# Patient Record
Sex: FEMALE | Race: WHITE | NOT HISPANIC OR LATINO | Employment: OTHER | URBAN - NONMETROPOLITAN AREA
[De-identification: names, ages, dates, MRNs, and addresses within clinical notes are randomized per-mention and may not be internally consistent; named-entity substitution may affect disease eponyms.]

---

## 2017-03-30 ENCOUNTER — TRANSCRIBE ORDERS (OUTPATIENT)
Dept: PHYSICAL THERAPY | Facility: HOSPITAL | Age: 47
End: 2017-03-30

## 2017-03-30 DIAGNOSIS — M54.5 LOW BACK PAIN, UNSPECIFIED BACK PAIN LATERALITY, UNSPECIFIED CHRONICITY, WITH SCIATICA PRESENCE UNSPECIFIED: Primary | ICD-10-CM

## 2017-04-25 ENCOUNTER — APPOINTMENT (OUTPATIENT)
Dept: PHYSICAL THERAPY | Facility: HOSPITAL | Age: 47
End: 2017-04-25
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
## 2019-07-15 ENCOUNTER — OFFICE VISIT (OUTPATIENT)
Dept: FAMILY MEDICINE CLINIC | Facility: CLINIC | Age: 49
End: 2019-07-15

## 2019-07-15 VITALS
DIASTOLIC BLOOD PRESSURE: 92 MMHG | HEIGHT: 65 IN | OXYGEN SATURATION: 97 % | WEIGHT: 113.6 LBS | SYSTOLIC BLOOD PRESSURE: 110 MMHG | TEMPERATURE: 98 F | HEART RATE: 91 BPM | BODY MASS INDEX: 18.93 KG/M2

## 2019-07-15 DIAGNOSIS — Z13.1 ENCOUNTER FOR SCREENING FOR DIABETES MELLITUS: ICD-10-CM

## 2019-07-15 DIAGNOSIS — R90.89 OTHER ABNORMAL FINDINGS ON DIAGNOSTIC IMAGING OF CENTRAL NERVOUS SYSTEM: ICD-10-CM

## 2019-07-15 DIAGNOSIS — K50.919 CROHN'S DISEASE WITH COMPLICATION, UNSPECIFIED GASTROINTESTINAL TRACT LOCATION (HCC): Primary | ICD-10-CM

## 2019-07-15 DIAGNOSIS — J44.9 CHRONIC OBSTRUCTIVE PULMONARY DISEASE, UNSPECIFIED COPD TYPE (HCC): ICD-10-CM

## 2019-07-15 DIAGNOSIS — Z72.0 TOBACCO USER: ICD-10-CM

## 2019-07-15 DIAGNOSIS — E53.8 B12 DEFICIENCY: ICD-10-CM

## 2019-07-15 DIAGNOSIS — Z71.6 TOBACCO ABUSE COUNSELING: ICD-10-CM

## 2019-07-15 DIAGNOSIS — Z12.11 ENCOUNTER FOR SCREENING COLONOSCOPY: ICD-10-CM

## 2019-07-15 DIAGNOSIS — Z12.31 SCREENING MAMMOGRAM, ENCOUNTER FOR: ICD-10-CM

## 2019-07-15 DIAGNOSIS — R59.0 CERVICAL LYMPHADENOPATHY: ICD-10-CM

## 2019-07-15 DIAGNOSIS — Z00.00 GENERAL MEDICAL EXAMINATION: ICD-10-CM

## 2019-07-15 DIAGNOSIS — E55.9 VITAMIN D DEFICIENCY: ICD-10-CM

## 2019-07-15 DIAGNOSIS — Z13.6 ENCOUNTER FOR SCREENING FOR CARDIOVASCULAR DISORDERS: ICD-10-CM

## 2019-07-15 DIAGNOSIS — K59.04 CHRONIC IDIOPATHIC CONSTIPATION: ICD-10-CM

## 2019-07-15 DIAGNOSIS — Z00.00 ANNUAL PHYSICAL EXAM: ICD-10-CM

## 2019-07-15 DIAGNOSIS — Z13.29 ENCOUNTER FOR SCREENING FOR ENDOCRINE DISORDER: ICD-10-CM

## 2019-07-15 DIAGNOSIS — R79.9 ABNORMAL FINDING OF BLOOD CHEMISTRY: ICD-10-CM

## 2019-07-15 PROCEDURE — 99203 OFFICE O/P NEW LOW 30 MIN: CPT | Performed by: FAMILY MEDICINE

## 2019-07-15 RX ORDER — VARENICLINE TARTRATE 1 MG/1
1 TABLET, FILM COATED ORAL 2 TIMES DAILY
Qty: 60 TABLET | Refills: 3 | Status: SHIPPED | OUTPATIENT
Start: 2019-07-15 | End: 2019-07-29

## 2019-07-15 RX ORDER — VARENICLINE TARTRATE 0.5 MG/1
TABLET, FILM COATED ORAL
Qty: 90 TABLET | Refills: 0 | Status: SHIPPED | OUTPATIENT
Start: 2019-07-15 | End: 2019-07-29

## 2019-07-15 RX ORDER — IPRATROPIUM/ALBUTEROL SULFATE 20-100 MCG
MIST INHALER (GRAM) INHALATION
Refills: 0 | COMMUNITY
Start: 2019-06-13 | End: 2019-08-29

## 2019-07-16 ENCOUNTER — LAB (OUTPATIENT)
Dept: LAB | Facility: HOSPITAL | Age: 49
End: 2019-07-16

## 2019-07-16 DIAGNOSIS — Z00.00 GENERAL MEDICAL EXAMINATION: ICD-10-CM

## 2019-07-16 DIAGNOSIS — R79.9 ABNORMAL FINDING OF BLOOD CHEMISTRY: ICD-10-CM

## 2019-07-16 DIAGNOSIS — E55.9 VITAMIN D DEFICIENCY: ICD-10-CM

## 2019-07-16 DIAGNOSIS — Z12.31 SCREENING MAMMOGRAM, ENCOUNTER FOR: ICD-10-CM

## 2019-07-16 DIAGNOSIS — Z12.11 ENCOUNTER FOR SCREENING COLONOSCOPY: ICD-10-CM

## 2019-07-16 DIAGNOSIS — R90.89 OTHER ABNORMAL FINDINGS ON DIAGNOSTIC IMAGING OF CENTRAL NERVOUS SYSTEM: ICD-10-CM

## 2019-07-16 DIAGNOSIS — Z13.29 ENCOUNTER FOR SCREENING FOR ENDOCRINE DISORDER: ICD-10-CM

## 2019-07-16 DIAGNOSIS — Z00.00 ANNUAL PHYSICAL EXAM: ICD-10-CM

## 2019-07-16 DIAGNOSIS — Z13.1 ENCOUNTER FOR SCREENING FOR DIABETES MELLITUS: ICD-10-CM

## 2019-07-16 DIAGNOSIS — E53.8 B12 DEFICIENCY: ICD-10-CM

## 2019-07-16 DIAGNOSIS — J44.9 CHRONIC OBSTRUCTIVE PULMONARY DISEASE, UNSPECIFIED COPD TYPE (HCC): ICD-10-CM

## 2019-07-16 DIAGNOSIS — Z13.6 ENCOUNTER FOR SCREENING FOR CARDIOVASCULAR DISORDERS: ICD-10-CM

## 2019-07-16 DIAGNOSIS — K50.919 CROHN'S DISEASE WITH COMPLICATION, UNSPECIFIED GASTROINTESTINAL TRACT LOCATION (HCC): ICD-10-CM

## 2019-07-16 LAB
25(OH)D3 SERPL-MCNC: 26.1 NG/ML (ref 30–100)
ALBUMIN SERPL-MCNC: 4.5 G/DL (ref 3.5–5.2)
ALBUMIN/GLOB SERPL: 2 G/DL
ALP SERPL-CCNC: 79 U/L (ref 39–117)
ALT SERPL W P-5'-P-CCNC: 23 U/L (ref 1–33)
ANION GAP SERPL CALCULATED.3IONS-SCNC: 11 MMOL/L (ref 5–15)
AST SERPL-CCNC: 23 U/L (ref 1–32)
BASOPHILS # BLD AUTO: 0.11 10*3/MM3 (ref 0–0.2)
BASOPHILS NFR BLD AUTO: 1.2 % (ref 0–1.5)
BILIRUB SERPL-MCNC: 0.5 MG/DL (ref 0.2–1.2)
BUN BLD-MCNC: 15 MG/DL (ref 6–20)
BUN/CREAT SERPL: 20.8 (ref 7–25)
CALCIUM SPEC-SCNC: 9.2 MG/DL (ref 8.6–10.5)
CHLORIDE SERPL-SCNC: 101 MMOL/L (ref 98–107)
CHOLEST SERPL-MCNC: 178 MG/DL (ref 0–200)
CO2 SERPL-SCNC: 31 MMOL/L (ref 22–29)
CREAT BLD-MCNC: 0.72 MG/DL (ref 0.57–1)
CRP SERPL-MCNC: 0.09 MG/DL (ref 0–0.5)
DEPRECATED RDW RBC AUTO: 44.1 FL (ref 37–54)
EOSINOPHIL # BLD AUTO: 0.41 10*3/MM3 (ref 0–0.4)
EOSINOPHIL NFR BLD AUTO: 4.4 % (ref 0.3–6.2)
ERYTHROCYTE [DISTWIDTH] IN BLOOD BY AUTOMATED COUNT: 12.4 % (ref 12.3–15.4)
ERYTHROCYTE [SEDIMENTATION RATE] IN BLOOD: 2 MM/HR (ref 0–20)
GFR SERPL CREATININE-BSD FRML MDRD: 85 ML/MIN/1.73
GLOBULIN UR ELPH-MCNC: 2.3 GM/DL
GLUCOSE BLD-MCNC: 96 MG/DL (ref 65–99)
HBA1C MFR BLD: 5.6 % (ref 4.8–5.6)
HCT VFR BLD AUTO: 45.3 % (ref 34–46.6)
HDLC SERPL-MCNC: 47 MG/DL (ref 40–60)
HGB BLD-MCNC: 14.4 G/DL (ref 12–15.9)
IMM GRANULOCYTES # BLD AUTO: 0.06 10*3/MM3 (ref 0–0.05)
IMM GRANULOCYTES NFR BLD AUTO: 0.7 % (ref 0–0.5)
LDLC SERPL CALC-MCNC: 99 MG/DL (ref 0–100)
LDLC/HDLC SERPL: 2.11 {RATIO}
LYMPHOCYTES # BLD AUTO: 3.72 10*3/MM3 (ref 0.7–3.1)
LYMPHOCYTES NFR BLD AUTO: 40.3 % (ref 19.6–45.3)
MCH RBC QN AUTO: 30.6 PG (ref 26.6–33)
MCHC RBC AUTO-ENTMCNC: 31.8 G/DL (ref 31.5–35.7)
MCV RBC AUTO: 96.2 FL (ref 79–97)
MONOCYTES # BLD AUTO: 0.91 10*3/MM3 (ref 0.1–0.9)
MONOCYTES NFR BLD AUTO: 9.9 % (ref 5–12)
NEUTROPHILS # BLD AUTO: 4.01 10*3/MM3 (ref 1.7–7)
NEUTROPHILS NFR BLD AUTO: 43.5 % (ref 42.7–76)
NRBC BLD AUTO-RTO: 0 /100 WBC (ref 0–0.2)
PLATELET # BLD AUTO: 434 10*3/MM3 (ref 140–450)
PMV BLD AUTO: 9.8 FL (ref 6–12)
POTASSIUM BLD-SCNC: 4.8 MMOL/L (ref 3.5–5.2)
PROT SERPL-MCNC: 6.8 G/DL (ref 6–8.5)
RBC # BLD AUTO: 4.71 10*6/MM3 (ref 3.77–5.28)
SODIUM BLD-SCNC: 143 MMOL/L (ref 136–145)
T3FREE SERPL-MCNC: 3.81 PG/ML (ref 2–4.4)
T4 FREE SERPL-MCNC: 1.16 NG/DL (ref 0.93–1.7)
TRIGL SERPL-MCNC: 159 MG/DL (ref 0–150)
TSH SERPL DL<=0.05 MIU/L-ACNC: 3.55 MIU/ML (ref 0.27–4.2)
VIT B12 BLD-MCNC: 782 PG/ML (ref 211–946)
VLDLC SERPL-MCNC: 31.8 MG/DL
WBC NRBC COR # BLD: 9.22 10*3/MM3 (ref 3.4–10.8)

## 2019-07-16 PROCEDURE — 85025 COMPLETE CBC W/AUTO DIFF WBC: CPT

## 2019-07-16 PROCEDURE — 86140 C-REACTIVE PROTEIN: CPT

## 2019-07-16 PROCEDURE — 82607 VITAMIN B-12: CPT

## 2019-07-16 PROCEDURE — 84439 ASSAY OF FREE THYROXINE: CPT

## 2019-07-16 PROCEDURE — 86340 INTRINSIC FACTOR ANTIBODY: CPT

## 2019-07-16 PROCEDURE — 80053 COMPREHEN METABOLIC PANEL: CPT

## 2019-07-16 PROCEDURE — 80061 LIPID PANEL: CPT

## 2019-07-16 PROCEDURE — 83036 HEMOGLOBIN GLYCOSYLATED A1C: CPT

## 2019-07-16 PROCEDURE — 84481 FREE ASSAY (FT-3): CPT

## 2019-07-16 PROCEDURE — 85651 RBC SED RATE NONAUTOMATED: CPT

## 2019-07-16 PROCEDURE — 82306 VITAMIN D 25 HYDROXY: CPT

## 2019-07-16 PROCEDURE — 84443 ASSAY THYROID STIM HORMONE: CPT

## 2019-07-17 ENCOUNTER — OFFICE VISIT (OUTPATIENT)
Dept: GASTROENTEROLOGY | Facility: CLINIC | Age: 49
End: 2019-07-17

## 2019-07-17 ENCOUNTER — TELEPHONE (OUTPATIENT)
Dept: FAMILY MEDICINE CLINIC | Facility: CLINIC | Age: 49
End: 2019-07-17

## 2019-07-17 VITALS
DIASTOLIC BLOOD PRESSURE: 60 MMHG | HEIGHT: 65 IN | BODY MASS INDEX: 19.13 KG/M2 | HEART RATE: 88 BPM | WEIGHT: 114.8 LBS | SYSTOLIC BLOOD PRESSURE: 102 MMHG

## 2019-07-17 DIAGNOSIS — R10.84 GENERALIZED ABDOMINAL PAIN: Primary | ICD-10-CM

## 2019-07-17 DIAGNOSIS — R11.0 NAUSEA: ICD-10-CM

## 2019-07-17 PROCEDURE — 99214 OFFICE O/P EST MOD 30 MIN: CPT | Performed by: NURSE PRACTITIONER

## 2019-07-17 RX ORDER — SODIUM, POTASSIUM,MAG SULFATES 17.5-3.13G
1 SOLUTION, RECONSTITUTED, ORAL ORAL EVERY 12 HOURS
Qty: 2 BOTTLE | Refills: 0 | Status: ON HOLD | OUTPATIENT
Start: 2019-07-17 | End: 2019-10-18

## 2019-07-17 RX ORDER — ERGOCALCIFEROL 1.25 MG/1
50000 CAPSULE ORAL WEEKLY
Qty: 4 CAPSULE | Refills: 3 | Status: SHIPPED | OUTPATIENT
Start: 2019-07-17 | End: 2020-07-23 | Stop reason: SDUPTHER

## 2019-07-17 RX ORDER — DEXTROSE AND SODIUM CHLORIDE 5; .45 G/100ML; G/100ML
30 INJECTION, SOLUTION INTRAVENOUS CONTINUOUS PRN
Status: CANCELLED | OUTPATIENT
Start: 2019-10-18

## 2019-07-17 NOTE — TELEPHONE ENCOUNTER
----- Message from Xavier Wick MD sent at 7/16/2019  1:22 PM CDT -----  Thyroid test so far normal

## 2019-07-17 NOTE — TELEPHONE ENCOUNTER
----- Message from Xavier Wick MD sent at 7/17/2019  9:36 AM CDT -----  TSH normal    Vitamin D is low and start pt on Vitamin D 50,000 units once a week Give 4 pills and 3 refills     Vitamin B12 levels normal. Awaiting intrinsic factor test    hga1c normal    ESR inflammation marker normal    awiating rest of labwork     Thanks
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
## 2019-07-18 LAB — IF BLOCK AB SER-ACNC: 0.9 AU/ML (ref 0–1.1)

## 2019-07-19 ENCOUNTER — TELEPHONE (OUTPATIENT)
Dept: FAMILY MEDICINE CLINIC | Facility: CLINIC | Age: 49
End: 2019-07-19

## 2019-07-19 NOTE — TELEPHONE ENCOUNTER
----- Message from Xavier Wick MD sent at 7/18/2019  3:38 PM CDT -----  Intrinsic factor normal..  Suggests that pt does not have pernicious anemia  Called pt
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
## 2019-07-29 ENCOUNTER — OFFICE VISIT (OUTPATIENT)
Dept: FAMILY MEDICINE CLINIC | Facility: CLINIC | Age: 49
End: 2019-07-29

## 2019-07-29 VITALS
HEIGHT: 65 IN | DIASTOLIC BLOOD PRESSURE: 70 MMHG | OXYGEN SATURATION: 97 % | SYSTOLIC BLOOD PRESSURE: 110 MMHG | TEMPERATURE: 98.9 F | BODY MASS INDEX: 19.16 KG/M2 | WEIGHT: 115 LBS | HEART RATE: 94 BPM

## 2019-07-29 DIAGNOSIS — J44.9 CHRONIC OBSTRUCTIVE PULMONARY DISEASE, UNSPECIFIED COPD TYPE (HCC): ICD-10-CM

## 2019-07-29 DIAGNOSIS — E78.2 MIXED HYPERLIPIDEMIA: ICD-10-CM

## 2019-07-29 DIAGNOSIS — Z72.0 TOBACCO USER: ICD-10-CM

## 2019-07-29 DIAGNOSIS — Z71.6 TOBACCO ABUSE COUNSELING: ICD-10-CM

## 2019-07-29 DIAGNOSIS — K50.919 CROHN'S DISEASE WITH COMPLICATION, UNSPECIFIED GASTROINTESTINAL TRACT LOCATION (HCC): ICD-10-CM

## 2019-07-29 DIAGNOSIS — E55.9 VITAMIN D DEFICIENCY: ICD-10-CM

## 2019-07-29 DIAGNOSIS — E53.8 B12 DEFICIENCY: ICD-10-CM

## 2019-07-29 DIAGNOSIS — G25.0 BENIGN HEAD TREMOR: Primary | ICD-10-CM

## 2019-07-29 PROCEDURE — 99214 OFFICE O/P EST MOD 30 MIN: CPT | Performed by: FAMILY MEDICINE

## 2019-08-08 ENCOUNTER — HOSPITAL ENCOUNTER (EMERGENCY)
Facility: HOSPITAL | Age: 49
Discharge: HOME | End: 2019-08-08
Attending: FAMILY MEDICINE | Admitting: FAMILY MEDICINE

## 2019-08-08 ENCOUNTER — APPOINTMENT (OUTPATIENT)
Dept: ULTRASOUND IMAGING | Facility: HOSPITAL | Age: 49
End: 2019-08-08

## 2019-08-08 VITALS
HEART RATE: 78 BPM | BODY MASS INDEX: 19.36 KG/M2 | WEIGHT: 116.2 LBS | SYSTOLIC BLOOD PRESSURE: 140 MMHG | DIASTOLIC BLOOD PRESSURE: 88 MMHG | RESPIRATION RATE: 16 BRPM | HEIGHT: 65 IN | OXYGEN SATURATION: 98 % | TEMPERATURE: 97.8 F

## 2019-08-08 DIAGNOSIS — R10.11 RIGHT UPPER QUADRANT ABDOMINAL PAIN: Primary | ICD-10-CM

## 2019-08-08 LAB
ALBUMIN SERPL-MCNC: 4.2 G/DL (ref 3.5–5.2)
ALBUMIN/GLOB SERPL: 1.8 G/DL
ALP SERPL-CCNC: 63 U/L (ref 39–117)
ALT SERPL W P-5'-P-CCNC: 12 U/L (ref 1–33)
ANION GAP SERPL CALCULATED.3IONS-SCNC: 9 MMOL/L (ref 5–15)
AST SERPL-CCNC: 16 U/L (ref 1–32)
BACTERIA UR QL AUTO: ABNORMAL /HPF
BASOPHILS # BLD AUTO: 0.07 10*3/MM3 (ref 0–0.2)
BASOPHILS NFR BLD AUTO: 0.7 % (ref 0–1.5)
BILIRUB SERPL-MCNC: 0.5 MG/DL (ref 0.2–1.2)
BILIRUB UR QL STRIP: NEGATIVE
BUN BLD-MCNC: 6 MG/DL (ref 6–20)
BUN/CREAT SERPL: 9.7 (ref 7–25)
CALCIUM SPEC-SCNC: 8.9 MG/DL (ref 8.6–10.5)
CHLORIDE SERPL-SCNC: 107 MMOL/L (ref 98–107)
CLARITY UR: CLEAR
CO2 SERPL-SCNC: 25 MMOL/L (ref 22–29)
COLOR UR: YELLOW
CREAT BLD-MCNC: 0.62 MG/DL (ref 0.57–1)
DEPRECATED RDW RBC AUTO: 42.2 FL (ref 37–54)
EOSINOPHIL # BLD AUTO: 0.34 10*3/MM3 (ref 0–0.4)
EOSINOPHIL NFR BLD AUTO: 3.3 % (ref 0.3–6.2)
ERYTHROCYTE [DISTWIDTH] IN BLOOD BY AUTOMATED COUNT: 12.6 % (ref 12.3–15.4)
GFR SERPL CREATININE-BSD FRML MDRD: 101 ML/MIN/1.73
GLOBULIN UR ELPH-MCNC: 2.3 GM/DL
GLUCOSE BLD-MCNC: 101 MG/DL (ref 65–99)
GLUCOSE UR STRIP-MCNC: NEGATIVE MG/DL
HCT VFR BLD AUTO: 38.1 % (ref 34–46.6)
HGB BLD-MCNC: 12.8 G/DL (ref 12–15.9)
HGB UR QL STRIP.AUTO: ABNORMAL
HOLD SPECIMEN: NORMAL
HOLD SPECIMEN: NORMAL
HYALINE CASTS UR QL AUTO: ABNORMAL /LPF
IMM GRANULOCYTES # BLD AUTO: 0.03 10*3/MM3 (ref 0–0.05)
IMM GRANULOCYTES NFR BLD AUTO: 0.3 % (ref 0–0.5)
KETONES UR QL STRIP: NEGATIVE
LEUKOCYTE ESTERASE UR QL STRIP.AUTO: NEGATIVE
LIPASE SERPL-CCNC: 27 U/L (ref 13–60)
LYMPHOCYTES # BLD AUTO: 3.12 10*3/MM3 (ref 0.7–3.1)
LYMPHOCYTES NFR BLD AUTO: 30.4 % (ref 19.6–45.3)
MCH RBC QN AUTO: 30.8 PG (ref 26.6–33)
MCHC RBC AUTO-ENTMCNC: 33.6 G/DL (ref 31.5–35.7)
MCV RBC AUTO: 91.8 FL (ref 79–97)
MONOCYTES # BLD AUTO: 0.68 10*3/MM3 (ref 0.1–0.9)
MONOCYTES NFR BLD AUTO: 6.6 % (ref 5–12)
NEUTROPHILS # BLD AUTO: 6.04 10*3/MM3 (ref 1.7–7)
NEUTROPHILS NFR BLD AUTO: 58.7 % (ref 42.7–76)
NITRITE UR QL STRIP: NEGATIVE
NRBC BLD AUTO-RTO: 0 /100 WBC (ref 0–0.2)
PH UR STRIP.AUTO: 7.5 [PH] (ref 5–9)
PLATELET # BLD AUTO: 379 10*3/MM3 (ref 140–450)
PMV BLD AUTO: 9.3 FL (ref 6–12)
POTASSIUM BLD-SCNC: 3.9 MMOL/L (ref 3.5–5.2)
PROT SERPL-MCNC: 6.5 G/DL (ref 6–8.5)
PROT UR QL STRIP: NEGATIVE
RBC # BLD AUTO: 4.15 10*6/MM3 (ref 3.77–5.28)
RBC # UR: ABNORMAL /HPF
REF LAB TEST METHOD: ABNORMAL
SODIUM BLD-SCNC: 141 MMOL/L (ref 136–145)
SP GR UR STRIP: 1.01 (ref 1–1.03)
SQUAMOUS #/AREA URNS HPF: ABNORMAL /HPF
UROBILINOGEN UR QL STRIP: ABNORMAL
WBC NRBC COR # BLD: 10.28 10*3/MM3 (ref 3.4–10.8)
WBC UR QL AUTO: ABNORMAL /HPF
WHOLE BLOOD HOLD SPECIMEN: NORMAL
WHOLE BLOOD HOLD SPECIMEN: NORMAL

## 2019-08-08 PROCEDURE — 96361 HYDRATE IV INFUSION ADD-ON: CPT

## 2019-08-08 PROCEDURE — 76705 ECHO EXAM OF ABDOMEN: CPT

## 2019-08-08 PROCEDURE — 96375 TX/PRO/DX INJ NEW DRUG ADDON: CPT

## 2019-08-08 PROCEDURE — 96374 THER/PROPH/DIAG INJ IV PUSH: CPT

## 2019-08-08 PROCEDURE — 85025 COMPLETE CBC W/AUTO DIFF WBC: CPT | Performed by: FAMILY MEDICINE

## 2019-08-08 PROCEDURE — 83690 ASSAY OF LIPASE: CPT | Performed by: FAMILY MEDICINE

## 2019-08-08 PROCEDURE — 80053 COMPREHEN METABOLIC PANEL: CPT | Performed by: FAMILY MEDICINE

## 2019-08-08 PROCEDURE — 25010000002 ONDANSETRON PER 1 MG: Performed by: FAMILY MEDICINE

## 2019-08-08 PROCEDURE — 25010000002 MORPHINE PER 10 MG: Performed by: FAMILY MEDICINE

## 2019-08-08 PROCEDURE — 99284 EMERGENCY DEPT VISIT MOD MDM: CPT

## 2019-08-08 PROCEDURE — 81001 URINALYSIS AUTO W/SCOPE: CPT | Performed by: FAMILY MEDICINE

## 2019-08-08 RX ORDER — ONDANSETRON 2 MG/ML
4 INJECTION INTRAMUSCULAR; INTRAVENOUS ONCE
Status: COMPLETED | OUTPATIENT
Start: 2019-08-08 | End: 2019-08-08

## 2019-08-08 RX ORDER — SODIUM CHLORIDE 0.9 % (FLUSH) 0.9 %
10 SYRINGE (ML) INJECTION AS NEEDED
Status: DISCONTINUED | OUTPATIENT
Start: 2019-08-08 | End: 2019-08-08 | Stop reason: HOSPADM

## 2019-08-08 RX ORDER — DICYCLOMINE HYDROCHLORIDE 10 MG/1
10 CAPSULE ORAL AS NEEDED
COMMUNITY
End: 2019-08-14 | Stop reason: DRUGHIGH

## 2019-08-08 RX ORDER — HYDROCODONE BITARTRATE AND ACETAMINOPHEN 5; 325 MG/1; MG/1
1 TABLET ORAL EVERY 6 HOURS PRN
Qty: 12 TABLET | Refills: 0 | Status: SHIPPED | OUTPATIENT
Start: 2019-08-08 | End: 2019-08-26

## 2019-08-08 RX ORDER — SODIUM CHLORIDE 9 MG/ML
125 INJECTION, SOLUTION INTRAVENOUS CONTINUOUS
Status: DISCONTINUED | OUTPATIENT
Start: 2019-08-08 | End: 2019-08-08 | Stop reason: HOSPADM

## 2019-08-08 RX ORDER — PROMETHAZINE HYDROCHLORIDE 25 MG/ML
12.5 INJECTION, SOLUTION INTRAMUSCULAR; INTRAVENOUS ONCE
Status: DISCONTINUED | OUTPATIENT
Start: 2019-08-08 | End: 2019-08-08

## 2019-08-08 RX ORDER — ONDANSETRON 4 MG/1
4 TABLET, ORALLY DISINTEGRATING ORAL EVERY 6 HOURS PRN
Qty: 10 TABLET | Refills: 0 | Status: SHIPPED | OUTPATIENT
Start: 2019-08-08 | End: 2019-11-13 | Stop reason: SDUPTHER

## 2019-08-08 RX ADMIN — ONDANSETRON 4 MG: 2 INJECTION INTRAMUSCULAR; INTRAVENOUS at 11:59

## 2019-08-08 RX ADMIN — MORPHINE SULFATE 4 MG: 4 INJECTION INTRAVENOUS at 12:00

## 2019-08-08 RX ADMIN — SODIUM CHLORIDE 125 ML/HR: 900 INJECTION, SOLUTION INTRAVENOUS at 13:25

## 2019-08-08 RX ADMIN — SODIUM CHLORIDE 1000 ML: 9 INJECTION, SOLUTION INTRAVENOUS at 11:59

## 2019-08-08 NOTE — ED PROVIDER NOTES
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 Last night patient developed RUQ pain when she drank coffee with cream.         Abdominal Pain   Pain location:  RUQ  Pain quality: aching    Pain radiates to:  Does not radiate  Pain severity:  Moderate  Onset quality:  Sudden  Duration:  1 day  Timing:  Intermittent  Progression:  Waxing and waning  Chronicity:  Recurrent  Context: not suspicious food intake and not trauma    Relieved by:  Lying down  Worsened by:  Eating  Associated symptoms: diarrhea, nausea and vomiting    Associated symptoms: no chest pain, no chills, no constipation, no cough, no dysuria, no fatigue, no fever, no shortness of breath and no sore throat    Risk factors: not obese    Nausea   The primary symptoms include abdominal pain, nausea, vomiting and diarrhea. Primary symptoms do not include fever, fatigue, dysuria, myalgias or rash.   The illness does not include chills or constipation.   Diarrhea   The primary symptoms include abdominal pain, nausea, vomiting and diarrhea. Primary symptoms do not include fever, fatigue, dysuria, myalgias or rash.   The illness does not include chills or constipation.       Review of Systems   Constitutional: Negative for appetite change, chills, diaphoresis, fatigue and fever.   HENT: Negative for congestion, ear discharge, ear pain, nosebleeds, rhinorrhea, sinus pressure, sore throat and trouble swallowing.    Eyes: Negative for discharge and redness.   Respiratory: Negative for apnea, cough, chest tightness, shortness of breath and wheezing.    Cardiovascular: Negative for chest pain.   Gastrointestinal: Positive for abdominal pain, diarrhea, nausea and vomiting. Negative for constipation.   Endocrine: Negative for polyuria.   Genitourinary: Negative for dysuria, frequency and urgency.   Musculoskeletal: Negative for myalgias and neck pain.   Skin: Negative for color change and rash.    Allergic/Immunologic: Negative for immunocompromised state.   Neurological: Negative for dizziness, seizures, syncope, weakness, light-headedness and headaches.   Hematological: Negative for adenopathy. Does not bruise/bleed easily.   Psychiatric/Behavioral: Negative for behavioral problems and confusion.   All other systems reviewed and are negative.      Past Medical History:   Diagnosis Date   • Colon polyp    • Crohn's disease (CMS/HCC)    • Diverticulitis of colon    • Irritable bowel syndrome    • Pancreatitis        Allergies   Allergen Reactions   • Azithromycin Swelling   • Ciprofloxacin Hives   • Doxycycline Swelling   • Levofloxacin Rash   • Phenergan [Promethazine Hcl] GI Intolerance       Past Surgical History:   Procedure Laterality Date   • COLONOSCOPY     • HYSTERECTOMY     • TONSILECTOMY, ADENOIDECTOMY, BILATERAL MYRINGOTOMY AND TUBES     • UPPER GASTROINTESTINAL ENDOSCOPY         Family History   Problem Relation Age of Onset   • Inflammatory bowel disease Mother        Social History     Socioeconomic History   • Marital status:      Spouse name: Not on file   • Number of children: Not on file   • Years of education: Not on file   • Highest education level: Not on file   Tobacco Use   • Smoking status: Current Every Day Smoker     Types: Cigarettes   • Smokeless tobacco: Never Used   Substance and Sexual Activity   • Alcohol use: No     Frequency: Never   • Drug use: No   • Sexual activity: Defer           Objective   Physical Exam   Constitutional: She is oriented to person, place, and time. She appears well-developed and well-nourished.   HENT:   Head: Normocephalic and atraumatic.   Nose: Nose normal.   Mouth/Throat: Oropharynx is clear and moist.   Eyes: Conjunctivae and EOM are normal. Pupils are equal, round, and reactive to light. Right eye exhibits no discharge. Left eye exhibits no discharge. No scleral icterus.   Neck: Normal range of motion. Neck supple. No tracheal deviation  present.   Cardiovascular: Normal rate, regular rhythm and normal heart sounds.   No murmur heard.  Pulmonary/Chest: Effort normal and breath sounds normal. No stridor. No respiratory distress. She has no wheezes. She has no rales.   Abdominal: Soft. Bowel sounds are normal. She exhibits no distension and no mass. There is no tenderness. There is no rebound and no guarding.   Musculoskeletal: She exhibits no edema.   Neurological: She is alert and oriented to person, place, and time. Coordination normal.   Skin: Skin is warm and dry. No rash noted. No erythema.   Psychiatric: She has a normal mood and affect. Her behavior is normal. Thought content normal.   Nursing note and vitals reviewed.      Procedures           ED Course        Labs Reviewed   COMPREHENSIVE METABOLIC PANEL - Abnormal; Notable for the following components:       Result Value    Glucose 101 (*)     All other components within normal limits    Narrative:     GFR Normal >60  Chronic Kidney Disease <60  Kidney Failure <15   URINALYSIS W/ MICROSCOPIC IF INDICATED (NO CULTURE) - Abnormal; Notable for the following components:    Blood, UA Trace (*)     All other components within normal limits   CBC WITH AUTO DIFFERENTIAL - Abnormal; Notable for the following components:    Lymphocytes, Absolute 3.12 (*)     All other components within normal limits   URINALYSIS, MICROSCOPIC ONLY - Abnormal; Notable for the following components:    RBC, UA 0-2 (*)     All other components within normal limits   LIPASE - Normal   RAINBOW DRAW    Narrative:     The following orders were created for panel order [place] Draw.  Procedure                               Abnormality         Status                     ---------                               -----------         ------                     Light Blue Top[[id number]]                                   Final result               Green Top (Gel)[[id number]]                                  Final result                Lavender Top[[id number]]                                     Final result               Gold Top - SST[[id number]]                                   Final result                 Please view results for these tests on the individual orders.   CBC AND DIFFERENTIAL    Narrative:     The following orders were created for panel order CBC & Differential.  Procedure                               Abnormality         Status                     ---------                               -----------         ------                     CBC Auto Differential[[id number]]        Abnormal            Final result                 Please view results for these tests on the individual orders.   LIGHT BLUE TOP   GREEN TOP   LAVENDER TOP   GOLD TOP - SST       US Gallbladder   Final Result   Normal right upper quadrant abdominal ultrasound.      [id number]      Electronically signed by:  Rambo Lainez MD  8/8/2019 1:20 PM   CDT Workstation: [id number]                      [facility]      Final diagnoses:   Right upper quadrant abdominal pain            Nic Fuchs MD  08/08/19 142

## 2019-08-08 NOTE — ED TRIAGE NOTES
Pt presents to ED with C/O RUQ abdominal pain that began at 1800 last night after eating chicken. Pt reports pain lasted until 2200. Pt reports the pain began again this AM at 0730 after drinking coffee with creamer. Pt reports that pain as burning as sharp, pain is worse with movement. Pt began having N/V/D this AM

## 2019-08-12 ENCOUNTER — OFFICE VISIT (OUTPATIENT)
Dept: FAMILY MEDICINE CLINIC | Facility: CLINIC | Age: 49
End: 2019-08-12

## 2019-08-12 VITALS
SYSTOLIC BLOOD PRESSURE: 116 MMHG | TEMPERATURE: 99.6 F | DIASTOLIC BLOOD PRESSURE: 78 MMHG | HEIGHT: 65 IN | WEIGHT: 114.4 LBS | HEART RATE: 89 BPM | BODY MASS INDEX: 19.06 KG/M2 | OXYGEN SATURATION: 97 %

## 2019-08-12 DIAGNOSIS — R29.818 NEUROLOGICAL ABNORMALITY: ICD-10-CM

## 2019-08-12 DIAGNOSIS — K59.04 CHRONIC IDIOPATHIC CONSTIPATION: ICD-10-CM

## 2019-08-12 DIAGNOSIS — K50.919 CROHN'S DISEASE WITH COMPLICATION, UNSPECIFIED GASTROINTESTINAL TRACT LOCATION (HCC): ICD-10-CM

## 2019-08-12 DIAGNOSIS — Z71.6 TOBACCO ABUSE COUNSELING: ICD-10-CM

## 2019-08-12 DIAGNOSIS — E55.9 VITAMIN D DEFICIENCY: ICD-10-CM

## 2019-08-12 DIAGNOSIS — R10.11 RUQ PAIN: Primary | ICD-10-CM

## 2019-08-12 DIAGNOSIS — Z72.0 TOBACCO USER: ICD-10-CM

## 2019-08-12 DIAGNOSIS — J44.9 CHRONIC OBSTRUCTIVE PULMONARY DISEASE, UNSPECIFIED COPD TYPE (HCC): ICD-10-CM

## 2019-08-12 DIAGNOSIS — R10.84 GENERALIZED ABDOMINAL PAIN: ICD-10-CM

## 2019-08-12 DIAGNOSIS — R25.1 TREMOR: ICD-10-CM

## 2019-08-12 PROCEDURE — 99214 OFFICE O/P EST MOD 30 MIN: CPT | Performed by: FAMILY MEDICINE
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
## 2019-08-12 NOTE — PATIENT INSTRUCTIONS
Gallbladder Eating Plan  If you have a gallbladder condition, you may have trouble digesting fats. Eating a low-fat diet can help reduce your symptoms, and may be helpful before and after having surgery to remove your gallbladder (cholecystectomy). Your health care provider may recommend that you work with a diet and nutrition specialist (dietitian) to help you reduce the amount of fat in your diet.  What are tips for following this plan?  General guidelines  · Limit your fat intake to less than 30% of your total daily calories. If you eat around 1,800 calories each day, this is less than 60 grams (g) of fat per day.  · Fat is an important part of a healthy diet. Eating a low-fat diet can make it hard to maintain a healthy body weight. Ask your dietitian how much fat, calories, and other nutrients you need each day.  · Eat small, frequent meals throughout the day instead of three large meals.  · Drink at least 8-10 cups of fluid a day. Drink enough fluid to keep your urine clear or pale yellow.  · Limit alcohol intake to no more than 1 drink a day for nonpregnant women and 2 drinks a day for men. One drink equals 12 oz of beer, 5 oz of wine, or 1½ oz of hard liquor.  Reading food labels  · Check Nutrition Facts on food labels for the amount of fat per serving. Choose foods with less than 3 grams of fat per serving.  Shopping  · Choose nonfat and low-fat healthy foods. Look for the words “nonfat,” “low fat,” or “fat free.”  · Avoid buying processed or prepackaged foods.  Cooking  · Cook using low-fat methods, such as baking, broiling, grilling, or boiling.  · Cook with small amounts of healthy fats, such as olive oil, grapeseed oil, canola oil, or sunflower oil.  What foods are recommended?    · All fresh, frozen, or canned fruits and vegetables.  · Whole grains.  · Low-fat or non-fat (skim) milk and yogurt.  · Lean meat, skinless poultry, fish, eggs, and beans.  · Low-fat protein supplement powders or  drinks.  · Spices and herbs.  What foods are not recommended?  · High-fat foods. These include baked goods, fast food, fatty cuts of meat, ice cream, french toast, sweet rolls, pizza, cheese bread, foods covered with butter, creamy sauces, or cheese.  · Fried foods. These include french fries, tempura, battered fish, breaded chicken, fried breads, and sweets.  · Foods with strong odors.  · Foods that cause bloating and gas.  Summary  · A low-fat diet can be helpful if you have a gallbladder condition, or before and after gallbladder surgery.  · Limit your fat intake to less than 30% of your total daily calories. This is about 60 g of fat if you eat 1,800 calories each day.  · Eat small, frequent meals throughout the day instead of three large meals.  This information is not intended to replace advice given to you by your health care provider. Make sure you discuss any questions you have with your health care provider.  Document Released: 12/23/2014 Document Revised: 01/25/2018 Document Reviewed: 01/25/2018  "[employer] Interactive Patient Education © 2019 [employer]    Gallbladder Eating Plan  If you have a gallbladder condition, you may have trouble digesting fats. Eating a low-fat diet can help reduce your symptoms, and may be helpful before and after having surgery to remove your gallbladder (cholecystectomy). Your health care provider may recommend that you work with a diet and nutrition specialist (dietitian) to help you reduce the amount of fat in your diet.  What are tips for following this plan?  General guidelines  · Limit your fat intake to less than 30% of your total daily calories. If you eat around 1,800 calories each day, this is less than 60 grams (g) of fat per day.  · Fat is an important part of a healthy diet. Eating a low-fat diet can make it hard to maintain a healthy body weight. Ask your dietitian how much fat, calories, and other nutrients you need each day.  · Eat small, frequent meals  throughout the day instead of three large meals.  · Drink at least 8-10 cups of fluid a day. Drink enough fluid to keep your urine clear or pale yellow.  · Limit alcohol intake to no more than 1 drink a day for nonpregnant women and 2 drinks a day for men. One drink equals 12 oz of beer, 5 oz of wine, or 1½ oz of hard liquor.  Reading food labels  · Check Nutrition Facts on food labels for the amount of fat per serving. Choose foods with less than 3 grams of fat per serving.  Shopping  · Choose nonfat and low-fat healthy foods. Look for the words “nonfat,” “low fat,” or “fat free.”  · Avoid buying processed or prepackaged foods.  Cooking  · Cook using low-fat methods, such as baking, broiling, grilling, or boiling.  · Cook with small amounts of healthy fats, such as olive oil, grapeseed oil, canola oil, or sunflower oil.  What foods are recommended?    · All fresh, frozen, or canned fruits and vegetables.  · Whole grains.  · Low-fat or non-fat (skim) milk and yogurt.  · Lean meat, skinless poultry, fish, eggs, and beans.  · Low-fat protein supplement powders or drinks.  · Spices and herbs.  What foods are not recommended?  · High-fat foods. These include baked goods, fast food, fatty cuts of meat, ice cream, french toast, sweet rolls, pizza, cheese bread, foods covered with butter, creamy sauces, or cheese.  · Fried foods. These include french fries, tempura, battered fish, breaded chicken, fried breads, and sweets.  · Foods with strong odors.  · Foods that cause bloating and gas.  Summary  · A low-fat diet can be helpful if you have a gallbladder condition, or before and after gallbladder surgery.  · Limit your fat intake to less than 30% of your total daily calories. This is about 60 g of fat if you eat 1,800 calories each day.  · Eat small, frequent meals throughout the day instead of three large meals.  This information is not intended to replace advice given to you by your health care provider. Make sure you  discuss any questions you have with your health care provider.  Document Released: 12/23/2014 Document Revised: 01/25/2018 Document Reviewed: 01/25/2018  [employer] Interactive Patient Education © 2019 [employer]

## 2019-08-14 ENCOUNTER — OFFICE VISIT (OUTPATIENT)
Dept: GASTROENTEROLOGY | Facility: CLINIC | Age: 49
End: 2019-08-14

## 2019-08-14 VITALS
BODY MASS INDEX: 19.56 KG/M2 | HEIGHT: 65 IN | WEIGHT: 117.4 LBS | HEART RATE: 86 BPM | DIASTOLIC BLOOD PRESSURE: 62 MMHG | SYSTOLIC BLOOD PRESSURE: 118 MMHG

## 2019-08-14 DIAGNOSIS — R10.84 GENERALIZED ABDOMINAL PAIN: Primary | ICD-10-CM

## 2019-08-14 DIAGNOSIS — R19.7 DIARRHEA, UNSPECIFIED TYPE: ICD-10-CM

## 2019-08-14 DIAGNOSIS — R11.14 BILIOUS VOMITING WITH NAUSEA: ICD-10-CM

## 2019-08-14 PROCEDURE — 99213 OFFICE O/P EST LOW 20 MIN: CPT | Performed by: NURSE PRACTITIONER
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
## 2019-08-14 NOTE — PATIENT INSTRUCTIONS
Nausea, Adult  Nausea is the feeling of an upset stomach or having to vomit. Nausea on its own is not usually a serious concern, but it may be an early sign of a more serious medical problem. As nausea gets worse, it can lead to vomiting. If vomiting develops, or if you are not able to drink enough fluids, you are at risk of becoming dehydrated. Dehydration can make you tired and thirsty, cause you to have a dry mouth, and decrease how often you urinate. Older adults and people with other diseases or a weak immune system are at higher risk for dehydration. The main goals of treating your nausea are:  · To limit repeated nausea episodes.  · To prevent vomiting and dehydration.  Follow these instructions at home:  Follow instructions from your health care provider about how to care for yourself at home.  Eating and drinking  Follow these recommendations as told by your health care provider:  · Take an oral rehydration solution (ORS). This is a drink that is sold at pharmacies and retail stores.  · Drink clear fluids in small amounts as you are able. Clear fluids include water, ice chips, diluted fruit juice, and low-calorie sports drinks.  · Eat bland, easy-to-digest foods in small amounts as you are able. These foods include bananas, applesauce, rice, lean meats, toast, and crackers.  · Avoid drinking fluids that contain a lot of sugar or caffeine, such as energy drinks, sports drinks, and soda.  · Avoid alcohol.  · Avoid spicy or fatty foods.  General instructions  · Drink enough fluid to keep your urine clear or pale yellow.  · Wash your hands often. If soap and water are not available, use hand .  · Make sure that all people in your household wash their hands well and often.  · Rest at home while you recover.  · Take over-the-counter and prescription medicines only as told by your health care provider.  · Breathe slowly and deeply when you feel nauseous.  · Watch your condition for any changes.  · Keep  all follow-up visits as told by your health care provider. This is important.  Contact a health care provider if:  · You have a headache.  · You have new symptoms.  · Your nausea gets worse.  · You have a fever.  · You feel light-headed or dizzy.  · You vomit.  · You cannot keep fluids down.  Get help right away if:  · You have pain in your chest, neck, arm, or jaw.  · You feel extremely weak or you faint.  · You have vomit that is bright red or looks like coffee grounds.  · You have bloody or black stools or stools that look like tar.  · You have a severe headache, a stiff neck, or both.  · You have severe pain, cramping, or bloating in your abdomen.  · You have a rash.  · You have difficulty breathing or are breathing very quickly.  · Your heart is beating very quickly.  · Your skin feels cold and clammy.  · You feel confused.  · You have pain when you urinate.  · You have signs of dehydration, such as:  ? Dark urine, very little, or no urine.  ? Cracked lips.  ? Dry mouth.  ? Sunken eyes.  ? Sleepiness.  ? Weakness.  These symptoms may represent a serious problem that is an emergency. Do not wait to see if the symptoms will go away. Get medical help right away. Call your local emergency services (911 in the [place] Do not drive yourself to the hospital.  This information is not intended to replace advice given to you by your health care provider. Make sure you discuss any questions you have with your health care provider.  Document Released: 01/25/2006 Document Revised: 05/22/2017 Document Reviewed: 08/23/2016  [employer] Education © 2019 [employer]

## 2019-08-19 ENCOUNTER — HOSPITAL ENCOUNTER (OUTPATIENT)
Dept: NUCLEAR MEDICINE | Facility: HOSPITAL | Age: 49
Discharge: HOME | End: 2019-08-19

## 2019-08-19 ENCOUNTER — TELEPHONE (OUTPATIENT)
Dept: FAMILY MEDICINE CLINIC | Facility: CLINIC | Age: 49
End: 2019-08-19

## 2019-08-19 DIAGNOSIS — R10.11 RUQ PAIN: ICD-10-CM

## 2019-08-19 DIAGNOSIS — R10.84 GENERALIZED ABDOMINAL PAIN: ICD-10-CM

## 2019-08-19 PROCEDURE — A9537 TC99M MEBROFENIN: HCPCS | Performed by: FAMILY MEDICINE

## 2019-08-19 PROCEDURE — 0 TECHNETIUM TC 99M MEBROFENIN KIT: Performed by: FAMILY MEDICINE

## 2019-08-19 PROCEDURE — 78226 HEPATOBILIARY SYSTEM IMAGING: CPT

## 2019-08-19 RX ORDER — KIT FOR THE PREPARATION OF TECHNETIUM TC 99M MEBROFENIN 45 MG/10ML
1 INJECTION, POWDER, LYOPHILIZED, FOR SOLUTION INTRAVENOUS
Status: COMPLETED | OUTPATIENT
Start: 2019-08-19 | End: 2019-08-19

## 2019-08-19 RX ADMIN — MEBROFENIN 1 DOSE: 45 INJECTION, POWDER, LYOPHILIZED, FOR SOLUTION INTRAVENOUS at 09:15

## 2019-08-19 NOTE — TELEPHONE ENCOUNTER
----- Message from Xavier Wick MD sent at 8/19/2019  1:08 PM CDT -----  Call pt    Her HIDA scan was normal and she had a normal ejection fraction of gallbladder at 72%. She will need to continue followup with Gastroenterologist. Thanks  Called pt

## 2019-08-26 NOTE — PROGRESS NOTES
Subjective:  Carla Richard is a 49 y.o. female who presents for       Patient Active Problem List   Diagnosis   • Tobacco abuse counseling   • Chronic idiopathic constipation   • B12 deficiency   • Vitamin D deficiency    • Tobacco user   • Chronic obstructive pulmonary disease (CMS/HCC)   • Crohn's disease with complication (CMS/HCC)   • Cervical lymphadenopathy   • Generalized abdominal pain   • Nausea   • Neurological abnormality   • Tremor   • RUQ pain   • COPD exacerbation (CMS/HCC)           Current Outpatient Medications:   •  albuterol sulfate  (90 Base) MCG/ACT inhaler, Inhale 2 puffs Every 4 (Four) Hours As Needed for Shortness of Air., Disp: 1 inhaler, Rfl: 3  •  doxycycline (MONODOX) 50 MG capsule, Take 2 capsules by mouth 2 (Two) Times a Day for 10 days., Disp: 40 capsule, Rfl: 0  •  Fluticasone Furoate-Vilanterol (BREO ELLIPTA) 100-25 MCG/INH inhaler, Inhale 1 puff Daily., Disp: 28 each, Rfl: 3  •  ondansetron ODT (ZOFRAN-ODT) 4 MG disintegrating tablet, Take 1 tablet by mouth Every 6 (Six) Hours As Needed for Nausea or Vomiting., Disp: 10 tablet, Rfl: 0  •  predniSONE (DELTASONE) 10 MG tablet, Take 4 pills for  4 days 3 pills for 3 days 2 pills for 2 pills and 1 pill for 1 day then stop, Disp: 30 tablet, Rfl: 0  •  sodium-potassium-magnesium sulfates (SUPREP BOWEL PREP KIT) 17.5-3.13-1.6 GM/177ML solution oral solution, Take 1 bottle by mouth Every 12 (Twelve) Hours., Disp: 2 bottle, Rfl: 0  •  vitamin B-12 (CYANOCOBALAMIN) 250 MCG tablet, Take 1 tablet by mouth Daily., Disp: 30 tablet, Rfl: 3  •  vitamin D (ERGOCALCIFEROL) 50968 units capsule capsule, Take 1 capsule by mouth 1 (One) Time Per Week., Disp: 4 capsule, Rfl: 3    HPI     8/12/19  Pt is here for recheck. Since last visit pt has been to [facility] ER on 8/8/19 for RUQ pain and tenderness. Had RUQ pain that was negative for gallstones. Lipase was normal . CBC showed normal WBC and CMP showed normal renal function. UA showed 0-2  RBC.  She was given Norco and zofran for pain.  She states she has a spinchter of oddi issue and has several stents put in for pancreatic stricture/chronic pancreatitis. . SHe has EGD and Colonoscopy scheduled with Dr. Davis on 8/30/19. She did see Gastroenterology [facility] dahl on 7/17/19/  She states on 8/7/19 she had fried chicken and that night it hurt.     She saw Dr. Manjarrez today Neurologist and was started on primodone 50 mg  1/2 tablet at bedtime.      8/29/19 pt is here for recheck she has been coughing and congested for a month.  She was seeing PUlmonologist in the past Dr. Woodward and was diagnosed this year.   She is currenlty on combivent.  She has been smoking tobacco along with  E cigarettes. She has been coughing up sputum. She has scheduled an endoscopy tomorrow.  She has had weakness and fatigue. She has been smoking for more than 30 years     Abdominal Pain   This is a recurrent problem. The current episode started more than 1 year ago. The onset quality is sudden. The problem has been waxing and waning. The pain is located in the generalized abdominal region, epigastric region and RUQ. The pain is at a severity of 5/10. The pain is moderate. The quality of the pain is aching, a sensation of fullness and burning. The abdominal pain radiates to the epigastric region. Associated symptoms include constipation. Pertinent negatives include no anorexia, arthralgias, belching, diarrhea, dysuria, fever, flatus, frequency, headaches, hematochezia, hematuria, melena, myalgias, nausea, vomiting or weight loss. Nothing aggravates the pain. The pain is relieved by being still. The treatment provided no relief. Prior diagnostic workup includes ultrasound. Her past medical history is significant for Crohn's disease. There is no history of abdominal surgery, colon cancer, gallstones, GERD, irritable bowel syndrome, pancreatitis, PUD or ulcerative colitis.    Neurologic Problem   The patient's primary symptoms  include focal sensory loss and weakness. The patient's pertinent negatives include no altered mental status, clumsiness, loss of balance, memory loss, near-syncope, slurred speech, syncope or visual change. This is a recurrent problem. The neurological problem developed gradually. The problem has been waxing and waning since onset. There was no focality noted. Associated symptoms include a fever and shortness of breath. Pertinent negatives include no abdominal pain, auditory change, aura, back pain, bladder incontinence, bowel incontinence, chest pain, confusion, diaphoresis, dizziness, fatigue, headaches, light-headedness, nausea, neck pain, palpitations, vertigo or vomiting. Past treatments include nothing. The treatment provided no relief. There is no history of a bleeding disorder, a clotting disorder, a CVA, head trauma, liver disease, mood changes or seizures.   COPD   This is a chronic problem. The current episode started more than 1 year ago. The problem occurs constantly. The problem has been unchanged. Associated symptoms include abdominal pain, arthralgias, fatigue, joint swelling, numbness and weakness. Pertinent negatives include no anorexia, change in bowel habit, chest pain, chills, congestion, coughing, diaphoresis, fever, headaches, myalgias, nausea, neck pain, sore throat, swollen glands, urinary symptoms, vertigo, visual change or vomiting. The symptoms are aggravated by smoking. She has tried nothing (combivent ) for the symptoms.   Fatigue   This is a chronic problem. The current episode started more than 1 year ago. The problem occurs constantly. The problem has been unchanged. Associated symptoms include abdominal pain, arthralgias, fatigue, joint swelling, numbness and weakness. Pertinent negatives include no anorexia, change in bowel habit, chest pain, chills, congestion, coughing, diaphoresis, fever, headaches, myalgias, nausea, neck pain, sore throat, swollen glands, urinary  symptoms, vertigo, visual change or vomiting. Nothing aggravates the symptoms. She has tried nothing (b12 injections ) for the symptoms. The treatment provided no relief.    Review of Systems  Review of Systems   Constitutional: Positive for activity change and fatigue. Negative for appetite change, chills, diaphoresis and fever.   HENT: Positive for congestion and sore throat. Negative for postnasal drip, rhinorrhea, sinus pressure, sinus pain, sneezing, trouble swallowing and voice change.    Respiratory: Positive for cough, shortness of breath, wheezing and stridor. Negative for choking and chest tightness.    Cardiovascular: Negative for chest pain.   Gastrointestinal: Negative for diarrhea, nausea and vomiting.   Musculoskeletal: Positive for arthralgias.   Neurological: Positive for weakness. Negative for headaches.   Psychiatric/Behavioral: Positive for agitation.       Patient Active Problem List   Diagnosis   • Tobacco abuse counseling   • Chronic idiopathic constipation   • B12 deficiency   • Vitamin D deficiency    • Tobacco user   • Chronic obstructive pulmonary disease (CMS/HCC)   • Crohn's disease with complication (CMS/HCC)   • Cervical lymphadenopathy   • Generalized abdominal pain   • Nausea   • Neurological abnormality   • Tremor   • RUQ pain   • COPD exacerbation (CMS/HCC)     Past Surgical History:   Procedure Laterality Date   • COLONOSCOPY     • HYSTERECTOMY     • TONSILECTOMY, ADENOIDECTOMY, BILATERAL MYRINGOTOMY AND TUBES     • UPPER GASTROINTESTINAL ENDOSCOPY       Social History     Socioeconomic History   • Marital status:      Spouse name: Not on file   • Number of children: Not on file   • Years of education: Not on file   • Highest education level: Not on file   Tobacco Use   • Smoking status: Current Every Day Smoker     Packs/day: 1.00     Types: Cigarettes   • Smokeless tobacco: Never Used   Substance and Sexual Activity   • Alcohol use: No     Frequency: Never   • Drug use:  No   • Sexual activity: Defer     Family History   Problem Relation Age of Onset   • Inflammatory bowel disease Mother      Admission on 08/08/2019, Discharged on 08/08/2019   Component Date Value Ref Range Status   • Glucose 08/08/2019 101* 65 - 99 mg/dL Final   • BUN 08/08/2019 6  6 - 20 mg/dL Final   • Creatinine 08/08/2019 0.62  0.57 - 1.00 mg/dL Final   • Sodium 08/08/2019 141  136 - 145 mmol/L Final   • Potassium 08/08/2019 3.9  3.5 - 5.2 mmol/L Final   • Chloride 08/08/2019 107  98 - 107 mmol/L Final   • CO2 08/08/2019 25.0  22.0 - 29.0 mmol/L Final   • Calcium 08/08/2019 8.9  8.6 - 10.5 mg/dL Final   • Total Protein 08/08/2019 6.5  6.0 - 8.5 g/dL Final   • Albumin 08/08/2019 4.20  3.50 - 5.20 g/dL Final   • ALT (SGPT) 08/08/2019 12  1 - 33 U/L Final   • AST (SGOT) 08/08/2019 16  1 - 32 U/L Final   • Alkaline Phosphatase 08/08/2019 63  39 - 117 U/L Final   • Total Bilirubin 08/08/2019 0.5  0.2 - 1.2 mg/dL Final   • eGFR Non African Amer 08/08/2019 101  >60 mL/min/1.73 Final   • Globulin 08/08/2019 2.3  gm/dL Final   • A/G Ratio 08/08/2019 1.8  g/dL Final   • BUN/Creatinine Ratio 08/08/2019 9.7  7.0 - 25.0 Final   • Anion Gap 08/08/2019 9.0  5.0 - 15.0 mmol/L Final   • Lipase 08/08/2019 27  13 - 60 U/L Final   • Color, UA 08/08/2019 Yellow  Yellow, Straw, Dark Yellow, Ana M Final   • Appearance, UA 08/08/2019 Clear  Clear Final   • pH, UA 08/08/2019 7.5  5.0 - 9.0 Final   • Specific Gravity, UA 08/08/2019 1.006  1.003 - 1.030 Final   • Glucose, UA 08/08/2019 Negative  Negative Final   • Ketones, UA 08/08/2019 Negative  Negative Final   • Bilirubin, UA 08/08/2019 Negative  Negative Final   • Blood, UA 08/08/2019 Trace* Negative Final   • Protein, UA 08/08/2019 Negative  Negative Final   • Leuk Esterase, UA 08/08/2019 Negative  Negative Final   • Nitrite, UA 08/08/2019 Negative  Negative Final   • Urobilinogen, UA 08/08/2019 0.2 E.U./dL  0.2 - 1.0 E.U./dL Final   • Extra Tube 08/08/2019 hold for add-on   Final     Auto resulted   • Extra Tube 08/08/2019 Hold for add-ons.   Final    Auto resulted.   • Extra Tube 08/08/2019 hold for add-on   Final    Auto resulted   • Extra Tube 08/08/2019 Hold for add-ons.   Final    Auto resulted.   • WBC 08/08/2019 10.28  3.40 - 10.80 10*3/mm3 Final   • RBC 08/08/2019 4.15  3.77 - 5.28 10*6/mm3 Final   • Hemoglobin 08/08/2019 12.8  12.0 - 15.9 g/dL Final   • Hematocrit 08/08/2019 38.1  34.0 - 46.6 % Final   • MCV 08/08/2019 91.8  79.0 - 97.0 fL Final   • MCH 08/08/2019 30.8  26.6 - 33.0 pg Final   • MCHC 08/08/2019 33.6  31.5 - 35.7 g/dL Final   • RDW 08/08/2019 12.6  12.3 - 15.4 % Final   • RDW-SD 08/08/2019 42.2  37.0 - 54.0 fl Final   • MPV 08/08/2019 9.3  6.0 - 12.0 fL Final   • Platelets 08/08/2019 379  140 - 450 10*3/mm3 Final   • Neutrophil % 08/08/2019 58.7  42.7 - 76.0 % Final   • Lymphocyte % 08/08/2019 30.4  19.6 - 45.3 % Final   • Monocyte % 08/08/2019 6.6  5.0 - 12.0 % Final   • Eosinophil % 08/08/2019 3.3  0.3 - 6.2 % Final   • Basophil % 08/08/2019 0.7  0.0 - 1.5 % Final   • Immature Grans % 08/08/2019 0.3  0.0 - 0.5 % Final   • Neutrophils, Absolute 08/08/2019 6.04  1.70 - 7.00 10*3/mm3 Final   • Lymphocytes, Absolute 08/08/2019 3.12* 0.70 - 3.10 10*3/mm3 Final   • Monocytes, Absolute 08/08/2019 0.68  0.10 - 0.90 10*3/mm3 Final   • Eosinophils, Absolute 08/08/2019 0.34  0.00 - 0.40 10*3/mm3 Final   • Basophils, Absolute 08/08/2019 0.07  0.00 - 0.20 10*3/mm3 Final   • Immature Grans, Absolute 08/08/2019 0.03  0.00 - 0.05 10*3/mm3 Final   • nRBC 08/08/2019 0.0  0.0 - 0.2 /100 WBC Final   • RBC, UA 08/08/2019 0-2* None Seen /HPF Final   • WBC, UA 08/08/2019 None Seen  None Seen, 0-2, 3-5 /HPF Final   • Bacteria, UA 08/08/2019 None Seen  None Seen /HPF Final   • Squamous Epithelial Cells, UA 08/08/2019 None Seen  None Seen, 0-2 /HPF Final   • Hyaline Casts, UA 08/08/2019 None Seen  None Seen /LPF Final   • Methodology 08/08/2019 Automated Microscopy   Final   Lab on 07/16/2019    Component Date Value Ref Range Status   • WBC 07/16/2019 9.22  3.40 - 10.80 10*3/mm3 Final   • RBC 07/16/2019 4.71  3.77 - 5.28 10*6/mm3 Final   • Hemoglobin 07/16/2019 14.4  12.0 - 15.9 g/dL Final   • Hematocrit 07/16/2019 45.3  34.0 - 46.6 % Final   • MCV 07/16/2019 96.2  79.0 - 97.0 fL Final   • MCH 07/16/2019 30.6  26.6 - 33.0 pg Final   • MCHC 07/16/2019 31.8  31.5 - 35.7 g/dL Final   • RDW 07/16/2019 12.4  12.3 - 15.4 % Final   • RDW-SD 07/16/2019 44.1  37.0 - 54.0 fl Final   • MPV 07/16/2019 9.8  6.0 - 12.0 fL Final   • Platelets 07/16/2019 434  140 - 450 10*3/mm3 Final   • Neutrophil % 07/16/2019 43.5  42.7 - 76.0 % Final   • Lymphocyte % 07/16/2019 40.3  19.6 - 45.3 % Final   • Monocyte % 07/16/2019 9.9  5.0 - 12.0 % Final   • Eosinophil % 07/16/2019 4.4  0.3 - 6.2 % Final   • Basophil % 07/16/2019 1.2  0.0 - 1.5 % Final   • Immature Grans % 07/16/2019 0.7* 0.0 - 0.5 % Final   • Neutrophils, Absolute 07/16/2019 4.01  1.70 - 7.00 10*3/mm3 Final   • Lymphocytes, Absolute 07/16/2019 3.72* 0.70 - 3.10 10*3/mm3 Final   • Monocytes, Absolute 07/16/2019 0.91* 0.10 - 0.90 10*3/mm3 Final   • Eosinophils, Absolute 07/16/2019 0.41* 0.00 - 0.40 10*3/mm3 Final   • Basophils, Absolute 07/16/2019 0.11  0.00 - 0.20 10*3/mm3 Final   • Immature Grans, Absolute 07/16/2019 0.06* 0.00 - 0.05 10*3/mm3 Final   • nRBC 07/16/2019 0.0  0.0 - 0.2 /100 WBC Final   • Glucose 07/16/2019 96  65 - 99 mg/dL Final   • BUN 07/16/2019 15  6 - 20 mg/dL Final   • Creatinine 07/16/2019 0.72  0.57 - 1.00 mg/dL Final   • Sodium 07/16/2019 143  136 - 145 mmol/L Final   • Potassium 07/16/2019 4.8  3.5 - 5.2 mmol/L Final   • Chloride 07/16/2019 101  98 - 107 mmol/L Final   • CO2 07/16/2019 31.0* 22.0 - 29.0 mmol/L Final   • Calcium 07/16/2019 9.2  8.6 - 10.5 mg/dL Final   • Total Protein 07/16/2019 6.8  6.0 - 8.5 g/dL Final   • Albumin 07/16/2019 4.50  3.50 - 5.20 g/dL Final   • ALT (SGPT) 07/16/2019 23  1 - 33 U/L Final   • AST (SGOT) 07/16/2019  23  1 - 32 U/L Final   • Alkaline Phosphatase 07/16/2019 79  39 - 117 U/L Final   • Total Bilirubin 07/16/2019 0.5  0.2 - 1.2 mg/dL Final   • eGFR Non African Amer 07/16/2019 85  >60 mL/min/1.73 Final   • Globulin 07/16/2019 2.3  gm/dL Final   • A/G Ratio 07/16/2019 2.0  g/dL Final   • BUN/Creatinine Ratio 07/16/2019 20.8  7.0 - 25.0 Final   • Anion Gap 07/16/2019 11.0  5.0 - 15.0 mmol/L Final   • Hemoglobin A1C 07/16/2019 5.60  4.80 - 5.60 % Final   • Total Cholesterol 07/16/2019 178  0 - 200 mg/dL Final   • Triglycerides 07/16/2019 159* 0 - 150 mg/dL Final   • HDL Cholesterol 07/16/2019 47  40 - 60 mg/dL Final   • LDL Cholesterol  07/16/2019 99  0 - 100 mg/dL Final   • VLDL Cholesterol 07/16/2019 31.8  mg/dL Final   • LDL/HDL Ratio 07/16/2019 2.11   Final   • TSH 07/16/2019 3.550  0.270 - 4.200 mIU/mL Final   • Free T4 07/16/2019 1.16  0.93 - 1.70 ng/dL Final   • T3, Free 07/16/2019 3.81  2.00 - 4.40 pg/mL Final   • 25 Hydroxy, Vitamin D 07/16/2019 26.1* 30.0 - 100.0 ng/ml Final   • Vitamin B-12 07/16/2019 782  211 - 946 pg/mL Final   • Sed Rate 07/16/2019 2  0 - 20 mm/hr Final   • C-Reactive Protein 07/16/2019 0.09  0.00 - 0.50 mg/dL Final   • Intrinsic Factor Antibodies 07/16/2019 0.9  0.0 - 1.1 AU/mL Final      NM Hepatobiliary Without CCK  Narrative: Nuclear medicine hepatobiliary scan. Fatty challenge. Corn oil  emulsion protocol     CLINICAL HISTORY:   Right upper quadrant symptoms      COMPARISON EXAMINATIONS:    Ultrasound gallbladder August 8, 2019.     FINDINGS:      A Nuclear Medicine hepatobiliary imaging examination was  performed, following the administration of 5.4 millicuries of Tc  labeled Choletec and images obtained as a dynamic acquisition.      There is prompt clearance of tracer from the blood pool into the  hepatic parenchyma, with progression of tracer into the  intrahepatic biliary radicals in an appropriate amount of time.    The gall bladder is visualized in an appropriate period of  "time.    There is progression of tracer through the common bile duct, and  into the small bowel in an appropriate period of time.    Following completion of the initial phase of the examination, a  fatty challenge , 7 ounces corn oil emulsion was administered  orally and images obtained as a dynamic acquisition for  determination of a gall bladder ejection fraction, calculated as   72 percent.  (Normal - greater than 35%).  Impression: CONCLUSION:  1.  Patent cystic duct.  No scintigraphic evidence for acute  cholecystitis.       2.  Patent common bile duct.    3. Normal Gallbladder ejection fraction of at 72 percent. (Normal  - greater than 35%).    Electronically signed by:  Aden Dahl MD  8/19/2019 1:01 PM CDT  Workstation: [id number]    @[email or web address]    There is no immunization history on file for this patient.    The following portions of the patient's history were reviewed and updated as appropriate: allergies, current medications, past family history, past medical history, past social history, past surgical history and problem list.        Physical Exam  /70   Pulse 88   Temp 99.2 °F (37.3 °C)   Ht 165.1 cm (65\")   Wt 51.9 kg (114 lb 6.4 oz)   SpO2 95%   BMI 19.04 kg/m²     Physical Exam   Constitutional: She is oriented to person, place, and time. She appears well-developed and well-nourished.   Appears ill   HENT:   Head: Normocephalic and atraumatic.   Right Ear: External ear normal.   Eyes: Conjunctivae and EOM are normal. Pupils are equal, round, and reactive to light.   Neck: Normal range of motion. Neck supple.   Cardiovascular: Normal rate, regular rhythm and normal heart sounds.   No murmur heard.  Pulmonary/Chest: No respiratory distress.   Decreased breath sounds   Abdominal: Soft. Bowel sounds are normal. She exhibits no distension. There is no tenderness.   Musculoskeletal: Normal range of motion. She exhibits no edema or deformity.   Neurological: She is alert and oriented to person, " place, and time. No cranial nerve deficit.   Skin: Skin is warm. No rash noted. She is not diaphoretic. No erythema. No pallor.   Psychiatric: She has a normal mood and affect. Her behavior is normal.   Nursing note and vitals reviewed.      Assessment/Plan    Diagnosis Plan   1. COPD exacerbation (CMS/[facility])     2. Tobacco abuse counseling     3. Tobacco user     4. Vitamin D deficiency      5. Tremor     6. Crohn's disease with complication, unspecified gastrointestinal tract location (CMS/[facility])     7. Chronic idiopathic constipation     8. Chronic obstructive pulmonary disease, unspecified COPD type (CMS/[facility])  CBC Auto Differential    Comprehensive Metabolic Panel    Mycoplasma Pneu. IgG / IgM Antibodies    C-reactive protein    B Pertussis IgG / IgM Ab    XR Chest PA & Lateral   9. B12 deficiency     10. Neurological abnormality          -reviewed last ER visit at [facility]   -COPD/COPD exacerbation - referral to Pulmonology for PFTs/  Start on prednisone tapering dose. Suspect atpyical pneumonia.  Doxycycline 100 mg PO BID. Pt states she was on this before and could not tolerate due to GI upset. But she will try this time with probiotic supplements. Albuterol inhaler along with nebulizer machine.  Also stop combivent and start on Breo 100 mcg daily.    -tremor - Neurology following on primidone 50 mg 1/2 qhs. Duo nebulzier every 4-6 hours   -rUQ pain - US negative for gallstone. Recommend HIDA scan.  Suspect biliary dyskinesia. Recommend bland diet   -vitamin B12 deficiency  - last b12 stable will start on vitamin b12 250 mcg gordon.  Continue to monitor    -tobacco user - counseled to quit smoking >5 minutes. She could not tolerate chantix   -recommend colonoscopy screeningk/ Crohn's disesae/ IBS-C - referral to Gastroenteorlogy has upcoming colonoscopy soon. Endoscopy schedule on 8/30/19. She may have to cancel since she has respiratory isues. currently  -will get last mammogram screening   -b12 deficiency -check  intrinsic factor and b12 levels. Consider injections if low   -fatigue -check thyroid test.   -cervical lymphadenopathy.  - continue to monitor. Consider  US of neck.    -COPD - combivent  She continues to see Dr. Woodward as Pulmonology. Recommend PUlmonology  Referral in [place] but she will hold now  -regarding tremor of head - she has seen neurology with Dr. Chen. Had MRI of brain. Recommended 2nd opinon with Dr. Manjarrez She also has family history of Friedreich's ataxia  -annual physical exam today  -advised pt to be safe and call with questions and concerns  -recheck in  1 week  -go to ER or call 911 if seere         This document has been electronically signed by Xavier Wick MD on August 29, 2019 10:15 AM

## 2019-08-26 NOTE — PROGRESS NOTES
Subjective:  Carla Richard is a 49 y.o. female who presents for       Patient Active Problem List   Diagnosis   • Tobacco abuse counseling   • Chronic idiopathic constipation   • B12 deficiency   • Vitamin D deficiency    • Tobacco user   • Chronic obstructive pulmonary disease (CMS/HCC)   • Crohn's disease with complication (CMS/HCC)   • Cervical lymphadenopathy   • Generalized abdominal pain   • Nausea   • Neurological abnormality   • Tremor   • RUQ pain           Current Outpatient Medications:   •  COMBIVENT RESPIMAT  MCG/ACT inhaler, INHALE 1 PUFF FOUR TIMES DAILY AS NEEDED., Disp: , Rfl: 0  •  HYDROcodone-acetaminophen (NORCO) 5-325 MG per tablet, Take 1 tablet by mouth Every 6 (Six) Hours As Needed for Moderate Pain ., Disp: 12 tablet, Rfl: 0  •  ondansetron ODT (ZOFRAN-ODT) 4 MG disintegrating tablet, Take 1 tablet by mouth Every 6 (Six) Hours As Needed for Nausea or Vomiting., Disp: 10 tablet, Rfl: 0  •  sodium-potassium-magnesium sulfates (SUPREP BOWEL PREP KIT) 17.5-3.13-1.6 GM/177ML solution oral solution, Take 1 bottle by mouth Every 12 (Twelve) Hours., Disp: 2 bottle, Rfl: 0  •  vitamin B-12 (CYANOCOBALAMIN) 250 MCG tablet, Take 1 tablet by mouth Daily., Disp: 30 tablet, Rfl: 3  •  vitamin D (ERGOCALCIFEROL) 23526 units capsule capsule, Take 1 capsule by mouth 1 (One) Time Per Week., Disp: 4 capsule, Rfl: 3    HPI    Review of Systems  Review of Systems    Patient Active Problem List   Diagnosis   • Tobacco abuse counseling   • Chronic idiopathic constipation   • B12 deficiency   • Vitamin D deficiency    • Tobacco user   • Chronic obstructive pulmonary disease (CMS/HCC)   • Crohn's disease with complication (CMS/HCC)   • Cervical lymphadenopathy   • Generalized abdominal pain   • Nausea   • Neurological abnormality   • Tremor   • RUQ pain     Past Surgical History:   Procedure Laterality Date   • COLONOSCOPY     • HYSTERECTOMY     • TONSILECTOMY, ADENOIDECTOMY, BILATERAL MYRINGOTOMY  AND TUBES     • UPPER GASTROINTESTINAL ENDOSCOPY       Social History     Socioeconomic History   • Marital status:      Spouse name: Not on file   • Number of children: Not on file   • Years of education: Not on file   • Highest education level: Not on file   Tobacco Use   • Smoking status: Current Every Day Smoker     Types: Cigarettes   • Smokeless tobacco: Never Used   Substance and Sexual Activity   • Alcohol use: No     Frequency: Never   • Drug use: No   • Sexual activity: Defer     Family History   Problem Relation Age of Onset   • Inflammatory bowel disease Mother      Admission on 08/08/2019, Discharged on 08/08/2019   Component Date Value Ref Range Status   • Glucose 08/08/2019 101* 65 - 99 mg/dL Final   • BUN 08/08/2019 6  6 - 20 mg/dL Final   • Creatinine 08/08/2019 0.62  0.57 - 1.00 mg/dL Final   • Sodium 08/08/2019 141  136 - 145 mmol/L Final   • Potassium 08/08/2019 3.9  3.5 - 5.2 mmol/L Final   • Chloride 08/08/2019 107  98 - 107 mmol/L Final   • CO2 08/08/2019 25.0  22.0 - 29.0 mmol/L Final   • Calcium 08/08/2019 8.9  8.6 - 10.5 mg/dL Final   • Total Protein 08/08/2019 6.5  6.0 - 8.5 g/dL Final   • Albumin 08/08/2019 4.20  3.50 - 5.20 g/dL Final   • ALT (SGPT) 08/08/2019 12  1 - 33 U/L Final   • AST (SGOT) 08/08/2019 16  1 - 32 U/L Final   • Alkaline Phosphatase 08/08/2019 63  39 - 117 U/L Final   • Total Bilirubin 08/08/2019 0.5  0.2 - 1.2 mg/dL Final   • eGFR Non African Amer 08/08/2019 101  >60 mL/min/1.73 Final   • Globulin 08/08/2019 2.3  gm/dL Final   • A/G Ratio 08/08/2019 1.8  g/dL Final   • BUN/Creatinine Ratio 08/08/2019 9.7  7.0 - 25.0 Final   • Anion Gap 08/08/2019 9.0  5.0 - 15.0 mmol/L Final   • Lipase 08/08/2019 27  13 - 60 U/L Final   • Color, UA 08/08/2019 Yellow  Yellow, Straw, Dark Yellow, Ana M Final   • Appearance, UA 08/08/2019 Clear  Clear Final   • pH,  08/08/2019 7.5  5.0 - 9.0 Final   • Specific Gravity,  08/08/2019 1.006  1.003 - 1.030 Final   • Glucose, UA  08/08/2019 Negative  Negative Final   • Ketones, UA 08/08/2019 Negative  Negative Final   • Bilirubin, UA 08/08/2019 Negative  Negative Final   • Blood, UA 08/08/2019 Trace* Negative Final   • Protein, UA 08/08/2019 Negative  Negative Final   • Leuk Esterase, UA 08/08/2019 Negative  Negative Final   • Nitrite, UA 08/08/2019 Negative  Negative Final   • Urobilinogen, UA 08/08/2019 0.2 E.U./dL  0.2 - 1.0 E.U./dL Final   • Extra Tube 08/08/2019 hold for add-on   Final    Auto resulted   • Extra Tube 08/08/2019 Hold for add-ons.   Final    Auto resulted.   • Extra Tube 08/08/2019 hold for add-on   Final    Auto resulted   • Extra Tube 08/08/2019 Hold for add-ons.   Final    Auto resulted.   • WBC 08/08/2019 10.28  3.40 - 10.80 10*3/mm3 Final   • RBC 08/08/2019 4.15  3.77 - 5.28 10*6/mm3 Final   • Hemoglobin 08/08/2019 12.8  12.0 - 15.9 g/dL Final   • Hematocrit 08/08/2019 38.1  34.0 - 46.6 % Final   • MCV 08/08/2019 91.8  79.0 - 97.0 fL Final   • MCH 08/08/2019 30.8  26.6 - 33.0 pg Final   • MCHC 08/08/2019 33.6  31.5 - 35.7 g/dL Final   • RDW 08/08/2019 12.6  12.3 - 15.4 % Final   • RDW-SD 08/08/2019 42.2  37.0 - 54.0 fl Final   • MPV 08/08/2019 9.3  6.0 - 12.0 fL Final   • Platelets 08/08/2019 379  140 - 450 10*3/mm3 Final   • Neutrophil % 08/08/2019 58.7  42.7 - 76.0 % Final   • Lymphocyte % 08/08/2019 30.4  19.6 - 45.3 % Final   • Monocyte % 08/08/2019 6.6  5.0 - 12.0 % Final   • Eosinophil % 08/08/2019 3.3  0.3 - 6.2 % Final   • Basophil % 08/08/2019 0.7  0.0 - 1.5 % Final   • Immature Grans % 08/08/2019 0.3  0.0 - 0.5 % Final   • Neutrophils, Absolute 08/08/2019 6.04  1.70 - 7.00 10*3/mm3 Final   • Lymphocytes, Absolute 08/08/2019 3.12* 0.70 - 3.10 10*3/mm3 Final   • Monocytes, Absolute 08/08/2019 0.68  0.10 - 0.90 10*3/mm3 Final   • Eosinophils, Absolute 08/08/2019 0.34  0.00 - 0.40 10*3/mm3 Final   • Basophils, Absolute 08/08/2019 0.07  0.00 - 0.20 10*3/mm3 Final   • Immature Grans, Absolute 08/08/2019 0.03   0.00 - 0.05 10*3/mm3 Final   • nRBC 08/08/2019 0.0  0.0 - 0.2 /100 WBC Final   • RBC, UA 08/08/2019 0-2* None Seen /HPF Final   • WBC, UA 08/08/2019 None Seen  None Seen, 0-2, 3-5 /HPF Final   • Bacteria, UA 08/08/2019 None Seen  None Seen /HPF Final   • Squamous Epithelial Cells, UA 08/08/2019 None Seen  None Seen, 0-2 /HPF Final   • Hyaline Casts, UA 08/08/2019 None Seen  None Seen /LPF Final   • Methodology 08/08/2019 Automated Microscopy   Final   Lab on 07/16/2019   Component Date Value Ref Range Status   • WBC 07/16/2019 9.22  3.40 - 10.80 10*3/mm3 Final   • RBC 07/16/2019 4.71  3.77 - 5.28 10*6/mm3 Final   • Hemoglobin 07/16/2019 14.4  12.0 - 15.9 g/dL Final   • Hematocrit 07/16/2019 45.3  34.0 - 46.6 % Final   • MCV 07/16/2019 96.2  79.0 - 97.0 fL Final   • MCH 07/16/2019 30.6  26.6 - 33.0 pg Final   • MCHC 07/16/2019 31.8  31.5 - 35.7 g/dL Final   • RDW 07/16/2019 12.4  12.3 - 15.4 % Final   • RDW-SD 07/16/2019 44.1  37.0 - 54.0 fl Final   • MPV 07/16/2019 9.8  6.0 - 12.0 fL Final   • Platelets 07/16/2019 434  140 - 450 10*3/mm3 Final   • Neutrophil % 07/16/2019 43.5  42.7 - 76.0 % Final   • Lymphocyte % 07/16/2019 40.3  19.6 - 45.3 % Final   • Monocyte % 07/16/2019 9.9  5.0 - 12.0 % Final   • Eosinophil % 07/16/2019 4.4  0.3 - 6.2 % Final   • Basophil % 07/16/2019 1.2  0.0 - 1.5 % Final   • Immature Grans % 07/16/2019 0.7* 0.0 - 0.5 % Final   • Neutrophils, Absolute 07/16/2019 4.01  1.70 - 7.00 10*3/mm3 Final   • Lymphocytes, Absolute 07/16/2019 3.72* 0.70 - 3.10 10*3/mm3 Final   • Monocytes, Absolute 07/16/2019 0.91* 0.10 - 0.90 10*3/mm3 Final   • Eosinophils, Absolute 07/16/2019 0.41* 0.00 - 0.40 10*3/mm3 Final   • Basophils, Absolute 07/16/2019 0.11  0.00 - 0.20 10*3/mm3 Final   • Immature Grans, Absolute 07/16/2019 0.06* 0.00 - 0.05 10*3/mm3 Final   • nRBC 07/16/2019 0.0  0.0 - 0.2 /100 WBC Final   • Glucose 07/16/2019 96  65 - 99 mg/dL Final   • BUN 07/16/2019 15  6 - 20 mg/dL Final   • Creatinine  07/16/2019 0.72  0.57 - 1.00 mg/dL Final   • Sodium 07/16/2019 143  136 - 145 mmol/L Final   • Potassium 07/16/2019 4.8  3.5 - 5.2 mmol/L Final   • Chloride 07/16/2019 101  98 - 107 mmol/L Final   • CO2 07/16/2019 31.0* 22.0 - 29.0 mmol/L Final   • Calcium 07/16/2019 9.2  8.6 - 10.5 mg/dL Final   • Total Protein 07/16/2019 6.8  6.0 - 8.5 g/dL Final   • Albumin 07/16/2019 4.50  3.50 - 5.20 g/dL Final   • ALT (SGPT) 07/16/2019 23  1 - 33 U/L Final   • AST (SGOT) 07/16/2019 23  1 - 32 U/L Final   • Alkaline Phosphatase 07/16/2019 79  39 - 117 U/L Final   • Total Bilirubin 07/16/2019 0.5  0.2 - 1.2 mg/dL Final   • eGFR Non African Amer 07/16/2019 85  >60 mL/min/1.73 Final   • Globulin 07/16/2019 2.3  gm/dL Final   • A/G Ratio 07/16/2019 2.0  g/dL Final   • BUN/Creatinine Ratio 07/16/2019 20.8  7.0 - 25.0 Final   • Anion Gap 07/16/2019 11.0  5.0 - 15.0 mmol/L Final   • Hemoglobin A1C 07/16/2019 5.60  4.80 - 5.60 % Final   • Total Cholesterol 07/16/2019 178  0 - 200 mg/dL Final   • Triglycerides 07/16/2019 159* 0 - 150 mg/dL Final   • HDL Cholesterol 07/16/2019 47  40 - 60 mg/dL Final   • LDL Cholesterol  07/16/2019 99  0 - 100 mg/dL Final   • VLDL Cholesterol 07/16/2019 31.8  mg/dL Final   • LDL/HDL Ratio 07/16/2019 2.11   Final   • TSH 07/16/2019 3.550  0.270 - 4.200 mIU/mL Final   • Free T4 07/16/2019 1.16  0.93 - 1.70 ng/dL Final   • T3, Free 07/16/2019 3.81  2.00 - 4.40 pg/mL Final   • 25 Hydroxy, Vitamin D 07/16/2019 26.1* 30.0 - 100.0 ng/ml Final   • Vitamin B-12 07/16/2019 782  211 - 946 pg/mL Final   • Sed Rate 07/16/2019 2  0 - 20 mm/hr Final   • C-Reactive Protein 07/16/2019 0.09  0.00 - 0.50 mg/dL Final   • Intrinsic Factor Antibodies 07/16/2019 0.9  0.0 - 1.1 AU/mL Final      NM Hepatobiliary Without CCK  Narrative: Nuclear medicine hepatobiliary scan. Fatty challenge. Corn oil  emulsion protocol     CLINICAL HISTORY:   Right upper quadrant symptoms      COMPARISON EXAMINATIONS:    Ultrasound gallbladder  August 8, 2019.     FINDINGS:      A Nuclear Medicine hepatobiliary imaging examination was  performed, following the administration of 5.4 millicuries of Tc  labeled Choletec and images obtained as a dynamic acquisition.      There is prompt clearance of tracer from the blood pool into the  hepatic parenchyma, with progression of tracer into the  intrahepatic biliary radicals in an appropriate amount of time.    The gall bladder is visualized in an appropriate period of time.    There is progression of tracer through the common bile duct, and  into the small bowel in an appropriate period of time.    Following completion of the initial phase of the examination, a  fatty challenge , 7 ounces corn oil emulsion was administered  orally and images obtained as a dynamic acquisition for  determination of a gall bladder ejection fraction, calculated as   72 percent.  (Normal - greater than 35%).  Impression: CONCLUSION:  1.  Patent cystic duct.  No scintigraphic evidence for acute  cholecystitis.       2.  Patent common bile duct.    3. Normal Gallbladder ejection fraction of at 72 percent. (Normal  - greater than 35%).    Electronically signed by:  Aden Dahl MD  8/19/2019 1:01 PM CDT  Workstation: [id number]    @[email or web address]    There is no immunization history on file for this patient.    The following portions of the patient's history were reviewed and updated as appropriate: allergies, current medications, past family history, past medical history, past social history, past surgical history and problem list.        Physical Exam  Physical Exam    Assessment/Plan   No diagnosis found.                     This document has been electronically signed by Xavier Wick MD on August 26, 2019 12:43 PM

## 2019-08-29 ENCOUNTER — TELEPHONE (OUTPATIENT)
Dept: FAMILY MEDICINE CLINIC | Facility: CLINIC | Age: 49
End: 2019-08-29

## 2019-08-29 ENCOUNTER — LAB (OUTPATIENT)
Dept: LAB | Facility: HOSPITAL | Age: 49
End: 2019-08-29

## 2019-08-29 ENCOUNTER — OFFICE VISIT (OUTPATIENT)
Dept: FAMILY MEDICINE CLINIC | Facility: CLINIC | Age: 49
End: 2019-08-29

## 2019-08-29 VITALS
HEIGHT: 65 IN | OXYGEN SATURATION: 95 % | SYSTOLIC BLOOD PRESSURE: 110 MMHG | BODY MASS INDEX: 19.06 KG/M2 | TEMPERATURE: 99.2 F | HEART RATE: 88 BPM | DIASTOLIC BLOOD PRESSURE: 70 MMHG | WEIGHT: 114.4 LBS

## 2019-08-29 DIAGNOSIS — J44.9 CHRONIC OBSTRUCTIVE PULMONARY DISEASE, UNSPECIFIED COPD TYPE (HCC): ICD-10-CM

## 2019-08-29 DIAGNOSIS — E53.8 B12 DEFICIENCY: ICD-10-CM

## 2019-08-29 DIAGNOSIS — R50.9 LOW GRADE FEVER: ICD-10-CM

## 2019-08-29 DIAGNOSIS — Z72.0 TOBACCO USER: ICD-10-CM

## 2019-08-29 DIAGNOSIS — Z71.6 TOBACCO ABUSE COUNSELING: ICD-10-CM

## 2019-08-29 DIAGNOSIS — E55.9 VITAMIN D DEFICIENCY: ICD-10-CM

## 2019-08-29 DIAGNOSIS — R25.1 TREMOR: ICD-10-CM

## 2019-08-29 DIAGNOSIS — K50.919 CROHN'S DISEASE WITH COMPLICATION, UNSPECIFIED GASTROINTESTINAL TRACT LOCATION (HCC): ICD-10-CM

## 2019-08-29 DIAGNOSIS — J44.1 COPD EXACERBATION (HCC): Primary | ICD-10-CM

## 2019-08-29 DIAGNOSIS — K59.04 CHRONIC IDIOPATHIC CONSTIPATION: ICD-10-CM

## 2019-08-29 DIAGNOSIS — R06.09 DYSPNEA ON EXERTION: ICD-10-CM

## 2019-08-29 DIAGNOSIS — R05.9 COUGH: ICD-10-CM

## 2019-08-29 DIAGNOSIS — R29.818 NEUROLOGICAL ABNORMALITY: ICD-10-CM

## 2019-08-29 LAB
ALBUMIN SERPL-MCNC: 4.5 G/DL (ref 3.5–5.2)
ALBUMIN/GLOB SERPL: 2.3 G/DL
ALP SERPL-CCNC: 74 U/L (ref 39–117)
ALT SERPL W P-5'-P-CCNC: 15 U/L (ref 1–33)
ANION GAP SERPL CALCULATED.3IONS-SCNC: 14.4 MMOL/L (ref 5–15)
AST SERPL-CCNC: 21 U/L (ref 1–32)
BASOPHILS # BLD AUTO: 0.09 10*3/MM3 (ref 0–0.2)
BASOPHILS NFR BLD AUTO: 0.6 % (ref 0–1.5)
BILIRUB SERPL-MCNC: 0.4 MG/DL (ref 0.2–1.2)
BUN BLD-MCNC: 9 MG/DL (ref 6–20)
BUN/CREAT SERPL: 12.7 (ref 7–25)
CALCIUM SPEC-SCNC: 9.3 MG/DL (ref 8.6–10.5)
CHLORIDE SERPL-SCNC: 100 MMOL/L (ref 98–107)
CO2 SERPL-SCNC: 25.6 MMOL/L (ref 22–29)
CREAT BLD-MCNC: 0.71 MG/DL (ref 0.57–1)
CRP SERPL-MCNC: 0.36 MG/DL (ref 0–0.5)
DEPRECATED RDW RBC AUTO: 46.6 FL (ref 37–54)
EOSINOPHIL # BLD AUTO: 0.27 10*3/MM3 (ref 0–0.4)
EOSINOPHIL NFR BLD AUTO: 1.9 % (ref 0.3–6.2)
ERYTHROCYTE [DISTWIDTH] IN BLOOD BY AUTOMATED COUNT: 12.7 % (ref 12.3–15.4)
GFR SERPL CREATININE-BSD FRML MDRD: 87 ML/MIN/1.73
GLOBULIN UR ELPH-MCNC: 2 GM/DL
GLUCOSE BLD-MCNC: 99 MG/DL (ref 65–99)
HCT VFR BLD AUTO: 44.8 % (ref 34–46.6)
HGB BLD-MCNC: 14.2 G/DL (ref 12–15.9)
IMM GRANULOCYTES # BLD AUTO: 0.08 10*3/MM3 (ref 0–0.05)
IMM GRANULOCYTES NFR BLD AUTO: 0.6 % (ref 0–0.5)
LYMPHOCYTES # BLD AUTO: 3.57 10*3/MM3 (ref 0.7–3.1)
LYMPHOCYTES NFR BLD AUTO: 25.1 % (ref 19.6–45.3)
MCH RBC QN AUTO: 31.4 PG (ref 26.6–33)
MCHC RBC AUTO-ENTMCNC: 31.7 G/DL (ref 31.5–35.7)
MCV RBC AUTO: 99.1 FL (ref 79–97)
MONOCYTES # BLD AUTO: 0.85 10*3/MM3 (ref 0.1–0.9)
MONOCYTES NFR BLD AUTO: 6 % (ref 5–12)
NEUTROPHILS # BLD AUTO: 9.36 10*3/MM3 (ref 1.7–7)
NEUTROPHILS NFR BLD AUTO: 65.8 % (ref 42.7–76)
NRBC BLD AUTO-RTO: 0 /100 WBC (ref 0–0.2)
PLATELET # BLD AUTO: 489 10*3/MM3 (ref 140–450)
PMV BLD AUTO: 10.2 FL (ref 6–12)
POTASSIUM BLD-SCNC: 4.6 MMOL/L (ref 3.5–5.2)
PROT SERPL-MCNC: 6.5 G/DL (ref 6–8.5)
RBC # BLD AUTO: 4.52 10*6/MM3 (ref 3.77–5.28)
SODIUM BLD-SCNC: 140 MMOL/L (ref 136–145)
WBC NRBC COR # BLD: 14.22 10*3/MM3 (ref 3.4–10.8)

## 2019-08-29 PROCEDURE — 86140 C-REACTIVE PROTEIN: CPT

## 2019-08-29 PROCEDURE — 86738 MYCOPLASMA ANTIBODY: CPT

## 2019-08-29 PROCEDURE — 99214 OFFICE O/P EST MOD 30 MIN: CPT | Performed by: FAMILY MEDICINE

## 2019-08-29 PROCEDURE — 86615 BORDETELLA ANTIBODY: CPT

## 2019-08-29 PROCEDURE — 85025 COMPLETE CBC W/AUTO DIFF WBC: CPT

## 2019-08-29 PROCEDURE — 80053 COMPREHEN METABOLIC PANEL: CPT

## 2019-08-29 RX ORDER — ALBUTEROL SULFATE 90 UG/1
2 AEROSOL, METERED RESPIRATORY (INHALATION) EVERY 4 HOURS PRN
Qty: 1 INHALER | Refills: 3 | Status: SHIPPED | OUTPATIENT
Start: 2019-08-29 | End: 2019-08-29 | Stop reason: CLARIF

## 2019-08-29 RX ORDER — PREDNISONE 10 MG/1
TABLET ORAL
Qty: 30 TABLET | Refills: 0 | Status: SHIPPED | OUTPATIENT
Start: 2019-08-29 | End: 2019-09-25

## 2019-08-29 RX ORDER — FLUCONAZOLE 150 MG/1
TABLET ORAL
Qty: 1 TABLET | Refills: 0 | Status: SHIPPED | OUTPATIENT
Start: 2019-08-29 | End: 2019-09-19

## 2019-08-29 RX ORDER — ALBUTEROL SULFATE 90 UG/1
2 AEROSOL, METERED RESPIRATORY (INHALATION) EVERY 4 HOURS PRN
Qty: 18 G | Refills: 3 | Status: SHIPPED | OUTPATIENT
Start: 2019-08-29 | End: 2020-10-28 | Stop reason: SDUPTHER

## 2019-08-29 RX ORDER — DOXYCYCLINE 50 MG/1
100 CAPSULE ORAL 2 TIMES DAILY
Qty: 40 CAPSULE | Refills: 0 | Status: SHIPPED | OUTPATIENT
Start: 2019-08-29 | End: 2019-09-05

## 2019-08-29 NOTE — TELEPHONE ENCOUNTER
----- Message from Xavier Wick MD sent at 8/29/2019 12:09 PM CDT -----  Call pt     Chest x-ray normal    Awaiting labwork. Continue on antibotics for now. Thanks  Called pt

## 2019-08-30 ENCOUNTER — TELEPHONE (OUTPATIENT)
Dept: FAMILY MEDICINE CLINIC | Facility: CLINIC | Age: 49
End: 2019-08-30

## 2019-08-30 LAB
M PNEUMONIAE IGG ABS: <100 U/ML (ref 0–99)
M PNEUMONIAE IGM ABS: <770 U/ML (ref 0–769)

## 2019-08-30 RX ORDER — CEFUROXIME AXETIL 250 MG/1
500 TABLET ORAL 2 TIMES DAILY
Qty: 40 TABLET | Refills: 0 | Status: SHIPPED | OUTPATIENT
Start: 2019-08-30 | End: 2019-09-19

## 2019-08-30 NOTE — TELEPHONE ENCOUNTER
----- Message from Xavier Wick MD sent at 8/29/2019  9:51 PM CDT -----  Pt's kidney function slightly lower from last check needs to drink more water and fluids     CRP nomral    But WBC is elevated at 14.22 with high neutrophil count suggesting bacterial infection. Continue antibiotics     Awaiting rest of labwork. Thanks     Called pt

## 2019-08-30 NOTE — TELEPHONE ENCOUNTER
Sent Cefitin 500 mg PO BID for 10 day to pharmacy. She is to take 2 pills twice a day for 10 days. Gave 40 pills    Thanks

## 2019-09-01 NOTE — PROGRESS NOTES
Subjective:  Carla Richard is a 49 y.o. female who presents for       Patient Active Problem List   Diagnosis   • Tobacco abuse counseling   • Chronic idiopathic constipation   • B12 deficiency   • Vitamin D deficiency    • Tobacco user   • Chronic obstructive pulmonary disease (CMS/HCC)   • Crohn's disease with complication (CMS/HCC)   • Cervical lymphadenopathy   • Generalized abdominal pain   • Nausea   • Neurological abnormality   • Tremor   • RUQ pain   • COPD exacerbation (CMS/HCC)   • Pertussis exposure   • Chronic bronchitis (CMS/HCC)           Current Outpatient Medications:   •  albuterol sulfate  (90 Base) MCG/ACT inhaler, Inhale 2 puffs Every 4 (Four) Hours As Needed for Wheezing., Disp: 18 g, Rfl: 3  •  cefuroxime (CEFTIN) 250 MG tablet, Take 2 tablets by mouth 2 (Two) Times a Day., Disp: 40 tablet, Rfl: 0  •  clindamycin (CLEOCIN) 300 MG capsule, Take 1 capsule by mouth 4 (Four) Times a Day., Disp: 28 capsule, Rfl: 0  •  fluconazole (DIFLUCAN) 150 MG tablet, Take 1 tablet now and in 72 hours on onset of symptoms, Disp: 1 tablet, Rfl: 0  •  Fluticasone Furoate-Vilanterol (BREO ELLIPTA) 100-25 MCG/INH inhaler, Inhale 1 puff Daily., Disp: 28 each, Rfl: 3  •  ondansetron ODT (ZOFRAN-ODT) 4 MG disintegrating tablet, Take 1 tablet by mouth Every 6 (Six) Hours As Needed for Nausea or Vomiting., Disp: 10 tablet, Rfl: 0  •  predniSONE (DELTASONE) 10 MG tablet, Take 4 pills for  4 days 3 pills for 3 days 2 pills for 2 pills and 1 pill for 1 day then stop, Disp: 30 tablet, Rfl: 0  •  sodium-potassium-magnesium sulfates (SUPREP BOWEL PREP KIT) 17.5-3.13-1.6 GM/177ML solution oral solution, Take 1 bottle by mouth Every 12 (Twelve) Hours., Disp: 2 bottle, Rfl: 0  •  vitamin B-12 (CYANOCOBALAMIN) 250 MCG tablet, Take 1 tablet by mouth Daily., Disp: 30 tablet, Rfl: 3  •  vitamin D (ERGOCALCIFEROL) 03570 units capsule capsule, Take 1 capsule by mouth 1 (One) Time Per Week., Disp: 4 capsule, Rfl:  3    HPI     Pt is 48 yo female with history of COPD, Vitamin B12 deficiency, Vitamin D deficiency, chronic abdominal pain, tremor,  RUQ pain,  Sp hysterectomy,  History of chron's disease, history of pertussis infection     8/12/19  Pt is here for recheck. Since last visit pt has been to [facility] ER on 8/8/19 for RUQ pain and tenderness. Had RUQ pain that was negative for gallstones. Lipase was normal . CBC showed normal WBC and CMP showed normal renal function. UA showed 0-2 RBC.  She was given Norco and zofran for pain.  She states she has a spinchter of oddi issue and has several stents put in for pancreatic stricture/chronic pancreatitis. . SHe has EGD and Colonoscopy scheduled with Dr. aDvis on 8/30/19. She did see Gastroenterology [facility] on 7/17/19/  She states on 8/7/19 she had fried chicken and that night it hurt.     She saw Dr. Manjarrez today Neurologist and was started on primodone 50 mg  1/2 tablet at bedtime.       8/29/19 pt is here for recheck she has been coughing and congested for a month.  She was seeing PUlmonologist in the past Dr. Woodward and was diagnosed this year.   She is currenlty on combivent.  She has been smoking tobacco along with  E cigarettes. She has been coughing up sputum. She has scheduled an endoscopy tomorrow.  She has had weakness and fatigue. She has been smoking for more than 30 years    9/5/19 pt is here for recheck and followup. Pt  Had chest x-ray that was negative for active diseae .  CBC showed WBC at 14.22  With platelets being high. Neurtrophil count was at. GFR was at 87 , liver enzymes. Mycoplasma was negative. CrP was normal B Pertussis was positive for IgG at 3.14 . Pt does report she was exposed to pertussis earlier this year (grandchild).  She is intolerant to Doxycycline. Azithromycin. Her abx was recently switched from doxycycline to cefepime. She states she continues to have shortness of breath and cough.  She was given prednisone tapering dose.  She was  also referred to  Pulmonology but office was unable to reach patient.  She was also started on Breo but has not noticed a difference. She stopped prednisone and restarted again with the cefipime since doxycycline made her stomach upset. She continues to smoke 1/2 ppd.      Abdominal Pain   This is a recurrent problem. The current episode started more than 1 year ago. The onset quality is sudden. The problem has been waxing and waning. The pain is located in the generalized abdominal region, epigastric region and RUQ. The pain is at a severity of 5/10. The pain is moderate. The quality of the pain is aching, a sensation of fullness and burning. The abdominal pain radiates to the epigastric region. Associated symptoms include constipation. Pertinent negatives include no anorexia, arthralgias, belching, diarrhea, dysuria, fever, flatus, frequency, headaches, hematochezia, hematuria, melena, myalgias, nausea, vomiting or weight loss. Nothing aggravates the pain. The pain is relieved by being still. The treatment provided no relief. Prior diagnostic workup includes ultrasound. Her past medical history is significant for Crohn's disease. There is no history of abdominal surgery, colon cancer, gallstones, GERD, irritable bowel syndrome, pancreatitis, PUD or ulcerative colitis.    Neurologic Problem   The patient's primary symptoms include focal sensory loss and weakness. The patient's pertinent negatives include no altered mental status, clumsiness, loss of balance, memory loss, near-syncope, slurred speech, syncope or visual change. This is a recurrent problem. The neurological problem developed gradually. The problem has been waxing and waning since onset. There was no focality noted. Associated symptoms include a fever and shortness of breath. Pertinent negatives include no abdominal pain, auditory change, aura, back pain, bladder incontinence, bowel incontinence, chest  pain, confusion, diaphoresis, dizziness, fatigue, headaches, light-headedness, nausea, neck pain, palpitations, vertigo or vomiting. Past treatments include nothing. The treatment provided no relief. There is no history of a bleeding disorder, a clotting disorder, a CVA, head trauma, liver disease, mood changes or seizures.   COPD   This is a chronic problem. The current episode started more than 1 year ago. The problem occurs constantly. The problem has been unchanged. Associated symptoms include abdominal pain, arthralgias, fatigue, joint swelling, numbness and weakness. Pertinent negatives include no anorexia, change in bowel habit, chest pain, chills, congestion, coughing, diaphoresis, fever, headaches, myalgias, nausea, neck pain, sore throat, swollen glands, urinary symptoms, vertigo, visual change or vomiting. The symptoms are aggravated by smoking. She has tried nothing (combivent ) for the symptoms.   Fatigue   This is a chronic problem. The current episode started more than 1 year ago. The problem occurs constantly. The problem has been unchanged. Associated symptoms include abdominal pain, arthralgias, fatigue, joint swelling, numbness and weakness. Pertinent negatives include no anorexia, change in bowel habit, chest pain, chills, congestion, coughing, diaphoresis, fever, headaches, myalgias, nausea, neck pain, sore throat, swollen glands, urinary symptoms, vertigo, visual change or vomiting. Nothing aggravates the symptoms. She has tried nothing (b12 injections ) for the symptoms. The treatment provided no relief.       Review of Systems  Review of Systems   Constitutional: Positive for activity change and fatigue. Negative for appetite change, chills, diaphoresis and fever.   HENT: Negative for congestion, postnasal drip, rhinorrhea, sinus pressure, sinus pain, sneezing, sore throat, trouble swallowing and voice change.    Respiratory: Positive for cough and shortness of breath. Negative for choking,  chest tightness, wheezing and stridor.    Cardiovascular: Negative for chest pain.   Gastrointestinal: Positive for abdominal pain, constipation and diarrhea. Negative for nausea and vomiting.   Neurological: Negative for weakness and headaches.       Patient Active Problem List   Diagnosis   • Tobacco abuse counseling   • Chronic idiopathic constipation   • B12 deficiency   • Vitamin D deficiency    • Tobacco user   • Chronic obstructive pulmonary disease (CMS/HCC)   • Crohn's disease with complication (CMS/HCC)   • Cervical lymphadenopathy   • Generalized abdominal pain   • Nausea   • Neurological abnormality   • Tremor   • RUQ pain   • COPD exacerbation (CMS/HCC)   • Pertussis exposure   • Chronic bronchitis (CMS/HCC)     Past Surgical History:   Procedure Laterality Date   • COLONOSCOPY     • HYSTERECTOMY     • TONSILECTOMY, ADENOIDECTOMY, BILATERAL MYRINGOTOMY AND TUBES     • UPPER GASTROINTESTINAL ENDOSCOPY       Social History     Socioeconomic History   • Marital status:      Spouse name: Not on file   • Number of children: Not on file   • Years of education: Not on file   • Highest education level: Not on file   Tobacco Use   • Smoking status: Current Every Day Smoker     Packs/day: 1.00     Types: Cigarettes   • Smokeless tobacco: Never Used   Substance and Sexual Activity   • Alcohol use: No     Frequency: Never   • Drug use: No   • Sexual activity: Defer     Family History   Problem Relation Age of Onset   • Inflammatory bowel disease Mother      Lab on 08/29/2019   Component Date Value Ref Range Status   • WBC 08/29/2019 14.22* 3.40 - 10.80 10*3/mm3 Final   • RBC 08/29/2019 4.52  3.77 - 5.28 10*6/mm3 Final   • Hemoglobin 08/29/2019 14.2  12.0 - 15.9 g/dL Final   • Hematocrit 08/29/2019 44.8  34.0 - 46.6 % Final   • MCV 08/29/2019 99.1* 79.0 - 97.0 fL Final   • MCH 08/29/2019 31.4  26.6 - 33.0 pg Final   • MCHC 08/29/2019 31.7  31.5 - 35.7 g/dL Final   • RDW 08/29/2019 12.7  12.3 - 15.4 % Final    • RDW-SD 08/29/2019 46.6  37.0 - 54.0 fl Final   • MPV 08/29/2019 10.2  6.0 - 12.0 fL Final   • Platelets 08/29/2019 489* 140 - 450 10*3/mm3 Final   • Neutrophil % 08/29/2019 65.8  42.7 - 76.0 % Final   • Lymphocyte % 08/29/2019 25.1  19.6 - 45.3 % Final   • Monocyte % 08/29/2019 6.0  5.0 - 12.0 % Final   • Eosinophil % 08/29/2019 1.9  0.3 - 6.2 % Final   • Basophil % 08/29/2019 0.6  0.0 - 1.5 % Final   • Immature Grans % 08/29/2019 0.6* 0.0 - 0.5 % Final   • Neutrophils, Absolute 08/29/2019 9.36* 1.70 - 7.00 10*3/mm3 Final   • Lymphocytes, Absolute 08/29/2019 3.57* 0.70 - 3.10 10*3/mm3 Final   • Monocytes, Absolute 08/29/2019 0.85  0.10 - 0.90 10*3/mm3 Final   • Eosinophils, Absolute 08/29/2019 0.27  0.00 - 0.40 10*3/mm3 Final   • Basophils, Absolute 08/29/2019 0.09  0.00 - 0.20 10*3/mm3 Final   • Immature Grans, Absolute 08/29/2019 0.08* 0.00 - 0.05 10*3/mm3 Final   • nRBC 08/29/2019 0.0  0.0 - 0.2 /100 WBC Final   • Glucose 08/29/2019 99  65 - 99 mg/dL Final   • BUN 08/29/2019 9  6 - 20 mg/dL Final   • Creatinine 08/29/2019 0.71  0.57 - 1.00 mg/dL Final   • Sodium 08/29/2019 140  136 - 145 mmol/L Final   • Potassium 08/29/2019 4.6  3.5 - 5.2 mmol/L Final   • Chloride 08/29/2019 100  98 - 107 mmol/L Final   • CO2 08/29/2019 25.6  22.0 - 29.0 mmol/L Final   • Calcium 08/29/2019 9.3  8.6 - 10.5 mg/dL Final   • Total Protein 08/29/2019 6.5  6.0 - 8.5 g/dL Final   • Albumin 08/29/2019 4.50  3.50 - 5.20 g/dL Final   • ALT (SGPT) 08/29/2019 15  1 - 33 U/L Final   • AST (SGOT) 08/29/2019 21  1 - 32 U/L Final   • Alkaline Phosphatase 08/29/2019 74  39 - 117 U/L Final   • Total Bilirubin 08/29/2019 0.4  0.2 - 1.2 mg/dL Final   • eGFR Non African Amer 08/29/2019 87  >60 mL/min/1.73 Final   • Globulin 08/29/2019 2.0  gm/dL Final   • A/G Ratio 08/29/2019 2.3  g/dL Final   • BUN/Creatinine Ratio 08/29/2019 12.7  7.0 - 25.0 Final   • Anion Gap 08/29/2019 14.4  5.0 - 15.0 mmol/L Final   • M pneumoniae IgG Abs 08/29/2019 <100   0 - 99 U/mL Final                                 Negative:           <100                               Indeterminate: 100 - 320                               Positive:           >320  The reference interval established is intended as a  baseline only.  Values >100 may indicate a recent  infection with Mycoplasma pneumoniae and need to be  confirmed either by a positive IgM result and/or an  additional specimen drawn 2-4 weeks later showing a  significant increase in antibody levels.   • M pneumoniae IgM Abs 08/29/2019 <770  0 - 769 U/mL Final                                 Negative            <770  Clinically significant amount of M. pneumoniae antibody  not detected.                               Low Positive   770 - 950  M. pneumoniae specific IgM presumptively detected.  It  is recommended that another sample be collected 1-2  weeks later to assure reactivity.                               Positive            >950  Highly significant amount of M. pneumoniae specific  IgM antibody detected.   • C-Reactive Protein 08/29/2019 0.36  0.00 - 0.50 mg/dL Final   • B pertussis IgG Ab, Quant 08/29/2019 3.14* 0.00 - 0.94 index Final                                   Negative          <0.95                                 Equivocal   0.95 - 1.04                                 Positive          >1.04   • B pertussis IgM Ab, Quant 08/29/2019 <1.0  0.0 - 0.9 index Final                                     Negative        <1.0                                   Borderline 1.0 - 1.1                                   Positive        >1.1   Admission on 08/08/2019, Discharged on 08/08/2019   Component Date Value Ref Range Status   • Glucose 08/08/2019 101* 65 - 99 mg/dL Final   • BUN 08/08/2019 6  6 - 20 mg/dL Final   • Creatinine 08/08/2019 0.62  0.57 - 1.00 mg/dL Final   • Sodium 08/08/2019 141  136 - 145 mmol/L Final   • Potassium 08/08/2019 3.9  3.5 - 5.2 mmol/L Final   • Chloride 08/08/2019 107  98 - 107 mmol/L Final   •  CO2 08/08/2019 25.0  22.0 - 29.0 mmol/L Final   • Calcium 08/08/2019 8.9  8.6 - 10.5 mg/dL Final   • Total Protein 08/08/2019 6.5  6.0 - 8.5 g/dL Final   • Albumin 08/08/2019 4.20  3.50 - 5.20 g/dL Final   • ALT (SGPT) 08/08/2019 12  1 - 33 U/L Final   • AST (SGOT) 08/08/2019 16  1 - 32 U/L Final   • Alkaline Phosphatase 08/08/2019 63  39 - 117 U/L Final   • Total Bilirubin 08/08/2019 0.5  0.2 - 1.2 mg/dL Final   • eGFR Non African Amer 08/08/2019 101  >60 mL/min/1.73 Final   • Globulin 08/08/2019 2.3  gm/dL Final   • A/G Ratio 08/08/2019 1.8  g/dL Final   • BUN/Creatinine Ratio 08/08/2019 9.7  7.0 - 25.0 Final   • Anion Gap 08/08/2019 9.0  5.0 - 15.0 mmol/L Final   • Lipase 08/08/2019 27  13 - 60 U/L Final   • Color, UA 08/08/2019 Yellow  Yellow, Straw, Dark Yellow, Ana M Final   • Appearance, UA 08/08/2019 Clear  Clear Final   • pH, UA 08/08/2019 7.5  5.0 - 9.0 Final   • Specific Gravity, UA 08/08/2019 1.006  1.003 - 1.030 Final   • Glucose, UA 08/08/2019 Negative  Negative Final   • Ketones, UA 08/08/2019 Negative  Negative Final   • Bilirubin, UA 08/08/2019 Negative  Negative Final   • Blood, UA 08/08/2019 Trace* Negative Final   • Protein, UA 08/08/2019 Negative  Negative Final   • Leuk Esterase, UA 08/08/2019 Negative  Negative Final   • Nitrite, UA 08/08/2019 Negative  Negative Final   • Urobilinogen, UA 08/08/2019 0.2 E.U./dL  0.2 - 1.0 E.U./dL Final   • Extra Tube 08/08/2019 hold for add-on   Final    Auto resulted   • Extra Tube 08/08/2019 Hold for add-ons.   Final    Auto resulted.   • Extra Tube 08/08/2019 hold for add-on   Final    Auto resulted   • Extra Tube 08/08/2019 Hold for add-ons.   Final    Auto resulted.   • WBC 08/08/2019 10.28  3.40 - 10.80 10*3/mm3 Final   • RBC 08/08/2019 4.15  3.77 - 5.28 10*6/mm3 Final   • Hemoglobin 08/08/2019 12.8  12.0 - 15.9 g/dL Final   • Hematocrit 08/08/2019 38.1  34.0 - 46.6 % Final   • MCV 08/08/2019 91.8  79.0 - 97.0 fL Final   • MCH 08/08/2019 30.8  26.6 -  33.0 pg Final   • MCHC 08/08/2019 33.6  31.5 - 35.7 g/dL Final   • RDW 08/08/2019 12.6  12.3 - 15.4 % Final   • RDW-SD 08/08/2019 42.2  37.0 - 54.0 fl Final   • MPV 08/08/2019 9.3  6.0 - 12.0 fL Final   • Platelets 08/08/2019 379  140 - 450 10*3/mm3 Final   • Neutrophil % 08/08/2019 58.7  42.7 - 76.0 % Final   • Lymphocyte % 08/08/2019 30.4  19.6 - 45.3 % Final   • Monocyte % 08/08/2019 6.6  5.0 - 12.0 % Final   • Eosinophil % 08/08/2019 3.3  0.3 - 6.2 % Final   • Basophil % 08/08/2019 0.7  0.0 - 1.5 % Final   • Immature Grans % 08/08/2019 0.3  0.0 - 0.5 % Final   • Neutrophils, Absolute 08/08/2019 6.04  1.70 - 7.00 10*3/mm3 Final   • Lymphocytes, Absolute 08/08/2019 3.12* 0.70 - 3.10 10*3/mm3 Final   • Monocytes, Absolute 08/08/2019 0.68  0.10 - 0.90 10*3/mm3 Final   • Eosinophils, Absolute 08/08/2019 0.34  0.00 - 0.40 10*3/mm3 Final   • Basophils, Absolute 08/08/2019 0.07  0.00 - 0.20 10*3/mm3 Final   • Immature Grans, Absolute 08/08/2019 0.03  0.00 - 0.05 10*3/mm3 Final   • nRBC 08/08/2019 0.0  0.0 - 0.2 /100 WBC Final   • RBC, UA 08/08/2019 0-2* None Seen /HPF Final   • WBC, UA 08/08/2019 None Seen  None Seen, 0-2, 3-5 /HPF Final   • Bacteria, UA 08/08/2019 None Seen  None Seen /HPF Final   • Squamous Epithelial Cells, UA 08/08/2019 None Seen  None Seen, 0-2 /HPF Final   • Hyaline Casts, UA 08/08/2019 None Seen  None Seen /LPF Final   • Methodology 08/08/2019 Automated Microscopy   Final   Lab on 07/16/2019   Component Date Value Ref Range Status   • WBC 07/16/2019 9.22  3.40 - 10.80 10*3/mm3 Final   • RBC 07/16/2019 4.71  3.77 - 5.28 10*6/mm3 Final   • Hemoglobin 07/16/2019 14.4  12.0 - 15.9 g/dL Final   • Hematocrit 07/16/2019 45.3  34.0 - 46.6 % Final   • MCV 07/16/2019 96.2  79.0 - 97.0 fL Final   • MCH 07/16/2019 30.6  26.6 - 33.0 pg Final   • MCHC 07/16/2019 31.8  31.5 - 35.7 g/dL Final   • RDW 07/16/2019 12.4  12.3 - 15.4 % Final   • RDW-SD 07/16/2019 44.1  37.0 - 54.0 fl Final   • MPV 07/16/2019 9.8  6.0  - 12.0 fL Final   • Platelets 07/16/2019 434  140 - 450 10*3/mm3 Final   • Neutrophil % 07/16/2019 43.5  42.7 - 76.0 % Final   • Lymphocyte % 07/16/2019 40.3  19.6 - 45.3 % Final   • Monocyte % 07/16/2019 9.9  5.0 - 12.0 % Final   • Eosinophil % 07/16/2019 4.4  0.3 - 6.2 % Final   • Basophil % 07/16/2019 1.2  0.0 - 1.5 % Final   • Immature Grans % 07/16/2019 0.7* 0.0 - 0.5 % Final   • Neutrophils, Absolute 07/16/2019 4.01  1.70 - 7.00 10*3/mm3 Final   • Lymphocytes, Absolute 07/16/2019 3.72* 0.70 - 3.10 10*3/mm3 Final   • Monocytes, Absolute 07/16/2019 0.91* 0.10 - 0.90 10*3/mm3 Final   • Eosinophils, Absolute 07/16/2019 0.41* 0.00 - 0.40 10*3/mm3 Final   • Basophils, Absolute 07/16/2019 0.11  0.00 - 0.20 10*3/mm3 Final   • Immature Grans, Absolute 07/16/2019 0.06* 0.00 - 0.05 10*3/mm3 Final   • nRBC 07/16/2019 0.0  0.0 - 0.2 /100 WBC Final   • Glucose 07/16/2019 96  65 - 99 mg/dL Final   • BUN 07/16/2019 15  6 - 20 mg/dL Final   • Creatinine 07/16/2019 0.72  0.57 - 1.00 mg/dL Final   • Sodium 07/16/2019 143  136 - 145 mmol/L Final   • Potassium 07/16/2019 4.8  3.5 - 5.2 mmol/L Final   • Chloride 07/16/2019 101  98 - 107 mmol/L Final   • CO2 07/16/2019 31.0* 22.0 - 29.0 mmol/L Final   • Calcium 07/16/2019 9.2  8.6 - 10.5 mg/dL Final   • Total Protein 07/16/2019 6.8  6.0 - 8.5 g/dL Final   • Albumin 07/16/2019 4.50  3.50 - 5.20 g/dL Final   • ALT (SGPT) 07/16/2019 23  1 - 33 U/L Final   • AST (SGOT) 07/16/2019 23  1 - 32 U/L Final   • Alkaline Phosphatase 07/16/2019 79  39 - 117 U/L Final   • Total Bilirubin 07/16/2019 0.5  0.2 - 1.2 mg/dL Final   • eGFR Non African Amer 07/16/2019 85  >60 mL/min/1.73 Final   • Globulin 07/16/2019 2.3  gm/dL Final   • A/G Ratio 07/16/2019 2.0  g/dL Final   • BUN/Creatinine Ratio 07/16/2019 20.8  7.0 - 25.0 Final   • Anion Gap 07/16/2019 11.0  5.0 - 15.0 mmol/L Final   • Hemoglobin A1C 07/16/2019 5.60  4.80 - 5.60 % Final   • Total Cholesterol 07/16/2019 178  0 - 200 mg/dL Final   •  "Triglycerides 07/16/2019 159* 0 - 150 mg/dL Final   • HDL Cholesterol 07/16/2019 47  40 - 60 mg/dL Final   • LDL Cholesterol  07/16/2019 99  0 - 100 mg/dL Final   • VLDL Cholesterol 07/16/2019 31.8  mg/dL Final   • LDL/HDL Ratio 07/16/2019 2.11   Final   • TSH 07/16/2019 3.550  0.270 - 4.200 mIU/mL Final   • Free T4 07/16/2019 1.16  0.93 - 1.70 ng/dL Final   • T3, Free 07/16/2019 3.81  2.00 - 4.40 pg/mL Final   • 25 Hydroxy, Vitamin D 07/16/2019 26.1* 30.0 - 100.0 ng/ml Final   • Vitamin B-12 07/16/2019 782  211 - 946 pg/mL Final   • Sed Rate 07/16/2019 2  0 - 20 mm/hr Final   • C-Reactive Protein 07/16/2019 0.09  0.00 - 0.50 mg/dL Final   • Intrinsic Factor Antibodies 07/16/2019 0.9  0.0 - 1.1 AU/mL Final      XR Chest PA & Lateral  Narrative: TWO VIEW CHEST    HISTORY: Dyspnea. Cough. COPD.    Frontal and lateral films of the chest were obtained.    COMPARISON: January 31, 2015    FINDINGS:    The lungs are clear of an acute process.  The heart is not enlarged.  The pulmonary vasculature is not increased.  No pleural effusion.  No pneumothorax.  No acute osseous abnormality.  Impression: CONCLUSION:  No Acute Disease    [id number]    Electronically signed by:  Theo Jacobson MD  8/29/2019 10:48 AM  CDT Workstation: [id number]    [email or web address]    There is no immunization history on file for this patient.    The following portions of the patient's history were reviewed and updated as appropriate: allergies, current medications, past family history, past medical history, past social history, past surgical history and problem list.        Physical Exam  /70   Pulse 83   Temp 98.8 °F (37.1 °C)   Ht 165.1 cm (65\")   Wt 52 kg (114 lb 9.6 oz)   SpO2 96%   BMI 19.07 kg/m²     Physical Exam   Constitutional: She is oriented to person, place, and time. She appears well-developed and well-nourished.   HENT:   Head: Normocephalic and atraumatic.   Right Ear: External ear normal.   Eyes: Conjunctivae and EOM are normal. " Pupils are equal, round, and reactive to light.   Neck: Normal range of motion. Neck supple.   Cardiovascular: Normal rate, regular rhythm and normal heart sounds.   No murmur heard.  Pulmonary/Chest: No respiratory distress.   Decreased breath sounds     Coarse breath sounds    Abdominal: Soft. Bowel sounds are normal. She exhibits no distension. There is no tenderness.   Musculoskeletal: Normal range of motion. She exhibits no edema or deformity.   Neurological: She is alert and oriented to person, place, and time. No cranial nerve deficit.   Skin: Skin is warm. No rash noted. She is not diaphoretic. No erythema. No pallor.   Psychiatric: She has a normal mood and affect. Her behavior is normal.   Nursing note and vitals reviewed.      Assessment/Plan    Diagnosis Plan   1. COPD exacerbation (CMS/HCC)     2. Tobacco abuse counseling     3. Tobacco user     4. Vitamin D deficiency      5. Crohn's disease with complication, unspecified gastrointestinal tract location (CMS/HCC)     6. Chronic obstructive pulmonary disease, unspecified COPD type (CMS/HCC)     7. Chronic idiopathic constipation     8. B12 deficiency     9. Tremor     10. Pertussis exposure     11. Chronic bronchitis, unspecified chronic bronchitis type (CMS/HCC)          -reviewed last ER visit at [facility]   -COPD/COPD exacerbation - referral to Pulmonology for PFTs/  Start on prednisone tapering dose.mycoplasma negative Stop  Doxycycline 100 mg PO BID. Pt states she was on this before and could not tolerate due to GI upset. But she will try this time with probiotic supplements. Albuterol inhaler along with nebulizer machine.  Also stop combivent and start on Breo 100 mcg 1 puff daily.  Advised to quit smoking. Continue cefepime 250 mg PO BID  Until finished. Will add clindamycin 300 mg every 6 hours since pt has history of pertussis exposure. Pt is intolerant to macrolides and Bactrim.    -tremor - Neurology following on primidone 50 mg 1/2 qhs. Duo  nebulzier every 4-6 hours   -RUQ pain - US negative for gallstone. Recommend HIDA scan.  Suspect biliary dyskinesia. Recommend bland diet   -vitamin B12 deficiency  - last b12 stable will start on vitamin b12 250 mcg gordon.  Continue to monitor    -tobacco user - counseled to quit smoking >5 minutes. She could not tolerate chantix  urged the importance of quitting smoking   -recommend colonoscopy screeningk/ Crohn's disesae/ IBS-C - referral to Gastroenteorlogy has upcoming colonoscopy soon. Endoscopy schedule on 8/30/19. She may have to cancel since she has respiratory isues. currently  -will get last mammogram screening   -b12 deficiency -check intrinsic factor and b12 levels. Consider injections if low   -fatigue -check thyroid test.   -cervical lymphadenopathy.  - continue to monitor. Consider  US of neck.    -COPD - combivent  She continues to see Dr. Woodward as Pulmonology. Recommend PUlmonology  Referral in [place] but she will hold now  -regarding tremor of head - she has seen neurology with Dr. Chen. Had MRI of brain. Recommended 2nd opinon with Dr. Manjarrez She also has family history of Friedreich's ataxia  -annual physical exam today  -advised pt to be safe and call with questions and concerns  -recheck in  12 weeks   -go to ER or call 911 if seere         This document has been electronically signed by Xavier Wick MD on September 5, 2019 11:43 AM

## 2019-09-02 LAB
B PERT IGG SER-ACNC: 3.14 INDEX (ref 0–0.94)
B PERT IGM SER QL IA: <1 INDEX (ref 0–0.9)

## 2019-09-04 ENCOUNTER — TELEPHONE (OUTPATIENT)
Dept: FAMILY MEDICINE CLINIC | Facility: CLINIC | Age: 49
End: 2019-09-04

## 2019-09-04 NOTE — TELEPHONE ENCOUNTER
----- Message from Xavier Wick MD sent at 9/3/2019  7:13 AM CDT -----  Pt is positive for Pertussis IgG. May be due to previous immunization or was infected in the past. Continue with abx. Will recheck on next visit. Thanks

## 2019-09-04 NOTE — TELEPHONE ENCOUNTER
----- Message from Xavier Wick MD sent at 8/30/2019  2:32 PM CDT -----  Mycoplasma antibody test negative continue with antibiotics.  Called pt

## 2019-09-05 ENCOUNTER — OFFICE VISIT (OUTPATIENT)
Dept: FAMILY MEDICINE CLINIC | Facility: CLINIC | Age: 49
End: 2019-09-05

## 2019-09-05 VITALS
HEIGHT: 65 IN | OXYGEN SATURATION: 96 % | BODY MASS INDEX: 19.09 KG/M2 | TEMPERATURE: 98.8 F | HEART RATE: 83 BPM | DIASTOLIC BLOOD PRESSURE: 70 MMHG | WEIGHT: 114.6 LBS | SYSTOLIC BLOOD PRESSURE: 110 MMHG

## 2019-09-05 DIAGNOSIS — Z20.818 PERTUSSIS EXPOSURE: ICD-10-CM

## 2019-09-05 DIAGNOSIS — J44.9 CHRONIC OBSTRUCTIVE PULMONARY DISEASE, UNSPECIFIED COPD TYPE (HCC): ICD-10-CM

## 2019-09-05 DIAGNOSIS — E53.8 B12 DEFICIENCY: ICD-10-CM

## 2019-09-05 DIAGNOSIS — J44.1 COPD EXACERBATION (HCC): Primary | ICD-10-CM

## 2019-09-05 DIAGNOSIS — R25.1 TREMOR: ICD-10-CM

## 2019-09-05 DIAGNOSIS — Z71.6 TOBACCO ABUSE COUNSELING: ICD-10-CM

## 2019-09-05 DIAGNOSIS — J42 CHRONIC BRONCHITIS, UNSPECIFIED CHRONIC BRONCHITIS TYPE (HCC): ICD-10-CM

## 2019-09-05 DIAGNOSIS — K50.919 CROHN'S DISEASE WITH COMPLICATION, UNSPECIFIED GASTROINTESTINAL TRACT LOCATION (HCC): ICD-10-CM

## 2019-09-05 DIAGNOSIS — E55.9 VITAMIN D DEFICIENCY: ICD-10-CM

## 2019-09-05 DIAGNOSIS — Z72.0 TOBACCO USER: ICD-10-CM

## 2019-09-05 DIAGNOSIS — K59.04 CHRONIC IDIOPATHIC CONSTIPATION: ICD-10-CM

## 2019-09-05 PROCEDURE — 99214 OFFICE O/P EST MOD 30 MIN: CPT | Performed by: FAMILY MEDICINE

## 2019-09-05 RX ORDER — CLINDAMYCIN HYDROCHLORIDE 300 MG/1
300 CAPSULE ORAL 4 TIMES DAILY
Qty: 28 CAPSULE | Refills: 0 | Status: SHIPPED | OUTPATIENT
Start: 2019-09-05 | End: 2019-09-19

## 2019-09-05 NOTE — PATIENT INSTRUCTIONS
Clindamycin capsules  What is this medicine?  CLINDAMYCIN (GORDON Garcia) is a lincosamide antibiotic. It is used to treat certain kinds of bacterial infections. It will not work for colds, flu, or other viral infections.  This medicine may be used for other purposes; ask your health care provider or pharmacist if you have questions.  COMMON BRAND NAME(S): Cleocin  What should I tell my health care provider before I take this medicine?  They need to know if you have any of these conditions:  -kidney disease  -liver disease  -stomach problems like colitis  -an unusual or allergic reaction to clindamycin, lincomycin, or other medicines, foods, dyes like tartrazine or preservatives  -pregnant or trying to get pregnant  -breast-feeding  How should I use this medicine?  Take this medicine by mouth with a full glass of water. Follow the directions on the prescription label. You can take this medicine with food or on an empty stomach. If the medicine upsets your stomach, take it with food. Take your medicine at regular intervals. Do not take your medicine more often than directed. Take all of your medicine as directed even if you think your are better. Do not skip doses or stop your medicine early.  Talk to your pediatrician regarding the use of this medicine in children. Special care may be needed.  Overdosage: If you think you have taken too much of this medicine contact a poison control center or emergency room at once.  NOTE: This medicine is only for you. Do not share this medicine with others.  What if I miss a dose?  If you miss a dose, take it as soon as you can. If it is almost time for your next dose, take only that dose. Do not take double or extra doses.  What may interact with this medicine?  -birth control pills  -erythromycin  -medicines that relax muscles for surgery  -rifampin  This list may not describe all possible interactions. Give your health care provider a list of all the medicines, herbs,  non-prescription drugs, or dietary supplements you use. Also tell them if you smoke, drink alcohol, or use illegal drugs. Some items may interact with your medicine.  What should I watch for while using this medicine?  Tell your doctor or healthcare professional if your symptoms do not start to get better or if they get worse.  Do not treat diarrhea with over the counter products. Contact your doctor if you have diarrhea that lasts more than 2 days or if it is severe and watery.  What side effects may I notice from receiving this medicine?  Side effects that you should report to your doctor or health care professional as soon as possible:  -allergic reactions like skin rash, itching or hives, swelling of the face, lips, or tongue  -dark urine  -pain on swallowing  -redness, blistering, peeling or loosening of the skin, including inside the mouth  -unusual bleeding or bruising  -unusually weak or tired  -yellowing of eyes or skin  Side effects that usually do not require medical attention (report to your doctor or health care professional if they continue or are bothersome):  -diarrhea  -itching in the rectal or genital area  -joint pain  -nausea, vomiting  -stomach pain  This list may not describe all possible side effects. Call your doctor for medical advice about side effects. You may report side effects to FDA at 0-861-FDA-2072.  Where should I keep my medicine?  Keep out of the reach of children.  Store at room temperature between 20 and 25 degrees C (68 and 77 degrees F). Throw away any unused medicine after the expiration date.  NOTE: This sheet is a summary. It may not cover all possible information. If you have questions about this medicine, talk to your doctor, pharmacist, or health care provider.  © 2019 [employer] (2017-03-22 16:34:00)

## 2019-09-19 ENCOUNTER — OFFICE VISIT (OUTPATIENT)
Dept: FAMILY MEDICINE CLINIC | Facility: CLINIC | Age: 49
End: 2019-09-19

## 2019-09-19 VITALS
WEIGHT: 114.2 LBS | DIASTOLIC BLOOD PRESSURE: 74 MMHG | OXYGEN SATURATION: 96 % | HEIGHT: 65 IN | SYSTOLIC BLOOD PRESSURE: 124 MMHG | HEART RATE: 74 BPM | BODY MASS INDEX: 19.03 KG/M2 | TEMPERATURE: 97.6 F

## 2019-09-19 DIAGNOSIS — Z71.6 TOBACCO ABUSE COUNSELING: ICD-10-CM

## 2019-09-19 DIAGNOSIS — E53.8 B12 DEFICIENCY: ICD-10-CM

## 2019-09-19 DIAGNOSIS — J42 CHRONIC BRONCHITIS, UNSPECIFIED CHRONIC BRONCHITIS TYPE (HCC): ICD-10-CM

## 2019-09-19 DIAGNOSIS — J44.9 CHRONIC OBSTRUCTIVE PULMONARY DISEASE, UNSPECIFIED COPD TYPE (HCC): ICD-10-CM

## 2019-09-19 DIAGNOSIS — K59.04 CHRONIC IDIOPATHIC CONSTIPATION: ICD-10-CM

## 2019-09-19 DIAGNOSIS — Z20.818 PERTUSSIS EXPOSURE: ICD-10-CM

## 2019-09-19 DIAGNOSIS — K50.919 CROHN'S DISEASE WITH COMPLICATION, UNSPECIFIED GASTROINTESTINAL TRACT LOCATION (HCC): ICD-10-CM

## 2019-09-19 DIAGNOSIS — Z72.0 TOBACCO USER: Primary | ICD-10-CM

## 2019-09-19 DIAGNOSIS — J44.1 COPD EXACERBATION (HCC): ICD-10-CM

## 2019-09-19 DIAGNOSIS — E55.9 VITAMIN D DEFICIENCY: ICD-10-CM

## 2019-09-19 DIAGNOSIS — Z23 NEED FOR IMMUNIZATION AGAINST INFLUENZA: ICD-10-CM

## 2019-09-19 PROCEDURE — G0008 ADMIN INFLUENZA VIRUS VAC: HCPCS | Performed by: FAMILY MEDICINE

## 2019-09-19 PROCEDURE — 90674 CCIIV4 VAC NO PRSV 0.5 ML IM: CPT | Performed by: FAMILY MEDICINE

## 2019-09-19 PROCEDURE — 99214 OFFICE O/P EST MOD 30 MIN: CPT | Performed by: FAMILY MEDICINE

## 2019-09-19 PROCEDURE — 99406 BEHAV CHNG SMOKING 3-10 MIN: CPT | Performed by: FAMILY MEDICINE

## 2019-09-19 NOTE — PATIENT INSTRUCTIONS
Umeclidinium; Vilanterol inhalation powder  What is this medicine?  UMECLIDINIUM; VILANTEROL (ue MEK li DIN ee um; vye TOMAS ter ol) inhalation is a combination of two medicines that decrease inflammation and help to open up the airways of your lungs. It is for chronic obstructive pulmonary disease (COPD), including chronic bronchitis or emphysema. Do NOT use for asthma or an acute asthma attack. Do NOT use for a COPD attack.  This medicine may be used for other purposes; ask your health care provider or pharmacist if you have questions.  COMMON BRAND NAME(S): JAKCELIN AGUIRRE  What should I tell my health care provider before I take this medicine?  They need to know if you have any of these conditions:  -bladder problems or difficulty passing urine  -diabetes  -glaucoma  -heart disease or irregular heartbeat  -high blood pressure  -kidney disease  -pheochromocytoma  -prostate disease  -seizures  -thyroid disease  -an unusual or allergic reaction to umeclidinium, vilanterol, lactose, milk proteins, other medicines, foods, dyes, or preservatives  -pregnant or trying to get pregnant  -breast-feeding  How should I use this medicine?  This medicine is inhaled through the mouth. It is used once per day. Follow the directions on the prescription label. Do not use a spacer device with this inhaler. Take your medicine at regular intervals. Do not take your medicine more often than directed. Do not stop taking except on your doctor's advice. Make sure that you are using your inhaler correctly. Ask you doctor or health care provider if you have any questions.  A special MedGuide will be given to you by the pharmacist with each prescription and refill. Be sure to read this information carefully each time.  Talk to your pediatrician regarding the use of this medicine in children. Special care may be needed.  Overdosage: If you think you have taken too much of this medicine contact a poison control center or emergency room at  once.  NOTE: This medicine is only for you. Do not share this medicine with others.  What if I miss a dose?  If you miss a dose, use it as soon as you can. If it is almost time for your next dose, use only that dose and continue with your regular schedule. Do not use double or extra doses.  What may interact with this medicine?  Do not take this medicine with any of the following medications:  -cisapride  -dofetilide  -dronedarone  -MAOIs like Carbex, Eldepryl, Marplan, Nardil, and Parnate  -pimozide  -thioridazine  -ziprasidone  This medicine may also interact with the following medications:  -antihistamines for allergy  -antiviral medicines for HIV or AIDS  -atropine  -beta-blockers like metoprolol and propranolol  -certain medicines for bladder problems like oxybutynin, tolterodine  -certain medicines for depression, anxiety, or psychotic disturbances  -certain medicines for Parkinson's disease like benztropine, trihexyphenidyl  -certain medicines for stomach problems like dicyclomine, hyoscyamine  -certain medicines for travel sickness like scopolamine  -diuretics  -ipratropium  -medicines for colds  -medicines for fungal infections like ketoconazole and itraconazole  -other medicines for breathing problems  -other medicines that prolong the QT interval (cause an abnormal heart rhythm)  -tiotropium  This list may not describe all possible interactions. Give your health care provider a list of all the medicines, herbs, non-prescription drugs, or dietary supplements you use. Also tell them if you smoke, drink alcohol, or use illegal drugs. Some items may interact with your medicine.  What should I watch for while using this medicine?  Visit your doctor or health care professional for regular checkups. Tell your doctor or health care professional if your symptoms do not get better.  If your symptoms get worse or if you need your short-acting inhalers more often, call your doctor right away. Do not use this medicine  more than once every 24 hours.  What side effects may I notice from receiving this medicine?  Side effects that you should report to your doctor or health care professional as soon as possible:  -allergic reactions like skin rash or hives, swelling of the face, lips, or tongue  -breathing problems right after inhaling your medicine  -changes in vision  -chest pain  -fast, irregular heartbeat  -feeling faint or lightheaded, falls  -fever or chills  -nausea, vomiting  -trouble passing urine or change in the amount of urine  Side effects that usually do not require medical attention (report to your doctor or health care professional if they continue or are bothersome):  -constipation  -cough  -diarrhea  -headache  -muscle cramps  -nervousness  -sore throat  -tremor  This list may not describe all possible side effects. Call your doctor for medical advice about side effects. You may report side effects to FDA at 5-609-FDA-7693.  Where should I keep my medicine?  Keep out of the reach of children.  Store at room temperature between 15 and 30 degrees C (59 and 86 degrees F). Store in a dry place away from direct heat or sunlight. Throw away 6 weeks after you remove the inhaler from the foil tray, or after the dose indicator reads 0, whichever comes first. Throw away any unopened packages after the expiration date.  NOTE: This sheet is a summary. It may not cover all possible information. If you have questions about this medicine, talk to your doctor, pharmacist, or health care provider.     © 2017, [employer] Standard. (2017-01-19 08:33:54)

## 2019-09-23 ENCOUNTER — OFFICE VISIT (OUTPATIENT)
Dept: FAMILY MEDICINE CLINIC | Facility: CLINIC | Age: 49
End: 2019-09-23

## 2019-09-23 VITALS
SYSTOLIC BLOOD PRESSURE: 110 MMHG | BODY MASS INDEX: 18.93 KG/M2 | HEART RATE: 87 BPM | WEIGHT: 113.6 LBS | TEMPERATURE: 98 F | OXYGEN SATURATION: 96 % | HEIGHT: 65 IN | DIASTOLIC BLOOD PRESSURE: 72 MMHG

## 2019-09-23 DIAGNOSIS — Z00.00 MEDICARE ANNUAL WELLNESS VISIT, INITIAL: Primary | ICD-10-CM

## 2019-09-23 PROCEDURE — G0438 PPPS, INITIAL VISIT: HCPCS | Performed by: FAMILY MEDICINE

## 2019-09-23 NOTE — PROGRESS NOTES
The ABCs of the Annual Wellness Visit  Initial Medicare Wellness Visit    Chief Complaint   Patient presents with   • Annual Exam     Medicare Wellness Exam       Subjective   History of Present Illness:  Carla Richard is a 49 y.o. female who presents for an Initial Medicare Wellness Visit.    HEALTH RISK ASSESSMENT    Recent Hospitalizations:  No hospitalization(s) within the last year.    Current Medical Providers:  Patient Care Team:  Xavier Wick MD as PCP - General (Family Medicine)    Smoking Status:  Social History     Tobacco Use   Smoking Status Current Every Day Smoker   • Packs/day: 1.00   • Types: Cigarettes   Smokeless Tobacco Never Used       Alcohol Consumption:  Social History     Substance and Sexual Activity   Alcohol Use No   • Frequency: Never       Depression Screen:   PHQ-2/PHQ-9 Depression Screening 9/23/2019   Little interest or pleasure in doing things 0   Feeling down, depressed, or hopeless 0   Total Score 0       Fall Risk Screen:  STEADI Fall Risk Assessment has not been completed.    Health Habits and Functional and Cognitive Screening:  Functional & Cognitive Status 9/23/2019   Do you have difficulty preparing food and eating? No   Do you have difficulty bathing yourself, getting dressed or grooming yourself? No   Do you have difficulty using the toilet? No   Do you have difficulty moving around from place to place? No   Do you have trouble with steps or getting out of a bed or a chair? No   Current Diet Well Balanced Diet   Dental Exam Up to date   Eye Exam Not up to date   Current Exercise Activities Include None   Do you need help using the phone?  No   Are you deaf or do you have serious difficulty hearing?  No   Do you need help with transportation? No   Do you need help shopping? No   Do you need help preparing meals?  No   Do you need help with housework?  Yes   Do you need help with laundry? No   Do you need help taking your medications? No   Do you need help  managing money? No   Do you ever drive or ride in a car without wearing a seat belt? No   Have you felt unusual stress, anger or loneliness in the last month? Yes   Who do you live with? Alone   If you need help, do you have trouble finding someone available to you? No   Have you been bothered in the last four weeks by sexual problems? No   Do you have difficulty concentrating, remembering or making decisions? No         Does the patient have evidence of cognitive impairment? No    Asprin use counseling:Does not need ASA (and currently is not on it)    Age-appropriate Screening Schedule:  Refer to the list below for future screening recommendations based on patient's age, sex and/or medical conditions. Orders for these recommended tests are listed in the plan section. The patient has been provided with a written plan.    Health Maintenance   Topic Date Due   • TDAP/TD VACCINES (1 - Tdap) 08/12/2029 (Originally 2/8/1989)   • PNEUMOCOCCAL VACCINE (19-64 MEDIUM RISK) (1 of 1 - PPSV23) 08/12/2034 (Originally [birth year]8/1989)   • MAMMOGRAM  02/07/2020   • LIPID PANEL  07/16/2020   • COLONOSCOPY  09/22/2024   • INFLUENZA VACCINE  Completed   • PAP SMEAR  Discontinued   • ZOSTER VACCINE  Discontinued          The following portions of the patient's history were reviewed and updated as appropriate: allergies, current medications, past family history, past medical history, past social history, past surgical history and problem list.    Outpatient Medications Prior to Visit   Medication Sig Dispense Refill   • albuterol sulfate  (90 Base) MCG/ACT inhaler Inhale 2 puffs Every 4 (Four) Hours As Needed for Wheezing. 18 g 3   • ondansetron ODT (ZOFRAN-ODT) 4 MG disintegrating tablet Take 1 tablet by mouth Every 6 (Six) Hours As Needed for Nausea or Vomiting. 10 tablet 0   • sodium-potassium-magnesium sulfates (SUPREP BOWEL PREP KIT) 17.5-3.13-1.6 GM/177ML solution oral solution Take 1 bottle by mouth Every 12 (Twelve) Hours. 2  "bottle 0   • umeclidinium-vilanterol (ANORO ELLIPTA) 62.5-25 MCG/INH aerosol powder  inhaler Inhale 1 puff Daily. 60 each 2   • umeclidinium-vilanterol (ANORO ELLIPTA) 62.5-25 MCG/INH aerosol powder  inhaler Inhale 1 puff Daily. 1 each 0   • vitamin B-12 (CYANOCOBALAMIN) 250 MCG tablet Take 1 tablet by mouth Daily. 30 tablet 3   • vitamin D (ERGOCALCIFEROL) 66213 units capsule capsule Take 1 capsule by mouth 1 (One) Time Per Week. 4 capsule 3   • predniSONE (DELTASONE) 10 MG tablet Take 4 pills for  4 days 3 pills for 3 days 2 pills for 2 pills and 1 pill for 1 day then stop 30 tablet 0     No facility-administered medications prior to visit.        Patient Active Problem List   Diagnosis   • Tobacco abuse counseling   • Chronic idiopathic constipation   • B12 deficiency   • Vitamin D deficiency    • Tobacco user   • Chronic obstructive pulmonary disease (CMS/HCC)   • Crohn's disease with complication (CMS/HCC)   • Cervical lymphadenopathy   • Generalized abdominal pain   • Nausea   • Neurological abnormality   • Tremor   • RUQ pain   • COPD exacerbation (CMS/HCC)   • Pertussis exposure   • Chronic bronchitis (CMS/HCC)       Advanced Care Planning:  Patient has an advance directive - a copy has been provided and is visible in patient header    Review of Systems    Compared to one year ago, the patient feels her physical health is worse.  Compared to one year ago, the patient feels her mental health is the same.    Reviewed chart for potential of high risk medication in the elderly: yes  Reviewed chart for potential of harmful drug interactions in the elderly:yes    Objective         Vitals:    09/23/19 1006   BP: 110/72   BP Location: Right arm   Patient Position: Sitting   Cuff Size: Adult   Pulse: 87   Temp: 98 °F (36.7 °C)   TempSrc: Oral   SpO2: 96%   Weight: 51.5 kg (113 lb 9.6 oz)   Height: 165.1 cm (65\")   PainSc:   5   PainLoc: Back       Body mass index is 18.9 kg/m².  Discussed the patient's BMI with her. " The BMI is below average; BMI management plan is completed.    Physical Exam    Lab Results   Component Value Date    TRIG 159 (H) 07/16/2019    HDL 47 07/16/2019    LDL 99 07/16/2019    VLDL 31.8 07/16/2019    HGBA1C 5.60 07/16/2019        Assessment/Plan   Medicare Risks and Personalized Health Plan  CMS Preventative Services Quick Reference  Advance Directive Discussion  Alcohol Misuse  Breast Cancer/Mammogram Screening  Cardiovascular risk  Chronic Pain   Colon Cancer Screening  Dementia/Memory   Depression/Dysphoria  Fall Risk  Hearing Problem  Immunizations Discussed/Encouraged (specific immunizations; other )  Inadequate Social Support, Isolation, Loneliness, Lack of Transportation, Financial Difficulties, or Caregiver Stress   Inactivity/Sedentary  Lung Cancer Risk  Obesity/Overweight   Osteoprorosis Risk  Polypharmacy    The above risks/problems have been discussed with the patient.  Pertinent information has been shared with the patient in the After Visit Summary.  Follow up plans and orders are seen below in the Assessment/Plan Section.    Diagnoses and all orders for this visit:    1. Medicare annual wellness visit, initial (Primary)      Follow Up:  Return in about 1 year (around 9/23/2020) for Medicare Wellness subsequent.     An After Visit Summary and PPPS were given to the patient.    -Action plan discussed, please see scanned documents  -repeat in 1 year        This document has been electronically signed by Xavier Wick MD on September 23, 2019 12:41 PM

## 2019-09-23 NOTE — PATIENT INSTRUCTIONS
Steps to Quit Smoking    Smoking tobacco can be harmful to your health and can affect almost every organ in your body. Smoking puts you, and those around you, at risk for developing many serious chronic diseases. Quitting smoking is difficult, but it is one of the best things that you can do for your health. It is never too late to quit.  What are the benefits of quitting smoking?  When you quit smoking, you lower your risk of developing serious diseases and conditions, such as:  · Lung cancer or lung disease, such as COPD.  · Heart disease.  · Stroke.  · Heart attack.  · Infertility.  · Osteoporosis and bone fractures.  Additionally, symptoms such as coughing, wheezing, and shortness of breath may get better when you quit. You may also find that you get sick less often because your body is stronger at fighting off colds and infections. If you are pregnant, quitting smoking can help to reduce your chances of having a baby of low birth weight.  How do I get ready to quit?  When you decide to quit smoking, create a plan to make sure that you are successful. Before you quit:  · Pick a date to quit. Set a date within the next two weeks to give you time to prepare.  · Write down the reasons why you are quitting. Keep this list in places where you will see it often, such as on your bathroom mirror or in your car or wallet.  · Identify the people, places, things, and activities that make you want to smoke (triggers) and avoid them. Make sure to take these actions:  ? Throw away all cigarettes at home, at work, and in your car.  ? Throw away smoking accessories, such as ashtrays and lighters.  ? Clean your car and make sure to empty the ashtray.  ? Clean your home, including curtains and carpets.  · Tell your family, friends, and coworkers that you are quitting. Support from your loved ones can make quitting easier.  · Talk with your health care provider about your options for quitting smoking.  · Find out what treatment  options are covered by your health insurance.  What strategies can I use to quit smoking?  Talk with your healthcare provider about different strategies to quit smoking. Some strategies include:  · Quitting smoking altogether instead of gradually lessening how much you smoke over a period of time. Research shows that quitting “cold turkey” is more successful than gradually quitting.  · Attending in-person counseling to help you build problem-solving skills. You are more likely to have success in quitting if you attend several counseling sessions. Even short sessions of 10 minutes can be effective.  · Finding resources and support systems that can help you to quit smoking and remain smoke-free after you quit. These resources are most helpful when you use them often. They can include:  ? Online chats with a counselor.  ? Telephone quitlines.  ? Printed self-help materials.  ? Support groups or group counseling.  ? Text messaging programs.  ? Mobile phone applications.  · Taking medicines to help you quit smoking. (If you are pregnant or breastfeeding, talk with your health care provider first.) Some medicines contain nicotine and some do not. Both types of medicines help with cravings, but the medicines that include nicotine help to relieve withdrawal symptoms. Your health care provider may recommend:  ? Nicotine patches, gum, or lozenges.  ? Nicotine inhalers or sprays.  ? Non-nicotine medicine that is taken by mouth.  Talk with your health care provider about combining strategies, such as taking medicines while you are also receiving in-person counseling. Using these two strategies together makes you more likely to succeed in quitting than if you used either strategy on its own.  If you are pregnant or breastfeeding, talk with your health care provider about finding counseling or other support strategies to quit smoking. Do not take medicine to help you quit smoking unless told to do so by your health care  provider.  What things can I do to make it easier to quit?  Quitting smoking might feel overwhelming at first, but there is a lot that you can do to make it easier. Take these important actions:  · Reach out to your family and friends and ask that they support and encourage you during this time. Call telephone quitlines, reach out to support groups, or work with a counselor for support.  · Ask people who smoke to avoid smoking around you.  · Avoid places that trigger you to smoke, such as bars, parties, or smoke-break areas at work.  · Spend time around people who do not smoke.  · Lessen stress in your life, because stress can be a smoking trigger for some people. To lessen stress, try:  ? Exercising regularly.  ? Deep-breathing exercises.  ? Yoga.  ? Meditating.  ? Performing a body scan. This involves closing your eyes, scanning your body from head to toe, and noticing which parts of your body are particularly tense. Purposefully relax the muscles in those areas.  · Download or purchase mobile phone or tablet apps (applications) that can help you stick to your quit plan by providing reminders, tips, and encouragement. There are many free apps, such as QuitGuide from the CDC (Centers for Disease Control and Prevention). You can find other support for quitting smoking (smoking cessation) through smokefree.gov and other websites.  How will I feel when I quit smoking?  Within the first 24 hours of quitting smoking, you may start to feel some withdrawal symptoms. These symptoms are usually most noticeable 2-3 days after quitting, but they usually do not last beyond 2-3 weeks. Changes or symptoms that you might experience include:  · Mood swings.  · Restlessness, anxiety, or irritation.  · Difficulty concentrating.  · Dizziness.  · Strong cravings for sugary foods in addition to nicotine.  · Mild weight gain.  · Constipation.  · Nausea.  · Coughing or a sore throat.  · Changes in how your medicines work in your  body.  · A depressed mood.  · Difficulty sleeping (insomnia).  After the first 2-3 weeks of quitting, you may start to notice more positive results, such as:  · Improved sense of smell and taste.  · Decreased coughing and sore throat.  · Slower heart rate.  · Lower blood pressure.  · Clearer skin.  · The ability to breathe more easily.  · Fewer sick days.  Quitting smoking is very challenging for most people. Do not get discouraged if you are not successful the first time. Some people need to make many attempts to quit before they achieve long-term success. Do your best to stick to your quit plan, and talk with your health care provider if you have any questions or concerns.  This information is not intended to replace advice given to you by your health care provider. Make sure you discuss any questions you have with your health care provider.  Document Released: 12/12/2002 Document Revised: 07/24/2018 Document Reviewed: 05/03/2016  [employer] Interactive Patient Education © 2019 [employer]  Exercising to Stay Healthy  To become healthy and stay healthy, it is recommended that you do moderate-intensity and vigorous-intensity exercise. You can tell that you are exercising at a moderate intensity if your heart starts beating faster and you start breathing faster but can still hold a conversation. You can tell that you are exercising at a vigorous intensity if you are breathing much harder and faster and cannot hold a conversation while exercising.  Exercising regularly is important. It has many health benefits, such as:  · Improving overall fitness, flexibility, and endurance.  · Increasing bone density.  · Helping with weight control.  · Decreasing body fat.  · Increasing muscle strength.  · Reducing stress and tension.  · Improving overall health.  How often should I exercise?  Choose an activity that you enjoy, and set realistic goals. Your health care provider can help you make an activity plan that works for  you.  Exercise regularly as told by your health care provider. This may include:  · Doing strength training two times a week, such as:  ? Lifting weights.  ? Using resistance bands.  ? Push-ups.  ? Sit-ups.  ? Yoga.  · Doing a certain intensity of exercise for a given amount of time. Choose from these options:  ? A total of 150 minutes of moderate-intensity exercise every week.  ? A total of 75 minutes of vigorous-intensity exercise every week.  ? A mix of moderate-intensity and vigorous-intensity exercise every week.  Children, pregnant women, people who have not exercised regularly, people who are overweight, and older adults may need to talk with a health care provider about what activities are safe to do. If you have a medical condition, be sure to talk with your health care provider before you start a new exercise program.  What are some exercise ideas?  Moderate-intensity exercise ideas include:  · Walking 1 mile (1.6 km) in about 15 minutes.  · Biking.  · Hiking.  · Golfing.  · Dancing.  · Water aerobics.  Vigorous-intensity exercise ideas include:  · Walking 4.5 miles (7.2 km) or more in about 1 hour.  · Jogging or running 5 miles (8 km) in about 1 hour.  · Biking 10 miles (16.1 km) or more in about 1 hour.  · Lap swimming.  · Roller-skating or in-line skating.  · Cross-country skiing.  · Vigorous competitive sports, such as football, basketball, and soccer.  · Jumping rope.  · Aerobic dancing.  What are some everyday activities that can help me to get exercise?  · Yard work, such as:  ? Pushing a .  ? Raking and bagging leaves.  · Washing your car.  · Pushing a stroller.  · Shoveling snow.  · Gardening.  · Washing windows or floors.  How can I be more active in my day-to-day activities?  · Use stairs instead of an elevator.  · Take a walk during your lunch break.  · If you drive, park your car farther away from your work or school.  · If you take public transportation, get off one stop early and  walk the rest of the way.  · Stand up or walk around during all of your indoor phone calls.  · Get up, stretch, and walk around every 30 minutes throughout the day.  · Enjoy exercise with a friend. Support to continue exercising will help you keep a regular routine of activity.  What guidelines can I follow while exercising?  · Before you start a new exercise program, talk with your health care provider.  · Do not exercise so much that you hurt yourself, feel dizzy, or get very short of breath.  · Wear comfortable clothes and wear shoes with good support.  · Drink plenty of water while you exercise to prevent dehydration or heat stroke.  · Work out until your breathing and your heartbeat get faster.  Where to find more information  · [place] Department of Health and Human Services: www.hhs.gov  · Centers for Disease Control and Prevention (CDC): www.cdc.gov  Summary  · Exercising regularly is important. It will improve your overall fitness, flexibility, and endurance.  · Regular exercise also will improve your overall health. It can help you control your weight, reduce stress, and improve your bone density.  · Do not exercise so much that you hurt yourself, feel dizzy, or get very short of breath.  · Before you start a new exercise program, talk with your health care provider.  This information is not intended to replace advice given to you by your health care provider. Make sure you discuss any questions you have with your health care provider.  Document Released: 01/20/2012 Document Revised: 11/08/2018 Document Reviewed: 11/08/2018  [employer] Interactive Patient Education © 2019 [employer]  Preventive Care 40-64 Years, Female  Preventive care refers to lifestyle choices and visits with your health care provider that can promote health and wellness.  What does preventive care include?    · A yearly physical exam. This is also called an annual well check.  · Dental exams once or twice a year.  · Routine eye exams.  Ask your health care provider how often you should have your eyes checked.  · Personal lifestyle choices, including:  ? Daily care of your teeth and gums.  ? Regular physical activity.  ? Eating a healthy diet.  ? Avoiding tobacco and drug use.  ? Limiting alcohol use.  ? Practicing safe sex.  ? Taking low-dose aspirin daily starting at age 50.  ? Taking vitamin and mineral supplements as recommended by your health care provider.  What happens during an annual well check?  The services and screenings done by your health care provider during your annual well check will depend on your age, overall health, lifestyle risk factors, and family history of disease.  Counseling  Your health care provider may ask you questions about your:  · Alcohol use.  · Tobacco use.  · Drug use.  · Emotional well-being.  · Home and relationship well-being.  · Sexual activity.  · Eating habits.  · Work and work environment.  · Method of birth control.  · Menstrual cycle.  · Pregnancy history.  Screening  You may have the following tests or measurements:  · Height, weight, and BMI.  · Blood pressure.  · Lipid and cholesterol levels. These may be checked every 5 years, or more frequently if you are over 50 years old.  · Skin check.  · Lung cancer screening. You may have this screening every year starting at age 55 if you have a 30-pack-year history of smoking and currently smoke or have quit within the past 15 years.  · Colorectal cancer screening. All adults should have this screening starting at age 50 and continuing until age 75. Your health care provider may recommend screening at age 45. You will have tests every 1-10 years, depending on your results and the type of screening test. People at increased risk should start screening at an earlier age. Screening tests may include:  ? Guaiac-based fecal occult blood testing.  ? Fecal immunochemical test (FIT).  ? Stool DNA test.  ? Virtual colonoscopy.  ? Sigmoidoscopy. During this test, a  flexible tube with a tiny camera (sigmoidoscope) is used to examine your rectum and lower colon. The sigmoidoscope is inserted through your anus into your rectum and lower colon.  ? Colonoscopy. During this test, a long, thin, flexible tube with a tiny camera (colonoscope) is used to examine your entire colon and rectum.  · Hepatitis C blood test.  · Hepatitis B blood test.  · Sexually transmitted disease (STD) testing.  · Diabetes screening. This is done by checking your blood sugar (glucose) after you have not eaten for a while (fasting). You may have this done every 1-3 years.  · Mammogram. This may be done every 1-2 years. Talk to your health care provider about when you should start having regular mammograms. This may depend on whether you have a family history of breast cancer.  · BRCA-related cancer screening. This may be done if you have a family history of breast, ovarian, tubal, or peritoneal cancers.  · Pelvic exam and Pap test. This may be done every 3 years starting at age 21. Starting at age 30, this may be done every 5 years if you have a Pap test in combination with an HPV test.  · Bone density scan. This is done to screen for osteoporosis. You may have this scan if you are at high risk for osteoporosis.  Discuss your test results, treatment options, and if necessary, the need for more tests with your health care provider.  Vaccines  Your health care provider may recommend certain vaccines, such as:  · Influenza vaccine. This is recommended every year.  · Tetanus, diphtheria, and acellular pertussis (Tdap, Td) vaccine. You may need a Td booster every 10 years.  · Varicella vaccine. You may need this if you have not been vaccinated.  · Zoster vaccine. You may need this after age 60.  · Measles, mumps, and rubella (MMR) vaccine. You may need at least one dose of MMR if you were born in 1957 or later. You may also need a second dose.  · Pneumococcal 13-valent conjugate (PCV13) vaccine. You may need this  if you have certain conditions and were not previously vaccinated.  · Pneumococcal polysaccharide (PPSV23) vaccine. You may need one or two doses if you smoke cigarettes or if you have certain conditions.  · Meningococcal vaccine. You may need this if you have certain conditions.  · Hepatitis A vaccine. You may need this if you have certain conditions or if you travel or work in places where you may be exposed to hepatitis A.  · Hepatitis B vaccine. You may need this if you have certain conditions or if you travel or work in places where you may be exposed to hepatitis B.  · Haemophilus influenzae type b (Hib) vaccine. You may need this if you have certain conditions.  Talk to your health care provider about which screenings and vaccines you need and how often you need them.  This information is not intended to replace advice given to you by your health care provider. Make sure you discuss any questions you have with your health care provider.  Document Released: 2017 Document Revised: 2019 Document Reviewed: 10/18/2016  [employer] Interactive Patient Education © 2019 [employer]    Medicare Wellness  Personal Prevention Plan of Service     Date of Office Visit:  2019  Encounter Provider:  Xavier Wick MD  Place of Service:  [facility]  Patient Name: Carla Richard  :  1970    As part of the Medicare Wellness portion of your visit today, we are providing you with this personalized preventive plan of services (PPPS). This plan is based upon recommendations of the [place] Preventive Services Task Force (USPSTF) and the Advisory Committee on Immunization Practices (ACIP).    This lists the preventive care services that should be considered, and provides dates of when you are due. Items listed as completed are up-to-date and do not require any further intervention.    Health Maintenance   Topic Date Due   • ZOSTER VACCINE (1 of 2)  02/08/2018   • MEDICARE ANNUAL WELLNESS  07/14/2019   • TDAP/TD VACCINES (1 - Tdap) 08/12/2029 (Originally 2/8/1987)   • PNEUMOCOCCAL VACCINE (19-64 MEDIUM RISK) (1 of 1 - PPSV23) 08/12/2034 (Originally 2/8/1987)   • MAMMOGRAM  02/07/2020   • LIPID PANEL  07/16/2020   • COLONOSCOPY  09/22/2024   • INFLUENZA VACCINE  Completed   • PAP SMEAR  Discontinued       No orders of the defined types were placed in this encounter.      No Follow-up on file.
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
## 2019-09-25 ENCOUNTER — PROCEDURE VISIT (OUTPATIENT)
Dept: PULMONOLOGY | Facility: CLINIC | Age: 49
End: 2019-09-25

## 2019-09-25 ENCOUNTER — OFFICE VISIT (OUTPATIENT)
Dept: PULMONOLOGY | Facility: CLINIC | Age: 49
End: 2019-09-25

## 2019-09-25 VITALS
OXYGEN SATURATION: 96 % | HEART RATE: 76 BPM | WEIGHT: 117.3 LBS | DIASTOLIC BLOOD PRESSURE: 85 MMHG | HEIGHT: 65 IN | BODY MASS INDEX: 19.54 KG/M2 | SYSTOLIC BLOOD PRESSURE: 116 MMHG

## 2019-09-25 DIAGNOSIS — R06.09 DYSPNEA ON EXERTION: Primary | ICD-10-CM

## 2019-09-25 DIAGNOSIS — J44.9 STAGE 2 MODERATE COPD BY GOLD CLASSIFICATION (HCC): ICD-10-CM

## 2019-09-25 DIAGNOSIS — J41.0 SIMPLE CHRONIC BRONCHITIS (HCC): ICD-10-CM

## 2019-09-25 DIAGNOSIS — J30.89 CHRONIC NONSEASONAL ALLERGIC RHINITIS DUE TO POLLEN: ICD-10-CM

## 2019-09-25 DIAGNOSIS — F17.210 CIGARETTE NICOTINE DEPENDENCE, UNCOMPLICATED: ICD-10-CM

## 2019-09-25 PROCEDURE — 99406 BEHAV CHNG SMOKING 3-10 MIN: CPT | Performed by: INTERNAL MEDICINE

## 2019-09-25 PROCEDURE — 94010 BREATHING CAPACITY TEST: CPT | Performed by: INTERNAL MEDICINE

## 2019-09-25 PROCEDURE — 99204 OFFICE O/P NEW MOD 45 MIN: CPT | Performed by: INTERNAL MEDICINE

## 2019-09-25 RX ORDER — CETIRIZINE HYDROCHLORIDE 10 MG/1
10 TABLET ORAL DAILY
Qty: 30 TABLET | Refills: 11 | Status: SHIPPED | OUTPATIENT
Start: 2019-09-25 | End: 2022-03-31 | Stop reason: SDUPTHER

## 2019-09-25 RX ORDER — IPRATROPIUM BROMIDE AND ALBUTEROL SULFATE 2.5; .5 MG/3ML; MG/3ML
3 SOLUTION RESPIRATORY (INHALATION) EVERY 4 HOURS PRN
COMMUNITY
End: 2020-06-23 | Stop reason: SDUPTHER

## 2019-09-25 RX ORDER — BUDESONIDE AND FORMOTEROL FUMARATE DIHYDRATE 160; 4.5 UG/1; UG/1
2 AEROSOL RESPIRATORY (INHALATION) 2 TIMES DAILY
Qty: 1 INHALER | Refills: 11 | Status: SHIPPED | OUTPATIENT
Start: 2019-09-25 | End: 2020-09-30 | Stop reason: SDUPTHER

## 2019-09-25 RX ORDER — FLUTICASONE PROPIONATE 50 MCG
2 SPRAY, SUSPENSION (ML) NASAL DAILY
Qty: 1 BOTTLE | Refills: 11 | Status: SHIPPED | OUTPATIENT
Start: 2019-09-25 | End: 2020-10-28 | Stop reason: SDUPTHER

## 2019-09-25 RX ORDER — PREDNISONE 20 MG/1
40 TABLET ORAL DAILY
Qty: 10 TABLET | Refills: 0 | Status: SHIPPED | OUTPATIENT
Start: 2019-09-25 | End: 2019-09-30

## 2019-10-10 NOTE — PROGRESS NOTES
Subjective:  Carla Richard is a 49 y.o. female who presents for       Patient Active Problem List   Diagnosis   • Tobacco abuse counseling   • Chronic idiopathic constipation   • B12 deficiency   • Vitamin D deficiency    • Tobacco user   • Stage 2 moderate COPD by GOLD classification (CMS/[facility])   • Crohn's disease with complication (CMS/[facility])   • Cervical lymphadenopathy   • Generalized abdominal pain   • Nausea   • Neurological abnormality   • Tremor   • RUQ pain   • COPD exacerbation (CMS/[facility])   • Pertussis exposure   • Chronic bronchitis (CMS/[facility])   • Cigarette nicotine dependence, uncomplicated   • Chronic nonseasonal allergic rhinitis due to pollen           Current Outpatient Medications:   •  albuterol sulfate  (90 Base) MCG/ACT inhaler, Inhale 2 puffs Every 4 (Four) Hours As Needed for Wheezing., Disp: 18 g, Rfl: 3  •  budesonide-formoterol (SYMBICORT) 160-4.5 MCG/ACT inhaler, Inhale 2 puffs 2 (Two) Times a Day., Disp: 1 inhaler, Rfl: 11  •  cetirizine (zyrTEC) 10 MG tablet, Take 1 tablet by mouth Daily., Disp: 30 tablet, Rfl: 11  •  fluticasone (FLONASE) 50 MCG/ACT nasal spray, 2 sprays into the nostril(s) as directed by provider Daily., Disp: 1 bottle, Rfl: 11  •  ipratropium-albuterol (DUO-NEB) 0.5-2.5 mg/3 ml nebulizer, Take 3 mL by nebulization Every 4 (Four) Hours As Needed for Wheezing or Shortness of Air., Disp: , Rfl:   •  ondansetron ODT (ZOFRAN-ODT) 4 MG disintegrating tablet, Take 1 tablet by mouth Every 6 (Six) Hours As Needed for Nausea or Vomiting., Disp: 10 tablet, Rfl: 0  •  sodium-potassium-magnesium sulfates (SUPREP BOWEL PREP KIT) 17.5-3.13-1.6 GM/177ML solution oral solution, Take 1 bottle by mouth Every 12 (Twelve) Hours., Disp: 2 bottle, Rfl: 0  •  vitamin B-12 (CYANOCOBALAMIN) 250 MCG tablet, Take 1 tablet by mouth Daily., Disp: 30 tablet, Rfl: 3  •  vitamin D (ERGOCALCIFEROL) 46960 units capsule capsule, Take 1 capsule by mouth 1 (One) Time Per Week., Disp: 4  capsule, Rfl: 3    HPI        Pt is 48 yo female with history of moderateOPD, Vitamin B12 deficiency, Vitamin D deficiency, chronic abdominal pain, tremor,  RUQ pain,  Sp hysterectomy,  History of chron's disease, history of pertussis infection  sp right shoulder surgery. X 3   9/19/19 pt is here for recheck and followup. She was treated for pertusissi exposure, COPD exacerbation, chronic bronchitis,. She was given cefepime and clindamycin along with prednisone tapering dose.   Pt was positive for pertussis IgG. Mycoplasma test was negative. Has appt with DR. Jacobson on 9/25/19\. She continues to  Have issues with cough dyspnea. She continues to smoke 1 ppd she declines nicotine patch, wellbutrin and chantix.  She has been using Breo with no relief. She was also on combivent     10/17/19 pt [place] for recheck and followup.  Since last visit pt has seen Pulmonlogist fo her COPD and PFTs were consistent with moderate obstruction. Pt was started on symbicort 160-4.5 mcg  2 puffs BID.  She has history of tobacco smoking. Anoro was stopped and she was given presnidone 40 mg PO for 5 days. She also was advised to use duo nebs and albuterol as needed. She was started on flonase and zyrtec  She does have appt tomorrow with endoscopy.  She is going to have surgery on right shoulder with Dr. Stringer this month. She continues to smoke 1 ppd.  She has noticed some improvement with Symbicort but has thick mucous. She has tried mucinex      Abdominal Pain   This is a recurrent problem. The current episode started more than 1 year ago. The onset quality is sudden. The problem has been waxing and waning. The pain is located in the generalized abdominal region, epigastric region and RUQ. The pain is at a severity of 5/10. The pain is moderate. The quality of the pain is aching, a sensation of fullness and burning. The abdominal pain radiates to the epigastric region. Associated symptoms include constipation. Pertinent negatives  include no anorexia, arthralgias, belching, diarrhea, dysuria, fever, flatus, frequency, headaches, hematochezia, hematuria, melena, myalgias, nausea, vomiting or weight loss. Nothing aggravates the pain. The pain is relieved by being still. The treatment provided no relief. Prior diagnostic workup includes ultrasound. Her past medical history is significant for Crohn's disease. There is no history of abdominal surgery, colon cancer, gallstones, GERD, irritable bowel syndrome, pancreatitis, PUD or ulcerative colitis.    Neurologic Problem   The patient's primary symptoms include focal sensory loss and weakness. The patient's pertinent negatives include no altered mental status, clumsiness, loss of balance, memory loss, near-syncope, slurred speech, syncope or visual change. This is a recurrent problem. The neurological problem developed gradually. The problem has been waxing and waning since onset. There was no focality noted. Associated symptoms include a fever and shortness of breath. Pertinent negatives include no abdominal pain, auditory change, aura, back pain, bladder incontinence, bowel incontinence, chest pain, confusion, diaphoresis, dizziness, fatigue, headaches, light-headedness, nausea, neck pain, palpitations, vertigo or vomiting. Past treatments include nothing. The treatment provided no relief. There is no history of a bleeding disorder, a clotting disorder, a CVA, head trauma, liver disease, mood changes or seizures.   COPD   This is a chronic problem. The current episode started more than 1 year ago. The problem occurs constantly. The problem has been unchanged. Associated symptoms include abdominal pain, arthralgias, fatigue, joint swelling, numbness and weakness. Pertinent negatives include no anorexia, change in bowel habit, chest pain, chills, congestion, coughing, diaphoresis, fever, headaches, myalgias, nausea, neck pain, sore throat, swollen glands, urinary symptoms, vertigo, visual change or  vomiting. The symptoms are aggravated by smoking. She has tried nothing (combivent ) for the symptoms.   Fatigue   This is a chronic problem. The current episode started more than 1 year ago. The problem occurs constantly. The problem has been unchanged. Associated symptoms include abdominal pain, arthralgias, fatigue, joint swelling, numbness and weakness. Pertinent negatives include no anorexia, change in bowel habit, chest pain, chills, congestion, coughing, diaphoresis, fever, headaches, myalgias, nausea, neck pain, sore throat, swollen glands, urinary symptoms, vertigo, visual change or vomiting. Nothing aggravates the symptoms. She has tried nothing (b12 injections ) for the symptoms. The treatment provided no relief.    Review of Systems  Review of Systems   Constitutional: Positive for activity change and fatigue. Negative for appetite change, chills, diaphoresis and fever.   HENT: Negative for congestion, postnasal drip, rhinorrhea, sinus pressure, sinus pain, sneezing, sore throat, trouble swallowing and voice change.    Respiratory: Positive for cough and shortness of breath. Negative for choking, chest tightness, wheezing and stridor.    Cardiovascular: Negative for chest pain.   Gastrointestinal: Positive for abdominal pain. Negative for diarrhea, nausea and vomiting.   Musculoskeletal: Positive for arthralgias.   Neurological: Positive for weakness and numbness. Negative for headaches.       Patient Active Problem List   Diagnosis   • Tobacco abuse counseling   • Chronic idiopathic constipation   • B12 deficiency   • Vitamin D deficiency    • Tobacco user   • Stage 2 moderate COPD by GOLD classification (CMS/HCC)   • Crohn's disease with complication (CMS/HCC)   • Cervical lymphadenopathy   • Generalized abdominal pain   • Nausea   • Neurological abnormality   • Tremor   • RUQ pain   • COPD exacerbation (CMS/HCC)   • Pertussis exposure   • Chronic bronchitis (CMS/HCC)   • Cigarette nicotine dependence,  uncomplicated   • Chronic nonseasonal allergic rhinitis due to pollen     Past Surgical History:   Procedure Laterality Date   • COLONOSCOPY     • HYSTERECTOMY     • TONSILECTOMY, ADENOIDECTOMY, BILATERAL MYRINGOTOMY AND TUBES     • UPPER GASTROINTESTINAL ENDOSCOPY       Social History     Socioeconomic History   • Marital status:      Spouse name: Not on file   • Number of children: Not on file   • Years of education: Not on file   • Highest education level: Not on file   Tobacco Use   • Smoking status: Current Every Day Smoker     Packs/day: 1.00     Types: Cigarettes   • Smokeless tobacco: Never Used   Substance and Sexual Activity   • Alcohol use: No     Frequency: Never   • Drug use: No   • Sexual activity: Defer     Family History   Problem Relation Age of Onset   • Inflammatory bowel disease Mother    • Arthritis Mother    • Diabetes Mother    • Hypertension Mother    • Hyperlipidemia Mother    • Obesity Mother    • Osteoporosis Mother    • Cancer Father    • Heart disease Father    • Hyperlipidemia Father    • Diabetes Sister    • Liver disease Daughter    • Mental illness Daughter    • Obesity Daughter    • Diabetes Maternal Grandmother      Lab on 08/29/2019   Component Date Value Ref Range Status   • WBC 08/29/2019 14.22* 3.40 - 10.80 10*3/mm3 Final   • RBC 08/29/2019 4.52  3.77 - 5.28 10*6/mm3 Final   • Hemoglobin 08/29/2019 14.2  12.0 - 15.9 g/dL Final   • Hematocrit 08/29/2019 44.8  34.0 - 46.6 % Final   • MCV 08/29/2019 99.1* 79.0 - 97.0 fL Final   • MCH 08/29/2019 31.4  26.6 - 33.0 pg Final   • MCHC 08/29/2019 31.7  31.5 - 35.7 g/dL Final   • RDW 08/29/2019 12.7  12.3 - 15.4 % Final   • RDW-SD 08/29/2019 46.6  37.0 - 54.0 fl Final   • MPV 08/29/2019 10.2  6.0 - 12.0 fL Final   • Platelets 08/29/2019 489* 140 - 450 10*3/mm3 Final   • Neutrophil % 08/29/2019 65.8  42.7 - 76.0 % Final   • Lymphocyte % 08/29/2019 25.1  19.6 - 45.3 % Final   • Monocyte % 08/29/2019 6.0  5.0 - 12.0 % Final   •  Eosinophil % 08/29/2019 1.9  0.3 - 6.2 % Final   • Basophil % 08/29/2019 0.6  0.0 - 1.5 % Final   • Immature Grans % 08/29/2019 0.6* 0.0 - 0.5 % Final   • Neutrophils, Absolute 08/29/2019 9.36* 1.70 - 7.00 10*3/mm3 Final   • Lymphocytes, Absolute 08/29/2019 3.57* 0.70 - 3.10 10*3/mm3 Final   • Monocytes, Absolute 08/29/2019 0.85  0.10 - 0.90 10*3/mm3 Final   • Eosinophils, Absolute 08/29/2019 0.27  0.00 - 0.40 10*3/mm3 Final   • Basophils, Absolute 08/29/2019 0.09  0.00 - 0.20 10*3/mm3 Final   • Immature Grans, Absolute 08/29/2019 0.08* 0.00 - 0.05 10*3/mm3 Final   • nRBC 08/29/2019 0.0  0.0 - 0.2 /100 WBC Final   • Glucose 08/29/2019 99  65 - 99 mg/dL Final   • BUN 08/29/2019 9  6 - 20 mg/dL Final   • Creatinine 08/29/2019 0.71  0.57 - 1.00 mg/dL Final   • Sodium 08/29/2019 140  136 - 145 mmol/L Final   • Potassium 08/29/2019 4.6  3.5 - 5.2 mmol/L Final   • Chloride 08/29/2019 100  98 - 107 mmol/L Final   • CO2 08/29/2019 25.6  22.0 - 29.0 mmol/L Final   • Calcium 08/29/2019 9.3  8.6 - 10.5 mg/dL Final   • Total Protein 08/29/2019 6.5  6.0 - 8.5 g/dL Final   • Albumin 08/29/2019 4.50  3.50 - 5.20 g/dL Final   • ALT (SGPT) 08/29/2019 15  1 - 33 U/L Final   • AST (SGOT) 08/29/2019 21  1 - 32 U/L Final   • Alkaline Phosphatase 08/29/2019 74  39 - 117 U/L Final   • Total Bilirubin 08/29/2019 0.4  0.2 - 1.2 mg/dL Final   • eGFR Non African Amer 08/29/2019 87  >60 mL/min/1.73 Final   • Globulin 08/29/2019 2.0  gm/dL Final   • A/G Ratio 08/29/2019 2.3  g/dL Final   • BUN/Creatinine Ratio 08/29/2019 12.7  7.0 - 25.0 Final   • Anion Gap 08/29/2019 14.4  5.0 - 15.0 mmol/L Final   • M pneumoniae IgG Abs 08/29/2019 <100  0 - 99 U/mL Final                                 Negative:           <100                               Indeterminate: 100 - 320                               Positive:           >320  The reference interval established is intended as a  baseline only.  Values >100 may indicate a recent  infection with  Mycoplasma pneumoniae and need to be  confirmed either by a positive IgM result and/or an  additional specimen drawn 2-4 weeks later showing a  significant increase in antibody levels.   • M pneumoniae IgM Abs 08/29/2019 <770  0 - 769 U/mL Final                                 Negative            <770  Clinically significant amount of M. pneumoniae antibody  not detected.                               Low Positive   770 - 950  M. pneumoniae specific IgM presumptively detected.  It  is recommended that another sample be collected 1-2  weeks later to assure reactivity.                               Positive            >950  Highly significant amount of M. pneumoniae specific  IgM antibody detected.   • C-Reactive Protein 08/29/2019 0.36  0.00 - 0.50 mg/dL Final   • B pertussis IgG Ab, Quant 08/29/2019 3.14* 0.00 - 0.94 index Final                                   Negative          <0.95                                 Equivocal   0.95 - 1.04                                 Positive          >1.04   • B pertussis IgM Ab, Quant 08/29/2019 <1.0  0.0 - 0.9 index Final                                     Negative        <1.0                                   Borderline 1.0 - 1.1                                   Positive        >1.1   Admission on 08/08/2019, Discharged on 08/08/2019   Component Date Value Ref Range Status   • Glucose 08/08/2019 101* 65 - 99 mg/dL Final   • BUN 08/08/2019 6  6 - 20 mg/dL Final   • Creatinine 08/08/2019 0.62  0.57 - 1.00 mg/dL Final   • Sodium 08/08/2019 141  136 - 145 mmol/L Final   • Potassium 08/08/2019 3.9  3.5 - 5.2 mmol/L Final   • Chloride 08/08/2019 107  98 - 107 mmol/L Final   • CO2 08/08/2019 25.0  22.0 - 29.0 mmol/L Final   • Calcium 08/08/2019 8.9  8.6 - 10.5 mg/dL Final   • Total Protein 08/08/2019 6.5  6.0 - 8.5 g/dL Final   • Albumin 08/08/2019 4.20  3.50 - 5.20 g/dL Final   • ALT (SGPT) 08/08/2019 12  1 - 33 U/L Final   • AST (SGOT) 08/08/2019 16  1 - 32 U/L Final   •  Alkaline Phosphatase 08/08/2019 63  39 - 117 U/L Final   • Total Bilirubin 08/08/2019 0.5  0.2 - 1.2 mg/dL Final   • eGFR Non African Amer 08/08/2019 101  >60 mL/min/1.73 Final   • Globulin 08/08/2019 2.3  gm/dL Final   • A/G Ratio 08/08/2019 1.8  g/dL Final   • BUN/Creatinine Ratio 08/08/2019 9.7  7.0 - 25.0 Final   • Anion Gap 08/08/2019 9.0  5.0 - 15.0 mmol/L Final   • Lipase 08/08/2019 27  13 - 60 U/L Final   • Color, UA 08/08/2019 Yellow  Yellow, Straw, Dark Yellow, Ana M Final   • Appearance, UA 08/08/2019 Clear  Clear Final   • pH, UA 08/08/2019 7.5  5.0 - 9.0 Final   • Specific Gravity, UA 08/08/2019 1.006  1.003 - 1.030 Final   • Glucose, UA 08/08/2019 Negative  Negative Final   • Ketones, UA 08/08/2019 Negative  Negative Final   • Bilirubin, UA 08/08/2019 Negative  Negative Final   • Blood, UA 08/08/2019 Trace* Negative Final   • Protein, UA 08/08/2019 Negative  Negative Final   • Leuk Esterase, UA 08/08/2019 Negative  Negative Final   • Nitrite, UA 08/08/2019 Negative  Negative Final   • Urobilinogen, UA 08/08/2019 0.2 E.U./dL  0.2 - 1.0 E.U./dL Final   • Extra Tube 08/08/2019 hold for add-on   Final    Auto resulted   • Extra Tube 08/08/2019 Hold for add-ons.   Final    Auto resulted.   • Extra Tube 08/08/2019 hold for add-on   Final    Auto resulted   • Extra Tube 08/08/2019 Hold for add-ons.   Final    Auto resulted.   • WBC 08/08/2019 10.28  3.40 - 10.80 10*3/mm3 Final   • RBC 08/08/2019 4.15  3.77 - 5.28 10*6/mm3 Final   • Hemoglobin 08/08/2019 12.8  12.0 - 15.9 g/dL Final   • Hematocrit 08/08/2019 38.1  34.0 - 46.6 % Final   • MCV 08/08/2019 91.8  79.0 - 97.0 fL Final   • MCH 08/08/2019 30.8  26.6 - 33.0 pg Final   • MCHC 08/08/2019 33.6  31.5 - 35.7 g/dL Final   • RDW 08/08/2019 12.6  12.3 - 15.4 % Final   • RDW-SD 08/08/2019 42.2  37.0 - 54.0 fl Final   • MPV 08/08/2019 9.3  6.0 - 12.0 fL Final   • Platelets 08/08/2019 379  140 - 450 10*3/mm3 Final   • Neutrophil % 08/08/2019 58.7  42.7 - 76.0 %  Final   • Lymphocyte % 08/08/2019 30.4  19.6 - 45.3 % Final   • Monocyte % 08/08/2019 6.6  5.0 - 12.0 % Final   • Eosinophil % 08/08/2019 3.3  0.3 - 6.2 % Final   • Basophil % 08/08/2019 0.7  0.0 - 1.5 % Final   • Immature Grans % 08/08/2019 0.3  0.0 - 0.5 % Final   • Neutrophils, Absolute 08/08/2019 6.04  1.70 - 7.00 10*3/mm3 Final   • Lymphocytes, Absolute 08/08/2019 3.12* 0.70 - 3.10 10*3/mm3 Final   • Monocytes, Absolute 08/08/2019 0.68  0.10 - 0.90 10*3/mm3 Final   • Eosinophils, Absolute 08/08/2019 0.34  0.00 - 0.40 10*3/mm3 Final   • Basophils, Absolute 08/08/2019 0.07  0.00 - 0.20 10*3/mm3 Final   • Immature Grans, Absolute 08/08/2019 0.03  0.00 - 0.05 10*3/mm3 Final   • nRBC 08/08/2019 0.0  0.0 - 0.2 /100 WBC Final   • RBC, UA 08/08/2019 0-2* None Seen /HPF Final   • WBC, UA 08/08/2019 None Seen  None Seen, 0-2, 3-5 /HPF Final   • Bacteria, UA 08/08/2019 None Seen  None Seen /HPF Final   • Squamous Epithelial Cells, UA 08/08/2019 None Seen  None Seen, 0-2 /HPF Final   • Hyaline Casts, UA 08/08/2019 None Seen  None Seen /LPF Final   • Methodology 08/08/2019 Automated Microscopy   Final   Lab on 07/16/2019   Component Date Value Ref Range Status   • WBC 07/16/2019 9.22  3.40 - 10.80 10*3/mm3 Final   • RBC 07/16/2019 4.71  3.77 - 5.28 10*6/mm3 Final   • Hemoglobin 07/16/2019 14.4  12.0 - 15.9 g/dL Final   • Hematocrit 07/16/2019 45.3  34.0 - 46.6 % Final   • MCV 07/16/2019 96.2  79.0 - 97.0 fL Final   • MCH 07/16/2019 30.6  26.6 - 33.0 pg Final   • MCHC 07/16/2019 31.8  31.5 - 35.7 g/dL Final   • RDW 07/16/2019 12.4  12.3 - 15.4 % Final   • RDW-SD 07/16/2019 44.1  37.0 - 54.0 fl Final   • MPV 07/16/2019 9.8  6.0 - 12.0 fL Final   • Platelets 07/16/2019 434  140 - 450 10*3/mm3 Final   • Neutrophil % 07/16/2019 43.5  42.7 - 76.0 % Final   • Lymphocyte % 07/16/2019 40.3  19.6 - 45.3 % Final   • Monocyte % 07/16/2019 9.9  5.0 - 12.0 % Final   • Eosinophil % 07/16/2019 4.4  0.3 - 6.2 % Final   • Basophil %  07/16/2019 1.2  0.0 - 1.5 % Final   • Immature Grans % 07/16/2019 0.7* 0.0 - 0.5 % Final   • Neutrophils, Absolute 07/16/2019 4.01  1.70 - 7.00 10*3/mm3 Final   • Lymphocytes, Absolute 07/16/2019 3.72* 0.70 - 3.10 10*3/mm3 Final   • Monocytes, Absolute 07/16/2019 0.91* 0.10 - 0.90 10*3/mm3 Final   • Eosinophils, Absolute 07/16/2019 0.41* 0.00 - 0.40 10*3/mm3 Final   • Basophils, Absolute 07/16/2019 0.11  0.00 - 0.20 10*3/mm3 Final   • Immature Grans, Absolute 07/16/2019 0.06* 0.00 - 0.05 10*3/mm3 Final   • nRBC 07/16/2019 0.0  0.0 - 0.2 /100 WBC Final   • Glucose 07/16/2019 96  65 - 99 mg/dL Final   • BUN 07/16/2019 15  6 - 20 mg/dL Final   • Creatinine 07/16/2019 0.72  0.57 - 1.00 mg/dL Final   • Sodium 07/16/2019 143  136 - 145 mmol/L Final   • Potassium 07/16/2019 4.8  3.5 - 5.2 mmol/L Final   • Chloride 07/16/2019 101  98 - 107 mmol/L Final   • CO2 07/16/2019 31.0* 22.0 - 29.0 mmol/L Final   • Calcium 07/16/2019 9.2  8.6 - 10.5 mg/dL Final   • Total Protein 07/16/2019 6.8  6.0 - 8.5 g/dL Final   • Albumin 07/16/2019 4.50  3.50 - 5.20 g/dL Final   • ALT (SGPT) 07/16/2019 23  1 - 33 U/L Final   • AST (SGOT) 07/16/2019 23  1 - 32 U/L Final   • Alkaline Phosphatase 07/16/2019 79  39 - 117 U/L Final   • Total Bilirubin 07/16/2019 0.5  0.2 - 1.2 mg/dL Final   • eGFR Non African Amer 07/16/2019 85  >60 mL/min/1.73 Final   • Globulin 07/16/2019 2.3  gm/dL Final   • A/G Ratio 07/16/2019 2.0  g/dL Final   • BUN/Creatinine Ratio 07/16/2019 20.8  7.0 - 25.0 Final   • Anion Gap 07/16/2019 11.0  5.0 - 15.0 mmol/L Final   • Hemoglobin A1C 07/16/2019 5.60  4.80 - 5.60 % Final   • Total Cholesterol 07/16/2019 178  0 - 200 mg/dL Final   • Triglycerides 07/16/2019 159* 0 - 150 mg/dL Final   • HDL Cholesterol 07/16/2019 47  40 - 60 mg/dL Final   • LDL Cholesterol  07/16/2019 99  0 - 100 mg/dL Final   • VLDL Cholesterol 07/16/2019 31.8  mg/dL Final   • LDL/HDL Ratio 07/16/2019 2.11   Final   • TSH 07/16/2019 3.550  0.270 - 4.200  "mIU/mL Final   • Free T4 07/16/2019 1.16  0.93 - 1.70 ng/dL Final   • T3, Free 07/16/2019 3.81  2.00 - 4.40 pg/mL Final   • 25 Hydroxy, Vitamin D 07/16/2019 26.1* 30.0 - 100.0 ng/ml Final   • Vitamin B-12 07/16/2019 782  211 - 946 pg/mL Final   • Sed Rate 07/16/2019 2  0 - 20 mm/hr Final   • C-Reactive Protein 07/16/2019 0.09  0.00 - 0.50 mg/dL Final   • Intrinsic Factor Antibodies 07/16/2019 0.9  0.0 - 1.1 AU/mL Final      XR Chest PA & Lateral  Narrative: TWO VIEW CHEST    HISTORY: Dyspnea. Cough. COPD.    Frontal and lateral films of the chest were obtained.    COMPARISON: January 31, 2015    FINDINGS:    The lungs are clear of an acute process.  The heart is not enlarged.  The pulmonary vasculature is not increased.  No pleural effusion.  No pneumothorax.  No acute osseous abnormality.  Impression: CONCLUSION:  No Acute Disease    [address]    Electronically signed by:  Theo Jacobson MD  8/29/2019 10:48 AM  CDT Workstation: [id number]    [email or web address]  Immunization History   Administered Date(s) Administered   • flucelvax quad pfs =>4 YRS 09/19/2019       The following portions of the patient's history were reviewed and updated as appropriate: allergies, current medications, past family history, past medical history, past social history, past surgical history and problem list.        Physical Exam  /76 (BP Location: Left arm, Patient Position: Sitting, Cuff Size: Adult)   Pulse 87   Temp 97.7 °F (36.5 °C) (Oral)   Ht 165.1 cm (65\")   Wt 52.9 kg (116 lb 9.6 oz)   SpO2 98%   BMI 19.40 kg/m²     Physical Exam   Constitutional: She is oriented to person, place, and time. She appears well-developed and well-nourished.   Thins appearance   HENT:   Head: Normocephalic and atraumatic.   Right Ear: External ear normal.   Eyes: Conjunctivae and EOM are normal. Pupils are equal, round, and reactive to light.   Neck: Normal range of motion. Neck supple.   Cardiovascular: Normal rate, regular rhythm and normal heart " sounds.   No murmur heard.  Pulmonary/Chest: No respiratory distress.   Decreased breath sounds    Abdominal: Soft. Bowel sounds are normal. She exhibits no distension. There is no tenderness.   Musculoskeletal: Normal range of motion. She exhibits no edema or deformity.   Neurological: She is alert and oriented to person, place, and time. No cranial nerve deficit.   Skin: Skin is warm. No rash noted. She is not diaphoretic. No erythema. No pallor.   Psychiatric: She has a normal mood and affect. Her behavior is normal.   Nursing note and vitals reviewed.      Assessment/Plan    Diagnosis Plan   1. Stage 2 moderate COPD by GOLD classification (CMS/[facility])     2. Tobacco abuse counseling     3. Tobacco user     4. Vitamin D deficiency      5. Crohn's disease with complication, unspecified gastrointestinal tract location (CMS/[facility])     6. Chronic idiopathic constipation     7. Chronic bronchitis, unspecified chronic bronchitis type (CMS/[facility])     8. B12 deficiency     9. COPD exacerbation (CMS/[facility])          -reviewed last ER visit at [facility]   -recommend influenza vaccination - advised to get at pharmacy   -COPD/COPD exacerbation - referral to Pulmonology for PFTs/  Start on prednisone tapering dose.mycoplasma negative Stop  Doxycycline 100 mg PO BID. Pt states she was on this before and could not tolerate due to GI upset. But she will try this time with probiotic supplements. Albuterol inhaler along with nebulizer machine.   she was on  Breo and Combvent. Anoro Stopped. Pulmonology following now on Symbicort 160/4.4 mcg 2 puffs BID.   Also on albuterol inhalers PRN    -tremor - Neurology following on primidone 50 mg 1/2 qhs. Duo nebulzier every 4-6 hours   -RUQ pain - US negative for gallstone. Recommend HIDA scan.  Suspect biliary dyskinesia. Recommend bland diet   -vitamin B12 deficiency  - last b12 stable will start on vitamin b12 250 mcg gordon.  Continue to monitor    -tobacco user - counseled to quit smoking >5  minutes. She could not tolerate chantix  urged the importance of quitting smoking.   -chronic bronchitis, - mucinex   -recommend colonoscopy screeningk/ Crohn's disesae/ IBS-C - referral to Gastroenteorlogy has upcoming colonoscopy soon. Endoscopy schedule on 8/30/19. She may have to cancel since she has respiratory isues. currently  -pt has upcoming mammogram soon.    -regarding tremor of head - she has seen neurology with Dr. Chen. Had MRI of brain. Recommended 2nd opinon with Dr. Manjarrez She also has family history of Friedreich's ataxia  -advised pt to go to ER or call 911 if symptoms worrisome or severe  -advised pt to be  Safe and call with questions and concerns  -advised to followup with all referrals and Specialist        This document has been electronically signed by Xavier Wick MD on October 17, 2019 7:24 AM                  This document has been electronically signed by Xavier Wick MD on October 10, 2019 7:49 AM

## 2019-10-17 ENCOUNTER — OFFICE VISIT (OUTPATIENT)
Dept: FAMILY MEDICINE CLINIC | Facility: CLINIC | Age: 49
End: 2019-10-17

## 2019-10-17 VITALS
SYSTOLIC BLOOD PRESSURE: 124 MMHG | WEIGHT: 116.6 LBS | DIASTOLIC BLOOD PRESSURE: 76 MMHG | OXYGEN SATURATION: 98 % | BODY MASS INDEX: 19.43 KG/M2 | HEART RATE: 87 BPM | HEIGHT: 65 IN | TEMPERATURE: 97.7 F

## 2019-10-17 DIAGNOSIS — K50.919 CROHN'S DISEASE WITH COMPLICATION, UNSPECIFIED GASTROINTESTINAL TRACT LOCATION (HCC): ICD-10-CM

## 2019-10-17 DIAGNOSIS — J44.9 STAGE 2 MODERATE COPD BY GOLD CLASSIFICATION (HCC): Primary | ICD-10-CM

## 2019-10-17 DIAGNOSIS — Z71.6 TOBACCO ABUSE COUNSELING: ICD-10-CM

## 2019-10-17 DIAGNOSIS — J42 CHRONIC BRONCHITIS, UNSPECIFIED CHRONIC BRONCHITIS TYPE (HCC): ICD-10-CM

## 2019-10-17 DIAGNOSIS — J44.1 COPD EXACERBATION (HCC): ICD-10-CM

## 2019-10-17 DIAGNOSIS — K59.04 CHRONIC IDIOPATHIC CONSTIPATION: ICD-10-CM

## 2019-10-17 DIAGNOSIS — E53.8 B12 DEFICIENCY: ICD-10-CM

## 2019-10-17 DIAGNOSIS — Z72.0 TOBACCO USER: ICD-10-CM

## 2019-10-17 DIAGNOSIS — E55.9 VITAMIN D DEFICIENCY: ICD-10-CM

## 2019-10-17 PROCEDURE — 99214 OFFICE O/P EST MOD 30 MIN: CPT | Performed by: FAMILY MEDICINE

## 2019-10-17 RX ORDER — GUAIFENESIN 600 MG/1
600 TABLET, EXTENDED RELEASE ORAL 2 TIMES DAILY
Qty: 60 TABLET | Refills: 3 | Status: SHIPPED | OUTPATIENT
Start: 2019-10-17 | End: 2020-04-29

## 2019-10-17 RX ORDER — MONTELUKAST SODIUM 10 MG/1
10 TABLET ORAL NIGHTLY
Qty: 30 TABLET | Refills: 3
Start: 2019-10-17 | End: 2022-03-31 | Stop reason: SDUPTHER

## 2019-10-18 ENCOUNTER — ANESTHESIA (OUTPATIENT)
Dept: GASTROENTEROLOGY | Facility: HOSPITAL | Age: 49
End: 2019-10-18

## 2019-10-18 ENCOUNTER — HOSPITAL ENCOUNTER (OUTPATIENT)
Facility: HOSPITAL | Age: 49
Setting detail: HOSPITAL OUTPATIENT SURGERY
Discharge: HOME | End: 2019-10-18
Attending: INTERNAL MEDICINE | Admitting: INTERNAL MEDICINE

## 2019-10-18 ENCOUNTER — ANESTHESIA EVENT (OUTPATIENT)
Dept: GASTROENTEROLOGY | Facility: HOSPITAL | Age: 49
End: 2019-10-18

## 2019-10-18 VITALS
HEART RATE: 75 BPM | BODY MASS INDEX: 19.16 KG/M2 | WEIGHT: 115 LBS | RESPIRATION RATE: 18 BRPM | DIASTOLIC BLOOD PRESSURE: 60 MMHG | HEIGHT: 65 IN | SYSTOLIC BLOOD PRESSURE: 116 MMHG | TEMPERATURE: 97 F | OXYGEN SATURATION: 97 %

## 2019-10-18 DIAGNOSIS — R11.0 NAUSEA: ICD-10-CM

## 2019-10-18 DIAGNOSIS — R10.84 GENERALIZED ABDOMINAL PAIN: ICD-10-CM

## 2019-10-18 PROCEDURE — 45380 COLONOSCOPY AND BIOPSY: CPT | Performed by: INTERNAL MEDICINE

## 2019-10-18 PROCEDURE — 88305 TISSUE EXAM BY PATHOLOGIST: CPT | Performed by: PATHOLOGY

## 2019-10-18 PROCEDURE — 25010000002 PROPOFOL 10 MG/ML EMULSION: Performed by: NURSE ANESTHETIST, CERTIFIED REGISTERED

## 2019-10-18 PROCEDURE — 25010000002 ONDANSETRON PER 1 MG: Performed by: NURSE ANESTHETIST, CERTIFIED REGISTERED

## 2019-10-18 PROCEDURE — 43239 EGD BIOPSY SINGLE/MULTIPLE: CPT | Performed by: INTERNAL MEDICINE

## 2019-10-18 PROCEDURE — 25010000003 LIDOCAINE 1 % SOLUTION: Performed by: NURSE ANESTHETIST, CERTIFIED REGISTERED

## 2019-10-18 PROCEDURE — 88305 TISSUE EXAM BY PATHOLOGIST: CPT | Performed by: INTERNAL MEDICINE

## 2019-10-18 RX ORDER — LIDOCAINE HYDROCHLORIDE 10 MG/ML
INJECTION, SOLUTION INFILTRATION; PERINEURAL AS NEEDED
Status: DISCONTINUED | OUTPATIENT
Start: 2019-10-18 | End: 2019-10-18 | Stop reason: SURG

## 2019-10-18 RX ORDER — DEXTROSE AND SODIUM CHLORIDE 5; .45 G/100ML; G/100ML
30 INJECTION, SOLUTION INTRAVENOUS CONTINUOUS PRN
Status: DISCONTINUED | OUTPATIENT
Start: 2019-10-18 | End: 2019-10-18 | Stop reason: HOSPADM

## 2019-10-18 RX ORDER — PROPOFOL 10 MG/ML
VIAL (ML) INTRAVENOUS AS NEEDED
Status: DISCONTINUED | OUTPATIENT
Start: 2019-10-18 | End: 2019-10-18 | Stop reason: SURG

## 2019-10-18 RX ORDER — ONDANSETRON 2 MG/ML
INJECTION INTRAMUSCULAR; INTRAVENOUS AS NEEDED
Status: DISCONTINUED | OUTPATIENT
Start: 2019-10-18 | End: 2019-10-18 | Stop reason: SURG

## 2019-10-18 RX ADMIN — PROPOFOL 30 MG: 10 INJECTION, EMULSION INTRAVENOUS at 11:14

## 2019-10-18 RX ADMIN — PROPOFOL 30 MG: 10 INJECTION, EMULSION INTRAVENOUS at 11:18

## 2019-10-18 RX ADMIN — PROPOFOL 30 MG: 10 INJECTION, EMULSION INTRAVENOUS at 11:11

## 2019-10-18 RX ADMIN — ONDANSETRON 4 MG: 2 INJECTION INTRAMUSCULAR; INTRAVENOUS at 11:02

## 2019-10-18 RX ADMIN — PROPOFOL 30 MG: 10 INJECTION, EMULSION INTRAVENOUS at 11:21

## 2019-10-18 RX ADMIN — PROPOFOL 20 MG: 10 INJECTION, EMULSION INTRAVENOUS at 11:13

## 2019-10-18 RX ADMIN — PROPOFOL 20 MG: 10 INJECTION, EMULSION INTRAVENOUS at 11:06

## 2019-10-18 RX ADMIN — PROPOFOL 100 MG: 10 INJECTION, EMULSION INTRAVENOUS at 11:03

## 2019-10-18 RX ADMIN — DEXTROSE AND SODIUM CHLORIDE 30 ML/HR: 5; 450 INJECTION, SOLUTION INTRAVENOUS at 10:27

## 2019-10-18 RX ADMIN — LIDOCAINE HYDROCHLORIDE 60 MG: 10 INJECTION, SOLUTION INFILTRATION; PERINEURAL at 11:03

## 2019-10-18 RX ADMIN — PROPOFOL 20 MG: 10 INJECTION, EMULSION INTRAVENOUS at 11:08

## 2019-10-18 RX ADMIN — PROPOFOL 30 MG: 10 INJECTION, EMULSION INTRAVENOUS at 11:15

## 2019-10-18 RX ADMIN — PROPOFOL 50 MG: 10 INJECTION, EMULSION INTRAVENOUS at 11:09

## 2019-10-18 RX ADMIN — PROPOFOL 10 MG: 10 INJECTION, EMULSION INTRAVENOUS at 11:12

## 2019-10-18 RX ADMIN — PROPOFOL 20 MG: 10 INJECTION, EMULSION INTRAVENOUS at 11:25

## 2019-10-18 NOTE — ANESTHESIA PREPROCEDURE EVALUATION
Anesthesia Evaluation     Patient summary reviewed and Nursing notes reviewed   NPO Solid Status: > 8 hours  NPO Liquid Status: > 8 hours           Airway   Mallampati: II  TM distance: >3 FB  Neck ROM: full  No difficulty expected  Dental    (+) edentulous    Pulmonary - normal exam   (+) COPD, asthma,   Cardiovascular - normal exam    (+) hyperlipidemia,       Neuro/Psych  (+) tremors,     GI/Hepatic/Renal/Endo    (+)  GERD,      Musculoskeletal (-) negative ROS    Abdominal  - normal exam   Substance History - negative use     OB/GYN negative ob/gyn ROS         Other      history of cancer                    Anesthesia Plan    ASA 2     MAC   total IV anesthesia  intravenous induction   Anesthetic plan, all risks, benefits, and alternatives have been provided, discussed and informed consent has been obtained with: patient.

## 2019-10-18 NOTE — ANESTHESIA POSTPROCEDURE EVALUATION
Patient: Carla Richard    Procedure Summary     Date:  10/18/19 Room / Location:  [facility] ENDOSCOPY 3 / [facility] ENDOSCOPY    Anesthesia Start:  1101 Anesthesia Stop:  1132    Procedures:       ESOPHAGOGASTRODUODENOSCOPY (N/A )      COLONOSCOPY (N/A ) Diagnosis:       Generalized abdominal pain      Nausea      (Generalized abdominal pain [R10.84])      (Nausea [R11.0])    Surgeon:  Tom Davis MD Provider:  Magdy Joyce CRNA    Anesthesia Type:  MAC ASA Status:  2          Anesthesia Type: MAC  Last vitals  BP   117/71 (10/18/19 1021)   Temp   98.2 °F (36.8 °C) (10/18/19 1021)   Pulse   73 (10/18/19 1021)   Resp   18 (10/18/19 1021)     SpO2   97 % (10/18/19 1021)     Post Anesthesia Care and Evaluation    Patient location during evaluation: bedside  Patient participation: complete - patient participated  Level of consciousness: awake and alert  Pain score: 0  Pain management: adequate  Airway patency: patent  Anesthetic complications: No anesthetic complications  PONV Status: none  Cardiovascular status: acceptable  Respiratory status: acceptable  Hydration status: acceptable
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
## 2019-10-19 ENCOUNTER — HOSPITAL ENCOUNTER (EMERGENCY)
Facility: HOSPITAL | Age: 49
Discharge: HOME | End: 2019-10-19
Attending: FAMILY MEDICINE | Admitting: FAMILY MEDICINE

## 2019-10-19 ENCOUNTER — APPOINTMENT (OUTPATIENT)
Dept: CT IMAGING | Facility: HOSPITAL | Age: 49
End: 2019-10-19

## 2019-10-19 VITALS
RESPIRATION RATE: 20 BRPM | SYSTOLIC BLOOD PRESSURE: 117 MMHG | HEART RATE: 88 BPM | BODY MASS INDEX: 19.16 KG/M2 | TEMPERATURE: 97.6 F | OXYGEN SATURATION: 97 % | HEIGHT: 65 IN | WEIGHT: 115 LBS | DIASTOLIC BLOOD PRESSURE: 66 MMHG

## 2019-10-19 DIAGNOSIS — R10.33 PERIUMBILICAL ABDOMINAL PAIN: Primary | ICD-10-CM

## 2019-10-19 LAB
ALBUMIN SERPL-MCNC: 4.4 G/DL (ref 3.5–5.2)
ALBUMIN/GLOB SERPL: 1.8 G/DL
ALP SERPL-CCNC: 89 U/L (ref 39–117)
ALT SERPL W P-5'-P-CCNC: 12 U/L (ref 1–33)
ANION GAP SERPL CALCULATED.3IONS-SCNC: 8 MMOL/L (ref 5–15)
AST SERPL-CCNC: 11 U/L (ref 1–32)
BASOPHILS # BLD AUTO: 0.06 10*3/MM3 (ref 0–0.2)
BASOPHILS NFR BLD AUTO: 0.3 % (ref 0–1.5)
BILIRUB SERPL-MCNC: 0.3 MG/DL (ref 0.2–1.2)
BUN BLD-MCNC: 11 MG/DL (ref 6–20)
BUN/CREAT SERPL: 16.4 (ref 7–25)
CALCIUM SPEC-SCNC: 9.2 MG/DL (ref 8.6–10.5)
CHLORIDE SERPL-SCNC: 102 MMOL/L (ref 98–107)
CK SERPL-CCNC: 53 U/L (ref 20–180)
CO2 SERPL-SCNC: 31 MMOL/L (ref 22–29)
CREAT BLD-MCNC: 0.67 MG/DL (ref 0.57–1)
DEPRECATED RDW RBC AUTO: 43.9 FL (ref 37–54)
EOSINOPHIL # BLD AUTO: 0.07 10*3/MM3 (ref 0–0.4)
EOSINOPHIL NFR BLD AUTO: 0.4 % (ref 0.3–6.2)
ERYTHROCYTE [DISTWIDTH] IN BLOOD BY AUTOMATED COUNT: 12.7 % (ref 12.3–15.4)
GFR SERPL CREATININE-BSD FRML MDRD: 93 ML/MIN/1.73
GLOBULIN UR ELPH-MCNC: 2.5 GM/DL
GLUCOSE BLD-MCNC: 99 MG/DL (ref 65–99)
HCT VFR BLD AUTO: 42.4 % (ref 34–46.6)
HGB BLD-MCNC: 14 G/DL (ref 12–15.9)
HOLD SPECIMEN: NORMAL
IMM GRANULOCYTES # BLD AUTO: 0.25 10*3/MM3 (ref 0–0.05)
IMM GRANULOCYTES NFR BLD AUTO: 1.4 % (ref 0–0.5)
LIPASE SERPL-CCNC: 35 U/L (ref 13–60)
LYMPHOCYTES # BLD AUTO: 3.67 10*3/MM3 (ref 0.7–3.1)
LYMPHOCYTES NFR BLD AUTO: 20.5 % (ref 19.6–45.3)
MCH RBC QN AUTO: 31.4 PG (ref 26.6–33)
MCHC RBC AUTO-ENTMCNC: 33 G/DL (ref 31.5–35.7)
MCV RBC AUTO: 95.1 FL (ref 79–97)
MONOCYTES # BLD AUTO: 1.33 10*3/MM3 (ref 0.1–0.9)
MONOCYTES NFR BLD AUTO: 7.4 % (ref 5–12)
NEUTROPHILS # BLD AUTO: 12.56 10*3/MM3 (ref 1.7–7)
NEUTROPHILS NFR BLD AUTO: 70 % (ref 42.7–76)
NRBC BLD AUTO-RTO: 0 /100 WBC (ref 0–0.2)
PLATELET # BLD AUTO: 541 10*3/MM3 (ref 140–450)
PMV BLD AUTO: 9 FL (ref 6–12)
POTASSIUM BLD-SCNC: 4.6 MMOL/L (ref 3.5–5.2)
PROT SERPL-MCNC: 6.9 G/DL (ref 6–8.5)
RBC # BLD AUTO: 4.46 10*6/MM3 (ref 3.77–5.28)
SODIUM BLD-SCNC: 141 MMOL/L (ref 136–145)
WBC NRBC COR # BLD: 17.94 10*3/MM3 (ref 3.4–10.8)
WHOLE BLOOD HOLD SPECIMEN: NORMAL

## 2019-10-19 PROCEDURE — 85025 COMPLETE CBC W/AUTO DIFF WBC: CPT | Performed by: FAMILY MEDICINE

## 2019-10-19 PROCEDURE — 0 DIATRIZOATE MEGLUMINE & SODIUM PER 1 ML: Performed by: FAMILY MEDICINE

## 2019-10-19 PROCEDURE — 83690 ASSAY OF LIPASE: CPT | Performed by: FAMILY MEDICINE

## 2019-10-19 PROCEDURE — 96375 TX/PRO/DX INJ NEW DRUG ADDON: CPT

## 2019-10-19 PROCEDURE — 80053 COMPREHEN METABOLIC PANEL: CPT | Performed by: FAMILY MEDICINE

## 2019-10-19 PROCEDURE — 25010000002 MORPHINE PER 10 MG: Performed by: FAMILY MEDICINE

## 2019-10-19 PROCEDURE — 96374 THER/PROPH/DIAG INJ IV PUSH: CPT

## 2019-10-19 PROCEDURE — 25010000002 ONDANSETRON PER 1 MG: Performed by: FAMILY MEDICINE

## 2019-10-19 PROCEDURE — 99284 EMERGENCY DEPT VISIT MOD MDM: CPT

## 2019-10-19 PROCEDURE — 96361 HYDRATE IV INFUSION ADD-ON: CPT

## 2019-10-19 PROCEDURE — 74177 CT ABD & PELVIS W/CONTRAST: CPT

## 2019-10-19 PROCEDURE — 25010000002 IOPAMIDOL 61 % SOLUTION: Performed by: FAMILY MEDICINE

## 2019-10-19 PROCEDURE — 82550 ASSAY OF CK (CPK): CPT | Performed by: FAMILY MEDICINE

## 2019-10-19 RX ORDER — PROMETHAZINE HYDROCHLORIDE 25 MG/1
25 TABLET ORAL EVERY 6 HOURS PRN
Qty: 15 TABLET | Refills: 0 | Status: SHIPPED | OUTPATIENT
Start: 2019-10-19 | End: 2019-10-24

## 2019-10-19 RX ORDER — ONDANSETRON 2 MG/ML
4 INJECTION INTRAMUSCULAR; INTRAVENOUS ONCE
Status: COMPLETED | OUTPATIENT
Start: 2019-10-19 | End: 2019-10-19

## 2019-10-19 RX ORDER — DICYCLOMINE HCL 20 MG
20 TABLET ORAL EVERY 6 HOURS PRN
Qty: 30 TABLET | Refills: 0 | Status: SHIPPED | OUTPATIENT
Start: 2019-10-19 | End: 2020-02-13

## 2019-10-19 RX ORDER — PREDNISONE 20 MG/1
20 TABLET ORAL 2 TIMES DAILY
Qty: 10 TABLET | Refills: 0 | Status: SHIPPED | OUTPATIENT
Start: 2019-10-19 | End: 2019-10-24

## 2019-10-19 RX ORDER — ONDANSETRON 4 MG/1
4 TABLET, ORALLY DISINTEGRATING ORAL EVERY 6 HOURS PRN
Qty: 10 TABLET | Refills: 0 | Status: SHIPPED | OUTPATIENT
Start: 2019-10-19 | End: 2019-10-24 | Stop reason: SDUPTHER

## 2019-10-19 RX ADMIN — MORPHINE SULFATE 4 MG: 4 INJECTION INTRAVENOUS at 15:39

## 2019-10-19 RX ADMIN — DIATRIZOATE MEGLUMINE AND DIATRIZOATE SODIUM 30 ML: 660; 100 LIQUID ORAL; RECTAL at 17:19

## 2019-10-19 RX ADMIN — SODIUM CHLORIDE 1000 ML: 9 INJECTION, SOLUTION INTRAVENOUS at 15:37

## 2019-10-19 RX ADMIN — IOPAMIDOL 75 ML: 612 INJECTION, SOLUTION INTRAVENOUS at 17:19

## 2019-10-19 RX ADMIN — ONDANSETRON 4 MG: 2 INJECTION INTRAMUSCULAR; INTRAVENOUS at 15:38

## 2019-10-19 NOTE — ED PROVIDER NOTES
Subjective   49 year-old female presents with abdominal pain and nausea that began yesterday afternoon following an EGD/colonoscopy with Dr. Davis.  She complains of a constant dull pain in her lower mid abdomen with intermittent stabbing pain.  She denies any vomiting, diarrhea, bloody or dark stools.  She has taken Tylenol at home which did not relieve her pain.  She states that Dr. Davis reported to her that he collected biopsies, and she has gastritis.  She has a history of IBS with constipation and diverticulosis.  She denies any weakness, fever, or chills.  Patient states that several years ago she was told she may have Crohn's disease, that diagnosis is still pending from Dr. Davis, according to patient.        History provided by:  Patient  Abdominal Pain   Pain location:  Periumbilical (Mid lower abdomen)  Pain quality: dull and stabbing    Pain radiates to:  Does not radiate  Pain severity:  Severe  Timing:  Constant (with intermittent stabbing pain)  Chronicity:  New  Relieved by:  Nothing  Worsened by:  Position changes and palpation  Ineffective treatments:  Acetaminophen  Associated symptoms: nausea    Associated symptoms: no chest pain, no cough, no diarrhea, no dysuria, no fatigue, no fever, no shortness of breath, no sore throat and no vomiting        Review of Systems   Constitutional: Negative for activity change, fatigue and fever.   HENT: Negative for congestion and sore throat.    Respiratory: Negative for cough, chest tightness and shortness of breath.    Cardiovascular: Negative for chest pain and palpitations.   Gastrointestinal: Positive for abdominal pain and nausea. Negative for abdominal distention, blood in stool, diarrhea and vomiting.   Genitourinary: Negative for difficulty urinating, dysuria and flank pain.   Musculoskeletal: Negative for arthralgias and myalgias.   Skin: Negative for color change.   Neurological: Negative for dizziness, weakness and headaches.    Psychiatric/Behavioral: Negative for behavioral problems and dysphoric mood.       Past Medical History:   Diagnosis Date   • Asthma    • Cancer (CMS/HCC)     cervical   • Colon polyp    • COPD (chronic obstructive pulmonary disease) (CMS/HCC)    • Crohn's disease (CMS/HCC)    • Diverticulitis of colon    • Elevated cholesterol    • GERD (gastroesophageal reflux disease)    • History of transfusion    • Irritable bowel syndrome    • Pancreatitis        Allergies   Allergen Reactions   • Nsaids Swelling and GI Intolerance   • Azithromycin Swelling   • Ciprofloxacin Hives   • Doxycycline Swelling   • Levofloxacin Rash   • Phenergan [Promethazine Hcl] GI Intolerance       Past Surgical History:   Procedure Laterality Date   • COLONOSCOPY     • HYSTERECTOMY     • TONSILECTOMY, ADENOIDECTOMY, BILATERAL MYRINGOTOMY AND TUBES     • UPPER GASTROINTESTINAL ENDOSCOPY         Family History   Problem Relation Age of Onset   • Inflammatory bowel disease Mother    • Arthritis Mother    • Diabetes Mother    • Hypertension Mother    • Hyperlipidemia Mother    • Obesity Mother    • Osteoporosis Mother    • Cancer Father    • Heart disease Father    • Hyperlipidemia Father    • Diabetes Sister    • Liver disease Daughter    • Mental illness Daughter    • Obesity Daughter    • Diabetes Maternal Grandmother        Social History     Socioeconomic History   • Marital status:      Spouse name: Not on file   • Number of children: Not on file   • Years of education: Not on file   • Highest education level: Not on file   Tobacco Use   • Smoking status: Current Every Day Smoker     Packs/day: 1.00     Types: Cigarettes   • Smokeless tobacco: Never Used   Substance and Sexual Activity   • Alcohol use: No     Frequency: Never   • Drug use: No   • Sexual activity: Defer           Objective   Physical Exam   Constitutional: She appears well-developed and well-nourished.   HENT:   Head: Normocephalic and atraumatic.   Eyes: Pupils are  equal, round, and reactive to light.   Cardiovascular: Normal rate, regular rhythm and normal heart sounds.   Pulmonary/Chest: Effort normal and breath sounds normal.   Abdominal: Soft. Normal appearance and bowel sounds are normal. She exhibits no distension. There is tenderness in the periumbilical area.   Lower mid abdomen   Skin: Skin is warm and dry.       Procedures           ED Course  ED Course as of Oct 19 1852   Sat Oct 19, 2019   1737 ALT (SGPT): 12 [CL]      ED Course User Index  [CL] Syeda [place], [facility]    Final diagnoses:   Periumbilical abdominal pain              Nic Fuchs MD  10/19/19 1852

## 2019-10-21 LAB
LAB AP CASE REPORT: NORMAL
PATH REPORT.FINAL DX SPEC: NORMAL
PATH REPORT.GROSS SPEC: NORMAL

## 2019-10-23 NOTE — PROGRESS NOTES
Pulmonary Office Follow-Up     Carla Richard is seen in the office today for   Chief Complaint   Patient presents with   • Shortness of Breath   .    Subjective     History of Present Illness  Carla Richard is a 49 y.o. female with a PMH significant for COPD, tobacco use, and Crohn's disease who presents for follow-up of COPD.    She was last seen on 9/25/2019, at which time I treated her for an acute exacerbation with a 5-day course of prednisone and recommended changing from Anoro to Symbicort twice daily.  I also recommended that she start using Flonase and cetirizine daily due to prominent allergies.  I did spend time counseling the patient on the importance of complete tobacco cessation and she committed to a cessation date of 2/10/2020.  She states that she does not feel the changes made at last appointment have helped.  Patient complains that while she was on the prednisone she was not able to sleep and she did not like that side effect.  She continues to have frequent cough which is intermittently productive.  Her sputum ranges from clear to yellowish-green.  Patient complains that she sometimes feels like she should have more sputum production but it is not coming out.  She has tried Mucinex but does not feel like it is helping.  She is using Symbicort twice daily and her DuoNeb's 4 times daily.  Patient feels the most benefit from her nebulizer.  She denies any fever, chills, or chest pain.  She is taking montelukast, cetirizine, and Flonase on a daily basis.  Unfortunately, she does continue to smoke but she is committed to keeping her quit date in February.  She states that she thinks she may need an antidepressant as she is trying to quit smoking due to her mood changes.  Patient was previously on Wellbutrin but had significant depression and mood changes.  She also tried Chantix in the past but had GI upset which was intolerable.  She does admit that she was on Daliresp several  years ago when she had frequent pneumonia and believes it helped.  She did get the flu shot but has not gotten her Pneumovax booster yet.  She thinks she last got a Pneumovax in 2013.    Tobacco use history:  Type: cigarettes  Amount: 1 ppd  Duration: 40 years  Cessation: n/a   Willing to quit: Yes      Review of Systems: History obtained from chart review and the patient.  Review of Systems   Constitutional: Positive for fatigue. Negative for fever and unexpected weight change.   HENT: Positive for congestion. Negative for postnasal drip.    Respiratory: Positive for cough, chest tightness and shortness of breath. Negative for wheezing.    Cardiovascular: Negative for chest pain and leg swelling.   Gastrointestinal: Negative for abdominal pain.   Psychiatric/Behavioral: Positive for dysphoric mood. The patient is nervous/anxious.      As described in the HPI. Otherwise, remainder of ROS (14 systems) were negative.    Patient Active Problem List   Diagnosis   • Tobacco abuse counseling   • Chronic idiopathic constipation   • B12 deficiency   • Vitamin D deficiency    • Tobacco user   • Stage 2 moderate COPD by GOLD classification (CMS/HCC)   • Crohn's disease with complication (CMS/HCC)   • Cervical lymphadenopathy   • Generalized abdominal pain   • Nausea   • Neurological abnormality   • Tremor   • RUQ pain   • COPD exacerbation (CMS/HCC)   • Pertussis exposure   • Chronic bronchitis (CMS/HCC)   • Cigarette nicotine dependence, uncomplicated   • Chronic nonseasonal allergic rhinitis due to pollen         Current Outpatient Medications:   •  albuterol sulfate  (90 Base) MCG/ACT inhaler, Inhale 2 puffs Every 4 (Four) Hours As Needed for Wheezing., Disp: 18 g, Rfl: 3  •  budesonide-formoterol (SYMBICORT) 160-4.5 MCG/ACT inhaler, Inhale 2 puffs 2 (Two) Times a Day., Disp: 1 inhaler, Rfl: 11  •  cetirizine (zyrTEC) 10 MG tablet, Take 1 tablet by mouth Daily., Disp: 30 tablet, Rfl: 11  •  dicyclomine (BENTYL) 20  MG tablet, Take 1 tablet by mouth Every 6 (Six) Hours As Needed (abdominal pain)., Disp: 30 tablet, Rfl: 0  •  fluticasone (FLONASE) 50 MCG/ACT nasal spray, 2 sprays into the nostril(s) as directed by provider Daily., Disp: 1 bottle, Rfl: 11  •  guaiFENesin (MUCINEX) 600 MG 12 hr tablet, Take 1 tablet by mouth 2 (Two) Times a Day., Disp: 60 tablet, Rfl: 3  •  ipratropium-albuterol (DUO-NEB) 0.5-2.5 mg/3 ml nebulizer, Take 3 mL by nebulization Every 4 (Four) Hours As Needed for Wheezing or Shortness of Air., Disp: , Rfl:   •  montelukast (SINGULAIR) 10 MG tablet, Take 1 tablet by mouth Every Night., Disp: 30 tablet, Rfl: 3  •  ondansetron ODT (ZOFRAN-ODT) 4 MG disintegrating tablet, Take 1 tablet by mouth Every 6 (Six) Hours As Needed for Nausea or Vomiting., Disp: 10 tablet, Rfl: 0  •  vitamin B-12 (CYANOCOBALAMIN) 250 MCG tablet, Take 1 tablet by mouth Daily., Disp: 30 tablet, Rfl: 3  •  vitamin D (ERGOCALCIFEROL) 09874 units capsule capsule, Take 1 capsule by mouth 1 (One) Time Per Week., Disp: 4 capsule, Rfl: 3  •  roflumilast (DALIRESP) 500 MCG tablet tablet, Take 1 tablet by mouth Daily., Disp: 30 tablet, Rfl: 11    Allergies   Allergen Reactions   • Nsaids Swelling and GI Intolerance   • Azithromycin Swelling   • Ciprofloxacin Hives   • Doxycycline Swelling   • Other Other (See Comments)     nicotine patch   Caused body twitching/seizures   • Levofloxacin Rash   • Phenergan [Promethazine Hcl] GI Intolerance       Past Medical History:   Diagnosis Date   • Asthma    • Cancer (CMS/HCC)     cervical   • Colon polyp    • COPD (chronic obstructive pulmonary disease) (CMS/HCC)    • Crohn's disease (CMS/HCC)    • Diverticulitis of colon    • Elevated cholesterol    • GERD (gastroesophageal reflux disease)    • History of transfusion    • Irritable bowel syndrome    • Pancreatitis      Past Surgical History:   Procedure Laterality Date   • COLONOSCOPY     • COLONOSCOPY N/A 10/18/2019    Procedure: COLONOSCOPY;   "Surgeon: Tom Davis MD;  Location: [facility] ENDOSCOPY;  Service: Gastroenterology   • ENDOSCOPY N/A 10/18/2019    Procedure: ESOPHAGOGASTRODUODENOSCOPY;  Surgeon: Tom Davis MD;  Location: [facility] ENDOSCOPY;  Service: Gastroenterology   • HYSTERECTOMY     • TONSILECTOMY, ADENOIDECTOMY, BILATERAL MYRINGOTOMY AND TUBES     • UPPER GASTROINTESTINAL ENDOSCOPY       Social History     Socioeconomic History   • Marital status:      Spouse name: Not on file   • Number of children: Not on file   • Years of education: Not on file   • Highest education level: Not on file   Tobacco Use   • Smoking status: Current Every Day Smoker     Packs/day: 1.00     Types: Cigarettes   • Smokeless tobacco: Never Used   Substance and Sexual Activity   • Alcohol use: No     Frequency: Never   • Drug use: No   • Sexual activity: Defer     Family History   Problem Relation Age of Onset   • Inflammatory bowel disease Mother    • Arthritis Mother    • Diabetes Mother    • Hypertension Mother    • Hyperlipidemia Mother    • Obesity Mother    • Osteoporosis Mother    • Cancer Father    • Heart disease Father    • Hyperlipidemia Father    • Diabetes Sister    • Liver disease Daughter    • Mental illness Daughter    • Obesity Daughter    • Diabetes Maternal Grandmother           Objective     Blood pressure 115/79, pulse 76, height 165.1 cm (65\"), weight 54 kg (119 lb), SpO2 98 %, not currently breastfeeding.  Physical Exam   Constitutional: She is oriented to person, place, and time. Vital signs are normal. She appears well-developed and well-nourished.   HENT:   Head: Normocephalic and atraumatic.   Nose: Mucosal edema present. No rhinorrhea.   Mouth/Throat: Uvula is midline, oropharynx is clear and moist and mucous membranes are normal.   Mallampati 2   Eyes: Conjunctivae, EOM and lids are normal. Pupils are equal, round, and reactive to light.   Neck: Trachea normal and normal range of motion. No tracheal tenderness present. No " thyroid mass present.   Cardiovascular: Normal rate, regular rhythm and normal heart sounds. PMI is not displaced. Exam reveals no gallop.   No murmur heard.  Pulmonary/Chest: Effort normal and breath sounds normal. No respiratory distress. She has no decreased breath sounds. She has no wheezes. She has no rhonchi. Chest wall is not dull to percussion. She exhibits no tenderness.   Cough with deep breathing   Abdominal: Soft. Normal appearance and bowel sounds are normal. There is no hepatomegaly. There is no tenderness.   Musculoskeletal:   Normal gait, no extremity edema     Vascular Status -  Her right foot exhibits no edema. Her left foot exhibits no edema.  Lymphadenopathy:        Head (right side): No submandibular adenopathy present.        Head (left side): No submandibular adenopathy present.     She has no cervical adenopathy.        Right: No supraclavicular adenopathy present.        Left: No supraclavicular adenopathy present.   Neurological: She is alert and oriented to person, place, and time.   Skin: Skin is warm and dry. No rash noted. No cyanosis. Nails show no clubbing.   Psychiatric: She has a normal mood and affect. Her speech is normal and behavior is normal. Judgment normal.   Nursing note and vitals reviewed.      PFTs: 9/25/19 (independently reviewed and interpreted by me)  Ratio 53  FVC 2.96/ 81%  FEV1 1.56/ 54%  Moderate obstruction.  No comparative data available.    Radiology (independently reviewed and interpreted by me): CXR 8/29/19 showed NACPD    CT chest with contrast 2/8/2018 (report reviewed): No PE, no adenopathy, mild scarring at the apices, mild emphysematous changes, mild patchy infiltrates in the right middle lobe along major fissure and in the lingula not present on prior CT from 3/27/2015     Assessment/Plan     Carla was seen today for shortness of breath.    Diagnoses and all orders for this visit:    Stage 2 moderate COPD by GOLD classification (CMS/HCC)    Chronic  bronchitis, unspecified chronic bronchitis type (CMS/HCC)  -     roflumilast (DALIRESP) 500 MCG tablet tablet; Take 1 tablet by mouth Daily.    Chronic nonseasonal allergic rhinitis due to pollen    Cigarette nicotine dependence, uncomplicated         Discussion/ Recommendations:   She does continue to have prominent chronic bronchitis, which I think is triggered by her persistent smoking.  She is already on triple therapy with Symbicort and DuoNeb so my only suggested change would be to alter her ipratropium to a LAMA.  She states that she feels the DuoNeb's help her the most and she would like to continue with it for now.  She has had issues with Wellbutrin and Chantix in the past, so unfortunately smoking cessation aids are limited.  I do think she would benefit from a trial of Daliresp, but I am certain if her insurance will approve it given that her FEV1 is 54%.  I do think that she qualifies based on her chronic bronchitis as well as frequent exacerbations.    -Continue Symbicort 2 puffs twice daily  -Continue duo nebs 4 times daily.  -Use albuterol as needed for dyspnea, wheeze, or congestion  -Continue Flonase, Zyrtec, and Singulair daily.  -Mucinex as needed for congestion.  Ensure that she is drinking plenty of water to make it effective.  -Allergen and trigger avoidance.  -Recommended she discuss an antidepressant with her PCP.  -Carla Richard is a current cigarettes user.  She currently smokes 1 pack of cigarettes per day for a duration of 40 years. I have educated her on the risk of diseases from using tobacco products such as cancer, COPD and heart diease. I advised her to quit and she is willing to quit. We have discussed the following method/s for tobacco cessation:  Education Material Counseling.  Together we have set a quit date for 2/10/20 (Muslim date).  She will follow up with me in 2 months or sooner to check on her progress. I spent 3.5 minutes counseling the patient.  -Needs  criteria for LD CT screening but will not be due until after 2/8/2020.  Will discuss at the next visit and schedule if she is agreeable. (1ppd x 40yrs, still smoking).  -Up-to-date with the flu vaccine.  Encouraged her to get the Pneumovax 23 booster.      Patient's Body mass index is 19.8 kg/m². BMI is below normal parameters. Recommendations include: treating the underlying disease process.           Return in about 2 months (around 12/24/2019) for Recheck COPD.      Thank you for allowing me to participate in the care of Carla Richard. Please do not hesitate to contact me with any questions.         This document has been electronically signed by Iliana Jacobson MD on October 24, 2019 10:23 AM      Dictated using Dragon

## 2019-10-24 ENCOUNTER — OFFICE VISIT (OUTPATIENT)
Dept: PULMONOLOGY | Facility: CLINIC | Age: 49
End: 2019-10-24

## 2019-10-24 ENCOUNTER — OFFICE VISIT (OUTPATIENT)
Dept: GASTROENTEROLOGY | Facility: CLINIC | Age: 49
End: 2019-10-24

## 2019-10-24 VITALS
DIASTOLIC BLOOD PRESSURE: 79 MMHG | BODY MASS INDEX: 19.83 KG/M2 | HEART RATE: 76 BPM | WEIGHT: 119 LBS | SYSTOLIC BLOOD PRESSURE: 115 MMHG | HEIGHT: 65 IN | OXYGEN SATURATION: 98 %

## 2019-10-24 VITALS
SYSTOLIC BLOOD PRESSURE: 116 MMHG | HEART RATE: 77 BPM | BODY MASS INDEX: 19.83 KG/M2 | HEIGHT: 65 IN | WEIGHT: 119 LBS | DIASTOLIC BLOOD PRESSURE: 83 MMHG

## 2019-10-24 DIAGNOSIS — R19.7 DIARRHEA, UNSPECIFIED TYPE: ICD-10-CM

## 2019-10-24 DIAGNOSIS — F17.210 CIGARETTE NICOTINE DEPENDENCE, UNCOMPLICATED: ICD-10-CM

## 2019-10-24 DIAGNOSIS — R10.84 GENERALIZED ABDOMINAL PAIN: Primary | ICD-10-CM

## 2019-10-24 DIAGNOSIS — J44.9 STAGE 2 MODERATE COPD BY GOLD CLASSIFICATION (HCC): Primary | ICD-10-CM

## 2019-10-24 DIAGNOSIS — J30.89 CHRONIC NONSEASONAL ALLERGIC RHINITIS DUE TO POLLEN: ICD-10-CM

## 2019-10-24 DIAGNOSIS — J42 CHRONIC BRONCHITIS, UNSPECIFIED CHRONIC BRONCHITIS TYPE (HCC): ICD-10-CM

## 2019-10-24 PROCEDURE — 99406 BEHAV CHNG SMOKING 3-10 MIN: CPT | Performed by: INTERNAL MEDICINE

## 2019-10-24 PROCEDURE — 99213 OFFICE O/P EST LOW 20 MIN: CPT | Performed by: NURSE PRACTITIONER

## 2019-10-24 PROCEDURE — 99214 OFFICE O/P EST MOD 30 MIN: CPT | Performed by: INTERNAL MEDICINE

## 2019-10-24 RX ORDER — ROFLUMILAST 500 UG/1
500 TABLET ORAL DAILY
Qty: 30 TABLET | Refills: 11 | Status: SHIPPED | OUTPATIENT
Start: 2019-10-24 | End: 2019-12-04
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
## 2019-10-24 NOTE — PATIENT INSTRUCTIONS
Nausea, Adult  Nausea is the feeling of an upset stomach or having to vomit. Nausea on its own is not usually a serious concern, but it may be an early sign of a more serious medical problem. As nausea gets worse, it can lead to vomiting. If vomiting develops, or if you are not able to drink enough fluids, you are at risk of becoming dehydrated. Dehydration can make you tired and thirsty, cause you to have a dry mouth, and decrease how often you urinate. Older adults and people with other diseases or a weak immune system are at higher risk for dehydration. The main goals of treating your nausea are:  · To limit repeated nausea episodes.  · To prevent vomiting and dehydration.  Follow these instructions at home:  Follow instructions from your health care provider about how to care for yourself at home.  Eating and drinking  Follow these recommendations as told by your health care provider:  · Take an oral rehydration solution (ORS). This is a drink that is sold at pharmacies and retail stores.  · Drink clear fluids in small amounts as you are able. Clear fluids include water, ice chips, diluted fruit juice, and low-calorie sports drinks.  · Eat bland, easy-to-digest foods in small amounts as you are able. These foods include bananas, applesauce, rice, lean meats, toast, and crackers.  · Avoid drinking fluids that contain a lot of sugar or caffeine, such as energy drinks, sports drinks, and soda.  · Avoid alcohol.  · Avoid spicy or fatty foods.  General instructions  · Drink enough fluid to keep your urine clear or pale yellow.  · Wash your hands often. If soap and water are not available, use hand .  · Make sure that all people in your household wash their hands well and often.  · Rest at home while you recover.  · Take over-the-counter and prescription medicines only as told by your health care provider.  · Breathe slowly and deeply when you feel nauseous.  · Watch your condition for any changes.  · Keep  all follow-up visits as told by your health care provider. This is important.  Contact a health care provider if:  · You have a headache.  · You have new symptoms.  · Your nausea gets worse.  · You have a fever.  · You feel light-headed or dizzy.  · You vomit.  · You cannot keep fluids down.  Get help right away if:  · You have pain in your chest, neck, arm, or jaw.  · You feel extremely weak or you faint.  · You have vomit that is bright red or looks like coffee grounds.  · You have bloody or black stools or stools that look like tar.  · You have a severe headache, a stiff neck, or both.  · You have severe pain, cramping, or bloating in your abdomen.  · You have a rash.  · You have difficulty breathing or are breathing very quickly.  · Your heart is beating very quickly.  · Your skin feels cold and clammy.  · You feel confused.  · You have pain when you urinate.  · You have signs of dehydration, such as:  ? Dark urine, very little, or no urine.  ? Cracked lips.  ? Dry mouth.  ? Sunken eyes.  ? Sleepiness.  ? Weakness.  These symptoms may represent a serious problem that is an emergency. Do not wait to see if the symptoms will go away. Get medical help right away. Call your local emergency services (911 in the [place] Do not drive yourself to the hospital.  This information is not intended to replace advice given to you by your health care provider. Make sure you discuss any questions you have with your health care provider.  Document Released: 01/25/2006 Document Revised: 05/22/2017 Document Reviewed: 08/23/2016  [employer] Patient Education © 2019 [employer]  Abdominal Pain, Adult  Abdominal pain can be caused by many things. Often, abdominal pain is not serious and it gets better with no treatment or by being treated at home. However, sometimes abdominal pain is serious. Your health care provider will do a medical history and a physical exam to try to determine the cause of your abdominal  pain.  Follow these instructions at home:  · Take over-the-counter and prescription medicines only as told by your health care provider. Do not take a laxative unless told by your health care provider.  · Drink enough fluid to keep your urine clear or pale yellow.  · Watch your condition for any changes.  · Keep all follow-up visits as told by your health care provider. This is important.  Contact a health care provider if:  · Your abdominal pain changes or gets worse.  · You are not hungry or you lose weight without trying.  · You are constipated or have diarrhea for more than 2-3 days.  · You have pain when you urinate or have a bowel movement.  · Your abdominal pain wakes you up at night.  · Your pain gets worse with meals, after eating, or with certain foods.  · You are throwing up and cannot keep anything down.  · You have a fever.  Get help right away if:  · Your pain does not go away as soon as your health care provider told you to expect.  · You cannot stop throwing up.  · Your pain is only in areas of the abdomen, such as the right side or the left lower portion of the abdomen.  · You have bloody or black stools, or stools that look like tar.  · You have severe pain, cramping, or bloating in your abdomen.  · You have signs of dehydration, such as:  ? Dark urine, very little urine, or no urine.  ? Cracked lips.  ? Dry mouth.  ? Sunken eyes.  ? Sleepiness.  ? Weakness.  This information is not intended to replace advice given to you by your health care provider. Make sure you discuss any questions you have with your health care provider.  Document Released: 09/27/2006 Document Revised: 07/07/2017 Document Reviewed: 05/31/2017  [employer] Patient Education © 2019 [employer]

## 2019-10-25 ENCOUNTER — DOCUMENTATION (OUTPATIENT)
Dept: PULMONOLOGY | Facility: CLINIC | Age: 49
End: 2019-10-25

## 2019-10-25 NOTE — PROGRESS NOTES
Ms. Richard's Dali[employer] was prior authorized and good from 10/24/19 - 10/23/2020.  This approval has been sent to [employer]. To be scanned into patients chart.

## 2019-11-13 ENCOUNTER — TELEPHONE (OUTPATIENT)
Dept: FAMILY MEDICINE CLINIC | Facility: CLINIC | Age: 49
End: 2019-11-13

## 2019-11-13 RX ORDER — ONDANSETRON 4 MG/1
4 TABLET, ORALLY DISINTEGRATING ORAL EVERY 8 HOURS PRN
Qty: 90 TABLET | Refills: 3 | Status: SHIPPED | OUTPATIENT
Start: 2019-11-13 | End: 2020-01-13 | Stop reason: SDUPTHER

## 2019-11-13 RX ORDER — ONDANSETRON 4 MG/1
4 TABLET, ORALLY DISINTEGRATING ORAL EVERY 6 HOURS PRN
Qty: 10 TABLET | Refills: 0 | Status: SHIPPED | OUTPATIENT
Start: 2019-11-13 | End: 2019-11-13 | Stop reason: SDUPTHER

## 2019-11-20 ENCOUNTER — HOSPITAL ENCOUNTER (OUTPATIENT)
Dept: GASTROENTEROLOGY | Facility: HOSPITAL | Age: 49
Discharge: HOME | End: 2019-11-20
Admitting: NURSE PRACTITIONER

## 2019-11-20 DIAGNOSIS — R10.84 GENERALIZED ABDOMINAL PAIN: ICD-10-CM

## 2019-11-20 DIAGNOSIS — R19.7 DIARRHEA, UNSPECIFIED TYPE: ICD-10-CM

## 2019-11-20 PROCEDURE — 91110 GI TRC IMG INTRAL ESOPH-ILE: CPT

## 2019-11-20 NOTE — NURSING NOTE
0750__________________________    Does patient have a pacemaker or implantable device (if yes, this is contraindicated) :   no    B/P:  124/74  Heart Rate:80    O2 Sat %:  98  Temp:97.1      Pain:0            If yes, location and VAS _____________________________    Allergies:see chart  NPO status:0005 11/20/2019  Pillcam Lot# :[id number]   Pillcam expiration date:  1/24/2021  Patient swallowed capsule @ ________0805_____      Patient arrived to Endoscopy department ambulatory, alert and oriented.  Procedure explained to patient, patient verbalized understanding.  Consent obtained.  Patient swallowed capsule without difficulty.  Dietary instructions given and patient instructed on time to return to the Endoscopy department.    Discharged from endo time_________________________

## 2019-12-04 ENCOUNTER — OFFICE VISIT (OUTPATIENT)
Dept: FAMILY MEDICINE CLINIC | Facility: CLINIC | Age: 49
End: 2019-12-04

## 2019-12-04 VITALS
DIASTOLIC BLOOD PRESSURE: 70 MMHG | TEMPERATURE: 98.1 F | SYSTOLIC BLOOD PRESSURE: 112 MMHG | HEIGHT: 65 IN | OXYGEN SATURATION: 98 % | BODY MASS INDEX: 19.49 KG/M2 | HEART RATE: 73 BPM | WEIGHT: 117 LBS

## 2019-12-04 DIAGNOSIS — J44.9 STAGE 2 MODERATE COPD BY GOLD CLASSIFICATION (HCC): Primary | ICD-10-CM

## 2019-12-04 DIAGNOSIS — R25.1 TREMOR: ICD-10-CM

## 2019-12-04 DIAGNOSIS — E55.9 VITAMIN D DEFICIENCY: ICD-10-CM

## 2019-12-04 DIAGNOSIS — Z72.0 TOBACCO USER: ICD-10-CM

## 2019-12-04 DIAGNOSIS — J42 CHRONIC BRONCHITIS, UNSPECIFIED CHRONIC BRONCHITIS TYPE (HCC): ICD-10-CM

## 2019-12-04 DIAGNOSIS — R10.84 GENERALIZED ABDOMINAL PAIN: ICD-10-CM

## 2019-12-04 DIAGNOSIS — K59.04 CHRONIC IDIOPATHIC CONSTIPATION: ICD-10-CM

## 2019-12-04 PROCEDURE — 99406 BEHAV CHNG SMOKING 3-10 MIN: CPT | Performed by: FAMILY MEDICINE

## 2019-12-04 PROCEDURE — 99214 OFFICE O/P EST MOD 30 MIN: CPT | Performed by: FAMILY MEDICINE

## 2019-12-04 RX ORDER — HYDROCODONE BITARTRATE AND ACETAMINOPHEN 5; 325 MG/1; MG/1
1 TABLET ORAL EVERY 6 HOURS PRN
Refills: 0 | COMMUNITY
Start: 2019-11-27 | End: 2020-04-10

## 2019-12-11 ENCOUNTER — OFFICE VISIT (OUTPATIENT)
Dept: GASTROENTEROLOGY | Facility: CLINIC | Age: 49
End: 2019-12-11

## 2019-12-11 VITALS
HEART RATE: 72 BPM | BODY MASS INDEX: 19.16 KG/M2 | DIASTOLIC BLOOD PRESSURE: 68 MMHG | WEIGHT: 115 LBS | SYSTOLIC BLOOD PRESSURE: 118 MMHG | HEIGHT: 65 IN

## 2019-12-11 DIAGNOSIS — K58.1 IRRITABLE BOWEL SYNDROME WITH CONSTIPATION: ICD-10-CM

## 2019-12-11 DIAGNOSIS — K29.50 CHRONIC GASTRITIS WITHOUT BLEEDING, UNSPECIFIED GASTRITIS TYPE: ICD-10-CM

## 2019-12-11 DIAGNOSIS — K30 DELAYED GASTRIC EMPTYING: Primary | ICD-10-CM

## 2019-12-11 PROCEDURE — 99214 OFFICE O/P EST MOD 30 MIN: CPT | Performed by: NURSE PRACTITIONER

## 2019-12-11 NOTE — PATIENT INSTRUCTIONS
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
## 2019-12-12 ENCOUNTER — TELEPHONE (OUTPATIENT)
Dept: FAMILY MEDICINE CLINIC | Facility: CLINIC | Age: 49
End: 2019-12-12

## 2019-12-13 ENCOUNTER — HOSPITAL ENCOUNTER (OUTPATIENT)
Dept: NUCLEAR MEDICINE | Facility: HOSPITAL | Age: 49
Discharge: HOME | End: 2019-12-13

## 2019-12-13 DIAGNOSIS — K30 DELAYED GASTRIC EMPTYING: ICD-10-CM

## 2019-12-13 DIAGNOSIS — K58.1 IRRITABLE BOWEL SYNDROME WITH CONSTIPATION: ICD-10-CM

## 2019-12-13 PROCEDURE — 0 TECHNETIUM SULFUR COLLOID: Performed by: NURSE PRACTITIONER

## 2019-12-13 PROCEDURE — A9541 TC99M SULFUR COLLOID: HCPCS | Performed by: NURSE PRACTITIONER

## 2019-12-13 PROCEDURE — 78264 GASTRIC EMPTYING IMG STUDY: CPT

## 2019-12-13 RX ORDER — PANTOPRAZOLE SODIUM 40 MG/1
40 TABLET, DELAYED RELEASE ORAL DAILY
Qty: 30 TABLET | Refills: 5 | Status: SHIPPED | OUTPATIENT
Start: 2019-12-13 | End: 2020-02-13

## 2019-12-13 RX ADMIN — TECHNETIUM TC 99M SULFUR COLLOID 1 DOSE: KIT at 06:58

## 2019-12-16 ENCOUNTER — TELEPHONE (OUTPATIENT)
Dept: GASTROENTEROLOGY | Facility: CLINIC | Age: 49
End: 2019-12-16

## 2019-12-16 NOTE — TELEPHONE ENCOUNTER
Contacted patient with results, patient voiced understanding.        ----- Message from CROW Soares sent at 12/13/2019  1:38 PM CST -----  Please call patient with results

## 2020-01-08 NOTE — PROGRESS NOTES
Subjective:  Carla Richard is a 49 y.o. female who presents for       Patient Active Problem List   Diagnosis   • Tobacco abuse counseling   • Chronic idiopathic constipation   • B12 deficiency   • Vitamin D deficiency    • Tobacco user   • Chronic obstructive pulmonary disease (CMS/HCC)   • Crohn's disease with complication (CMS/HCC)   • Cervical lymphadenopathy   • Generalized abdominal pain   • Nausea   • Neurological abnormality   • Tremor   • RUQ pain   • COPD exacerbation (CMS/HCC)   • Pertussis exposure   • Chronic bronchitis (CMS/HCC)           Current Outpatient Medications:   •  albuterol sulfate  (90 Base) MCG/ACT inhaler, Inhale 2 puffs Every 4 (Four) Hours As Needed for Wheezing., Disp: 18 g, Rfl: 3  •  Fluticasone Furoate-Vilanterol (BREO ELLIPTA) 100-25 MCG/INH inhaler, Inhale 1 puff Daily., Disp: 28 each, Rfl: 3  •  ondansetron ODT (ZOFRAN-ODT) 4 MG disintegrating tablet, Take 1 tablet by mouth Every 6 (Six) Hours As Needed for Nausea or Vomiting., Disp: 10 tablet, Rfl: 0  •  vitamin B-12 (CYANOCOBALAMIN) 250 MCG tablet, Take 1 tablet by mouth Daily., Disp: 30 tablet, Rfl: 3  •  vitamin D (ERGOCALCIFEROL) 15789 units capsule capsule, Take 1 capsule by mouth 1 (One) Time Per Week., Disp: 4 capsule, Rfl: 3  •  cefuroxime (CEFTIN) 250 MG tablet, Take 2 tablets by mouth 2 (Two) Times a Day., Disp: 40 tablet, Rfl: 0  •  clindamycin (CLEOCIN) 300 MG capsule, Take 1 capsule by mouth 4 (Four) Times a Day., Disp: 28 capsule, Rfl: 0  •  fluconazole (DIFLUCAN) 150 MG tablet, Take 1 tablet now and in 72 hours on onset of symptoms, Disp: 1 tablet, Rfl: 0  •  predniSONE (DELTASONE) 10 MG tablet, Take 4 pills for  4 days 3 pills for 3 days 2 pills for 2 pills and 1 pill for 1 day then stop, Disp: 30 tablet, Rfl: 0  •  sodium-potassium-magnesium sulfates (SUPREP BOWEL PREP KIT) 17.5-3.13-1.6 GM/177ML solution oral solution, Take 1 bottle by mouth Every 12 (Twelve) Hours., Disp: 2 bottle, Rfl:  We will have him do a trial of singular, claritin.   Follow up in 2 weeks for a recheck.    If there is no improvement we will have ENT evaluate him.    0    HPI     Pt is 48 yo female with history of COPD, Vitamin B12 deficiency, Vitamin D deficiency, chronic abdominal pain, tremor,  RUQ pain,  Sp hysterectomy,  History of chron's disease, history of pertussis infection      8/12/19  Pt is here for recheck. Since last visit pt has been to [facility] ER on 8/8/19 for RUQ pain and tenderness. Had RUQ pain that was negative for gallstones. Lipase was normal . CBC showed normal WBC and CMP showed normal renal function. UA showed 0-2 RBC.  She was given Norco and zofran for pain.  She states she has a spinchter of oddi issue and has several stents put in for pancreatic stricture/chronic pancreatitis. . SHe has EGD and Colonoscopy scheduled with Dr. Davis on 8/30/19. She did see Gastroenterology [facility] on 7/17/19/  She states on 8/7/19 she had fried chicken and that night it hurt.     She saw Dr. Manjarrez today Neurologist and was started on primodone 50 mg  1/2 tablet at bedtime.       8/29/19 pt is here for recheck she has been coughing and congested for a month.  She was seeing PUlmonologist in the past Dr. Woodward and was diagnosed this year.   She is currenlty on combivent.  She has been smoking tobacco along with  E cigarettes. She has been coughing up sputum. She has scheduled an endoscopy tomorrow.  She has had weakness and fatigue. She has been smoking for more than 30 years     9/5/19 pt is here for recheck and followup. Pt  Had chest x-ray that was negative for active diseae .  CBC showed WBC at 14.22  With platelets being high. Neurtrophil count was at. GFR was at 87 , liver enzymes. Mycoplasma was negative. CrP was normal B Pertussis was positive for IgG at 3.14 . Pt does report she was exposed to pertussis earlier this year (grandchild).  She is intolerant to Doxycycline. Azithromycin. Her abx was recently switched from doxycycline to cefepime. She states she continues to have shortness of breath and cough.  She was given prednisone tapering dose.  She was  also referred to  Pulmonology but office was unable to reach patient.  She was also started on Breo but has not noticed a difference. She stopped prednisone and restarted again with the cefipime since doxycycline made her stomach upset. She continues to smoke 1/2 ppd.     9/19/19 pt is here for recheck and followup. She was treated for pertusissi exposure, COPD exacerbation, chronic bronchitis,. She was given cefepime and clindamycin along with prednisone tapering dose.   Pt was positive for pertussis IgG. Mycoplasma test was negative. Has appt with DR. Jacobson on 9/25/19\. She continues to  Have issues with cough dyspnea. She continues to smoke 1 ppd she declines nicotine patch, wellbutrin and chantix.  She has been using Breo with no relief. She was also on combivent      Abdominal Pain   This is a recurrent problem. The current episode started more than 1 year ago. The onset quality is sudden. The problem has been waxing and waning. The pain is located in the generalized abdominal region, epigastric region and RUQ. The pain is at a severity of 5/10. The pain is moderate. The quality of the pain is aching, a sensation of fullness and burning. The abdominal pain radiates to the epigastric region. Associated symptoms include constipation. Pertinent negatives include no anorexia, arthralgias, belching, diarrhea, dysuria, fever, flatus, frequency, headaches, hematochezia, hematuria, melena, myalgias, nausea, vomiting or weight loss. Nothing aggravates the pain. The pain is relieved by being still. The treatment provided no relief. Prior diagnostic workup includes ultrasound. Her past medical history is significant for Crohn's disease. There is no history of abdominal surgery, colon cancer, gallstones, GERD, irritable bowel syndrome, pancreatitis, PUD or ulcerative colitis.    Neurologic Problem   The patient's primary symptoms include focal sensory loss and weakness. The patient's pertinent negatives include no altered  mental status, clumsiness, loss of balance, memory loss, near-syncope, slurred speech, syncope or visual change. This is a recurrent problem. The neurological problem developed gradually. The problem has been waxing and waning since onset. There was no focality noted. Associated symptoms include a fever and shortness of breath. Pertinent negatives include no abdominal pain, auditory change, aura, back pain, bladder incontinence, bowel incontinence, chest pain, confusion, diaphoresis, dizziness, fatigue, headaches, light-headedness, nausea, neck pain, palpitations, vertigo or vomiting. Past treatments include nothing. The treatment provided no relief. There is no history of a bleeding disorder, a clotting disorder, a CVA, head trauma, liver disease, mood changes or seizures.   COPD   This is a chronic problem. The current episode started more than 1 year ago. The problem occurs constantly. The problem has been unchanged. Associated symptoms include abdominal pain, arthralgias, fatigue, joint swelling, numbness and weakness. Pertinent negatives include no anorexia, change in bowel habit, chest pain, chills, congestion, coughing, diaphoresis, fever, headaches, myalgias, nausea, neck pain, sore throat, swollen glands, urinary symptoms, vertigo, visual change or vomiting. The symptoms are aggravated by smoking. She has tried nothing (combivent ) for the symptoms.   Fatigue   This is a chronic problem. The current episode started more than 1 year ago. The problem occurs constantly. The problem has been unchanged. Associated symptoms include abdominal pain, arthralgias, fatigue, joint swelling, numbness and weakness. Pertinent negatives include no anorexia, change in bowel habit, chest pain, chills, congestion, coughing, diaphoresis, fever, headaches, myalgias, nausea, neck pain, sore throat, swollen glands, urinary symptoms, vertigo, visual change or vomiting. Nothing aggravates the symptoms. She has tried nothing (b12  injections ) for the symptoms. The treatment provided no relief.       Review of Systems  Review of Systems   Constitutional: Positive for activity change and fatigue. Negative for appetite change, chills, diaphoresis and fever.   HENT: Negative for congestion, postnasal drip, rhinorrhea, sinus pressure, sinus pain, sneezing, sore throat, trouble swallowing and voice change.    Respiratory: Positive for cough and shortness of breath. Negative for choking, chest tightness, wheezing and stridor.    Cardiovascular: Negative for chest pain.   Gastrointestinal: Positive for abdominal pain. Negative for diarrhea, nausea and vomiting.   Neurological: Positive for tremors, weakness and numbness. Negative for headaches.       Patient Active Problem List   Diagnosis   • Tobacco abuse counseling   • Chronic idiopathic constipation   • B12 deficiency   • Vitamin D deficiency    • Tobacco user   • Chronic obstructive pulmonary disease (CMS/HCC)   • Crohn's disease with complication (CMS/HCC)   • Cervical lymphadenopathy   • Generalized abdominal pain   • Nausea   • Neurological abnormality   • Tremor   • RUQ pain   • COPD exacerbation (CMS/HCC)   • Pertussis exposure   • Chronic bronchitis (CMS/HCC)     Past Surgical History:   Procedure Laterality Date   • COLONOSCOPY     • HYSTERECTOMY     • TONSILECTOMY, ADENOIDECTOMY, BILATERAL MYRINGOTOMY AND TUBES     • UPPER GASTROINTESTINAL ENDOSCOPY       Social History     Socioeconomic History   • Marital status:      Spouse name: Not on file   • Number of children: Not on file   • Years of education: Not on file   • Highest education level: Not on file   Tobacco Use   • Smoking status: Current Every Day Smoker     Packs/day: 1.00     Types: Cigarettes   • Smokeless tobacco: Never Used   Substance and Sexual Activity   • Alcohol use: No     Frequency: Never   • Drug use: No   • Sexual activity: Defer     Family History   Problem Relation Age of Onset   • Inflammatory bowel  disease Mother      Lab on 08/29/2019   Component Date Value Ref Range Status   • WBC 08/29/2019 14.22* 3.40 - 10.80 10*3/mm3 Final   • RBC 08/29/2019 4.52  3.77 - 5.28 10*6/mm3 Final   • Hemoglobin 08/29/2019 14.2  12.0 - 15.9 g/dL Final   • Hematocrit 08/29/2019 44.8  34.0 - 46.6 % Final   • MCV 08/29/2019 99.1* 79.0 - 97.0 fL Final   • MCH 08/29/2019 31.4  26.6 - 33.0 pg Final   • MCHC 08/29/2019 31.7  31.5 - 35.7 g/dL Final   • RDW 08/29/2019 12.7  12.3 - 15.4 % Final   • RDW-SD 08/29/2019 46.6  37.0 - 54.0 fl Final   • MPV 08/29/2019 10.2  6.0 - 12.0 fL Final   • Platelets 08/29/2019 489* 140 - 450 10*3/mm3 Final   • Neutrophil % 08/29/2019 65.8  42.7 - 76.0 % Final   • Lymphocyte % 08/29/2019 25.1  19.6 - 45.3 % Final   • Monocyte % 08/29/2019 6.0  5.0 - 12.0 % Final   • Eosinophil % 08/29/2019 1.9  0.3 - 6.2 % Final   • Basophil % 08/29/2019 0.6  0.0 - 1.5 % Final   • Immature Grans % 08/29/2019 0.6* 0.0 - 0.5 % Final   • Neutrophils, Absolute 08/29/2019 9.36* 1.70 - 7.00 10*3/mm3 Final   • Lymphocytes, Absolute 08/29/2019 3.57* 0.70 - 3.10 10*3/mm3 Final   • Monocytes, Absolute 08/29/2019 0.85  0.10 - 0.90 10*3/mm3 Final   • Eosinophils, Absolute 08/29/2019 0.27  0.00 - 0.40 10*3/mm3 Final   • Basophils, Absolute 08/29/2019 0.09  0.00 - 0.20 10*3/mm3 Final   • Immature Grans, Absolute 08/29/2019 0.08* 0.00 - 0.05 10*3/mm3 Final   • nRBC 08/29/2019 0.0  0.0 - 0.2 /100 WBC Final   • Glucose 08/29/2019 99  65 - 99 mg/dL Final   • BUN 08/29/2019 9  6 - 20 mg/dL Final   • Creatinine 08/29/2019 0.71  0.57 - 1.00 mg/dL Final   • Sodium 08/29/2019 140  136 - 145 mmol/L Final   • Potassium 08/29/2019 4.6  3.5 - 5.2 mmol/L Final   • Chloride 08/29/2019 100  98 - 107 mmol/L Final   • CO2 08/29/2019 25.6  22.0 - 29.0 mmol/L Final   • Calcium 08/29/2019 9.3  8.6 - 10.5 mg/dL Final   • Total Protein 08/29/2019 6.5  6.0 - 8.5 g/dL Final   • Albumin 08/29/2019 4.50  3.50 - 5.20 g/dL Final   • ALT (SGPT) 08/29/2019 15  1 -  33 U/L Final   • AST (SGOT) 08/29/2019 21  1 - 32 U/L Final   • Alkaline Phosphatase 08/29/2019 74  39 - 117 U/L Final   • Total Bilirubin 08/29/2019 0.4  0.2 - 1.2 mg/dL Final   • eGFR Non African Amer 08/29/2019 87  >60 mL/min/1.73 Final   • Globulin 08/29/2019 2.0  gm/dL Final   • A/G Ratio 08/29/2019 2.3  g/dL Final   • BUN/Creatinine Ratio 08/29/2019 12.7  7.0 - 25.0 Final   • Anion Gap 08/29/2019 14.4  5.0 - 15.0 mmol/L Final   • M pneumoniae IgG Abs 08/29/2019 <100  0 - 99 U/mL Final                                 Negative:           <100                               Indeterminate: 100 - 320                               Positive:           >320  The reference interval established is intended as a  baseline only.  Values >100 may indicate a recent  infection with Mycoplasma pneumoniae and need to be  confirmed either by a positive IgM result and/or an  additional specimen drawn 2-4 weeks later showing a  significant increase in antibody levels.   • M pneumoniae IgM Abs 08/29/2019 <770  0 - 769 U/mL Final                                 Negative            <770  Clinically significant amount of M. pneumoniae antibody  not detected.                               Low Positive   770 - 950  M. pneumoniae specific IgM presumptively detected.  It  is recommended that another sample be collected 1-2  weeks later to assure reactivity.                               Positive            >950  Highly significant amount of M. pneumoniae specific  IgM antibody detected.   • C-Reactive Protein 08/29/2019 0.36  0.00 - 0.50 mg/dL Final   • B pertussis IgG Ab, Quant 08/29/2019 3.14* 0.00 - 0.94 index Final                                   Negative          <0.95                                 Equivocal   0.95 - 1.04                                 Positive          >1.04   • B pertussis IgM Ab, Quant 08/29/2019 <1.0  0.0 - 0.9 index Final                                     Negative        <1.0                                    Borderline 1.0 - 1.1                                   Positive        >1.1   Admission on 08/08/2019, Discharged on 08/08/2019   Component Date Value Ref Range Status   • Glucose 08/08/2019 101* 65 - 99 mg/dL Final   • BUN 08/08/2019 6  6 - 20 mg/dL Final   • Creatinine 08/08/2019 0.62  0.57 - 1.00 mg/dL Final   • Sodium 08/08/2019 141  136 - 145 mmol/L Final   • Potassium 08/08/2019 3.9  3.5 - 5.2 mmol/L Final   • Chloride 08/08/2019 107  98 - 107 mmol/L Final   • CO2 08/08/2019 25.0  22.0 - 29.0 mmol/L Final   • Calcium 08/08/2019 8.9  8.6 - 10.5 mg/dL Final   • Total Protein 08/08/2019 6.5  6.0 - 8.5 g/dL Final   • Albumin 08/08/2019 4.20  3.50 - 5.20 g/dL Final   • ALT (SGPT) 08/08/2019 12  1 - 33 U/L Final   • AST (SGOT) 08/08/2019 16  1 - 32 U/L Final   • Alkaline Phosphatase 08/08/2019 63  39 - 117 U/L Final   • Total Bilirubin 08/08/2019 0.5  0.2 - 1.2 mg/dL Final   • eGFR Non African Amer 08/08/2019 101  >60 mL/min/1.73 Final   • Globulin 08/08/2019 2.3  gm/dL Final   • A/G Ratio 08/08/2019 1.8  g/dL Final   • BUN/Creatinine Ratio 08/08/2019 9.7  7.0 - 25.0 Final   • Anion Gap 08/08/2019 9.0  5.0 - 15.0 mmol/L Final   • Lipase 08/08/2019 27  13 - 60 U/L Final   • Color, UA 08/08/2019 Yellow  Yellow, Straw, Dark Yellow, Ana M Final   • Appearance, UA 08/08/2019 Clear  Clear Final   • pH, UA 08/08/2019 7.5  5.0 - 9.0 Final   • Specific Gravity, UA 08/08/2019 1.006  1.003 - 1.030 Final   • Glucose, UA 08/08/2019 Negative  Negative Final   • Ketones, UA 08/08/2019 Negative  Negative Final   • Bilirubin, UA 08/08/2019 Negative  Negative Final   • Blood, UA 08/08/2019 Trace* Negative Final   • Protein, UA 08/08/2019 Negative  Negative Final   • Leuk Esterase, UA 08/08/2019 Negative  Negative Final   • Nitrite, UA 08/08/2019 Negative  Negative Final   • Urobilinogen, UA 08/08/2019 0.2 E.U./dL  0.2 - 1.0 E.U./dL Final   • Extra Tube 08/08/2019 hold for add-on   Final    Auto resulted   • Extra Tube 08/08/2019  Hold for add-ons.   Final    Auto resulted.   • Extra Tube 08/08/2019 hold for add-on   Final    Auto resulted   • Extra Tube 08/08/2019 Hold for add-ons.   Final    Auto resulted.   • WBC 08/08/2019 10.28  3.40 - 10.80 10*3/mm3 Final   • RBC 08/08/2019 4.15  3.77 - 5.28 10*6/mm3 Final   • Hemoglobin 08/08/2019 12.8  12.0 - 15.9 g/dL Final   • Hematocrit 08/08/2019 38.1  34.0 - 46.6 % Final   • MCV 08/08/2019 91.8  79.0 - 97.0 fL Final   • MCH 08/08/2019 30.8  26.6 - 33.0 pg Final   • MCHC 08/08/2019 33.6  31.5 - 35.7 g/dL Final   • RDW 08/08/2019 12.6  12.3 - 15.4 % Final   • RDW-SD 08/08/2019 42.2  37.0 - 54.0 fl Final   • MPV 08/08/2019 9.3  6.0 - 12.0 fL Final   • Platelets 08/08/2019 379  140 - 450 10*3/mm3 Final   • Neutrophil % 08/08/2019 58.7  42.7 - 76.0 % Final   • Lymphocyte % 08/08/2019 30.4  19.6 - 45.3 % Final   • Monocyte % 08/08/2019 6.6  5.0 - 12.0 % Final   • Eosinophil % 08/08/2019 3.3  0.3 - 6.2 % Final   • Basophil % 08/08/2019 0.7  0.0 - 1.5 % Final   • Immature Grans % 08/08/2019 0.3  0.0 - 0.5 % Final   • Neutrophils, Absolute 08/08/2019 6.04  1.70 - 7.00 10*3/mm3 Final   • Lymphocytes, Absolute 08/08/2019 3.12* 0.70 - 3.10 10*3/mm3 Final   • Monocytes, Absolute 08/08/2019 0.68  0.10 - 0.90 10*3/mm3 Final   • Eosinophils, Absolute 08/08/2019 0.34  0.00 - 0.40 10*3/mm3 Final   • Basophils, Absolute 08/08/2019 0.07  0.00 - 0.20 10*3/mm3 Final   • Immature Grans, Absolute 08/08/2019 0.03  0.00 - 0.05 10*3/mm3 Final   • nRBC 08/08/2019 0.0  0.0 - 0.2 /100 WBC Final   • RBC, UA 08/08/2019 0-2* None Seen /HPF Final   • WBC, UA 08/08/2019 None Seen  None Seen, 0-2, 3-5 /HPF Final   • Bacteria, UA 08/08/2019 None Seen  None Seen /HPF Final   • Squamous Epithelial Cells, UA 08/08/2019 None Seen  None Seen, 0-2 /HPF Final   • Hyaline Casts, UA 08/08/2019 None Seen  None Seen /LPF Final   • Methodology 08/08/2019 Automated Microscopy   Final   Lab on 07/16/2019   Component Date Value Ref Range Status    • WBC 07/16/2019 9.22  3.40 - 10.80 10*3/mm3 Final   • RBC 07/16/2019 4.71  3.77 - 5.28 10*6/mm3 Final   • Hemoglobin 07/16/2019 14.4  12.0 - 15.9 g/dL Final   • Hematocrit 07/16/2019 45.3  34.0 - 46.6 % Final   • MCV 07/16/2019 96.2  79.0 - 97.0 fL Final   • MCH 07/16/2019 30.6  26.6 - 33.0 pg Final   • MCHC 07/16/2019 31.8  31.5 - 35.7 g/dL Final   • RDW 07/16/2019 12.4  12.3 - 15.4 % Final   • RDW-SD 07/16/2019 44.1  37.0 - 54.0 fl Final   • MPV 07/16/2019 9.8  6.0 - 12.0 fL Final   • Platelets 07/16/2019 434  140 - 450 10*3/mm3 Final   • Neutrophil % 07/16/2019 43.5  42.7 - 76.0 % Final   • Lymphocyte % 07/16/2019 40.3  19.6 - 45.3 % Final   • Monocyte % 07/16/2019 9.9  5.0 - 12.0 % Final   • Eosinophil % 07/16/2019 4.4  0.3 - 6.2 % Final   • Basophil % 07/16/2019 1.2  0.0 - 1.5 % Final   • Immature Grans % 07/16/2019 0.7* 0.0 - 0.5 % Final   • Neutrophils, Absolute 07/16/2019 4.01  1.70 - 7.00 10*3/mm3 Final   • Lymphocytes, Absolute 07/16/2019 3.72* 0.70 - 3.10 10*3/mm3 Final   • Monocytes, Absolute 07/16/2019 0.91* 0.10 - 0.90 10*3/mm3 Final   • Eosinophils, Absolute 07/16/2019 0.41* 0.00 - 0.40 10*3/mm3 Final   • Basophils, Absolute 07/16/2019 0.11  0.00 - 0.20 10*3/mm3 Final   • Immature Grans, Absolute 07/16/2019 0.06* 0.00 - 0.05 10*3/mm3 Final   • nRBC 07/16/2019 0.0  0.0 - 0.2 /100 WBC Final   • Glucose 07/16/2019 96  65 - 99 mg/dL Final   • BUN 07/16/2019 15  6 - 20 mg/dL Final   • Creatinine 07/16/2019 0.72  0.57 - 1.00 mg/dL Final   • Sodium 07/16/2019 143  136 - 145 mmol/L Final   • Potassium 07/16/2019 4.8  3.5 - 5.2 mmol/L Final   • Chloride 07/16/2019 101  98 - 107 mmol/L Final   • CO2 07/16/2019 31.0* 22.0 - 29.0 mmol/L Final   • Calcium 07/16/2019 9.2  8.6 - 10.5 mg/dL Final   • Total Protein 07/16/2019 6.8  6.0 - 8.5 g/dL Final   • Albumin 07/16/2019 4.50  3.50 - 5.20 g/dL Final   • ALT (SGPT) 07/16/2019 23  1 - 33 U/L Final   • AST (SGOT) 07/16/2019 23  1 - 32 U/L Final   • Alkaline  Phosphatase 07/16/2019 79  39 - 117 U/L Final   • Total Bilirubin 07/16/2019 0.5  0.2 - 1.2 mg/dL Final   • eGFR Non African Amer 07/16/2019 85  >60 mL/min/1.73 Final   • Globulin 07/16/2019 2.3  gm/dL Final   • A/G Ratio 07/16/2019 2.0  g/dL Final   • BUN/Creatinine Ratio 07/16/2019 20.8  7.0 - 25.0 Final   • Anion Gap 07/16/2019 11.0  5.0 - 15.0 mmol/L Final   • Hemoglobin A1C 07/16/2019 5.60  4.80 - 5.60 % Final   • Total Cholesterol 07/16/2019 178  0 - 200 mg/dL Final   • Triglycerides 07/16/2019 159* 0 - 150 mg/dL Final   • HDL Cholesterol 07/16/2019 47  40 - 60 mg/dL Final   • LDL Cholesterol  07/16/2019 99  0 - 100 mg/dL Final   • VLDL Cholesterol 07/16/2019 31.8  mg/dL Final   • LDL/HDL Ratio 07/16/2019 2.11   Final   • TSH 07/16/2019 3.550  0.270 - 4.200 mIU/mL Final   • Free T4 07/16/2019 1.16  0.93 - 1.70 ng/dL Final   • T3, Free 07/16/2019 3.81  2.00 - 4.40 pg/mL Final   • 25 Hydroxy, Vitamin D 07/16/2019 26.1* 30.0 - 100.0 ng/ml Final   • Vitamin B-12 07/16/2019 782  211 - 946 pg/mL Final   • Sed Rate 07/16/2019 2  0 - 20 mm/hr Final   • C-Reactive Protein 07/16/2019 0.09  0.00 - 0.50 mg/dL Final   • Intrinsic Factor Antibodies 07/16/2019 0.9  0.0 - 1.1 AU/mL Final      XR Chest PA & Lateral  Narrative: TWO VIEW CHEST    HISTORY: Dyspnea. Cough. COPD.    Frontal and lateral films of the chest were obtained.    COMPARISON: January 31, 2015    FINDINGS:    The lungs are clear of an acute process.  The heart is not enlarged.  The pulmonary vasculature is not increased.  No pleural effusion.  No pneumothorax.  No acute osseous abnormality.  Impression: CONCLUSION:  No Acute Disease    [id number]    Electronically signed by:  Theo Jacobson MD  8/29/2019 10:48 AM  CDT Workstation: [id number]    @[email or web address]@    There is no immunization history on file for this patient.    The following portions of the patient's history were reviewed and updated as appropriate: allergies, current medications, past family history, past  "medical history, past social history, past surgical history and problem list.        Physical Exam  /74 (BP Location: Left arm, Patient Position: Sitting, Cuff Size: Adult)   Pulse 74   Temp 97.6 °F (36.4 °C) (Oral)   Ht 165.1 cm (65\")   Wt 51.8 kg (114 lb 3.2 oz)   SpO2 96%   BMI 19.00 kg/m²     Physical Exam   Constitutional: She is oriented to person, place, and time. She appears well-developed and well-nourished.   HENT:   Head: Normocephalic and atraumatic.   Right Ear: External ear normal.   Eyes: Conjunctivae and EOM are normal. Pupils are equal, round, and reactive to light.   Neck: Normal range of motion. Neck supple.   Cardiovascular: Normal rate, regular rhythm and normal heart sounds.   No murmur heard.  Pulmonary/Chest: No respiratory distress.   Decreased breath sounds    Abdominal: Soft. Bowel sounds are normal. She exhibits no distension. There is no tenderness.   Musculoskeletal: Normal range of motion. She exhibits no edema or deformity.   Neurological: She is alert and oriented to person, place, and time. No cranial nerve deficit.   Skin: Skin is warm. No rash noted. She is not diaphoretic. No erythema. No pallor.   Psychiatric: She has a normal mood and affect. Her behavior is normal.   Nursing note and vitals reviewed.      Assessment/Plan    Diagnosis Plan   1. Tobacco user     2. Tobacco abuse counseling     3. Vitamin D deficiency      4. Pertussis exposure     5. Crohn's disease with complication, unspecified gastrointestinal tract location (CMS/HCC)     6. COPD exacerbation (CMS/HCC)     7. Chronic obstructive pulmonary disease, unspecified COPD type (CMS/HCC)     8. Chronic idiopathic constipation     9. Chronic bronchitis, unspecified chronic bronchitis type (CMS/HCC)     10. B12 deficiency               -reviewed last ER visit at [facility]   -COPD/COPD exacerbation - referral to Pulmonology for PFTs/  Start on prednisone tapering dose.mycoplasma negative Stop  Doxycycline 100 mg PO " BID. Pt states she was on this before and could not tolerate due to GI upset. But she will try this time with probiotic supplements. Albuterol inhaler along with nebulizer machine.   she was on  Breo and Combvent with no relief start on Anoro 62.5-25 mg daily. Gave samples todays drug information provided. appt with Dr. Faust soon on 9/25/19. Pt had good response to Anoro after trying sample in clinic. Breathing became better. Symptoms improved . Discussed side effects. Gave information today   -tremor - Neurology following on primidone 50 mg 1/2 qhs. Duo nebulzier every 4-6 hours   -RUQ pain - US negative for gallstone. Recommend HIDA scan.  Suspect biliary dyskinesia. Recommend bland diet   -vitamin B12 deficiency  - last b12 stable will start on vitamin b12 250 mcg gordon.  Continue to monitor    -tobacco user - counseled to quit smoking >5 minutes. She could not tolerate chantix  urged the importance of quitting smoking   -recommend colonoscopy screeningk/ Crohn's disesae/ IBS-C - referral to Gastroenteorlogy has upcoming colonoscopy soon. Endoscopy schedule on 8/30/19. She may have to cancel since she has respiratory isues. currently  -pt has upcoming mammogram soon.    -regarding tremor of head - she has seen neurology with Dr. Chen. Had MRI of brain. Recommended 2nd opinon with Dr. Manjarrez She also has family history of Friedreich's ataxia  -annual physical exam today  -advised pt to be safe and call with questions and concerns  -go to ER or call 911 if symptoms worrisome or severe  -recheck in 1 month or sooner if any problems         This document has been electronically signed by Xavier Wick MD on September 19, 2019 10:49 AM

## 2020-01-13 ENCOUNTER — OFFICE VISIT (OUTPATIENT)
Dept: GASTROENTEROLOGY | Facility: CLINIC | Age: 50
End: 2020-01-13

## 2020-01-13 ENCOUNTER — TELEPHONE (OUTPATIENT)
Dept: GASTROENTEROLOGY | Facility: CLINIC | Age: 50
End: 2020-01-13

## 2020-01-13 VITALS
DIASTOLIC BLOOD PRESSURE: 62 MMHG | HEART RATE: 69 BPM | SYSTOLIC BLOOD PRESSURE: 100 MMHG | BODY MASS INDEX: 19.39 KG/M2 | WEIGHT: 116.4 LBS | HEIGHT: 65 IN

## 2020-01-13 DIAGNOSIS — R10.84 GENERALIZED ABDOMINAL PAIN: Primary | ICD-10-CM

## 2020-01-13 DIAGNOSIS — K59.04 CHRONIC IDIOPATHIC CONSTIPATION: ICD-10-CM

## 2020-01-13 DIAGNOSIS — R11.0 NAUSEA: ICD-10-CM

## 2020-01-13 PROCEDURE — 99213 OFFICE O/P EST LOW 20 MIN: CPT | Performed by: NURSE PRACTITIONER

## 2020-01-13 RX ORDER — ONDANSETRON 4 MG/1
4 TABLET, ORALLY DISINTEGRATING ORAL EVERY 8 HOURS PRN
Qty: 90 TABLET | Refills: 3 | Status: SHIPPED | OUTPATIENT
Start: 2020-01-13 | End: 2020-03-04

## 2020-01-13 NOTE — PATIENT INSTRUCTIONS
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
## 2020-01-17 ENCOUNTER — TELEPHONE (OUTPATIENT)
Dept: GASTROENTEROLOGY | Facility: CLINIC | Age: 50
End: 2020-01-17

## 2020-01-17 NOTE — TELEPHONE ENCOUNTER
Contacted patient and made her aware that her motegrity was approved by her insurance. Patient voiced understanding.

## 2020-02-04 ENCOUNTER — OFFICE VISIT (OUTPATIENT)
Dept: FAMILY MEDICINE CLINIC | Facility: CLINIC | Age: 50
End: 2020-02-04

## 2020-02-04 ENCOUNTER — LAB (OUTPATIENT)
Dept: LAB | Facility: HOSPITAL | Age: 50
End: 2020-02-04

## 2020-02-04 VITALS
WEIGHT: 112.6 LBS | BODY MASS INDEX: 18.76 KG/M2 | OXYGEN SATURATION: 98 % | TEMPERATURE: 98.3 F | HEART RATE: 90 BPM | SYSTOLIC BLOOD PRESSURE: 124 MMHG | HEIGHT: 65 IN | DIASTOLIC BLOOD PRESSURE: 68 MMHG

## 2020-02-04 DIAGNOSIS — R19.7 DIARRHEA, UNSPECIFIED TYPE: ICD-10-CM

## 2020-02-04 DIAGNOSIS — E53.8 B12 DEFICIENCY: ICD-10-CM

## 2020-02-04 DIAGNOSIS — T78.40XA ALLERGY, UNSPECIFIED, INITIAL ENCOUNTER: ICD-10-CM

## 2020-02-04 DIAGNOSIS — F17.210 CIGARETTE NICOTINE DEPENDENCE, UNCOMPLICATED: ICD-10-CM

## 2020-02-04 DIAGNOSIS — D75.839 THROMBOCYTOSIS: ICD-10-CM

## 2020-02-04 DIAGNOSIS — R11.2 NAUSEA AND VOMITING, INTRACTABILITY OF VOMITING NOT SPECIFIED, UNSPECIFIED VOMITING TYPE: ICD-10-CM

## 2020-02-04 DIAGNOSIS — K52.9 CHRONIC DIARRHEA: ICD-10-CM

## 2020-02-04 DIAGNOSIS — R10.84 GENERALIZED ABDOMINAL PAIN: ICD-10-CM

## 2020-02-04 DIAGNOSIS — R10.9 CHRONIC ABDOMINAL PAIN: Primary | ICD-10-CM

## 2020-02-04 DIAGNOSIS — G89.29 CHRONIC ABDOMINAL PAIN: Primary | ICD-10-CM

## 2020-02-04 DIAGNOSIS — F33.9 EPISODE OF RECURRENT MAJOR DEPRESSIVE DISORDER, UNSPECIFIED DEPRESSION EPISODE SEVERITY (HCC): ICD-10-CM

## 2020-02-04 DIAGNOSIS — J42 CHRONIC BRONCHITIS, UNSPECIFIED CHRONIC BRONCHITIS TYPE (HCC): ICD-10-CM

## 2020-02-04 DIAGNOSIS — J44.9 STAGE 2 MODERATE COPD BY GOLD CLASSIFICATION (HCC): ICD-10-CM

## 2020-02-04 DIAGNOSIS — R10.9 CHRONIC ABDOMINAL PAIN: ICD-10-CM

## 2020-02-04 DIAGNOSIS — K59.04 CHRONIC IDIOPATHIC CONSTIPATION: ICD-10-CM

## 2020-02-04 DIAGNOSIS — R25.1 TREMOR: ICD-10-CM

## 2020-02-04 DIAGNOSIS — G89.29 CHRONIC ABDOMINAL PAIN: ICD-10-CM

## 2020-02-04 DIAGNOSIS — Z72.0 TOBACCO USER: ICD-10-CM

## 2020-02-04 DIAGNOSIS — E55.9 VITAMIN D DEFICIENCY: ICD-10-CM

## 2020-02-04 LAB
ALBUMIN SERPL-MCNC: 4.4 G/DL (ref 3.5–5.2)
ALBUMIN/GLOB SERPL: 2.2 G/DL
ALP SERPL-CCNC: 62 U/L (ref 39–117)
ALT SERPL W P-5'-P-CCNC: 6 U/L (ref 1–33)
AMYLASE SERPL-CCNC: 51 U/L (ref 28–100)
ANION GAP SERPL CALCULATED.3IONS-SCNC: 16.3 MMOL/L (ref 5–15)
AST SERPL-CCNC: 11 U/L (ref 1–32)
BASOPHILS # BLD MANUAL: 0.38 10*3/MM3 (ref 0–0.2)
BASOPHILS NFR BLD AUTO: 2 % (ref 0–1.5)
BILIRUB SERPL-MCNC: 0.3 MG/DL (ref 0.2–1.2)
BUN BLD-MCNC: 14 MG/DL (ref 6–20)
BUN/CREAT SERPL: 18.4 (ref 7–25)
CALCIUM SPEC-SCNC: 9 MG/DL (ref 8.6–10.5)
CHLORIDE SERPL-SCNC: 101 MMOL/L (ref 98–107)
CO2 SERPL-SCNC: 22.7 MMOL/L (ref 22–29)
CREAT BLD-MCNC: 0.76 MG/DL (ref 0.57–1)
DEPRECATED RDW RBC AUTO: 42.2 FL (ref 37–54)
EOSINOPHIL # BLD MANUAL: 0.19 10*3/MM3 (ref 0–0.4)
EOSINOPHIL NFR BLD MANUAL: 1 % (ref 0.3–6.2)
ERYTHROCYTE [DISTWIDTH] IN BLOOD BY AUTOMATED COUNT: 12.4 % (ref 12.3–15.4)
GFR SERPL CREATININE-BSD FRML MDRD: 80 ML/MIN/1.73
GLOBULIN UR ELPH-MCNC: 2 GM/DL
GLUCOSE BLD-MCNC: 93 MG/DL (ref 65–99)
HCT VFR BLD AUTO: 43 % (ref 34–46.6)
HGB BLD-MCNC: 15.2 G/DL (ref 12–15.9)
LIPASE SERPL-CCNC: 31 U/L (ref 13–60)
LYMPHOCYTES # BLD MANUAL: 4.76 10*3/MM3 (ref 0.7–3.1)
LYMPHOCYTES NFR BLD MANUAL: 11.1 % (ref 5–12)
LYMPHOCYTES NFR BLD MANUAL: 25.3 % (ref 19.6–45.3)
MCH RBC QN AUTO: 32.9 PG (ref 26.6–33)
MCHC RBC AUTO-ENTMCNC: 35.3 G/DL (ref 31.5–35.7)
MCV RBC AUTO: 93.1 FL (ref 79–97)
MONOCYTES # BLD AUTO: 2.09 10*3/MM3 (ref 0.1–0.9)
NEUTROPHILS # BLD AUTO: 11.4 10*3/MM3 (ref 1.7–7)
NEUTROPHILS NFR BLD MANUAL: 60.6 % (ref 42.7–76)
PLAT MORPH BLD: NORMAL
PLATELET # BLD AUTO: 489 10*3/MM3 (ref 140–450)
PMV BLD AUTO: 9.7 FL (ref 6–12)
POIKILOCYTOSIS BLD QL SMEAR: ABNORMAL
POTASSIUM BLD-SCNC: 4.1 MMOL/L (ref 3.5–5.2)
PROT SERPL-MCNC: 6.4 G/DL (ref 6–8.5)
RBC # BLD AUTO: 4.62 10*6/MM3 (ref 3.77–5.28)
SODIUM BLD-SCNC: 140 MMOL/L (ref 136–145)
WBC MORPH BLD: NORMAL
WBC NRBC COR # BLD: 18.81 10*3/MM3 (ref 3.4–10.8)

## 2020-02-04 PROCEDURE — 86255 FLUORESCENT ANTIBODY SCREEN: CPT

## 2020-02-04 PROCEDURE — 86003 ALLG SPEC IGE CRUDE XTRC EA: CPT

## 2020-02-04 PROCEDURE — 83516 IMMUNOASSAY NONANTIBODY: CPT

## 2020-02-04 PROCEDURE — 99214 OFFICE O/P EST MOD 30 MIN: CPT | Performed by: FAMILY MEDICINE

## 2020-02-04 PROCEDURE — 87493 C DIFF AMPLIFIED PROBE: CPT

## 2020-02-04 PROCEDURE — 80053 COMPREHEN METABOLIC PANEL: CPT

## 2020-02-04 PROCEDURE — 99406 BEHAV CHNG SMOKING 3-10 MIN: CPT | Performed by: FAMILY MEDICINE

## 2020-02-04 PROCEDURE — 82150 ASSAY OF AMYLASE: CPT

## 2020-02-04 PROCEDURE — 85007 BL SMEAR W/DIFF WBC COUNT: CPT

## 2020-02-04 PROCEDURE — 83690 ASSAY OF LIPASE: CPT

## 2020-02-04 PROCEDURE — 0097U HC BIOFIRE FILMARRAY GI PANEL: CPT

## 2020-02-04 PROCEDURE — 86008 ALLG SPEC IGE RECOMB EA: CPT

## 2020-02-04 PROCEDURE — 85025 COMPLETE CBC W/AUTO DIFF WBC: CPT

## 2020-02-04 PROCEDURE — 82784 ASSAY IGA/IGD/IGG/IGM EACH: CPT

## 2020-02-04 RX ORDER — DICYCLOMINE HCL 20 MG
40 TABLET ORAL EVERY 6 HOURS
Qty: 240 TABLET | Refills: 3 | Status: SHIPPED | OUTPATIENT
Start: 2020-02-04 | End: 2020-06-23 | Stop reason: SDUPTHER

## 2020-02-04 RX ORDER — TRAMADOL HYDROCHLORIDE 50 MG/1
TABLET ORAL
COMMUNITY
Start: 2020-01-22 | End: 2020-04-10

## 2020-02-04 RX ORDER — ESCITALOPRAM OXALATE 10 MG/1
10 TABLET ORAL DAILY
Qty: 30 TABLET | Refills: 3 | Status: SHIPPED | OUTPATIENT
Start: 2020-02-04 | End: 2020-08-24

## 2020-02-04 NOTE — PROGRESS NOTES
Subjective:  Carla Richard is a 50 y.o. female who presents for       Patient Active Problem List   Diagnosis   • Tobacco abuse counseling   • Chronic idiopathic constipation   • B12 deficiency   • Vitamin D deficiency    • Tobacco user   • Stage 2 moderate COPD by GOLD classification (CMS/[facility])   • Crohn's disease with complication (CMS/[facility])   • Cervical lymphadenopathy   • Generalized abdominal pain   • Nausea   • Neurological abnormality   • Tremor   • RUQ pain   • COPD exacerbation (CMS/[facility])   • Pertussis exposure   • Chronic bronchitis (CMS/[facility])   • Cigarette nicotine dependence, uncomplicated   • Chronic nonseasonal allergic rhinitis due to pollen   • Episode of recurrent major depressive disorder (CMS/[facility])   • Diarrhea   • Chronic diarrhea   • Thrombocytosis (CMS/[facility])   • Nausea and vomiting   • Gluten intolerance   • C. difficile diarrhea   • Shiga toxin-producing Escherichia coli infection   • Generalized weakness           Current Outpatient Medications:   •  albuterol sulfate  (90 Base) MCG/ACT inhaler, Inhale 2 puffs Every 4 (Four) Hours As Needed for Wheezing., Disp: 18 g, Rfl: 3  •  budesonide-formoterol (SYMBICORT) 160-4.5 MCG/ACT inhaler, Inhale 2 puffs 2 (Two) Times a Day., Disp: 1 inhaler, Rfl: 11  •  cetirizine (zyrTEC) 10 MG tablet, Take 1 tablet by mouth Daily., Disp: 30 tablet, Rfl: 11  •  dicyclomine (BENTYL) 20 MG tablet, Take 2 tablets by mouth Every 6 (Six) Hours., Disp: 240 tablet, Rfl: 3  •  escitalopram (LEXAPRO) 10 MG tablet, Take 1 tablet by mouth Daily., Disp: 30 tablet, Rfl: 3  •  esomeprazole (nexIUM) 20 MG capsule, Take 20 mg by mouth Every Morning Before Breakfast., Disp: , Rfl:   •  fluticasone (FLONASE) 50 MCG/ACT nasal spray, 2 sprays into the nostril(s) as directed by provider Daily., Disp: 1 bottle, Rfl: 11  •  guaiFENesin (MUCINEX) 600 MG 12 hr tablet, Take 1 tablet by mouth 2 (Two) Times a Day., Disp: 60 tablet, Rfl: 3  •  HYDROcodone-acetaminophen  (NORCO) 5-325 MG per tablet, Take 1 tablet by mouth Every 6 (Six) Hours As Needed., Disp: , Rfl: 0  •  ipratropium-albuterol (DUO-NEB) 0.5-2.5 mg/3 ml nebulizer, Take 3 mL by nebulization Every 4 (Four) Hours As Needed for Wheezing or Shortness of Air., Disp: , Rfl:   •  montelukast (SINGULAIR) 10 MG tablet, Take 1 tablet by mouth Every Night., Disp: 30 tablet, Rfl: 3  •  ondansetron ODT (ZOFRAN-ODT) 4 MG disintegrating tablet, Take 1 tablet by mouth Every 8 (Eight) Hours As Needed for Nausea or Vomiting., Disp: 90 tablet, Rfl: 3  •  Prucalopride Succinate 2 MG tablet, Take 2 mg by mouth Daily., Disp: 30 tablet, Rfl: 5  •  traMADol (ULTRAM) 50 MG tablet, TAKE 1 TABLET(S) EVERY 6 HOURS BY ORAL ROUTE AS NEEDED., Disp: , Rfl:   •  vancomycin (VANCOCIN) 125 MG capsule, Take 1 capsule by mouth 4 (Four) Times a Day., Disp: 56 capsule, Rfl: 0  •  vitamin B-12 (CYANOCOBALAMIN) 250 MCG tablet, Take 1 tablet by mouth Daily., Disp: 30 tablet, Rfl: 3  •  vitamin D (ERGOCALCIFEROL) 50818 units capsule capsule, Take 1 capsule by mouth 1 (One) Time Per Week., Disp: 4 capsule, Rfl: 3    HPI       Pt is 50 yo female with history of moderateOPD, Vitamin B12 deficiency, Vitamin D deficiency, chronic abdominal pain, tremor,  RUQ pain,  Sp hysterectomy,  History of chron's disease, history of pertussis infection  sp right shoulder surgery. X 3, sp rotator cuff repair , history of C diff diarrhea, gluten sensitivity            12/4/19 pt is here for recheck. She had rotator cuff repair recently with Dr. Mota.  She is now on PT?OT.she is taking Norco 5/325 mg every 6 hours PRN. Pain is about 4/10 on severity.  Also had pill cam  For her GI issues done on 11/20/19.  Abdominal pain is intermittent.  She has episodes of pain. Breathing stable. She is taking symbicort. She is on symbicort and off daliresp      2/4/20 pt is here for recheck. She saw Gastroenterology on 1/13/20  With CROW dahl for her generalized abodminal pain, nausea/  chronic idiopathic contipation. She was advised to continue zofran and bentyl 20 mg TID. She went to [facility] ER last week and had labwork that showed hemoglobin high along and platelet being high.  She was started on motegrity but took it once and it made her sick.   Per pt had MRI of abdomen done at imaging center along with given pain medication. Her last CT of abdomen was in October 2019 that showed previous hysterctomy and no acute process. She had nuclear medicine Gastric emptying study that was normal in December 2019.  Pain is about 7/10 on severity. It occurs 5-6 times a day.  She has diarhrea as week. She states Sunday through Wednesday she has no pain but on Thursday and Friday bowel pattern changes. Her pathology report on October 2019 showed no significant histologic abnormality,  She also would like to go back on lexapro for depression. She cannot take wellbutrin. In regards to diet. She is intolerant to meat products and has pain when she eats beef. She has been bitten by tick in past She also  Abdomen states it hurts when she eats certain foods. Has not had CT Angiogram done yet. Of abdomen.     2/19/20 pt is here for recheck and followup. Had labwork done and pt had shiga toxin in her stool culture along with C Diff. Pt also had sensitivity to gluten on celiac panel.  She has been feeling better since taking PO vancomycin for 14 days. She has refrained from gluten and is now on gluten free diet. On labwork lipase was normal CMP showed normal kidney function. CBC howed elevated WBC. UA was normal.  She has 3 more days of abx left. She states stool is better.     Diarrhea    This is a recurrent problem. The current episode started more than 1 year ago. The problem has been waxing and waning. The stool consistency is described as watery. The patient states that diarrhea does not awaken her from sleep. Associated symptoms include abdominal pain, arthralgias and coughing. Pertinent negatives include no  bloating, chills, fever, headaches, increased  flatus, myalgias, sweats, URI, vomiting or weight loss. Nothing aggravates the symptoms. She has tried nothing for the symptoms. The treatment provided no relief. There is no history of bowel resection, inflammatory bowel disease, irritable bowel syndrome, malabsorption, a recent abdominal surgery or short gut syndrome.    Abdominal Pain   This is a recurrent problem. The current episode started more than 1 year ago. The onset quality is sudden. The problem has been waxing and waning. The pain is located in the generalized abdominal region, epigastric region and RUQ. The pain is at a severity of 5/10. The pain is moderate. The quality of the pain is aching, a sensation of fullness and burning. The abdominal pain radiates to the epigastric region. Associated symptoms include constipation. Pertinent negatives include no anorexia, arthralgias, belching, diarrhea, dysuria, fever, flatus, frequency, headaches, hematochezia, hematuria, melena, myalgias, nausea, vomiting or weight loss. Nothing aggravates the pain. The pain is relieved by being still. The treatment provided no relief. Prior diagnostic workup includes ultrasound. Her past medical history is significant for Crohn's disease. There is no history of abdominal surgery, colon cancer, gallstones, GERD, irritable bowel syndrome, pancreatitis, PUD or ulcerative colitis.    Neurologic Problem   The patient's primary symptoms include focal sensory loss and weakness. The patient's pertinent negatives include no altered mental status, clumsiness, loss of balance, memory loss, near-syncope, slurred speech, syncope or visual change. This is a recurrent problem. The neurological problem developed gradually. The problem has been waxing and waning since onset. There was no focality noted. Associated symptoms include a fever and shortness of breath. Pertinent negatives include no abdominal pain, auditory change, aura, back  pain, bladder incontinence, bowel incontinence, chest pain, confusion, diaphoresis, dizziness, fatigue, headaches, light-headedness, nausea, neck pain, palpitations, vertigo or vomiting. Past treatments include nothing. The treatment provided no relief. There is no history of a bleeding disorder, a clotting disorder, a CVA, head trauma, liver disease, mood changes or seizures.   COPD   This is a chronic problem. The current episode started more than 1 year ago. The problem occurs constantly. The problem has been unchanged. Associated symptoms include abdominal pain, arthralgias, fatigue, joint swelling, numbness and weakness. Pertinent negatives include no anorexia, change in bowel habit, chest pain, chills, congestion, coughing, diaphoresis, fever, headaches, myalgias, nausea, neck pain, sore throat, swollen glands, urinary symptoms, vertigo, visual change or vomiting. The symptoms are aggravated by smoking. She has tried nothing (combivent ) for the symptoms.   Fatigue   This is a chronic problem. The current episode started more than 1 year ago. The problem occurs constantly. The problem has been unchanged. Associated symptoms include abdominal pain, arthralgias, fatigue, joint swelling, numbness and weakness. Pertinent negatives include no anorexia, change in bowel habit, chest pain, chills, congestion, coughing, diaphoresis, fever, headaches, myalgias, nausea, neck pain, sore throat, swollen glands, urinary symptoms, vertigo, visual change or vomiting. Nothing aggravates the symptoms. She has tried nothing (b12 injections ) for the symptoms. The treatment provided no relief.         Review of Systems  Review of Systems   Constitutional: Positive for activity change and fatigue. Negative for appetite change, chills, diaphoresis and fever.   HENT: Negative for congestion, postnasal drip, rhinorrhea, sinus pressure, sinus pain, sneezing, sore throat, trouble swallowing and voice change.    Respiratory: Positive  for cough and shortness of breath. Negative for choking, chest tightness, wheezing and stridor.    Cardiovascular: Negative for chest pain.   Gastrointestinal: Positive for abdominal pain and diarrhea. Negative for nausea and vomiting.   Neurological: Positive for weakness and numbness. Negative for headaches.   Psychiatric/Behavioral: Positive for agitation. The patient is nervous/anxious.        Patient Active Problem List   Diagnosis   • Tobacco abuse counseling   • Chronic idiopathic constipation   • B12 deficiency   • Vitamin D deficiency    • Tobacco user   • Stage 2 moderate COPD by GOLD classification (CMS/[facility])   • Crohn's disease with complication (CMS/[facility])   • Cervical lymphadenopathy   • Generalized abdominal pain   • Nausea   • Neurological abnormality   • Tremor   • RUQ pain   • COPD exacerbation (CMS/[facility])   • Pertussis exposure   • Chronic bronchitis (CMS/[facility])   • Cigarette nicotine dependence, uncomplicated   • Chronic nonseasonal allergic rhinitis due to pollen   • Episode of recurrent major depressive disorder (CMS/[facility])   • Diarrhea   • Chronic diarrhea   • Thrombocytosis (CMS/[facility])   • Nausea and vomiting   • Gluten intolerance   • C. difficile diarrhea   • Shiga toxin-producing Escherichia coli infection   • Generalized weakness     Past Surgical History:   Procedure Laterality Date   • COLONOSCOPY     • COLONOSCOPY N/A 10/18/2019    Procedure: COLONOSCOPY;  Surgeon: Tom Davis MD;  Location: [facility] ENDOSCOPY;  Service: Gastroenterology   • ENDOSCOPY N/A 10/18/2019    Procedure: ESOPHAGOGASTRODUODENOSCOPY;  Surgeon: Tom Davis MD;  Location: [facility] ENDOSCOPY;  Service: Gastroenterology   • HYSTERECTOMY     • TONSILECTOMY, ADENOIDECTOMY, BILATERAL MYRINGOTOMY AND TUBES     • UPPER GASTROINTESTINAL ENDOSCOPY       Social History     Socioeconomic History   • Marital status: Single     Spouse name: Not on file   • Number of children: Not on file   • Years of education: Not on file   •  Highest education level: Not on file   Tobacco Use   • Smoking status: Current Every Day Smoker     Packs/day: 1.00     Types: Cigarettes   • Smokeless tobacco: Never Used   Substance and Sexual Activity   • Alcohol use: No     Frequency: Never   • Drug use: No   • Sexual activity: Defer     Family History   Problem Relation Age of Onset   • Inflammatory bowel disease Mother    • Arthritis Mother    • Diabetes Mother    • Hypertension Mother    • Hyperlipidemia Mother    • Obesity Mother    • Osteoporosis Mother    • Heart disease Father    • Hyperlipidemia Father    • Diabetes Sister    • Liver disease Daughter    • Mental illness Daughter    • Obesity Daughter    • Diabetes Maternal Grandmother    • Cancer Maternal Grandmother         lung   • Cancer Other         lung   • Heart disease Other      Admission on 02/06/2020, Discharged on 02/06/2020   Component Date Value Ref Range Status   • Glucose 02/06/2020 99  65 - 99 mg/dL Final   • BUN 02/06/2020 12  6 - 20 mg/dL Final   • Creatinine 02/06/2020 0.66  0.57 - 1.00 mg/dL Final   • Sodium 02/06/2020 139  136 - 145 mmol/L Final   • Potassium 02/06/2020 3.6  3.5 - 5.2 mmol/L Final   • Chloride 02/06/2020 103  98 - 107 mmol/L Final   • CO2 02/06/2020 24.0  22.0 - 29.0 mmol/L Final   • Calcium 02/06/2020 9.2  8.6 - 10.5 mg/dL Final   • Total Protein 02/06/2020 6.6  6.0 - 8.5 g/dL Final   • Albumin 02/06/2020 4.40  3.50 - 5.20 g/dL Final   • ALT (SGPT) 02/06/2020 6  1 - 33 U/L Final   • AST (SGOT) 02/06/2020 10  1 - 32 U/L Final   • Alkaline Phosphatase 02/06/2020 73  39 - 117 U/L Final   • Total Bilirubin 02/06/2020 0.4  0.2 - 1.2 mg/dL Final   • eGFR Non African Amer 02/06/2020 94  >60 mL/min/1.73 Final   • Globulin 02/06/2020 2.2  gm/dL Final   • A/G Ratio 02/06/2020 2.0  g/dL Final   • BUN/Creatinine Ratio 02/06/2020 18.2  7.0 - 25.0 Final   • Anion Gap 02/06/2020 12.0  5.0 - 15.0 mmol/L Final   • Lipase 02/06/2020 39  13 - 60 U/L Final   • Color, UA 02/06/2020  Yellow  Yellow, Straw, Dark Yellow, Ana M Final   • Appearance, UA 02/06/2020 Clear  Clear Final   • pH, UA 02/06/2020 6.5  5.0 - 9.0 Final   • Specific Gravity, UA 02/06/2020 1.029  1.003 - 1.030 Final   • Glucose, UA 02/06/2020 Negative  Negative Final   • Ketones, UA 02/06/2020 Negative  Negative Final   • Bilirubin, UA 02/06/2020 Negative  Negative Final   • Blood, UA 02/06/2020 Negative  Negative Final   • Protein, UA 02/06/2020 Negative  Negative Final   • Leuk Esterase, UA 02/06/2020 Negative  Negative Final   • Nitrite, UA 02/06/2020 Negative  Negative Final   • Urobilinogen, UA 02/06/2020 1.0 E.U./dL  0.2 - 1.0 E.U./dL Final   • WBC 02/06/2020 14.21* 3.40 - 10.80 10*3/mm3 Final   • RBC 02/06/2020 4.89  3.77 - 5.28 10*6/mm3 Final   • Hemoglobin 02/06/2020 15.3  12.0 - 15.9 g/dL Final   • Hematocrit 02/06/2020 45.7  34.0 - 46.6 % Final   • MCV 02/06/2020 93.5  79.0 - 97.0 fL Final   • MCH 02/06/2020 31.3  26.6 - 33.0 pg Final   • MCHC 02/06/2020 33.5  31.5 - 35.7 g/dL Final   • RDW 02/06/2020 12.7  12.3 - 15.4 % Final   • RDW-SD 02/06/2020 43.5  37.0 - 54.0 fl Final   • MPV 02/06/2020 9.1  6.0 - 12.0 fL Final   • Platelets 02/06/2020 432  140 - 450 10*3/mm3 Final   • Neutrophil % 02/06/2020 60.1  42.7 - 76.0 % Final   • Lymphocyte % 02/06/2020 28.6  19.6 - 45.3 % Final   • Monocyte % 02/06/2020 8.3  5.0 - 12.0 % Final   • Eosinophil % 02/06/2020 1.7  0.3 - 6.2 % Final   • Basophil % 02/06/2020 0.6  0.0 - 1.5 % Final   • Immature Grans % 02/06/2020 0.7* 0.0 - 0.5 % Final   • Neutrophils, Absolute 02/06/2020 8.54* 1.70 - 7.00 10*3/mm3 Final   • Lymphocytes, Absolute 02/06/2020 4.07* 0.70 - 3.10 10*3/mm3 Final   • Monocytes, Absolute 02/06/2020 1.18* 0.10 - 0.90 10*3/mm3 Final   • Eosinophils, Absolute 02/06/2020 0.24  0.00 - 0.40 10*3/mm3 Final   • Basophils, Absolute 02/06/2020 0.08  0.00 - 0.20 10*3/mm3 Final   • Immature Grans, Absolute 02/06/2020 0.10* 0.00 - 0.05 10*3/mm3 Final   • nRBC 02/06/2020 0.0  0.0  - 0.2 /100 WBC Final   • Extra Tube 02/06/2020 hold for add-on   Final    Auto resulted   • Extra Tube 02/06/2020 Hold for add-ons.   Final    Auto resulted.   • Neutrophil % 02/06/2020 62.0  42.7 - 76.0 % Final   • Lymphocyte % 02/06/2020 31.0  19.6 - 45.3 % Final   • Monocyte % 02/06/2020 6.0  5.0 - 12.0 % Final   • Bands %  02/06/2020 1.0  0.0 - 5.0 % Final   • Neutrophils Absolute 02/06/2020 8.95* 1.70 - 7.00 10*3/mm3 Final   • Lymphocytes Absolute 02/06/2020 4.41* 0.70 - 3.10 10*3/mm3 Final   • Monocytes Absolute 02/06/2020 0.85  0.10 - 0.90 10*3/mm3 Final   • RBC Morphology 02/06/2020 Normal  Normal Final   • WBC Morphology 02/06/2020 Normal  Normal Final   • Platelet Morphology 02/06/2020 Normal  Normal Final   Lab on 02/04/2020   Component Date Value Ref Range Status   • WBC 02/04/2020 18.81* 3.40 - 10.80 10*3/mm3 Final   • RBC 02/04/2020 4.62  3.77 - 5.28 10*6/mm3 Final   • Hemoglobin 02/04/2020 15.2  12.0 - 15.9 g/dL Final   • Hematocrit 02/04/2020 43.0  34.0 - 46.6 % Final   • MCV 02/04/2020 93.1  79.0 - 97.0 fL Final   • MCH 02/04/2020 32.9  26.6 - 33.0 pg Final   • MCHC 02/04/2020 35.3  31.5 - 35.7 g/dL Final   • RDW 02/04/2020 12.4  12.3 - 15.4 % Final   • RDW-SD 02/04/2020 42.2  37.0 - 54.0 fl Final   • MPV 02/04/2020 9.7  6.0 - 12.0 fL Final   • Platelets 02/04/2020 489* 140 - 450 10*3/mm3 Final   • Glucose 02/04/2020 93  65 - 99 mg/dL Final   • BUN 02/04/2020 14  6 - 20 mg/dL Final   • Creatinine 02/04/2020 0.76  0.57 - 1.00 mg/dL Final   • Sodium 02/04/2020 140  136 - 145 mmol/L Final   • Potassium 02/04/2020 4.1  3.5 - 5.2 mmol/L Final   • Chloride 02/04/2020 101  98 - 107 mmol/L Final   • CO2 02/04/2020 22.7  22.0 - 29.0 mmol/L Final   • Calcium 02/04/2020 9.0  8.6 - 10.5 mg/dL Final   • Total Protein 02/04/2020 6.4  6.0 - 8.5 g/dL Final   • Albumin 02/04/2020 4.40  3.50 - 5.20 g/dL Final   • ALT (SGPT) 02/04/2020 6  1 - 33 U/L Final   • AST (SGOT) 02/04/2020 11  1 - 32 U/L Final   • Alkaline  Phosphatase 02/04/2020 62  39 - 117 U/L Final   • Total Bilirubin 02/04/2020 0.3  0.2 - 1.2 mg/dL Final   • eGFR Non African Amer 02/04/2020 80  >60 mL/min/1.73 Final   • Globulin 02/04/2020 2.0  gm/dL Final   • A/G Ratio 02/04/2020 2.2  g/dL Final   • BUN/Creatinine Ratio 02/04/2020 18.4  7.0 - 25.0 Final   • Anion Gap 02/04/2020 16.3* 5.0 - 15.0 mmol/L Final   • Amylase 02/04/2020 51  28 - 100 U/L Final   • Lipase 02/04/2020 31  13 - 60 U/L Final   • Beef 02/04/2020 <0.10  <0.35 kU/L Final   • Class Description 02/04/2020 0   Final   • [place] 02/04/2020 <0.10  <0.35 kU/L Final   • Class Interpretation 02/04/2020 0   Final   • Pork 02/04/2020 <0.10  <0.35 kU/L Final   • Class Interpretation 02/04/2020 0   Final    The test method is the GigaBryte ImmunoCAP allergen-specific  IgE system. CLASS INTERPRETATION   <0.10 kU/L= 0,  Negative; 0.10 - 0.34 kU/L= 0/1, Equivocal/Borderline;  0.35 - 0.69  kU/L=1, Low Positive; 0.70 - 3.49 kU/L=2,  Moderate Positive;  3.50  - 17.49 kU/L=3, High Positive;  17.50 - 49.99 kU/L= 4, Very High Positive; 50.00 - 99.99  kU/L= 5, Very High Positive;   >99.99 kU/L=6, Very High  Positive  *This test was developed and its performance  characteristics determined by [facility]. It has not  been cleared or approved by the [place]. Food and Drug  Administration.   • Alpha Gal IgE 02/04/2020 <0.10  <0.10 kU/L Final    Previous reports (ABIEL 2009;[id number]) have demonstrated  that patients with IgE antibodies to  xekqhdmky-[id number],3-galactose are at risk for delayed  anaphylaxis, angioedema, or urticaria following  consumption of beef, pork, or lamb.   • Gliadin Deamidated Peptide Ab, IgA 02/04/2020 4  0 - 19 units Final                       Negative                   0 - 19                     Weak Positive             20 - 30                     Moderate to Strong Positive   >30   • Deaminated Gliadin Ab IgG 02/04/2020 5  0 - 19 units Final                       Negative                   0 - 19                      Weak Positive             20 - 30                     Moderate to Strong Positive   >30   • Tissue Transglutaminase IgA 02/04/2020 <2  0 - 3 U/mL Final                                  Negative        0 -  3                                Weak Positive   4 - 10                                Positive           >10   Tissue Transglutaminase (tTG) has been identified   as the endomysial antigen.  Studies have demonstr-   ated that endomysial IgA antibodies have over 99%   specificity for gluten sensitive enteropathy.   • Tissue Transglutaminase IgG 02/04/2020 10* 0 - 5 U/mL Final                                  Negative        0 - 5                                Weak Positive   6 - 9                                Positive           >9   • Endomysial IgA 02/04/2020 Negative  Negative Final   • IgA 02/04/2020 64* 87 - 352 mg/dL Final   • Class Description 02/04/2020 Comment   Final        Levels of Specific IgE       Class  Description of Class      ---------------------------  -----  --------------------                     < 0.10         0         Negative             0.10 -    0.31         0/I       Equivocal/Low             0.32 -    0.55         I         Low             0.56 -    1.40         II        Moderate             1.41 -    3.90         III       High             3.91 -   19.00         IV        Very High            19.01 -  100.00         V         Very High                    >100.00         VI        Very High   • Egg White 02/04/2020 <0.10  Class 0 kU/L Final   • Milk, Cow's 02/04/2020 <0.10  Class 0 kU/L Final   • CodFish 02/04/2020 <0.10  Class 0 kU/L Final   • Sesame Seed 02/04/2020 <0.10  Class 0 kU/L Final   • Peanut 02/04/2020 <0.10  Class 0 kU/L Final   • Soybean 02/04/2020 <0.10  Class 0 kU/L Final   • Hazelnut 02/04/2020 <0.10  Class 0 kU/L Final   • Shrimp 02/04/2020 <0.10  Class 0 kU/L Final   • Scallop 02/04/2020 <0.10  Class 0 kU/L Final   • Gluten 02/04/2020 <0.10   Class 0 kU/L Final   • [place] 02/04/2020 <0.10  Class 0 kU/L Final   • Wheat 02/04/2020 <0.10  Class 0 kU/L Final   • Gliadin Deamidated Peptide Ab, IgA 02/04/2020 4  0 - 19 units Final                       Negative                   0 - 19                     Weak Positive             20 - 30                     Moderate to Strong Positive   >30   • Deaminated Gliadin Ab IgG 02/04/2020 6  0 - 19 units Final                       Negative                   0 - 19                     Weak Positive             20 - 30                     Moderate to Strong Positive   >30   • Tissue Transglutaminase IgA 02/04/2020 <2  0 - 3 U/mL Final                                  Negative        0 -  3                                Weak Positive   4 - 10                                Positive           >10   Tissue Transglutaminase (tTG) has been identified   as the endomysial antigen.  Studies have demonstr-   ated that endomysial IgA antibodies have over 99%   specificity for gluten sensitive enteropathy.   • Tissue Transglutaminase IgG 02/04/2020 11* 0 - 5 U/mL Final                                  Negative        0 - 5                                Weak Positive   6 - 9                                Positive           >9   • Campylobacter 02/04/2020 Not Detected  Not Detected, Invalid Final   • Plesiomonas shigelloides 02/04/2020 Not Detected  Not Detected Final   • Salmonella 02/04/2020 Not Detected  Not Detected Final   • Vibrio 02/04/2020 Not Detected  Not Detected Final   • Vibrio cholerae 02/04/2020 Not Detected  Not Detected Final   • Yersinia enterocolitica 02/04/2020 Not Detected  Not Detected Final   • Enteroaggregative E. coli (EAEC) 02/04/2020 Not Detected  Not Detected Final   • Enteropathogenic E. coli (EPEC) 02/04/2020 Not Detected  Not Detected Final   • Enterotoxigenic E. coli (ETEC) lt/* 02/04/2020 Not Detected  Not Detected Final   • Shiga-like toxin-producing E. coli* 02/04/2020 Detected* Not Detected  Final   • E. coli O157 02/04/2020 Not Detected  Not Detected Final   • Shigella/Enteroinvasive E. coli (E* 02/04/2020 Not Detected  Not Detected Final   • Cryptosporidium 02/04/2020 Not Detected  Not Detected, Invalid Final   • Cyclospora cayetanensis 02/04/2020 Not Detected  Not Detected Final   • Entamoeba histolytica 02/04/2020 Not Detected  Not Detected Final   • Giardia lamblia 02/04/2020 Not Detected  Not Detected Final   • Adenovirus F40/41 02/04/2020 Not Detected  Not Detected Final   • Astrovirus 02/04/2020 Not Detected  Not Detected Final   • Norovirus GI/GII 02/04/2020 Not Detected  Not Detected Final   • Rotavirus A 02/04/2020 Not Detected  Not Detected Final   • Sapovirus (I, II, IV or V) 02/04/2020 Not Detected  Not Detected Final   • C. Difficile Toxins by PCR 02/04/2020 Positive* Negative Final   • Neutrophil % 02/04/2020 60.6  42.7 - 76.0 % Final   • Lymphocyte % 02/04/2020 25.3  19.6 - 45.3 % Final   • Monocyte % 02/04/2020 11.1  5.0 - 12.0 % Final   • Eosinophil % 02/04/2020 1.0  0.3 - 6.2 % Final   • Basophil % 02/04/2020 2.0* 0.0 - 1.5 % Final   • Neutrophils Absolute 02/04/2020 11.40* 1.70 - 7.00 10*3/mm3 Final   • Lymphocytes Absolute 02/04/2020 4.76* 0.70 - 3.10 10*3/mm3 Final   • Monocytes Absolute 02/04/2020 2.09* 0.10 - 0.90 10*3/mm3 Final   • Eosinophils Absolute 02/04/2020 0.19  0.00 - 0.40 10*3/mm3 Final   • Basophils Absolute 02/04/2020 0.38* 0.00 - 0.20 10*3/mm3 Final   • Poikilocytes 02/04/2020 Slight/1+  None Seen Final   • WBC Morphology 02/04/2020 Normal  Normal Final   • Platelet Morphology 02/04/2020 Normal  Normal Final   Admission on 10/19/2019, Discharged on 10/19/2019   Component Date Value Ref Range Status   • Glucose 10/19/2019 99  65 - 99 mg/dL Final   • BUN 10/19/2019 11  6 - 20 mg/dL Final   • Creatinine 10/19/2019 0.67  0.57 - 1.00 mg/dL Final   • Sodium 10/19/2019 141  136 - 145 mmol/L Final   • Potassium 10/19/2019 4.6  3.5 - 5.2 mmol/L Final   • Chloride 10/19/2019  102  98 - 107 mmol/L Final   • CO2 10/19/2019 31.0* 22.0 - 29.0 mmol/L Final   • Calcium 10/19/2019 9.2  8.6 - 10.5 mg/dL Final   • Total Protein 10/19/2019 6.9  6.0 - 8.5 g/dL Final   • Albumin 10/19/2019 4.40  3.50 - 5.20 g/dL Final   • ALT (SGPT) 10/19/2019 12  1 - 33 U/L Final   • AST (SGOT) 10/19/2019 11  1 - 32 U/L Final   • Alkaline Phosphatase 10/19/2019 89  39 - 117 U/L Final   • Total Bilirubin 10/19/2019 0.3  0.2 - 1.2 mg/dL Final   • eGFR Non African Amer 10/19/2019 93  >60 mL/min/1.73 Final   • Globulin 10/19/2019 2.5  gm/dL Final   • A/G Ratio 10/19/2019 1.8  g/dL Final   • BUN/Creatinine Ratio 10/19/2019 16.4  7.0 - 25.0 Final   • Anion Gap 10/19/2019 8.0  5.0 - 15.0 mmol/L Final   • Creatine Kinase 10/19/2019 53  20 - 180 U/L Final   • Lipase 10/19/2019 35  13 - 60 U/L Final   • WBC 10/19/2019 17.94* 3.40 - 10.80 10*3/mm3 Final   • RBC 10/19/2019 4.46  3.77 - 5.28 10*6/mm3 Final   • Hemoglobin 10/19/2019 14.0  12.0 - 15.9 g/dL Final   • Hematocrit 10/19/2019 42.4  34.0 - 46.6 % Final   • MCV 10/19/2019 95.1  79.0 - 97.0 fL Final   • MCH 10/19/2019 31.4  26.6 - 33.0 pg Final   • MCHC 10/19/2019 33.0  31.5 - 35.7 g/dL Final   • RDW 10/19/2019 12.7  12.3 - 15.4 % Final   • RDW-SD 10/19/2019 43.9  37.0 - 54.0 fl Final   • MPV 10/19/2019 9.0  6.0 - 12.0 fL Final   • Platelets 10/19/2019 541* 140 - 450 10*3/mm3 Final   • Neutrophil % 10/19/2019 70.0  42.7 - 76.0 % Final   • Lymphocyte % 10/19/2019 20.5  19.6 - 45.3 % Final   • Monocyte % 10/19/2019 7.4  5.0 - 12.0 % Final   • Eosinophil % 10/19/2019 0.4  0.3 - 6.2 % Final   • Basophil % 10/19/2019 0.3  0.0 - 1.5 % Final   • Immature Grans % 10/19/2019 1.4* 0.0 - 0.5 % Final   • Neutrophils, Absolute 10/19/2019 12.56* 1.70 - 7.00 10*3/mm3 Final   • Lymphocytes, Absolute 10/19/2019 3.67* 0.70 - 3.10 10*3/mm3 Final   • Monocytes, Absolute 10/19/2019 1.33* 0.10 - 0.90 10*3/mm3 Final   • Eosinophils, Absolute 10/19/2019 0.07  0.00 - 0.40 10*3/mm3 Final   •  Basophils, Absolute 10/19/2019 0.06  0.00 - 0.20 10*3/mm3 Final   • Immature Grans, Absolute 10/19/2019 0.25* 0.00 - 0.05 10*3/mm3 Final   • nRBC 10/19/2019 0.0  0.0 - 0.2 /100 WBC Final   • Extra Tube 10/19/2019 hold for add-on   Final    Auto resulted   • Extra Tube 10/19/2019 Hold for add-ons.   Final    Auto resulted.   Admission on 10/18/2019, Discharged on 10/18/2019   Component Date Value Ref Range Status   • Case Report 10/18/2019    Final                    Value:Surgical Pathology Report                         Case: [id number]                                  Authorizing Provider:  Tom Davis MD        Collected:           10/18/2019 11:09 AM          Ordering Location:     [facility]             Received:            10/18/2019 01:35 PM                                 [facility]                                                     Pathologist:           Rambo Carter MD                                                         Specimens:   1) - Small Intestine, Duodenum, small bowel                                                         2) - Gastric, Antrum                                                                                3) - Small Intestine, Ileum, TI                                                                     4) - Large Intestine, colonic mucosa                                                      • Final Diagnosis 10/18/2019    Final                    Value:This result contains rich text formatting which cannot be displayed here.   • Gross Description 10/18/2019    Final                    Value:This result contains rich text formatting which cannot be displayed here.   Lab on 08/29/2019   Component Date Value Ref Range Status   • WBC 08/29/2019 14.22* 3.40 - 10.80 10*3/mm3 Final   • RBC 08/29/2019 4.52  3.77 - 5.28 10*6/mm3 Final   • Hemoglobin 08/29/2019 14.2  12.0 - 15.9 g/dL Final   • Hematocrit 08/29/2019 44.8  34.0 - 46.6 % Final   • MCV 08/29/2019  99.1* 79.0 - 97.0 fL Final   • MCH 08/29/2019 31.4  26.6 - 33.0 pg Final   • MCHC 08/29/2019 31.7  31.5 - 35.7 g/dL Final   • RDW 08/29/2019 12.7  12.3 - 15.4 % Final   • RDW-SD 08/29/2019 46.6  37.0 - 54.0 fl Final   • MPV 08/29/2019 10.2  6.0 - 12.0 fL Final   • Platelets 08/29/2019 489* 140 - 450 10*3/mm3 Final   • Neutrophil % 08/29/2019 65.8  42.7 - 76.0 % Final   • Lymphocyte % 08/29/2019 25.1  19.6 - 45.3 % Final   • Monocyte % 08/29/2019 6.0  5.0 - 12.0 % Final   • Eosinophil % 08/29/2019 1.9  0.3 - 6.2 % Final   • Basophil % 08/29/2019 0.6  0.0 - 1.5 % Final   • Immature Grans % 08/29/2019 0.6* 0.0 - 0.5 % Final   • Neutrophils, Absolute 08/29/2019 9.36* 1.70 - 7.00 10*3/mm3 Final   • Lymphocytes, Absolute 08/29/2019 3.57* 0.70 - 3.10 10*3/mm3 Final   • Monocytes, Absolute 08/29/2019 0.85  0.10 - 0.90 10*3/mm3 Final   • Eosinophils, Absolute 08/29/2019 0.27  0.00 - 0.40 10*3/mm3 Final   • Basophils, Absolute 08/29/2019 0.09  0.00 - 0.20 10*3/mm3 Final   • Immature Grans, Absolute 08/29/2019 0.08* 0.00 - 0.05 10*3/mm3 Final   • nRBC 08/29/2019 0.0  0.0 - 0.2 /100 WBC Final   • Glucose 08/29/2019 99  65 - 99 mg/dL Final   • BUN 08/29/2019 9  6 - 20 mg/dL Final   • Creatinine 08/29/2019 0.71  0.57 - 1.00 mg/dL Final   • Sodium 08/29/2019 140  136 - 145 mmol/L Final   • Potassium 08/29/2019 4.6  3.5 - 5.2 mmol/L Final   • Chloride 08/29/2019 100  98 - 107 mmol/L Final   • CO2 08/29/2019 25.6  22.0 - 29.0 mmol/L Final   • Calcium 08/29/2019 9.3  8.6 - 10.5 mg/dL Final   • Total Protein 08/29/2019 6.5  6.0 - 8.5 g/dL Final   • Albumin 08/29/2019 4.50  3.50 - 5.20 g/dL Final   • ALT (SGPT) 08/29/2019 15  1 - 33 U/L Final   • AST (SGOT) 08/29/2019 21  1 - 32 U/L Final   • Alkaline Phosphatase 08/29/2019 74  39 - 117 U/L Final   • Total Bilirubin 08/29/2019 0.4  0.2 - 1.2 mg/dL Final   • eGFR Non African Amer 08/29/2019 87  >60 mL/min/1.73 Final   • Globulin 08/29/2019 2.0  gm/dL Final   • A/G Ratio 08/29/2019 2.3   g/dL Final   • BUN/Creatinine Ratio 08/29/2019 12.7  7.0 - 25.0 Final   • Anion Gap 08/29/2019 14.4  5.0 - 15.0 mmol/L Final   • M pneumoniae IgG Abs 08/29/2019 <100  0 - 99 U/mL Final                                 Negative:           <100                               Indeterminate: 100 - 320                               Positive:           >320  The reference interval established is intended as a  baseline only.  Values >100 may indicate a recent  infection with Mycoplasma pneumoniae and need to be  confirmed either by a positive IgM result and/or an  additional specimen drawn 2-4 weeks later showing a  significant increase in antibody levels.   • M pneumoniae IgM Abs 08/29/2019 <770  0 - 769 U/mL Final                                 Negative            <770  Clinically significant amount of M. pneumoniae antibody  not detected.                               Low Positive   770 - 950  M. pneumoniae specific IgM presumptively detected.  It  is recommended that another sample be collected 1-2  weeks later to assure reactivity.                               Positive            >950  Highly significant amount of M. pneumoniae specific  IgM antibody detected.   • C-Reactive Protein 08/29/2019 0.36  0.00 - 0.50 mg/dL Final   • B pertussis IgG Ab, Quant 08/29/2019 3.14* 0.00 - 0.94 index Final                                   Negative          <0.95                                 Equivocal   0.95 - 1.04                                 Positive          >1.04   • B pertussis IgM Ab, Quant 08/29/2019 <1.0  0.0 - 0.9 index Final                                     Negative        <1.0                                   Borderline 1.0 - 1.1                                   Positive        >1.1      XR Abdomen KUB  Narrative: [facility] [place]    Patient Name: ARA ZACARIAS    ATTENDING: OBINNA ARZATE     REFERRING: BETO OCHOA    ORDERING: BETO OCHOA    -----------------------    PROCEDURE:  "AP abdomen    DATE OF EXAM: 2/6/2020    HISTORY: Abdomen pain    Single supine image of the abdomen and pelvis was obtained. Study  is compared to prior exam of 1/31/2015.    FINDINGS:    There is an unremarkable bowel gas pattern. There is no evidence  of significant distention or obstruction. No free air is  appreciated with this supine imaging. No masses or abnormal  calcifications are identified. No definite renal stones are seen.  Impression: Unremarkable abdomen.    Electronically signed by:  Shaheen Aragon MD  2/6/2020 10:16 AM  [facility] Workstation: [id number]    @[email or web address]@  Immunization History   Administered Date(s) Administered   • flucelvax quad pfs =>4 YRS 09/19/2019       The following portions of the patient's history were reviewed and updated as appropriate: allergies, current medications, past family history, past medical history, past social history, past surgical history and problem list.        Physical Exam  BP 98/64 (BP Location: Left arm, Patient Position: Sitting, Cuff Size: Adult)   Pulse 98   Temp 98.2 °F (36.8 °C) (Oral)   Ht 165.1 cm (65\")   Wt 49.9 kg (110 lb)   LMP  (LMP Unknown)   SpO2 98%   BMI 18.30 kg/m²     Physical Exam   Constitutional: She is oriented to person, place, and time. She appears well-developed and well-nourished.   HENT:   Head: Normocephalic and atraumatic.   Right Ear: External ear normal.   Eyes: Pupils are equal, round, and reactive to light. Conjunctivae and EOM are normal.   Neck: Normal range of motion. Neck supple.   Cardiovascular: Normal rate, regular rhythm and normal heart sounds.   No murmur heard.  Pulmonary/Chest: Effort normal and breath sounds normal. No respiratory distress.   Abdominal: Soft. Bowel sounds are normal. She exhibits no distension. There is tenderness.   Tenderness improved on palpation    Musculoskeletal: Normal range of motion. She exhibits no edema or deformity.   Neurological: She is alert and oriented to person, place, and time. " No cranial nerve deficit.   Skin: Skin is warm. No rash noted. She is not diaphoretic. No erythema. No pallor.   Psychiatric: She has a normal mood and affect. Her behavior is normal.   Nursing note and vitals reviewed.      Assessment/Plan    Diagnosis Plan   1. C. difficile diarrhea  Clostridium Difficile Toxin, PCR - Stool, Per Rectum   2. Tobacco user     3. Tremor     4. Vitamin D deficiency      5. Stage 2 moderate COPD by GOLD classification (CMS/[facility])     6. Generalized abdominal pain     7. Diarrhea, unspecified type     8. Chronic idiopathic constipation     9. Chronic diarrhea     10. Chronic bronchitis, unspecified chronic bronchitis type (CMS/[facility])     11. Gluten intolerance     12. Shiga toxin-producing Escherichia coli infection     13. Generalized weakness             -reviewed last ER visit at [facility]   -c diff diarrhea - was on PO vancomycin for 14 day. Clinically better  Will get C diff toxin after pt finish treatment. If not improving Xxifaxan . Recommend probiotic supplemetn   -gluten sensitivity -recommend gluten free diet   -recommend influenza vaccination - advised to get at pharmacy   -COPD/COPD exacerbation - referral to Pulmonology for PFTs/  Start on prednisone tapering dose.mycoplasma negative Stop  Doxycycline 100 mg PO BID. Pt states she was on this before and could not tolerate due to GI upset. But she will try this time with probiotic supplements. Albuterol inhaler along with nebulizer machine.   she was on  Breo and Combvent. Anoro Stopped. Pulmonology following now on Symbicort 160/4.4 mcg 2 puffs BID.   Also on albuterol inhalers PRN    -vitamin B12 deficiency  - last b12 stable will start on vitamin b12 250 mcg gordon.  Continue to monitor    -tobacco user - counseled to quit smoking >5 minutes. She could not tolerate chantix  urged the importance of quitting smoking.   -chronic bronchitis, - mucinex   -abdominal pain- normal CT  Of abdomen in October 2019. Normal gastric empyting study  in December 2019. Continue bentyl but will increase dosage from 20 mg TID to 40 mg QID.  Recheck in 4 weeks.  Check amylase, lipase, and cbc and cmp. Check alpha gal, celiac panel and recurrent GI distress panel. Also get CT angioram of abdomen and pelvis with contrast to rule out any vascular causes  -nausea/ vomting - continue zofran   -pt has upcoming mammogram soon.    -regarding tremor of head - she has seen neurology with Dr. Chen. Had MRI of brain. Recommended 2nd opinon with Dr. Manjarrez She also has family history of Friedreich's ataxia  -advised pt to go to ER or call 911 if symptoms worrisome or severe  -advised pt to be  Safe and call with questions and concerns  -advised to followup with all referrals and Specialist  -recheck in 6 weeks         This document has been electronically signed by Xavier Wick MD on February 19, 2020 10:26 AM

## 2020-02-05 LAB
ADV 40+41 DNA STL QL NAA+NON-PROBE: NOT DETECTED
ASTRO TYP 1-8 RNA STL QL NAA+NON-PROBE: NOT DETECTED
C CAYETANENSIS DNA STL QL NAA+NON-PROBE: NOT DETECTED
C DIFF TOX GENS STL QL NAA+PROBE: POSITIVE
CAMPY SP DNA.DIARRHEA STL QL NAA+PROBE: NOT DETECTED
CRYPTOSP STL CULT: NOT DETECTED
E COLI DNA SPEC QL NAA+PROBE: NOT DETECTED
E HISTOLYT AG STL-ACNC: NOT DETECTED
EAEC PAA PLAS AGGR+AATA ST NAA+NON-PRB: NOT DETECTED
EC STX1 + STX2 GENES STL NAA+PROBE: DETECTED
ENDOMYSIUM IGA SER QL: NEGATIVE
EPEC EAE GENE STL QL NAA+NON-PROBE: NOT DETECTED
ETEC LTA+ST1A+ST1B TOX ST NAA+NON-PROBE: NOT DETECTED
G LAMBLIA DNA SPEC QL NAA+PROBE: NOT DETECTED
GLIADIN PEPTIDE IGA SER-ACNC: 4 UNITS (ref 0–19)
GLIADIN PEPTIDE IGA SER-ACNC: 4 UNITS (ref 0–19)
GLIADIN PEPTIDE IGG SER-ACNC: 5 UNITS (ref 0–19)
GLIADIN PEPTIDE IGG SER-ACNC: 6 UNITS (ref 0–19)
IGA SERPL-MCNC: 64 MG/DL (ref 87–352)
NOROVIRUS GI+II RNA STL QL NAA+NON-PROBE: NOT DETECTED
P SHIGELLOIDES DNA STL QL NAA+PROBE: NOT DETECTED
RV RNA STL NAA+PROBE: NOT DETECTED
SALMONELLA DNA SPEC QL NAA+PROBE: NOT DETECTED
SAPO I+II+IV+V RNA STL QL NAA+NON-PROBE: NOT DETECTED
SHIGELLA SP+EIEC IPAH STL QL NAA+PROBE: NOT DETECTED
TTG IGA SER-ACNC: <2 U/ML (ref 0–3)
TTG IGA SER-ACNC: <2 U/ML (ref 0–3)
TTG IGG SER-ACNC: 10 U/ML (ref 0–5)
TTG IGG SER-ACNC: 11 U/ML (ref 0–5)
V CHOLERAE DNA SPEC QL NAA+PROBE: NOT DETECTED
VIBRIO DNA SPEC NAA+PROBE: NOT DETECTED
YERSINIA STL CULT: NOT DETECTED

## 2020-02-05 RX ORDER — VANCOMYCIN HYDROCHLORIDE 125 MG/1
125 CAPSULE ORAL 4 TIMES DAILY
Qty: 56 CAPSULE | Refills: 0 | Status: SHIPPED | OUTPATIENT
Start: 2020-02-05 | End: 2020-04-10

## 2020-02-06 ENCOUNTER — HOSPITAL ENCOUNTER (EMERGENCY)
Facility: HOSPITAL | Age: 50
Discharge: HOME | End: 2020-02-06
Attending: EMERGENCY MEDICINE | Admitting: EMERGENCY MEDICINE

## 2020-02-06 ENCOUNTER — APPOINTMENT (OUTPATIENT)
Dept: GENERAL RADIOLOGY | Facility: HOSPITAL | Age: 50
End: 2020-02-06

## 2020-02-06 VITALS
OXYGEN SATURATION: 98 % | HEIGHT: 65 IN | SYSTOLIC BLOOD PRESSURE: 120 MMHG | BODY MASS INDEX: 18.66 KG/M2 | DIASTOLIC BLOOD PRESSURE: 77 MMHG | TEMPERATURE: 97.4 F | HEART RATE: 70 BPM | RESPIRATION RATE: 18 BRPM | WEIGHT: 112 LBS

## 2020-02-06 DIAGNOSIS — R10.84 GENERALIZED ABDOMINAL PAIN: Primary | ICD-10-CM

## 2020-02-06 LAB
ALBUMIN SERPL-MCNC: 4.4 G/DL (ref 3.5–5.2)
ALBUMIN/GLOB SERPL: 2 G/DL
ALP SERPL-CCNC: 73 U/L (ref 39–117)
ALT SERPL W P-5'-P-CCNC: 6 U/L (ref 1–33)
ANION GAP SERPL CALCULATED.3IONS-SCNC: 12 MMOL/L (ref 5–15)
AST SERPL-CCNC: 10 U/L (ref 1–32)
BASOPHILS # BLD AUTO: 0.08 10*3/MM3 (ref 0–0.2)
BASOPHILS NFR BLD AUTO: 0.6 % (ref 0–1.5)
BILIRUB SERPL-MCNC: 0.4 MG/DL (ref 0.2–1.2)
BILIRUB UR QL STRIP: NEGATIVE
BUN BLD-MCNC: 12 MG/DL (ref 6–20)
BUN/CREAT SERPL: 18.2 (ref 7–25)
CALCIUM SPEC-SCNC: 9.2 MG/DL (ref 8.6–10.5)
CALIF WALNUT POLN IGE QN: <0.1 KU/L
CHLORIDE SERPL-SCNC: 103 MMOL/L (ref 98–107)
CLARITY UR: CLEAR
CO2 SERPL-SCNC: 24 MMOL/L (ref 22–29)
CODFISH IGE QN: <0.1 KU/L
COLOR UR: YELLOW
CONV CLASS DESCRIPTION: NORMAL
COW MILK IGE QN: <0.1 KU/L
CREAT BLD-MCNC: 0.66 MG/DL (ref 0.57–1)
DEPRECATED RDW RBC AUTO: 43.5 FL (ref 37–54)
EGG WHITE IGE QN: <0.1 KU/L
EOSINOPHIL # BLD AUTO: 0.24 10*3/MM3 (ref 0–0.4)
EOSINOPHIL NFR BLD AUTO: 1.7 % (ref 0.3–6.2)
ERYTHROCYTE [DISTWIDTH] IN BLOOD BY AUTOMATED COUNT: 12.7 % (ref 12.3–15.4)
GFR SERPL CREATININE-BSD FRML MDRD: 94 ML/MIN/1.73
GLOBULIN UR ELPH-MCNC: 2.2 GM/DL
GLUCOSE BLD-MCNC: 99 MG/DL (ref 65–99)
GLUCOSE UR STRIP-MCNC: NEGATIVE MG/DL
GLUTEN IGE QN: <0.1 KU/L
HAZELNUT IGE QN: <0.1 KU/L
HCT VFR BLD AUTO: 45.7 % (ref 34–46.6)
HGB BLD-MCNC: 15.3 G/DL (ref 12–15.9)
HGB UR QL STRIP.AUTO: NEGATIVE
HOLD SPECIMEN: NORMAL
IMM GRANULOCYTES # BLD AUTO: 0.1 10*3/MM3 (ref 0–0.05)
IMM GRANULOCYTES NFR BLD AUTO: 0.7 % (ref 0–0.5)
KETONES UR QL STRIP: NEGATIVE
LEUKOCYTE ESTERASE UR QL STRIP.AUTO: NEGATIVE
LIPASE SERPL-CCNC: 39 U/L (ref 13–60)
LYMPHOCYTES # BLD AUTO: 4.07 10*3/MM3 (ref 0.7–3.1)
LYMPHOCYTES # BLD MANUAL: 4.41 10*3/MM3 (ref 0.7–3.1)
LYMPHOCYTES NFR BLD AUTO: 28.6 % (ref 19.6–45.3)
LYMPHOCYTES NFR BLD MANUAL: 31 % (ref 19.6–45.3)
LYMPHOCYTES NFR BLD MANUAL: 6 % (ref 5–12)
MCH RBC QN AUTO: 31.3 PG (ref 26.6–33)
MCHC RBC AUTO-ENTMCNC: 33.5 G/DL (ref 31.5–35.7)
MCV RBC AUTO: 93.5 FL (ref 79–97)
MONOCYTES # BLD AUTO: 0.85 10*3/MM3 (ref 0.1–0.9)
MONOCYTES # BLD AUTO: 1.18 10*3/MM3 (ref 0.1–0.9)
MONOCYTES NFR BLD AUTO: 8.3 % (ref 5–12)
NEUTROPHILS # BLD AUTO: 8.54 10*3/MM3 (ref 1.7–7)
NEUTROPHILS # BLD AUTO: 8.95 10*3/MM3 (ref 1.7–7)
NEUTROPHILS NFR BLD AUTO: 60.1 % (ref 42.7–76)
NEUTROPHILS NFR BLD MANUAL: 62 % (ref 42.7–76)
NEUTS BAND NFR BLD MANUAL: 1 % (ref 0–5)
NITRITE UR QL STRIP: NEGATIVE
NRBC BLD AUTO-RTO: 0 /100 WBC (ref 0–0.2)
PEANUT IGE QN: <0.1 KU/L
PH UR STRIP.AUTO: 6.5 [PH] (ref 5–9)
PLAT MORPH BLD: NORMAL
PLATELET # BLD AUTO: 432 10*3/MM3 (ref 140–450)
PMV BLD AUTO: 9.1 FL (ref 6–12)
POTASSIUM BLD-SCNC: 3.6 MMOL/L (ref 3.5–5.2)
PROT SERPL-MCNC: 6.6 G/DL (ref 6–8.5)
PROT UR QL STRIP: NEGATIVE
RBC # BLD AUTO: 4.89 10*6/MM3 (ref 3.77–5.28)
RBC MORPH BLD: NORMAL
SCALLOP IGE QN: <0.1 KU/L
SESAME SEED IGE: <0.1 KU/L
SHRIMP IGE: <0.1 KU/L
SODIUM BLD-SCNC: 139 MMOL/L (ref 136–145)
SOYBEAN IGE QN: <0.1 KU/L
SP GR UR STRIP: 1.03 (ref 1–1.03)
UROBILINOGEN UR QL STRIP: NORMAL
WBC MORPH BLD: NORMAL
WBC NRBC COR # BLD: 14.21 10*3/MM3 (ref 3.4–10.8)
WHEAT IGE QN: <0.1 KU/L
WHOLE BLOOD HOLD SPECIMEN: NORMAL

## 2020-02-06 PROCEDURE — 81003 URINALYSIS AUTO W/O SCOPE: CPT | Performed by: STUDENT IN AN ORGANIZED HEALTH CARE EDUCATION/TRAINING PROGRAM

## 2020-02-06 PROCEDURE — 80053 COMPREHEN METABOLIC PANEL: CPT | Performed by: STUDENT IN AN ORGANIZED HEALTH CARE EDUCATION/TRAINING PROGRAM

## 2020-02-06 PROCEDURE — 85025 COMPLETE CBC W/AUTO DIFF WBC: CPT | Performed by: STUDENT IN AN ORGANIZED HEALTH CARE EDUCATION/TRAINING PROGRAM

## 2020-02-06 PROCEDURE — 96375 TX/PRO/DX INJ NEW DRUG ADDON: CPT

## 2020-02-06 PROCEDURE — 83690 ASSAY OF LIPASE: CPT | Performed by: STUDENT IN AN ORGANIZED HEALTH CARE EDUCATION/TRAINING PROGRAM

## 2020-02-06 PROCEDURE — 25010000002 MORPHINE PER 10 MG: Performed by: STUDENT IN AN ORGANIZED HEALTH CARE EDUCATION/TRAINING PROGRAM

## 2020-02-06 PROCEDURE — 85007 BL SMEAR W/DIFF WBC COUNT: CPT | Performed by: STUDENT IN AN ORGANIZED HEALTH CARE EDUCATION/TRAINING PROGRAM

## 2020-02-06 PROCEDURE — 74018 RADEX ABDOMEN 1 VIEW: CPT

## 2020-02-06 PROCEDURE — 25010000002 ONDANSETRON PER 1 MG: Performed by: STUDENT IN AN ORGANIZED HEALTH CARE EDUCATION/TRAINING PROGRAM

## 2020-02-06 PROCEDURE — 96374 THER/PROPH/DIAG INJ IV PUSH: CPT

## 2020-02-06 PROCEDURE — 99284 EMERGENCY DEPT VISIT MOD MDM: CPT

## 2020-02-06 RX ORDER — ONDANSETRON 2 MG/ML
4 INJECTION INTRAMUSCULAR; INTRAVENOUS ONCE
Status: COMPLETED | OUTPATIENT
Start: 2020-02-06 | End: 2020-02-06

## 2020-02-06 RX ORDER — MORPHINE SULFATE 2 MG/ML
2 INJECTION, SOLUTION INTRAMUSCULAR; INTRAVENOUS ONCE
Status: COMPLETED | OUTPATIENT
Start: 2020-02-06 | End: 2020-02-06

## 2020-02-06 RX ORDER — SODIUM CHLORIDE 0.9 % (FLUSH) 0.9 %
10 SYRINGE (ML) INJECTION AS NEEDED
Status: DISCONTINUED | OUTPATIENT
Start: 2020-02-06 | End: 2020-02-06 | Stop reason: HOSPADM

## 2020-02-06 RX ADMIN — ONDANSETRON 4 MG: 2 INJECTION INTRAMUSCULAR; INTRAVENOUS at 10:13

## 2020-02-06 RX ADMIN — SODIUM CHLORIDE 1000 ML: 9 INJECTION, SOLUTION INTRAVENOUS at 10:13

## 2020-02-06 RX ADMIN — MORPHINE SULFATE 2 MG: 2 INJECTION, SOLUTION INTRAMUSCULAR; INTRAVENOUS at 10:13

## 2020-02-06 NOTE — ED NOTES
Pt is ready for discharge waiting on ride from out of town. Pt to wait in room due to dx of c-diff.  Pt informed of plan and will update us on status of her ride. This RN brought pt a soft drink. She is resting quietly at this time.     Norm Villalobos, RN  02/06/20 [id number]

## 2020-02-06 NOTE — ED PROVIDER NOTES
"Mimi Aguirre presents to the ED with complaints of abdominal pain, fever chills, dizziness, and \"feeling lousy.\"  She was recently diagnosed with C. difficile and sugar toxin, and was prescribed vancomycin but she has not been able to  yet.  She says this morning her pain was worse and she developed dizziness, which is new.  She says she vomited once today, 3-4 times yesterday, but was able to keep some food down.      Diarrhea   The primary symptoms include fever (subjective), nausea, vomiting and diarrhea. Primary symptoms do not include abdominal pain, dysuria, arthralgias or rash. The illness began more than 7 days ago. The onset was gradual. The problem has been gradually improving.   Nausea began more than 1 week ago. The nausea is associated with eating. The nausea is exacerbated by food, motion, activity and stress.   Vomiting occurs 2 to 5 times per day. The emesis contains stomach contents.   The diarrhea began more than 1 week ago. The diarrhea is unusually odiferous, watery, black and tarry and malodorous. The diarrhea occurs 5 to 10 times per day.   The illness is also significant for chills. The illness does not include back pain. Associated medical issues comments: multiple GI complications, recent dx of C diff and Shiga toxin.   Flank Pain   Pain location:  L flank and R flank  Pain quality: aching and dull    Pain radiation: throbbing pain radiates from midline abd.  Pain severity:  Moderate  Onset quality:  Gradual  Duration:  2 days  Timing:  Intermittent  Progression:  Waxing and waning  Chronicity:  Recurrent  Relieved by:  Not moving  Worsened by:  Position changes and vomiting  Ineffective treatments:  None tried  Associated symptoms: chills, diarrhea, fever (subjective), nausea and vomiting    Associated symptoms: no chest pain, no dysuria and no shortness of breath        Review of Systems   Constitutional: Positive for appetite change, chills and fever (subjective). Negative " for activity change.   HENT: Negative for congestion and ear pain.    Eyes: Negative for pain and discharge.   Respiratory: Negative for chest tightness and shortness of breath.    Cardiovascular: Negative for chest pain and palpitations.   Gastrointestinal: Positive for diarrhea, nausea and vomiting. Negative for abdominal distention and abdominal pain.   Endocrine: Negative for cold intolerance and heat intolerance.   Genitourinary: Positive for flank pain. Negative for difficulty urinating and dysuria.   Musculoskeletal: Negative for arthralgias and back pain.   Skin: Negative for color change and rash.   Allergic/Immunologic: Negative for environmental allergies and food allergies.   Neurological: Positive for dizziness. Negative for headaches.   Hematological: Negative for adenopathy. Does not bruise/bleed easily.   Psychiatric/Behavioral: Negative for agitation and confusion.       Past Medical History:   Diagnosis Date   • Asthma    • Cancer (CMS/HCC)     cervical   • Colon polyp    • COPD (chronic obstructive pulmonary disease) (CMS/HCC)    • Crohn's disease (CMS/HCC)    • Diverticulitis of colon    • Elevated cholesterol    • GERD (gastroesophageal reflux disease)    • History of transfusion    • Irritable bowel syndrome    • Pancreatitis        Allergies   Allergen Reactions   • Nsaids Swelling and GI Intolerance   • Azithromycin Swelling   • Ciprofloxacin Hives   • Doxycycline Swelling   • Other Other (See Comments)     nicotine patch   Caused body twitching/seizures   • Levofloxacin Rash   • Phenergan [Promethazine Hcl] GI Intolerance       Past Surgical History:   Procedure Laterality Date   • COLONOSCOPY     • COLONOSCOPY N/A 10/18/2019    Procedure: COLONOSCOPY;  Surgeon: Tom Davis MD;  Location: [facility] ENDOSCOPY;  Service: Gastroenterology   • ENDOSCOPY N/A 10/18/2019    Procedure: ESOPHAGOGASTRODUODENOSCOPY;  Surgeon: Tom Davis MD;  Location: [facility] ENDOSCOPY;  Service:  Gastroenterology   • HYSTERECTOMY     • TONSILECTOMY, ADENOIDECTOMY, BILATERAL MYRINGOTOMY AND TUBES     • UPPER GASTROINTESTINAL ENDOSCOPY         Family History   Problem Relation Age of Onset   • Inflammatory bowel disease Mother    • Arthritis Mother    • Diabetes Mother    • Hypertension Mother    • Hyperlipidemia Mother    • Obesity Mother    • Osteoporosis Mother    • Heart disease Father    • Hyperlipidemia Father    • Diabetes Sister    • Liver disease Daughter    • Mental illness Daughter    • Obesity Daughter    • Diabetes Maternal Grandmother    • Cancer Maternal Grandmother         lung   • Cancer Other         lung   • Heart disease Other        Social History     Socioeconomic History   • Marital status: Single     Spouse name: Not on file   • Number of children: Not on file   • Years of education: Not on file   • Highest education level: Not on file   Tobacco Use   • Smoking status: Current Every Day Smoker     Packs/day: 1.00     Types: Cigarettes   • Smokeless tobacco: Never Used   Substance and Sexual Activity   • Alcohol use: No     Frequency: Never   • Drug use: No   • Sexual activity: Defer           Objective   Physical Exam   Constitutional: She is oriented to person, place, and time. She appears well-developed and well-nourished.   HENT:   Head: Normocephalic and atraumatic.   Eyes: Pupils are equal, round, and reactive to light. EOM are normal.   Neck: No JVD present. No tracheal deviation present. No thyromegaly present.   Cardiovascular: Normal rate, S1 normal, S2 normal, normal heart sounds and intact distal pulses.   Pulses:       Radial pulses are 2+ on the right side, and 2+ on the left side.        Dorsalis pedis pulses are 2+ on the right side, and 2+ on the left side.   Pulmonary/Chest: Effort normal and breath sounds normal.   Abdominal: Bowel sounds are normal. There is tenderness (all quadrants).   + for heel tap   Musculoskeletal: Normal range of motion.   Neurological: She is  alert and oriented to person, place, and time. GCS eye subscore is 4. GCS verbal subscore is 5. GCS motor subscore is 6.   Skin: Skin is warm and dry. Capillary refill takes 2 to 3 seconds.   Psychiatric: She has a normal mood and affect. Her speech is normal and behavior is normal. Thought content normal.       Procedures           ED Course  ED Course as of Feb 06 1154   Thu Feb 06, 2020   1151 Carla's labs showed improvement in her white count over her last draw couple of days ago.  KUB showed an unremarkable bowel gas pattern.  All of her other labs improved over previous draws.  Instructed to go  her vancomycin and get that started.  She will be discharged with follow-up with PCP in 1 week.    [IKE]      ED Course User Index  [IKE] Darline Cartagena MD                                       Lab Results   Component Value Date    WBC 14.21 (H) 02/06/2020    HGB 15.3 02/06/2020    HCT 45.7 02/06/2020    MCV 93.5 02/06/2020     02/06/2020     Lab Results   Component Value Date    GLUCOSE 99 02/06/2020    BUN 12 02/06/2020    CREATININE 0.66 02/06/2020    EGFRIFNONA 94 02/06/2020    BCR 18.2 02/06/2020    K 3.6 02/06/2020    CO2 24.0 02/06/2020    CALCIUM 9.2 02/06/2020    ALBUMIN 4.40 02/06/2020    AST 10 02/06/2020    ALT 6 02/06/2020     Xr Abdomen Kub    Result Date: 2/6/2020  Unremarkable abdomen. Electronically signed by:  Shaheen Aragon MD  2/6/2020 10:16 AM [facility] Workstation: [id number]          [facility]  Number of Diagnoses or Management Options  Generalized abdominal pain:      Amount and/or Complexity of Data Reviewed  Clinical lab tests: ordered  Tests in the radiology section of CPT®: ordered    Risk of Complications, Morbidity, and/or Mortality  Presenting problems: low  Diagnostic procedures: low  Management options: low  General comments: WBC count is less than it was a few days ago.  All other labs are unremarkable.  KUB shows an unremarkable bowel gas pattern.  Patient looks visibly more  comfortable and will be sent home for follow-up with in 1 week with primary care.        Final diagnoses:   Generalized abdominal pain       Darline Cartagena MD PGY-1    This document has been electronically signed by Darline Cartagena MD on February 6, 2020 11:54 AM         Darline Cartagena MD  Resident  02/06/20 [id number]

## 2020-02-07 LAB
ALPHA GAL IGE: <0.1 KU/L
BEEF IGE QN: <0.1 KU/L
LAMB IGE QN: <0.1 KU/L
Lab: 0
PORK IGE: <0.1 KU/L

## 2020-02-11 ENCOUNTER — TELEPHONE (OUTPATIENT)
Dept: FAMILY MEDICINE CLINIC | Facility: CLINIC | Age: 50
End: 2020-02-11

## 2020-02-11 NOTE — TELEPHONE ENCOUNTER
----- Message from Xavier Wick MD sent at 2/7/2020  4:13 PM CST -----  Please let pt know here alpha gall meal allergy test was negative    Also pt has negative recurrent GI distress panel    Recheck on next visit. Thanks

## 2020-02-13 ENCOUNTER — OFFICE VISIT (OUTPATIENT)
Dept: GASTROENTEROLOGY | Facility: CLINIC | Age: 50
End: 2020-02-13

## 2020-02-13 VITALS
HEART RATE: 72 BPM | DIASTOLIC BLOOD PRESSURE: 62 MMHG | SYSTOLIC BLOOD PRESSURE: 102 MMHG | HEIGHT: 65 IN | WEIGHT: 111 LBS | BODY MASS INDEX: 18.49 KG/M2

## 2020-02-13 DIAGNOSIS — A04.72 COLITIS, CLOSTRIDIUM DIFFICILE: Primary | ICD-10-CM

## 2020-02-13 DIAGNOSIS — R11.0 NAUSEA: ICD-10-CM

## 2020-02-13 PROCEDURE — 99213 OFFICE O/P EST LOW 20 MIN: CPT | Performed by: NURSE PRACTITIONER

## 2020-02-13 NOTE — PATIENT INSTRUCTIONS

## 2020-02-13 NOTE — PROGRESS NOTES
Chief Complaint   Patient presents with   • Nausea   • Abdominal Pain   • Constipation       Subjective    Carla Patricia Richard is a 50 y.o. female. she is here today for follow-up.  50-year-old female presents for follow-up she was recently diagnosed with C. difficile colitis on treatment with vancomycin per Dr. Church.  Also completed alpha gal, recurrent GI distress panel and celiac panel and IgG was elevated suggestive celiac disease though duodenal biopsies were normal in October 2019.  She has felt better following a gluten-free diet.  So I encouraged her to continue gluten-free diet.  Reports overall she is doing some better still having 3-5 loose bowel movements per day denies any nocturnal diarrhea denies any melena or hematochezia.  States nausea is constant    Nausea   This is a chronic problem. Associated symptoms include abdominal pain, a change in bowel habit and fatigue. Pertinent negatives include no anorexia, arthralgias, chest pain, chills, congestion, coughing, diaphoresis, fever, headaches, joint swelling, myalgias, nausea, neck pain, numbness, sore throat or vomiting.   Abdominal Pain   Associated symptoms include diarrhea. Pertinent negatives include no anorexia, arthralgias, constipation, fever, headaches, myalgias, nausea or vomiting.   Constipation   Associated symptoms include abdominal pain and diarrhea. Pertinent negatives include no anorexia, fever, nausea, rectal pain or vomiting.            The following portions of the patient's history were reviewed and updated as appropriate:   Past Medical History:   Diagnosis Date   • Asthma    • Cancer (CMS/HCC)     cervical   • Colon polyp    • COPD (chronic obstructive pulmonary disease) (CMS/HCC)    • Crohn's disease (CMS/HCC)    • Diverticulitis of colon    • Elevated cholesterol    • GERD (gastroesophageal reflux disease)    • History of transfusion    • Irritable bowel syndrome    • Pancreatitis      Past Surgical History:   Procedure  Laterality Date   • COLONOSCOPY     • COLONOSCOPY N/A 10/18/2019    Procedure: COLONOSCOPY;  Surgeon: Tom Davis MD;  Location: [facility] ENDOSCOPY;  Service: Gastroenterology   • ENDOSCOPY N/A 10/18/2019    Procedure: ESOPHAGOGASTRODUODENOSCOPY;  Surgeon: Tom Davis MD;  Location: [facility] ENDOSCOPY;  Service: Gastroenterology   • HYSTERECTOMY     • TONSILECTOMY, ADENOIDECTOMY, BILATERAL MYRINGOTOMY AND TUBES     • UPPER GASTROINTESTINAL ENDOSCOPY       Family History   Problem Relation Age of Onset   • Inflammatory bowel disease Mother    • Arthritis Mother    • Diabetes Mother    • Hypertension Mother    • Hyperlipidemia Mother    • Obesity Mother    • Osteoporosis Mother    • Heart disease Father    • Hyperlipidemia Father    • Diabetes Sister    • Liver disease Daughter    • Mental illness Daughter    • Obesity Daughter    • Diabetes Maternal Grandmother    • Cancer Maternal Grandmother         lung   • Cancer Other         lung   • Heart disease Other      OB History    None       Prior to Admission medications    Medication Sig Start Date End Date Taking? Authorizing Provider   albuterol sulfate  (90 Base) MCG/ACT inhaler Inhale 2 puffs Every 4 (Four) Hours As Needed for Wheezing. 8/29/19  Yes Xavier Wick MD   budesonide-formoterol (SYMBICORT) 160-4.5 MCG/ACT inhaler Inhale 2 puffs 2 (Two) Times a Day. 9/25/19  Yes Iliana Jacobson MD   cetirizine (zyrTEC) 10 MG tablet Take 1 tablet by mouth Daily. 9/25/19  Yes Iliana Jacobson MD   dicyclomine (BENTYL) 20 MG tablet Take 1 tablet by mouth Every 6 (Six) Hours As Needed (abdominal pain). 10/19/19  Yes Nic Fuchs MD   dicyclomine (BENTYL) 20 MG tablet Take 2 tablets by mouth Every 6 (Six) Hours. 2/4/20  Yes Xavier Wick MD   escitalopram (LEXAPRO) 10 MG tablet Take 1 tablet by mouth Daily. 2/4/20  Yes Xavier Wick MD   fluticasone (FLONASE) 50 MCG/ACT nasal spray 2 sprays into the nostril(s) as directed by provider Daily.  9/25/19  Yes Iliana Jacobson MD   guaiFENesin (MUCINEX) 600 MG 12 hr tablet Take 1 tablet by mouth 2 (Two) Times a Day. 10/17/19  Yes Xavier Wick MD   HYDROcodone-acetaminophen (NORCO) 5-325 MG per tablet Take 1 tablet by mouth Every 6 (Six) Hours As Needed. 11/27/19  Yes Byron Medina MD   ipratropium-albuterol (DUO-NEB) 0.5-2.5 mg/3 ml nebulizer Take 3 mL by nebulization Every 4 (Four) Hours As Needed for Wheezing or Shortness of Air.   Yes Byron Medina MD   montelukast (SINGULAIR) 10 MG tablet Take 1 tablet by mouth Every Night. 10/17/19  Yes Xavier Wick MD   ondansetron ODT (ZOFRAN-ODT) 4 MG disintegrating tablet Take 1 tablet by mouth Every 8 (Eight) Hours As Needed for Nausea or Vomiting. 1/13/20  Yes Divine Cardona APRN   Prucalopride Succinate 2 MG tablet Take 2 mg by mouth Daily. 1/13/20  Yes Divine Cardona APRN   traMADol (ULTRAM) 50 MG tablet TAKE 1 TABLET(S) EVERY 6 HOURS BY ORAL ROUTE AS NEEDED. 1/22/20  Yes Byron Medina MD   vancomycin (VANCOCIN) 125 MG capsule Take 1 capsule by mouth 4 (Four) Times a Day. 2/5/20  Yes Xavier Wick MD   vitamin B-12 (CYANOCOBALAMIN) 250 MCG tablet Take 1 tablet by mouth Daily. 7/29/19  Yes Xavier Wick MD   vitamin D (ERGOCALCIFEROL) 17310 units capsule capsule Take 1 capsule by mouth 1 (One) Time Per Week. 7/17/19  Yes Xavier Wick MD   pantoprazole (PROTONIX) 40 MG EC tablet Take 1 tablet by mouth Daily. 12/13/19 2/13/20 Yes Divine Cardona APRN   esomeprazole (nexIUM) 20 MG capsule Take 20 mg by mouth Every Morning Before Breakfast.    ProviderByron MD     Allergies   Allergen Reactions   • Nsaids Swelling and GI Intolerance   • Azithromycin Swelling   • Ciprofloxacin Hives   • Doxycycline Swelling   • Other Other (See Comments)     nicotine patch   Caused body twitching/seizures   • Levofloxacin Rash   • Phenergan [Promethazine Hcl] GI Intolerance     Social History     Socioeconomic History   • Marital status:  "Single     Spouse name: Not on file   • Number of children: Not on file   • Years of education: Not on file   • Highest education level: Not on file   Tobacco Use   • Smoking status: Current Every Day Smoker     Packs/day: 1.00     Types: Cigarettes   • Smokeless tobacco: Never Used   Substance and Sexual Activity   • Alcohol use: No     Frequency: Never   • Drug use: No   • Sexual activity: Defer       Review of Systems  Review of Systems   Constitutional: Positive for appetite change, fatigue and unexpected weight change (down 5 pounds ). Negative for activity change, chills, diaphoresis and fever.   HENT: Negative for congestion, sore throat and trouble swallowing.    Respiratory: Negative for cough and shortness of breath.    Cardiovascular: Negative for chest pain.   Gastrointestinal: Positive for abdominal pain, change in bowel habit and diarrhea. Negative for abdominal distention, anal bleeding, anorexia, blood in stool, constipation, nausea, rectal pain and vomiting.   Musculoskeletal: Negative for arthralgias, joint swelling, myalgias and neck pain.   Skin: Negative for pallor.   Neurological: Negative for light-headedness, numbness and headaches.        /62 (BP Location: Left arm)   Pulse 72   Ht 165.1 cm (65\")   Wt 50.3 kg (111 lb)   LMP  (LMP Unknown)   BMI 18.47 kg/m²     Objective    Physical Exam   Constitutional: She is oriented to person, place, and time. She appears well-developed and well-nourished. She is cooperative. No distress.   HENT:   Head: Normocephalic and atraumatic.   Neck: Normal range of motion. Neck supple. No thyromegaly present.   Cardiovascular: Normal rate, regular rhythm and normal heart sounds.   Pulmonary/Chest: Effort normal and breath sounds normal. She has no wheezes. She has no rhonchi. She has no rales.   Abdominal: Soft. Normal appearance and bowel sounds are normal. She exhibits no distension. There is no hepatosplenomegaly. There is generalized tenderness. " There is no rigidity and no guarding. No hernia.   Lymphadenopathy:     She has no cervical adenopathy.   Neurological: She is alert and oriented to person, place, and time.   Skin: Skin is warm, dry and intact. No rash noted. No pallor.   Psychiatric: She has a normal mood and affect. Her speech is normal.     Admission on 02/06/2020, Discharged on 02/06/2020   Component Date Value Ref Range Status   • Glucose 02/06/2020 99  65 - 99 mg/dL Final   • BUN 02/06/2020 12  6 - 20 mg/dL Final   • Creatinine 02/06/2020 0.66  0.57 - 1.00 mg/dL Final   • Sodium 02/06/2020 139  136 - 145 mmol/L Final   • Potassium 02/06/2020 3.6  3.5 - 5.2 mmol/L Final   • Chloride 02/06/2020 103  98 - 107 mmol/L Final   • CO2 02/06/2020 24.0  22.0 - 29.0 mmol/L Final   • Calcium 02/06/2020 9.2  8.6 - 10.5 mg/dL Final   • Total Protein 02/06/2020 6.6  6.0 - 8.5 g/dL Final   • Albumin 02/06/2020 4.40  3.50 - 5.20 g/dL Final   • ALT (SGPT) 02/06/2020 6  1 - 33 U/L Final   • AST (SGOT) 02/06/2020 10  1 - 32 U/L Final   • Alkaline Phosphatase 02/06/2020 73  39 - 117 U/L Final   • Total Bilirubin 02/06/2020 0.4  0.2 - 1.2 mg/dL Final   • eGFR Non African Amer 02/06/2020 94  >60 mL/min/1.73 Final   • Globulin 02/06/2020 2.2  gm/dL Final   • A/G Ratio 02/06/2020 2.0  g/dL Final   • BUN/Creatinine Ratio 02/06/2020 18.2  7.0 - 25.0 Final   • Anion Gap 02/06/2020 12.0  5.0 - 15.0 mmol/L Final   • Lipase 02/06/2020 39  13 - 60 U/L Final   • Color, UA 02/06/2020 Yellow  Yellow, Straw, Dark Yellow, Ana M Final   • Appearance, UA 02/06/2020 Clear  Clear Final   • pH, UA 02/06/2020 6.5  5.0 - 9.0 Final   • Specific Gravity, UA 02/06/2020 1.029  1.003 - 1.030 Final   • Glucose, UA 02/06/2020 Negative  Negative Final   • Ketones, UA 02/06/2020 Negative  Negative Final   • Bilirubin, UA 02/06/2020 Negative  Negative Final   • Blood, UA 02/06/2020 Negative  Negative Final   • Protein, UA 02/06/2020 Negative  Negative Final   • Leuk Esterase, UA 02/06/2020  Negative  Negative Final   • Nitrite, UA 02/06/2020 Negative  Negative Final   • Urobilinogen, UA 02/06/2020 1.0 E.U./dL  0.2 - 1.0 E.U./dL Final   • WBC 02/06/2020 14.21* 3.40 - 10.80 10*3/mm3 Final   • RBC 02/06/2020 4.89  3.77 - 5.28 10*6/mm3 Final   • Hemoglobin 02/06/2020 15.3  12.0 - 15.9 g/dL Final   • Hematocrit 02/06/2020 45.7  34.0 - 46.6 % Final   • MCV 02/06/2020 93.5  79.0 - 97.0 fL Final   • MCH 02/06/2020 31.3  26.6 - 33.0 pg Final   • MCHC 02/06/2020 33.5  31.5 - 35.7 g/dL Final   • RDW 02/06/2020 12.7  12.3 - 15.4 % Final   • RDW-SD 02/06/2020 43.5  37.0 - 54.0 fl Final   • MPV 02/06/2020 9.1  6.0 - 12.0 fL Final   • Platelets 02/06/2020 432  140 - 450 10*3/mm3 Final   • Neutrophil % 02/06/2020 60.1  42.7 - 76.0 % Final   • Lymphocyte % 02/06/2020 28.6  19.6 - 45.3 % Final   • Monocyte % 02/06/2020 8.3  5.0 - 12.0 % Final   • Eosinophil % 02/06/2020 1.7  0.3 - 6.2 % Final   • Basophil % 02/06/2020 0.6  0.0 - 1.5 % Final   • Immature Grans % 02/06/2020 0.7* 0.0 - 0.5 % Final   • Neutrophils, Absolute 02/06/2020 8.54* 1.70 - 7.00 10*3/mm3 Final   • Lymphocytes, Absolute 02/06/2020 4.07* 0.70 - 3.10 10*3/mm3 Final   • Monocytes, Absolute 02/06/2020 1.18* 0.10 - 0.90 10*3/mm3 Final   • Eosinophils, Absolute 02/06/2020 0.24  0.00 - 0.40 10*3/mm3 Final   • Basophils, Absolute 02/06/2020 0.08  0.00 - 0.20 10*3/mm3 Final   • Immature Grans, Absolute 02/06/2020 0.10* 0.00 - 0.05 10*3/mm3 Final   • nRBC 02/06/2020 0.0  0.0 - 0.2 /100 WBC Final   • Extra Tube 02/06/2020 hold for add-on   Final    Auto resulted   • Extra Tube 02/06/2020 Hold for add-ons.   Final    Auto resulted.   • Neutrophil % 02/06/2020 62.0  42.7 - 76.0 % Final   • Lymphocyte % 02/06/2020 31.0  19.6 - 45.3 % Final   • Monocyte % 02/06/2020 6.0  5.0 - 12.0 % Final   • Bands %  02/06/2020 1.0  0.0 - 5.0 % Final   • Neutrophils Absolute 02/06/2020 8.95* 1.70 - 7.00 10*3/mm3 Final   • Lymphocytes Absolute 02/06/2020 4.41* 0.70 - 3.10 10*3/mm3  Final   • Monocytes Absolute 02/06/2020 0.85  0.10 - 0.90 10*3/mm3 Final   • RBC Morphology 02/06/2020 Normal  Normal Final   • WBC Morphology 02/06/2020 Normal  Normal Final   • Platelet Morphology 02/06/2020 Normal  Normal Final     Assessment/Plan      1. Colitis, Clostridium difficile    2. Nausea    .   Continue with current regimen.  Recommend probiotic daily.  Follow-up in 8 weeks for recheck return office sooner if needed    Orders placed during this encounter include:  No orders of the defined types were placed in this encounter.      * Surgery not found *    Review and/or summary of lab tests, radiology, procedures, medications. Review and summary of old records and obtaining of history. The risks and benefits of my recommendations, as well as other treatment options were discussed with the patient today. Questions were answered.    No orders of the defined types were placed in this encounter.      Follow-up: Return in about 8 weeks (around 4/9/2020) for Recheck.          This document has been electronically signed by CROW Dudley on February 13, 2020 9:40 AM             Results for orders placed or performed during the hospital encounter of 02/06/20   [facility]   Result Value Ref Range    Extra Tube Hold for add-ons.    Urinalysis With Microscopic If Indicated (No Culture) - Urine, Clean Catch   Result Value Ref Range    Color, UA Yellow Yellow, Straw, Dark Yellow, Ana M    Appearance, UA Clear Clear    pH, UA 6.5 5.0 - 9.0    Specific Gravity, UA 1.029 1.003 - 1.030    Glucose, UA Negative Negative    Ketones, UA Negative Negative    Bilirubin, UA Negative Negative    Blood, UA Negative Negative    Protein, UA Negative Negative    Leuk Esterase, UA Negative Negative    Nitrite, UA Negative Negative    Urobilinogen, UA 1.0 E.U./dL 0.2 - 1.0 E.U./dL   CBC Auto Differential   Result Value Ref Range    WBC 14.21 (H) 3.40 - 10.80 10*3/mm3    RBC 4.89 3.77 - 5.28 10*6/mm3    Hemoglobin 15.3 12.0 -  15.9 g/dL    Hematocrit 45.7 34.0 - 46.6 %    MCV 93.5 79.0 - 97.0 fL    MCH 31.3 26.6 - 33.0 pg    MCHC 33.5 31.5 - 35.7 g/dL    RDW 12.7 12.3 - 15.4 %    RDW-SD 43.5 37.0 - 54.0 fl    MPV 9.1 6.0 - 12.0 fL    Platelets 432 140 - 450 10*3/mm3    Neutrophil % 60.1 42.7 - 76.0 %    Lymphocyte % 28.6 19.6 - 45.3 %    Monocyte % 8.3 5.0 - 12.0 %    Eosinophil % 1.7 0.3 - 6.2 %    Basophil % 0.6 0.0 - 1.5 %    Immature Grans % 0.7 (H) 0.0 - 0.5 %    Neutrophils, Absolute 8.54 (H) 1.70 - 7.00 10*3/mm3    Lymphocytes, Absolute 4.07 (H) 0.70 - 3.10 10*3/mm3    Monocytes, Absolute 1.18 (H) 0.10 - 0.90 10*3/mm3    Eosinophils, Absolute 0.24 0.00 - 0.40 10*3/mm3    Basophils, Absolute 0.08 0.00 - 0.20 10*3/mm3    Immature Grans, Absolute 0.10 (H) 0.00 - 0.05 10*3/mm3    nRBC 0.0 0.0 - 0.2 /100 WBC   Light Blue Top   Result Value Ref Range    Extra Tube hold for add-on    Manual Differential   Result Value Ref Range    Neutrophil % 62.0 42.7 - 76.0 %    Lymphocyte % 31.0 19.6 - 45.3 %    Monocyte % 6.0 5.0 - 12.0 %    Bands %  1.0 0.0 - 5.0 %    Neutrophils Absolute 8.95 (H) 1.70 - 7.00 10*3/mm3    Lymphocytes Absolute 4.41 (H) 0.70 - 3.10 10*3/mm3    Monocytes Absolute 0.85 0.10 - 0.90 10*3/mm3    RBC Morphology Normal Normal    WBC Morphology Normal Normal    Platelet Morphology Normal Normal   Lipase   Result Value Ref Range    Lipase 39 13 - 60 U/L   Comprehensive Metabolic Panel   Result Value Ref Range    Glucose 99 65 - 99 mg/dL    BUN 12 6 - 20 mg/dL    Creatinine 0.66 0.57 - 1.00 mg/dL    Sodium 139 136 - 145 mmol/L    Potassium 3.6 3.5 - 5.2 mmol/L    Chloride 103 98 - 107 mmol/L    CO2 24.0 22.0 - 29.0 mmol/L    Calcium 9.2 8.6 - 10.5 mg/dL    Total Protein 6.6 6.0 - 8.5 g/dL    Albumin 4.40 3.50 - 5.20 g/dL    ALT (SGPT) 6 1 - 33 U/L    AST (SGOT) 10 1 - 32 U/L    Alkaline Phosphatase 73 39 - 117 U/L    Total Bilirubin 0.4 0.2 - 1.2 mg/dL    eGFR Non African Amer 94 >60 mL/min/1.73    Globulin 2.2 gm/dL    A/G  Ratio 2.0 g/dL    BUN/Creatinine Ratio 18.2 7.0 - 25.0    Anion Gap 12.0 5.0 - 15.0 mmol/L   Results for orders placed or performed in visit on 02/04/20   Gastrointestinal Panel, PCR - Stool, Per Rectum   Result Value Ref Range    Campylobacter Not Detected Not Detected, Invalid    Plesiomonas shigelloides Not Detected Not Detected    Salmonella Not Detected Not Detected    Vibrio Not Detected Not Detected    Vibrio cholerae Not Detected Not Detected    Yersinia enterocolitica Not Detected Not Detected    Enteroaggregative E. coli (EAEC) Not Detected Not Detected    Enteropathogenic E. coli (EPEC) Not Detected Not Detected    Enterotoxigenic E. coli (ETEC) lt/st Not Detected Not Detected    Shiga-like toxin-producing E. coli (STEC) stx1/stx2 Detected (A) Not Detected    E. coli O157 Not Detected Not Detected    Shigella/Enteroinvasive E. coli (EIEC) Not Detected Not Detected    Cryptosporidium Not Detected Not Detected, Invalid    Cyclospora cayetanensis Not Detected Not Detected    Entamoeba histolytica Not Detected Not Detected    Giardia lamblia Not Detected Not Detected    Adenovirus F40/41 Not Detected Not Detected    Astrovirus Not Detected Not Detected    Norovirus GI/GII Not Detected Not Detected    Rotavirus A Not Detected Not Detected    Sapovirus (I, II, IV or V) Not Detected Not Detected   Glia(IgA / G) & TTG(IgA / G)   Result Value Ref Range    Gliadin Deamidated Peptide Ab, IgA 4 0 - 19 units    Deaminated Gliadin Ab IgG 6 0 - 19 units    Tissue Transglutaminase IgA <2 0 - 3 U/mL    Tissue Transglutaminase IgG 11 (H) 0 - 5 U/mL   Allergens (12) Foods   Result Value Ref Range    Class Description Comment     Egg White <0.10 Class 0 kU/L    Milk, Cow's <0.10 Class 0 kU/L    CodFish <0.10 Class 0 kU/L    Sesame Seed <0.10 Class 0 kU/L    Peanut <0.10 Class 0 kU/L    Soybean <0.10 Class 0 kU/L    Hazelnut <0.10 Class 0 kU/L    Shrimp <0.10 Class 0 kU/L    Scallop <0.10 Class 0 kU/L    Gluten <0.10 Class 0  kU/L    [place] <0.10 Class 0 kU/L    Wheat <0.10 Class 0 kU/L     *Note: Due to a large number of results and/or encounters for the requested time period, some results have not been displayed. A complete set of results can be found in Results Review.

## 2020-02-19 ENCOUNTER — OFFICE VISIT (OUTPATIENT)
Dept: FAMILY MEDICINE CLINIC | Facility: CLINIC | Age: 50
End: 2020-02-19

## 2020-02-19 VITALS
HEIGHT: 65 IN | WEIGHT: 110 LBS | BODY MASS INDEX: 18.33 KG/M2 | DIASTOLIC BLOOD PRESSURE: 64 MMHG | HEART RATE: 98 BPM | SYSTOLIC BLOOD PRESSURE: 98 MMHG | TEMPERATURE: 98.2 F | OXYGEN SATURATION: 98 %

## 2020-02-19 DIAGNOSIS — K59.04 CHRONIC IDIOPATHIC CONSTIPATION: ICD-10-CM

## 2020-02-19 DIAGNOSIS — R25.1 TREMOR: ICD-10-CM

## 2020-02-19 DIAGNOSIS — K90.41 GLUTEN INTOLERANCE: ICD-10-CM

## 2020-02-19 DIAGNOSIS — A04.72 C. DIFFICILE DIARRHEA: Primary | ICD-10-CM

## 2020-02-19 DIAGNOSIS — Z72.0 TOBACCO USER: ICD-10-CM

## 2020-02-19 DIAGNOSIS — K52.9 CHRONIC DIARRHEA: ICD-10-CM

## 2020-02-19 DIAGNOSIS — R19.7 DIARRHEA, UNSPECIFIED TYPE: ICD-10-CM

## 2020-02-19 DIAGNOSIS — E55.9 VITAMIN D DEFICIENCY: ICD-10-CM

## 2020-02-19 DIAGNOSIS — J44.9 STAGE 2 MODERATE COPD BY GOLD CLASSIFICATION (HCC): ICD-10-CM

## 2020-02-19 DIAGNOSIS — J42 CHRONIC BRONCHITIS, UNSPECIFIED CHRONIC BRONCHITIS TYPE (HCC): ICD-10-CM

## 2020-02-19 DIAGNOSIS — R53.1 GENERALIZED WEAKNESS: ICD-10-CM

## 2020-02-19 DIAGNOSIS — A49.8 SHIGA TOXIN-PRODUCING ESCHERICHIA COLI INFECTION: ICD-10-CM

## 2020-02-19 DIAGNOSIS — R10.84 GENERALIZED ABDOMINAL PAIN: ICD-10-CM

## 2020-02-19 PROCEDURE — 99214 OFFICE O/P EST MOD 30 MIN: CPT | Performed by: FAMILY MEDICINE

## 2020-02-19 NOTE — PATIENT INSTRUCTIONS
"Low-Gluten Eating Plan  Gluten is a protein that is found in wheat, barley, rye, and triticale, a hybrid of wheat and rye. Some people have a condition that makes them unable to digest gluten. For those people, eating just a small amount of gluten can damage their intestines.  This is not a gluten-free eating plan. This low-gluten eating plan is for people who feel better when they eat less gluten.  What are tips for following this plan?  Reading food labels  · Make sure to read food labels.  · Look for wheat, rye, barley, oats (unless it says \"certified gluten-free\"), malt, and parikh's yeast. If the food contains any of these, it has gluten in it.  · Wheat-free does not mean gluten-free.  Meal planning  · Eat a variety of foods so you get all of the nutrients that you need.  · To have more control over the ingredients in your meals, consider making food yourself instead of buying prepared foods.  General information  · Many gluten-free grain products are not fortified with vitamins and minerals like gluten-containing grains are. Because of this, there is a risk of nutrient deficiencies if you do not eat a balanced diet. Make sure to meet with your health care provider or a registered dietitian to review your diet.  · When eating out, look for restaurants that have gluten-free options or can make substitutions to accommodate this eating plan. Consider calling a restaurant ahead of time to discuss their menu.  What foods can I eat?    With this eating plan, you can eat anything that is labeled \"gluten-free\" or that does not contain wheat or other grains that have gluten.  Fruits  All fruits, such as bananas, apples, oranges, grapes, papaya, bisi, pomegranate, kiwi, grapefruit, and cherries.  Vegetables  All vegetables that are not in a sauce that would contain gluten, such as lettuce, spinach, peas, beets, cauliflower, cabbage, broccoli, carrots, tomatoes, squash, eggplant, herbs, peppers, onions, cucumbers, " "[place] sprouts, yams, and sweet potatoes.  Grains  Naturally gluten-free grains and flours, including rice, bulgur, quinoa, corn, buckwheat, cassava, amaranth, millet, polenta or cornmeal, tapioca, flax, kp, yucca, sorghum, and teff. Corn tortillas or taco shells. Oatmeal that is labeled as \"gluten-free\" or \"uncontaminated.\" Nut flours.  Meats and other proteins  Beef. Pork. Chicken. Turkey. Fish. Eggs. Tofu. Beans. Nuts. Lentils.  Dairy  Milk. Ice cream. Yogurt. Cheese. Cottage cheese.  Beverages  Water. Coffee. Tea. Juice. Soda. [place] water. Distilled alcohols. Wine.  Seasonings and condiments  Mustard. Relish. Ketchup. Barbecue sauce. Vinegar. Mayonnaise. Tamari.  Sweets and desserts  Honey. Sugar. Maple syrup.  Fats and oils  Butter. Vegetable oil. Olive oil. Canola oil. [place] oil.  Other foods  Arrowroot or cornstarch. Potato flour.  The items listed above may not be a complete list of foods and beverages you can eat. Contact a dietitian for more information.  What foods should I avoid?  Grains  Wheat. Barley. Rye. Oatmeal that is not certified gluten-free. Triticale.  Meats and other proteins  Seitan. Precooked or cured meat, such as sausages or meat loaves. Hot dogs. Salami.  Beverages  Beer, sanya, lager, and malt beverages made from gluten-containing grains.  Seasonings and condiments  Malt vinegar. Salad dressing. Soy sauce. Teriyaki sauce. Marinades. Check the label of any pre-made sauces for a full list of ingredients.  Sweets and desserts  Licorice. Brown rice syrup. Pre-made pudding or pudding mixes.  Other foods  Bouillon cubes. Canned or boxed pre-made soups or soup packets. Bagged chips, such as potato chips and tortilla chips. Seasoning packets. Energy bars. Seasoned rice mixes. Processed foods.  The items listed above may not be a complete list of foods and beverages to avoid. Contact a dietitian for more information.  Summary  · Gluten is a protein that is found in wheat, barley, rye, and " "triticale, a hybrid of wheat and rye.  · This low-gluten eating plan is for people who feel better when they eat less gluten.  · Reading food labels of packaged foods is the best way to make sure you are following a low-gluten eating plan. Look for \"gluten-free\" on the label.  · Eat a variety of foods and colors and use whole grains that are naturally gluten-free to reduce your risk of having any nutrient deficiencies.  This information is not intended to replace advice given to you by your health care provider. Make sure you discuss any questions you have with your health care provider.  Document Released: 05/03/2016 Document Revised: 08/21/2019 Document Reviewed: 08/21/2019  [employer] Interactive Patient Education © 2020 [employer]    Gluten-Free Diet for Celiac Disease, Adult    The gluten-free diet includes all foods that do not contain gluten. Gluten is a protein that is found in wheat, rye, barley, and some other grains. Following the gluten-free diet is the only treatment for people with celiac disease. It helps to prevent damage to the intestines and improves or eliminates the symptoms of celiac disease.  Following the gluten-free diet requires some planning. It can be challenging at first, but it gets easier with time and practice. There are more gluten-free options available today than ever before. If you need help finding gluten-free foods or if you have questions, talk with your diet and nutrition specialist (registered dietitian) or your health care provider.  What do I need to know about a gluten-free diet?  · All fruits, vegetables, and meats are safe to eat and do not contain gluten.  · When grocery shopping, start by shopping in the produce, meat, and dairy sections. These sections are more likely to contain gluten-free foods. Then move to the aisles that contain packaged foods if you need to.  · Read all food labels. Gluten is often added to foods. Always check the ingredient list and look for " "warnings, such as “may contain gluten.\"  · Talk with your dietitian or health care provider before taking a gluten-free multivitamin or mineral supplement.  · Be aware of gluten-free foods having contact with foods that contain gluten (cross-contamination). This can happen at home and with any processed foods.  ? Talk with your health care provider or dietitian about how to reduce the risk of cross-contamination in your home.  ? If you have questions about how a food is processed, ask the .  What key words help to identify gluten?  Foods that list any of these key words on the label usually contain gluten:  · Wheat, flour, enriched flour, bromated flour, white flour, durum flour, yani flour, phosphated flour, self-rising flour, semolina, farina, barley (malt), rye, and oats.  · Starch, dextrin, modified food starch, or cereal.  · Thickening, fillers, or emulsifiers.  · Malt flavoring, malt extract, or malt syrup.  · Hydrolyzed vegetable protein.  In the [place] packaged foods that are gluten-free are required to be labeled “GF.” These foods should be easy to identify and are safe to eat. In the [place] food companies are also required to list common food allergens, including wheat, on their labels.  Recommended foods  Grains  · Amaranth, bean flours, 100% buckwheat flour, corn, millet, nut flours or nut meals, GF oats, quinoa, rice, sorghum, teff, rice wafers, pure cornmeal tortillas, popcorn, and hot cereals made from cornmeal. [place], rice, wild rice. Some Asian rice noodles or bean noodles. Arrowroot starch, corn bran, corn flour, corn germ, cornmeal, corn starch, potato flour, potato starch flour, and rice bran. Plain, brown, and sweet rice flours. Rice polish, soy flour, and tapioca starch.  Vegetables  · All plain fresh, frozen, and canned vegetables.  Fruits  · All plain fresh, frozen, canned, and dried fruits, and 100% fruit juices.  Meats and other protein foods  · All fresh beef, pork, poultry, " fish, seafood, and eggs. Fish canned in water, oil, brine, or vegetable broth. Plain nuts and seeds, peanut butter. Some lunch meat and some frankfurters. Dried beans, dried peas, and lentils.  Dairy  · Fresh plain, dry, evaporated, or condensed milk. Cream, butter, sour cream, whipping cream, and most yogurts. Unprocessed cheese, most processed cheeses, some cottage cheese, some cream cheeses.  Beverages  · Coffee, tea, most herbal teas. Carbonated beverages and some root beers. Wine, sake, and distilled spirits, such as gin, vodka, and whiskey. Most hard ciders.  Fats and oils  · Butter, margarine, vegetable oil, hydrogenated butter, olive oil, shortening, lard, cream, and some mayonnaise. Some commercial salad dressings. Olives.  Sweets and desserts  · Sugar, honey, some syrups, molasses, jelly, and jam. Plain hard candy, marshmallows, and gumdrops. Pure cocoa powder. Plain chocolate. Custard and some pudding mixes. Gelatin desserts, sorbets, frozen ice pops, and sherbet. Cake, cookies, and other desserts prepared with allowed flours. Some commercial ice creams. Cornstarch, tapioca, and rice puddings.  Seasoning and other foods  · Some canned or frozen soups. Monosodium glutamate (MSG). Cider, rice, and wine vinegar. Baking soda and baking powder. Cream of tartar. Baking and nutritional yeast. Certain soy sauces made without wheat (ask your dietitian about specific brands that are allowed). Nuts, coconut, and chocolate. Salt, pepper, herbs, spices, flavoring extracts, imitation or artificial flavorings, natural flavorings, and food colorings. Some medicines and supplements. Some lip glosses and other cosmetics. Rice syrups.  The items listed may not be a complete list. Talk with your dietitian about what dietary choices are best for you.  Foods to avoid  Grains  · Barley, bran, bulgur, couscous, cracked wheat, [place], farro, yani, malt, matzo, semolina, wheat germ, and all wheat and rye cereals including spelt  and kamut. Cereals containing malt as a flavoring, such as rice cereal. Noodles, spaghetti, macaroni, most packaged rice mixes, and all mixes containing wheat, rye, barley, or triticale.  Vegetables  · Most creamed vegetables and most vegetables canned in sauces. Some commercially prepared vegetables and salads.  Fruits  · Thickened or prepared fruits and some pie fillings. Some fruit snacks and fruit roll-ups.  Meats and other protein foods  · Any meat or meat alternative containing wheat, rye, barley, or gluten stabilizers. These are often marinated or packaged meats and lunch meats. Bread-containing products, such as Swiss steak, croquettes, meatballs, and meatloaf. Most tuna canned in vegetable broth and turkey with hydrolyzed vegetable protein (HVP) injected as part of the basting. Seitan. Imitation fish. Eggs in sauces made from ingredients to avoid.  Dairy  · Commercial chocolate milk drinks and malted milk. Some non-dairy creamers. Any cheese product containing ingredients to avoid.  Beverages  · Certain cereal beverages. Beer, sanya, malted milk, and some root beers. Some hard ciders. Some instant flavored coffees. Some herbal teas made with barley or with barley malt added.  Fats and oils  · Some commercial salad dressings. Sour cream containing modified food starch.  Sweets and desserts  · Some toffees. Chocolate-coated nuts (may be rolled in wheat flour) and some commercial candies and candy bars. Most cakes, cookies, donuts, pastries, and other baked goods. Some commercial ice cream. Ice cream cones. Commercially prepared mixes for cakes, cookies, and other desserts. Bread pudding and other puddings thickened with flour. Products containing brown rice syrup made with barley malt enzyme. Desserts and sweets made with malt flavoring.  Seasoning and other foods  · Some parikh powders, some dry seasoning mixes, some gravy extracts, some meat sauces, some ketchups, some prepared mustards, and horseradish.  "Certain soy sauces. Malt vinegar. Bouillon and bouillon cubes that contain HVP. Some chip dips, and some chewing gum. Yeast extract. Mir’s yeast. Caramel color. Some medicines and supplements. Some lip glosses and other cosmetics.  The items listed may not be a complete list. Talk with your dietitian about what dietary choices are best for you.  Summary  · Gluten is a protein that is found in wheat, rye, barley, and some other grains. The gluten-free diet includes all foods that do not contain gluten.  · If you need help finding gluten-free foods or if you have questions, talk with your diet and nutrition specialist (registered dietitian) or your health care provider.  · Read all food labels. Gluten is often added to foods. Always check the ingredient list and look for warnings, such as “may contain gluten.\"  This information is not intended to replace advice given to you by your health care provider. Make sure you discuss any questions you have with your health care provider.  Document Released: 12/18/2006 Document Revised: 10/02/2017 Document Reviewed: 10/02/2017  [employer] Interactive Patient Education © 2020 [employer]    "

## 2020-02-27 DIAGNOSIS — Z86.19 HISTORY OF CLOSTRIDIOIDES DIFFICILE INFECTION: ICD-10-CM

## 2020-02-27 DIAGNOSIS — K52.9 CHRONIC DIARRHEA: Primary | ICD-10-CM

## 2020-02-27 DIAGNOSIS — A09 DIARRHEA OF INFECTIOUS ORIGIN: ICD-10-CM

## 2020-02-28 ENCOUNTER — TELEPHONE (OUTPATIENT)
Dept: PULMONOLOGY | Facility: CLINIC | Age: 50
End: 2020-02-28

## 2020-02-28 NOTE — TELEPHONE ENCOUNTER
Called Hiwot back and informed her that Dr Jacobson needs to see the pt before we can send in that medication. She was supposed to come back in December. She said she would call the pt and let her know.

## 2020-02-28 NOTE — TELEPHONE ENCOUNTER
----- Message from Rama Warren sent at 2/27/2020  2:37 PM CST -----  Regarding: Dr Kavon Benson with the wound [facility] in [facility] v/mail today @ 10:17, regarding this patient.  She needs her nebulizer medication ordered.  Please fax the order to: [phone] or if you need to speak with Marcelino call [phone].  Thank you

## 2020-03-02 ENCOUNTER — APPOINTMENT (OUTPATIENT)
Dept: GENERAL RADIOLOGY | Facility: HOSPITAL | Age: 50
End: 2020-03-02

## 2020-03-02 ENCOUNTER — APPOINTMENT (OUTPATIENT)
Dept: CT IMAGING | Facility: HOSPITAL | Age: 50
End: 2020-03-02

## 2020-03-02 ENCOUNTER — HOSPITAL ENCOUNTER (EMERGENCY)
Facility: HOSPITAL | Age: 50
Discharge: HOME | End: 2020-03-02
Attending: EMERGENCY MEDICINE | Admitting: EMERGENCY MEDICINE

## 2020-03-02 VITALS
HEART RATE: 72 BPM | HEIGHT: 65 IN | TEMPERATURE: 97.8 F | SYSTOLIC BLOOD PRESSURE: 118 MMHG | DIASTOLIC BLOOD PRESSURE: 56 MMHG | WEIGHT: 113.7 LBS | RESPIRATION RATE: 18 BRPM | OXYGEN SATURATION: 94 % | BODY MASS INDEX: 18.94 KG/M2

## 2020-03-02 DIAGNOSIS — S06.0X9A CONCUSSION WITH LOSS OF CONSCIOUSNESS, INITIAL ENCOUNTER: ICD-10-CM

## 2020-03-02 DIAGNOSIS — R51.9 NONINTRACTABLE HEADACHE, UNSPECIFIED CHRONICITY PATTERN, UNSPECIFIED HEADACHE TYPE: Primary | ICD-10-CM

## 2020-03-02 PROBLEM — K50.919 CROHN'S DISEASE WITH COMPLICATION: Status: RESOLVED | Noted: 2019-07-15 | Resolved: 2020-03-02

## 2020-03-02 LAB
ALBUMIN SERPL-MCNC: 4.8 G/DL (ref 3.5–5.2)
ALBUMIN/GLOB SERPL: 1.7 G/DL
ALP SERPL-CCNC: 95 U/L (ref 39–117)
ALT SERPL W P-5'-P-CCNC: 19 U/L (ref 1–33)
AMPHET+METHAMPHET UR QL: NEGATIVE
AMPHETAMINES UR QL: NEGATIVE
ANION GAP SERPL CALCULATED.3IONS-SCNC: 13 MMOL/L (ref 5–15)
AST SERPL-CCNC: 18 U/L (ref 1–32)
BACTERIA UR QL AUTO: ABNORMAL /HPF
BARBITURATES UR QL SCN: POSITIVE
BASOPHILS # BLD AUTO: 0.08 10*3/MM3 (ref 0–0.2)
BASOPHILS NFR BLD AUTO: 0.8 % (ref 0–1.5)
BENZODIAZ UR QL SCN: NEGATIVE
BILIRUB SERPL-MCNC: 0.3 MG/DL (ref 0.2–1.2)
BILIRUB UR QL STRIP: NEGATIVE
BUN BLD-MCNC: 13 MG/DL (ref 6–20)
BUN/CREAT SERPL: 18.8 (ref 7–25)
BUPRENORPHINE SERPL-MCNC: NEGATIVE NG/ML
CALCIUM SPEC-SCNC: 10 MG/DL (ref 8.6–10.5)
CANNABINOIDS SERPL QL: POSITIVE
CHLORIDE SERPL-SCNC: 102 MMOL/L (ref 98–107)
CLARITY UR: CLEAR
CO2 SERPL-SCNC: 26 MMOL/L (ref 22–29)
COCAINE UR QL: NEGATIVE
COLOR UR: YELLOW
CREAT BLD-MCNC: 0.69 MG/DL (ref 0.57–1)
DEPRECATED RDW RBC AUTO: 46.1 FL (ref 37–54)
EOSINOPHIL # BLD AUTO: 0.17 10*3/MM3 (ref 0–0.4)
EOSINOPHIL NFR BLD AUTO: 1.7 % (ref 0.3–6.2)
ERYTHROCYTE [DISTWIDTH] IN BLOOD BY AUTOMATED COUNT: 13.2 % (ref 12.3–15.4)
GFR SERPL CREATININE-BSD FRML MDRD: 89 ML/MIN/1.73
GLOBULIN UR ELPH-MCNC: 2.9 GM/DL
GLUCOSE BLD-MCNC: 97 MG/DL (ref 65–99)
GLUCOSE UR STRIP-MCNC: NEGATIVE MG/DL
HCT VFR BLD AUTO: 49.4 % (ref 34–46.6)
HGB BLD-MCNC: 16.4 G/DL (ref 12–15.9)
HGB UR QL STRIP.AUTO: ABNORMAL
HOLD SPECIMEN: NORMAL
HYALINE CASTS UR QL AUTO: ABNORMAL /LPF
IMM GRANULOCYTES # BLD AUTO: 0.07 10*3/MM3 (ref 0–0.05)
IMM GRANULOCYTES NFR BLD AUTO: 0.7 % (ref 0–0.5)
KETONES UR QL STRIP: NEGATIVE
LEUKOCYTE ESTERASE UR QL STRIP.AUTO: NEGATIVE
LYMPHOCYTES # BLD AUTO: 3.36 10*3/MM3 (ref 0.7–3.1)
LYMPHOCYTES NFR BLD AUTO: 34.5 % (ref 19.6–45.3)
MCH RBC QN AUTO: 31.5 PG (ref 26.6–33)
MCHC RBC AUTO-ENTMCNC: 33.2 G/DL (ref 31.5–35.7)
MCV RBC AUTO: 95 FL (ref 79–97)
METHADONE UR QL SCN: NEGATIVE
MONOCYTES # BLD AUTO: 0.74 10*3/MM3 (ref 0.1–0.9)
MONOCYTES NFR BLD AUTO: 7.6 % (ref 5–12)
NEUTROPHILS # BLD AUTO: 5.31 10*3/MM3 (ref 1.7–7)
NEUTROPHILS NFR BLD AUTO: 54.7 % (ref 42.7–76)
NITRITE UR QL STRIP: NEGATIVE
NRBC BLD AUTO-RTO: 0 /100 WBC (ref 0–0.2)
NT-PROBNP SERPL-MCNC: 27.8 PG/ML (ref 5–900)
OPIATES UR QL: NEGATIVE
OXYCODONE UR QL SCN: NEGATIVE
PCP UR QL SCN: NEGATIVE
PH UR STRIP.AUTO: 6.5 [PH] (ref 5–9)
PLATELET # BLD AUTO: 439 10*3/MM3 (ref 140–450)
PMV BLD AUTO: 9.2 FL (ref 6–12)
POTASSIUM BLD-SCNC: 4 MMOL/L (ref 3.5–5.2)
PROPOXYPH UR QL: NEGATIVE
PROT SERPL-MCNC: 7.7 G/DL (ref 6–8.5)
PROT UR QL STRIP: NEGATIVE
RBC # BLD AUTO: 5.2 10*6/MM3 (ref 3.77–5.28)
RBC # UR: ABNORMAL /HPF
REF LAB TEST METHOD: ABNORMAL
SODIUM BLD-SCNC: 141 MMOL/L (ref 136–145)
SP GR UR STRIP: 1.02 (ref 1–1.03)
SQUAMOUS #/AREA URNS HPF: ABNORMAL /HPF
TRICYCLICS UR QL SCN: NEGATIVE
TROPONIN T SERPL-MCNC: <0.01 NG/ML (ref 0–0.03)
UROBILINOGEN UR QL STRIP: ABNORMAL
WBC NRBC COR # BLD: 9.73 10*3/MM3 (ref 3.4–10.8)
WBC UR QL AUTO: ABNORMAL /HPF
WHOLE BLOOD HOLD SPECIMEN: NORMAL

## 2020-03-02 PROCEDURE — 93005 ELECTROCARDIOGRAM TRACING: CPT | Performed by: PHYSICIAN ASSISTANT

## 2020-03-02 PROCEDURE — 83880 ASSAY OF NATRIURETIC PEPTIDE: CPT | Performed by: PHYSICIAN ASSISTANT

## 2020-03-02 PROCEDURE — 99284 EMERGENCY DEPT VISIT MOD MDM: CPT

## 2020-03-02 PROCEDURE — 81001 URINALYSIS AUTO W/SCOPE: CPT | Performed by: PHYSICIAN ASSISTANT

## 2020-03-02 PROCEDURE — 93010 ELECTROCARDIOGRAM REPORT: CPT | Performed by: INTERNAL MEDICINE

## 2020-03-02 PROCEDURE — 80053 COMPREHEN METABOLIC PANEL: CPT | Performed by: PHYSICIAN ASSISTANT

## 2020-03-02 PROCEDURE — 80306 DRUG TEST PRSMV INSTRMNT: CPT | Performed by: PHYSICIAN ASSISTANT

## 2020-03-02 PROCEDURE — 84484 ASSAY OF TROPONIN QUANT: CPT | Performed by: PHYSICIAN ASSISTANT

## 2020-03-02 PROCEDURE — 71045 X-RAY EXAM CHEST 1 VIEW: CPT

## 2020-03-02 PROCEDURE — 70450 CT HEAD/BRAIN W/O DYE: CPT

## 2020-03-02 PROCEDURE — 85025 COMPLETE CBC W/AUTO DIFF WBC: CPT | Performed by: PHYSICIAN ASSISTANT

## 2020-03-02 RX ORDER — HYDROCODONE BITARTRATE AND ACETAMINOPHEN 5; 325 MG/1; MG/1
1 TABLET ORAL ONCE
Status: COMPLETED | OUTPATIENT
Start: 2020-03-02 | End: 2020-03-02

## 2020-03-02 RX ORDER — SODIUM CHLORIDE 0.9 % (FLUSH) 0.9 %
10 SYRINGE (ML) INJECTION AS NEEDED
Status: DISCONTINUED | OUTPATIENT
Start: 2020-03-02 | End: 2020-03-02 | Stop reason: HOSPADM

## 2020-03-02 RX ORDER — OMEPRAZOLE 20 MG/1
20 CAPSULE, DELAYED RELEASE ORAL DAILY
COMMUNITY
End: 2020-06-23 | Stop reason: SDUPTHER

## 2020-03-02 RX ADMIN — HYDROCODONE BITARTRATE AND ACETAMINOPHEN 1 TABLET: 5; 325 TABLET ORAL at 14:08

## 2020-03-02 NOTE — ED PROVIDER NOTES
Subjective   Patient presents to emergency department for worsening headache x 3 days.  States 3 days ago a vehicle trunk fell on her head.  She went to [facility]  and was cleared with a negative CT scan of her head and told she had a concussion.  States constant headache since then which is not improved.  Endorses one syncopal episode 2 days ago.  States she has baseline intermittent nausea and vomiting.      History provided by:  Patient   used: No    Headache   Pain location:  Frontal  Radiates to:  Does not radiate  Onset quality:  Sudden  Duration:  3 days  Timing:  Constant  Progression:  Unchanged  Chronicity:  New  Associated symptoms: nausea, photophobia, syncope and vomiting    Associated symptoms: no abdominal pain, no back pain, no blurred vision, no cough, no fever, no focal weakness, no loss of balance, no near-syncope, no neck pain, no paresthesias, no seizures, no sore throat and no weakness        Review of Systems   Constitutional: Negative for chills and fever.   HENT: Negative for sore throat and trouble swallowing.    Eyes: Positive for photophobia. Negative for blurred vision and visual disturbance.   Respiratory: Negative for cough, shortness of breath and wheezing.    Cardiovascular: Positive for syncope. Negative for chest pain and near-syncope.   Gastrointestinal: Positive for nausea and vomiting. Negative for abdominal pain.   Genitourinary: Negative for dysuria and flank pain.   Musculoskeletal: Negative for back pain and neck pain.   Skin: Negative for color change.   Allergic/Immunologic: Negative for immunocompromised state.   Neurological: Positive for syncope and headaches. Negative for focal weakness, seizures, weakness, paresthesias and loss of balance.   Hematological: Does not bruise/bleed easily.   Psychiatric/Behavioral: Negative for confusion.       Past Medical History:   Diagnosis Date   • Asthma    • Cancer (CMS/HCC)     cervical   •  Colon polyp    • COPD (chronic obstructive pulmonary disease) (CMS/HCC)    • Crohn's disease (CMS/HCC)    • Diverticulitis of colon    • Elevated cholesterol    • GERD (gastroesophageal reflux disease)    • History of transfusion    • Irritable bowel syndrome    • Pancreatitis        Allergies   Allergen Reactions   • Nsaids Swelling and GI Intolerance   • Azithromycin Swelling   • Ciprofloxacin Hives   • Doxycycline Swelling   • Other Other (See Comments)     nicotine patch   Caused body twitching/seizures   • Levofloxacin Rash   • Phenergan [Promethazine Hcl] GI Intolerance       Past Surgical History:   Procedure Laterality Date   • COLONOSCOPY     • COLONOSCOPY N/A 10/18/2019    Procedure: COLONOSCOPY;  Surgeon: Tom Davis MD;  Location: [facility] ENDOSCOPY;  Service: Gastroenterology   • ENDOSCOPY N/A 10/18/2019    Procedure: ESOPHAGOGASTRODUODENOSCOPY;  Surgeon: Tom Davis MD;  Location: [facility] ENDOSCOPY;  Service: Gastroenterology   • HYSTERECTOMY     • TONSILECTOMY, ADENOIDECTOMY, BILATERAL MYRINGOTOMY AND TUBES     • UPPER GASTROINTESTINAL ENDOSCOPY         Family History   Problem Relation Age of Onset   • Inflammatory bowel disease Mother    • Arthritis Mother    • Diabetes Mother    • Hypertension Mother    • Hyperlipidemia Mother    • Obesity Mother    • Osteoporosis Mother    • Heart disease Father    • Hyperlipidemia Father    • Diabetes Sister    • Liver disease Daughter    • Mental illness Daughter    • Obesity Daughter    • Diabetes Maternal Grandmother    • Cancer Maternal Grandmother         lung   • Cancer Other         lung   • Heart disease Other        Social History     Socioeconomic History   • Marital status: Single     Spouse name: Not on file   • Number of children: Not on file   • Years of education: Not on file   • Highest education level: Not on file   Tobacco Use   • Smoking status: Current Every Day Smoker     Packs/day: 1.00     Types: Cigarettes   • Smokeless tobacco:  "Never Used   Substance and Sexual Activity   • Alcohol use: No     Frequency: Never   • Drug use: No   • Sexual activity: Defer           Objective      /53 (BP Location: Left arm, Patient Position: Lying)   Pulse 78   Temp 97.8 °F (36.6 °C) (Oral)   Resp 18   Ht 165.1 cm (65\")   Wt 51.6 kg (113 lb 11.2 oz)   LMP  (LMP Unknown)   SpO2 96%   Breastfeeding No   BMI 18.92 kg/m²     Physical Exam   Constitutional: She is oriented to person, place, and time. She appears well-developed and well-nourished.   HENT:   Head: Normocephalic and atraumatic.   Eyes: Pupils are equal, round, and reactive to light. Conjunctivae and EOM are normal.   Cardiovascular: Normal rate, regular rhythm, normal heart sounds and intact distal pulses.   Pulmonary/Chest: Effort normal and breath sounds normal. No respiratory distress. She has no wheezes.   Musculoskeletal: She exhibits no edema.   Neurological: She is alert and oriented to person, place, and time.   Skin: Skin is warm and dry. Capillary refill takes less than 2 seconds.   Psychiatric: She has a normal mood and affect. Her behavior is normal. Thought content normal.   Nursing note and vitals reviewed.      ECG 12 Lead    Date/Time: 3/2/2020 1:57 PM  Performed by: Xander Krause PA-C  Authorized by: Xander Krause PA-C   Interpreted by physician  Comparison: compared with previous ECG from 2/21/2019  Similar to previous ECG  Rhythm: sinus rhythm  BPM: 67  ST Segments: ST segments normal  Clinical impression: normal ECG                 ED Course  ED Course as of Mar 02 1400   Mon Mar 02, 2020   1051 Severe headache, worsening after head trauma 3 days ago.  Syncopal episode 2 days ago.  CT head approriate.    [ROMERO]   1143 Reviewed eKASPER #[id number]    [ROMERO]      ED Course User Index  [ROMERO] Xander Krause PA-C      Results for orders placed or performed during the hospital encounter of 03/02/20   Urine Drug Screen - Urine, Clean Catch   Result Value " Ref Range    THC, Screen, Urine Positive (A) Negative    Phencyclidine (PCP), Urine Negative Negative    Cocaine Screen, Urine Negative Negative    Methamphetamine, Ur Negative Negative    Opiate Screen Negative Negative    Amphetamine Screen, Urine Negative Negative    Benzodiazepine Screen, Urine Negative Negative    Tricyclic Antidepressants Screen Negative Negative    Methadone Screen, Urine Negative Negative    Barbiturates Screen, Urine Positive (A) Negative    Oxycodone Screen, Urine Negative Negative    Propoxyphene Screen Negative Negative    Buprenorphine, Screen, Urine Negative Negative   Comprehensive Metabolic Panel   Result Value Ref Range    Glucose 97 65 - 99 mg/dL    BUN 13 6 - 20 mg/dL    Creatinine 0.69 0.57 - 1.00 mg/dL    Sodium 141 136 - 145 mmol/L    Potassium 4.0 3.5 - 5.2 mmol/L    Chloride 102 98 - 107 mmol/L    CO2 26.0 22.0 - 29.0 mmol/L    Calcium 10.0 8.6 - 10.5 mg/dL    Total Protein 7.7 6.0 - 8.5 g/dL    Albumin 4.80 3.50 - 5.20 g/dL    ALT (SGPT) 19 1 - 33 U/L    AST (SGOT) 18 1 - 32 U/L    Alkaline Phosphatase 95 39 - 117 U/L    Total Bilirubin 0.3 0.2 - 1.2 mg/dL    eGFR Non African Amer 89 >60 mL/min/1.73    Globulin 2.9 gm/dL    A/G Ratio 1.7 g/dL    BUN/Creatinine Ratio 18.8 7.0 - 25.0    Anion Gap 13.0 5.0 - 15.0 mmol/L   CBC Auto Differential   Result Value Ref Range    WBC 9.73 3.40 - 10.80 10*3/mm3    RBC 5.20 3.77 - 5.28 10*6/mm3    Hemoglobin 16.4 (H) 12.0 - 15.9 g/dL    Hematocrit 49.4 (H) 34.0 - 46.6 %    MCV 95.0 79.0 - 97.0 fL    MCH 31.5 26.6 - 33.0 pg    MCHC 33.2 31.5 - 35.7 g/dL    RDW 13.2 12.3 - 15.4 %    RDW-SD 46.1 37.0 - 54.0 fl    MPV 9.2 6.0 - 12.0 fL    Platelets 439 140 - 450 10*3/mm3    Neutrophil % 54.7 42.7 - 76.0 %    Lymphocyte % 34.5 19.6 - 45.3 %    Monocyte % 7.6 5.0 - 12.0 %    Eosinophil % 1.7 0.3 - 6.2 %    Basophil % 0.8 0.0 - 1.5 %    Immature Grans % 0.7 (H) 0.0 - 0.5 %    Neutrophils, Absolute 5.31 1.70 - 7.00 10*3/mm3    Lymphocytes,  Absolute 3.36 (H) 0.70 - 3.10 10*3/mm3    Monocytes, Absolute 0.74 0.10 - 0.90 10*3/mm3    Eosinophils, Absolute 0.17 0.00 - 0.40 10*3/mm3    Basophils, Absolute 0.08 0.00 - 0.20 10*3/mm3    Immature Grans, Absolute 0.07 (H) 0.00 - 0.05 10*3/mm3    nRBC 0.0 0.0 - 0.2 /100 WBC   Urinalysis With Culture If Indicated - Urine, Clean Catch   Result Value Ref Range    Color, UA Yellow Yellow, Straw, Dark Yellow, Ana M    Appearance, UA Clear Clear    pH, UA 6.5 5.0 - 9.0    Specific Gravity, UA 1.021 1.003 - 1.030    Glucose, UA Negative Negative    Ketones, UA Negative Negative    Bilirubin, UA Negative Negative    Blood, UA Small (1+) (A) Negative    Protein, UA Negative Negative    Leuk Esterase, UA Negative Negative    Nitrite, UA Negative Negative    Urobilinogen, UA 0.2 E.U./dL 0.2 - 1.0 E.U./dL   Urinalysis, Microscopic Only - Urine, Clean Catch   Result Value Ref Range    RBC, UA 6-12 (A) None Seen /HPF    WBC, UA None Seen None Seen, 0-2, 3-5 /HPF    Bacteria, UA None Seen None Seen /HPF    Squamous Epithelial Cells, UA None Seen None Seen, 0-2 /HPF    Hyaline Casts, UA 0-2 None Seen /LPF    Methodology Automated Microscopy    Troponin   Result Value Ref Range    Troponin T <0.010 0.000 - 0.030 ng/mL   BNP   Result Value Ref Range    proBNP 27.8 5.0 - 900.0 pg/mL   Light Blue Top   Result Value Ref Range    Extra Tube hold for add-on    Gold Top - SST   Result Value Ref Range    Extra Tube Hold for add-ons.        Ct Head Without Contrast    Result Date: 3/2/2020  Narrative: CT Head Without Contrast History: Worsening headache x3 days. Head trauma. Axial scans of the brain were obtained without intravenous contrast.  Coronal and sagital reconstructions were preformed. This exam was performed according to our departmental dose-optimization program, which includes automated exposure control, adjustment of the mA and/or kV according to patient size and/or use of iterative reconstruction technique. DLP: 1128  Comparison: None Findings: Bone windows are unremarkable. The visualized paranasal sinuses are unremarkable. No hemorrhage. No mass. No abnormal areas of increased or decreased attenuation. No midline shift. No abnormal extra-axial fluid collections.     Impression: CONCLUSION: No intracranial injury or acute process 22060 Electronically signed by:  Theo Jacobson MD  3/2/2020 11:36 AM CST Workstation: [id number]    Xr Chest 1 View    Result Date: 3/2/2020  Narrative: PORTABLE CHEST HISTORY: Syncope. Headache. Portable AP upright film of the chest was obtained at 11:40 AM. COMPARISON: August 29, 2019 FINDINGS: The lungs are clear of an acute process. The heart is not enlarged. The pulmonary vasculature is not increased. No pleural effusion. No pneumothorax. No acute osseous abnormality. Postoperative changes in the cervical spine, right clavicle and right humeral head.     Impression: CONCLUSION: No Acute Disease 61018 Electronically signed by:  Theo Jacobson MD  3/2/2020 12:29 PM CST Workstation: [id number]    Xr Abdomen Kub    Result Date: 2/6/2020  Narrative: [facility] [place] Patient Name: ARA ZACARIAS ATTENDING: OBINNA ARZATE REFERRING: BETO OCHOA ORDERING: BETO OCHOA ----------------------- PROCEDURE: AP abdomen DATE OF EXAM: 2/6/2020 HISTORY: Abdomen pain Single supine image of the abdomen and pelvis was obtained. Study is compared to prior exam of 1/31/2015. FINDINGS: There is an unremarkable bowel gas pattern. There is no evidence of significant distention or obstruction. No free air is appreciated with this supine imaging. No masses or abnormal calcifications are identified. No definite renal stones are seen.     Impression: Unremarkable abdomen. Electronically signed by:  Shaheen Aragon MD  2/6/2020 10:16 AM CST Workstation: [id number]    Discussed results with patient.  Gave educational materials.  Advised close follow up with PCP.  Return to emergency department for new or worsening  symptoms.                                         MDM    Final diagnoses:   Nonintractable headache, unspecified chronicity pattern, unspecified headache type   Concussion with loss of consciousness, initial encounter            Xander Krause PA-C  03/02/20 1400

## 2020-03-02 NOTE — ED NOTES
This RN called lab, spoke with Arlene - for UA to be collected from previously sent urine.     Delmi Pelra RN  03/02/20 [id number]

## 2020-03-03 NOTE — PROGRESS NOTES
Subjective:  Carla Richard is a 50 y.o. female who presents for       Patient Active Problem List   Diagnosis   • Tobacco abuse counseling   • Chronic idiopathic constipation   • B12 deficiency   • Vitamin D deficiency    • Tobacco user   • Stage 2 moderate COPD by GOLD classification (CMS/[facility])   • Cervical lymphadenopathy   • Generalized abdominal pain   • Nausea   • Neurological abnormality   • Tremor   • RUQ pain   • COPD exacerbation (CMS/[facility])   • Pertussis exposure   • Chronic bronchitis (CMS/[facility])   • Cigarette nicotine dependence, uncomplicated   • Chronic nonseasonal allergic rhinitis due to pollen   • Episode of recurrent major depressive disorder (CMS/[facility])   • Diarrhea   • Chronic diarrhea   • Thrombocytosis (CMS/[facility])   • Nausea and vomiting   • Gluten intolerance   • C. difficile diarrhea   • Shiga toxin-producing Escherichia coli infection   • Generalized weakness   • Head injury   • Concussion with loss of consciousness           Current Outpatient Medications:   •  albuterol sulfate  (90 Base) MCG/ACT inhaler, Inhale 2 puffs Every 4 (Four) Hours As Needed for Wheezing., Disp: 18 g, Rfl: 3  •  budesonide-formoterol (SYMBICORT) 160-4.5 MCG/ACT inhaler, Inhale 2 puffs 2 (Two) Times a Day., Disp: 1 inhaler, Rfl: 11  •  cetirizine (zyrTEC) 10 MG tablet, Take 1 tablet by mouth Daily., Disp: 30 tablet, Rfl: 11  •  dicyclomine (BENTYL) 20 MG tablet, Take 2 tablets by mouth Every 6 (Six) Hours., Disp: 240 tablet, Rfl: 3  •  escitalopram (LEXAPRO) 10 MG tablet, Take 1 tablet by mouth Daily., Disp: 30 tablet, Rfl: 3  •  esomeprazole (nexIUM) 20 MG capsule, Take 20 mg by mouth Every Morning Before Breakfast., Disp: , Rfl:   •  fluticasone (FLONASE) 50 MCG/ACT nasal spray, 2 sprays into the nostril(s) as directed by provider Daily., Disp: 1 bottle, Rfl: 11  •  guaiFENesin (MUCINEX) 600 MG 12 hr tablet, Take 1 tablet by mouth 2 (Two) Times a Day., Disp: 60 tablet, Rfl: 3  •   HYDROcodone-acetaminophen (NORCO) 5-325 MG per tablet, Take 1 tablet by mouth Every 6 (Six) Hours As Needed., Disp: , Rfl: 0  •  ipratropium-albuterol (DUO-NEB) 0.5-2.5 mg/3 ml nebulizer, Take 3 mL by nebulization Every 4 (Four) Hours As Needed for Wheezing or Shortness of Air., Disp: , Rfl:   •  montelukast (SINGULAIR) 10 MG tablet, Take 1 tablet by mouth Every Night., Disp: 30 tablet, Rfl: 3  •  omeprazole (priLOSEC) 20 MG capsule, Take 20 mg by mouth Daily., Disp: , Rfl:   •  Prucalopride Succinate 2 MG tablet, Take 2 mg by mouth Daily., Disp: 30 tablet, Rfl: 5  •  traMADol (ULTRAM) 50 MG tablet, TAKE 1 TABLET(S) EVERY 6 HOURS BY ORAL ROUTE AS NEEDED., Disp: , Rfl:   •  vancomycin (VANCOCIN) 125 MG capsule, Take 1 capsule by mouth 4 (Four) Times a Day., Disp: 56 capsule, Rfl: 0  •  vitamin B-12 (CYANOCOBALAMIN) 250 MCG tablet, Take 1 tablet by mouth Daily., Disp: 30 tablet, Rfl: 3  •  vitamin D (ERGOCALCIFEROL) 23386 units capsule capsule, Take 1 capsule by mouth 1 (One) Time Per Week., Disp: 4 capsule, Rfl: 3  •  ondansetron ODT (ZOFRAN ODT) 8 MG disintegrating tablet, Take 1 tablet by mouth Every 8 (Eight) Hours As Needed for Nausea or Vomiting., Disp: 90 tablet, Rfl: 3    Pt is 48 yo female with history of moderateOPD, Vitamin B12 deficiency, Vitamin D deficiency, chronic abdominal pain, tremor,  RUQ pain,  Sp hysterectomy,  History of chron's disease, history of pertussis infection  sp right shoulder surgery. X 3, sp rotator cuff repair , history of C diff diarrhea, gluten sensitivity            2/4/20 pt is here for recheck. She saw Gastroenterology on 1/13/20  With CROW dahl for her generalized abodminal pain, nausea/ chronic idiopathic contipation. She was advised to continue zofran and bentyl 20 mg TID. She went to [facility] ER last week and had labwork that showed hemoglobin high along and platelet being high.  She was started on motegrity but took it once and it made her sick.   Per pt had MRI of  abdomen done at imaging center along with given pain medication. Her last CT of abdomen was in October 2019 that showed previous hysterctomy and no acute process. She had nuclear medicine Gastric emptying study that was normal in December 2019.  Pain is about 7/10 on severity. It occurs 5-6 times a day.  She has diarhrea as week. She states Sunday through Wednesday she has no pain but on Thursday and Friday bowel pattern changes. Her pathology report on October 2019 showed no significant histologic abnormality,  She also would like to go back on lexapro for depression. She cannot take wellbutrin. In regards to diet. She is intolerant to meat products and has pain when she eats beef. She has been bitten by tick in past She also  Abdomen states it hurts when she eats certain foods. Has not had CT Angiogram done yet. Of abdomen.      2/19/20 pt is here for recheck and followup. Had labwork done and pt had shiga toxin in her stool culture along with C Diff. Pt also had sensitivity to gluten on celiac panel.  She has been feeling better since taking PO vancomycin for 14 days. She has refrained from gluten and is now on gluten free diet. On labwork lipase was normal CMP showed normal kidney function. CBC howed elevated WBC. UA was normal.  She has 3 more days of abx left. She states stool is better.    3/4/20 pt is here for recheck. She chris to ER at [facility] after trunk lid hit her head. With high force. This cause her to have headaches, dizziness confusion, nausea/ vomiting. Per cT of head done on 2//28/20 there was no acute intracranial process. Went to [facility] ER on 3/22/20 for same issues and CT of head done on 3/2/20 shoed no intracranial process or injury.  Also had labwork that showed normal tropnoin BnP normal UA showed RBC in urine UDS showed positive for THC.  She was seeing Dr. Manjarrez her Neurologist for her tremors.  Chest x-ray  Was normal.             Neurologic Problem   The patient's primary symptoms include an  altered mental status, clumsiness, a loss of balance, memory loss, slurred speech, a visual change and weakness. The patient's pertinent negatives include no focal sensory loss, near-syncope or syncope. This is a new problem. The current episode started today. The neurological problem developed suddenly. Associated symptoms include abdominal pain, back pain, confusion, dizziness, fatigue, headaches, nausea, shortness of breath and vomiting. Pertinent negatives include no auditory change, aura, bladder incontinence, bowel incontinence, chest pain, diaphoresis, fever, neck pain, palpitations or vertigo. Past treatments include nothing. The treatment provided no relief. Her past medical history is significant for head trauma. There is no history of a bleeding disorder, a clotting disorder, a CVA, dementia, liver disease, mood changes or seizures.   Dizziness   This is a new problem. The current episode started 1 to 4 weeks ago. The problem occurs constantly. The problem has been unchanged. Associated symptoms include abdominal pain, arthralgias, coughing, fatigue, headaches, nausea, numbness, a visual change, vomiting and weakness. Pertinent negatives include no anorexia, change in bowel habit, chest pain, chills, congestion, diaphoresis, fever, joint swelling, myalgias, neck pain, sore throat, swollen glands or vertigo. Nothing aggravates the symptoms. She has tried nothing for the symptoms. The treatment provided no relief.   Head Injury    The quality of the pain is described as aching. The pain is at a severity of 3/10. The pain is mild. Associated symptoms include headaches, memory loss, numbness, vomiting and weakness. Pertinent negatives include no blurred vision, disorientation or tinnitus. She has tried nothing for the symptoms. The treatment provided no relief.   Diarrhea    This is a recurrent problem. The current episode started more than 1 year ago. The problem has been waxing and waning. The stool  consistency is described as watery. The patient states that diarrhea does not awaken her from sleep. Associated symptoms include abdominal pain, arthralgias and coughing. Pertinent negatives include no bloating, chills, fever, headaches, increased  flatus, myalgias, sweats, URI, vomiting or weight loss. Nothing aggravates the symptoms. She has tried nothing for the symptoms. The treatment provided no relief. There is no history of bowel resection, inflammatory bowel disease, irritable bowel syndrome, malabsorption, a recent abdominal surgery or short gut syndrome.    Abdominal Pain   This is a recurrent problem. The current episode started more than 1 year ago. The onset quality is sudden. The problem has been waxing and waning. The pain is located in the generalized abdominal region, epigastric region and RUQ. The pain is at a severity of 5/10. The pain is moderate. The quality of the pain is aching, a sensation of fullness and burning. The abdominal pain radiates to the epigastric region. Associated symptoms include constipation. Pertinent negatives include no anorexia, arthralgias, belching, diarrhea, dysuria, fever, flatus, frequency, headaches, hematochezia, hematuria, melena, myalgias, nausea, vomiting or weight loss. Nothing aggravates the pain. The pain is relieved by being still. The treatment provided no relief. Prior diagnostic workup includes ultrasound. Her past medical history is significant for Crohn's disease. There is no history of abdominal surgery, colon cancer, gallstones, GERD, irritable bowel syndrome, pancreatitis, PUD or ulcerative colitis.    Neurologic Problem   The patient's primary symptoms include focal sensory loss and weakness. The patient's pertinent negatives include no altered mental status, clumsiness, loss of balance, memory loss, near-syncope, slurred speech, syncope or visual change. This is a recurrent problem. The neurological problem developed gradually. The problem has  been waxing and waning since onset. There was no focality noted. Associated symptoms include a fever and shortness of breath. Pertinent negatives include no abdominal pain, auditory change, aura, back pain, bladder incontinence, bowel incontinence, chest pain, confusion, diaphoresis, dizziness, fatigue, headaches, light-headedness, nausea, neck pain, palpitations, vertigo or vomiting. Past treatments include nothing. The treatment provided no relief. There is no history of a bleeding disorder, a clotting disorder, a CVA, head trauma, liver disease, mood changes or seizures.   COPD   This is a chronic problem. The current episode started more than 1 year ago. The problem occurs constantly. The problem has been unchanged. Associated symptoms include abdominal pain, arthralgias, fatigue, joint swelling, numbness and weakness. Pertinent negatives include no anorexia, change in bowel habit, chest pain, chills, congestion, coughing, diaphoresis, fever, headaches, myalgias, nausea, neck pain, sore throat, swollen glands, urinary symptoms, vertigo, visual change or vomiting. The symptoms are aggravated by smoking. She has tried nothing (combivent ) for the symptoms.   Fatigue   This is a chronic problem. The current episode started more than 1 year ago. The problem occurs constantly. The problem has been unchanged. Associated symptoms include abdominal pain, arthralgias, fatigue, joint swelling, numbness and weakness. Pertinent negatives include no anorexia, change in bowel habit, chest pain, chills, congestion, coughing, diaphoresis, fever, headaches, myalgias, nausea, neck pain, sore throat, swollen glands, urinary symptoms, vertigo, visual change or vomiting. Nothing aggravates the symptoms. She has tried nothing (b12 injections ) for the symptoms. The treatment provided no relief.          Review of Systems  Review of Systems   Constitutional: Positive for activity change and fatigue. Negative for appetite change,  chills, diaphoresis and fever.   HENT: Negative for congestion, postnasal drip, rhinorrhea, sinus pressure, sinus pain, sneezing, sore throat, tinnitus, trouble swallowing and voice change.    Eyes: Negative for blurred vision.   Respiratory: Positive for cough and shortness of breath. Negative for choking, chest tightness, wheezing and stridor.    Cardiovascular: Negative for chest pain, palpitations and near-syncope.   Gastrointestinal: Positive for abdominal pain, diarrhea, nausea and vomiting. Negative for anorexia, bowel incontinence and change in bowel habit.   Genitourinary: Negative for bladder incontinence.   Musculoskeletal: Positive for arthralgias and back pain. Negative for joint swelling, myalgias and neck pain.   Neurological: Positive for dizziness, tremors, weakness, numbness, headaches and loss of balance. Negative for vertigo and syncope.   Psychiatric/Behavioral: Positive for agitation, behavioral problems, confusion, decreased concentration and memory loss.       Patient Active Problem List   Diagnosis   • Tobacco abuse counseling   • Chronic idiopathic constipation   • B12 deficiency   • Vitamin D deficiency    • Tobacco user   • Stage 2 moderate COPD by GOLD classification (CMS/HCC)   • Cervical lymphadenopathy   • Generalized abdominal pain   • Nausea   • Neurological abnormality   • Tremor   • RUQ pain   • COPD exacerbation (CMS/HCC)   • Pertussis exposure   • Chronic bronchitis (CMS/HCC)   • Cigarette nicotine dependence, uncomplicated   • Chronic nonseasonal allergic rhinitis due to pollen   • Episode of recurrent major depressive disorder (CMS/HCC)   • Diarrhea   • Chronic diarrhea   • Thrombocytosis (CMS/HCC)   • Nausea and vomiting   • Gluten intolerance   • C. difficile diarrhea   • Shiga toxin-producing Escherichia coli infection   • Generalized weakness   • Head injury   • Concussion with loss of consciousness     Past Surgical History:   Procedure Laterality Date   • COLONOSCOPY      • COLONOSCOPY N/A 10/18/2019    Procedure: COLONOSCOPY;  Surgeon: Tom Davis MD;  Location: [facility] ENDOSCOPY;  Service: Gastroenterology   • ENDOSCOPY N/A 10/18/2019    Procedure: ESOPHAGOGASTRODUODENOSCOPY;  Surgeon: Tom Davis MD;  Location: [facility] ENDOSCOPY;  Service: Gastroenterology   • HYSTERECTOMY     • TONSILECTOMY, ADENOIDECTOMY, BILATERAL MYRINGOTOMY AND TUBES     • UPPER GASTROINTESTINAL ENDOSCOPY       Social History     Socioeconomic History   • Marital status: Single     Spouse name: Not on file   • Number of children: Not on file   • Years of education: Not on file   • Highest education level: Not on file   Tobacco Use   • Smoking status: Current Every Day Smoker     Packs/day: 1.00     Types: Cigarettes   • Smokeless tobacco: Never Used   Substance and Sexual Activity   • Alcohol use: No     Frequency: Never   • Drug use: No   • Sexual activity: Defer     Family History   Problem Relation Age of Onset   • Inflammatory bowel disease Mother    • Arthritis Mother    • Diabetes Mother    • Hypertension Mother    • Hyperlipidemia Mother    • Obesity Mother    • Osteoporosis Mother    • Heart disease Father    • Hyperlipidemia Father    • Diabetes Sister    • Liver disease Daughter    • Mental illness Daughter    • Obesity Daughter    • Diabetes Maternal Grandmother    • Cancer Maternal Grandmother         lung   • Cancer Other         lung   • Heart disease Other      Admission on 03/02/2020, Discharged on 03/02/2020   Component Date Value Ref Range Status   • THC, Screen, Urine 03/02/2020 Positive* Negative Final   • Phencyclidine (PCP), Urine 03/02/2020 Negative  Negative Final   • Cocaine Screen, Urine 03/02/2020 Negative  Negative Final   • Methamphetamine, Ur 03/02/2020 Negative  Negative Final   • Opiate Screen 03/02/2020 Negative  Negative Final   • Amphetamine Screen, Urine 03/02/2020 Negative  Negative Final   • Benzodiazepine Screen, Urine 03/02/2020 Negative  Negative Final   •  Tricyclic Antidepressants Screen 03/02/2020 Negative  Negative Final   • Methadone Screen, Urine 03/02/2020 Negative  Negative Final   • Barbiturates Screen, Urine 03/02/2020 Positive* Negative Final   • Oxycodone Screen, Urine 03/02/2020 Negative  Negative Final   • Propoxyphene Screen 03/02/2020 Negative  Negative Final   • Buprenorphine, Screen, Urine 03/02/2020 Negative  Negative Final   • Glucose 03/02/2020 97  65 - 99 mg/dL Final   • BUN 03/02/2020 13  6 - 20 mg/dL Final   • Creatinine 03/02/2020 0.69  0.57 - 1.00 mg/dL Final   • Sodium 03/02/2020 141  136 - 145 mmol/L Final   • Potassium 03/02/2020 4.0  3.5 - 5.2 mmol/L Final   • Chloride 03/02/2020 102  98 - 107 mmol/L Final   • CO2 03/02/2020 26.0  22.0 - 29.0 mmol/L Final   • Calcium 03/02/2020 10.0  8.6 - 10.5 mg/dL Final   • Total Protein 03/02/2020 7.7  6.0 - 8.5 g/dL Final   • Albumin 03/02/2020 4.80  3.50 - 5.20 g/dL Final   • ALT (SGPT) 03/02/2020 19  1 - 33 U/L Final   • AST (SGOT) 03/02/2020 18  1 - 32 U/L Final   • Alkaline Phosphatase 03/02/2020 95  39 - 117 U/L Final   • Total Bilirubin 03/02/2020 0.3  0.2 - 1.2 mg/dL Final   • eGFR Non African Amer 03/02/2020 89  >60 mL/min/1.73 Final   • Globulin 03/02/2020 2.9  gm/dL Final   • A/G Ratio 03/02/2020 1.7  g/dL Final   • BUN/Creatinine Ratio 03/02/2020 18.8  7.0 - 25.0 Final   • Anion Gap 03/02/2020 13.0  5.0 - 15.0 mmol/L Final   • WBC 03/02/2020 9.73  3.40 - 10.80 10*3/mm3 Final   • RBC 03/02/2020 5.20  3.77 - 5.28 10*6/mm3 Final   • Hemoglobin 03/02/2020 16.4* 12.0 - 15.9 g/dL Final   • Hematocrit 03/02/2020 49.4* 34.0 - 46.6 % Final   • MCV 03/02/2020 95.0  79.0 - 97.0 fL Final   • MCH 03/02/2020 31.5  26.6 - 33.0 pg Final   • MCHC 03/02/2020 33.2  31.5 - 35.7 g/dL Final   • RDW 03/02/2020 13.2  12.3 - 15.4 % Final   • RDW-SD 03/02/2020 46.1  37.0 - 54.0 fl Final   • MPV 03/02/2020 9.2  6.0 - 12.0 fL Final   • Platelets 03/02/2020 439  140 - 450 10*3/mm3 Final   • Neutrophil % 03/02/2020 54.7   42.7 - 76.0 % Final   • Lymphocyte % 03/02/2020 34.5  19.6 - 45.3 % Final   • Monocyte % 03/02/2020 7.6  5.0 - 12.0 % Final   • Eosinophil % 03/02/2020 1.7  0.3 - 6.2 % Final   • Basophil % 03/02/2020 0.8  0.0 - 1.5 % Final   • Immature Grans % 03/02/2020 0.7* 0.0 - 0.5 % Final   • Neutrophils, Absolute 03/02/2020 5.31  1.70 - 7.00 10*3/mm3 Final   • Lymphocytes, Absolute 03/02/2020 3.36* 0.70 - 3.10 10*3/mm3 Final   • Monocytes, Absolute 03/02/2020 0.74  0.10 - 0.90 10*3/mm3 Final   • Eosinophils, Absolute 03/02/2020 0.17  0.00 - 0.40 10*3/mm3 Final   • Basophils, Absolute 03/02/2020 0.08  0.00 - 0.20 10*3/mm3 Final   • Immature Grans, Absolute 03/02/2020 0.07* 0.00 - 0.05 10*3/mm3 Final   • nRBC 03/02/2020 0.0  0.0 - 0.2 /100 WBC Final   • Extra Tube 03/02/2020 hold for add-on   Final    Auto resulted   • Extra Tube 03/02/2020 Hold for add-ons.   Final    Auto resulted.   • Color, UA 03/02/2020 Yellow  Yellow, Straw, Dark Yellow, Ana M Final   • Appearance, UA 03/02/2020 Clear  Clear Final   • pH, UA 03/02/2020 6.5  5.0 - 9.0 Final   • Specific Gravity, UA 03/02/2020 1.021  1.003 - 1.030 Final   • Glucose, UA 03/02/2020 Negative  Negative Final   • Ketones, UA 03/02/2020 Negative  Negative Final   • Bilirubin, UA 03/02/2020 Negative  Negative Final   • Blood, UA 03/02/2020 Small (1+)* Negative Final   • Protein, UA 03/02/2020 Negative  Negative Final   • Leuk Esterase, UA 03/02/2020 Negative  Negative Final   • Nitrite, UA 03/02/2020 Negative  Negative Final   • Urobilinogen, UA 03/02/2020 0.2 E.U./dL  0.2 - 1.0 E.U./dL Final   • RBC, UA 03/02/2020 6-12* None Seen /HPF Final   • WBC, UA 03/02/2020 None Seen  None Seen, 0-2, 3-5 /HPF Final   • Bacteria, UA 03/02/2020 None Seen  None Seen /HPF Final   • Squamous Epithelial Cells, UA 03/02/2020 None Seen  None Seen, 0-2 /HPF Final   • Hyaline Casts, UA 03/02/2020 0-2  None Seen /LPF Final   • Methodology 03/02/2020 Automated Microscopy   Final   • Troponin T  03/02/2020 <0.010  0.000 - 0.030 ng/mL Final   • proBNP 03/02/2020 27.8  5.0 - 900.0 pg/mL Final   Admission on 02/06/2020, Discharged on 02/06/2020   Component Date Value Ref Range Status   • Glucose 02/06/2020 99  65 - 99 mg/dL Final   • BUN 02/06/2020 12  6 - 20 mg/dL Final   • Creatinine 02/06/2020 0.66  0.57 - 1.00 mg/dL Final   • Sodium 02/06/2020 139  136 - 145 mmol/L Final   • Potassium 02/06/2020 3.6  3.5 - 5.2 mmol/L Final   • Chloride 02/06/2020 103  98 - 107 mmol/L Final   • CO2 02/06/2020 24.0  22.0 - 29.0 mmol/L Final   • Calcium 02/06/2020 9.2  8.6 - 10.5 mg/dL Final   • Total Protein 02/06/2020 6.6  6.0 - 8.5 g/dL Final   • Albumin 02/06/2020 4.40  3.50 - 5.20 g/dL Final   • ALT (SGPT) 02/06/2020 6  1 - 33 U/L Final   • AST (SGOT) 02/06/2020 10  1 - 32 U/L Final   • Alkaline Phosphatase 02/06/2020 73  39 - 117 U/L Final   • Total Bilirubin 02/06/2020 0.4  0.2 - 1.2 mg/dL Final   • eGFR Non African Amer 02/06/2020 94  >60 mL/min/1.73 Final   • Globulin 02/06/2020 2.2  gm/dL Final   • A/G Ratio 02/06/2020 2.0  g/dL Final   • BUN/Creatinine Ratio 02/06/2020 18.2  7.0 - 25.0 Final   • Anion Gap 02/06/2020 12.0  5.0 - 15.0 mmol/L Final   • Lipase 02/06/2020 39  13 - 60 U/L Final   • Color, UA 02/06/2020 Yellow  Yellow, Straw, Dark Yellow, Ana M Final   • Appearance, UA 02/06/2020 Clear  Clear Final   • pH, UA 02/06/2020 6.5  5.0 - 9.0 Final   • Specific Gravity, UA 02/06/2020 1.029  1.003 - 1.030 Final   • Glucose, UA 02/06/2020 Negative  Negative Final   • Ketones, UA 02/06/2020 Negative  Negative Final   • Bilirubin, UA 02/06/2020 Negative  Negative Final   • Blood, UA 02/06/2020 Negative  Negative Final   • Protein, UA 02/06/2020 Negative  Negative Final   • Leuk Esterase, UA 02/06/2020 Negative  Negative Final   • Nitrite, UA 02/06/2020 Negative  Negative Final   • Urobilinogen, UA 02/06/2020 1.0 E.U./dL  0.2 - 1.0 E.U./dL Final   • WBC 02/06/2020 14.21* 3.40 - 10.80 10*3/mm3 Final   • RBC 02/06/2020  4.89  3.77 - 5.28 10*6/mm3 Final   • Hemoglobin 02/06/2020 15.3  12.0 - 15.9 g/dL Final   • Hematocrit 02/06/2020 45.7  34.0 - 46.6 % Final   • MCV 02/06/2020 93.5  79.0 - 97.0 fL Final   • MCH 02/06/2020 31.3  26.6 - 33.0 pg Final   • MCHC 02/06/2020 33.5  31.5 - 35.7 g/dL Final   • RDW 02/06/2020 12.7  12.3 - 15.4 % Final   • RDW-SD 02/06/2020 43.5  37.0 - 54.0 fl Final   • MPV 02/06/2020 9.1  6.0 - 12.0 fL Final   • Platelets 02/06/2020 432  140 - 450 10*3/mm3 Final   • Neutrophil % 02/06/2020 60.1  42.7 - 76.0 % Final   • Lymphocyte % 02/06/2020 28.6  19.6 - 45.3 % Final   • Monocyte % 02/06/2020 8.3  5.0 - 12.0 % Final   • Eosinophil % 02/06/2020 1.7  0.3 - 6.2 % Final   • Basophil % 02/06/2020 0.6  0.0 - 1.5 % Final   • Immature Grans % 02/06/2020 0.7* 0.0 - 0.5 % Final   • Neutrophils, Absolute 02/06/2020 8.54* 1.70 - 7.00 10*3/mm3 Final   • Lymphocytes, Absolute 02/06/2020 4.07* 0.70 - 3.10 10*3/mm3 Final   • Monocytes, Absolute 02/06/2020 1.18* 0.10 - 0.90 10*3/mm3 Final   • Eosinophils, Absolute 02/06/2020 0.24  0.00 - 0.40 10*3/mm3 Final   • Basophils, Absolute 02/06/2020 0.08  0.00 - 0.20 10*3/mm3 Final   • Immature Grans, Absolute 02/06/2020 0.10* 0.00 - 0.05 10*3/mm3 Final   • nRBC 02/06/2020 0.0  0.0 - 0.2 /100 WBC Final   • Extra Tube 02/06/2020 hold for add-on   Final    Auto resulted   • Extra Tube 02/06/2020 Hold for add-ons.   Final    Auto resulted.   • Neutrophil % 02/06/2020 62.0  42.7 - 76.0 % Final   • Lymphocyte % 02/06/2020 31.0  19.6 - 45.3 % Final   • Monocyte % 02/06/2020 6.0  5.0 - 12.0 % Final   • Bands %  02/06/2020 1.0  0.0 - 5.0 % Final   • Neutrophils Absolute 02/06/2020 8.95* 1.70 - 7.00 10*3/mm3 Final   • Lymphocytes Absolute 02/06/2020 4.41* 0.70 - 3.10 10*3/mm3 Final   • Monocytes Absolute 02/06/2020 0.85  0.10 - 0.90 10*3/mm3 Final   • RBC Morphology 02/06/2020 Normal  Normal Final   • WBC Morphology 02/06/2020 Normal  Normal Final   • Platelet Morphology 02/06/2020 Normal   Normal Final   Lab on 02/04/2020   Component Date Value Ref Range Status   • WBC 02/04/2020 18.81* 3.40 - 10.80 10*3/mm3 Final   • RBC 02/04/2020 4.62  3.77 - 5.28 10*6/mm3 Final   • Hemoglobin 02/04/2020 15.2  12.0 - 15.9 g/dL Final   • Hematocrit 02/04/2020 43.0  34.0 - 46.6 % Final   • MCV 02/04/2020 93.1  79.0 - 97.0 fL Final   • MCH 02/04/2020 32.9  26.6 - 33.0 pg Final   • MCHC 02/04/2020 35.3  31.5 - 35.7 g/dL Final   • RDW 02/04/2020 12.4  12.3 - 15.4 % Final   • RDW-SD 02/04/2020 42.2  37.0 - 54.0 fl Final   • MPV 02/04/2020 9.7  6.0 - 12.0 fL Final   • Platelets 02/04/2020 489* 140 - 450 10*3/mm3 Final   • Glucose 02/04/2020 93  65 - 99 mg/dL Final   • BUN 02/04/2020 14  6 - 20 mg/dL Final   • Creatinine 02/04/2020 0.76  0.57 - 1.00 mg/dL Final   • Sodium 02/04/2020 140  136 - 145 mmol/L Final   • Potassium 02/04/2020 4.1  3.5 - 5.2 mmol/L Final   • Chloride 02/04/2020 101  98 - 107 mmol/L Final   • CO2 02/04/2020 22.7  22.0 - 29.0 mmol/L Final   • Calcium 02/04/2020 9.0  8.6 - 10.5 mg/dL Final   • Total Protein 02/04/2020 6.4  6.0 - 8.5 g/dL Final   • Albumin 02/04/2020 4.40  3.50 - 5.20 g/dL Final   • ALT (SGPT) 02/04/2020 6  1 - 33 U/L Final   • AST (SGOT) 02/04/2020 11  1 - 32 U/L Final   • Alkaline Phosphatase 02/04/2020 62  39 - 117 U/L Final   • Total Bilirubin 02/04/2020 0.3  0.2 - 1.2 mg/dL Final   • eGFR Non African Amer 02/04/2020 80  >60 mL/min/1.73 Final   • Globulin 02/04/2020 2.0  gm/dL Final   • A/G Ratio 02/04/2020 2.2  g/dL Final   • BUN/Creatinine Ratio 02/04/2020 18.4  7.0 - 25.0 Final   • Anion Gap 02/04/2020 16.3* 5.0 - 15.0 mmol/L Final   • Amylase 02/04/2020 51  28 - 100 U/L Final   • Lipase 02/04/2020 31  13 - 60 U/L Final   • Beef 02/04/2020 <0.10  <0.35 kU/L Final   • Class Description 02/04/2020 0   Final   • [place] 02/04/2020 <0.10  <0.35 kU/L Final   • Class Interpretation 02/04/2020 0   Final   • Pork 02/04/2020 <0.10  <0.35 kU/L Final   • Class Interpretation 02/04/2020 0    Final    The test method is the Hydrocision ImmunoCAP allergen-specific  IgE system. CLASS INTERPRETATION   <0.10 kU/L= 0,  Negative; 0.10 - 0.34 kU/L= 0/1, Equivocal/Borderline;  0.35 - 0.69  kU/L=1, Low Positive; 0.70 - 3.49 kU/L=2,  Moderate Positive;  3.50  - 17.49 kU/L=3, High Positive;  17.50 - 49.99 kU/L= 4, Very High Positive; 50.00 - 99.99  kU/L= 5, Very High Positive;   >99.99 kU/L=6, Very High  Positive  *This test was developed and its performance  characteristics determined by [facility]. It has not  been cleared or approved by the [place]. Food and Drug  Administration.   • Alpha Gal IgE 02/04/2020 <0.10  <0.10 kU/L Final    Previous reports (ABIEL 2009;123:426-433) have demonstrated  that patients with IgE antibodies to  ntxdlgpnd-q-2,3-galactose are at risk for delayed  anaphylaxis, angioedema, or urticaria following  consumption of beef, pork, or lamb.   • Gliadin Deamidated Peptide Ab, IgA 02/04/2020 4  0 - 19 units Final                       Negative                   0 - 19                     Weak Positive             20 - 30                     Moderate to Strong Positive   >30   • Deaminated Gliadin Ab IgG 02/04/2020 5  0 - 19 units Final                       Negative                   0 - 19                     Weak Positive             20 - 30                     Moderate to Strong Positive   >30   • Tissue Transglutaminase IgA 02/04/2020 <2  0 - 3 U/mL Final                                  Negative        0 -  3                                Weak Positive   4 - 10                                Positive           >10   Tissue Transglutaminase (tTG) has been identified   as the endomysial antigen.  Studies have demonstr-   ated that endomysial IgA antibodies have over 99%   specificity for gluten sensitive enteropathy.   • Tissue Transglutaminase IgG 02/04/2020 10* 0 - 5 U/mL Final                                  Negative        0 - 5                                Weak Positive   6 - 9                                 Positive           >9   • Endomysial IgA 02/04/2020 Negative  Negative Final   • IgA 02/04/2020 64* 87 - 352 mg/dL Final   • Class Description 02/04/2020 Comment   Final        Levels of Specific IgE       Class  Description of Class      ---------------------------  -----  --------------------                     < 0.10         0         Negative             0.10 -    0.31         0/I       Equivocal/Low             0.32 -    0.55         I         Low             0.56 -    1.40         II        Moderate             1.41 -    3.90         III       High             3.91 -   19.00         IV        Very High            19.01 -  100.00         V         Very High                    >100.00         VI        Very High   • Egg White 02/04/2020 <0.10  Class 0 kU/L Final   • Milk, Cow's 02/04/2020 <0.10  Class 0 kU/L Final   • CodFish 02/04/2020 <0.10  Class 0 kU/L Final   • Sesame Seed 02/04/2020 <0.10  Class 0 kU/L Final   • Peanut 02/04/2020 <0.10  Class 0 kU/L Final   • Soybean 02/04/2020 <0.10  Class 0 kU/L Final   • Hazelnut 02/04/2020 <0.10  Class 0 kU/L Final   • Shrimp 02/04/2020 <0.10  Class 0 kU/L Final   • Scallop 02/04/2020 <0.10  Class 0 kU/L Final   • Gluten 02/04/2020 <0.10  Class 0 kU/L Final   • [place] 02/04/2020 <0.10  Class 0 kU/L Final   • Wheat 02/04/2020 <0.10  Class 0 kU/L Final   • Gliadin Deamidated Peptide Ab, IgA 02/04/2020 4  0 - 19 units Final                       Negative                   0 - 19                     Weak Positive             20 - 30                     Moderate to Strong Positive   >30   • Deaminated Gliadin Ab IgG 02/04/2020 6  0 - 19 units Final                       Negative                   0 - 19                     Weak Positive             20 - 30                     Moderate to Strong Positive   >30   • Tissue Transglutaminase IgA 02/04/2020 <2  0 - 3 U/mL Final                                  Negative        0 -  3                                 Weak Positive   4 - 10                                Positive           >10   Tissue Transglutaminase (tTG) has been identified   as the endomysial antigen.  Studies have demonstr-   ated that endomysial IgA antibodies have over 99%   specificity for gluten sensitive enteropathy.   • Tissue Transglutaminase IgG 02/04/2020 11* 0 - 5 U/mL Final                                  Negative        0 - 5                                Weak Positive   6 - 9                                Positive           >9   • Campylobacter 02/04/2020 Not Detected  Not Detected, Invalid Final   • Plesiomonas shigelloides 02/04/2020 Not Detected  Not Detected Final   • Salmonella 02/04/2020 Not Detected  Not Detected Final   • Vibrio 02/04/2020 Not Detected  Not Detected Final   • Vibrio cholerae 02/04/2020 Not Detected  Not Detected Final   • Yersinia enterocolitica 02/04/2020 Not Detected  Not Detected Final   • Enteroaggregative E. coli (EAEC) 02/04/2020 Not Detected  Not Detected Final   • Enteropathogenic E. coli (EPEC) 02/04/2020 Not Detected  Not Detected Final   • Enterotoxigenic E. coli (ETEC) lt/* 02/04/2020 Not Detected  Not Detected Final   • Shiga-like toxin-producing E. coli* 02/04/2020 Detected* Not Detected Final   • E. coli O157 02/04/2020 Not Detected  Not Detected Final   • Shigella/Enteroinvasive E. coli (E* 02/04/2020 Not Detected  Not Detected Final   • Cryptosporidium 02/04/2020 Not Detected  Not Detected, Invalid Final   • Cyclospora cayetanensis 02/04/2020 Not Detected  Not Detected Final   • Entamoeba histolytica 02/04/2020 Not Detected  Not Detected Final   • Giardia lamblia 02/04/2020 Not Detected  Not Detected Final   • Adenovirus F40/41 02/04/2020 Not Detected  Not Detected Final   • Astrovirus 02/04/2020 Not Detected  Not Detected Final   • Norovirus GI/GII 02/04/2020 Not Detected  Not Detected Final   • Rotavirus A 02/04/2020 Not Detected  Not Detected Final   • Sapovirus (I, II, IV or V)  02/04/2020 Not Detected  Not Detected Final   • C. Difficile Toxins by PCR 02/04/2020 Positive* Negative Final   • Neutrophil % 02/04/2020 60.6  42.7 - 76.0 % Final   • Lymphocyte % 02/04/2020 25.3  19.6 - 45.3 % Final   • Monocyte % 02/04/2020 11.1  5.0 - 12.0 % Final   • Eosinophil % 02/04/2020 1.0  0.3 - 6.2 % Final   • Basophil % 02/04/2020 2.0* 0.0 - 1.5 % Final   • Neutrophils Absolute 02/04/2020 11.40* 1.70 - 7.00 10*3/mm3 Final   • Lymphocytes Absolute 02/04/2020 4.76* 0.70 - 3.10 10*3/mm3 Final   • Monocytes Absolute 02/04/2020 2.09* 0.10 - 0.90 10*3/mm3 Final   • Eosinophils Absolute 02/04/2020 0.19  0.00 - 0.40 10*3/mm3 Final   • Basophils Absolute 02/04/2020 0.38* 0.00 - 0.20 10*3/mm3 Final   • Poikilocytes 02/04/2020 Slight/1+  None Seen Final   • WBC Morphology 02/04/2020 Normal  Normal Final   • Platelet Morphology 02/04/2020 Normal  Normal Final   Admission on 10/19/2019, Discharged on 10/19/2019   Component Date Value Ref Range Status   • Glucose 10/19/2019 99  65 - 99 mg/dL Final   • BUN 10/19/2019 11  6 - 20 mg/dL Final   • Creatinine 10/19/2019 0.67  0.57 - 1.00 mg/dL Final   • Sodium 10/19/2019 141  136 - 145 mmol/L Final   • Potassium 10/19/2019 4.6  3.5 - 5.2 mmol/L Final   • Chloride 10/19/2019 102  98 - 107 mmol/L Final   • CO2 10/19/2019 31.0* 22.0 - 29.0 mmol/L Final   • Calcium 10/19/2019 9.2  8.6 - 10.5 mg/dL Final   • Total Protein 10/19/2019 6.9  6.0 - 8.5 g/dL Final   • Albumin 10/19/2019 4.40  3.50 - 5.20 g/dL Final   • ALT (SGPT) 10/19/2019 12  1 - 33 U/L Final   • AST (SGOT) 10/19/2019 11  1 - 32 U/L Final   • Alkaline Phosphatase 10/19/2019 89  39 - 117 U/L Final   • Total Bilirubin 10/19/2019 0.3  0.2 - 1.2 mg/dL Final   • eGFR Non African Amer 10/19/2019 93  >60 mL/min/1.73 Final   • Globulin 10/19/2019 2.5  gm/dL Final   • A/G Ratio 10/19/2019 1.8  g/dL Final   • BUN/Creatinine Ratio 10/19/2019 16.4  7.0 - 25.0 Final   • Anion Gap 10/19/2019 8.0  5.0 - 15.0 mmol/L Final   •  Creatine Kinase 10/19/2019 53  20 - 180 U/L Final   • Lipase 10/19/2019 35  13 - 60 U/L Final   • WBC 10/19/2019 17.94* 3.40 - 10.80 10*3/mm3 Final   • RBC 10/19/2019 4.46  3.77 - 5.28 10*6/mm3 Final   • Hemoglobin 10/19/2019 14.0  12.0 - 15.9 g/dL Final   • Hematocrit 10/19/2019 42.4  34.0 - 46.6 % Final   • MCV 10/19/2019 95.1  79.0 - 97.0 fL Final   • MCH 10/19/2019 31.4  26.6 - 33.0 pg Final   • MCHC 10/19/2019 33.0  31.5 - 35.7 g/dL Final   • RDW 10/19/2019 12.7  12.3 - 15.4 % Final   • RDW-SD 10/19/2019 43.9  37.0 - 54.0 fl Final   • MPV 10/19/2019 9.0  6.0 - 12.0 fL Final   • Platelets 10/19/2019 541* 140 - 450 10*3/mm3 Final   • Neutrophil % 10/19/2019 70.0  42.7 - 76.0 % Final   • Lymphocyte % 10/19/2019 20.5  19.6 - 45.3 % Final   • Monocyte % 10/19/2019 7.4  5.0 - 12.0 % Final   • Eosinophil % 10/19/2019 0.4  0.3 - 6.2 % Final   • Basophil % 10/19/2019 0.3  0.0 - 1.5 % Final   • Immature Grans % 10/19/2019 1.4* 0.0 - 0.5 % Final   • Neutrophils, Absolute 10/19/2019 12.56* 1.70 - 7.00 10*3/mm3 Final   • Lymphocytes, Absolute 10/19/2019 3.67* 0.70 - 3.10 10*3/mm3 Final   • Monocytes, Absolute 10/19/2019 1.33* 0.10 - 0.90 10*3/mm3 Final   • Eosinophils, Absolute 10/19/2019 0.07  0.00 - 0.40 10*3/mm3 Final   • Basophils, Absolute 10/19/2019 0.06  0.00 - 0.20 10*3/mm3 Final   • Immature Grans, Absolute 10/19/2019 0.25* 0.00 - 0.05 10*3/mm3 Final   • nRBC 10/19/2019 0.0  0.0 - 0.2 /100 WBC Final   • Extra Tube 10/19/2019 hold for add-on   Final    Auto resulted   • Extra Tube 10/19/2019 Hold for add-ons.   Final    Auto resulted.   Admission on 10/18/2019, Discharged on 10/18/2019   Component Date Value Ref Range Status   • Case Report 10/18/2019    Final                    Value:Surgical Pathology Report                         Case: [id number]                                  Authorizing Provider:  Tom Davis MD        Collected:           10/18/2019 11:09 AM          Ordering Location:     [facility]  HEALTH             Received:            10/18/2019 01:35 PM                                 [facility]                                                     Pathologist:           Rambo Carter MD                                                         Specimens:   1) - Small Intestine, Duodenum, small bowel                                                         2) - Gastric, Antrum                                                                                3) - Small Intestine, Ileum, TI                                                                     4) - Large Intestine, colonic mucosa                                                      • Final Diagnosis 10/18/2019    Final                    Value:This result contains rich text formatting which cannot be displayed here.   • Gross Description 10/18/2019    Final                    Value:This result contains rich text formatting which cannot be displayed here.      XR Chest 1 View  Narrative: PORTABLE CHEST    HISTORY: Syncope. Headache.    Portable AP upright film of the chest was obtained at 11:40 AM.  COMPARISON: August 29, 2019    FINDINGS:   The lungs are clear of an acute process.  The heart is not enlarged.  The pulmonary vasculature is not increased.  No pleural effusion.  No pneumothorax.  No acute osseous abnormality.  Postoperative changes in the cervical spine, right clavicle and  right humeral head.  Impression: CONCLUSION:  No Acute Disease    [id number]    Electronically signed by:  Theo Jacobson MD  3/2/2020 12:29 PM CST  Workstation: [id number]  CT Head Without Contrast  Narrative: CT Head Without Contrast    History: Worsening headache x3 days. Head trauma.    Axial scans of the brain were obtained without intravenous  contrast.  Coronal and sagital reconstructions were preformed.    This exam was performed according to our departmental  dose-optimization program, which includes automated exposure  control, adjustment of the mA  "and/or kV according to patient size  and/or use of iterative reconstruction technique.    DLP: 1128    Comparison: None    Findings:  Bone windows are unremarkable.  The visualized paranasal sinuses are unremarkable.    No hemorrhage.  No mass.  No abnormal areas of increased or decreased attenuation.  No midline shift.  No abnormal extra-axial fluid collections.  Impression: CONCLUSION:  No intracranial injury or acute process    [id number]    Electronically signed by:  Theo Jacobson MD  3/2/2020 11:36 AM [facility]  Workstation: [id number]    [email or web address]  [facility] [place] History   Administered Date(s) Administered   • flucelvax quad pfs =>2 YRS 09/19/2019       The following portions of the patient's history were reviewed and updated as appropriate: allergies, current medications, past family history, past medical history, past social history, past surgical history and problem list.        Physical Exam  /64 (BP Location: Right arm, Patient Position: Sitting, Cuff Size: Adult)   Pulse 96   Temp 98.2 °F (36.8 °C) (Oral)   Ht 165.1 cm (65\")   Wt 49.5 kg (109 lb 3.2 oz)   LMP  (LMP Unknown)   SpO2 98%   BMI 18.17 kg/m²     Physical Exam   Constitutional: She is oriented to person, place, and time. She appears well-developed and well-nourished.   HENT:   Head: Normocephalic and atraumatic.   Right Ear: External ear normal.   Eyes: Pupils are equal, round, and reactive to light. Conjunctivae and EOM are normal.   Neck: Normal range of motion. Neck supple.   Cardiovascular: Normal rate, regular rhythm and normal heart sounds.   No murmur heard.  Pulmonary/Chest: No respiratory distress.   Decreased breath sounds    Abdominal: Soft. Bowel sounds are normal. She exhibits no distension. There is no tenderness.   Musculoskeletal: Normal range of motion. She exhibits tenderness. She exhibits no edema or deformity.   Neurological: She is alert and oriented to person, place, and time. No cranial nerve deficit.   CN 2-12 intact "   Skin: Skin is warm. No rash noted. She is not diaphoretic. No erythema. No pallor.   Psychiatric: She has a normal mood and affect. Her behavior is normal.   Nursing note and vitals reviewed.      Assessment/Plan    Diagnosis Plan   1. Tremor     2. Tobacco user     3. Vitamin D deficiency      4. Stage 2 moderate COPD by GOLD classification (CMS/[facility])     5. Shiga toxin-producing Escherichia coli infection     6. Nausea and vomiting, intractability of vomiting not specified, unspecified vomiting type     7. Gluten intolerance     8. Episode of recurrent major depressive disorder, unspecified depression episode severity (CMS/[facility])     9. Chronic idiopathic constipation     10. Chronic diarrhea     11. Chronic bronchitis, unspecified chronic bronchitis type (CMS/[facility])     12. C. difficile diarrhea     13. Injury of head, sequela     14. Concussion with loss of consciousness, sequela (CMS/[facility])          -reviewed last ER visit at [facility]  -concussion/head trauma - recommend pt followup with Dr. Manjarrez with Neurologist. She continues to smoke marijuana. Recommend continue with that for now. zofran 8 mg every 8 hours for nausea/vomting.highly recommend she not drive until symptoms resove. Her mother will drive for her for now.   She will call and make appt with Dr. Manjarrez   -sanket toussaint diarrhea -repeat stool studies   -gluten sensitivity -recommend gluten free diet   -COPD/COPD exacerbation - referral to Pulmonology for PFTs/  Start on prednisone tapering dose.mycoplasma negative Stop  Doxycycline 100 mg PO BID. Pt states she was on this before and could not tolerate due to GI upset. But she will try this time with probiotic supplements. Albuterol inhaler along with nebulizer machine.   she was on  Breo and Combvent. Anoro Stopped. Pulmonology following now on Symbicort 160/4.4 mcg 2 puffs BID.   Also on albuterol inhalers PRN    -vitamin B12 deficiency  - last b12 stable will start on vitamin b12 250 mcg gordon.  Continue to monitor     -tobacco user - counseled to quit smoking >5 minutes. She could not tolerate chantix  urged the importance of quitting smoking.   -chronic bronchitis, - mucinex adivsed smoking cessation   -abdominal pain- normal CT  Of abdomen in October 2019. Normal gastric empyting study in December 2019. Continue bentyl but will increase dosage from 20 mg TID to 40 mg QID.  Recheck in 4 weeks.  Check amylase, lipase, and cbc and cmp. Check alpha gal, celiac panel and recurrent GI distress panel. Also get CT angioram of abdomen and pelvis with contrast to rule out any vascular causes  -nausea/ vomting - continue zofran   -pt has upcoming mammogram soon.    -regarding tremor of head - she has seen neurology with Dr. Chen. Had MRI of brain. Recommended 2nd opinon with Dr. Manjarrez She also has family history of Friedreich's ataxia  -advised pt to go to ER or call 911 if symptoms worrisome or severe  -advised pt to be  Safe and call with questions and concerns  -advised to followup with all referrals and Specialist  -recheck in 4 weeks         This document has been electronically signed by Xavier Wick MD on March 4, 2020 10:52 AM

## 2020-03-04 ENCOUNTER — OFFICE VISIT (OUTPATIENT)
Dept: FAMILY MEDICINE CLINIC | Facility: CLINIC | Age: 50
End: 2020-03-04

## 2020-03-04 VITALS
BODY MASS INDEX: 18.19 KG/M2 | HEART RATE: 96 BPM | SYSTOLIC BLOOD PRESSURE: 102 MMHG | OXYGEN SATURATION: 98 % | TEMPERATURE: 98.2 F | DIASTOLIC BLOOD PRESSURE: 64 MMHG | WEIGHT: 109.2 LBS | HEIGHT: 65 IN

## 2020-03-04 DIAGNOSIS — K59.04 CHRONIC IDIOPATHIC CONSTIPATION: ICD-10-CM

## 2020-03-04 DIAGNOSIS — J44.9 STAGE 2 MODERATE COPD BY GOLD CLASSIFICATION (HCC): ICD-10-CM

## 2020-03-04 DIAGNOSIS — R11.2 NAUSEA AND VOMITING, INTRACTABILITY OF VOMITING NOT SPECIFIED, UNSPECIFIED VOMITING TYPE: ICD-10-CM

## 2020-03-04 DIAGNOSIS — J42 CHRONIC BRONCHITIS, UNSPECIFIED CHRONIC BRONCHITIS TYPE (HCC): ICD-10-CM

## 2020-03-04 DIAGNOSIS — K52.9 CHRONIC DIARRHEA: ICD-10-CM

## 2020-03-04 DIAGNOSIS — Z72.0 TOBACCO USER: ICD-10-CM

## 2020-03-04 DIAGNOSIS — A04.72 C. DIFFICILE DIARRHEA: ICD-10-CM

## 2020-03-04 DIAGNOSIS — A49.8 SHIGA TOXIN-PRODUCING ESCHERICHIA COLI INFECTION: ICD-10-CM

## 2020-03-04 DIAGNOSIS — F33.9 EPISODE OF RECURRENT MAJOR DEPRESSIVE DISORDER, UNSPECIFIED DEPRESSION EPISODE SEVERITY (HCC): ICD-10-CM

## 2020-03-04 DIAGNOSIS — S09.90XS INJURY OF HEAD, SEQUELA: ICD-10-CM

## 2020-03-04 DIAGNOSIS — S06.0X9S CONCUSSION WITH LOSS OF CONSCIOUSNESS, SEQUELA (HCC): ICD-10-CM

## 2020-03-04 DIAGNOSIS — E55.9 VITAMIN D DEFICIENCY: ICD-10-CM

## 2020-03-04 DIAGNOSIS — R25.1 TREMOR: Primary | ICD-10-CM

## 2020-03-04 DIAGNOSIS — K90.41 GLUTEN INTOLERANCE: ICD-10-CM

## 2020-03-04 PROBLEM — S06.0X9A CONCUSSION WITH LOSS OF CONSCIOUSNESS: Status: ACTIVE | Noted: 2020-03-04

## 2020-03-04 PROBLEM — S09.90XA HEAD INJURY: Status: ACTIVE | Noted: 2020-03-04

## 2020-03-04 PROCEDURE — 99214 OFFICE O/P EST MOD 30 MIN: CPT | Performed by: FAMILY MEDICINE

## 2020-03-04 RX ORDER — ONDANSETRON 8 MG/1
8 TABLET, ORALLY DISINTEGRATING ORAL EVERY 8 HOURS PRN
Qty: 90 TABLET | Refills: 3 | Status: SHIPPED | OUTPATIENT
Start: 2020-03-04 | End: 2020-07-26

## 2020-03-04 NOTE — PATIENT INSTRUCTIONS
Call Dr. Manjarrez for appt    Get stool test again    Limit noise sound no driving. Limit tv    Cut back on smoking    Melatonin for sleep as needed    zofran as needed for nausea vomiting     Concussion, Adult    A concussion is a brain injury from a hard, direct hit (trauma) to your head or body. This direct hit causes the brain to quickly shake back and forth inside the skull. A concussion may also be called a mild traumatic brain injury (TBI). Healing from this injury can take time.  What are the causes?  This condition is caused by:  · A direct hit to your head, such as:  ? Running into a player during a game.  ? Being hit in a fight.  ? Hitting your head on a hard surface.  · A quick and sudden movement (jolt) of the head or neck, such as in a car crash.  What are the signs or symptoms?  The signs of a concussion can be hard to notice. They may be missed by you, family members, and doctors. You may look fine on the outside but may not act or feel normal.  Physical symptoms  · Headaches.  · Being tired (fatigued).  · Being dizzy.  · Problems with body balance.  · Problems seeing or hearing.  · Being sensitive to light or noise.  · Feeling sick to your stomach (nausea) or throwing up (vomiting).  · Not sleeping or eating as you used to.  · Loss of feeling (numbness) or tingling in the body.  · Seizure.  Mental and emotional symptoms  · Problems remembering things.  · Trouble focusing your mind (concentrating), organizing, or making decisions.  · Being slow to think, act, react, speak, or read.  · Feeling grouchy (irritable).  · Having mood changes.  · Feeling worried or nervous (anxious).  · Feeling sad (depressed).  How is this treated?  This condition may be treated by:  · Stopping sports or activity if you are injured. If you hit your head or have signs of concussion:  ? Do not return to sports or activities the same day.  ? Get checked by a doctor before you return to your activities.  · Resting your body and  your mind.  · Being watched carefully, often at home.  · Medicines to help with symptoms such as:  ? Feeling sick to your stomach.  ? Headaches.  ? Problems with sleep.  § Avoid taking strong pain medicines (opioids) for a concussion.  · Avoiding alcohol and drugs.  · Being asked to go to a concussion clinic or a place to help you recover (rehabilitation center).  Recovery from a concussion can take time. Return to activities only:  · When you are fully healed.  · When your doctor says it is safe.  Follow these instructions at home:  Activity  · Limit activities that need a lot of thought or focus, such as:  ? Homework or work for your job.  ? Watching TV.  ? Using the computer or phone.  ? Playing memory games and puzzles.  · Rest. Rest helps your brain heal. Make sure you:  ? Get plenty of sleep. Most adults should get 7-9 hours of sleep each night.  ? Rest during the day. Take naps or breaks when you feel tired.  · Avoid activity like exercise until your doctor says its safe. Stop any activity that makes symptoms worse.  · Do not do activities that could cause a second concussion, such as riding a bike or playing sports.  · Ask your doctor when you can return to your normal activities, such as school, work, sports, and driving. Your ability to react may be slower. Do not do these activities if you are dizzy.  General instructions    · Take over-the-counter and prescription medicines only as told by your doctor.  · Do not drink alcohol until your doctor says you can.  · Watch your symptoms and tell other people to do the same. Other problems can occur after a concussion. Older adults have a higher risk of serious problems.  · Tell your , teachers, school nurse, school counselor, , or  about your injury and symptoms. Tell them about what you can or cannot do.  · Keep all follow-up visits as told by your doctor. This is important.  How is this prevented?  · It is very important that  you do not get another brain injury. In rare cases, another injury can cause brain damage that will not go away, brain swelling, or death. The risk of this is greatest in the first 7-10 days after a head injury. To avoid injuries:  ? Stop activities that could lead to a second concussion, such as contact sports, until your doctor says it is okay.  ? When you return to sports or activities:  § Do not crash into other players. This is how most concussions happen.  § Follow the rules.  § Respect other players.  ? Get regular exercise. Do strength and balance training.  ? Wear a helmet that fits you well during sports, biking, or other activities.  § Helmets can help protect you from serious skull and brain injuries, but they do not protect you from a concussion. Even when wearing a helmet, you should avoid being hit in the head.  Contact a doctor if:  · Your symptoms get worse or they do not get better.  · You have new symptoms.  · You have another injury.  Get help right away if:  · You have bad headaches or your headaches get worse.  · You feel weak or numb in any part of your body.  · You are mixed up (confused).  · Your balance gets worse.  · You keep throwing up.  · You feel more sleepy than normal.  · Your speech is not clear (is slurred).  · You cannot recognize people or places.  · You have a seizure.  · Others have trouble waking you up.  · You have behavior changes.  · You have changes in how you see (vision).  · You pass out (lose consciousness).  Summary  · A concussion is a brain injury from a hard, direct hit (trauma) to your head or body.  · This condition is treated with rest and careful watching of symptoms.  · If you keep having symptoms, call your doctor.  This information is not intended to replace advice given to you by your health care provider. Make sure you discuss any questions you have with your health care provider.  Document Released: 12/06/2010 Document Revised: 08/08/2019 Document  Reviewed: 08/08/2019  [employer] Interactive Patient Education © 2020 [employer]    Concussion, Adult    A concussion is a brain injury from a hard, direct hit (trauma) to your head or body. This direct hit causes the brain to quickly shake back and forth inside the skull. A concussion may also be called a mild traumatic brain injury (TBI). Healing from this injury can take time.  What are the causes?  This condition is caused by:  · A direct hit to your head, such as:  ? Running into a player during a game.  ? Being hit in a fight.  ? Hitting your head on a hard surface.  · A quick and sudden movement (jolt) of the head or neck, such as in a car crash.  What are the signs or symptoms?  The signs of a concussion can be hard to notice. They may be missed by you, family members, and doctors. You may look fine on the outside but may not act or feel normal.  Physical symptoms  · Headaches.  · Being tired (fatigued).  · Being dizzy.  · Problems with body balance.  · Problems seeing or hearing.  · Being sensitive to light or noise.  · Feeling sick to your stomach (nausea) or throwing up (vomiting).  · Not sleeping or eating as you used to.  · Loss of feeling (numbness) or tingling in the body.  · Seizure.  Mental and emotional symptoms  · Problems remembering things.  · Trouble focusing your mind (concentrating), organizing, or making decisions.  · Being slow to think, act, react, speak, or read.  · Feeling grouchy (irritable).  · Having mood changes.  · Feeling worried or nervous (anxious).  · Feeling sad (depressed).  How is this treated?  This condition may be treated by:  · Stopping sports or activity if you are injured. If you hit your head or have signs of concussion:  ? Do not return to sports or activities the same day.  ? Get checked by a doctor before you return to your activities.  · Resting your body and your mind.  · Being watched carefully, often at home.  · Medicines to help with symptoms such  as:  ? Feeling sick to your stomach.  ? Headaches.  ? Problems with sleep.  § Avoid taking strong pain medicines (opioids) for a concussion.  · Avoiding alcohol and drugs.  · Being asked to go to a concussion clinic or a place to help you recover (rehabilitation center).  Recovery from a concussion can take time. Return to activities only:  · When you are fully healed.  · When your doctor says it is safe.  Follow these instructions at home:  Activity  · Limit activities that need a lot of thought or focus, such as:  ? Homework or work for your job.  ? Watching TV.  ? Using the computer or phone.  ? Playing memory games and puzzles.  · Rest. Rest helps your brain heal. Make sure you:  ? Get plenty of sleep. Most adults should get 7-9 hours of sleep each night.  ? Rest during the day. Take naps or breaks when you feel tired.  · Avoid activity like exercise until your doctor says its safe. Stop any activity that makes symptoms worse.  · Do not do activities that could cause a second concussion, such as riding a bike or playing sports.  · Ask your doctor when you can return to your normal activities, such as school, work, sports, and driving. Your ability to react may be slower. Do not do these activities if you are dizzy.  General instructions    · Take over-the-counter and prescription medicines only as told by your doctor.  · Do not drink alcohol until your doctor says you can.  · Watch your symptoms and tell other people to do the same. Other problems can occur after a concussion. Older adults have a higher risk of serious problems.  · Tell your , teachers, school nurse, school counselor, , or  about your injury and symptoms. Tell them about what you can or cannot do.  · Keep all follow-up visits as told by your doctor. This is important.  How is this prevented?  · It is very important that you do not get another brain injury. In rare cases, another injury can cause brain damage that  will not go away, brain swelling, or death. The risk of this is greatest in the first 7-10 days after a head injury. To avoid injuries:  ? Stop activities that could lead to a second concussion, such as contact sports, until your doctor says it is okay.  ? When you return to sports or activities:  § Do not crash into other players. This is how most concussions happen.  § Follow the rules.  § Respect other players.  ? Get regular exercise. Do strength and balance training.  ? Wear a helmet that fits you well during sports, biking, or other activities.  § Helmets can help protect you from serious skull and brain injuries, but they do not protect you from a concussion. Even when wearing a helmet, you should avoid being hit in the head.  Contact a doctor if:  · Your symptoms get worse or they do not get better.  · You have new symptoms.  · You have another injury.  Get help right away if:  · You have bad headaches or your headaches get worse.  · You feel weak or numb in any part of your body.  · You are mixed up (confused).  · Your balance gets worse.  · You keep throwing up.  · You feel more sleepy than normal.  · Your speech is not clear (is slurred).  · You cannot recognize people or places.  · You have a seizure.  · Others have trouble waking you up.  · You have behavior changes.  · You have changes in how you see (vision).  · You pass out (lose consciousness).  Summary  · A concussion is a brain injury from a hard, direct hit (trauma) to your head or body.  · This condition is treated with rest and careful watching of symptoms.  · If you keep having symptoms, call your doctor.  This information is not intended to replace advice given to you by your health care provider. Make sure you discuss any questions you have with your health care provider.  Document Released: 12/06/2010 Document Revised: 08/08/2019 Document Reviewed: 08/08/2019  [employer] Interactive Patient Education © 2020 [employer]

## 2020-03-17 ENCOUNTER — OFFICE VISIT (OUTPATIENT)
Dept: FAMILY MEDICINE CLINIC | Facility: CLINIC | Age: 50
End: 2020-03-17

## 2020-03-17 VITALS
BODY MASS INDEX: 17.96 KG/M2 | WEIGHT: 107.8 LBS | DIASTOLIC BLOOD PRESSURE: 78 MMHG | TEMPERATURE: 98 F | OXYGEN SATURATION: 96 % | HEIGHT: 65 IN | SYSTOLIC BLOOD PRESSURE: 108 MMHG | RESPIRATION RATE: 20 BRPM | HEART RATE: 100 BPM

## 2020-03-17 DIAGNOSIS — J42 CHRONIC BRONCHITIS, UNSPECIFIED CHRONIC BRONCHITIS TYPE (HCC): ICD-10-CM

## 2020-03-17 DIAGNOSIS — J44.1 COPD EXACERBATION (HCC): Primary | ICD-10-CM

## 2020-03-17 PROCEDURE — 99213 OFFICE O/P EST LOW 20 MIN: CPT | Performed by: NURSE PRACTITIONER

## 2020-03-17 PROCEDURE — 96372 THER/PROPH/DIAG INJ SC/IM: CPT | Performed by: NURSE PRACTITIONER

## 2020-03-17 RX ORDER — METHYLPREDNISOLONE SODIUM SUCCINATE 40 MG/ML
80 INJECTION, POWDER, LYOPHILIZED, FOR SOLUTION INTRAMUSCULAR; INTRAVENOUS ONCE
Status: COMPLETED | OUTPATIENT
Start: 2020-03-17 | End: 2020-03-17

## 2020-03-17 RX ORDER — FLUCONAZOLE 150 MG/1
150 TABLET ORAL TAKE AS DIRECTED
Qty: 3 TABLET | Refills: 0 | Status: SHIPPED | OUTPATIENT
Start: 2020-03-17 | End: 2020-04-10

## 2020-03-17 RX ORDER — PREDNISONE 10 MG/1
TABLET ORAL
Qty: 21 TABLET | Refills: 0 | Status: SHIPPED | OUTPATIENT
Start: 2020-03-17 | End: 2020-04-10

## 2020-03-17 RX ORDER — AMOXICILLIN 875 MG/1
875 TABLET, COATED ORAL 2 TIMES DAILY
Qty: 20 TABLET | Refills: 0 | Status: SHIPPED | OUTPATIENT
Start: 2020-03-17 | End: 2020-03-27

## 2020-03-17 RX ADMIN — METHYLPREDNISOLONE SODIUM SUCCINATE 80 MG: 40 INJECTION, POWDER, LYOPHILIZED, FOR SOLUTION INTRAMUSCULAR; INTRAVENOUS at 10:44

## 2020-03-17 NOTE — PROGRESS NOTES
Chief Complaint   Patient presents with   • Cough   • Sinusitis   • Sore Throat   • Headache   • Neck Pain       Subjective:  Carla Richard is a 50 y.o. female who presents for worsening cough, nasal drainage, sore throat and neck pain. Reports using albuterol neb at home but only helps for a short time. Denies fever. Pt is wearing surgical mask. Pt able to have conversation without obvious signs of respiratory distress. O2 96%. Pt reports recently finishing abx for C.diff infection but could not recall abx name. Reports recent concussion that is still affecting her memory. Could only provide limited information on what abx she has taken in the past without SE.      The following portions of the patient's history were reviewed and updated as appropriate: allergies, current medications, past family history, past medical history, past social history, past surgical history and problem list.    Cough   This is a chronic problem. The current episode started in the past 7 days (worsening of past week). The problem has been gradually worsening. The cough is non-productive. Associated symptoms include headaches, a sore throat, shortness of breath and wheezing. Pertinent negatives include no chest pain or fever. She has tried a beta-agonist inhaler and steroid inhaler for the symptoms. The treatment provided no relief. Her past medical history is significant for bronchitis and COPD.   Sore Throat    This is a new problem. The current episode started in the past 7 days. The problem has been unchanged. There has been no fever. The pain is at a severity of 4/10. The pain is mild. Associated symptoms include congestion, coughing, headaches, a hoarse voice, neck pain and shortness of breath. Pertinent negatives include no trouble swallowing.        Past Medical History:   Diagnosis Date   • Asthma    • Cancer (CMS/HCC)     cervical   • Colon polyp    • COPD (chronic obstructive pulmonary disease) (CMS/HCC)    • Crohn's  disease (CMS/HCC)    • Diverticulitis of colon    • Elevated cholesterol    • GERD (gastroesophageal reflux disease)    • History of transfusion    • Irritable bowel syndrome    • Pancreatitis          Current Outpatient Medications:   •  albuterol sulfate  (90 Base) MCG/ACT inhaler, Inhale 2 puffs Every 4 (Four) Hours As Needed for Wheezing., Disp: 18 g, Rfl: 3  •  budesonide-formoterol (SYMBICORT) 160-4.5 MCG/ACT inhaler, Inhale 2 puffs 2 (Two) Times a Day., Disp: 1 inhaler, Rfl: 11  •  cetirizine (zyrTEC) 10 MG tablet, Take 1 tablet by mouth Daily., Disp: 30 tablet, Rfl: 11  •  dicyclomine (BENTYL) 20 MG tablet, Take 2 tablets by mouth Every 6 (Six) Hours., Disp: 240 tablet, Rfl: 3  •  escitalopram (LEXAPRO) 10 MG tablet, Take 1 tablet by mouth Daily., Disp: 30 tablet, Rfl: 3  •  esomeprazole (nexIUM) 20 MG capsule, Take 20 mg by mouth Every Morning Before Breakfast., Disp: , Rfl:   •  fluticasone (FLONASE) 50 MCG/ACT nasal spray, 2 sprays into the nostril(s) as directed by provider Daily., Disp: 1 bottle, Rfl: 11  •  guaiFENesin (MUCINEX) 600 MG 12 hr tablet, Take 1 tablet by mouth 2 (Two) Times a Day., Disp: 60 tablet, Rfl: 3  •  HYDROcodone-acetaminophen (NORCO) 5-325 MG per tablet, Take 1 tablet by mouth Every 6 (Six) Hours As Needed., Disp: , Rfl: 0  •  ipratropium-albuterol (DUO-NEB) 0.5-2.5 mg/3 ml nebulizer, Take 3 mL by nebulization Every 4 (Four) Hours As Needed for Wheezing or Shortness of Air., Disp: , Rfl:   •  montelukast (SINGULAIR) 10 MG tablet, Take 1 tablet by mouth Every Night., Disp: 30 tablet, Rfl: 3  •  omeprazole (priLOSEC) 20 MG capsule, Take 20 mg by mouth Daily., Disp: , Rfl:   •  ondansetron ODT (ZOFRAN ODT) 8 MG disintegrating tablet, Take 1 tablet by mouth Every 8 (Eight) Hours As Needed for Nausea or Vomiting., Disp: 90 tablet, Rfl: 3  •  Prucalopride Succinate 2 MG tablet, Take 2 mg by mouth Daily., Disp: 30 tablet, Rfl: 5  •  traMADol (ULTRAM) 50 MG tablet, TAKE 1 TABLET(S)  "EVERY 6 HOURS BY ORAL ROUTE AS NEEDED., Disp: , Rfl:   •  vancomycin (VANCOCIN) 125 MG capsule, Take 1 capsule by mouth 4 (Four) Times a Day., Disp: 56 capsule, Rfl: 0  •  vitamin B-12 (CYANOCOBALAMIN) 250 MCG tablet, Take 1 tablet by mouth Daily., Disp: 30 tablet, Rfl: 3  •  vitamin D (ERGOCALCIFEROL) 40733 units capsule capsule, Take 1 capsule by mouth 1 (One) Time Per Week., Disp: 4 capsule, Rfl: 3  •  amoxicillin (AMOXIL) 875 MG tablet, Take 1 tablet by mouth 2 (Two) Times a Day for 10 days., Disp: 20 tablet, Rfl: 0  •  fluconazole (DIFLUCAN) 150 MG tablet, Take 1 tablet by mouth Take As Directed. Take one tablet PO when starting abx, repeat at end of abx course, then may repeat in 72 hours if needed., Disp: 3 tablet, Rfl: 0  •  predniSONE (DELTASONE) 10 MG (48) tablet pack, Take as directed on package, Disp: 21 tablet, Rfl: 0  No current facility-administered medications for this visit.     Review of Systems    Review of Systems   Constitutional: Positive for fatigue. Negative for fever.   HENT: Positive for congestion, hoarse voice, sinus pressure and sore throat. Negative for trouble swallowing.    Respiratory: Positive for cough, shortness of breath and wheezing. Negative for chest tightness.    Cardiovascular: Negative for chest pain.   Gastrointestinal: Negative for nausea.   Musculoskeletal: Positive for neck pain.   Neurological: Positive for headaches.       Objective  Vitals:    03/17/20 1015   BP: 108/78   BP Location: Left arm   Patient Position: Sitting   Cuff Size: Adult   Pulse: 100   Resp: 20   Temp: 98 °F (36.7 °C)   TempSrc: Oral   SpO2: 96%   Weight: 48.9 kg (107 lb 12.8 oz)   Height: 165.1 cm (65\")   PainSc:   4   PainLoc: Head       Physical Exam   Constitutional: She appears well-developed and well-nourished. No distress.   Ill appearing   HENT:   Head: Normocephalic and atraumatic.   Right Ear: External ear normal.   Left Ear: External ear normal.   Pt wearing surgical mask   Eyes: Pupils " are equal, round, and reactive to light.   Neck: Normal range of motion. Neck supple.   Cardiovascular: Normal rate, regular rhythm and normal heart sounds.   Pulmonary/Chest: No respiratory distress. She has decreased breath sounds in the right upper field, the right middle field, the right lower field, the left upper field, the left middle field and the left lower field. She has wheezes in the left upper field, the left middle field and the left lower field. She has rhonchi in the right upper field, the right middle field, the right lower field, the left upper field, the left middle field and the left lower field.   Musculoskeletal: Normal range of motion.   Lymphadenopathy:     She has no cervical adenopathy.   Neurological: She is alert.   Psychiatric: She has a normal mood and affect. Her behavior is normal.   Nursing note and vitals reviewed.        Carla was seen today for cough, sinusitis, sore throat, headache and neck pain.    Diagnoses and all orders for this visit:    COPD exacerbation (CMS/HCC)  -     methylPREDNISolone sodium succinate (SOLU-Medrol) injection 80 mg  -     cefTRIAXone (ROCEPHIN) 350 mg/ml in lidocaine 1% IM syringe (1 gm vial)  -     amoxicillin (AMOXIL) 875 MG tablet; Take 1 tablet by mouth 2 (Two) Times a Day for 10 days.  -     fluconazole (DIFLUCAN) 150 MG tablet; Take 1 tablet by mouth Take As Directed. Take one tablet PO when starting abx, repeat at end of abx course, then may repeat in 72 hours if needed.  -     predniSONE (DELTASONE) 10 MG (48) tablet pack; Take as directed on package    Chronic bronchitis, unspecified chronic bronchitis type (CMS/HCC)  -     amoxicillin (AMOXIL) 875 MG tablet; Take 1 tablet by mouth 2 (Two) Times a Day for 10 days.  -     fluconazole (DIFLUCAN) 150 MG tablet; Take 1 tablet by mouth Take As Directed. Take one tablet PO when starting abx, repeat at end of abx course, then may repeat in 72 hours if needed.  -     predniSONE (DELTASONE) 10 MG  (48) tablet pack; Take as directed on package    1. D/t current restrictions r/t coronavirus, unable to test for flu or strep.  2. Based on presenting symptoms, exam and medical history will initiate abx tx empirically to cover bacterial etiology.  3. Pt is poor historian d/t memory deficit r/t concussion and is unable to confirm last abx. Will tx with amoxicillin and advised OTC probiotic d/t recent C. Diff.  4. Strongly encouraged pt to monitor symptoms and go to ER for worsening. Pt v/u.  5. IM ceftriaxone and solu-medrol given today in office. Advised to start all other PO meds tomorrow. Pt v/u.  6. RTC PRN or as scheduled or to ER for worsening sx.      This document has been electronically signed by CROW Link on March 17, 2020 11:15

## 2020-04-10 ENCOUNTER — OFFICE VISIT (OUTPATIENT)
Dept: FAMILY MEDICINE CLINIC | Facility: CLINIC | Age: 50
End: 2020-04-10

## 2020-04-10 VITALS
DIASTOLIC BLOOD PRESSURE: 92 MMHG | SYSTOLIC BLOOD PRESSURE: 127 MMHG | BODY MASS INDEX: 18.33 KG/M2 | WEIGHT: 110 LBS | HEART RATE: 97 BPM | HEIGHT: 65 IN

## 2020-04-10 DIAGNOSIS — Z72.0 TOBACCO USER: ICD-10-CM

## 2020-04-10 DIAGNOSIS — S06.0X9S CONCUSSION WITH LOSS OF CONSCIOUSNESS, SEQUELA (HCC): ICD-10-CM

## 2020-04-10 DIAGNOSIS — K29.70 GASTRITIS WITHOUT BLEEDING, UNSPECIFIED CHRONICITY, UNSPECIFIED GASTRITIS TYPE: ICD-10-CM

## 2020-04-10 DIAGNOSIS — K58.9 IRRITABLE BOWEL SYNDROME, UNSPECIFIED TYPE: ICD-10-CM

## 2020-04-10 DIAGNOSIS — A49.8 SHIGA TOXIN-PRODUCING ESCHERICHIA COLI INFECTION: ICD-10-CM

## 2020-04-10 DIAGNOSIS — Z12.31 VISIT FOR SCREENING MAMMOGRAM: Primary | ICD-10-CM

## 2020-04-10 DIAGNOSIS — D75.839 THROMBOCYTOSIS: ICD-10-CM

## 2020-04-10 DIAGNOSIS — J44.1 COPD EXACERBATION (HCC): ICD-10-CM

## 2020-04-10 DIAGNOSIS — K90.41 GLUTEN INTOLERANCE: ICD-10-CM

## 2020-04-10 DIAGNOSIS — R10.84 GENERALIZED ABDOMINAL PAIN: ICD-10-CM

## 2020-04-10 DIAGNOSIS — E55.9 VITAMIN D DEFICIENCY: ICD-10-CM

## 2020-04-10 DIAGNOSIS — J42 CHRONIC BRONCHITIS, UNSPECIFIED CHRONIC BRONCHITIS TYPE (HCC): ICD-10-CM

## 2020-04-10 DIAGNOSIS — R25.1 TREMOR: ICD-10-CM

## 2020-04-10 DIAGNOSIS — F33.9 EPISODE OF RECURRENT MAJOR DEPRESSIVE DISORDER, UNSPECIFIED DEPRESSION EPISODE SEVERITY (HCC): ICD-10-CM

## 2020-04-10 DIAGNOSIS — J44.9 STAGE 2 MODERATE COPD BY GOLD CLASSIFICATION (HCC): ICD-10-CM

## 2020-04-10 PROCEDURE — 99213 OFFICE O/P EST LOW 20 MIN: CPT | Performed by: FAMILY MEDICINE

## 2020-04-10 RX ORDER — AMITRIPTYLINE HYDROCHLORIDE 25 MG/1
25 TABLET, FILM COATED ORAL NIGHTLY
COMMUNITY
Start: 2020-03-06 | End: 2021-04-29 | Stop reason: SDUPTHER

## 2020-04-14 ENCOUNTER — OFFICE VISIT (OUTPATIENT)
Dept: GASTROENTEROLOGY | Facility: CLINIC | Age: 50
End: 2020-04-14

## 2020-04-14 ENCOUNTER — TELEPHONE (OUTPATIENT)
Dept: FAMILY MEDICINE CLINIC | Facility: CLINIC | Age: 50
End: 2020-04-14

## 2020-04-14 VITALS
DIASTOLIC BLOOD PRESSURE: 90 MMHG | BODY MASS INDEX: 18.33 KG/M2 | HEART RATE: 99 BPM | HEIGHT: 65 IN | WEIGHT: 110 LBS | SYSTOLIC BLOOD PRESSURE: 141 MMHG

## 2020-04-14 DIAGNOSIS — K58.2 IRRITABLE BOWEL SYNDROME WITH BOTH CONSTIPATION AND DIARRHEA: ICD-10-CM

## 2020-04-14 DIAGNOSIS — K21.9 GASTROESOPHAGEAL REFLUX DISEASE WITHOUT ESOPHAGITIS: ICD-10-CM

## 2020-04-14 DIAGNOSIS — R10.84 GENERALIZED ABDOMINAL PAIN: Primary | ICD-10-CM

## 2020-04-14 PROCEDURE — 99442 PR PHYS/QHP TELEPHONE EVALUATION 11-20 MIN: CPT | Performed by: NURSE PRACTITIONER

## 2020-04-14 NOTE — PROGRESS NOTES
Subjective:  Carla Richard is a 50 y.o. female who presents for       Patient Active Problem List   Diagnosis   • Tobacco abuse counseling   • Chronic idiopathic constipation   • B12 deficiency   • Vitamin D deficiency    • Tobacco user   • Stage 2 moderate COPD by GOLD classification (CMS/[facility])   • Cervical lymphadenopathy   • Generalized abdominal pain   • Nausea   • Neurological abnormality   • Tremor   • RUQ pain   • COPD exacerbation (CMS/[facility])   • Pertussis exposure   • Chronic bronchitis (CMS/[facility])   • Cigarette nicotine dependence, uncomplicated   • Chronic nonseasonal allergic rhinitis due to pollen   • Episode of recurrent major depressive disorder (CMS/[facility])   • Diarrhea   • Chronic diarrhea   • Thrombocytosis (CMS/[facility])   • Nausea and vomiting   • Gluten intolerance   • C. difficile diarrhea   • Shiga toxin-producing Escherichia coli infection   • Generalized weakness   • Head injury   • Concussion with loss of consciousness   • Gastritis without bleeding   • Irritable bowel syndrome   • Tachycardia   • Essential hypertension           Current Outpatient Medications:   •  albuterol sulfate  (90 Base) MCG/ACT inhaler, Inhale 2 puffs Every 4 (Four) Hours As Needed for Wheezing., Disp: 18 g, Rfl: 3  •  amitriptyline (ELAVIL) 25 MG tablet, Take 25 mg by mouth Every Night., Disp: , Rfl:   •  budesonide-formoterol (SYMBICORT) 160-4.5 MCG/ACT inhaler, Inhale 2 puffs 2 (Two) Times a Day., Disp: 1 inhaler, Rfl: 11  •  cetirizine (zyrTEC) 10 MG tablet, Take 1 tablet by mouth Daily., Disp: 30 tablet, Rfl: 11  •  Cyanocobalamin (B-12 COMPLIANCE INJECTION IJ), Inject  as directed., Disp: , Rfl:   •  dicyclomine (BENTYL) 20 MG tablet, Take 2 tablets by mouth Every 6 (Six) Hours., Disp: 240 tablet, Rfl: 3  •  escitalopram (LEXAPRO) 10 MG tablet, Take 1 tablet by mouth Daily., Disp: 30 tablet, Rfl: 3  •  fluticasone (FLONASE) 50 MCG/ACT nasal spray, 2 sprays into the nostril(s) as directed by provider  Daily., Disp: 1 bottle, Rfl: 11  •  guaiFENesin (MUCINEX) 600 MG 12 hr tablet, Take 1 tablet by mouth 2 (Two) Times a Day., Disp: 60 tablet, Rfl: 3  •  ipratropium-albuterol (DUO-NEB) 0.5-2.5 mg/3 ml nebulizer, Take 3 mL by nebulization Every 4 (Four) Hours As Needed for Wheezing or Shortness of Air., Disp: , Rfl:   •  montelukast (SINGULAIR) 10 MG tablet, Take 1 tablet by mouth Every Night., Disp: 30 tablet, Rfl: 3  •  omeprazole (priLOSEC) 20 MG capsule, Take 20 mg by mouth Daily., Disp: , Rfl:   •  ondansetron ODT (ZOFRAN ODT) 8 MG disintegrating tablet, Take 1 tablet by mouth Every 8 (Eight) Hours As Needed for Nausea or Vomiting., Disp: 90 tablet, Rfl: 3  •  vitamin D (ERGOCALCIFEROL) 96367 units capsule capsule, Take 1 capsule by mouth 1 (One) Time Per Week., Disp: 4 capsule, Rfl: 3  •  metoprolol succinate XL (TOPROL-XL) 50 MG 24 hr tablet, Take 1 tablet by mouth Daily., Disp: 30 tablet, Rfl: 3  •  nicotine ([facility] Nicotine) 7 MG/24HR patch, Place 1 patch on the skin as directed by provider Daily., Disp: 30 patch, Rfl: 3       Pt is 50 yo female with history of moderateC OPD, Vitamin B12 deficiency, Vitamin D deficiency, chronic abdominal pain, tremor,  RUQ pain,  Sp hysterectomy,  History of chron's disease, history of pertussis infection  sp right shoulder surgery. X 3, sp rotator cuff repair , history of C diff diarrhea, gluten sensitivity, gastritis        4/9/20 Telemedicine Visit today. Since last visit pt was seen at walk in clinic by CROW Reynolds on 3/17/20.  She was treated for COPD exacerbation and given prednisone tapering dose along with steroid shot rocephin short amoxcillin 875 mg PO BID for 10 day and diflucan.  She continues to smoke 3/4 ppd. She is getting b12 injections weekly. She sees Hematology for thrombocytosis and may have secondary thrombocytosis. abodminal pain is stable with bentyl and prilosec. She continues to eat bread despite having gluten sensitiviy. Is off prucalopride  because 2 mg. It states it causes diarrhea. She has had improvement with concussion.       4/15/20 Telemedicine Visit today. Pt had recently telemedicine visit with GI. Pt noticed her BP has been consistently elevated for past couple of weeks . Per GI she is to continue bentyl as needed along with trulance if needed for constipation. She is to continue nexium. She notices her BP has been high for past week. She states she has ligthheadeness and tingling in lips. No slurring speech. No facial drop.no weakness of limbs. She has cut back on smoking to 3/4 ppd. She stopped smoking marijuana at bedtime and then noticed BP higher. No high salt diet. No chest pain. No syncopal episodes. In regards to concussion her symptoms is better.  On recheck on BP it is 130/100 with HR of 112.        Hypertension   This is a recurrent problem. The current episode started more than 1 month ago. The problem has been gradually worsening since onset. The problem is uncontrolled. Associated symptoms include anxiety, blurred vision, headaches and shortness of breath. Pertinent negatives include no chest pain, malaise/fatigue, neck pain, orthopnea, palpitations, peripheral edema, PND or sweats. Risk factors for coronary artery disease include smoking/tobacco exposure, stress and post-menopausal state. Past treatments include nothing. The current treatment provides no improvement. There is no history of angina, kidney disease, CAD/MI, CVA, heart failure, left ventricular hypertrophy, PVD or retinopathy. There is no history of chronic renal disease, coarctation of the aorta, hyperaldosteronism, hypercortisolism, hyperparathyroidism, a hypertension causing med, pheochromocytoma, renovascular disease, sleep apnea or a thyroid problem.   Diarrhea    This is a recurrent problem. The current episode started more than 1 year ago. The problem has been waxing and waning. The stool consistency is described as watery. The patient states that diarrhea  does not awaken her from sleep. Associated symptoms include abdominal pain, arthralgias and coughing. Pertinent negatives include no bloating, chills, fever, headaches, increased  flatus, myalgias, sweats, URI, vomiting or weight loss. Nothing aggravates the symptoms. She has tried nothing for the symptoms. The treatment provided no relief. There is no history of bowel resection, inflammatory bowel disease, irritable bowel syndrome, malabsorption, a recent abdominal surgery or short gut syndrome.    Abdominal Pain   This is a recurrent problem. The current episode started more than 1 year ago. The onset quality is sudden. The problem has been waxing and waning. The pain is located in the generalized abdominal region, epigastric region and RUQ. The pain is at a severity of 5/10. The pain is moderate. The quality of the pain is aching, a sensation of fullness and burning. The abdominal pain radiates to the epigastric region. Associated symptoms include constipation. Pertinent negatives include no anorexia, arthralgias, belching, diarrhea, dysuria, fever, flatus, frequency, headaches, hematochezia, hematuria, melena, myalgias, nausea, vomiting or weight loss. Nothing aggravates the pain. The pain is relieved by being still. The treatment provided no relief. Prior diagnostic workup includes ultrasound. Her past medical history is significant for Crohn's disease. There is no history of abdominal surgery, colon cancer, gallstones, GERD, irritable bowel syndrome, pancreatitis, PUD or ulcerative colitis.    Neurologic Problem   The patient's primary symptoms include focal sensory loss and weakness. The patient's pertinent negatives include no altered mental status, clumsiness, loss of balance, memory loss, near-syncope, slurred speech, syncope or visual change. This is a recurrent problem. The neurological problem developed gradually. The problem has been waxing and waning since onset. There was no focality noted.  Associated symptoms include a fever and shortness of breath. Pertinent negatives include no abdominal pain, auditory change, aura, back pain, bladder incontinence, bowel incontinence, chest pain, confusion, diaphoresis, dizziness, fatigue, headaches, light-headedness, nausea, neck pain, palpitations, vertigo or vomiting. Past treatments include nothing. The treatment provided no relief. There is no history of a bleeding disorder, a clotting disorder, a CVA, head trauma, liver disease, mood changes or seizures.   COPD   This is a chronic problem. The current episode started more than 1 year ago. The problem occurs constantly. The problem has been unchanged. Associated symptoms include abdominal pain, arthralgias, fatigue, joint swelling, numbness and weakness. Pertinent negatives include no anorexia, change in bowel habit, chest pain, chills, congestion, coughing, diaphoresis, fever, headaches, myalgias, nausea, neck pain, sore throat, swollen glands, urinary symptoms, vertigo, visual change or vomiting. The symptoms are aggravated by smoking. She has tried nothing (combivent ) for the symptoms.   Fatigue   This is a chronic problem. The current episode started more than 1 year ago. The problem occurs constantly. The problem has been unchanged. Associated symptoms include abdominal pain, arthralgias, fatigue, joint swelling, numbness and weakness. Pertinent negatives include no anorexia, change in bowel habit, chest pain, chills, congestion, coughing, diaphoresis, fever, headaches, myalgias, nausea, neck pain, sore throat, swollen glands, urinary symptoms, vertigo, visual change or vomiting. Nothing aggravates the symptoms. She has tried nothing (b12 injections ) for the symptoms. The treatment provided no relief       Review of Systems  Review of Systems   Constitutional: Positive for activity change and fatigue. Negative for appetite change, chills, diaphoresis, fever and malaise/fatigue.   HENT: Negative for  congestion, postnasal drip, rhinorrhea, sinus pressure, sinus pain, sneezing, sore throat, trouble swallowing and voice change.    Eyes: Positive for blurred vision.   Respiratory: Positive for cough and shortness of breath. Negative for choking, chest tightness, wheezing and stridor.    Cardiovascular: Negative for chest pain, palpitations, orthopnea and PND.   Gastrointestinal: Positive for abdominal pain, constipation, diarrhea and nausea. Negative for vomiting.   Musculoskeletal: Negative for neck pain.   Neurological: Positive for dizziness, tremors, weakness, numbness and headaches.   Psychiatric/Behavioral: Positive for agitation. The patient is nervous/anxious.        Patient Active Problem List   Diagnosis   • Tobacco abuse counseling   • Chronic idiopathic constipation   • B12 deficiency   • Vitamin D deficiency    • Tobacco user   • Stage 2 moderate COPD by GOLD classification (CMS/[facility])   • Cervical lymphadenopathy   • Generalized abdominal pain   • Nausea   • Neurological abnormality   • Tremor   • RUQ pain   • COPD exacerbation (CMS/[facility])   • Pertussis exposure   • Chronic bronchitis (CMS/[facility])   • Cigarette nicotine dependence, uncomplicated   • Chronic nonseasonal allergic rhinitis due to pollen   • Episode of recurrent major depressive disorder (CMS/HCC)   • Diarrhea   • Chronic diarrhea   • Thrombocytosis (CMS/HCC)   • Nausea and vomiting   • Gluten intolerance   • C. difficile diarrhea   • Shiga toxin-producing Escherichia coli infection   • Generalized weakness   • Head injury   • Concussion with loss of consciousness   • Gastritis without bleeding   • Irritable bowel syndrome   • Tachycardia   • Essential hypertension     Past Surgical History:   Procedure Laterality Date   • COLONOSCOPY     • COLONOSCOPY N/A 10/18/2019    Procedure: COLONOSCOPY;  Surgeon: Tom Davis MD;  Location: [facility] ENDOSCOPY;  Service: Gastroenterology   • ENDOSCOPY N/A 10/18/2019    Procedure:  ESOPHAGOGASTRODUODENOSCOPY;  Surgeon: Tom Davis MD;  Location: [facility] ENDOSCOPY;  Service: Gastroenterology   • HYSTERECTOMY     • TONSILECTOMY, ADENOIDECTOMY, BILATERAL MYRINGOTOMY AND TUBES     • UPPER GASTROINTESTINAL ENDOSCOPY       Social History     Socioeconomic History   • Marital status: Single     Spouse name: Not on file   • Number of children: Not on file   • Years of education: Not on file   • Highest education level: Not on file   Tobacco Use   • Smoking status: Current Every Day Smoker     Packs/day: 1.00     Types: Cigarettes   • Smokeless tobacco: Never Used   Substance and Sexual Activity   • Alcohol use: No     Frequency: Never   • Drug use: No   • Sexual activity: Defer     Family History   Problem Relation Age of Onset   • Inflammatory bowel disease Mother    • Arthritis Mother    • Diabetes Mother    • Hypertension Mother    • Hyperlipidemia Mother    • Obesity Mother    • Osteoporosis Mother    • Heart disease Father    • Hyperlipidemia Father    • Diabetes Sister    • Liver disease Daughter    • Mental illness Daughter    • Obesity Daughter    • Diabetes Maternal Grandmother    • Cancer Maternal Grandmother         lung   • Cancer Other         lung   • Heart disease Other      Admission on 03/02/2020, Discharged on 03/02/2020   Component Date Value Ref Range Status   • THC, Screen, Urine 03/02/2020 Positive* Negative Final   • Phencyclidine (PCP), Urine 03/02/2020 Negative  Negative Final   • Cocaine Screen, Urine 03/02/2020 Negative  Negative Final   • Methamphetamine, Ur 03/02/2020 Negative  Negative Final   • Opiate Screen 03/02/2020 Negative  Negative Final   • Amphetamine Screen, Urine 03/02/2020 Negative  Negative Final   • Benzodiazepine Screen, Urine 03/02/2020 Negative  Negative Final   • Tricyclic Antidepressants Screen 03/02/2020 Negative  Negative Final   • Methadone Screen, Urine 03/02/2020 Negative  Negative Final   • Barbiturates Screen, Urine 03/02/2020 Positive*  Negative Final   • Oxycodone Screen, Urine 03/02/2020 Negative  Negative Final   • Propoxyphene Screen 03/02/2020 Negative  Negative Final   • Buprenorphine, Screen, Urine 03/02/2020 Negative  Negative Final   • Glucose 03/02/2020 97  65 - 99 mg/dL Final   • BUN 03/02/2020 13  6 - 20 mg/dL Final   • Creatinine 03/02/2020 0.69  0.57 - 1.00 mg/dL Final   • Sodium 03/02/2020 141  136 - 145 mmol/L Final   • Potassium 03/02/2020 4.0  3.5 - 5.2 mmol/L Final   • Chloride 03/02/2020 102  98 - 107 mmol/L Final   • CO2 03/02/2020 26.0  22.0 - 29.0 mmol/L Final   • Calcium 03/02/2020 10.0  8.6 - 10.5 mg/dL Final   • Total Protein 03/02/2020 7.7  6.0 - 8.5 g/dL Final   • Albumin 03/02/2020 4.80  3.50 - 5.20 g/dL Final   • ALT (SGPT) 03/02/2020 19  1 - 33 U/L Final   • AST (SGOT) 03/02/2020 18  1 - 32 U/L Final   • Alkaline Phosphatase 03/02/2020 95  39 - 117 U/L Final   • Total Bilirubin 03/02/2020 0.3  0.2 - 1.2 mg/dL Final   • eGFR Non African Amer 03/02/2020 89  >60 mL/min/1.73 Final   • Globulin 03/02/2020 2.9  gm/dL Final   • A/G Ratio 03/02/2020 1.7  g/dL Final   • BUN/Creatinine Ratio 03/02/2020 18.8  7.0 - 25.0 Final   • Anion Gap 03/02/2020 13.0  5.0 - 15.0 mmol/L Final   • WBC 03/02/2020 9.73  3.40 - 10.80 10*3/mm3 Final   • RBC 03/02/2020 5.20  3.77 - 5.28 10*6/mm3 Final   • Hemoglobin 03/02/2020 16.4* 12.0 - 15.9 g/dL Final   • Hematocrit 03/02/2020 49.4* 34.0 - 46.6 % Final   • MCV 03/02/2020 95.0  79.0 - 97.0 fL Final   • MCH 03/02/2020 31.5  26.6 - 33.0 pg Final   • MCHC 03/02/2020 33.2  31.5 - 35.7 g/dL Final   • RDW 03/02/2020 13.2  12.3 - 15.4 % Final   • RDW-SD 03/02/2020 46.1  37.0 - 54.0 fl Final   • MPV 03/02/2020 9.2  6.0 - 12.0 fL Final   • Platelets 03/02/2020 439  140 - 450 10*3/mm3 Final   • Neutrophil % 03/02/2020 54.7  42.7 - 76.0 % Final   • Lymphocyte % 03/02/2020 34.5  19.6 - 45.3 % Final   • Monocyte % 03/02/2020 7.6  5.0 - 12.0 % Final   • Eosinophil % 03/02/2020 1.7  0.3 - 6.2 % Final   •  Basophil % 03/02/2020 0.8  0.0 - 1.5 % Final   • Immature Grans % 03/02/2020 0.7* 0.0 - 0.5 % Final   • Neutrophils, Absolute 03/02/2020 5.31  1.70 - 7.00 10*3/mm3 Final   • Lymphocytes, Absolute 03/02/2020 3.36* 0.70 - 3.10 10*3/mm3 Final   • Monocytes, Absolute 03/02/2020 0.74  0.10 - 0.90 10*3/mm3 Final   • Eosinophils, Absolute 03/02/2020 0.17  0.00 - 0.40 10*3/mm3 Final   • Basophils, Absolute 03/02/2020 0.08  0.00 - 0.20 10*3/mm3 Final   • Immature Grans, Absolute 03/02/2020 0.07* 0.00 - 0.05 10*3/mm3 Final   • nRBC 03/02/2020 0.0  0.0 - 0.2 /100 WBC Final   • Extra Tube 03/02/2020 hold for add-on   Final    Auto resulted   • Extra Tube 03/02/2020 Hold for add-ons.   Final    Auto resulted.   • Color, UA 03/02/2020 Yellow  Yellow, Straw, Dark Yellow, Ana M Final   • Appearance, UA 03/02/2020 Clear  Clear Final   • pH, UA 03/02/2020 6.5  5.0 - 9.0 Final   • Specific Gravity, UA 03/02/2020 1.021  1.003 - 1.030 Final   • Glucose, UA 03/02/2020 Negative  Negative Final   • Ketones, UA 03/02/2020 Negative  Negative Final   • Bilirubin, UA 03/02/2020 Negative  Negative Final   • Blood, UA 03/02/2020 Small (1+)* Negative Final   • Protein, UA 03/02/2020 Negative  Negative Final   • Leuk Esterase, UA 03/02/2020 Negative  Negative Final   • Nitrite, UA 03/02/2020 Negative  Negative Final   • Urobilinogen, UA 03/02/2020 0.2 E.U./dL  0.2 - 1.0 E.U./dL Final   • RBC, UA 03/02/2020 6-12* None Seen /HPF Final   • WBC, UA 03/02/2020 None Seen  None Seen, 0-2, 3-5 /HPF Final   • Bacteria, UA 03/02/2020 None Seen  None Seen /HPF Final   • Squamous Epithelial Cells, UA 03/02/2020 None Seen  None Seen, 0-2 /HPF Final   • Hyaline Casts, UA 03/02/2020 0-2  None Seen /LPF Final   • Methodology 03/02/2020 Automated Microscopy   Final   • Troponin T 03/02/2020 <0.010  0.000 - 0.030 ng/mL Final   • proBNP 03/02/2020 27.8  5.0 - 900.0 pg/mL Final   Admission on 02/06/2020, Discharged on 02/06/2020   Component Date Value Ref Range  Status   • Glucose 02/06/2020 99  65 - 99 mg/dL Final   • BUN 02/06/2020 12  6 - 20 mg/dL Final   • Creatinine 02/06/2020 0.66  0.57 - 1.00 mg/dL Final   • Sodium 02/06/2020 139  136 - 145 mmol/L Final   • Potassium 02/06/2020 3.6  3.5 - 5.2 mmol/L Final   • Chloride 02/06/2020 103  98 - 107 mmol/L Final   • CO2 02/06/2020 24.0  22.0 - 29.0 mmol/L Final   • Calcium 02/06/2020 9.2  8.6 - 10.5 mg/dL Final   • Total Protein 02/06/2020 6.6  6.0 - 8.5 g/dL Final   • Albumin 02/06/2020 4.40  3.50 - 5.20 g/dL Final   • ALT (SGPT) 02/06/2020 6  1 - 33 U/L Final   • AST (SGOT) 02/06/2020 10  1 - 32 U/L Final   • Alkaline Phosphatase 02/06/2020 73  39 - 117 U/L Final   • Total Bilirubin 02/06/2020 0.4  0.2 - 1.2 mg/dL Final   • eGFR Non African Amer 02/06/2020 94  >60 mL/min/1.73 Final   • Globulin 02/06/2020 2.2  gm/dL Final   • A/G Ratio 02/06/2020 2.0  g/dL Final   • BUN/Creatinine Ratio 02/06/2020 18.2  7.0 - 25.0 Final   • Anion Gap 02/06/2020 12.0  5.0 - 15.0 mmol/L Final   • Lipase 02/06/2020 39  13 - 60 U/L Final   • Color, UA 02/06/2020 Yellow  Yellow, Straw, Dark Yellow, Ana M Final   • Appearance, UA 02/06/2020 Clear  Clear Final   • pH, UA 02/06/2020 6.5  5.0 - 9.0 Final   • Specific Gravity, UA 02/06/2020 1.029  1.003 - 1.030 Final   • Glucose, UA 02/06/2020 Negative  Negative Final   • Ketones, UA 02/06/2020 Negative  Negative Final   • Bilirubin, UA 02/06/2020 Negative  Negative Final   • Blood, UA 02/06/2020 Negative  Negative Final   • Protein, UA 02/06/2020 Negative  Negative Final   • Leuk Esterase, UA 02/06/2020 Negative  Negative Final   • Nitrite, UA 02/06/2020 Negative  Negative Final   • Urobilinogen, UA 02/06/2020 1.0 E.U./dL  0.2 - 1.0 E.U./dL Final   • WBC 02/06/2020 14.21* 3.40 - 10.80 10*3/mm3 Final   • RBC 02/06/2020 4.89  3.77 - 5.28 10*6/mm3 Final   • Hemoglobin 02/06/2020 15.3  12.0 - 15.9 g/dL Final   • Hematocrit 02/06/2020 45.7  34.0 - 46.6 % Final   • MCV 02/06/2020 93.5  79.0 - 97.0 fL  Final   • MCH 02/06/2020 31.3  26.6 - 33.0 pg Final   • MCHC 02/06/2020 33.5  31.5 - 35.7 g/dL Final   • RDW 02/06/2020 12.7  12.3 - 15.4 % Final   • RDW-SD 02/06/2020 43.5  37.0 - 54.0 fl Final   • MPV 02/06/2020 9.1  6.0 - 12.0 fL Final   • Platelets 02/06/2020 432  140 - 450 10*3/mm3 Final   • Neutrophil % 02/06/2020 60.1  42.7 - 76.0 % Final   • Lymphocyte % 02/06/2020 28.6  19.6 - 45.3 % Final   • Monocyte % 02/06/2020 8.3  5.0 - 12.0 % Final   • Eosinophil % 02/06/2020 1.7  0.3 - 6.2 % Final   • Basophil % 02/06/2020 0.6  0.0 - 1.5 % Final   • Immature Grans % 02/06/2020 0.7* 0.0 - 0.5 % Final   • Neutrophils, Absolute 02/06/2020 8.54* 1.70 - 7.00 10*3/mm3 Final   • Lymphocytes, Absolute 02/06/2020 4.07* 0.70 - 3.10 10*3/mm3 Final   • Monocytes, Absolute 02/06/2020 1.18* 0.10 - 0.90 10*3/mm3 Final   • Eosinophils, Absolute 02/06/2020 0.24  0.00 - 0.40 10*3/mm3 Final   • Basophils, Absolute 02/06/2020 0.08  0.00 - 0.20 10*3/mm3 Final   • Immature Grans, Absolute 02/06/2020 0.10* 0.00 - 0.05 10*3/mm3 Final   • nRBC 02/06/2020 0.0  0.0 - 0.2 /100 WBC Final   • Extra Tube 02/06/2020 hold for add-on   Final    Auto resulted   • Extra Tube 02/06/2020 Hold for add-ons.   Final    Auto resulted.   • Neutrophil % 02/06/2020 62.0  42.7 - 76.0 % Final   • Lymphocyte % 02/06/2020 31.0  19.6 - 45.3 % Final   • Monocyte % 02/06/2020 6.0  5.0 - 12.0 % Final   • Bands %  02/06/2020 1.0  0.0 - 5.0 % Final   • Neutrophils Absolute 02/06/2020 8.95* 1.70 - 7.00 10*3/mm3 Final   • Lymphocytes Absolute 02/06/2020 4.41* 0.70 - 3.10 10*3/mm3 Final   • Monocytes Absolute 02/06/2020 0.85  0.10 - 0.90 10*3/mm3 Final   • RBC Morphology 02/06/2020 Normal  Normal Final   • WBC Morphology 02/06/2020 Normal  Normal Final   • Platelet Morphology 02/06/2020 Normal  Normal Final   Lab on 02/04/2020   Component Date Value Ref Range Status   • WBC 02/04/2020 18.81* 3.40 - 10.80 10*3/mm3 Final   • RBC 02/04/2020 4.62  3.77 - 5.28 10*6/mm3 Final    • Hemoglobin 02/04/2020 15.2  12.0 - 15.9 g/dL Final   • Hematocrit 02/04/2020 43.0  34.0 - 46.6 % Final   • MCV 02/04/2020 93.1  79.0 - 97.0 fL Final   • MCH 02/04/2020 32.9  26.6 - 33.0 pg Final   • MCHC 02/04/2020 35.3  31.5 - 35.7 g/dL Final   • RDW 02/04/2020 12.4  12.3 - 15.4 % Final   • RDW-SD 02/04/2020 42.2  37.0 - 54.0 fl Final   • MPV 02/04/2020 9.7  6.0 - 12.0 fL Final   • Platelets 02/04/2020 489* 140 - 450 10*3/mm3 Final   • Glucose 02/04/2020 93  65 - 99 mg/dL Final   • BUN 02/04/2020 14  6 - 20 mg/dL Final   • Creatinine 02/04/2020 0.76  0.57 - 1.00 mg/dL Final   • Sodium 02/04/2020 140  136 - 145 mmol/L Final   • Potassium 02/04/2020 4.1  3.5 - 5.2 mmol/L Final   • Chloride 02/04/2020 101  98 - 107 mmol/L Final   • CO2 02/04/2020 22.7  22.0 - 29.0 mmol/L Final   • Calcium 02/04/2020 9.0  8.6 - 10.5 mg/dL Final   • Total Protein 02/04/2020 6.4  6.0 - 8.5 g/dL Final   • Albumin 02/04/2020 4.40  3.50 - 5.20 g/dL Final   • ALT (SGPT) 02/04/2020 6  1 - 33 U/L Final   • AST (SGOT) 02/04/2020 11  1 - 32 U/L Final   • Alkaline Phosphatase 02/04/2020 62  39 - 117 U/L Final   • Total Bilirubin 02/04/2020 0.3  0.2 - 1.2 mg/dL Final   • eGFR Non African Amer 02/04/2020 80  >60 mL/min/1.73 Final   • Globulin 02/04/2020 2.0  gm/dL Final   • A/G Ratio 02/04/2020 2.2  g/dL Final   • BUN/Creatinine Ratio 02/04/2020 18.4  7.0 - 25.0 Final   • Anion Gap 02/04/2020 16.3* 5.0 - 15.0 mmol/L Final   • Amylase 02/04/2020 51  28 - 100 U/L Final   • Lipase 02/04/2020 31  13 - 60 U/L Final   • Beef 02/04/2020 <0.10  <0.35 kU/L Final   • Class Description 02/04/2020 0   Final   • [place] 02/04/2020 <0.10  <0.35 kU/L Final   • Class Interpretation 02/04/2020 0   Final   • Pork 02/04/2020 <0.10  <0.35 kU/L Final   • Class Interpretation 02/04/2020 0   Final    The test method is the [place] ImmunoCAP allergen-specific  IgE system. CLASS INTERPRETATION   <0.10 kU/L= 0,  Negative; 0.10 - 0.34 kU/L= 0/1, Equivocal/Borderline;  0.35 -  0.69  kU/L=1, Low Positive; 0.70 - 3.49 kU/L=2,  Moderate Positive;  3.50  - 17.49 kU/L=3, High Positive;  17.50 - 49.99 kU/L= 4, Very High Positive; 50.00 - 99.99  kU/L= 5, Very High Positive;   >99.99 kU/L=6, Very High  Positive  *This test was developed and its performance  characteristics determined by [employer]. It has not  been cleared or approved by the [place]. Food and Drug  Administration.   • Alpha Gal IgE 02/04/2020 <0.10  <0.10 kU/L Final    Previous reports (ABIEL 2009;123:426-433) have demonstrated  that patients with IgE antibodies to  hzrsappes-x-8,3-galactose are at risk for delayed  anaphylaxis, angioedema, or urticaria following  consumption of beef, pork, or lamb.   • Gliadin Deamidated Peptide Ab, IgA 02/04/2020 4  0 - 19 units Final                       Negative                   0 - 19                     Weak Positive             20 - 30                     Moderate to Strong Positive   >30   • Deaminated Gliadin Ab IgG 02/04/2020 5  0 - 19 units Final                       Negative                   0 - 19                     Weak Positive             20 - 30                     Moderate to Strong Positive   >30   • Tissue Transglutaminase IgA 02/04/2020 <2  0 - 3 U/mL Final                                  Negative        0 -  3                                Weak Positive   4 - 10                                Positive           >10   Tissue Transglutaminase (tTG) has been identified   as the endomysial antigen.  Studies have demonstr-   ated that endomysial IgA antibodies have over 99%   specificity for gluten sensitive enteropathy.   • Tissue Transglutaminase IgG 02/04/2020 10* 0 - 5 U/mL Final                                  Negative        0 - 5                                Weak Positive   6 - 9                                Positive           >9   • Endomysial IgA 02/04/2020 Negative  Negative Final   • IgA 02/04/2020 64* 87 - 352 mg/dL Final   • Class Description 02/04/2020  Comment   Final        Levels of Specific IgE       Class  Description of Class      ---------------------------  -----  --------------------                     < 0.10         0         Negative             0.10 -    0.31         0/I       Equivocal/Low             0.32 -    0.55         I         Low             0.56 -    1.40         II        Moderate             1.41 -    3.90         III       High             3.91 -   19.00         IV        Very High            19.01 -  100.00         V         Very High                    >100.00         VI        Very High   • Egg White 02/04/2020 <0.10  Class 0 kU/L Final   • Milk, Cow's 02/04/2020 <0.10  Class 0 kU/L Final   • CodFish 02/04/2020 <0.10  Class 0 kU/L Final   • Sesame Seed 02/04/2020 <0.10  Class 0 kU/L Final   • Peanut 02/04/2020 <0.10  Class 0 kU/L Final   • Soybean 02/04/2020 <0.10  Class 0 kU/L Final   • Hazelnut 02/04/2020 <0.10  Class 0 kU/L Final   • Shrimp 02/04/2020 <0.10  Class 0 kU/L Final   • Scallop 02/04/2020 <0.10  Class 0 kU/L Final   • Gluten 02/04/2020 <0.10  Class 0 kU/L Final   • Cass 02/04/2020 <0.10  Class 0 kU/L Final   • Wheat 02/04/2020 <0.10  Class 0 kU/L Final   • Gliadin Deamidated Peptide Ab, IgA 02/04/2020 4  0 - 19 units Final                       Negative                   0 - 19                     Weak Positive             20 - 30                     Moderate to Strong Positive   >30   • Deaminated Gliadin Ab IgG 02/04/2020 6  0 - 19 units Final                       Negative                   0 - 19                     Weak Positive             20 - 30                     Moderate to Strong Positive   >30   • Tissue Transglutaminase IgA 02/04/2020 <2  0 - 3 U/mL Final                                  Negative        0 -  3                                Weak Positive   4 - 10                                Positive           >10   Tissue Transglutaminase (tTG) has been identified   as the endomysial antigen.  Studies  have demonstr-   ated that endomysial IgA antibodies have over 99%   specificity for gluten sensitive enteropathy.   • Tissue Transglutaminase IgG 02/04/2020 11* 0 - 5 U/mL Final                                  Negative        0 - 5                                Weak Positive   6 - 9                                Positive           >9   • Campylobacter 02/04/2020 Not Detected  Not Detected, Invalid Final   • Plesiomonas shigelloides 02/04/2020 Not Detected  Not Detected Final   • Salmonella 02/04/2020 Not Detected  Not Detected Final   • Vibrio 02/04/2020 Not Detected  Not Detected Final   • Vibrio cholerae 02/04/2020 Not Detected  Not Detected Final   • Yersinia enterocolitica 02/04/2020 Not Detected  Not Detected Final   • Enteroaggregative E. coli (EAEC) 02/04/2020 Not Detected  Not Detected Final   • Enteropathogenic E. coli (EPEC) 02/04/2020 Not Detected  Not Detected Final   • Enterotoxigenic E. coli (ETEC) lt/* 02/04/2020 Not Detected  Not Detected Final   • Shiga-like toxin-producing E. coli* 02/04/2020 Detected* Not Detected Final   • E. coli O157 02/04/2020 Not Detected  Not Detected Final   • Shigella/Enteroinvasive E. coli (E* 02/04/2020 Not Detected  Not Detected Final   • Cryptosporidium 02/04/2020 Not Detected  Not Detected, Invalid Final   • Cyclospora cayetanensis 02/04/2020 Not Detected  Not Detected Final   • Entamoeba histolytica 02/04/2020 Not Detected  Not Detected Final   • Giardia lamblia 02/04/2020 Not Detected  Not Detected Final   • Adenovirus F40/41 02/04/2020 Not Detected  Not Detected Final   • Astrovirus 02/04/2020 Not Detected  Not Detected Final   • Norovirus GI/GII 02/04/2020 Not Detected  Not Detected Final   • Rotavirus A 02/04/2020 Not Detected  Not Detected Final   • Sapovirus (I, II, IV or V) 02/04/2020 Not Detected  Not Detected Final   • C. Difficile Toxins by PCR 02/04/2020 Positive* Negative Final   • Neutrophil % 02/04/2020 60.6  42.7 - 76.0 % Final   • Lymphocyte %  02/04/2020 25.3  19.6 - 45.3 % Final   • Monocyte % 02/04/2020 11.1  5.0 - 12.0 % Final   • Eosinophil % 02/04/2020 1.0  0.3 - 6.2 % Final   • Basophil % 02/04/2020 2.0* 0.0 - 1.5 % Final   • Neutrophils Absolute 02/04/2020 11.40* 1.70 - 7.00 10*3/mm3 Final   • Lymphocytes Absolute 02/04/2020 4.76* 0.70 - 3.10 10*3/mm3 Final   • Monocytes Absolute 02/04/2020 2.09* 0.10 - 0.90 10*3/mm3 Final   • Eosinophils Absolute 02/04/2020 0.19  0.00 - 0.40 10*3/mm3 Final   • Basophils Absolute 02/04/2020 0.38* 0.00 - 0.20 10*3/mm3 Final   • Poikilocytes 02/04/2020 Slight/1+  None Seen Final   • WBC Morphology 02/04/2020 Normal  Normal Final   • Platelet Morphology 02/04/2020 Normal  Normal Final   Admission on 10/19/2019, Discharged on 10/19/2019   Component Date Value Ref Range Status   • Glucose 10/19/2019 99  65 - 99 mg/dL Final   • BUN 10/19/2019 11  6 - 20 mg/dL Final   • Creatinine 10/19/2019 0.67  0.57 - 1.00 mg/dL Final   • Sodium 10/19/2019 141  136 - 145 mmol/L Final   • Potassium 10/19/2019 4.6  3.5 - 5.2 mmol/L Final   • Chloride 10/19/2019 102  98 - 107 mmol/L Final   • CO2 10/19/2019 31.0* 22.0 - 29.0 mmol/L Final   • Calcium 10/19/2019 9.2  8.6 - 10.5 mg/dL Final   • Total Protein 10/19/2019 6.9  6.0 - 8.5 g/dL Final   • Albumin 10/19/2019 4.40  3.50 - 5.20 g/dL Final   • ALT (SGPT) 10/19/2019 12  1 - 33 U/L Final   • AST (SGOT) 10/19/2019 11  1 - 32 U/L Final   • Alkaline Phosphatase 10/19/2019 89  39 - 117 U/L Final   • Total Bilirubin 10/19/2019 0.3  0.2 - 1.2 mg/dL Final   • eGFR Non African Amer 10/19/2019 93  >60 mL/min/1.73 Final   • Globulin 10/19/2019 2.5  gm/dL Final   • A/G Ratio 10/19/2019 1.8  g/dL Final   • BUN/Creatinine Ratio 10/19/2019 16.4  7.0 - 25.0 Final   • Anion Gap 10/19/2019 8.0  5.0 - 15.0 mmol/L Final   • Creatine Kinase 10/19/2019 53  20 - 180 U/L Final   • Lipase 10/19/2019 35  13 - 60 U/L Final   • WBC 10/19/2019 17.94* 3.40 - 10.80 10*3/mm3 Final   • RBC 10/19/2019 4.46  3.77 - 5.28  10*6/mm3 Final   • Hemoglobin 10/19/2019 14.0  12.0 - 15.9 g/dL Final   • Hematocrit 10/19/2019 42.4  34.0 - 46.6 % Final   • MCV 10/19/2019 95.1  79.0 - 97.0 fL Final   • MCH 10/19/2019 31.4  26.6 - 33.0 pg Final   • MCHC 10/19/2019 33.0  31.5 - 35.7 g/dL Final   • RDW 10/19/2019 12.7  12.3 - 15.4 % Final   • RDW-SD 10/19/2019 43.9  37.0 - 54.0 fl Final   • MPV 10/19/2019 9.0  6.0 - 12.0 fL Final   • Platelets 10/19/2019 541* 140 - 450 10*3/mm3 Final   • Neutrophil % 10/19/2019 70.0  42.7 - 76.0 % Final   • Lymphocyte % 10/19/2019 20.5  19.6 - 45.3 % Final   • Monocyte % 10/19/2019 7.4  5.0 - 12.0 % Final   • Eosinophil % 10/19/2019 0.4  0.3 - 6.2 % Final   • Basophil % 10/19/2019 0.3  0.0 - 1.5 % Final   • Immature Grans % 10/19/2019 1.4* 0.0 - 0.5 % Final   • Neutrophils, Absolute 10/19/2019 12.56* 1.70 - 7.00 10*3/mm3 Final   • Lymphocytes, Absolute 10/19/2019 3.67* 0.70 - 3.10 10*3/mm3 Final   • Monocytes, Absolute 10/19/2019 1.33* 0.10 - 0.90 10*3/mm3 Final   • Eosinophils, Absolute 10/19/2019 0.07  0.00 - 0.40 10*3/mm3 Final   • Basophils, Absolute 10/19/2019 0.06  0.00 - 0.20 10*3/mm3 Final   • Immature Grans, Absolute 10/19/2019 0.25* 0.00 - 0.05 10*3/mm3 Final   • nRBC 10/19/2019 0.0  0.0 - 0.2 /100 WBC Final   • Extra Tube 10/19/2019 hold for add-on   Final    Auto resulted   • Extra Tube 10/19/2019 Hold for add-ons.   Final    Auto resulted.   Admission on 10/18/2019, Discharged on 10/18/2019   Component Date Value Ref Range Status   • Case Report 10/18/2019    Final                    Value:Surgical Pathology Report                         Case: [id number]                                  Authorizing Provider:  Tom Davis MD        Collected:           10/18/2019 11:09 AM          Ordering Location:     [facility]             Received:            10/18/2019 01:35 PM                                 [facility]                                                     Pathologist:            Rambo Carter MD                                                         Specimens:   1) - Small Intestine, Duodenum, small bowel                                                         2) - Gastric, Antrum                                                                                3) - Small Intestine, Ileum, TI                                                                     4) - Large Intestine, colonic mucosa                                                      • Final Diagnosis 10/18/2019    Final                    Value:This result contains rich text formatting which cannot be displayed here.   • Gross Description 10/18/2019    Final                    Value:This result contains rich text formatting which cannot be displayed here.      XR Chest 1 View  Narrative: PORTABLE CHEST    HISTORY: Syncope. Headache.    Portable AP upright film of the chest was obtained at 11:40 AM.  COMPARISON: August 29, 2019    FINDINGS:   The lungs are clear of an acute process.  The heart is not enlarged.  The pulmonary vasculature is not increased.  No pleural effusion.  No pneumothorax.  No acute osseous abnormality.  Postoperative changes in the cervical spine, right clavicle and  right humeral head.  Impression: CONCLUSION:  No Acute Disease    [id number]    Electronically signed by:  Theo Jacobson MD  3/2/2020 12:29 PM CST  Workstation: [id number]  CT Head Without Contrast  Narrative: CT Head Without Contrast    History: Worsening headache x3 days. Head trauma.    Axial scans of the brain were obtained without intravenous  contrast.  Coronal and sagital reconstructions were preformed.    This exam was performed according to our departmental  dose-optimization program, which includes automated exposure  control, adjustment of the mA and/or kV according to patient size  and/or use of iterative reconstruction technique.    DLP: 1128    Comparison: None    Findings:  Bone windows are unremarkable.  The visualized paranasal  "sinuses are unremarkable.    No hemorrhage.  No mass.  No abnormal areas of increased or decreased attenuation.  No midline shift.  No abnormal extra-axial fluid collections.  Impression: CONCLUSION:  No intracranial injury or acute process    [id number]    Electronically signed by:  Theo Jacobson MD  3/2/2020 11:36 AM [facility]  Workstation: [id number]    [unfilled]  Immunization History   Administered Date(s) Administered   • flucelvax quad pfs =>4 YRS 09/19/2019       The following portions of the patient's history were reviewed and updated as appropriate: allergies, current medications, past family history, past medical history, past social history, past surgical history and problem list.        Physical Exam  /100 (BP Location: Left arm, Patient Position: Sitting)   Pulse 106   Temp 96.6 °F (35.9 °C) (Oral)   Ht 165.1 cm (65\")   Wt 48.2 kg (106 lb 3.2 oz)   LMP  (LMP Unknown)   BMI 17.67 kg/m²     Physical Exam   Constitutional: She is oriented to person, place, and time. She appears well-developed and well-nourished.   HENT:   Head: Normocephalic and atraumatic.   Right Ear: External ear normal.   Eyes: Pupils are equal, round, and reactive to light. Conjunctivae and EOM are normal.   Neck: Normal range of motion. Neck supple.   Cardiovascular: Regular rhythm and normal heart sounds. Tachycardia present.   No murmur heard.  Pulmonary/Chest: Effort normal and breath sounds normal. No respiratory distress.   Abdominal: Soft. Bowel sounds are normal. She exhibits no distension. There is no tenderness.   Musculoskeletal: Normal range of motion. She exhibits no edema or deformity.   Neurological: She is alert and oriented to person, place, and time. No cranial nerve deficit.   Skin: Skin is warm. No rash noted. She is not diaphoretic. No erythema. No pallor.   Psychiatric: She has a normal mood and affect. Her behavior is normal.   Nursing note and vitals reviewed.      Assessment/Plan    Diagnosis Plan   1. " Thrombocytosis (CMS/[facility])     2. Tobacco user     3. Tremor     4. Vitamin D deficiency      5. Stage 2 moderate COPD by GOLD classification (CMS/[facility])     6. Irritable bowel syndrome, unspecified type     7. Gastritis without bleeding, unspecified chronicity, unspecified gastritis type     8. Episode of recurrent major depressive disorder, unspecified depression episode severity (CMS/[facility])     9. Generalized abdominal pain     10. Concussion with loss of consciousness, sequela (CMS/[facility])     11. Chronic idiopathic constipation     12. Chronic bronchitis, unspecified chronic bronchitis type (CMS/[facility])     13. Tachycardia     14. Essential hypertension             -HTN/tachcycardia -  Start on toprol XL 50 mg daily.   -schedule mammogram screening   -thrombocytosis - continue to monitor. She is seeing Hematology   -gluten sensitivity -recommend gluten free diet   -COPD/COPD exacerbation- Pulmonlogy following on Symbicort 160/45 2 puffs BID  Albuterol inhaler PRN.    -vitamin B12 deficiency  - b12 injections weekly  -tobacco user - counseled to quit smoking >5 minutes. She could not tolerate chantix  urged the importance of quitting smoking.   -chronic bronchitis, - mucinex advised pt to cut down or quit smoking >5 minutes   -abdominal pain/gastritis/IBS /GERD- Gastroenterology following. On PPI prilosec 20 mg q daily  OFF prucalopride 2 mg daily.   -nausea/ vomting - continue zofran PRN  -allergic rhinitis - flonase nasal spray and singulair   -vitamin D deficiency - vitamin D once a week   -pt has upcoming mammogram soon.    -tremor of head/concussion - she has seen neurology with Dr. Chen. Had MRI of brain. Recommended 2nd opinon with Dr. Manjarrez She also has family history of Friedreich's ataxia. On elavail 25 mg at bedtime   -advised pt to go to ER or call 911 if symptoms worrisome or severe  -advised pt to be  Safe and call with questions and concerns  -advised to followup with all referrals and Specialist  -advised  pt to be safe during COVID -19 pandemic   This visit has been rescheduled as a phone visit to comply with patient safety concerns in accordance with CDC recommendations. Total time of discussion was 25 minutes  -recheck in 2 weeks         This document has been electronically signed by Xavier Wick MD on April 15, 2020 11:25

## 2020-04-14 NOTE — TELEPHONE ENCOUNTER
"PT called, states she just finished a telemedicine encounter with her GI provider, Dr. Cardona. During the encounter, PT realized her BP is very high (\"for her\"). Reports 143/101 in her right arm and 141/99 in her left arm. Reports tingling and just a general lightheaded feeling of \" not being okay.\" States BP was a little elevated when she saw PCP last (4/10/20).     Please advise/call Carla back with guidance: [phone].    Confirmed Pharmacy:   [facility] - [place], [place] - [address] - [phone]  - [phone] FX  [address] [address]  Phone: [phone] Fax: [phone]        "

## 2020-04-14 NOTE — PATIENT INSTRUCTIONS
"Diet for Irritable Bowel Syndrome  When you have irritable bowel syndrome (IBS), it is very important to eat the foods and follow the eating habits that are best for your condition. IBS may cause various symptoms such as pain in the abdomen, constipation, or diarrhea. Choosing the right foods can help to ease the discomfort from these symptoms. Work with your health care provider and diet and nutrition specialist (dietitian) to find the eating plan that will help to control your symptoms.  What are tips for following this plan?         · Keep a food diary. This will help you identify foods that cause symptoms. Write down:  ? What you eat and when you eat it.  ? What symptoms you have.  ? When symptoms occur in relation to your meals, such as \"pain in abdomen 2 hours after dinner.\"  · Eat your meals slowly and in a relaxed setting.  · Aim to eat 5-6 small meals per day. Do not skip meals.  · Drink enough fluid to keep your urine pale yellow.  · Ask your health care provider if you should take an over-the-counter probiotic to help restore healthy bacteria in your gut (digestive tract).  ? Probiotics are foods that contain good bacteria and yeasts.  · Your dietitian may have specific dietary recommendations for you based on your symptoms. He or she may recommend that you:  ? Avoid foods that cause symptoms. Talk with your dietitian about other ways to get the same nutrients that are in those problem foods.  ? Avoid foods with gluten. Gluten is a protein that is found in rye, wheat, and barley.  ? Eat more foods that contain soluble fiber. Examples of foods with high soluble fiber include oats, seeds, and certain fruits and vegetables. Take a fiber supplement if directed by your dietitian.  ? Reduce or avoid certain foods called FODMAPs. These are foods that contain carbohydrates that are hard to digest. Ask your doctor which foods contain these carbohydrates.  What foods are not recommended?  The following are some " foods and drinks that may make your symptoms worse:  · Fatty foods, such as french fries.  · Foods that contain gluten, such as pasta and cereal.  · Dairy products, such as milk, cheese, and ice cream.  · Chocolate.  · Alcohol.  · Products with caffeine, such as coffee.  · Carbonated drinks, such as soda.  · Foods that are high in FODMAPs. These include certain fruits and vegetables.  · Products with sweeteners such as honey, high fructose corn syrup, sorbitol, and mannitol.  The items listed above may not be a complete list of foods and beverages you should avoid. Contact a dietitian for more information.  What foods are good sources of fiber?  Your health care provider or dietitian may recommend that you eat more foods that contain fiber. Fiber can help to reduce constipation and other IBS symptoms. Add foods with fiber to your diet a little at a time so your body can get used to them. Too much fiber at one time might cause gas and swelling of your abdomen. The following are some foods that are good sources of fiber:  · Berries, such as raspberries, strawberries, and blueberries.  · Tomatoes.  · Carrots.  · Brown rice.  · Oats.  · Seeds, such as kp and pumpkin seeds.  The items listed above may not be a complete list of recommended sources of fiber. Contact your dietitian for more options.  Where to find more information  · International Foundation for Functional Gastrointestinal Disorders: www.iffgd.org  · National [place] of Diabetes and Digestive and Kidney Diseases: www.niddk.nih.gov  Summary  · When you have irritable bowel syndrome (IBS), it is very important to eat the foods and follow the eating habits that are best for your condition.  · IBS may cause various symptoms such as pain in the abdomen, constipation, or diarrhea.  · Choosing the right foods can help to ease the discomfort that comes from symptoms.  · Keep a food diary. This will help you identify foods that cause symptoms.  · Your health  care provider or diet and nutrition specialist (dietitian) may recommend that you eat more foods that contain fiber.  This information is not intended to replace advice given to you by your health care provider. Make sure you discuss any questions you have with your health care provider.  Document Released: 03/09/2005 Document Revised: 07/15/2019 Document Reviewed: 08/21/2018  [employer] Patient Education © 2020 [employer]

## 2020-04-14 NOTE — PROGRESS NOTES
Chief Complaint   Patient presents with   • Follow-up   • Bloated   • Abdominal Pain   • Vomiting   • Nausea   • Diarrhea   • Constipation       Subjective    Carla Richard is a 50 y.o. female. she is here today for follow-up.    50-year-old female presents via telephone visit for recheck.  States overall symptoms have been better but she does reports abdominal pain and cramping reports dicyclomine helps her symptoms but tends to make her drowsy.  She has recently established with a hematologist has not been following a strict gluten-free diet.  States nausea and vomiting have greatly improved only reports of intermittent mild nausea denies any reflux symptoms with taking her medication.  States bowel habits still vary from days of constipation to days of diarrhea.  Patient reports she checked her blood pressure at home today is higher than normal for her however she denies any chest pain pressure or tightness and she is going to contact Dr. Church regarding this.  Discussed with patient if she develops any symptoms of chest pain pressure tightness she will seek emergency medical attention and she agrees.    Irritable Bowel Syndrome   This is a chronic problem. The current episode started more than 1 year ago. The problem occurs daily. The problem has been waxing and waning. Associated symptoms include abdominal pain, a change in bowel habit and nausea. Pertinent negatives include no anorexia, arthralgias, chest pain, chills, congestion, coughing, diaphoresis, fatigue, fever, headaches, joint swelling, myalgias, neck pain, numbness, rash, sore throat, swollen glands, urinary symptoms, vertigo, visual change, vomiting or weakness. The treatment provided mild relief.            The following portions of the patient's history were reviewed and updated as appropriate:   Past Medical History:   Diagnosis Date   • Asthma    • Cancer (CMS/HCC)     cervical   • Colon polyp    • COPD (chronic obstructive pulmonary  disease) (CMS/Formerly [facility])    • Crohn's disease (CMS/Formerly [facility])    • Diverticulitis of colon    • Elevated cholesterol    • GERD (gastroesophageal reflux disease)    • History of transfusion    • Irritable bowel syndrome    • Pancreatitis      Past Surgical History:   Procedure Laterality Date   • COLONOSCOPY     • COLONOSCOPY N/A 10/18/2019    Procedure: COLONOSCOPY;  Surgeon: Tom Davis MD;  Location: [facility] ENDOSCOPY;  Service: Gastroenterology   • ENDOSCOPY N/A 10/18/2019    Procedure: ESOPHAGOGASTRODUODENOSCOPY;  Surgeon: Tom Davis MD;  Location: [facility] ENDOSCOPY;  Service: Gastroenterology   • HYSTERECTOMY     • TONSILECTOMY, ADENOIDECTOMY, BILATERAL MYRINGOTOMY AND TUBES     • UPPER GASTROINTESTINAL ENDOSCOPY       Family History   Problem Relation Age of Onset   • Inflammatory bowel disease Mother    • Arthritis Mother    • Diabetes Mother    • Hypertension Mother    • Hyperlipidemia Mother    • Obesity Mother    • Osteoporosis Mother    • Heart disease Father    • Hyperlipidemia Father    • Diabetes Sister    • Liver disease Daughter    • Mental illness Daughter    • Obesity Daughter    • Diabetes Maternal Grandmother    • Cancer Maternal Grandmother         lung   • Cancer Other         lung   • Heart disease Other      OB History    None       Prior to Admission medications    Medication Sig Start Date End Date Taking? Authorizing Provider   albuterol sulfate  (90 Base) MCG/ACT inhaler Inhale 2 puffs Every 4 (Four) Hours As Needed for Wheezing. 8/29/19  Yes Xavier Wick MD   amitriptyline (ELAVIL) 25 MG tablet Take 25 mg by mouth Every Night. 3/6/20  Yes Provider, MD Byron   budesonide-formoterol (SYMBICORT) 160-4.5 MCG/ACT inhaler Inhale 2 puffs 2 (Two) Times a Day. 9/25/19  Yes Iliana Jacobson MD   cetirizine (zyrTEC) 10 MG tablet Take 1 tablet by mouth Daily. 9/25/19  Yes Iliana Jacobson MD   Cyanocobalamin (B-12 COMPLIANCE INJECTION IJ) Inject  as directed.   Yes Provider,  MD Byron   dicyclomine (BENTYL) 20 MG tablet Take 2 tablets by mouth Every 6 (Six) Hours. 2/4/20  Yes Xavier Wick MD   escitalopram (LEXAPRO) 10 MG tablet Take 1 tablet by mouth Daily. 2/4/20  Yes Xavier Wick MD   fluticasone (FLONASE) 50 MCG/ACT nasal spray 2 sprays into the nostril(s) as directed by provider Daily. 9/25/19  Yes Iliana Jacobson MD   guaiFENesin (MUCINEX) 600 MG 12 hr tablet Take 1 tablet by mouth 2 (Two) Times a Day. 10/17/19  Yes Xavier Wick MD   ipratropium-albuterol (DUO-NEB) 0.5-2.5 mg/3 ml nebulizer Take 3 mL by nebulization Every 4 (Four) Hours As Needed for Wheezing or Shortness of Air.   Yes Byron Medina MD   montelukast (SINGULAIR) 10 MG tablet Take 1 tablet by mouth Every Night. 10/17/19  Yes Xavier Wick MD   nicotine ([facility] Nicotine) 7 MG/24HR patch Place 1 patch on the skin as directed by provider Daily. 4/10/20  Yes Xavier Wick MD   omeprazole (priLOSEC) 20 MG capsule Take 20 mg by mouth Daily.   Yes ProviderByron MD   ondansetron ODT (ZOFRAN ODT) 8 MG disintegrating tablet Take 1 tablet by mouth Every 8 (Eight) Hours As Needed for Nausea or Vomiting. 3/4/20  Yes Xavier Wick MD   vitamin D (ERGOCALCIFEROL) 60968 units capsule capsule Take 1 capsule by mouth 1 (One) Time Per Week. 7/17/19  Yes Xavier Wick MD     Allergies   Allergen Reactions   • Nsaids Swelling and GI Intolerance   • Azithromycin Swelling   • Ciprofloxacin Hives   • Doxycycline Swelling   • Other Other (See Comments)     nicotine patch   Caused body twitching/seizures   • Levofloxacin Rash   • Phenergan [Promethazine Hcl] GI Intolerance     Social History     Socioeconomic History   • Marital status: Single     Spouse name: Not on file   • Number of children: Not on file   • Years of education: Not on file   • Highest education level: Not on file   Tobacco Use   • Smoking status: Current Every Day Smoker     Packs/day: 1.00     Types: Cigarettes   • Smokeless  "tobacco: Never Used   Substance and Sexual Activity   • Alcohol use: No     Frequency: Never   • Drug use: No   • Sexual activity: Defer       Review of Systems  Review of Systems   Constitutional: Negative for activity change, appetite change, chills, diaphoresis, fatigue, fever and unexpected weight change.   HENT: Negative for congestion and sore throat.    Respiratory: Negative for cough.    Cardiovascular: Negative for chest pain.   Gastrointestinal: Positive for abdominal pain, change in bowel habit, constipation (with flare up takes trulance now more issue with diarrhea ), diarrhea and nausea. Negative for abdominal distention, anal bleeding, anorexia, blood in stool, rectal pain and vomiting.   Musculoskeletal: Negative for arthralgias, joint swelling, myalgias and neck pain.   Skin: Negative for rash.   Neurological: Negative for vertigo, weakness, numbness and headaches.        /90 (BP Location: Left arm, Patient Position: Sitting)   Pulse 99   Ht 165.1 cm (65\")   Wt 49.9 kg (110 lb)   LMP  (LMP Unknown)   BMI 18.30 kg/m²     Objective    Physical Exam   Psychiatric: She has a normal mood and affect. Her speech is normal.     Admission on 03/02/2020, Discharged on 03/02/2020   Component Date Value Ref Range Status   • THC, Screen, Urine 03/02/2020 Positive* Negative Final   • Phencyclidine (PCP), Urine 03/02/2020 Negative  Negative Final   • Cocaine Screen, Urine 03/02/2020 Negative  Negative Final   • Methamphetamine, Ur 03/02/2020 Negative  Negative Final   • Opiate Screen 03/02/2020 Negative  Negative Final   • Amphetamine Screen, Urine 03/02/2020 Negative  Negative Final   • Benzodiazepine Screen, Urine 03/02/2020 Negative  Negative Final   • Tricyclic Antidepressants Screen 03/02/2020 Negative  Negative Final   • Methadone Screen, Urine 03/02/2020 Negative  Negative Final   • Barbiturates Screen, Urine 03/02/2020 Positive* Negative Final   • Oxycodone Screen, Urine 03/02/2020 Negative  " Negative Final   • Propoxyphene Screen 03/02/2020 Negative  Negative Final   • Buprenorphine, Screen, Urine 03/02/2020 Negative  Negative Final   • Glucose 03/02/2020 97  65 - 99 mg/dL Final   • BUN 03/02/2020 13  6 - 20 mg/dL Final   • Creatinine 03/02/2020 0.69  0.57 - 1.00 mg/dL Final   • Sodium 03/02/2020 141  136 - 145 mmol/L Final   • Potassium 03/02/2020 4.0  3.5 - 5.2 mmol/L Final   • Chloride 03/02/2020 102  98 - 107 mmol/L Final   • CO2 03/02/2020 26.0  22.0 - 29.0 mmol/L Final   • Calcium 03/02/2020 10.0  8.6 - 10.5 mg/dL Final   • Total Protein 03/02/2020 7.7  6.0 - 8.5 g/dL Final   • Albumin 03/02/2020 4.80  3.50 - 5.20 g/dL Final   • ALT (SGPT) 03/02/2020 19  1 - 33 U/L Final   • AST (SGOT) 03/02/2020 18  1 - 32 U/L Final   • Alkaline Phosphatase 03/02/2020 95  39 - 117 U/L Final   • Total Bilirubin 03/02/2020 0.3  0.2 - 1.2 mg/dL Final   • eGFR Non African Amer 03/02/2020 89  >60 mL/min/1.73 Final   • Globulin 03/02/2020 2.9  gm/dL Final   • A/G Ratio 03/02/2020 1.7  g/dL Final   • BUN/Creatinine Ratio 03/02/2020 18.8  7.0 - 25.0 Final   • Anion Gap 03/02/2020 13.0  5.0 - 15.0 mmol/L Final   • WBC 03/02/2020 9.73  3.40 - 10.80 10*3/mm3 Final   • RBC 03/02/2020 5.20  3.77 - 5.28 10*6/mm3 Final   • Hemoglobin 03/02/2020 16.4* 12.0 - 15.9 g/dL Final   • Hematocrit 03/02/2020 49.4* 34.0 - 46.6 % Final   • MCV 03/02/2020 95.0  79.0 - 97.0 fL Final   • MCH 03/02/2020 31.5  26.6 - 33.0 pg Final   • MCHC 03/02/2020 33.2  31.5 - 35.7 g/dL Final   • RDW 03/02/2020 13.2  12.3 - 15.4 % Final   • RDW-SD 03/02/2020 46.1  37.0 - 54.0 fl Final   • MPV 03/02/2020 9.2  6.0 - 12.0 fL Final   • Platelets 03/02/2020 439  140 - 450 10*3/mm3 Final   • Neutrophil % 03/02/2020 54.7  42.7 - 76.0 % Final   • Lymphocyte % 03/02/2020 34.5  19.6 - 45.3 % Final   • Monocyte % 03/02/2020 7.6  5.0 - 12.0 % Final   • Eosinophil % 03/02/2020 1.7  0.3 - 6.2 % Final   • Basophil % 03/02/2020 0.8  0.0 - 1.5 % Final   • Immature Grans %  03/02/2020 0.7* 0.0 - 0.5 % Final   • Neutrophils, Absolute 03/02/2020 5.31  1.70 - 7.00 10*3/mm3 Final   • Lymphocytes, Absolute 03/02/2020 3.36* 0.70 - 3.10 10*3/mm3 Final   • Monocytes, Absolute 03/02/2020 0.74  0.10 - 0.90 10*3/mm3 Final   • Eosinophils, Absolute 03/02/2020 0.17  0.00 - 0.40 10*3/mm3 Final   • Basophils, Absolute 03/02/2020 0.08  0.00 - 0.20 10*3/mm3 Final   • Immature Grans, Absolute 03/02/2020 0.07* 0.00 - 0.05 10*3/mm3 Final   • nRBC 03/02/2020 0.0  0.0 - 0.2 /100 WBC Final   • Extra Tube 03/02/2020 hold for add-on   Final    Auto resulted   • Extra Tube 03/02/2020 Hold for add-ons.   Final    Auto resulted.   • Color, UA 03/02/2020 Yellow  Yellow, Straw, Dark Yellow, Ana M Final   • Appearance, UA 03/02/2020 Clear  Clear Final   • pH, UA 03/02/2020 6.5  5.0 - 9.0 Final   • Specific Gravity, UA 03/02/2020 1.021  1.003 - 1.030 Final   • Glucose, UA 03/02/2020 Negative  Negative Final   • Ketones, UA 03/02/2020 Negative  Negative Final   • Bilirubin, UA 03/02/2020 Negative  Negative Final   • Blood, UA 03/02/2020 Small (1+)* Negative Final   • Protein, UA 03/02/2020 Negative  Negative Final   • Leuk Esterase, UA 03/02/2020 Negative  Negative Final   • Nitrite, UA 03/02/2020 Negative  Negative Final   • Urobilinogen, UA 03/02/2020 0.2 E.U./dL  0.2 - 1.0 E.U./dL Final   • RBC, UA 03/02/2020 6-12* None Seen /HPF Final   • WBC, UA 03/02/2020 None Seen  None Seen, 0-2, 3-5 /HPF Final   • Bacteria, UA 03/02/2020 None Seen  None Seen /HPF Final   • Squamous Epithelial Cells, UA 03/02/2020 None Seen  None Seen, 0-2 /HPF Final   • Hyaline Casts, UA 03/02/2020 0-2  None Seen /LPF Final   • Methodology 03/02/2020 Automated Microscopy   Final   • Troponin T 03/02/2020 <0.010  0.000 - 0.030 ng/mL Final   • proBNP 03/02/2020 27.8  5.0 - 900.0 pg/mL Final     Assessment/Plan      1. Generalized abdominal pain    2. Irritable bowel syndrome with both constipation and diarrhea    3. Gastroesophageal reflux  disease without esophagitis    .   Continue patient on current regimen Bentyl as needed for abdominal pain and IBS with diarrhea.  Trulance if several days of constipation.  Continue Nexium, standard antireflux measures for reflux.    Orders placed during this encounter include:  No orders of the defined types were placed in this encounter.    This visit has been rescheduled as a phone visit to comply with patient safety concerns in accordance with CDC recommendations. Total time of discussion was 15 minutes.        * Surgery not found *    Review and/or summary of lab tests, radiology, procedures, medications. Review and summary of old records and obtaining of history. The risks and benefits of my recommendations, as well as other treatment options were discussed with the patient today. Questions were answered.    No orders of the defined types were placed in this encounter.      Follow-up: Return in about 6 months (around 10/14/2020).          This document has been electronically signed by CROW Dudley on April 14, 2020 10:12             Results for orders placed or performed during the hospital encounter of 03/02/20   Gold Top - SST   Result Value Ref Range    Extra Tube Hold for add-ons.    Urinalysis, Microscopic Only - Urine, Clean Catch   Result Value Ref Range    RBC, UA 6-12 (A) None Seen /HPF    WBC, UA None Seen None Seen, 0-2, 3-5 /HPF    Bacteria, UA None Seen None Seen /HPF    Squamous Epithelial Cells, UA None Seen None Seen, 0-2 /HPF    Hyaline Casts, UA 0-2 None Seen /LPF    Methodology Automated Microscopy    Urinalysis With Culture If Indicated - Urine, Clean Catch   Result Value Ref Range    Color, UA Yellow Yellow, Straw, Dark Yellow, Ana M    Appearance, UA Clear Clear    pH, UA 6.5 5.0 - 9.0    Specific Gravity, UA 1.021 1.003 - 1.030    Glucose, UA Negative Negative    Ketones, UA Negative Negative    Bilirubin, UA Negative Negative    Blood, UA Small (1+) (A) Negative    Protein, UA  Negative Negative    Leuk Esterase, UA Negative Negative    Nitrite, UA Negative Negative    Urobilinogen, UA 0.2 E.U./dL 0.2 - 1.0 E.U./dL   CBC Auto Differential   Result Value Ref Range    WBC 9.73 3.40 - 10.80 10*3/mm3    RBC 5.20 3.77 - 5.28 10*6/mm3    Hemoglobin 16.4 (H) 12.0 - 15.9 g/dL    Hematocrit 49.4 (H) 34.0 - 46.6 %    MCV 95.0 79.0 - 97.0 fL    MCH 31.5 26.6 - 33.0 pg    MCHC 33.2 31.5 - 35.7 g/dL    RDW 13.2 12.3 - 15.4 %    RDW-SD 46.1 37.0 - 54.0 fl    MPV 9.2 6.0 - 12.0 fL    Platelets 439 140 - 450 10*3/mm3    Neutrophil % 54.7 42.7 - 76.0 %    Lymphocyte % 34.5 19.6 - 45.3 %    Monocyte % 7.6 5.0 - 12.0 %    Eosinophil % 1.7 0.3 - 6.2 %    Basophil % 0.8 0.0 - 1.5 %    Immature Grans % 0.7 (H) 0.0 - 0.5 %    Neutrophils, Absolute 5.31 1.70 - 7.00 10*3/mm3    Lymphocytes, Absolute 3.36 (H) 0.70 - 3.10 10*3/mm3    Monocytes, Absolute 0.74 0.10 - 0.90 10*3/mm3    Eosinophils, Absolute 0.17 0.00 - 0.40 10*3/mm3    Basophils, Absolute 0.08 0.00 - 0.20 10*3/mm3    Immature Grans, Absolute 0.07 (H) 0.00 - 0.05 10*3/mm3    nRBC 0.0 0.0 - 0.2 /100 WBC   Light Blue Top   Result Value Ref Range    Extra Tube hold for add-on    Troponin   Result Value Ref Range    Troponin T <0.010 0.000 - 0.030 ng/mL   Urine Drug Screen - Urine, Clean Catch   Result Value Ref Range    THC, Screen, Urine Positive (A) Negative    Phencyclidine (PCP), Urine Negative Negative    Cocaine Screen, Urine Negative Negative    Methamphetamine, Ur Negative Negative    Opiate Screen Negative Negative    Amphetamine Screen, Urine Negative Negative    Benzodiazepine Screen, Urine Negative Negative    Tricyclic Antidepressants Screen Negative Negative    Methadone Screen, Urine Negative Negative    Barbiturates Screen, Urine Positive (A) Negative    Oxycodone Screen, Urine Negative Negative    Propoxyphene Screen Negative Negative    Buprenorphine, Screen, Urine Negative Negative   BNP   Result Value Ref Range    proBNP 27.8 5.0 - 900.0  pg/mL   Comprehensive Metabolic Panel   Result Value Ref Range    Glucose 97 65 - 99 mg/dL    BUN 13 6 - 20 mg/dL    Creatinine 0.69 0.57 - 1.00 mg/dL    Sodium 141 136 - 145 mmol/L    Potassium 4.0 3.5 - 5.2 mmol/L    Chloride 102 98 - 107 mmol/L    CO2 26.0 22.0 - 29.0 mmol/L    Calcium 10.0 8.6 - 10.5 mg/dL    Total Protein 7.7 6.0 - 8.5 g/dL    Albumin 4.80 3.50 - 5.20 g/dL    ALT (SGPT) 19 1 - 33 U/L    AST (SGOT) 18 1 - 32 U/L    Alkaline Phosphatase 95 39 - 117 U/L    Total Bilirubin 0.3 0.2 - 1.2 mg/dL    eGFR Non African Amer 89 >60 mL/min/1.73    Globulin 2.9 gm/dL    A/G Ratio 1.7 g/dL    BUN/Creatinine Ratio 18.8 7.0 - 25.0    Anion Gap 13.0 5.0 - 15.0 mmol/L   Results for orders placed or performed during the hospital encounter of 02/06/20   [facility]   Result Value Ref Range    Extra Tube Hold for add-ons.    Urinalysis With Microscopic If Indicated (No Culture) - Urine, Clean Catch   Result Value Ref Range    Color, UA Yellow Yellow, Straw, Dark Yellow, Ana M    Appearance, UA Clear Clear    pH, UA 6.5 5.0 - 9.0    Specific Gravity, UA 1.029 1.003 - 1.030    Glucose, UA Negative Negative    Ketones, UA Negative Negative    Bilirubin, UA Negative Negative    Blood, UA Negative Negative    Protein, UA Negative Negative    Leuk Esterase, UA Negative Negative    Nitrite, UA Negative Negative    Urobilinogen, UA 1.0 E.U./dL 0.2 - 1.0 E.U./dL   CBC Auto Differential   Result Value Ref Range    WBC 14.21 (H) 3.40 - 10.80 10*3/mm3    RBC 4.89 3.77 - 5.28 10*6/mm3    Hemoglobin 15.3 12.0 - 15.9 g/dL    Hematocrit 45.7 34.0 - 46.6 %    MCV 93.5 79.0 - 97.0 fL    MCH 31.3 26.6 - 33.0 pg    MCHC 33.5 31.5 - 35.7 g/dL    RDW 12.7 12.3 - 15.4 %    RDW-SD 43.5 37.0 - 54.0 fl    MPV 9.1 6.0 - 12.0 fL    Platelets 432 140 - 450 10*3/mm3    Neutrophil % 60.1 42.7 - 76.0 %    Lymphocyte % 28.6 19.6 - 45.3 %    Monocyte % 8.3 5.0 - 12.0 %    Eosinophil % 1.7 0.3 - 6.2 %    Basophil % 0.6 0.0 - 1.5 %    Immature  Grans % 0.7 (H) 0.0 - 0.5 %    Neutrophils, Absolute 8.54 (H) 1.70 - 7.00 10*3/mm3    Lymphocytes, Absolute 4.07 (H) 0.70 - 3.10 10*3/mm3    Monocytes, Absolute 1.18 (H) 0.10 - 0.90 10*3/mm3    Eosinophils, Absolute 0.24 0.00 - 0.40 10*3/mm3    Basophils, Absolute 0.08 0.00 - 0.20 10*3/mm3    Immature Grans, Absolute 0.10 (H) 0.00 - 0.05 10*3/mm3    nRBC 0.0 0.0 - 0.2 /100 WBC     *Note: Due to a large number of results and/or encounters for the requested time period, some results have not been displayed. A complete set of results can be found in Results Review.

## 2020-04-15 ENCOUNTER — OFFICE VISIT (OUTPATIENT)
Dept: FAMILY MEDICINE CLINIC | Facility: CLINIC | Age: 50
End: 2020-04-15

## 2020-04-15 VITALS
HEART RATE: 106 BPM | BODY MASS INDEX: 17.69 KG/M2 | DIASTOLIC BLOOD PRESSURE: 100 MMHG | HEIGHT: 65 IN | TEMPERATURE: 96.6 F | SYSTOLIC BLOOD PRESSURE: 126 MMHG | WEIGHT: 106.2 LBS

## 2020-04-15 DIAGNOSIS — Z72.0 TOBACCO USER: ICD-10-CM

## 2020-04-15 DIAGNOSIS — S06.0X9S CONCUSSION WITH LOSS OF CONSCIOUSNESS, SEQUELA (HCC): ICD-10-CM

## 2020-04-15 DIAGNOSIS — R00.0 TACHYCARDIA: ICD-10-CM

## 2020-04-15 DIAGNOSIS — K59.04 CHRONIC IDIOPATHIC CONSTIPATION: ICD-10-CM

## 2020-04-15 DIAGNOSIS — I10 ESSENTIAL HYPERTENSION: ICD-10-CM

## 2020-04-15 DIAGNOSIS — J42 CHRONIC BRONCHITIS, UNSPECIFIED CHRONIC BRONCHITIS TYPE (HCC): ICD-10-CM

## 2020-04-15 DIAGNOSIS — F33.9 EPISODE OF RECURRENT MAJOR DEPRESSIVE DISORDER, UNSPECIFIED DEPRESSION EPISODE SEVERITY (HCC): ICD-10-CM

## 2020-04-15 DIAGNOSIS — D75.839 THROMBOCYTOSIS: Primary | ICD-10-CM

## 2020-04-15 DIAGNOSIS — R25.1 TREMOR: ICD-10-CM

## 2020-04-15 DIAGNOSIS — K29.70 GASTRITIS WITHOUT BLEEDING, UNSPECIFIED CHRONICITY, UNSPECIFIED GASTRITIS TYPE: ICD-10-CM

## 2020-04-15 DIAGNOSIS — K58.9 IRRITABLE BOWEL SYNDROME, UNSPECIFIED TYPE: ICD-10-CM

## 2020-04-15 DIAGNOSIS — R10.84 GENERALIZED ABDOMINAL PAIN: ICD-10-CM

## 2020-04-15 DIAGNOSIS — E55.9 VITAMIN D DEFICIENCY: ICD-10-CM

## 2020-04-15 DIAGNOSIS — J44.9 STAGE 2 MODERATE COPD BY GOLD CLASSIFICATION (HCC): ICD-10-CM

## 2020-04-15 PROCEDURE — 99213 OFFICE O/P EST LOW 20 MIN: CPT | Performed by: FAMILY MEDICINE

## 2020-04-15 RX ORDER — METOPROLOL SUCCINATE 50 MG/1
50 TABLET, EXTENDED RELEASE ORAL DAILY
Qty: 30 TABLET | Refills: 3 | Status: SHIPPED | OUTPATIENT
Start: 2020-04-15 | End: 2020-07-22

## 2020-04-20 ENCOUNTER — TELEPHONE (OUTPATIENT)
Dept: FAMILY MEDICINE CLINIC | Facility: CLINIC | Age: 50
End: 2020-04-20

## 2020-04-20 RX ORDER — POLYETHYLENE GLYCOL 3350 17 G
4 POWDER IN PACKET (EA) ORAL AS NEEDED
Qty: 168 LOZENGE | Refills: 3 | Status: SHIPPED | OUTPATIENT
Start: 2020-04-20 | End: 2020-04-29

## 2020-04-20 NOTE — TELEPHONE ENCOUNTER
Spoke to pt and made aware that insurance will not cover nicotine patches. Pt is willing to try nicotine lozenge.

## 2020-04-23 ENCOUNTER — TELEMEDICINE (OUTPATIENT)
Dept: GASTROENTEROLOGY | Facility: CLINIC | Age: 50
End: 2020-04-23

## 2020-04-23 ENCOUNTER — TELEPHONE (OUTPATIENT)
Dept: FAMILY MEDICINE CLINIC | Facility: CLINIC | Age: 50
End: 2020-04-23

## 2020-04-23 DIAGNOSIS — R10.13 EPIGASTRIC PAIN: Primary | ICD-10-CM

## 2020-04-23 PROCEDURE — 99213 OFFICE O/P EST LOW 20 MIN: CPT | Performed by: INTERNAL MEDICINE

## 2020-04-23 RX ORDER — SUCRALFATE ORAL 1 G/10ML
1 SUSPENSION ORAL 4 TIMES DAILY
Qty: 4 G | Refills: 5 | Status: SHIPPED | OUTPATIENT
Start: 2020-04-23 | End: 2020-06-23

## 2020-04-23 RX ORDER — DEXTROSE AND SODIUM CHLORIDE 5; .45 G/100ML; G/100ML
30 INJECTION, SOLUTION INTRAVENOUS CONTINUOUS PRN
Status: CANCELLED | OUTPATIENT
Start: 2020-04-23

## 2020-04-23 NOTE — TELEPHONE ENCOUNTER
Spoke to pt and made aware that insurance will not cover nicotine lozenge due to being OTC. Suggested pt call 1-800-Quit Now and see if they can help.

## 2020-04-23 NOTE — PROGRESS NOTES
[facility]      Chief Complaint:   No chief complaint on file.      Subjective   You have chosen to receive care through a telehealth visit.  Do you consent to use a video/audio connection for your medical care today? Yes  HPI:   Patient with severe epigastric abdominal pain.  Patient states pain radiates up into her chest and around to her back.  Patient denies any nausea vomiting patient is on a proton pump inhibitor patient had EGD in October which showed esophagitis and gastritis.  Because of current pandemic will not schedule patient for EGD at this time but will schedule this as soon as we are able to do this.    Plan; we will start patient on Carafate 1 g 4 times daily.  Patient follow-up in 3 months schedule EGD in the interim.  Discussed with patient possibility that this is pill esophagitis that she should take her medications with full glass of water    Past Medical History:   Past Medical History:   Diagnosis Date   • Asthma    • Cancer (CMS/HCC)     cervical   • Colon polyp    • COPD (chronic obstructive pulmonary disease) (CMS/HCC)    • Crohn's disease (CMS/HCC)    • Diverticulitis of colon    • Elevated cholesterol    • Essential hypertension 4/15/2020   • GERD (gastroesophageal reflux disease)    • History of transfusion    • Irritable bowel syndrome    • Pancreatitis        Past Surgical History:    Past Surgical History:   Procedure Laterality Date   • COLONOSCOPY     • COLONOSCOPY N/A 10/18/2019    Procedure: COLONOSCOPY;  Surgeon: Tom Davis MD;  Location: [facility] ENDOSCOPY;  Service: Gastroenterology   • ENDOSCOPY N/A 10/18/2019    Procedure: ESOPHAGOGASTRODUODENOSCOPY;  Surgeon: Tom Davis MD;  Location: [facility] ENDOSCOPY;  Service: Gastroenterology   • HYSTERECTOMY     • TONSILECTOMY, ADENOIDECTOMY, BILATERAL MYRINGOTOMY AND TUBES     • UPPER GASTROINTESTINAL ENDOSCOPY         Family History:  Family History   Problem Relation Age of Onset   • Inflammatory  bowel disease Mother    • Arthritis Mother    • Diabetes Mother    • Hypertension Mother    • Hyperlipidemia Mother    • Obesity Mother    • Osteoporosis Mother    • Heart disease Father    • Hyperlipidemia Father    • Diabetes Sister    • Liver disease Daughter    • Mental illness Daughter    • Obesity Daughter    • Diabetes Maternal Grandmother    • Cancer Maternal Grandmother         lung   • Cancer Other         lung   • Heart disease Other        Social History:   reports that she has been smoking cigarettes. She has been smoking about 1.00 pack per day. She has never used smokeless tobacco. She reports that she does not drink alcohol or use drugs.    Medications:   Prior to Admission medications    Medication Sig Start Date End Date Taking? Authorizing Provider   albuterol sulfate  (90 Base) MCG/ACT inhaler Inhale 2 puffs Every 4 (Four) Hours As Needed for Wheezing. 8/29/19   Xavier Wick MD   amitriptyline (ELAVIL) 25 MG tablet Take 25 mg by mouth Every Night. 3/6/20   Provider, MD Byron   budesonide-formoterol (SYMBICORT) 160-4.5 MCG/ACT inhaler Inhale 2 puffs 2 (Two) Times a Day. 9/25/19   Iliana Jacobson MD   cetirizine (zyrTEC) 10 MG tablet Take 1 tablet by mouth Daily. 9/25/19   Iliana Jacobson MD   Cyanocobalamin (B-12 COMPLIANCE INJECTION IJ) Inject  as directed.    Provider, MD Byron   dicyclomine (BENTYL) 20 MG tablet Take 2 tablets by mouth Every 6 (Six) Hours. 2/4/20   Xavier Wick MD   escitalopram (LEXAPRO) 10 MG tablet Take 1 tablet by mouth Daily. 2/4/20   Xavier Wick MD   fluticasone (FLONASE) 50 MCG/ACT nasal spray 2 sprays into the nostril(s) as directed by provider Daily. 9/25/19   Iliana Jacobson MD   guaiFENesin (MUCINEX) 600 MG 12 hr tablet Take 1 tablet by mouth 2 (Two) Times a Day. 10/17/19   Xavier Wick MD   ipratropium-albuterol (DUO-NEB) 0.5-2.5 mg/3 ml nebulizer Take 3 mL by nebulization Every 4 (Four) Hours As Needed for Wheezing or  Shortness of Air.    Provider, MD Byron   metoprolol succinate XL (TOPROL-XL) 50 MG 24 hr tablet Take 1 tablet by mouth Daily. 4/15/20   Xavier Wick MD   montelukast (SINGULAIR) 10 MG tablet Take 1 tablet by mouth Every Night. 10/17/19   Xavier Wick MD   nicotine ([facility] Nicotine) 7 MG/24HR patch Place 1 patch on the skin as directed by provider Daily. 4/10/20   Xavier Wick MD   nicotine polacrilex (COMMIT) 4 MG lozenge Dissolve 1 lozenge in the mouth As Needed for Smoking Cessation. 4/20/20   Xavier Wick MD   omeprazole (priLOSEC) 20 MG capsule Take 20 mg by mouth Daily.    Provider, MD Byron   ondansetron ODT (ZOFRAN ODT) 8 MG disintegrating tablet Take 1 tablet by mouth Every 8 (Eight) Hours As Needed for Nausea or Vomiting. 3/4/20   Xavier Wick MD   vitamin D (ERGOCALCIFEROL) 85434 units capsule capsule Take 1 capsule by mouth 1 (One) Time Per Week. 7/17/19   Xavier Wick MD       Allergies:  Nsaids; Azithromycin; Ciprofloxacin; Doxycycline; Other; Levofloxacin; and Phenergan [promethazine hcl]    ROS:    Review of Systems   Constitutional: Negative for activity change, appetite change, chills, diaphoresis, fatigue, fever and unexpected weight change.   HENT: Negative for sore throat and trouble swallowing.    Respiratory: Negative for shortness of breath.    Gastrointestinal: Positive for abdominal pain. Negative for abdominal distention, anal bleeding, blood in stool, constipation, diarrhea, nausea, rectal pain and vomiting.   Endocrine: Negative for polydipsia, polyphagia and polyuria.   Genitourinary: Negative for difficulty urinating.   Musculoskeletal: Negative for arthralgias.   Skin: Negative for pallor.   Allergic/Immunologic: Negative for food allergies.   Neurological: Negative for weakness and light-headedness.   Psychiatric/Behavioral: Negative for behavioral problems.     Objective     not currently breastfeeding.    Physical Exam   Constitutional: She appears  well-developed.   Pulmonary/Chest: No respiratory distress.   Abdominal: She exhibits no distension.   Skin: No erythema.   Psychiatric: She has a normal mood and affect.        Assessment/Plan   Diagnoses and all orders for this visit:    Epigastric pain  -     Case Request; Standing  -     Case Request    Other orders  -     sucralfate (Carafate) 1 GM/10ML suspension; Take 10 mL by mouth 4 (Four) Times a Day.        ESOPHAGOGASTRODUODENOSCOPY (N/A)     Diagnosis Plan   1. Epigastric pain  Case Request    Implement Anesthesia Orders Day of Procedure    Obtain Informed Consent    Pregnancy, Urine -    Insert Peripheral IV    dextrose 5 % and sodium chloride 0.45 % infusion    Case Request       Anticipated Surgical Procedure:  No orders of the defined types were placed in this encounter.      The risks, benefits, and alternatives of this procedure have been discussed with the patient or the responsible party- the patient understands and agrees to proceed.

## 2020-04-26 NOTE — PROGRESS NOTES
Subjective:  Carla Richard is a 50 y.o. female who presents for       Patient Active Problem List   Diagnosis   • Tobacco abuse counseling   • Chronic idiopathic constipation   • B12 deficiency   • Vitamin D deficiency    • Tobacco user   • Stage 2 moderate COPD by GOLD classification (CMS/[facility])   • Cervical lymphadenopathy   • Generalized abdominal pain   • Nausea   • Neurological abnormality   • Tremor   • RUQ pain   • COPD exacerbation (CMS/[facility])   • Pertussis exposure   • Chronic bronchitis (CMS/[facility])   • Cigarette nicotine dependence, uncomplicated   • Chronic nonseasonal allergic rhinitis due to pollen   • Episode of recurrent major depressive disorder (CMS/[facility])   • Diarrhea   • Chronic diarrhea   • Thrombocytosis (CMS/[facility])   • Nausea and vomiting   • Gluten intolerance   • C. difficile diarrhea   • Shiga toxin-producing Escherichia coli infection   • Generalized weakness   • Head injury   • Concussion with loss of consciousness   • Gastritis without bleeding   • Irritable bowel syndrome   • Tachycardia   • Essential hypertension   • JELENA (generalized anxiety disorder)   • Marijuana use           Current Outpatient Medications:   •  albuterol sulfate  (90 Base) MCG/ACT inhaler, Inhale 2 puffs Every 4 (Four) Hours As Needed for Wheezing., Disp: 18 g, Rfl: 3  •  amitriptyline (ELAVIL) 25 MG tablet, Take 25 mg by mouth Every Night., Disp: , Rfl:   •  budesonide-formoterol (SYMBICORT) 160-4.5 MCG/ACT inhaler, Inhale 2 puffs 2 (Two) Times a Day., Disp: 1 inhaler, Rfl: 11  •  cetirizine (zyrTEC) 10 MG tablet, Take 1 tablet by mouth Daily., Disp: 30 tablet, Rfl: 11  •  Cyanocobalamin (B-12 COMPLIANCE INJECTION IJ), Inject  as directed., Disp: , Rfl:   •  dicyclomine (BENTYL) 20 MG tablet, Take 2 tablets by mouth Every 6 (Six) Hours., Disp: 240 tablet, Rfl: 3  •  escitalopram (LEXAPRO) 10 MG tablet, Take 1 tablet by mouth Daily., Disp: 30 tablet, Rfl: 3  •  fluticasone (FLONASE) 50 MCG/ACT nasal spray,  2 sprays into the nostril(s) as directed by provider Daily., Disp: 1 bottle, Rfl: 11  •  ipratropium-albuterol (DUO-NEB) 0.5-2.5 mg/3 ml nebulizer, Take 3 mL by nebulization Every 4 (Four) Hours As Needed for Wheezing or Shortness of Air., Disp: , Rfl:   •  metoprolol succinate XL (TOPROL-XL) 50 MG 24 hr tablet, Take 1 tablet by mouth Daily., Disp: 30 tablet, Rfl: 3  •  montelukast (SINGULAIR) 10 MG tablet, Take 1 tablet by mouth Every Night., Disp: 30 tablet, Rfl: 3  •  omeprazole (priLOSEC) 20 MG capsule, Take 20 mg by mouth Daily., Disp: , Rfl:   •  ondansetron ODT (ZOFRAN ODT) 8 MG disintegrating tablet, Take 1 tablet by mouth Every 8 (Eight) Hours As Needed for Nausea or Vomiting., Disp: 90 tablet, Rfl: 3  •  sucralfate (Carafate) 1 GM/10ML suspension, Take 10 mL by mouth 4 (Four) Times a Day., Disp: 4 g, Rfl: 5  •  vitamin D (ERGOCALCIFEROL) 71695 units capsule capsule, Take 1 capsule by mouth 1 (One) Time Per Week., Disp: 4 capsule, Rfl: 3    HPI        Pt is 48 yo female with history of moderateC OPD, Vitamin B12 deficiency, Vitamin D deficiency, chronic abdominal pain, tremor,  RUQ pain,  Sp hysterectomy,  History of chron's disease, history of pertussis infection  sp right shoulder surgery. X 3, sp rotator cuff repair , history of C diff diarrhea, gluten sensitivity, gastritis, marijuana use       4/9/20 Telemedicine Visit today. Since last visit pt was seen at walk in clinic by CROW Reynolds on 3/17/20.  She was treated for COPD exacerbation and given prednisone tapering dose along with steroid shot rocephin short amoxcillin 875 mg PO BID for 10 day and diflucan.  She continues to smoke 3/4 ppd. She is getting b12 injections weekly. She sees Hematology for thrombocytosis and may have secondary thrombocytosis. abodminal pain is stable with bentyl and prilosec. She continues to eat bread despite having gluten sensitiviy. Is off prucalopride because 2 mg. It states it causes diarrhea. She has had  improvement with concussion.        4/15/20 Telemedicine Visit today. Pt had recently telemedicine visit with GI. Pt noticed her BP has been consistently elevated for past couple of weeks . Per GI she is to continue bentyl as needed along with trulance if needed for constipation. She is to continue nexium. She notices her BP has been high for past week. She states she has ligthheadeness and tingling in lips. No slurring speech. No facial drop.no weakness of limbs. She has cut back on smoking to 3/4 ppd. She stopped smoking marijuana at bedtime and then noticed BP higher. No high salt diet. No chest pain. No syncopal episodes. In regards to concussion her symptoms is better.  On recheck on BP it is 130/100 with HR of 112.      4/29/20 Telemedicine Visit today for blood pressure check. Pt started on toprol XL 50 mg daily. She has also contacterd me by [facility] where she has had headaches, dizziness. She does have history of concussion and sees Neurologist.  She did contct Dr. Manjarrez and was directed to [facility] ER She did go to ER yesterday  At [facility] and had CT of head and per patient was normal. Do not have records of [facility] ER.  She still has some episodes of headaches and dizziness. She is still taking Toprol XL 50 mg daily.  Her last reading was 110/70. She does admit to smoke marijuana at bedtime that helps calms her down and helps with anxiety.  She still smokes 3/4 ppd of tobacco         Hypertension   This is a recurrent problem. The current episode started more than 1 month ago. The problem has been gradually worsening since onset. The problem is uncontrolled. Associated symptoms include anxiety, blurred vision, headaches and shortness of breath. Pertinent negatives include no chest pain, malaise/fatigue, neck pain, orthopnea, palpitations, peripheral edema, PND or sweats. Risk factors for coronary artery disease include smoking/tobacco exposure, stress and post-menopausal state. Past treatments include nothing. The  current treatment provides no improvement. There is no history of angina, kidney disease, CAD/MI, CVA, heart failure, left ventricular hypertrophy, PVD or retinopathy. There is no history of chronic renal disease, coarctation of the aorta, hyperaldosteronism, hypercortisolism, hyperparathyroidism, a hypertension causing med, pheochromocytoma, renovascular disease, sleep apnea or a thyroid problem.   Diarrhea    This is a recurrent problem. The current episode started more than 1 year ago. The problem has been waxing and waning. The stool consistency is described as watery. The patient states that diarrhea does not awaken her from sleep. Associated symptoms include abdominal pain, arthralgias and coughing. Pertinent negatives include no bloating, chills, fever, headaches, increased  flatus, myalgias, sweats, URI, vomiting or weight loss. Nothing aggravates the symptoms. She has tried nothing for the symptoms. The treatment provided no relief. There is no history of bowel resection, inflammatory bowel disease, irritable bowel syndrome, malabsorption, a recent abdominal surgery or short gut syndrome.    Abdominal Pain   This is a recurrent problem. The current episode started more than 1 year ago. The onset quality is sudden. The problem has been waxing and waning. The pain is located in the generalized abdominal region, epigastric region and RUQ. The pain is at a severity of 5/10. The pain is moderate. The quality of the pain is aching, a sensation of fullness and burning. The abdominal pain radiates to the epigastric region. Associated symptoms include constipation. Pertinent negatives include no anorexia, arthralgias, belching, diarrhea, dysuria, fever, flatus, frequency, headaches, hematochezia, hematuria, melena, myalgias, nausea, vomiting or weight loss. Nothing aggravates the pain. The pain is relieved by being still. The treatment provided no relief. Prior diagnostic workup includes ultrasound. Her past medical  history is significant for Crohn's disease. There is no history of abdominal surgery, colon cancer, gallstones, GERD, irritable bowel syndrome, pancreatitis, PUD or ulcerative colitis.    Neurologic Problem   The patient's primary symptoms include focal sensory loss and weakness. The patient's pertinent negatives include no altered mental status, clumsiness, loss of balance, memory loss, near-syncope, slurred speech, syncope or visual change. This is a recurrent problem. The neurological problem developed gradually. The problem has been waxing and waning since onset. There was no focality noted. Associated symptoms include a fever and shortness of breath. Pertinent negatives include no abdominal pain, auditory change, aura, back pain, bladder incontinence, bowel incontinence, chest pain, confusion, diaphoresis, dizziness, fatigue, headaches, light-headedness, nausea, neck pain, palpitations, vertigo or vomiting. Past treatments include nothing. The treatment provided no relief. There is no history of a bleeding disorder, a clotting disorder, a CVA, head trauma, liver disease, mood changes or seizures.   COPD   This is a chronic problem. The current episode started more than 1 year ago. The problem occurs constantly. The problem has been unchanged. Associated symptoms include abdominal pain, arthralgias, fatigue, joint swelling, numbness and weakness. Pertinent negatives include no anorexia, change in bowel habit, chest pain, chills, congestion, coughing, diaphoresis, fever, headaches, myalgias, nausea, neck pain, sore throat, swollen glands, urinary symptoms, vertigo, visual change or vomiting. The symptoms are aggravated by smoking. She has tried nothing (combivent ) for the symptoms.   Fatigue   This is a chronic problem. The current episode started more than 1 year ago. The problem occurs constantly. The problem has been unchanged. Associated symptoms include abdominal pain, arthralgias, fatigue, joint  swelling, numbness and weakness. Pertinent negatives include no anorexia, change in bowel habit, chest pain, chills, congestion, coughing, diaphoresis, fever, headaches, myalgias, nausea, neck pain, sore throat, swollen glands, urinary symptoms, vertigo, visual change or vomiting. Nothing aggravates the symptoms. She has tried nothing (b12 injections ) for the symptoms. The treatment provided no relief       Review of Systems  Review of Systems   Constitutional: Positive for activity change and fatigue. Negative for appetite change, chills, diaphoresis and fever.   HENT: Negative for congestion, postnasal drip, rhinorrhea, sinus pressure, sinus pain, sneezing, sore throat, trouble swallowing and voice change.    Respiratory: Positive for cough and shortness of breath. Negative for choking, chest tightness, wheezing and stridor.    Cardiovascular: Negative for chest pain.   Gastrointestinal: Positive for anal bleeding and diarrhea. Negative for nausea and vomiting.   Neurological: Positive for dizziness, tremors, weakness, light-headedness, numbness and headaches.   Psychiatric/Behavioral: Positive for agitation and behavioral problems. The patient is nervous/anxious.        Patient Active Problem List   Diagnosis   • Tobacco abuse counseling   • Chronic idiopathic constipation   • B12 deficiency   • Vitamin D deficiency    • Tobacco user   • Stage 2 moderate COPD by GOLD classification (CMS/[facility])   • Cervical lymphadenopathy   • Generalized abdominal pain   • Nausea   • Neurological abnormality   • Tremor   • RUQ pain   • COPD exacerbation (CMS/[facility])   • Pertussis exposure   • Chronic bronchitis (CMS/[facility])   • Cigarette nicotine dependence, uncomplicated   • Chronic nonseasonal allergic rhinitis due to pollen   • Episode of recurrent major depressive disorder (CMS/[facility])   • Diarrhea   • Chronic diarrhea   • Thrombocytosis (CMS/[facility])   • Nausea and vomiting   • Gluten intolerance   • C. difficile diarrhea   • Shiga  toxin-producing Escherichia coli infection   • Generalized weakness   • Head injury   • Concussion with loss of consciousness   • Gastritis without bleeding   • Irritable bowel syndrome   • Tachycardia   • Essential hypertension   • JELENA (generalized anxiety disorder)   • Marijuana use     Past Surgical History:   Procedure Laterality Date   • COLONOSCOPY     • COLONOSCOPY N/A 10/18/2019    Procedure: COLONOSCOPY;  Surgeon: Tom Davis MD;  Location: [facility] ENDOSCOPY;  Service: Gastroenterology   • ENDOSCOPY N/A 10/18/2019    Procedure: ESOPHAGOGASTRODUODENOSCOPY;  Surgeon: Tom Davis MD;  Location: [facility] ENDOSCOPY;  Service: Gastroenterology   • HYSTERECTOMY     • TONSILECTOMY, ADENOIDECTOMY, BILATERAL MYRINGOTOMY AND TUBES     • UPPER GASTROINTESTINAL ENDOSCOPY       Social History     Socioeconomic History   • Marital status: Single     Spouse name: Not on file   • Number of children: Not on file   • Years of education: Not on file   • Highest education level: Not on file   Tobacco Use   • Smoking status: Current Every Day Smoker     Packs/day: 1.00     Types: Cigarettes   • Smokeless tobacco: Never Used   Substance and Sexual Activity   • Alcohol use: No     Frequency: Never   • Drug use: No   • Sexual activity: Defer     Family History   Problem Relation Age of Onset   • Inflammatory bowel disease Mother    • Arthritis Mother    • Diabetes Mother    • Hypertension Mother    • Hyperlipidemia Mother    • Obesity Mother    • Osteoporosis Mother    • Heart disease Father    • Hyperlipidemia Father    • Diabetes Sister    • Liver disease Daughter    • Mental illness Daughter    • Obesity Daughter    • Diabetes Maternal Grandmother    • Cancer Maternal Grandmother         lung   • Cancer Other         lung   • Heart disease Other      Admission on 03/02/2020, Discharged on 03/02/2020   Component Date Value Ref Range Status   • THC, Screen, Urine 03/02/2020 Positive* Negative Final   • Phencyclidine (PCP),  Urine 03/02/2020 Negative  Negative Final   • Cocaine Screen, Urine 03/02/2020 Negative  Negative Final   • Methamphetamine, Ur 03/02/2020 Negative  Negative Final   • Opiate Screen 03/02/2020 Negative  Negative Final   • Amphetamine Screen, Urine 03/02/2020 Negative  Negative Final   • Benzodiazepine Screen, Urine 03/02/2020 Negative  Negative Final   • Tricyclic Antidepressants Screen 03/02/2020 Negative  Negative Final   • Methadone Screen, Urine 03/02/2020 Negative  Negative Final   • Barbiturates Screen, Urine 03/02/2020 Positive* Negative Final   • Oxycodone Screen, Urine 03/02/2020 Negative  Negative Final   • Propoxyphene Screen 03/02/2020 Negative  Negative Final   • Buprenorphine, Screen, Urine 03/02/2020 Negative  Negative Final   • Glucose 03/02/2020 97  65 - 99 mg/dL Final   • BUN 03/02/2020 13  6 - 20 mg/dL Final   • Creatinine 03/02/2020 0.69  0.57 - 1.00 mg/dL Final   • Sodium 03/02/2020 141  136 - 145 mmol/L Final   • Potassium 03/02/2020 4.0  3.5 - 5.2 mmol/L Final   • Chloride 03/02/2020 102  98 - 107 mmol/L Final   • CO2 03/02/2020 26.0  22.0 - 29.0 mmol/L Final   • Calcium 03/02/2020 10.0  8.6 - 10.5 mg/dL Final   • Total Protein 03/02/2020 7.7  6.0 - 8.5 g/dL Final   • Albumin 03/02/2020 4.80  3.50 - 5.20 g/dL Final   • ALT (SGPT) 03/02/2020 19  1 - 33 U/L Final   • AST (SGOT) 03/02/2020 18  1 - 32 U/L Final   • Alkaline Phosphatase 03/02/2020 95  39 - 117 U/L Final   • Total Bilirubin 03/02/2020 0.3  0.2 - 1.2 mg/dL Final   • eGFR Non African Amer 03/02/2020 89  >60 mL/min/1.73 Final   • Globulin 03/02/2020 2.9  gm/dL Final   • A/G Ratio 03/02/2020 1.7  g/dL Final   • BUN/Creatinine Ratio 03/02/2020 18.8  7.0 - 25.0 Final   • Anion Gap 03/02/2020 13.0  5.0 - 15.0 mmol/L Final   • WBC 03/02/2020 9.73  3.40 - 10.80 10*3/mm3 Final   • RBC 03/02/2020 5.20  3.77 - 5.28 10*6/mm3 Final   • Hemoglobin 03/02/2020 16.4* 12.0 - 15.9 g/dL Final   • Hematocrit 03/02/2020 49.4* 34.0 - 46.6 % Final   • MCV  03/02/2020 95.0  79.0 - 97.0 fL Final   • MCH 03/02/2020 31.5  26.6 - 33.0 pg Final   • MCHC 03/02/2020 33.2  31.5 - 35.7 g/dL Final   • RDW 03/02/2020 13.2  12.3 - 15.4 % Final   • RDW-SD 03/02/2020 46.1  37.0 - 54.0 fl Final   • MPV 03/02/2020 9.2  6.0 - 12.0 fL Final   • Platelets 03/02/2020 439  140 - 450 10*3/mm3 Final   • Neutrophil % 03/02/2020 54.7  42.7 - 76.0 % Final   • Lymphocyte % 03/02/2020 34.5  19.6 - 45.3 % Final   • Monocyte % 03/02/2020 7.6  5.0 - 12.0 % Final   • Eosinophil % 03/02/2020 1.7  0.3 - 6.2 % Final   • Basophil % 03/02/2020 0.8  0.0 - 1.5 % Final   • Immature Grans % 03/02/2020 0.7* 0.0 - 0.5 % Final   • Neutrophils, Absolute 03/02/2020 5.31  1.70 - 7.00 10*3/mm3 Final   • Lymphocytes, Absolute 03/02/2020 3.36* 0.70 - 3.10 10*3/mm3 Final   • Monocytes, Absolute 03/02/2020 0.74  0.10 - 0.90 10*3/mm3 Final   • Eosinophils, Absolute 03/02/2020 0.17  0.00 - 0.40 10*3/mm3 Final   • Basophils, Absolute 03/02/2020 0.08  0.00 - 0.20 10*3/mm3 Final   • Immature Grans, Absolute 03/02/2020 0.07* 0.00 - 0.05 10*3/mm3 Final   • nRBC 03/02/2020 0.0  0.0 - 0.2 /100 WBC Final   • Extra Tube 03/02/2020 hold for add-on   Final    Auto resulted   • Extra Tube 03/02/2020 Hold for add-ons.   Final    Auto resulted.   • Color, UA 03/02/2020 Yellow  Yellow, Straw, Dark Yellow, Ana M Final   • Appearance, UA 03/02/2020 Clear  Clear Final   • pH, UA 03/02/2020 6.5  5.0 - 9.0 Final   • Specific Gravity, UA 03/02/2020 1.021  1.003 - 1.030 Final   • Glucose, UA 03/02/2020 Negative  Negative Final   • Ketones, UA 03/02/2020 Negative  Negative Final   • Bilirubin, UA 03/02/2020 Negative  Negative Final   • Blood, UA 03/02/2020 Small (1+)* Negative Final   • Protein, UA 03/02/2020 Negative  Negative Final   • Leuk Esterase, UA 03/02/2020 Negative  Negative Final   • Nitrite, UA 03/02/2020 Negative  Negative Final   • Urobilinogen, UA 03/02/2020 0.2 E.U./dL  0.2 - 1.0 E.U./dL Final   • RBC, UA 03/02/2020 6-12* None  Seen /HPF Final   • WBC, UA 03/02/2020 None Seen  None Seen, 0-2, 3-5 /HPF Final   • Bacteria, UA 03/02/2020 None Seen  None Seen /HPF Final   • Squamous Epithelial Cells, UA 03/02/2020 None Seen  None Seen, 0-2 /HPF Final   • Hyaline Casts, UA 03/02/2020 0-2  None Seen /LPF Final   • Methodology 03/02/2020 Automated Microscopy   Final   • Troponin T 03/02/2020 <0.010  0.000 - 0.030 ng/mL Final   • proBNP 03/02/2020 27.8  5.0 - 900.0 pg/mL Final   Admission on 02/06/2020, Discharged on 02/06/2020   Component Date Value Ref Range Status   • Glucose 02/06/2020 99  65 - 99 mg/dL Final   • BUN 02/06/2020 12  6 - 20 mg/dL Final   • Creatinine 02/06/2020 0.66  0.57 - 1.00 mg/dL Final   • Sodium 02/06/2020 139  136 - 145 mmol/L Final   • Potassium 02/06/2020 3.6  3.5 - 5.2 mmol/L Final   • Chloride 02/06/2020 103  98 - 107 mmol/L Final   • CO2 02/06/2020 24.0  22.0 - 29.0 mmol/L Final   • Calcium 02/06/2020 9.2  8.6 - 10.5 mg/dL Final   • Total Protein 02/06/2020 6.6  6.0 - 8.5 g/dL Final   • Albumin 02/06/2020 4.40  3.50 - 5.20 g/dL Final   • ALT (SGPT) 02/06/2020 6  1 - 33 U/L Final   • AST (SGOT) 02/06/2020 10  1 - 32 U/L Final   • Alkaline Phosphatase 02/06/2020 73  39 - 117 U/L Final   • Total Bilirubin 02/06/2020 0.4  0.2 - 1.2 mg/dL Final   • eGFR Non African Amer 02/06/2020 94  >60 mL/min/1.73 Final   • Globulin 02/06/2020 2.2  gm/dL Final   • A/G Ratio 02/06/2020 2.0  g/dL Final   • BUN/Creatinine Ratio 02/06/2020 18.2  7.0 - 25.0 Final   • Anion Gap 02/06/2020 12.0  5.0 - 15.0 mmol/L Final   • Lipase 02/06/2020 39  13 - 60 U/L Final   • Color, UA 02/06/2020 Yellow  Yellow, Straw, Dark Yellow, Ana M Final   • Appearance, UA 02/06/2020 Clear  Clear Final   • pH, UA 02/06/2020 6.5  5.0 - 9.0 Final   • Specific Gravity, UA 02/06/2020 1.029  1.003 - 1.030 Final   • Glucose, UA 02/06/2020 Negative  Negative Final   • Ketones, UA 02/06/2020 Negative  Negative Final   • Bilirubin, UA 02/06/2020 Negative  Negative Final    • Blood, UA 02/06/2020 Negative  Negative Final   • Protein, UA 02/06/2020 Negative  Negative Final   • Leuk Esterase, UA 02/06/2020 Negative  Negative Final   • Nitrite, UA 02/06/2020 Negative  Negative Final   • Urobilinogen, UA 02/06/2020 1.0 E.U./dL  0.2 - 1.0 E.U./dL Final   • WBC 02/06/2020 14.21* 3.40 - 10.80 10*3/mm3 Final   • RBC 02/06/2020 4.89  3.77 - 5.28 10*6/mm3 Final   • Hemoglobin 02/06/2020 15.3  12.0 - 15.9 g/dL Final   • Hematocrit 02/06/2020 45.7  34.0 - 46.6 % Final   • MCV 02/06/2020 93.5  79.0 - 97.0 fL Final   • MCH 02/06/2020 31.3  26.6 - 33.0 pg Final   • MCHC 02/06/2020 33.5  31.5 - 35.7 g/dL Final   • RDW 02/06/2020 12.7  12.3 - 15.4 % Final   • RDW-SD 02/06/2020 43.5  37.0 - 54.0 fl Final   • MPV 02/06/2020 9.1  6.0 - 12.0 fL Final   • Platelets 02/06/2020 432  140 - 450 10*3/mm3 Final   • Neutrophil % 02/06/2020 60.1  42.7 - 76.0 % Final   • Lymphocyte % 02/06/2020 28.6  19.6 - 45.3 % Final   • Monocyte % 02/06/2020 8.3  5.0 - 12.0 % Final   • Eosinophil % 02/06/2020 1.7  0.3 - 6.2 % Final   • Basophil % 02/06/2020 0.6  0.0 - 1.5 % Final   • Immature Grans % 02/06/2020 0.7* 0.0 - 0.5 % Final   • Neutrophils, Absolute 02/06/2020 8.54* 1.70 - 7.00 10*3/mm3 Final   • Lymphocytes, Absolute 02/06/2020 4.07* 0.70 - 3.10 10*3/mm3 Final   • Monocytes, Absolute 02/06/2020 1.18* 0.10 - 0.90 10*3/mm3 Final   • Eosinophils, Absolute 02/06/2020 0.24  0.00 - 0.40 10*3/mm3 Final   • Basophils, Absolute 02/06/2020 0.08  0.00 - 0.20 10*3/mm3 Final   • Immature Grans, Absolute 02/06/2020 0.10* 0.00 - 0.05 10*3/mm3 Final   • nRBC 02/06/2020 0.0  0.0 - 0.2 /100 WBC Final   • Extra Tube 02/06/2020 hold for add-on   Final    Auto resulted   • Extra Tube 02/06/2020 Hold for add-ons.   Final    Auto resulted.   • Neutrophil % 02/06/2020 62.0  42.7 - 76.0 % Final   • Lymphocyte % 02/06/2020 31.0  19.6 - 45.3 % Final   • Monocyte % 02/06/2020 6.0  5.0 - 12.0 % Final   • Bands %  02/06/2020 1.0  0.0 - 5.0 %  Final   • Neutrophils Absolute 02/06/2020 8.95* 1.70 - 7.00 10*3/mm3 Final   • Lymphocytes Absolute 02/06/2020 4.41* 0.70 - 3.10 10*3/mm3 Final   • Monocytes Absolute 02/06/2020 0.85  0.10 - 0.90 10*3/mm3 Final   • RBC Morphology 02/06/2020 Normal  Normal Final   • WBC Morphology 02/06/2020 Normal  Normal Final   • Platelet Morphology 02/06/2020 Normal  Normal Final   Lab on 02/04/2020   Component Date Value Ref Range Status   • WBC 02/04/2020 18.81* 3.40 - 10.80 10*3/mm3 Final   • RBC 02/04/2020 4.62  3.77 - 5.28 10*6/mm3 Final   • Hemoglobin 02/04/2020 15.2  12.0 - 15.9 g/dL Final   • Hematocrit 02/04/2020 43.0  34.0 - 46.6 % Final   • MCV 02/04/2020 93.1  79.0 - 97.0 fL Final   • MCH 02/04/2020 32.9  26.6 - 33.0 pg Final   • MCHC 02/04/2020 35.3  31.5 - 35.7 g/dL Final   • RDW 02/04/2020 12.4  12.3 - 15.4 % Final   • RDW-SD 02/04/2020 42.2  37.0 - 54.0 fl Final   • MPV 02/04/2020 9.7  6.0 - 12.0 fL Final   • Platelets 02/04/2020 489* 140 - 450 10*3/mm3 Final   • Glucose 02/04/2020 93  65 - 99 mg/dL Final   • BUN 02/04/2020 14  6 - 20 mg/dL Final   • Creatinine 02/04/2020 0.76  0.57 - 1.00 mg/dL Final   • Sodium 02/04/2020 140  136 - 145 mmol/L Final   • Potassium 02/04/2020 4.1  3.5 - 5.2 mmol/L Final   • Chloride 02/04/2020 101  98 - 107 mmol/L Final   • CO2 02/04/2020 22.7  22.0 - 29.0 mmol/L Final   • Calcium 02/04/2020 9.0  8.6 - 10.5 mg/dL Final   • Total Protein 02/04/2020 6.4  6.0 - 8.5 g/dL Final   • Albumin 02/04/2020 4.40  3.50 - 5.20 g/dL Final   • ALT (SGPT) 02/04/2020 6  1 - 33 U/L Final   • AST (SGOT) 02/04/2020 11  1 - 32 U/L Final   • Alkaline Phosphatase 02/04/2020 62  39 - 117 U/L Final   • Total Bilirubin 02/04/2020 0.3  0.2 - 1.2 mg/dL Final   • eGFR Non African Amer 02/04/2020 80  >60 mL/min/1.73 Final   • Globulin 02/04/2020 2.0  gm/dL Final   • A/G Ratio 02/04/2020 2.2  g/dL Final   • BUN/Creatinine Ratio 02/04/2020 18.4  7.0 - 25.0 Final   • Anion Gap 02/04/2020 16.3* 5.0 - 15.0 mmol/L  Final   • Amylase 02/04/2020 51  28 - 100 U/L Final   • Lipase 02/04/2020 31  13 - 60 U/L Final   • Beef 02/04/2020 <0.10  <0.35 kU/L Final   • Class Description 02/04/2020 0   Final   • [place] 02/04/2020 <0.10  <0.35 kU/L Final   • Class Interpretation 02/04/2020 0   Final   • Pork 02/04/2020 <0.10  <0.35 kU/L Final   • Class Interpretation 02/04/2020 0   Final    The test method is the Search to Phone ImmunoCAP allergen-specific  IgE system. CLASS INTERPRETATION   <0.10 kU/L= 0,  Negative; 0.10 - 0.34 kU/L= 0/1, Equivocal/Borderline;  0.35 - 0.69  kU/L=1, Low Positive; 0.70 - 3.49 kU/L=2,  Moderate Positive;  3.50  - 17.49 kU/L=3, High Positive;  17.50 - 49.99 kU/L= 4, Very High Positive; 50.00 - 99.99  kU/L= 5, Very High Positive;   >99.99 kU/L=6, Very High  Positive  *This test was developed and its performance  characteristics determined by [employer]. It has not  been cleared or approved by the [place] Food and Drug  Administration.   • Alpha Gal IgE 02/04/2020 <0.10  <0.10 kU/L Final    Previous reports (ABIEL 2009;123:426-433) have demonstrated  that patients with IgE antibodies to  bxxttjfbr-a-3,3-galactose are at risk for delayed  anaphylaxis, angioedema, or urticaria following  consumption of beef, pork, or lamb.   • Gliadin Deamidated Peptide Ab, IgA 02/04/2020 4  0 - 19 units Final                       Negative                   0 - 19                     Weak Positive             20 - 30                     Moderate to Strong Positive   >30   • Deaminated Gliadin Ab IgG 02/04/2020 5  0 - 19 units Final                       Negative                   0 - 19                     Weak Positive             20 - 30                     Moderate to Strong Positive   >30   • Tissue Transglutaminase IgA 02/04/2020 <2  0 - 3 U/mL Final                                  Negative        0 -  3                                Weak Positive   4 - 10                                Positive           >10   Tissue  Transglutaminase (tTG) has been identified   as the endomysial antigen.  Studies have demonstr-   ated that endomysial IgA antibodies have over 99%   specificity for gluten sensitive enteropathy.   • Tissue Transglutaminase IgG 02/04/2020 10* 0 - 5 U/mL Final                                  Negative        0 - 5                                Weak Positive   6 - 9                                Positive           >9   • Endomysial IgA 02/04/2020 Negative  Negative Final   • IgA 02/04/2020 64* 87 - 352 mg/dL Final   • Class Description 02/04/2020 Comment   Final        Levels of Specific IgE       Class  Description of Class      ---------------------------  -----  --------------------                     < 0.10         0         Negative             0.10 -    0.31         0/I       Equivocal/Low             0.32 -    0.55         I         Low             0.56 -    1.40         II        Moderate             1.41 -    3.90         III       High             3.91 -   19.00         IV        Very High            19.01 -  100.00         V         Very High                    >100.00         VI        Very High   • Egg White 02/04/2020 <0.10  Class 0 kU/L Final   • Milk, Cow's 02/04/2020 <0.10  Class 0 kU/L Final   • CodFish 02/04/2020 <0.10  Class 0 kU/L Final   • Sesame Seed 02/04/2020 <0.10  Class 0 kU/L Final   • Peanut 02/04/2020 <0.10  Class 0 kU/L Final   • Soybean 02/04/2020 <0.10  Class 0 kU/L Final   • Hazelnut 02/04/2020 <0.10  Class 0 kU/L Final   • Shrimp 02/04/2020 <0.10  Class 0 kU/L Final   • Scallop 02/04/2020 <0.10  Class 0 kU/L Final   • Gluten 02/04/2020 <0.10  Class 0 kU/L Final   • [place] 02/04/2020 <0.10  Class 0 kU/L Final   • Wheat 02/04/2020 <0.10  Class 0 kU/L Final   • Gliadin Deamidated Peptide Ab, IgA 02/04/2020 4  0 - 19 units Final                       Negative                   0 - 19                     Weak Positive             20 - 30                     Moderate to Strong Positive    >30   • Deaminated Gliadin Ab IgG 02/04/2020 6  0 - 19 units Final                       Negative                   0 - 19                     Weak Positive             20 - 30                     Moderate to Strong Positive   >30   • Tissue Transglutaminase IgA 02/04/2020 <2  0 - 3 U/mL Final                                  Negative        0 -  3                                Weak Positive   4 - 10                                Positive           >10   Tissue Transglutaminase (tTG) has been identified   as the endomysial antigen.  Studies have demonstr-   ated that endomysial IgA antibodies have over 99%   specificity for gluten sensitive enteropathy.   • Tissue Transglutaminase IgG 02/04/2020 11* 0 - 5 U/mL Final                                  Negative        0 - 5                                Weak Positive   6 - 9                                Positive           >9   • Campylobacter 02/04/2020 Not Detected  Not Detected, Invalid Final   • Plesiomonas shigelloides 02/04/2020 Not Detected  Not Detected Final   • Salmonella 02/04/2020 Not Detected  Not Detected Final   • Vibrio 02/04/2020 Not Detected  Not Detected Final   • Vibrio cholerae 02/04/2020 Not Detected  Not Detected Final   • Yersinia enterocolitica 02/04/2020 Not Detected  Not Detected Final   • Enteroaggregative E. coli (EAEC) 02/04/2020 Not Detected  Not Detected Final   • Enteropathogenic E. coli (EPEC) 02/04/2020 Not Detected  Not Detected Final   • Enterotoxigenic E. coli (ETEC) lt/* 02/04/2020 Not Detected  Not Detected Final   • Shiga-like toxin-producing E. coli* 02/04/2020 Detected* Not Detected Final   • E. coli O157 02/04/2020 Not Detected  Not Detected Final   • Shigella/Enteroinvasive E. coli (E* 02/04/2020 Not Detected  Not Detected Final   • Cryptosporidium 02/04/2020 Not Detected  Not Detected, Invalid Final   • Cyclospora cayetanensis 02/04/2020 Not Detected  Not Detected Final   • Entamoeba histolytica 02/04/2020 Not Detected   Not Detected Final   • Giardia lamblia 02/04/2020 Not Detected  Not Detected Final   • Adenovirus F40/41 02/04/2020 Not Detected  Not Detected Final   • Astrovirus 02/04/2020 Not Detected  Not Detected Final   • Norovirus GI/GII 02/04/2020 Not Detected  Not Detected Final   • Rotavirus A 02/04/2020 Not Detected  Not Detected Final   • Sapovirus (I, II, IV or V) 02/04/2020 Not Detected  Not Detected Final   • C. Difficile Toxins by PCR 02/04/2020 Positive* Negative Final   • Neutrophil % 02/04/2020 60.6  42.7 - 76.0 % Final   • Lymphocyte % 02/04/2020 25.3  19.6 - 45.3 % Final   • Monocyte % 02/04/2020 11.1  5.0 - 12.0 % Final   • Eosinophil % 02/04/2020 1.0  0.3 - 6.2 % Final   • Basophil % 02/04/2020 2.0* 0.0 - 1.5 % Final   • Neutrophils Absolute 02/04/2020 11.40* 1.70 - 7.00 10*3/mm3 Final   • Lymphocytes Absolute 02/04/2020 4.76* 0.70 - 3.10 10*3/mm3 Final   • Monocytes Absolute 02/04/2020 2.09* 0.10 - 0.90 10*3/mm3 Final   • Eosinophils Absolute 02/04/2020 0.19  0.00 - 0.40 10*3/mm3 Final   • Basophils Absolute 02/04/2020 0.38* 0.00 - 0.20 10*3/mm3 Final   • Poikilocytes 02/04/2020 Slight/1+  None Seen Final   • WBC Morphology 02/04/2020 Normal  Normal Final   • Platelet Morphology 02/04/2020 Normal  Normal Final      XR Chest 1 View  Narrative: PORTABLE CHEST    HISTORY: Syncope. Headache.    Portable AP upright film of the chest was obtained at 11:40 AM.  COMPARISON: August 29, 2019    FINDINGS:   The lungs are clear of an acute process.  The heart is not enlarged.  The pulmonary vasculature is not increased.  No pleural effusion.  No pneumothorax.  No acute osseous abnormality.  Postoperative changes in the cervical spine, right clavicle and  right humeral head.  Impression: CONCLUSION:  No Acute Disease    [id number]    Electronically signed by:  Theo Jacobson MD  3/2/2020 12:29 PM [facility]  Workstation: [id number]  CT Head Without Contrast  Narrative: CT Head Without Contrast    History: Worsening headache x3 days.  Head trauma.    Axial scans of the brain were obtained without intravenous  contrast.  Coronal and sagital reconstructions were preformed.    This exam was performed according to our departmental  dose-optimization program, which includes automated exposure  control, adjustment of the mA and/or kV according to patient size  and/or use of iterative reconstruction technique.    DLP: 1128    Comparison: None    Findings:  Bone windows are unremarkable.  The visualized paranasal sinuses are unremarkable.    No hemorrhage.  No mass.  No abnormal areas of increased or decreased attenuation.  No midline shift.  No abnormal extra-axial fluid collections.  Impression: CONCLUSION:  No intracranial injury or acute process    [id number]    Electronically signed by:  Theo Jacobson MD  3/2/2020 11:36 AM [facility]  Workstation: [id number]    @[email or web address]@  Immunization History   Administered Date(s) Administered   • flucelvax quad pfs =>4 YRS 09/19/2019       The following portions of the patient's history were reviewed and updated as appropriate: allergies, current medications, past family history, past medical history, past social history, past surgical history and problem list.        Physical Exam  Physical Exam   Constitutional: She is oriented to person, place, and time. She appears well-developed and well-nourished.   HENT:   Head: Normocephalic and atraumatic.   Right Ear: External ear normal.   Eyes: Pupils are equal, round, and reactive to light. Conjunctivae and EOM are normal.   Neck: Normal range of motion. Neck supple.   Cardiovascular: Normal rate, regular rhythm and normal heart sounds.   No murmur heard.  Pulmonary/Chest: Effort normal and breath sounds normal. No respiratory distress.   Abdominal: Soft. Bowel sounds are normal. She exhibits no distension. There is no tenderness.   Musculoskeletal: Normal range of motion. She exhibits no edema or deformity.   Neurological: She is alert and oriented to person, place, and time.  No cranial nerve deficit.   Skin: Skin is warm. No rash noted. She is not diaphoretic. No erythema. No pallor.   Psychiatric: She has a normal mood and affect. Her behavior is normal.   Nursing note and vitals reviewed.      Assessment/Plan    Diagnosis Plan   1. Essential hypertension     2. Tobacco user     3. Tremor     4. Vitamin D deficiency      5. Thrombocytosis (CMS/HCC)     6. Stage 2 moderate COPD by GOLD classification (CMS/[facility])     7. Gastritis without bleeding, unspecified chronicity, unspecified gastritis type     8. Generalized abdominal pain     9. Concussion with loss of consciousness, sequela (CMS/[facility])     10. Chronic bronchitis, unspecified chronic bronchitis type (CMS/HCC)     11. Chronic idiopathic constipation     12. JELENA (generalized anxiety disorder)     13. Marijuana use        -will get ER report and CT of ehead   -HTN/tachcycardia -  continue  on toprol XL 50 mg daily.   -schedule mammogram screening    -JELENA - pt admits to smoking marijuana. Recommend pt continue that since it calms her down but also discussed about potential side effects if long term use   -thrombocytosis - continue to monitor. She is seeing Hematology   -gluten sensitivity -recommend gluten free diet   -COPD/COPD exacerbation- Pulmonlogy following on Symbicort 160/45 2 puffs BID  Albuterol inhaler PRN.  advised pt to quit smoking >5 minutes.    -vitamin B12 deficiency  - b12 injections weekly  -tobacco user - counseled to quit smoking >5 minutes. She could not tolerate chantix  urged the importance of quitting smoking.   Insurance would not cover nicotine patch and gum. Recommend pt call 1 [phone] NOW  -chronic bronchitis, - mucinex advised pt to cut down or quit smoking >5 minutes   -abdominal pain/gastritis/IBS /GERD- Gastroenterology following. On PPI prilosec 20 mg q daily  OFF prucalopride 2 mg daily.   -nausea/ vomting - continue zofran PRN  -allergic rhinitis - flonase nasal spray and singulair   -vitamin D  deficiency - vitamin D once a week   -pt has upcoming mammogram soon.    -tremor of head/concussion - she has seen neurology with Dr. Chen. Had MRI of brain. Recommended 2nd opinon with Dr. Manjarrez She also has family history of Friedreich's ataxia. On elavail 25 mg at bedtime. Continue with Neurology followup with Dr. Manjarrez    -advised pt to go to ER or call 911 if symptoms worrisome or severe  -advised pt to be  Safe and call with questions and concerns  -advised to followup with all referrals and Specialist  -advised pt to be safe during COVID -19 pandemic   This visit has been rescheduled as a phone visit to comply with patient safety concerns in accordance with CDC recommendations. Total time of discussion was 25 minutes        This document has been electronically signed by Xavier Wick MD on April 29, 2020 11:24

## 2020-04-29 ENCOUNTER — OFFICE VISIT (OUTPATIENT)
Dept: FAMILY MEDICINE CLINIC | Facility: CLINIC | Age: 50
End: 2020-04-29

## 2020-04-29 VITALS
WEIGHT: 111.7 LBS | BODY MASS INDEX: 18.61 KG/M2 | SYSTOLIC BLOOD PRESSURE: 106 MMHG | HEIGHT: 65 IN | DIASTOLIC BLOOD PRESSURE: 73 MMHG | HEART RATE: 99 BPM

## 2020-04-29 DIAGNOSIS — K59.04 CHRONIC IDIOPATHIC CONSTIPATION: ICD-10-CM

## 2020-04-29 DIAGNOSIS — R25.1 TREMOR: ICD-10-CM

## 2020-04-29 DIAGNOSIS — J42 CHRONIC BRONCHITIS, UNSPECIFIED CHRONIC BRONCHITIS TYPE (HCC): ICD-10-CM

## 2020-04-29 DIAGNOSIS — K29.70 GASTRITIS WITHOUT BLEEDING, UNSPECIFIED CHRONICITY, UNSPECIFIED GASTRITIS TYPE: ICD-10-CM

## 2020-04-29 DIAGNOSIS — D75.839 THROMBOCYTOSIS: ICD-10-CM

## 2020-04-29 DIAGNOSIS — R10.84 GENERALIZED ABDOMINAL PAIN: ICD-10-CM

## 2020-04-29 DIAGNOSIS — E55.9 VITAMIN D DEFICIENCY: ICD-10-CM

## 2020-04-29 DIAGNOSIS — J44.9 STAGE 2 MODERATE COPD BY GOLD CLASSIFICATION (HCC): ICD-10-CM

## 2020-04-29 DIAGNOSIS — S06.0X9S CONCUSSION WITH LOSS OF CONSCIOUSNESS, SEQUELA (HCC): ICD-10-CM

## 2020-04-29 DIAGNOSIS — F12.90 MARIJUANA USE: ICD-10-CM

## 2020-04-29 DIAGNOSIS — F41.1 GAD (GENERALIZED ANXIETY DISORDER): ICD-10-CM

## 2020-04-29 DIAGNOSIS — I10 ESSENTIAL HYPERTENSION: Primary | ICD-10-CM

## 2020-04-29 DIAGNOSIS — Z72.0 TOBACCO USER: ICD-10-CM

## 2020-04-29 PROCEDURE — 99213 OFFICE O/P EST LOW 20 MIN: CPT | Performed by: FAMILY MEDICINE

## 2020-05-04 ENCOUNTER — HOSPITAL ENCOUNTER (OUTPATIENT)
Facility: HOSPITAL | Age: 50
Setting detail: HOSPITAL OUTPATIENT SURGERY
End: 2020-05-04
Attending: INTERNAL MEDICINE | Admitting: INTERNAL MEDICINE

## 2020-05-04 PROBLEM — R10.13 EPIGASTRIC PAIN: Status: ACTIVE | Noted: 2020-05-04

## 2020-05-06 NOTE — PROGRESS NOTES
Subjective:  Carla Richard is a 50 y.o. female who presents for       Patient Active Problem List   Diagnosis   • Tobacco abuse counseling   • Chronic idiopathic constipation   • B12 deficiency   • Vitamin D deficiency    • Tobacco user   • Stage 2 moderate COPD by GOLD classification (CMS/[facility])   • Cervical lymphadenopathy   • Generalized abdominal pain   • Nausea   • Neurological abnormality   • Tremor   • RUQ pain   • COPD exacerbation (CMS/[facility])   • Pertussis exposure   • Chronic bronchitis (CMS/[facility])   • Cigarette nicotine dependence, uncomplicated   • Chronic nonseasonal allergic rhinitis due to pollen   • Episode of recurrent major depressive disorder (CMS/[facility])   • Diarrhea   • Chronic diarrhea   • Thrombocytosis (CMS/[facility])   • Nausea and vomiting   • Gluten intolerance   • C. difficile diarrhea   • Shiga toxin-producing Escherichia coli infection   • Generalized weakness   • Head injury   • Concussion with loss of consciousness   • Gastritis without bleeding   • Irritable bowel syndrome   • Tachycardia   • Essential hypertension   • JELENA (generalized anxiety disorder)   • Marijuana use   • Epigastric pain           Current Outpatient Medications:   •  albuterol sulfate  (90 Base) MCG/ACT inhaler, Inhale 2 puffs Every 4 (Four) Hours As Needed for Wheezing., Disp: 18 g, Rfl: 3  •  amitriptyline (ELAVIL) 25 MG tablet, Take 25 mg by mouth Every Night., Disp: , Rfl:   •  budesonide-formoterol (SYMBICORT) 160-4.5 MCG/ACT inhaler, Inhale 2 puffs 2 (Two) Times a Day., Disp: 1 inhaler, Rfl: 11  •  cetirizine (zyrTEC) 10 MG tablet, Take 1 tablet by mouth Daily., Disp: 30 tablet, Rfl: 11  •  Cyanocobalamin (B-12 COMPLIANCE INJECTION IJ), Inject  as directed., Disp: , Rfl:   •  dicyclomine (BENTYL) 20 MG tablet, Take 2 tablets by mouth Every 6 (Six) Hours., Disp: 240 tablet, Rfl: 3  •  escitalopram (LEXAPRO) 10 MG tablet, Take 1 tablet by mouth Daily., Disp: 30 tablet, Rfl: 3  •  fluticasone (FLONASE) 50  MCG/ACT nasal spray, 2 sprays into the nostril(s) as directed by provider Daily., Disp: 1 bottle, Rfl: 11  •  ipratropium-albuterol (DUO-NEB) 0.5-2.5 mg/3 ml nebulizer, Take 3 mL by nebulization Every 4 (Four) Hours As Needed for Wheezing or Shortness of Air., Disp: , Rfl:   •  metoprolol succinate XL (TOPROL-XL) 50 MG 24 hr tablet, Take 1 tablet by mouth Daily., Disp: 30 tablet, Rfl: 3  •  montelukast (SINGULAIR) 10 MG tablet, Take 1 tablet by mouth Every Night., Disp: 30 tablet, Rfl: 3  •  omeprazole (priLOSEC) 20 MG capsule, Take 20 mg by mouth Daily., Disp: , Rfl:   •  ondansetron ODT (ZOFRAN ODT) 8 MG disintegrating tablet, Take 1 tablet by mouth Every 8 (Eight) Hours As Needed for Nausea or Vomiting., Disp: 90 tablet, Rfl: 3  •  sucralfate (Carafate) 1 GM/10ML suspension, Take 10 mL by mouth 4 (Four) Times a Day., Disp: 4 g, Rfl: 5  •  vitamin D (ERGOCALCIFEROL) 32518 units capsule capsule, Take 1 capsule by mouth 1 (One) Time Per Week., Disp: 4 capsule, Rfl: 3    HPI        Pt is 50 yo female with history of moderateC OPD, Vitamin B12 deficiency, Vitamin D deficiency, chronic abdominal pain, tremor,  RUQ pain,  Sp hysterectomy,  History of chron's disease, history of pertussis infection  sp right shoulder surgery. X 3, sp rotator cuff repair , history of C diff diarrhea, gluten sensitivity, gastritis, marijuana use              4/15/20 Telemedicine Visit today. Pt had recently telemedicine visit with GI. Pt noticed her BP has been consistently elevated for past couple of weeks . Per GI she is to continue bentyl as needed along with trulance if needed for constipation. She is to continue nexium. She notices her BP has been high for past week. She states she has ligthheadeness and tingling in lips. No slurring speech. No facial drop.no weakness of limbs. She has cut back on smoking to 3/4 ppd. She stopped smoking marijuana at bedtime and then noticed BP higher. No high salt diet. No chest pain. No syncopal  episodes. In regards to concussion her symptoms is better.  On recheck on BP it is 130/100 with HR of 112.       4/29/20 Telemedicine Visit today for blood pressure check. Pt started on toprol XL 50 mg daily. She has also contacterd me by francia where she has had headaches, dizziness. She does have history of concussion and sees Neurologist.  She did contct Dr. Manjarrez and was directed to [facility] ER She did go to ER yesterday  At [facility] and had CT of head and per patient was normal. Do not have records of [facility] ER.  She still has some episodes of headaches and dizziness. She is still taking Toprol XL 50 mg daily.  Her last reading was 110/70. She does admit to smoke marijuana at bedtime that helps calms her down and helps with anxiety.  She still smokes 3/4 ppd of tobacco     5/11/20  Telemedicine Visit today for blood pressure recheck she went back to smoking marijuana and BP has gone down. She stopped taking her Toprol XL 50 mg a week ago. BP stable today.  No chest pain no dizziness.  Headaches are stable.  She contineus to smoke 3/4 ppd.  She also lost her balance this weekend and fall and hurt her left leg. She did not go to ER.          Hypertension   This is a recurrent problem. The current episode started more than 1 month ago. The problem has been gradually worsening since onset. The problem is uncontrolled. Associated symptoms include anxiety, blurred vision, headaches and shortness of breath. Pertinent negatives include no chest pain, malaise/fatigue, neck pain, orthopnea, palpitations, peripheral edema, PND or sweats. Risk factors for coronary artery disease include smoking/tobacco exposure, stress and post-menopausal state. Past treatments include nothing. The current treatment provides no improvement. There is no history of angina, kidney disease, CAD/MI, CVA, heart failure, left ventricular hypertrophy, PVD or retinopathy. There is no history of chronic renal disease, coarctation of the  aorta, hyperaldosteronism, hypercortisolism, hyperparathyroidism, a hypertension causing med, pheochromocytoma, renovascular disease, sleep apnea or a thyroid problem.   Diarrhea    This is a recurrent problem. The current episode started more than 1 year ago. The problem has been waxing and waning. The stool consistency is described as watery. The patient states that diarrhea does not awaken her from sleep. Associated symptoms include abdominal pain, arthralgias and coughing. Pertinent negatives include no bloating, chills, fever, headaches, increased  flatus, myalgias, sweats, URI, vomiting or weight loss. Nothing aggravates the symptoms. She has tried nothing for the symptoms. The treatment provided no relief. There is no history of bowel resection, inflammatory bowel disease, irritable bowel syndrome, malabsorption, a recent abdominal surgery or short gut syndrome.    Abdominal Pain   This is a recurrent problem. The current episode started more than 1 year ago. The onset quality is sudden. The problem has been waxing and waning. The pain is located in the generalized abdominal region, epigastric region and RUQ. The pain is at a severity of 5/10. The pain is moderate. The quality of the pain is aching, a sensation of fullness and burning. The abdominal pain radiates to the epigastric region. Associated symptoms include constipation. Pertinent negatives include no anorexia, arthralgias, belching, diarrhea, dysuria, fever, flatus, frequency, headaches, hematochezia, hematuria, melena, myalgias, nausea, vomiting or weight loss. Nothing aggravates the pain. The pain is relieved by being still. The treatment provided no relief. Prior diagnostic workup includes ultrasound. Her past medical history is significant for Crohn's disease. There is no history of abdominal surgery, colon cancer, gallstones, GERD, irritable bowel syndrome, pancreatitis, PUD or ulcerative colitis.    Neurologic Problem   The patient's primary  symptoms include focal sensory loss and weakness. The patient's pertinent negatives include no altered mental status, clumsiness, loss of balance, memory loss, near-syncope, slurred speech, syncope or visual change. This is a recurrent problem. The neurological problem developed gradually. The problem has been waxing and waning since onset. There was no focality noted. Associated symptoms include a fever and shortness of breath. Pertinent negatives include no abdominal pain, auditory change, aura, back pain, bladder incontinence, bowel incontinence, chest pain, confusion, diaphoresis, dizziness, fatigue, headaches, light-headedness, nausea, neck pain, palpitations, vertigo or vomiting. Past treatments include nothing. The treatment provided no relief. There is no history of a bleeding disorder, a clotting disorder, a CVA, head trauma, liver disease, mood changes or seizures.   COPD   This is a chronic problem. The current episode started more than 1 year ago. The problem occurs constantly. The problem has been unchanged. Associated symptoms include abdominal pain, arthralgias, fatigue, joint swelling, numbness and weakness. Pertinent negatives include no anorexia, change in bowel habit, chest pain, chills, congestion, coughing, diaphoresis, fever, headaches, myalgias, nausea, neck pain, sore throat, swollen glands, urinary symptoms, vertigo, visual change or vomiting. The symptoms are aggravated by smoking. She has tried nothing (combivent ) for the symptoms.   Fatigue   This is a chronic problem. The current episode started more than 1 year ago. The problem occurs constantly. The problem has been unchanged. Associated symptoms include abdominal pain, arthralgias, fatigue, joint swelling, numbness and weakness. Pertinent negatives include no anorexia, change in bowel habit, chest pain, chills, congestion, coughing, diaphoresis, fever, headaches, myalgias, nausea, neck pain, sore throat, swollen glands, urinary  symptoms, vertigo, visual change or vomiting. Nothing aggravates the symptoms. She has tried nothing (b12 injections ) for the symptoms. The treatment provided no relief       Review of Systems  Review of Systems   Constitutional: Positive for activity change and fatigue. Negative for appetite change, chills, diaphoresis and fever.   HENT: Negative for congestion, postnasal drip, rhinorrhea, sinus pressure, sinus pain, sneezing, sore throat, trouble swallowing and voice change.    Respiratory: Positive for cough and shortness of breath. Negative for choking, chest tightness, wheezing and stridor.    Cardiovascular: Negative for chest pain.   Gastrointestinal: Positive for abdominal pain, constipation and diarrhea. Negative for nausea and vomiting.   Musculoskeletal: Positive for arthralgias.   Neurological: Positive for tremors, weakness, light-headedness, numbness and headaches.   Psychiatric/Behavioral: Positive for agitation. The patient is nervous/anxious.        Patient Active Problem List   Diagnosis   • Tobacco abuse counseling   • Chronic idiopathic constipation   • B12 deficiency   • Vitamin D deficiency    • Tobacco user   • Stage 2 moderate COPD by GOLD classification (CMS/HCC)   • Cervical lymphadenopathy   • Generalized abdominal pain   • Nausea   • Neurological abnormality   • Tremor   • RUQ pain   • COPD exacerbation (CMS/HCC)   • Pertussis exposure   • Chronic bronchitis (CMS/HCC)   • Cigarette nicotine dependence, uncomplicated   • Chronic nonseasonal allergic rhinitis due to pollen   • Episode of recurrent major depressive disorder (CMS/HCC)   • Diarrhea   • Chronic diarrhea   • Thrombocytosis (CMS/HCC)   • Nausea and vomiting   • Gluten intolerance   • C. difficile diarrhea   • Shiga toxin-producing Escherichia coli infection   • Generalized weakness   • Head injury   • Concussion with loss of consciousness   • Gastritis without bleeding   • Irritable bowel syndrome   • Tachycardia   • Essential  hypertension   • JELENA (generalized anxiety disorder)   • Marijuana use   • Epigastric pain     Past Surgical History:   Procedure Laterality Date   • COLONOSCOPY     • COLONOSCOPY N/A 10/18/2019    Procedure: COLONOSCOPY;  Surgeon: Tom Davis MD;  Location: [facility] ENDOSCOPY;  Service: Gastroenterology   • ENDOSCOPY N/A 10/18/2019    Procedure: ESOPHAGOGASTRODUODENOSCOPY;  Surgeon: Tom Davis MD;  Location: [facility] ENDOSCOPY;  Service: Gastroenterology   • HYSTERECTOMY     • TONSILECTOMY, ADENOIDECTOMY, BILATERAL MYRINGOTOMY AND TUBES     • UPPER GASTROINTESTINAL ENDOSCOPY       Social History     Socioeconomic History   • Marital status: Single     Spouse name: Not on file   • Number of children: Not on file   • Years of education: Not on file   • Highest education level: Not on file   Tobacco Use   • Smoking status: Current Every Day Smoker     Packs/day: 1.00     Types: Cigarettes   • Smokeless tobacco: Never Used   Substance and Sexual Activity   • Alcohol use: No     Frequency: Never   • Drug use: No   • Sexual activity: Defer     Family History   Problem Relation Age of Onset   • Inflammatory bowel disease Mother    • Arthritis Mother    • Diabetes Mother    • Hypertension Mother    • Hyperlipidemia Mother    • Obesity Mother    • Osteoporosis Mother    • Heart disease Father    • Hyperlipidemia Father    • Diabetes Sister    • Liver disease Daughter    • Mental illness Daughter    • Obesity Daughter    • Diabetes Maternal Grandmother    • Cancer Maternal Grandmother         lung   • Cancer Other         lung   • Heart disease Other      Admission on 03/02/2020, Discharged on 03/02/2020   Component Date Value Ref Range Status   • THC, Screen, Urine 03/02/2020 Positive* Negative Final   • Phencyclidine (PCP), Urine 03/02/2020 Negative  Negative Final   • Cocaine Screen, Urine 03/02/2020 Negative  Negative Final   • Methamphetamine, Ur 03/02/2020 Negative  Negative Final   • Opiate Screen 03/02/2020  Negative  Negative Final   • Amphetamine Screen, Urine 03/02/2020 Negative  Negative Final   • Benzodiazepine Screen, Urine 03/02/2020 Negative  Negative Final   • Tricyclic Antidepressants Screen 03/02/2020 Negative  Negative Final   • Methadone Screen, Urine 03/02/2020 Negative  Negative Final   • Barbiturates Screen, Urine 03/02/2020 Positive* Negative Final   • Oxycodone Screen, Urine 03/02/2020 Negative  Negative Final   • Propoxyphene Screen 03/02/2020 Negative  Negative Final   • Buprenorphine, Screen, Urine 03/02/2020 Negative  Negative Final   • Glucose 03/02/2020 97  65 - 99 mg/dL Final   • BUN 03/02/2020 13  6 - 20 mg/dL Final   • Creatinine 03/02/2020 0.69  0.57 - 1.00 mg/dL Final   • Sodium 03/02/2020 141  136 - 145 mmol/L Final   • Potassium 03/02/2020 4.0  3.5 - 5.2 mmol/L Final   • Chloride 03/02/2020 102  98 - 107 mmol/L Final   • CO2 03/02/2020 26.0  22.0 - 29.0 mmol/L Final   • Calcium 03/02/2020 10.0  8.6 - 10.5 mg/dL Final   • Total Protein 03/02/2020 7.7  6.0 - 8.5 g/dL Final   • Albumin 03/02/2020 4.80  3.50 - 5.20 g/dL Final   • ALT (SGPT) 03/02/2020 19  1 - 33 U/L Final   • AST (SGOT) 03/02/2020 18  1 - 32 U/L Final   • Alkaline Phosphatase 03/02/2020 95  39 - 117 U/L Final   • Total Bilirubin 03/02/2020 0.3  0.2 - 1.2 mg/dL Final   • eGFR Non African Amer 03/02/2020 89  >60 mL/min/1.73 Final   • Globulin 03/02/2020 2.9  gm/dL Final   • A/G Ratio 03/02/2020 1.7  g/dL Final   • BUN/Creatinine Ratio 03/02/2020 18.8  7.0 - 25.0 Final   • Anion Gap 03/02/2020 13.0  5.0 - 15.0 mmol/L Final   • WBC 03/02/2020 9.73  3.40 - 10.80 10*3/mm3 Final   • RBC 03/02/2020 5.20  3.77 - 5.28 10*6/mm3 Final   • Hemoglobin 03/02/2020 16.4* 12.0 - 15.9 g/dL Final   • Hematocrit 03/02/2020 49.4* 34.0 - 46.6 % Final   • MCV 03/02/2020 95.0  79.0 - 97.0 fL Final   • MCH 03/02/2020 31.5  26.6 - 33.0 pg Final   • MCHC 03/02/2020 33.2  31.5 - 35.7 g/dL Final   • RDW 03/02/2020 13.2  12.3 - 15.4 % Final   • RDW-SD  03/02/2020 46.1  37.0 - 54.0 fl Final   • MPV 03/02/2020 9.2  6.0 - 12.0 fL Final   • Platelets 03/02/2020 439  140 - 450 10*3/mm3 Final   • Neutrophil % 03/02/2020 54.7  42.7 - 76.0 % Final   • Lymphocyte % 03/02/2020 34.5  19.6 - 45.3 % Final   • Monocyte % 03/02/2020 7.6  5.0 - 12.0 % Final   • Eosinophil % 03/02/2020 1.7  0.3 - 6.2 % Final   • Basophil % 03/02/2020 0.8  0.0 - 1.5 % Final   • Immature Grans % 03/02/2020 0.7* 0.0 - 0.5 % Final   • Neutrophils, Absolute 03/02/2020 5.31  1.70 - 7.00 10*3/mm3 Final   • Lymphocytes, Absolute 03/02/2020 3.36* 0.70 - 3.10 10*3/mm3 Final   • Monocytes, Absolute 03/02/2020 0.74  0.10 - 0.90 10*3/mm3 Final   • Eosinophils, Absolute 03/02/2020 0.17  0.00 - 0.40 10*3/mm3 Final   • Basophils, Absolute 03/02/2020 0.08  0.00 - 0.20 10*3/mm3 Final   • Immature Grans, Absolute 03/02/2020 0.07* 0.00 - 0.05 10*3/mm3 Final   • nRBC 03/02/2020 0.0  0.0 - 0.2 /100 WBC Final   • Extra Tube 03/02/2020 hold for add-on   Final    Auto resulted   • Extra Tube 03/02/2020 Hold for add-ons.   Final    Auto resulted.   • Color, UA 03/02/2020 Yellow  Yellow, Straw, Dark Yellow, Ana M Final   • Appearance, UA 03/02/2020 Clear  Clear Final   • pH, UA 03/02/2020 6.5  5.0 - 9.0 Final   • Specific Gravity, UA 03/02/2020 1.021  1.003 - 1.030 Final   • Glucose, UA 03/02/2020 Negative  Negative Final   • Ketones, UA 03/02/2020 Negative  Negative Final   • Bilirubin, UA 03/02/2020 Negative  Negative Final   • Blood, UA 03/02/2020 Small (1+)* Negative Final   • Protein, UA 03/02/2020 Negative  Negative Final   • Leuk Esterase, UA 03/02/2020 Negative  Negative Final   • Nitrite, UA 03/02/2020 Negative  Negative Final   • Urobilinogen, UA 03/02/2020 0.2 E.U./dL  0.2 - 1.0 E.U./dL Final   • RBC, UA 03/02/2020 6-12* None Seen /HPF Final   • WBC, UA 03/02/2020 None Seen  None Seen, 0-2, 3-5 /HPF Final   • Bacteria, UA 03/02/2020 None Seen  None Seen /HPF Final   • Squamous Epithelial Cells, UA 03/02/2020 None  Seen  None Seen, 0-2 /HPF Final   • Hyaline Casts, UA 03/02/2020 0-2  None Seen /LPF Final   • Methodology 03/02/2020 Automated Microscopy   Final   • Troponin T 03/02/2020 <0.010  0.000 - 0.030 ng/mL Final   • proBNP 03/02/2020 27.8  5.0 - 900.0 pg/mL Final   Admission on 02/06/2020, Discharged on 02/06/2020   Component Date Value Ref Range Status   • Glucose 02/06/2020 99  65 - 99 mg/dL Final   • BUN 02/06/2020 12  6 - 20 mg/dL Final   • Creatinine 02/06/2020 0.66  0.57 - 1.00 mg/dL Final   • Sodium 02/06/2020 139  136 - 145 mmol/L Final   • Potassium 02/06/2020 3.6  3.5 - 5.2 mmol/L Final   • Chloride 02/06/2020 103  98 - 107 mmol/L Final   • CO2 02/06/2020 24.0  22.0 - 29.0 mmol/L Final   • Calcium 02/06/2020 9.2  8.6 - 10.5 mg/dL Final   • Total Protein 02/06/2020 6.6  6.0 - 8.5 g/dL Final   • Albumin 02/06/2020 4.40  3.50 - 5.20 g/dL Final   • ALT (SGPT) 02/06/2020 6  1 - 33 U/L Final   • AST (SGOT) 02/06/2020 10  1 - 32 U/L Final   • Alkaline Phosphatase 02/06/2020 73  39 - 117 U/L Final   • Total Bilirubin 02/06/2020 0.4  0.2 - 1.2 mg/dL Final   • eGFR Non African Amer 02/06/2020 94  >60 mL/min/1.73 Final   • Globulin 02/06/2020 2.2  gm/dL Final   • A/G Ratio 02/06/2020 2.0  g/dL Final   • BUN/Creatinine Ratio 02/06/2020 18.2  7.0 - 25.0 Final   • Anion Gap 02/06/2020 12.0  5.0 - 15.0 mmol/L Final   • Lipase 02/06/2020 39  13 - 60 U/L Final   • Color, UA 02/06/2020 Yellow  Yellow, Straw, Dark Yellow, Ana M Final   • Appearance, UA 02/06/2020 Clear  Clear Final   • pH, UA 02/06/2020 6.5  5.0 - 9.0 Final   • Specific Gravity, UA 02/06/2020 1.029  1.003 - 1.030 Final   • Glucose, UA 02/06/2020 Negative  Negative Final   • Ketones, UA 02/06/2020 Negative  Negative Final   • Bilirubin, UA 02/06/2020 Negative  Negative Final   • Blood, UA 02/06/2020 Negative  Negative Final   • Protein, UA 02/06/2020 Negative  Negative Final   • Leuk Esterase, UA 02/06/2020 Negative  Negative Final   • Nitrite, UA 02/06/2020  Negative  Negative Final   • Urobilinogen, UA 02/06/2020 1.0 E.U./dL  0.2 - 1.0 E.U./dL Final   • WBC 02/06/2020 14.21* 3.40 - 10.80 10*3/mm3 Final   • RBC 02/06/2020 4.89  3.77 - 5.28 10*6/mm3 Final   • Hemoglobin 02/06/2020 15.3  12.0 - 15.9 g/dL Final   • Hematocrit 02/06/2020 45.7  34.0 - 46.6 % Final   • MCV 02/06/2020 93.5  79.0 - 97.0 fL Final   • MCH 02/06/2020 31.3  26.6 - 33.0 pg Final   • MCHC 02/06/2020 33.5  31.5 - 35.7 g/dL Final   • RDW 02/06/2020 12.7  12.3 - 15.4 % Final   • RDW-SD 02/06/2020 43.5  37.0 - 54.0 fl Final   • MPV 02/06/2020 9.1  6.0 - 12.0 fL Final   • Platelets 02/06/2020 432  140 - 450 10*3/mm3 Final   • Neutrophil % 02/06/2020 60.1  42.7 - 76.0 % Final   • Lymphocyte % 02/06/2020 28.6  19.6 - 45.3 % Final   • Monocyte % 02/06/2020 8.3  5.0 - 12.0 % Final   • Eosinophil % 02/06/2020 1.7  0.3 - 6.2 % Final   • Basophil % 02/06/2020 0.6  0.0 - 1.5 % Final   • Immature Grans % 02/06/2020 0.7* 0.0 - 0.5 % Final   • Neutrophils, Absolute 02/06/2020 8.54* 1.70 - 7.00 10*3/mm3 Final   • Lymphocytes, Absolute 02/06/2020 4.07* 0.70 - 3.10 10*3/mm3 Final   • Monocytes, Absolute 02/06/2020 1.18* 0.10 - 0.90 10*3/mm3 Final   • Eosinophils, Absolute 02/06/2020 0.24  0.00 - 0.40 10*3/mm3 Final   • Basophils, Absolute 02/06/2020 0.08  0.00 - 0.20 10*3/mm3 Final   • Immature Grans, Absolute 02/06/2020 0.10* 0.00 - 0.05 10*3/mm3 Final   • nRBC 02/06/2020 0.0  0.0 - 0.2 /100 WBC Final   • Extra Tube 02/06/2020 hold for add-on   Final    Auto resulted   • Extra Tube 02/06/2020 Hold for add-ons.   Final    Auto resulted.   • Neutrophil % 02/06/2020 62.0  42.7 - 76.0 % Final   • Lymphocyte % 02/06/2020 31.0  19.6 - 45.3 % Final   • Monocyte % 02/06/2020 6.0  5.0 - 12.0 % Final   • Bands %  02/06/2020 1.0  0.0 - 5.0 % Final   • Neutrophils Absolute 02/06/2020 8.95* 1.70 - 7.00 10*3/mm3 Final   • Lymphocytes Absolute 02/06/2020 4.41* 0.70 - 3.10 10*3/mm3 Final   • Monocytes Absolute 02/06/2020 0.85  0.10 -  0.90 10*3/mm3 Final   • RBC Morphology 02/06/2020 Normal  Normal Final   • WBC Morphology 02/06/2020 Normal  Normal Final   • Platelet Morphology 02/06/2020 Normal  Normal Final   Lab on 02/04/2020   Component Date Value Ref Range Status   • WBC 02/04/2020 18.81* 3.40 - 10.80 10*3/mm3 Final   • RBC 02/04/2020 4.62  3.77 - 5.28 10*6/mm3 Final   • Hemoglobin 02/04/2020 15.2  12.0 - 15.9 g/dL Final   • Hematocrit 02/04/2020 43.0  34.0 - 46.6 % Final   • MCV 02/04/2020 93.1  79.0 - 97.0 fL Final   • MCH 02/04/2020 32.9  26.6 - 33.0 pg Final   • MCHC 02/04/2020 35.3  31.5 - 35.7 g/dL Final   • RDW 02/04/2020 12.4  12.3 - 15.4 % Final   • RDW-SD 02/04/2020 42.2  37.0 - 54.0 fl Final   • MPV 02/04/2020 9.7  6.0 - 12.0 fL Final   • Platelets 02/04/2020 489* 140 - 450 10*3/mm3 Final   • Glucose 02/04/2020 93  65 - 99 mg/dL Final   • BUN 02/04/2020 14  6 - 20 mg/dL Final   • Creatinine 02/04/2020 0.76  0.57 - 1.00 mg/dL Final   • Sodium 02/04/2020 140  136 - 145 mmol/L Final   • Potassium 02/04/2020 4.1  3.5 - 5.2 mmol/L Final   • Chloride 02/04/2020 101  98 - 107 mmol/L Final   • CO2 02/04/2020 22.7  22.0 - 29.0 mmol/L Final   • Calcium 02/04/2020 9.0  8.6 - 10.5 mg/dL Final   • Total Protein 02/04/2020 6.4  6.0 - 8.5 g/dL Final   • Albumin 02/04/2020 4.40  3.50 - 5.20 g/dL Final   • ALT (SGPT) 02/04/2020 6  1 - 33 U/L Final   • AST (SGOT) 02/04/2020 11  1 - 32 U/L Final   • Alkaline Phosphatase 02/04/2020 62  39 - 117 U/L Final   • Total Bilirubin 02/04/2020 0.3  0.2 - 1.2 mg/dL Final   • eGFR Non African Amer 02/04/2020 80  >60 mL/min/1.73 Final   • Globulin 02/04/2020 2.0  gm/dL Final   • A/G Ratio 02/04/2020 2.2  g/dL Final   • BUN/Creatinine Ratio 02/04/2020 18.4  7.0 - 25.0 Final   • Anion Gap 02/04/2020 16.3* 5.0 - 15.0 mmol/L Final   • Amylase 02/04/2020 51  28 - 100 U/L Final   • Lipase 02/04/2020 31  13 - 60 U/L Final   • Beef 02/04/2020 <0.10  <0.35 kU/L Final   • Class Description 02/04/2020 0   Final   • Lamb  02/04/2020 <0.10  <0.35 kU/L Final   • Class Interpretation 02/04/2020 0   Final   • Pork 02/04/2020 <0.10  <0.35 kU/L Final   • Class Interpretation 02/04/2020 0   Final    The test method is the [employer] ImmunoCAP allergen-specific  IgE system. CLASS INTERPRETATION   <0.10 kU/L= 0,  Negative; 0.10 - 0.34 kU/L= 0/1, Equivocal/Borderline;  0.35 - 0.69  kU/L=1, Low Positive; 0.70 - 3.49 kU/L=2,  Moderate Positive;  3.50  - 17.49 kU/L=3, High Positive;  17.50 - 49.99 kU/L= 4, Very High Positive; 50.00 - 99.99  kU/L= 5, Very High Positive;   >99.99 kU/L=6, Very High  Positive  *This test was developed and its performance  characteristics determined by [employer]. It has not  been cleared or approved by the [place] Food and Drug  Administration.   • Alpha Gal IgE 02/04/2020 <0.10  <0.10 kU/L Final    Previous reports (ABIEL 2009;123:426-433) have demonstrated  that patients with IgE antibodies to  ypqfikljr-v-5,3-galactose are at risk for delayed  anaphylaxis, angioedema, or urticaria following  consumption of beef, pork, or lamb.   • Gliadin Deamidated Peptide Ab, IgA 02/04/2020 4  0 - 19 units Final                       Negative                   0 - 19                     Weak Positive             20 - 30                     Moderate to Strong Positive   >30   • Deaminated Gliadin Ab IgG 02/04/2020 5  0 - 19 units Final                       Negative                   0 - 19                     Weak Positive             20 - 30                     Moderate to Strong Positive   >30   • Tissue Transglutaminase IgA 02/04/2020 <2  0 - 3 U/mL Final                                  Negative        0 -  3                                Weak Positive   4 - 10                                Positive           >10   Tissue Transglutaminase (tTG) has been identified   as the endomysial antigen.  Studies have demonstr-   ated that endomysial IgA antibodies have over 99%   specificity for gluten sensitive enteropathy.   • Tissue  Transglutaminase IgG 02/04/2020 10* 0 - 5 U/mL Final                                  Negative        0 - 5                                Weak Positive   6 - 9                                Positive           >9   • Endomysial IgA 02/04/2020 Negative  Negative Final   • IgA 02/04/2020 64* 87 - 352 mg/dL Final   • Class Description 02/04/2020 Comment   Final        Levels of Specific IgE       Class  Description of Class      ---------------------------  -----  --------------------                     < 0.10         0         Negative             0.10 -    0.31         0/I       Equivocal/Low             0.32 -    0.55         I         Low             0.56 -    1.40         II        Moderate             1.41 -    3.90         III       High             3.91 -   19.00         IV        Very High            19.01 -  100.00         V         Very High                    >100.00         VI        Very High   • Egg White 02/04/2020 <0.10  Class 0 kU/L Final   • Milk, Cow's 02/04/2020 <0.10  Class 0 kU/L Final   • CodFish 02/04/2020 <0.10  Class 0 kU/L Final   • Sesame Seed 02/04/2020 <0.10  Class 0 kU/L Final   • Peanut 02/04/2020 <0.10  Class 0 kU/L Final   • Soybean 02/04/2020 <0.10  Class 0 kU/L Final   • Hazelnut 02/04/2020 <0.10  Class 0 kU/L Final   • Shrimp 02/04/2020 <0.10  Class 0 kU/L Final   • Scallop 02/04/2020 <0.10  Class 0 kU/L Final   • Gluten 02/04/2020 <0.10  Class 0 kU/L Final   • [place] 02/04/2020 <0.10  Class 0 kU/L Final   • Wheat 02/04/2020 <0.10  Class 0 kU/L Final   • Gliadin Deamidated Peptide Ab, IgA 02/04/2020 4  0 - 19 units Final                       Negative                   0 - 19                     Weak Positive             20 - 30                     Moderate to Strong Positive   >30   • Deaminated Gliadin Ab IgG 02/04/2020 6  0 - 19 units Final                       Negative                   0 - 19                     Weak Positive             20 - 30                     Moderate  to Strong Positive   >30   • Tissue Transglutaminase IgA 02/04/2020 <2  0 - 3 U/mL Final                                  Negative        0 -  3                                Weak Positive   4 - 10                                Positive           >10   Tissue Transglutaminase (tTG) has been identified   as the endomysial antigen.  Studies have demonstr-   ated that endomysial IgA antibodies have over 99%   specificity for gluten sensitive enteropathy.   • Tissue Transglutaminase IgG 02/04/2020 11* 0 - 5 U/mL Final                                  Negative        0 - 5                                Weak Positive   6 - 9                                Positive           >9   • Campylobacter 02/04/2020 Not Detected  Not Detected, Invalid Final   • Plesiomonas shigelloides 02/04/2020 Not Detected  Not Detected Final   • Salmonella 02/04/2020 Not Detected  Not Detected Final   • Vibrio 02/04/2020 Not Detected  Not Detected Final   • Vibrio cholerae 02/04/2020 Not Detected  Not Detected Final   • Yersinia enterocolitica 02/04/2020 Not Detected  Not Detected Final   • Enteroaggregative E. coli (EAEC) 02/04/2020 Not Detected  Not Detected Final   • Enteropathogenic E. coli (EPEC) 02/04/2020 Not Detected  Not Detected Final   • Enterotoxigenic E. coli (ETEC) lt/* 02/04/2020 Not Detected  Not Detected Final   • Shiga-like toxin-producing E. coli* 02/04/2020 Detected* Not Detected Final   • E. coli O157 02/04/2020 Not Detected  Not Detected Final   • Shigella/Enteroinvasive E. coli (E* 02/04/2020 Not Detected  Not Detected Final   • Cryptosporidium 02/04/2020 Not Detected  Not Detected, Invalid Final   • Cyclospora cayetanensis 02/04/2020 Not Detected  Not Detected Final   • Entamoeba histolytica 02/04/2020 Not Detected  Not Detected Final   • Giardia lamblia 02/04/2020 Not Detected  Not Detected Final   • Adenovirus F40/41 02/04/2020 Not Detected  Not Detected Final   • Astrovirus 02/04/2020 Not Detected  Not Detected  Final   • Norovirus GI/GII 02/04/2020 Not Detected  Not Detected Final   • Rotavirus A 02/04/2020 Not Detected  Not Detected Final   • Sapovirus (I, II, IV or V) 02/04/2020 Not Detected  Not Detected Final   • C. Difficile Toxins by PCR 02/04/2020 Positive* Negative Final   • Neutrophil % 02/04/2020 60.6  42.7 - 76.0 % Final   • Lymphocyte % 02/04/2020 25.3  19.6 - 45.3 % Final   • Monocyte % 02/04/2020 11.1  5.0 - 12.0 % Final   • Eosinophil % 02/04/2020 1.0  0.3 - 6.2 % Final   • Basophil % 02/04/2020 2.0* 0.0 - 1.5 % Final   • Neutrophils Absolute 02/04/2020 11.40* 1.70 - 7.00 10*3/mm3 Final   • Lymphocytes Absolute 02/04/2020 4.76* 0.70 - 3.10 10*3/mm3 Final   • Monocytes Absolute 02/04/2020 2.09* 0.10 - 0.90 10*3/mm3 Final   • Eosinophils Absolute 02/04/2020 0.19  0.00 - 0.40 10*3/mm3 Final   • Basophils Absolute 02/04/2020 0.38* 0.00 - 0.20 10*3/mm3 Final   • Poikilocytes 02/04/2020 Slight/1+  None Seen Final   • WBC Morphology 02/04/2020 Normal  Normal Final   • Platelet Morphology 02/04/2020 Normal  Normal Final      XR Chest 1 View  Narrative: PORTABLE CHEST    HISTORY: Syncope. Headache.    Portable AP upright film of the chest was obtained at 11:40 AM.  COMPARISON: August 29, 2019    FINDINGS:   The lungs are clear of an acute process.  The heart is not enlarged.  The pulmonary vasculature is not increased.  No pleural effusion.  No pneumothorax.  No acute osseous abnormality.  Postoperative changes in the cervical spine, right clavicle and  right humeral head.  Impression: CONCLUSION:  No Acute Disease    [id number]    Electronically signed by:  Theo Jacobson MD  3/2/2020 12:29 PM [facility]  Workstation: [id number]  CT Head Without Contrast  Narrative: CT Head Without Contrast    History: Worsening headache x3 days. Head trauma.    Axial scans of the brain were obtained without intravenous  contrast.  Coronal and sagital reconstructions were preformed.    This exam was performed according to our  "departmental  dose-optimization program, which includes automated exposure  control, adjustment of the mA and/or kV according to patient size  and/or use of iterative reconstruction technique.    DLP: 1128    Comparison: None    Findings:  Bone windows are unremarkable.  The visualized paranasal sinuses are unremarkable.    No hemorrhage.  No mass.  No abnormal areas of increased or decreased attenuation.  No midline shift.  No abnormal extra-axial fluid collections.  Impression: CONCLUSION:  No intracranial injury or acute process    [id number]    Electronically signed by:  Theo Jacobson MD  3/2/2020 11:36 AM [facility]  Workstation: [id number]    @[email or web address]@  Immunization History   Administered Date(s) Administered   • flucelvax quad pfs =>4 YRS 09/19/2019       The following portions of the patient's history were reviewed and updated as appropriate: allergies, current medications, past family history, past medical history, past social history, past surgical history and problem list.        Physical Exam  /83 (BP Location: Right arm, Patient Position: Sitting)   Pulse 90   Ht 165.1 cm (65\")   Wt 51.3 kg (113 lb 1.6 oz)   LMP  (LMP Unknown)   BMI 18.82 kg/m²     Physical Exam   Constitutional: She is oriented to person, place, and time. She appears well-developed and well-nourished.   HENT:   Head: Normocephalic and atraumatic.   Right Ear: External ear normal.   Eyes: Pupils are equal, round, and reactive to light. Conjunctivae and EOM are normal.   Neck: Normal range of motion. Neck supple.   Cardiovascular: Normal rate, regular rhythm and normal heart sounds.   No murmur heard.  Pulmonary/Chest: Effort normal and breath sounds normal. No respiratory distress.   Abdominal: Soft. Bowel sounds are normal. She exhibits no distension. There is no tenderness.   Musculoskeletal: Normal range of motion. She exhibits no edema or deformity.   Neurological: She is alert and oriented to person, place, and time. No cranial " nerve deficit.   Skin: Skin is warm. No rash noted. She is not diaphoretic. No erythema. No pallor.   Psychiatric: She has a normal mood and affect. Her behavior is normal.   Nursing note and vitals reviewed.      Assessment/Plan    Diagnosis Plan   1. Tremor     2. Vitamin D deficiency      3. Tobacco user     4. Stage 2 moderate COPD by GOLD classification (CMS/[facility])     5. Essential hypertension     6. JELENA (generalized anxiety disorder)     7. Episode of recurrent major depressive disorder, unspecified depression episode severity (CMS/[facility])     8. Chronic idiopathic constipation     9. Chronic bronchitis, unspecified chronic bronchitis type (CMS/[facility])            -will get ER report and CT of ehead   -HTN/tachcycardia -  pt voluntarily stopped toprol XL 25 mg daily. She states smoking marijuana has helped keep under control  .   -schedule mammogram screening    -JELENA - pt admits to smoking marijuana. Recommend pt continue that since it calms her down but also discussed about potential side effects if long term use   -thrombocytosis - continue to monitor. She is seeing Hematology   -gluten sensitivity -recommend gluten free diet   -COPD/COPD exacerbation- Pulmonlogy following on Symbicort 160/45 2 puffs BID  Albuterol inhaler PRN.  advised pt to quit smoking >5 minutes.    -vitamin B12 deficiency  - b12 injections weekly  -tobacco user - counseled to quit smoking >5 minutes. She could not tolerate chantix  urged the importance of quitting smoking.   Insurance would not cover nicotine patch and gum. Recommend pt call 1 [phone] NOW  -chronic bronchitis, - mucinex advised pt to cut down or quit smoking >5 minutes   -abdominal pain/gastritis/IBS /GERD- Gastroenterology following. On PPI prilosec 20 mg q daily  OFF prucalopride 2 mg daily.   -nausea/ vomting - continue zofran PRN  -allergic rhinitis - flonase nasal spray and singulair   -vitamin D deficiency - vitamin D once a week   -pt has upcoming mammogram  soon.    -tremor of head/concussion - she has seen neurology with Dr. Chen. Had MRI of brain. Recommended 2nd opinon with Dr. Manjarrez She also has family history of Friedreich's ataxia. On elavail 25 mg at bedtime. Continue with Neurology followup with Dr. Manjarrez    -advised pt to go to ER or call 911 if symptoms worrisome or severe  -advised pt to be  Safe and call with questions and concerns  -advised to followup with all referrals and Specialist  -advised pt to be safe during COVID -19 pandemic   This visit has been rescheduled as a phone visit to comply with patient safety concerns in accordance with CDC recommendations. Total time of discussion was 25 minutes  -recheck in 6 weeks in office for blood pressure recheck         This document has been electronically signed by Xavier Wick MD on May 11, 2020 07:24

## 2020-05-11 ENCOUNTER — OFFICE VISIT (OUTPATIENT)
Dept: FAMILY MEDICINE CLINIC | Facility: CLINIC | Age: 50
End: 2020-05-11

## 2020-05-11 VITALS
DIASTOLIC BLOOD PRESSURE: 83 MMHG | SYSTOLIC BLOOD PRESSURE: 107 MMHG | HEART RATE: 90 BPM | BODY MASS INDEX: 18.84 KG/M2 | HEIGHT: 65 IN | WEIGHT: 113.1 LBS

## 2020-05-11 DIAGNOSIS — J44.9 STAGE 2 MODERATE COPD BY GOLD CLASSIFICATION (HCC): ICD-10-CM

## 2020-05-11 DIAGNOSIS — I10 ESSENTIAL HYPERTENSION: Primary | ICD-10-CM

## 2020-05-11 DIAGNOSIS — E55.9 VITAMIN D DEFICIENCY: ICD-10-CM

## 2020-05-11 DIAGNOSIS — F12.90 MARIJUANA USE: ICD-10-CM

## 2020-05-11 DIAGNOSIS — F33.9 EPISODE OF RECURRENT MAJOR DEPRESSIVE DISORDER, UNSPECIFIED DEPRESSION EPISODE SEVERITY (HCC): ICD-10-CM

## 2020-05-11 DIAGNOSIS — J42 CHRONIC BRONCHITIS, UNSPECIFIED CHRONIC BRONCHITIS TYPE (HCC): ICD-10-CM

## 2020-05-11 DIAGNOSIS — R25.1 TREMOR: ICD-10-CM

## 2020-05-11 DIAGNOSIS — F41.1 GAD (GENERALIZED ANXIETY DISORDER): ICD-10-CM

## 2020-05-11 DIAGNOSIS — Z72.0 TOBACCO USER: ICD-10-CM

## 2020-05-11 DIAGNOSIS — K59.04 CHRONIC IDIOPATHIC CONSTIPATION: ICD-10-CM

## 2020-05-11 PROCEDURE — 99443 PR PHYS/QHP TELEPHONE EVALUATION 21-30 MIN: CPT | Performed by: FAMILY MEDICINE

## 2020-05-15 ENCOUNTER — HOSPITAL ENCOUNTER (OUTPATIENT)
Dept: CT IMAGING | Facility: HOSPITAL | Age: 50
Discharge: HOME | End: 2020-05-15
Admitting: FAMILY MEDICINE

## 2020-05-15 ENCOUNTER — TELEPHONE (OUTPATIENT)
Dept: FAMILY MEDICINE CLINIC | Facility: CLINIC | Age: 50
End: 2020-05-15

## 2020-05-15 DIAGNOSIS — R11.2 NAUSEA AND VOMITING, INTRACTABILITY OF VOMITING NOT SPECIFIED, UNSPECIFIED VOMITING TYPE: ICD-10-CM

## 2020-05-15 DIAGNOSIS — R10.9 CHRONIC ABDOMINAL PAIN: ICD-10-CM

## 2020-05-15 DIAGNOSIS — G89.29 CHRONIC ABDOMINAL PAIN: ICD-10-CM

## 2020-05-15 DIAGNOSIS — R10.84 GENERALIZED ABDOMINAL PAIN: ICD-10-CM

## 2020-05-15 DIAGNOSIS — K52.9 CHRONIC DIARRHEA: ICD-10-CM

## 2020-05-15 PROCEDURE — 0 IOPAMIDOL PER 1 ML: Performed by: FAMILY MEDICINE

## 2020-05-15 PROCEDURE — 74174 CTA ABD&PLVS W/CONTRAST: CPT

## 2020-05-15 RX ADMIN — IOPAMIDOL 80 ML: 755 INJECTION, SOLUTION INTRAVENOUS at 14:02

## 2020-05-15 NOTE — TELEPHONE ENCOUNTER
----- Message from Xavier Wick MD sent at 5/15/2020  3:26 PM CDT -----  Please let pt know her CT angiogram of abdomen was normal    There is normal flow to arteries in abdomen    Recheck on next visit. Thanks

## 2020-06-15 NOTE — PROGRESS NOTES
Subjective:  Carla Richard is a 50 y.o. female who presents for       Patient Active Problem List   Diagnosis   • Tobacco abuse counseling   • Chronic idiopathic constipation   • B12 deficiency   • Vitamin D deficiency    • Tobacco user   • Stage 2 moderate COPD by GOLD classification (CMS/[facility])   • Cervical lymphadenopathy   • Generalized abdominal pain   • Nausea   • Neurological abnormality   • Tremor   • RUQ pain   • COPD exacerbation (CMS/[facility])   • Pertussis exposure   • Chronic bronchitis (CMS/[facility])   • Cigarette nicotine dependence, uncomplicated   • Chronic nonseasonal allergic rhinitis due to pollen   • Episode of recurrent major depressive disorder (CMS/[facility])   • Diarrhea   • Chronic diarrhea   • Thrombocytosis (CMS/[facility])   • Nausea and vomiting   • Gluten intolerance   • C. difficile diarrhea   • Shiga toxin-producing Escherichia coli infection   • Generalized weakness   • Head injury   • Concussion with loss of consciousness   • Gastritis without bleeding   • Irritable bowel syndrome   • Tachycardia   • Essential hypertension   • JELENA (generalized anxiety disorder)   • Marijuana use   • Epigastric pain           Current Outpatient Medications:   •  albuterol sulfate  (90 Base) MCG/ACT inhaler, Inhale 2 puffs Every 4 (Four) Hours As Needed for Wheezing., Disp: 18 g, Rfl: 3  •  amitriptyline (ELAVIL) 25 MG tablet, Take 25 mg by mouth Every Night., Disp: , Rfl:   •  budesonide-formoterol (SYMBICORT) 160-4.5 MCG/ACT inhaler, Inhale 2 puffs 2 (Two) Times a Day., Disp: 1 inhaler, Rfl: 11  •  cetirizine (zyrTEC) 10 MG tablet, Take 1 tablet by mouth Daily., Disp: 30 tablet, Rfl: 11  •  Cyanocobalamin (B-12 COMPLIANCE INJECTION IJ), Inject  as directed., Disp: , Rfl:   •  dicyclomine (Bentyl) 20 MG tablet, Take 2 tablets by mouth Every 6 (Six) Hours., Disp: 240 tablet, Rfl: 3  •  escitalopram (LEXAPRO) 10 MG tablet, Take 1 tablet by mouth Daily., Disp: 30 tablet, Rfl: 3  •  fluticasone (FLONASE) 50  MCG/ACT nasal spray, 2 sprays into the nostril(s) as directed by provider Daily., Disp: 1 bottle, Rfl: 11  •  ipratropium-albuterol (DUO-NEB) 0.5-2.5 mg/3 ml nebulizer, Take 3 mL by nebulization Every 4 (Four) Hours As Needed for Wheezing or Shortness of Air., Disp: 360 mL, Rfl: 3  •  metoprolol succinate XL (TOPROL-XL) 50 MG 24 hr tablet, Take 1 tablet by mouth Daily., Disp: 30 tablet, Rfl: 3  •  montelukast (SINGULAIR) 10 MG tablet, Take 1 tablet by mouth Every Night., Disp: 30 tablet, Rfl: 3  •  omeprazole (priLOSEC) 20 MG capsule, Take 1 capsule by mouth Daily., Disp: 30 capsule, Rfl: 3  •  ondansetron ODT (ZOFRAN ODT) 8 MG disintegrating tablet, Take 1 tablet by mouth Every 8 (Eight) Hours As Needed for Nausea or Vomiting., Disp: 90 tablet, Rfl: 3  •  vitamin D (ERGOCALCIFEROL) 84818 units capsule capsule, Take 1 capsule by mouth 1 (One) Time Per Week., Disp: 4 capsule, Rfl: 3  •  predniSONE (DELTASONE) 10 MG tablet, Take 4 pills for 4 days 3 pills for 3 days 2 pills for 2 days 1 pill for one day then stop., Disp: 30 tablet, Rfl: 0    HPI     Pt is 48 yo female with history of moderateC OPD, Vitamin B12 deficiency, Vitamin D deficiency, chronic abdominal pain, tremor,  RUQ pain,  Sp hysterectomy,  History of chron's disease, history of pertussis infection  sp right shoulder surgery. X 3, sp rotator cuff repair , history of C diff diarrhea, gluten sensitivity, gastritis, marijuana use, COPD         5/11/20  Telemedicine Visit today for blood pressure recheck she went back to smoking marijuana and BP has gone down. She stopped taking her Toprol XL 50 mg a week ago. BP stable today.  No chest pain no dizziness.  Headaches are stable.  She contineus to smoke 3/4 ppd.  She also lost her balance this weekend and fall and hurt her left leg. She did not go to ER.      6/23/20 in office visit today for recheck on pt's COPD, chronic abdominal pain, termor, COPD< tobacco use..  She continues to have abodminal pain. She  was supposed to get another endoscopy but that was canceled. Pt had Ct angioram done on 5/15/20 that was normal.  Normal superior mesenteric artery and normal inferior mesenteric artery.  She also had endoscopy GI with capsule and the capsule did not go down intestine and stay mostly in stomach. She continues to smoke 1 ppd. She has been under stress for past few weeks. She has upcoming appt with GI soon but no Pulmonology appt.    She continues to take symbicort and abluterol inhaler . She has upcoming appt with Hematology in [place] for polycythemia. She is to get a procedure soon.   BP stable         Hypertension   This is a recurrent problem. The current episode started more than 1 month ago. The problem has been gradually worsening since onset. The problem is uncontrolled. Associated symptoms include anxiety, blurred vision, headaches and shortness of breath. Pertinent negatives include no chest pain, malaise/fatigue, neck pain, orthopnea, palpitations, peripheral edema, PND or sweats. Risk factors for coronary artery disease include smoking/tobacco exposure, stress and post-menopausal state. Past treatments include nothing. The current treatment provides no improvement. There is no history of angina, kidney disease, CAD/MI, CVA, heart failure, left ventricular hypertrophy, PVD or retinopathy. There is no history of chronic renal disease, coarctation of the aorta, hyperaldosteronism, hypercortisolism, hyperparathyroidism, a hypertension causing med, pheochromocytoma, renovascular disease, sleep apnea or a thyroid problem.    Abdominal Pain   This is a recurrent problem. The current episode started more than 1 year ago. The onset quality is sudden. The problem has been waxing and waning. The pain is located in the generalized abdominal region, epigastric region and RUQ. The pain is at a severity of 5/10. The pain is moderate. The quality of the pain is aching, a sensation of fullness and burning. The  abdominal pain radiates to the epigastric region. Associated symptoms include constipation. Pertinent negatives include no anorexia, arthralgias, belching, diarrhea, dysuria, fever, flatus, frequency, headaches, hematochezia, hematuria, melena, myalgias, nausea, vomiting or weight loss. Nothing aggravates the pain. The pain is relieved by being still. The treatment provided no relief. Prior diagnostic workup includes ultrasound. Her past medical history is significant for Crohn's disease. There is no history of abdominal surgery, colon cancer, gallstones, GERD, irritable bowel syndrome, pancreatitis, PUD or ulcerative colitis.    Neurologic Problem   The patient's primary symptoms include focal sensory loss and weakness. The patient's pertinent negatives include no altered mental status, clumsiness, loss of balance, memory loss, near-syncope, slurred speech, syncope or visual change. This is a recurrent problem. The neurological problem developed gradually. The problem has been waxing and waning since onset. There was no focality noted. Associated symptoms include a fever and shortness of breath. Pertinent negatives include no abdominal pain, auditory change, aura, back pain, bladder incontinence, bowel incontinence, chest pain, confusion, diaphoresis, dizziness, fatigue, headaches, light-headedness, nausea, neck pain, palpitations, vertigo or vomiting. Past treatments include nothing. The treatment provided no relief. There is no history of a bleeding disorder, a clotting disorder, a CVA, head trauma, liver disease, mood changes or seizures.   COPD   This is a chronic problem. The current episode started more than 1 year ago. The problem occurs constantly. The problem has been unchanged. Associated symptoms include abdominal pain, arthralgias, fatigue, joint swelling, numbness and weakness. Pertinent negatives include no anorexia, change in bowel habit, chest  pain, chills, congestion, coughing, diaphoresis, fever, headaches, myalgias, nausea, neck pain, sore throat, swollen glands, urinary symptoms, vertigo, visual change or vomiting. The symptoms are aggravated by smoking. She has tried nothing (combivent ) for the symptoms.     Review of Systems  Review of Systems   Constitutional: Positive for activity change and fatigue. Negative for appetite change, chills, diaphoresis and fever.   HENT: Negative for congestion, postnasal drip, rhinorrhea, sinus pressure, sinus pain, sneezing, sore throat, trouble swallowing and voice change.    Respiratory: Positive for cough and shortness of breath. Negative for choking, chest tightness, wheezing and stridor.    Cardiovascular: Negative for chest pain.   Gastrointestinal: Positive for abdominal pain, constipation and diarrhea. Negative for nausea and vomiting.   Musculoskeletal: Positive for arthralgias.   Neurological: Positive for tremors, weakness and numbness. Negative for headaches.   Psychiatric/Behavioral: Positive for agitation and behavioral problems. The patient is nervous/anxious.        Patient Active Problem List   Diagnosis   • Tobacco abuse counseling   • Chronic idiopathic constipation   • B12 deficiency   • Vitamin D deficiency    • Tobacco user   • Stage 2 moderate COPD by GOLD classification (CMS/HCC)   • Cervical lymphadenopathy   • Generalized abdominal pain   • Nausea   • Neurological abnormality   • Tremor   • RUQ pain   • COPD exacerbation (CMS/HCC)   • Pertussis exposure   • Chronic bronchitis (CMS/HCC)   • Cigarette nicotine dependence, uncomplicated   • Chronic nonseasonal allergic rhinitis due to pollen   • Episode of recurrent major depressive disorder (CMS/HCC)   • Diarrhea   • Chronic diarrhea   • Thrombocytosis (CMS/HCC)   • Nausea and vomiting   • Gluten intolerance   • C. difficile diarrhea   • Shiga toxin-producing Escherichia coli infection   • Generalized weakness   • Head injury   • Concussion  with loss of consciousness   • Gastritis without bleeding   • Irritable bowel syndrome   • Tachycardia   • Essential hypertension   • JELENA (generalized anxiety disorder)   • Marijuana use   • Epigastric pain     Past Surgical History:   Procedure Laterality Date   • COLONOSCOPY     • COLONOSCOPY N/A 10/18/2019    Procedure: COLONOSCOPY;  Surgeon: Tom Davis MD;  Location: [facility] ENDOSCOPY;  Service: Gastroenterology   • ENDOSCOPY N/A 10/18/2019    Procedure: ESOPHAGOGASTRODUODENOSCOPY;  Surgeon: Tom Davis MD;  Location: [facility] ENDOSCOPY;  Service: Gastroenterology   • HYSTERECTOMY     • TONSILECTOMY, ADENOIDECTOMY, BILATERAL MYRINGOTOMY AND TUBES     • UPPER GASTROINTESTINAL ENDOSCOPY       Social History     Socioeconomic History   • Marital status: Single     Spouse name: Not on file   • Number of children: Not on file   • Years of education: Not on file   • Highest education level: Not on file   Tobacco Use   • Smoking status: Current Every Day Smoker     Packs/day: 1.00     Types: Cigarettes   • Smokeless tobacco: Never Used   Substance and Sexual Activity   • Alcohol use: No     Frequency: Never   • Drug use: No   • Sexual activity: Defer     Family History   Problem Relation Age of Onset   • Inflammatory bowel disease Mother    • Arthritis Mother    • Diabetes Mother    • Hypertension Mother    • Hyperlipidemia Mother    • Obesity Mother    • Osteoporosis Mother    • Heart disease Father    • Hyperlipidemia Father    • Diabetes Sister    • Liver disease Daughter    • Mental illness Daughter    • Obesity Daughter    • Diabetes Maternal Grandmother    • Cancer Maternal Grandmother         lung   • Cancer Other         lung   • Heart disease Other      Admission on 03/02/2020, Discharged on 03/02/2020   Component Date Value Ref Range Status   • THC, Screen, Urine 03/02/2020 Positive* Negative Final   • Phencyclidine (PCP), Urine 03/02/2020 Negative  Negative Final   • Cocaine Screen, Urine 03/02/2020  Negative  Negative Final   • Methamphetamine, Ur 03/02/2020 Negative  Negative Final   • Opiate Screen 03/02/2020 Negative  Negative Final   • Amphetamine Screen, Urine 03/02/2020 Negative  Negative Final   • Benzodiazepine Screen, Urine 03/02/2020 Negative  Negative Final   • Tricyclic Antidepressants Screen 03/02/2020 Negative  Negative Final   • Methadone Screen, Urine 03/02/2020 Negative  Negative Final   • Barbiturates Screen, Urine 03/02/2020 Positive* Negative Final   • Oxycodone Screen, Urine 03/02/2020 Negative  Negative Final   • Propoxyphene Screen 03/02/2020 Negative  Negative Final   • Buprenorphine, Screen, Urine 03/02/2020 Negative  Negative Final   • Glucose 03/02/2020 97  65 - 99 mg/dL Final   • BUN 03/02/2020 13  6 - 20 mg/dL Final   • Creatinine 03/02/2020 0.69  0.57 - 1.00 mg/dL Final   • Sodium 03/02/2020 141  136 - 145 mmol/L Final   • Potassium 03/02/2020 4.0  3.5 - 5.2 mmol/L Final   • Chloride 03/02/2020 102  98 - 107 mmol/L Final   • CO2 03/02/2020 26.0  22.0 - 29.0 mmol/L Final   • Calcium 03/02/2020 10.0  8.6 - 10.5 mg/dL Final   • Total Protein 03/02/2020 7.7  6.0 - 8.5 g/dL Final   • Albumin 03/02/2020 4.80  3.50 - 5.20 g/dL Final   • ALT (SGPT) 03/02/2020 19  1 - 33 U/L Final   • AST (SGOT) 03/02/2020 18  1 - 32 U/L Final   • Alkaline Phosphatase 03/02/2020 95  39 - 117 U/L Final   • Total Bilirubin 03/02/2020 0.3  0.2 - 1.2 mg/dL Final   • eGFR Non African Amer 03/02/2020 89  >60 mL/min/1.73 Final   • Globulin 03/02/2020 2.9  gm/dL Final   • A/G Ratio 03/02/2020 1.7  g/dL Final   • BUN/Creatinine Ratio 03/02/2020 18.8  7.0 - 25.0 Final   • Anion Gap 03/02/2020 13.0  5.0 - 15.0 mmol/L Final   • WBC 03/02/2020 9.73  3.40 - 10.80 10*3/mm3 Final   • RBC 03/02/2020 5.20  3.77 - 5.28 10*6/mm3 Final   • Hemoglobin 03/02/2020 16.4* 12.0 - 15.9 g/dL Final   • Hematocrit 03/02/2020 49.4* 34.0 - 46.6 % Final   • MCV 03/02/2020 95.0  79.0 - 97.0 fL Final   • MCH 03/02/2020 31.5  26.6 - 33.0 pg  Final   • MCHC 03/02/2020 33.2  31.5 - 35.7 g/dL Final   • RDW 03/02/2020 13.2  12.3 - 15.4 % Final   • RDW-SD 03/02/2020 46.1  37.0 - 54.0 fl Final   • MPV 03/02/2020 9.2  6.0 - 12.0 fL Final   • Platelets 03/02/2020 439  140 - 450 10*3/mm3 Final   • Neutrophil % 03/02/2020 54.7  42.7 - 76.0 % Final   • Lymphocyte % 03/02/2020 34.5  19.6 - 45.3 % Final   • Monocyte % 03/02/2020 7.6  5.0 - 12.0 % Final   • Eosinophil % 03/02/2020 1.7  0.3 - 6.2 % Final   • Basophil % 03/02/2020 0.8  0.0 - 1.5 % Final   • Immature Grans % 03/02/2020 0.7* 0.0 - 0.5 % Final   • Neutrophils, Absolute 03/02/2020 5.31  1.70 - 7.00 10*3/mm3 Final   • Lymphocytes, Absolute 03/02/2020 3.36* 0.70 - 3.10 10*3/mm3 Final   • Monocytes, Absolute 03/02/2020 0.74  0.10 - 0.90 10*3/mm3 Final   • Eosinophils, Absolute 03/02/2020 0.17  0.00 - 0.40 10*3/mm3 Final   • Basophils, Absolute 03/02/2020 0.08  0.00 - 0.20 10*3/mm3 Final   • Immature Grans, Absolute 03/02/2020 0.07* 0.00 - 0.05 10*3/mm3 Final   • nRBC 03/02/2020 0.0  0.0 - 0.2 /100 WBC Final   • Extra Tube 03/02/2020 hold for add-on   Final    Auto resulted   • Extra Tube 03/02/2020 Hold for add-ons.   Final    Auto resulted.   • Color, UA 03/02/2020 Yellow  Yellow, Straw, Dark Yellow, Ana M Final   • Appearance, UA 03/02/2020 Clear  Clear Final   • pH, UA 03/02/2020 6.5  5.0 - 9.0 Final   • Specific Gravity, UA 03/02/2020 1.021  1.003 - 1.030 Final   • Glucose, UA 03/02/2020 Negative  Negative Final   • Ketones, UA 03/02/2020 Negative  Negative Final   • Bilirubin, UA 03/02/2020 Negative  Negative Final   • Blood, UA 03/02/2020 Small (1+)* Negative Final   • Protein, UA 03/02/2020 Negative  Negative Final   • Leuk Esterase, UA 03/02/2020 Negative  Negative Final   • Nitrite, UA 03/02/2020 Negative  Negative Final   • Urobilinogen, UA 03/02/2020 0.2 E.U./dL  0.2 - 1.0 E.U./dL Final   • RBC, UA 03/02/2020 6-12* None Seen /HPF Final   • WBC, UA 03/02/2020 None Seen  None Seen, 0-2, 3-5 /HPF  Final   • Bacteria, UA 03/02/2020 None Seen  None Seen /HPF Final   • Squamous Epithelial Cells, UA 03/02/2020 None Seen  None Seen, 0-2 /HPF Final   • Hyaline Casts, UA 03/02/2020 0-2  None Seen /LPF Final   • Methodology 03/02/2020 Automated Microscopy   Final   • Troponin T 03/02/2020 <0.010  0.000 - 0.030 ng/mL Final   • proBNP 03/02/2020 27.8  5.0 - 900.0 pg/mL Final   Admission on 02/06/2020, Discharged on 02/06/2020   Component Date Value Ref Range Status   • Glucose 02/06/2020 99  65 - 99 mg/dL Final   • BUN 02/06/2020 12  6 - 20 mg/dL Final   • Creatinine 02/06/2020 0.66  0.57 - 1.00 mg/dL Final   • Sodium 02/06/2020 139  136 - 145 mmol/L Final   • Potassium 02/06/2020 3.6  3.5 - 5.2 mmol/L Final   • Chloride 02/06/2020 103  98 - 107 mmol/L Final   • CO2 02/06/2020 24.0  22.0 - 29.0 mmol/L Final   • Calcium 02/06/2020 9.2  8.6 - 10.5 mg/dL Final   • Total Protein 02/06/2020 6.6  6.0 - 8.5 g/dL Final   • Albumin 02/06/2020 4.40  3.50 - 5.20 g/dL Final   • ALT (SGPT) 02/06/2020 6  1 - 33 U/L Final   • AST (SGOT) 02/06/2020 10  1 - 32 U/L Final   • Alkaline Phosphatase 02/06/2020 73  39 - 117 U/L Final   • Total Bilirubin 02/06/2020 0.4  0.2 - 1.2 mg/dL Final   • eGFR Non African Amer 02/06/2020 94  >60 mL/min/1.73 Final   • Globulin 02/06/2020 2.2  gm/dL Final   • A/G Ratio 02/06/2020 2.0  g/dL Final   • BUN/Creatinine Ratio 02/06/2020 18.2  7.0 - 25.0 Final   • Anion Gap 02/06/2020 12.0  5.0 - 15.0 mmol/L Final   • Lipase 02/06/2020 39  13 - 60 U/L Final   • Color, UA 02/06/2020 Yellow  Yellow, Straw, Dark Yellow, Ana M Final   • Appearance, UA 02/06/2020 Clear  Clear Final   • pH, UA 02/06/2020 6.5  5.0 - 9.0 Final   • Specific Gravity, UA 02/06/2020 1.029  1.003 - 1.030 Final   • Glucose, UA 02/06/2020 Negative  Negative Final   • Ketones, UA 02/06/2020 Negative  Negative Final   • Bilirubin, UA 02/06/2020 Negative  Negative Final   • Blood, UA 02/06/2020 Negative  Negative Final   • Protein, UA 02/06/2020  Negative  Negative Final   • Leuk Esterase, UA 02/06/2020 Negative  Negative Final   • Nitrite, UA 02/06/2020 Negative  Negative Final   • Urobilinogen, UA 02/06/2020 1.0 E.U./dL  0.2 - 1.0 E.U./dL Final   • WBC 02/06/2020 14.21* 3.40 - 10.80 10*3/mm3 Final   • RBC 02/06/2020 4.89  3.77 - 5.28 10*6/mm3 Final   • Hemoglobin 02/06/2020 15.3  12.0 - 15.9 g/dL Final   • Hematocrit 02/06/2020 45.7  34.0 - 46.6 % Final   • MCV 02/06/2020 93.5  79.0 - 97.0 fL Final   • MCH 02/06/2020 31.3  26.6 - 33.0 pg Final   • MCHC 02/06/2020 33.5  31.5 - 35.7 g/dL Final   • RDW 02/06/2020 12.7  12.3 - 15.4 % Final   • RDW-SD 02/06/2020 43.5  37.0 - 54.0 fl Final   • MPV 02/06/2020 9.1  6.0 - 12.0 fL Final   • Platelets 02/06/2020 432  140 - 450 10*3/mm3 Final   • Neutrophil % 02/06/2020 60.1  42.7 - 76.0 % Final   • Lymphocyte % 02/06/2020 28.6  19.6 - 45.3 % Final   • Monocyte % 02/06/2020 8.3  5.0 - 12.0 % Final   • Eosinophil % 02/06/2020 1.7  0.3 - 6.2 % Final   • Basophil % 02/06/2020 0.6  0.0 - 1.5 % Final   • Immature Grans % 02/06/2020 0.7* 0.0 - 0.5 % Final   • Neutrophils, Absolute 02/06/2020 8.54* 1.70 - 7.00 10*3/mm3 Final   • Lymphocytes, Absolute 02/06/2020 4.07* 0.70 - 3.10 10*3/mm3 Final   • Monocytes, Absolute 02/06/2020 1.18* 0.10 - 0.90 10*3/mm3 Final   • Eosinophils, Absolute 02/06/2020 0.24  0.00 - 0.40 10*3/mm3 Final   • Basophils, Absolute 02/06/2020 0.08  0.00 - 0.20 10*3/mm3 Final   • Immature Grans, Absolute 02/06/2020 0.10* 0.00 - 0.05 10*3/mm3 Final   • nRBC 02/06/2020 0.0  0.0 - 0.2 /100 WBC Final   • Extra Tube 02/06/2020 hold for add-on   Final    Auto resulted   • Extra Tube 02/06/2020 Hold for add-ons.   Final    Auto resulted.   • Neutrophil % 02/06/2020 62.0  42.7 - 76.0 % Final   • Lymphocyte % 02/06/2020 31.0  19.6 - 45.3 % Final   • Monocyte % 02/06/2020 6.0  5.0 - 12.0 % Final   • Bands %  02/06/2020 1.0  0.0 - 5.0 % Final   • Neutrophils Absolute 02/06/2020 8.95* 1.70 - 7.00 10*3/mm3 Final   •  Lymphocytes Absolute 02/06/2020 4.41* 0.70 - 3.10 10*3/mm3 Final   • Monocytes Absolute 02/06/2020 0.85  0.10 - 0.90 10*3/mm3 Final   • RBC Morphology 02/06/2020 Normal  Normal Final   • WBC Morphology 02/06/2020 Normal  Normal Final   • Platelet Morphology 02/06/2020 Normal  Normal Final   Lab on 02/04/2020   Component Date Value Ref Range Status   • WBC 02/04/2020 18.81* 3.40 - 10.80 10*3/mm3 Final   • RBC 02/04/2020 4.62  3.77 - 5.28 10*6/mm3 Final   • Hemoglobin 02/04/2020 15.2  12.0 - 15.9 g/dL Final   • Hematocrit 02/04/2020 43.0  34.0 - 46.6 % Final   • MCV 02/04/2020 93.1  79.0 - 97.0 fL Final   • MCH 02/04/2020 32.9  26.6 - 33.0 pg Final   • MCHC 02/04/2020 35.3  31.5 - 35.7 g/dL Final   • RDW 02/04/2020 12.4  12.3 - 15.4 % Final   • RDW-SD 02/04/2020 42.2  37.0 - 54.0 fl Final   • MPV 02/04/2020 9.7  6.0 - 12.0 fL Final   • Platelets 02/04/2020 489* 140 - 450 10*3/mm3 Final   • Glucose 02/04/2020 93  65 - 99 mg/dL Final   • BUN 02/04/2020 14  6 - 20 mg/dL Final   • Creatinine 02/04/2020 0.76  0.57 - 1.00 mg/dL Final   • Sodium 02/04/2020 140  136 - 145 mmol/L Final   • Potassium 02/04/2020 4.1  3.5 - 5.2 mmol/L Final   • Chloride 02/04/2020 101  98 - 107 mmol/L Final   • CO2 02/04/2020 22.7  22.0 - 29.0 mmol/L Final   • Calcium 02/04/2020 9.0  8.6 - 10.5 mg/dL Final   • Total Protein 02/04/2020 6.4  6.0 - 8.5 g/dL Final   • Albumin 02/04/2020 4.40  3.50 - 5.20 g/dL Final   • ALT (SGPT) 02/04/2020 6  1 - 33 U/L Final   • AST (SGOT) 02/04/2020 11  1 - 32 U/L Final   • Alkaline Phosphatase 02/04/2020 62  39 - 117 U/L Final   • Total Bilirubin 02/04/2020 0.3  0.2 - 1.2 mg/dL Final   • eGFR Non African Amer 02/04/2020 80  >60 mL/min/1.73 Final   • Globulin 02/04/2020 2.0  gm/dL Final   • A/G Ratio 02/04/2020 2.2  g/dL Final   • BUN/Creatinine Ratio 02/04/2020 18.4  7.0 - 25.0 Final   • Anion Gap 02/04/2020 16.3* 5.0 - 15.0 mmol/L Final   • Amylase 02/04/2020 51  28 - 100 U/L Final   • Lipase 02/04/2020 31  13 -  60 U/L Final   • Beef 02/04/2020 <0.10  <0.35 kU/L Final   • Class Description 02/04/2020 0   Final   • [place] 02/04/2020 <0.10  <0.35 kU/L Final   • Class Interpretation 02/04/2020 0   Final   • Pork 02/04/2020 <0.10  <0.35 kU/L Final   • Class Interpretation 02/04/2020 0   Final    The test method is the [employer] ImmunoCAP allergen-specific  IgE system. CLASS INTERPRETATION   <0.10 kU/L= 0,  Negative; 0.10 - 0.34 kU/L= 0/1, Equivocal/Borderline;  0.35 - 0.69  kU/L=1, Low Positive; 0.70 - 3.49 kU/L=2,  Moderate Positive;  3.50  - 17.49 kU/L=3, High Positive;  17.50 - 49.99 kU/L= 4, Very High Positive; 50.00 - 99.99  kU/L= 5, Very High Positive;   >99.99 kU/L=6, Very High  Positive  *This test was developed and its performance  characteristics determined by [employer] [facility]. It has not  been cleared or approved by the U.S. Food and Drug  Administration.   • Alpha Gal IgE 02/04/2020 <0.10  <0.10 kU/L Final    Previous reports (ABIEL 2009;[id number]) have demonstrated  that patients with IgE antibodies to  [id number] are at risk for delayed  anaphylaxis, angioedema, or urticaria following  consumption of beef, pork, or lamb.   • Gliadin Deamidated Peptide Ab, IgA 02/04/2020 4  0 - 19 units Final                       Negative                   0 - 19                     Weak Positive             20 - 30                     Moderate to Strong Positive   >30   • Deaminated Gliadin Ab IgG 02/04/2020 5  0 - 19 units Final                       Negative                   0 - 19                     Weak Positive             20 - 30                     Moderate to Strong Positive   >30   • Tissue Transglutaminase IgA 02/04/2020 <2  0 - 3 U/mL Final                                  Negative        0 -  3                                Weak Positive   4 - 10                                Positive           >10   Tissue Transglutaminase (tTG) has been identified   as the endomysial antigen.  Studies have demonstr-    ated that endomysial IgA antibodies have over 99%   specificity for gluten sensitive enteropathy.   • Tissue Transglutaminase IgG 02/04/2020 10* 0 - 5 U/mL Final                                  Negative        0 - 5                                Weak Positive   6 - 9                                Positive           >9   • Endomysial IgA 02/04/2020 Negative  Negative Final   • IgA 02/04/2020 64* 87 - 352 mg/dL Final   • Class Description 02/04/2020 Comment   Final        Levels of Specific IgE       Class  Description of Class      ---------------------------  -----  --------------------                     < 0.10         0         Negative             0.10 -    0.31         0/I       Equivocal/Low             0.32 -    0.55         I         Low             0.56 -    1.40         II        Moderate             1.41 -    3.90         III       High             3.91 -   19.00         IV        Very High            19.01 -  100.00         V         Very High                    >100.00         VI        Very High   • Egg White 02/04/2020 <0.10  Class 0 kU/L Final   • Milk, Cow's 02/04/2020 <0.10  Class 0 kU/L Final   • CodFish 02/04/2020 <0.10  Class 0 kU/L Final   • Sesame Seed 02/04/2020 <0.10  Class 0 kU/L Final   • Peanut 02/04/2020 <0.10  Class 0 kU/L Final   • Soybean 02/04/2020 <0.10  Class 0 kU/L Final   • Hazelnut 02/04/2020 <0.10  Class 0 kU/L Final   • Shrimp 02/04/2020 <0.10  Class 0 kU/L Final   • Scallop 02/04/2020 <0.10  Class 0 kU/L Final   • Gluten 02/04/2020 <0.10  Class 0 kU/L Final   • [place] 02/04/2020 <0.10  Class 0 kU/L Final   • Wheat 02/04/2020 <0.10  Class 0 kU/L Final   • Gliadin Deamidated Peptide Ab, IgA 02/04/2020 4  0 - 19 units Final                       Negative                   0 - 19                     Weak Positive             20 - 30                     Moderate to Strong Positive   >30   • Deaminated Gliadin Ab IgG 02/04/2020 6  0 - 19 units Final                        Negative                   0 - 19                     Weak Positive             20 - 30                     Moderate to Strong Positive   >30   • Tissue Transglutaminase IgA 02/04/2020 <2  0 - 3 U/mL Final                                  Negative        0 -  3                                Weak Positive   4 - 10                                Positive           >10   Tissue Transglutaminase (tTG) has been identified   as the endomysial antigen.  Studies have demonstr-   ated that endomysial IgA antibodies have over 99%   specificity for gluten sensitive enteropathy.   • Tissue Transglutaminase IgG 02/04/2020 11* 0 - 5 U/mL Final                                  Negative        0 - 5                                Weak Positive   6 - 9                                Positive           >9   • Campylobacter 02/04/2020 Not Detected  Not Detected, Invalid Final   • Plesiomonas shigelloides 02/04/2020 Not Detected  Not Detected Final   • Salmonella 02/04/2020 Not Detected  Not Detected Final   • Vibrio 02/04/2020 Not Detected  Not Detected Final   • Vibrio cholerae 02/04/2020 Not Detected  Not Detected Final   • Yersinia enterocolitica 02/04/2020 Not Detected  Not Detected Final   • Enteroaggregative E. coli (EAEC) 02/04/2020 Not Detected  Not Detected Final   • Enteropathogenic E. coli (EPEC) 02/04/2020 Not Detected  Not Detected Final   • Enterotoxigenic E. coli (ETEC) lt/* 02/04/2020 Not Detected  Not Detected Final   • Shiga-like toxin-producing E. coli* 02/04/2020 Detected* Not Detected Final   • E. coli O157 02/04/2020 Not Detected  Not Detected Final   • Shigella/Enteroinvasive E. coli (E* 02/04/2020 Not Detected  Not Detected Final   • Cryptosporidium 02/04/2020 Not Detected  Not Detected, Invalid Final   • Cyclospora cayetanensis 02/04/2020 Not Detected  Not Detected Final   • Entamoeba histolytica 02/04/2020 Not Detected  Not Detected Final   • Giardia lamblia 02/04/2020 Not Detected  Not Detected Final   •  Adenovirus F40/41 02/04/2020 Not Detected  Not Detected Final   • Astrovirus 02/04/2020 Not Detected  Not Detected Final   • Norovirus GI/GII 02/04/2020 Not Detected  Not Detected Final   • Rotavirus A 02/04/2020 Not Detected  Not Detected Final   • Sapovirus (I, II, IV or V) 02/04/2020 Not Detected  Not Detected Final   • C. Difficile Toxins by PCR 02/04/2020 Positive* Negative Final   • Neutrophil % 02/04/2020 60.6  42.7 - 76.0 % Final   • Lymphocyte % 02/04/2020 25.3  19.6 - 45.3 % Final   • Monocyte % 02/04/2020 11.1  5.0 - 12.0 % Final   • Eosinophil % 02/04/2020 1.0  0.3 - 6.2 % Final   • Basophil % 02/04/2020 2.0* 0.0 - 1.5 % Final   • Neutrophils Absolute 02/04/2020 11.40* 1.70 - 7.00 10*3/mm3 Final   • Lymphocytes Absolute 02/04/2020 4.76* 0.70 - 3.10 10*3/mm3 Final   • Monocytes Absolute 02/04/2020 2.09* 0.10 - 0.90 10*3/mm3 Final   • Eosinophils Absolute 02/04/2020 0.19  0.00 - 0.40 10*3/mm3 Final   • Basophils Absolute 02/04/2020 0.38* 0.00 - 0.20 10*3/mm3 Final   • Poikilocytes 02/04/2020 Slight/1+  None Seen Final   • WBC Morphology 02/04/2020 Normal  Normal Final   • Platelet Morphology 02/04/2020 Normal  Normal Final      CT Angiogram Abdomen Pelvis  Narrative: CT angiogram abdomen, pelvis.    CLINICAL INDICATION: Abdominal pain. Evaluate for mesenteric  ischemia.       COMPARISON: CT abdomen pelvis October 19, 2019.    TECHNIQUE: 80 mL Isovue-370  nonionic IV contrast. Helical  scanning with axial and coronal reformations. Computer-generated  3-D images, CT angiography including volume rendered images,  subtraction, and MIP images are obtained. Soft tissue, lung, and  bone windows reviewed.    This exam was performed according to our departmental  dose-optimization program, which includes automated exposure  control, adjustment of the mA and/or kV according to patient size  and/or use of iterative reconstruction technique.     CT FINDINGS: Normal caliber abdominal aorta. Scattered areas  "of  calcified plaque distal abdominal aorta without evidence of any  significant stenosis.    Normal right and left renal arteries. Normal kidneys. Normal  adrenal glands.    Normal celiac artery. Normal superior mesenteric artery. Normal  patent inferior mesenteric artery. Normal iliac arteries. Loops  of bowel are unremarkable.  Impression: Normal CT angiography abdomen, pelvis. Normal celiac  artery. Normal superior mesenteric artery and normal inferior  mesenteric artery. No findings suggesting mesenteric ischemia.    Electronically signed by:  Aden Dahl MD  5/15/2020 3:18 PM CDT  Workstation: [id number]    @[email or web address]@  [facility] History   Administered Date(s) Administered   • flucelvax quad pfs =>2 YRS 09/19/2019       The following portions of the patient's history were reviewed and updated as appropriate: allergies, current medications, past family history, past medical history, past social history, past surgical history and problem list.        Physical Exam  /60 (BP Location: Left arm, Patient Position: Sitting, Cuff Size: Adult)   Pulse 90   Temp 99.3 °F (37.4 °C) (Temporal)   Ht 165.1 cm (65\")   Wt 51.7 kg (114 lb)   LMP  (LMP Unknown)   SpO2 98%   BMI 18.97 kg/m²     Physical Exam   Constitutional: She is oriented to person, place, and time. She appears well-developed and well-nourished.   HENT:   Head: Normocephalic and atraumatic.   Right Ear: External ear normal.   Eyes: Pupils are equal, round, and reactive to light. Conjunctivae and EOM are normal.   Neck: Normal range of motion. Neck supple.   Cardiovascular: Normal rate, regular rhythm and normal heart sounds.   No murmur heard.  Pulmonary/Chest: No respiratory distress.   Decreased breath sounds    Abdominal: Soft. Bowel sounds are normal. She exhibits no distension. There is tenderness.   Musculoskeletal: Normal range of motion. She exhibits no edema or deformity.   Neurological: She is alert and oriented to person, place, and " time. No cranial nerve deficit.   Skin: Skin is warm. No rash noted. She is not diaphoretic. No erythema. No pallor.   Psychiatric: She has a normal mood and affect. Her behavior is normal.   Nursing note and vitals reviewed.      Assessment/Plan    Diagnosis Plan   1. COPD exacerbation (CMS/HCC)     2. Tobacco user     3. Vitamin D deficiency      4. Stage 2 moderate COPD by GOLD classification (CMS/[facility])     5. Gastritis without bleeding, unspecified chronicity, unspecified gastritis type     6. Episode of recurrent major depressive disorder, unspecified depression episode severity (CMS/[facility])     7. Essential hypertension     8. Chronic bronchitis, unspecified chronic bronchitis type (CMS/HCC)     9. Chronic idiopathic constipation     10. Screening mammogram, encounter for  Mammo Screening Bilateral With CAD           -will get ER report and CT of ehead   -HTN/tachcycardia -controlledon toprol XL 50 mg daily.   -schedule mammogram screening    -JELENA - pt admits to smoking marijuana. Recommend pt continue that since it calms her down but also discussed about potential side effects if long term use   -thrombocytosis /polycythemia  continue to monitor. She is seeing Hematology   -gluten sensitivity -recommend gluten free diet   -COPD/COPD exacerbation- Pulmonlogy following on Symbicort 160/45 2 puffs BID  Albuterol inhaler PRN.  advised pt to quit smoking >5 minutes.  continue duo nebs. Advised pt to make appt with Pulmonlogist. urrged importance of smoking cessation   -vitamin B12 deficiency  - b12 injections weekly  -tobacco user - counseled to quit smoking >5 minutes. She could not tolerate chantix  urged the importance of quitting smoking.   Insurance would not cover nicotine patch and gum. Recommend pt call 1 [phone] NOW  -chronic bronchitis, - mucinex advised pt to cut down or quit smoking >5 minutes   -abdominal pain/gastritis/IBS /GERD- Gastroenterology following. On PPI prilosec 20 mg q  daily  OFF prucalopride 2 mg daily. She is to followup regarding abodminal issues on bentyl 40 mg every 6 hours PRN.    -nausea/ vomting - continue zofran PRN  -allergic rhinitis - flonase nasal spray and singulair   -vitamin D deficiency - vitamin D once a week   -pt has upcoming mammogram soon.    -tremor of head/concussion - she has seen neurology with Dr. Chen. Had MRI of brain. Recommended 2nd opinon with Dr. Manjarrez She also has family history of Friedreich's ataxia. On elavail 25 mg at bedtime. Continue with Neurology followup with Dr. Manjarrez    -advised pt to go to ER or call 911 if symptoms worrisome or severe  -advised pt to be  Safe and call with questions and concerns  -advised to followup with all referrals and Specialist  -advised pt to be safe during COVID -19 pandemic   -recheck in 1 month         This document has been electronically signed by Xavier Wick MD on June 23, 2020 09:51

## 2020-06-23 ENCOUNTER — OFFICE VISIT (OUTPATIENT)
Dept: FAMILY MEDICINE CLINIC | Facility: CLINIC | Age: 50
End: 2020-06-23

## 2020-06-23 VITALS
DIASTOLIC BLOOD PRESSURE: 60 MMHG | OXYGEN SATURATION: 98 % | BODY MASS INDEX: 18.99 KG/M2 | TEMPERATURE: 99.3 F | SYSTOLIC BLOOD PRESSURE: 108 MMHG | HEART RATE: 90 BPM | HEIGHT: 65 IN | WEIGHT: 114 LBS

## 2020-06-23 DIAGNOSIS — J42 CHRONIC BRONCHITIS, UNSPECIFIED CHRONIC BRONCHITIS TYPE (HCC): ICD-10-CM

## 2020-06-23 DIAGNOSIS — Z72.0 TOBACCO USER: ICD-10-CM

## 2020-06-23 DIAGNOSIS — F33.9 EPISODE OF RECURRENT MAJOR DEPRESSIVE DISORDER, UNSPECIFIED DEPRESSION EPISODE SEVERITY (HCC): ICD-10-CM

## 2020-06-23 DIAGNOSIS — I10 ESSENTIAL HYPERTENSION: ICD-10-CM

## 2020-06-23 DIAGNOSIS — J44.9 STAGE 2 MODERATE COPD BY GOLD CLASSIFICATION (HCC): ICD-10-CM

## 2020-06-23 DIAGNOSIS — Z12.31 SCREENING MAMMOGRAM, ENCOUNTER FOR: ICD-10-CM

## 2020-06-23 DIAGNOSIS — K29.70 GASTRITIS WITHOUT BLEEDING, UNSPECIFIED CHRONICITY, UNSPECIFIED GASTRITIS TYPE: ICD-10-CM

## 2020-06-23 DIAGNOSIS — E55.9 VITAMIN D DEFICIENCY: ICD-10-CM

## 2020-06-23 DIAGNOSIS — K59.04 CHRONIC IDIOPATHIC CONSTIPATION: ICD-10-CM

## 2020-06-23 DIAGNOSIS — J44.1 COPD EXACERBATION (HCC): Primary | ICD-10-CM

## 2020-06-23 PROCEDURE — 99214 OFFICE O/P EST MOD 30 MIN: CPT | Performed by: FAMILY MEDICINE

## 2020-06-23 RX ORDER — IPRATROPIUM BROMIDE AND ALBUTEROL SULFATE 2.5; .5 MG/3ML; MG/3ML
3 SOLUTION RESPIRATORY (INHALATION) EVERY 4 HOURS PRN
Qty: 360 ML | Refills: 3 | Status: SHIPPED | OUTPATIENT
Start: 2020-06-23 | End: 2020-06-23 | Stop reason: SDUPTHER

## 2020-06-23 RX ORDER — DICYCLOMINE HCL 20 MG
40 TABLET ORAL EVERY 6 HOURS
Qty: 240 TABLET | Refills: 3 | Status: SHIPPED | OUTPATIENT
Start: 2020-06-23 | End: 2020-08-24

## 2020-06-23 RX ORDER — PREDNISONE 10 MG/1
TABLET ORAL
Qty: 30 TABLET | Refills: 0 | Status: SHIPPED | OUTPATIENT
Start: 2020-06-23 | End: 2020-07-22

## 2020-06-23 RX ORDER — IPRATROPIUM BROMIDE AND ALBUTEROL SULFATE 2.5; .5 MG/3ML; MG/3ML
3 SOLUTION RESPIRATORY (INHALATION) EVERY 4 HOURS PRN
Qty: 360 ML | Refills: 3 | Status: SHIPPED | OUTPATIENT
Start: 2020-06-23 | End: 2020-10-28 | Stop reason: ALTCHOICE

## 2020-06-23 RX ORDER — OMEPRAZOLE 20 MG/1
20 CAPSULE, DELAYED RELEASE ORAL DAILY
Qty: 30 CAPSULE | Refills: 3 | Status: SHIPPED | OUTPATIENT
Start: 2020-06-23 | End: 2022-08-03 | Stop reason: SDUPTHER

## 2020-06-24 ENCOUNTER — TELEPHONE (OUTPATIENT)
Dept: FAMILY MEDICINE CLINIC | Facility: CLINIC | Age: 50
End: 2020-06-24

## 2020-06-24 NOTE — TELEPHONE ENCOUNTER
----- Message from Xavier Wick MD sent at 6/24/2020  8:03 AM CDT -----  Regarding: mammogram   Please call pt    Mammogram normal    Repeat in 1 year. Thanks

## 2020-06-25 DIAGNOSIS — Z12.31 VISIT FOR SCREENING MAMMOGRAM: ICD-10-CM

## 2020-07-05 ENCOUNTER — HOSPITAL ENCOUNTER (EMERGENCY)
Facility: HOSPITAL | Age: 50
Discharge: HOME | End: 2020-07-05
Attending: EMERGENCY MEDICINE | Admitting: EMERGENCY MEDICINE

## 2020-07-05 ENCOUNTER — APPOINTMENT (OUTPATIENT)
Dept: CT IMAGING | Facility: HOSPITAL | Age: 50
End: 2020-07-05

## 2020-07-05 VITALS
TEMPERATURE: 99.3 F | SYSTOLIC BLOOD PRESSURE: 141 MMHG | HEIGHT: 65 IN | OXYGEN SATURATION: 99 % | HEART RATE: 76 BPM | RESPIRATION RATE: 20 BRPM | DIASTOLIC BLOOD PRESSURE: 88 MMHG | BODY MASS INDEX: 19.16 KG/M2 | WEIGHT: 115 LBS

## 2020-07-05 DIAGNOSIS — A04.72 C. DIFFICILE COLITIS: Primary | ICD-10-CM

## 2020-07-05 DIAGNOSIS — R10.11 RIGHT UPPER QUADRANT ABDOMINAL PAIN: ICD-10-CM

## 2020-07-05 LAB
ALBUMIN SERPL-MCNC: 4 G/DL (ref 3.5–5.2)
ALBUMIN/GLOB SERPL: 1.7 G/DL
ALP SERPL-CCNC: 61 U/L (ref 39–117)
ALT SERPL W P-5'-P-CCNC: 18 U/L (ref 1–33)
ANION GAP SERPL CALCULATED.3IONS-SCNC: 9 MMOL/L (ref 5–15)
AST SERPL-CCNC: 21 U/L (ref 1–32)
BASOPHILS # BLD AUTO: 0.06 10*3/MM3 (ref 0–0.2)
BASOPHILS NFR BLD AUTO: 0.5 % (ref 0–1.5)
BILIRUB SERPL-MCNC: 0.3 MG/DL (ref 0.2–1.2)
BILIRUB UR QL STRIP: NEGATIVE
BUN SERPL-MCNC: 6 MG/DL (ref 6–20)
BUN/CREAT SERPL: 9.2 (ref 7–25)
CALCIUM SPEC-SCNC: 8.5 MG/DL (ref 8.6–10.5)
CHLORIDE SERPL-SCNC: 103 MMOL/L (ref 98–107)
CLARITY UR: CLEAR
CO2 SERPL-SCNC: 28 MMOL/L (ref 22–29)
COLOR UR: YELLOW
CREAT SERPL-MCNC: 0.65 MG/DL (ref 0.57–1)
DEPRECATED RDW RBC AUTO: 45.1 FL (ref 37–54)
EOSINOPHIL # BLD AUTO: 0.46 10*3/MM3 (ref 0–0.4)
EOSINOPHIL NFR BLD AUTO: 4.1 % (ref 0.3–6.2)
ERYTHROCYTE [DISTWIDTH] IN BLOOD BY AUTOMATED COUNT: 13.1 % (ref 12.3–15.4)
GFR SERPL CREATININE-BSD FRML MDRD: 96 ML/MIN/1.73
GLOBULIN UR ELPH-MCNC: 2.3 GM/DL
GLUCOSE SERPL-MCNC: 97 MG/DL (ref 65–99)
GLUCOSE UR STRIP-MCNC: NEGATIVE MG/DL
HCT VFR BLD AUTO: 37.7 % (ref 34–46.6)
HGB BLD-MCNC: 12.6 G/DL (ref 12–15.9)
HGB UR QL STRIP.AUTO: NEGATIVE
HOLD SPECIMEN: NORMAL
HOLD SPECIMEN: NORMAL
IMM GRANULOCYTES # BLD AUTO: 0.1 10*3/MM3 (ref 0–0.05)
IMM GRANULOCYTES NFR BLD AUTO: 0.9 % (ref 0–0.5)
KETONES UR QL STRIP: NEGATIVE
LEUKOCYTE ESTERASE UR QL STRIP.AUTO: NEGATIVE
LIPASE SERPL-CCNC: 19 U/L (ref 13–60)
LYMPHOCYTES # BLD AUTO: 3.72 10*3/MM3 (ref 0.7–3.1)
LYMPHOCYTES NFR BLD AUTO: 33.2 % (ref 19.6–45.3)
MCH RBC QN AUTO: 31.4 PG (ref 26.6–33)
MCHC RBC AUTO-ENTMCNC: 33.4 G/DL (ref 31.5–35.7)
MCV RBC AUTO: 94 FL (ref 79–97)
MONOCYTES # BLD AUTO: 1.17 10*3/MM3 (ref 0.1–0.9)
MONOCYTES NFR BLD AUTO: 10.4 % (ref 5–12)
NEUTROPHILS NFR BLD AUTO: 5.71 10*3/MM3 (ref 1.7–7)
NEUTROPHILS NFR BLD AUTO: 50.9 % (ref 42.7–76)
NITRITE UR QL STRIP: NEGATIVE
NRBC BLD AUTO-RTO: 0 /100 WBC (ref 0–0.2)
PH UR STRIP.AUTO: 8 [PH] (ref 5–9)
PLATELET # BLD AUTO: 368 10*3/MM3 (ref 140–450)
PMV BLD AUTO: 9.2 FL (ref 6–12)
POTASSIUM SERPL-SCNC: 3.5 MMOL/L (ref 3.5–5.2)
PROT SERPL-MCNC: 6.3 G/DL (ref 6–8.5)
PROT UR QL STRIP: NEGATIVE
RBC # BLD AUTO: 4.01 10*6/MM3 (ref 3.77–5.28)
SODIUM SERPL-SCNC: 140 MMOL/L (ref 136–145)
SP GR UR STRIP: 1.01 (ref 1–1.03)
UROBILINOGEN UR QL STRIP: NORMAL
WBC # BLD AUTO: 11.22 10*3/MM3 (ref 3.4–10.8)
WHOLE BLOOD HOLD SPECIMEN: NORMAL
WHOLE BLOOD HOLD SPECIMEN: NORMAL

## 2020-07-05 PROCEDURE — 25010000002 ONDANSETRON PER 1 MG: Performed by: EMERGENCY MEDICINE

## 2020-07-05 PROCEDURE — 81003 URINALYSIS AUTO W/O SCOPE: CPT | Performed by: EMERGENCY MEDICINE

## 2020-07-05 PROCEDURE — 80053 COMPREHEN METABOLIC PANEL: CPT

## 2020-07-05 PROCEDURE — 74177 CT ABD & PELVIS W/CONTRAST: CPT

## 2020-07-05 PROCEDURE — 96376 TX/PRO/DX INJ SAME DRUG ADON: CPT

## 2020-07-05 PROCEDURE — 83690 ASSAY OF LIPASE: CPT

## 2020-07-05 PROCEDURE — 25010000002 MORPHINE PER 10 MG: Performed by: EMERGENCY MEDICINE

## 2020-07-05 PROCEDURE — 96375 TX/PRO/DX INJ NEW DRUG ADDON: CPT

## 2020-07-05 PROCEDURE — 85025 COMPLETE CBC W/AUTO DIFF WBC: CPT

## 2020-07-05 PROCEDURE — 96374 THER/PROPH/DIAG INJ IV PUSH: CPT

## 2020-07-05 PROCEDURE — 25010000002 IOPAMIDOL 61 % SOLUTION: Performed by: EMERGENCY MEDICINE

## 2020-07-05 PROCEDURE — 99284 EMERGENCY DEPT VISIT MOD MDM: CPT

## 2020-07-05 RX ORDER — ONDANSETRON 2 MG/ML
4 INJECTION INTRAMUSCULAR; INTRAVENOUS ONCE
Status: COMPLETED | OUTPATIENT
Start: 2020-07-05 | End: 2020-07-05

## 2020-07-05 RX ORDER — SODIUM CHLORIDE 0.9 % (FLUSH) 0.9 %
10 SYRINGE (ML) INJECTION AS NEEDED
Status: DISCONTINUED | OUTPATIENT
Start: 2020-07-05 | End: 2020-07-06 | Stop reason: HOSPADM

## 2020-07-05 RX ORDER — HYDROCODONE BITARTRATE AND ACETAMINOPHEN 5; 325 MG/1; MG/1
1 TABLET ORAL EVERY 6 HOURS PRN
Qty: 6 TABLET | Refills: 0 | Status: SHIPPED | OUTPATIENT
Start: 2020-07-05 | End: 2020-07-23

## 2020-07-05 RX ADMIN — IOPAMIDOL 80 ML: 612 INJECTION, SOLUTION INTRAVENOUS at 20:25

## 2020-07-05 RX ADMIN — ONDANSETRON 4 MG: 2 INJECTION INTRAMUSCULAR; INTRAVENOUS at 18:50

## 2020-07-05 RX ADMIN — SODIUM CHLORIDE 1000 ML: 900 INJECTION, SOLUTION INTRAVENOUS at 18:49

## 2020-07-05 RX ADMIN — MORPHINE SULFATE 4 MG: 4 INJECTION, SOLUTION INTRAMUSCULAR; INTRAVENOUS at 18:50

## 2020-07-05 RX ADMIN — MORPHINE SULFATE 4 MG: 4 INJECTION, SOLUTION INTRAMUSCULAR; INTRAVENOUS at 21:07

## 2020-07-05 NOTE — ED PROVIDER NOTES
Subjective   50-year-old female presents the emergency department with complaint of abdominal pain that is upper and bright red in nature.  Reports that she was diagnosed with C. difficile and colitis recently and is on oral vancomycin.  Reports she had C. difficile once before earlier this year and thought she had been treated to completion but was seen at [facility] after abdominal pain, vomiting and diarrhea and was diagnosed with C. difficile again.  Presents here as she reports pain has not gotten any better.  She was prescribed pain medication reports she still has a few of them but they did not seem to be helping.  Denies fever or chills.  Reports good urinary output.  Reports nonbloody nonbilious vomiting and diarrhea.  Pain radiates through to the back.    Family history, surgical history, social history, current medications and allergies are reviewed with the patient and triage documentation and vitals are reviewed.      History provided by:  Patient and medical records   used: No        Review of Systems   Constitutional: Negative for chills and fever.   HENT: Negative for congestion, sinus pressure and sinus pain.    Eyes: Negative for photophobia and visual disturbance.   Respiratory: Negative for cough, shortness of breath and wheezing.    Cardiovascular: Negative for chest pain and leg swelling.   Gastrointestinal: Positive for abdominal pain, diarrhea, nausea and vomiting. Negative for abdominal distention, blood in stool and constipation.   Endocrine: Negative for polydipsia, polyphagia and polyuria.   Genitourinary: Negative for decreased urine volume, difficulty urinating, frequency and urgency.   Musculoskeletal: Negative for arthralgias, back pain, myalgias and neck pain.   Skin: Negative for color change, pallor and wound.   Allergic/Immunologic: Negative.    Neurological: Negative.    Hematological: Negative.    Psychiatric/Behavioral: Negative.        Past Medical  History:   Diagnosis Date   • Asthma    • Cancer (CMS/HCC)     cervical   • Colon polyp    • COPD (chronic obstructive pulmonary disease) (CMS/HCC)    • Crohn's disease (CMS/HCC)    • Diverticulitis of colon    • Elevated cholesterol    • Essential hypertension 4/15/2020   • GERD (gastroesophageal reflux disease)    • History of transfusion    • Irritable bowel syndrome    • Pancreatitis        Allergies   Allergen Reactions   • Nsaids Swelling and GI Intolerance   • Azithromycin Swelling   • Ciprofloxacin Hives   • Doxycycline Swelling   • Other Other (See Comments)     nicotine patch   Caused body twitching/seizures   • Levofloxacin Rash   • Phenergan [Promethazine Hcl] GI Intolerance       Past Surgical History:   Procedure Laterality Date   • COLONOSCOPY     • COLONOSCOPY N/A 10/18/2019    Procedure: COLONOSCOPY;  Surgeon: Tom Davis MD;  Location: [facility] ENDOSCOPY;  Service: Gastroenterology   • ENDOSCOPY N/A 10/18/2019    Procedure: ESOPHAGOGASTRODUODENOSCOPY;  Surgeon: Tom Davis MD;  Location: [facility] ENDOSCOPY;  Service: Gastroenterology   • HYSTERECTOMY     • TONSILECTOMY, ADENOIDECTOMY, BILATERAL MYRINGOTOMY AND TUBES     • UPPER GASTROINTESTINAL ENDOSCOPY         Family History   Problem Relation Age of Onset   • Inflammatory bowel disease Mother    • Arthritis Mother    • Diabetes Mother    • Hypertension Mother    • Hyperlipidemia Mother    • Obesity Mother    • Osteoporosis Mother    • Heart disease Father    • Hyperlipidemia Father    • Diabetes Sister    • Liver disease Daughter    • Mental illness Daughter    • Obesity Daughter    • Diabetes Maternal Grandmother    • Cancer Maternal Grandmother         lung   • Cancer Other         lung   • Heart disease Other        Social History     Socioeconomic History   • Marital status: Single     Spouse name: Not on file   • Number of children: Not on file   • Years of education: Not on file   • Highest education level: Not on file   Tobacco Use    • Smoking status: Current Every Day Smoker     Packs/day: 1.00     Types: Cigarettes   • Smokeless tobacco: Never Used   Substance and Sexual Activity   • Alcohol use: No     Frequency: Never   • Drug use: No   • Sexual activity: Defer           Objective   Physical Exam   Constitutional: She appears well-developed and well-nourished.  Non-toxic appearance. She does not appear ill. No distress.   HENT:   Head: Normocephalic.   Mouth/Throat: Oropharynx is clear and moist.   Eyes: Pupils are equal, round, and reactive to light.   Cardiovascular: Normal rate, regular rhythm and normal heart sounds.   No murmur heard.  Pulmonary/Chest: Effort normal and breath sounds normal. No respiratory distress.   Abdominal: Soft. Normal appearance. She exhibits no distension. Bowel sounds are increased. There is no hepatosplenomegaly. There is tenderness in the right upper quadrant, epigastric area and periumbilical area. There is no rigidity, no rebound, no guarding and no CVA tenderness. No hernia.   Neurological: She is alert.   Skin: Skin is warm and dry. Capillary refill takes less than 2 seconds.   Psychiatric: She has a normal mood and affect. Her behavior is normal.   Nursing note and vitals reviewed.      Procedures  none         ED Course      Labs Reviewed   COMPREHENSIVE METABOLIC PANEL - Abnormal; Notable for the following components:       Result Value    Calcium 8.5 (*)     All other components within normal limits    Narrative:     GFR Normal >60  Chronic Kidney Disease <60  Kidney Failure <15     CBC WITH AUTO DIFFERENTIAL - Abnormal; Notable for the following components:    WBC 11.22 (*)     Immature Grans % 0.9 (*)     Lymphocytes, Absolute 3.72 (*)     Monocytes, Absolute 1.17 (*)     Eosinophils, Absolute 0.46 (*)     Immature Grans, Absolute 0.10 (*)     All other components within normal limits   LIPASE - Normal   URINALYSIS W/ MICROSCOPIC IF INDICATED (NO CULTURE) - Normal    Narrative:     Urine  microscopic not indicated.   RAINBOW DRAW    Narrative:     The following orders were created for panel order [place] Draw.  Procedure                               Abnormality         Status                     ---------                               -----------         ------                     Light Blue Top[[id number]]                                   Final result               Green Top (Gel)[[id number]]                                  Final result               Lavender Top[[id number]]                                     Final result               Gold Top - SST[[id number]]                                   Final result                 Please view results for these tests on the individual orders.   CBC AND DIFFERENTIAL    Narrative:     The following orders were created for panel order CBC & Differential.  Procedure                               Abnormality         Status                     ---------                               -----------         ------                     CBC Auto Differential[[id number]]        Abnormal            Final result                 Please view results for these tests on the individual orders.   LIGHT BLUE TOP   GREEN TOP   LAVENDER TOP   GOLD TOP - SST     Ct Abdomen Pelvis With Contrast    Result Date: 7/5/2020  Narrative: PROCEDURE: CT ABDOMEN PELVIS W CONTRAST INDICATION: Right upper quadrant pain, known C. difficile HISTORY: Crohn's, COPD, cervical cancer, hysterectomy COMPARISON: 5/15/2020  TECHNIQUE:  - reconstructions:  Axial, coronal, sagittal  - contrast:  oral:  No     intravenous:  80 mL of Isovue-300  This exam was performed according to our departmental dose-optimization program, which includes automated exposure control, adjustment of the mA and/or kV according to patient size and/or use of iterative reconstruction technique. DLP is 314.3 FINDINGS:   - - - CT ABDOMEN - - -   THORAX (INFERIOR):  - LUNG BASES:  Clear  - PLEURA:  No fluid or mass  - HEART: Normal  size, no pericardial fluid  - MISC:  N/A   ABDOMEN:  - LIVER:  Enlarged, measuring 20 cm in greatest craniocaudal dimension. No ductal dilatation, no focal lesion  - GB:  Grossly negative  - CBD:  Grossly negative  - SPLEEN:  Normal size and contour  - PANCREAS:  Normal in size, contour, no focal mass  - VISCERA:  Normal caliber, no wall thickening  - MESENTERY: No mesenteric mass  - CAVITY:  No free abdominal fluid, no free intraperitoneal air  - BODY WALL:  With normal limits  - OSSEOUS:  Grossly negative for age  - MISC:  RETROPERITONEUM:  - KIDNEYS:Normal size/contour, no collecting system dilation                   No evidence of an enhancing mass  - URETERS:  Normal course, caliber  - ADRENALS:  Normal size, contour  - MISC:  No sig. retroperitoneal adenopathy or mass  - VASCULAR:  Aorta / iliacs: Mild atherosclerotic vascular ossification  - - - CT PELVIS - - -  - VISCERA:  Normal caliber small/large bowel. Mildly thick-walled appearance of the ascending colon may be due to patient's reported C. difficile infection. Sigmoid diverticuli without radiographic evidence of diverticulosis.      - APPENDIX: Within normal limits  - MESENTERY:  No mass  - VASCULAR:  Within normal limits for age  - CAVITY:  No free fluid / air  - BLADDER:  Unremarkable  - OSSEOUS:  Within normal limits  - MISC:  - UTERUS/OVARY: Uterus is surgically absent. Ovaries are not well seen and may be absent as well.      Impression: 1. Mildly thick-walled appearance of the descending colon may be due to patient's reported C. difficile infection. Clinical correlation needed 2. Sigmoid diverticulosis without radiographic evidence of diverticulitis 3. Hepatomegaly without focal lesions identified 4. Status post hysterectomy. 5. Please see findings section above for further details Electronically signed by:  Sally Rodriguez MD  7/5/2020 8:59 PM CDT Workstation: [id number]          MDM  Number of Diagnoses or Management Options  C. difficile colitis:    Right upper quadrant abdominal pain:      Amount and/or Complexity of Data Reviewed  Clinical lab tests: reviewed  Tests in the radiology section of CPT®: reviewed  Review and summarize past medical records: yes    Patient Progress  Patient progress: stable    Patient overall laboratory studies unremarkable.  CT scan reveals continued colitis with no complication from C. difficile.  Advised on symptomatic treatment.  Short course of pain medication also prescribed.  Agreeable to discharge.    Final diagnoses:   C. difficile colitis   Right upper quadrant abdominal pain            Vikas Tilley, DO  07/07/20 [id number]

## 2020-07-06 NOTE — DISCHARGE INSTRUCTIONS
Please return with new or worsening symptoms.  Continue antibiotic and pain medication as prescribed.  Follow-up with GI as planned.

## 2020-07-07 ENCOUNTER — OFFICE VISIT (OUTPATIENT)
Dept: GASTROENTEROLOGY | Facility: CLINIC | Age: 50
End: 2020-07-07

## 2020-07-07 VITALS
WEIGHT: 115.4 LBS | HEIGHT: 65 IN | OXYGEN SATURATION: 98 % | DIASTOLIC BLOOD PRESSURE: 80 MMHG | SYSTOLIC BLOOD PRESSURE: 140 MMHG | BODY MASS INDEX: 19.22 KG/M2 | HEART RATE: 100 BPM

## 2020-07-07 DIAGNOSIS — A04.72 COLITIS, CLOSTRIDIUM DIFFICILE: ICD-10-CM

## 2020-07-07 DIAGNOSIS — R10.84 GENERALIZED ABDOMINAL PAIN: Primary | ICD-10-CM

## 2020-07-07 PROCEDURE — 99213 OFFICE O/P EST LOW 20 MIN: CPT | Performed by: NURSE PRACTITIONER

## 2020-07-07 RX ORDER — VANCOMYCIN HYDROCHLORIDE 125 MG/1
125 CAPSULE ORAL 4 TIMES DAILY
COMMUNITY
Start: 2020-06-30 | End: 2020-07-22

## 2020-07-07 NOTE — PROGRESS NOTES
Chief Complaint   Patient presents with   • Hospital F/U       Subjective    Carla Richard is a 50 y.o. female. she is here today for follow-up.    50-year-old female presents for hospital and ER follow-up.  She was hospitalized at [facility] June due to C. difficile colitis.  She started taking vancomycin on June 30 and still has about 5 days left of that course.  Pain became severe and she was valuated July fifth emergency department here she underwent CT which noted mildly thick-walled appearance of the descending colon consistent with C. difficile colitis.  Diverticulosis was also seen hepatomegaly with no focal lesions was identified.  Reports severe abdominal pain and bloating but states she is not having any diarrhea currently has stopped drinking coffee due to diarrhea.  She has had 2 episodes of C. difficile in the past but was treated with vancomycin.    Diarrhea    This is a recurrent problem. The problem occurs less than 2 times per day. The problem has been gradually worsening. The stool consistency is described as mucous. The patient states that diarrhea does not awaken her from sleep. Associated symptoms include abdominal pain and bloating. Pertinent negatives include no arthralgias, chills, coughing, fever, headaches, increased  flatus, myalgias or vomiting.     Plan; will patient follow-up with Dr. Davis next week after she finishes antibiotics to decide if she needs FM T.  She also needs to plan EGD due to severe epigastric pain this was canceled due to COVID pandemic.       The following portions of the patient's history were reviewed and updated as appropriate:   Past Medical History:   Diagnosis Date   • Asthma    • Cancer (CMS/HCC)     cervical   • Colon polyp    • COPD (chronic obstructive pulmonary disease) (CMS/HCC)    • Crohn's disease (CMS/HCC)    • Diverticulitis of colon    • Elevated cholesterol    • Essential hypertension 4/15/2020   • GERD (gastroesophageal reflux  disease)    • History of transfusion    • Irritable bowel syndrome    • Pancreatitis      Past Surgical History:   Procedure Laterality Date   • COLONOSCOPY     • COLONOSCOPY N/A 10/18/2019    Procedure: COLONOSCOPY;  Surgeon: Tom Davis MD;  Location: [facility] ENDOSCOPY;  Service: Gastroenterology   • ENDOSCOPY N/A 10/18/2019    Procedure: ESOPHAGOGASTRODUODENOSCOPY;  Surgeon: Tom Davis MD;  Location: [facility] ENDOSCOPY;  Service: Gastroenterology   • HYSTERECTOMY     • TONSILECTOMY, ADENOIDECTOMY, BILATERAL MYRINGOTOMY AND TUBES     • UPPER GASTROINTESTINAL ENDOSCOPY       Family History   Problem Relation Age of Onset   • Inflammatory bowel disease Mother    • Arthritis Mother    • Diabetes Mother    • Hypertension Mother    • Hyperlipidemia Mother    • Obesity Mother    • Osteoporosis Mother    • Heart disease Father    • Hyperlipidemia Father    • Diabetes Sister    • Liver disease Daughter    • Mental illness Daughter    • Obesity Daughter    • Diabetes Maternal Grandmother    • Cancer Maternal Grandmother         lung   • Cancer Other         lung   • Heart disease Other      OB History    None       Prior to Admission medications    Medication Sig Start Date End Date Taking? Authorizing Provider   albuterol sulfate  (90 Base) MCG/ACT inhaler Inhale 2 puffs Every 4 (Four) Hours As Needed for Wheezing. 8/29/19  Yes Xavier Wick MD   amitriptyline (ELAVIL) 25 MG tablet Take 25 mg by mouth Every Night. 3/6/20  Yes ProviderByron MD   budesonide-formoterol (SYMBICORT) 160-4.5 MCG/ACT inhaler Inhale 2 puffs 2 (Two) Times a Day. 9/25/19  Yes Iliana Jacobson MD   cetirizine (zyrTEC) 10 MG tablet Take 1 tablet by mouth Daily. 9/25/19  Yes Iliana Jacobson MD   Cyanocobalamin (B-12 COMPLIANCE INJECTION IJ) Inject  as directed.   Yes ProviderByron MD   dicyclomine (Bentyl) 20 MG tablet Take 2 tablets by mouth Every 6 (Six) Hours. 6/23/20  Yes Xavier Wick MD   escitalopram  (LEXAPRO) 10 MG tablet Take 1 tablet by mouth Daily. 2/4/20  Yes Xavier Wick MD   fluticasone (FLONASE) 50 MCG/ACT nasal spray 2 sprays into the nostril(s) as directed by provider Daily. 9/25/19  Yes Iliana Jacobson MD   HYDROcodone-acetaminophen (NORCO) 5-325 MG per tablet Take 1 tablet by mouth Every 6 (Six) Hours As Needed for Moderate Pain . 7/5/20  Yes Vikas Tilley DO   ipratropium-albuterol (DUO-NEB) 0.5-2.5 mg/3 ml nebulizer Take 3 mL by nebulization Every 4 (Four) Hours As Needed for Wheezing or Shortness of Air. 6/23/20  Yes Xavier iWck MD   metoprolol succinate XL (TOPROL-XL) 50 MG 24 hr tablet Take 1 tablet by mouth Daily. 4/15/20  Yes Xavier Wick MD   montelukast (SINGULAIR) 10 MG tablet Take 1 tablet by mouth Every Night. 10/17/19  Yes Xavier Wick MD   omeprazole (priLOSEC) 20 MG capsule Take 1 capsule by mouth Daily. 6/23/20  Yes Xavier Wick MD   ondansetron ODT (ZOFRAN ODT) 8 MG disintegrating tablet Take 1 tablet by mouth Every 8 (Eight) Hours As Needed for Nausea or Vomiting. 3/4/20  Yes Xavier Wick MD   predniSONE (DELTASONE) 10 MG tablet Take 4 pills for 4 days 3 pills for 3 days 2 pills for 2 days 1 pill for one day then stop. 6/23/20  Yes Xavier Wick MD   vitamin D (ERGOCALCIFEROL) 75559 units capsule capsule Take 1 capsule by mouth 1 (One) Time Per Week. 7/17/19  Yes Xavier Wick MD   vancomycin (VANCOCIN) 125 MG capsule Take 125 mg by mouth 4 (Four) Times a Day. 6/30/20   ProviderByron MD     Allergies   Allergen Reactions   • Nsaids Swelling and GI Intolerance   • Azithromycin Swelling   • Ciprofloxacin Hives   • Doxycycline Swelling   • Other Other (See Comments)     nicotine patch   Caused body twitching/seizures   • Levofloxacin Rash   • Phenergan [Promethazine Hcl] GI Intolerance     Social History     Socioeconomic History   • Marital status: Single     Spouse name: Not on file   • Number of children: Not on file   • Years of  "education: Not on file   • Highest education level: Not on file   Tobacco Use   • Smoking status: Current Every Day Smoker     Packs/day: 1.00     Types: Cigarettes   • Smokeless tobacco: Never Used   Substance and Sexual Activity   • Alcohol use: No     Frequency: Never   • Drug use: No   • Sexual activity: Defer       Review of Systems  Review of Systems   Constitutional: Positive for appetite change and fatigue. Negative for activity change, chills, diaphoresis, fever and unexpected weight change.   Respiratory: Negative for cough.    Gastrointestinal: Positive for abdominal distention (bloating ), abdominal pain, bloating, constipation (strains for 2 small stool\"nuggets\" per day f) and nausea. Negative for anal bleeding, blood in stool, diarrhea, flatus, rectal pain and vomiting.   Musculoskeletal: Negative for arthralgias and myalgias.   Neurological: Negative for headaches.        /80   Pulse 100   Ht 165.1 cm (65\")   Wt 52.3 kg (115 lb 6.4 oz)   LMP  (LMP Unknown)   SpO2 98%   BMI 19.20 kg/m²     Objective    Physical Exam   Constitutional: She is oriented to person, place, and time. She appears well-developed and well-nourished. She is cooperative. No distress.   HENT:   Head: Normocephalic and atraumatic.   Neck: Normal range of motion. Neck supple. No thyromegaly present.   Cardiovascular: Normal rate, regular rhythm and normal heart sounds.   Pulmonary/Chest: Effort normal and breath sounds normal. She has no wheezes. She has no rhonchi. She has no rales.   Abdominal: Soft. Normal appearance and bowel sounds are normal. She exhibits distension. There is no hepatosplenomegaly. There is generalized tenderness. There is guarding. There is no rigidity. No hernia.   Lymphadenopathy:     She has no cervical adenopathy.   Neurological: She is alert and oriented to person, place, and time.   Skin: Skin is warm, dry and intact. No rash noted. No pallor.   Psychiatric: She has a normal mood and affect. " Her speech is normal.     Admission on 07/05/2020, Discharged on 07/05/2020   Component Date Value Ref Range Status   • Glucose 07/05/2020 97  65 - 99 mg/dL Final   • BUN 07/05/2020 6  6 - 20 mg/dL Final   • Creatinine 07/05/2020 0.65  0.57 - 1.00 mg/dL Final   • Sodium 07/05/2020 140  136 - 145 mmol/L Final   • Potassium 07/05/2020 3.5  3.5 - 5.2 mmol/L Final   • Chloride 07/05/2020 103  98 - 107 mmol/L Final   • CO2 07/05/2020 28.0  22.0 - 29.0 mmol/L Final   • Calcium 07/05/2020 8.5* 8.6 - 10.5 mg/dL Final   • Total Protein 07/05/2020 6.3  6.0 - 8.5 g/dL Final   • Albumin 07/05/2020 4.00  3.50 - 5.20 g/dL Final   • ALT (SGPT) 07/05/2020 18  1 - 33 U/L Final   • AST (SGOT) 07/05/2020 21  1 - 32 U/L Final   • Alkaline Phosphatase 07/05/2020 61  39 - 117 U/L Final   • Total Bilirubin 07/05/2020 0.3  0.2 - 1.2 mg/dL Final   • eGFR Non African Amer 07/05/2020 96  >60 mL/min/1.73 Final   • Globulin 07/05/2020 2.3  gm/dL Final   • A/G Ratio 07/05/2020 1.7  g/dL Final   • BUN/Creatinine Ratio 07/05/2020 9.2  7.0 - 25.0 Final   • Anion Gap 07/05/2020 9.0  5.0 - 15.0 mmol/L Final   • Lipase 07/05/2020 19  13 - 60 U/L Final   • Color, UA 07/05/2020 Yellow  Yellow, Straw, Dark Yellow, Ana M Final   • Appearance, UA 07/05/2020 Clear  Clear Final   • pH, UA 07/05/2020 8.0  5.0 - 9.0 Final   • Specific Gravity, UA 07/05/2020 1.015  1.003 - 1.030 Final   • Glucose, UA 07/05/2020 Negative  Negative Final   • Ketones, UA 07/05/2020 Negative  Negative Final   • Bilirubin, UA 07/05/2020 Negative  Negative Final   • Blood, UA 07/05/2020 Negative  Negative Final   • Protein, UA 07/05/2020 Negative  Negative Final   • Leuk Esterase, UA 07/05/2020 Negative  Negative Final   • Nitrite, UA 07/05/2020 Negative  Negative Final   • Urobilinogen, UA 07/05/2020 0.2 E.U./dL  0.2 - 1.0 E.U./dL Final   • Extra Tube 07/05/2020 hold for add-on   Final    Auto resulted   • Extra Tube 07/05/2020 Hold for add-ons.   Final    Auto resulted.   • Extra  Tube 07/05/2020 hold for add-on   Final    Auto resulted   • Extra Tube 07/05/2020 Hold for add-ons.   Final    Auto resulted.   • WBC 07/05/2020 11.22* 3.40 - 10.80 10*3/mm3 Final   • RBC 07/05/2020 4.01  3.77 - 5.28 10*6/mm3 Final   • Hemoglobin 07/05/2020 12.6  12.0 - 15.9 g/dL Final   • Hematocrit 07/05/2020 37.7  34.0 - 46.6 % Final   • MCV 07/05/2020 94.0  79.0 - 97.0 fL Final   • MCH 07/05/2020 31.4  26.6 - 33.0 pg Final   • MCHC 07/05/2020 33.4  31.5 - 35.7 g/dL Final   • RDW 07/05/2020 13.1  12.3 - 15.4 % Final   • RDW-SD 07/05/2020 45.1  37.0 - 54.0 fl Final   • MPV 07/05/2020 9.2  6.0 - 12.0 fL Final   • Platelets 07/05/2020 368  140 - 450 10*3/mm3 Final   • Neutrophil % 07/05/2020 50.9  42.7 - 76.0 % Final   • Lymphocyte % 07/05/2020 33.2  19.6 - 45.3 % Final   • Monocyte % 07/05/2020 10.4  5.0 - 12.0 % Final   • Eosinophil % 07/05/2020 4.1  0.3 - 6.2 % Final   • Basophil % 07/05/2020 0.5  0.0 - 1.5 % Final   • Immature Grans % 07/05/2020 0.9* 0.0 - 0.5 % Final   • Neutrophils, Absolute 07/05/2020 5.71  1.70 - 7.00 10*3/mm3 Final   • Lymphocytes, Absolute 07/05/2020 3.72* 0.70 - 3.10 10*3/mm3 Final   • Monocytes, Absolute 07/05/2020 1.17* 0.10 - 0.90 10*3/mm3 Final   • Eosinophils, Absolute 07/05/2020 0.46* 0.00 - 0.40 10*3/mm3 Final   • Basophils, Absolute 07/05/2020 0.06  0.00 - 0.20 10*3/mm3 Final   • Immature Grans, Absolute 07/05/2020 0.10* 0.00 - 0.05 10*3/mm3 Final   • nRBC 07/05/2020 0.0  0.0 - 0.2 /100 WBC Final     Assessment/Plan      1. Generalized abdominal pain    2. Colitis, Clostridium difficile    .       Orders placed during this encounter include:  No orders of the defined types were placed in this encounter.      * Surgery not found *    Review and/or summary of lab tests, radiology, procedures, medications. Review and summary of old records and obtaining of history. The risks and benefits of my recommendations, as well as other treatment options were discussed with the patient today.  Questions were answered.    No orders of the defined types were placed in this encounter.      Follow-up: Return in about 4 weeks (around 8/4/2020).          This document has been electronically signed by CROW Dudley on July 7, 2020 15:13             Results for orders placed or performed during the hospital encounter of 07/05/20   Gold Top - SST   Result Value Ref Range    Extra Tube Hold for add-ons.    Green Top (Gel)   Result Value Ref Range    Extra Tube Hold for add-ons.    Urinalysis With Microscopic If Indicated (No Culture) - Urine, Clean Catch   Result Value Ref Range    Color, UA Yellow Yellow, Straw, Dark Yellow, Ana M    Appearance, UA Clear Clear    pH, UA 8.0 5.0 - 9.0    Specific [place], UA 1.015 1.003 - 1.030    Glucose, UA Negative Negative    Ketones, UA Negative Negative    Bilirubin, UA Negative Negative    Blood, UA Negative Negative    Protein, UA Negative Negative    Leuk Esterase, UA Negative Negative    Nitrite, UA Negative Negative    Urobilinogen, UA 0.2 E.U./dL 0.2 - 1.0 E.U./dL   CBC Auto Differential   Result Value Ref Range    WBC 11.22 (H) 3.40 - 10.80 10*3/mm3    RBC 4.01 3.77 - 5.28 10*6/mm3    Hemoglobin 12.6 12.0 - 15.9 g/dL    Hematocrit 37.7 34.0 - 46.6 %    MCV 94.0 79.0 - 97.0 fL    MCH 31.4 26.6 - 33.0 pg    MCHC 33.4 31.5 - 35.7 g/dL    RDW 13.1 12.3 - 15.4 %    RDW-SD 45.1 37.0 - 54.0 fl    MPV 9.2 6.0 - 12.0 fL    Platelets 368 140 - 450 10*3/mm3    Neutrophil % 50.9 42.7 - 76.0 %    Lymphocyte % 33.2 19.6 - 45.3 %    Monocyte % 10.4 5.0 - 12.0 %    Eosinophil % 4.1 0.3 - 6.2 %    Basophil % 0.5 0.0 - 1.5 %    Immature Grans % 0.9 (H) 0.0 - 0.5 %    Neutrophils, Absolute 5.71 1.70 - 7.00 10*3/mm3    Lymphocytes, Absolute 3.72 (H) 0.70 - 3.10 10*3/mm3    Monocytes, Absolute 1.17 (H) 0.10 - 0.90 10*3/mm3    Eosinophils, Absolute 0.46 (H) 0.00 - 0.40 10*3/mm3    Basophils, Absolute 0.06 0.00 - 0.20 10*3/mm3    Immature Grans, Absolute 0.10 (H) 0.00 - 0.05 10*3/mm3     nRBC 0.0 0.0 - 0.2 /100 WBC   Lavender Top   Result Value Ref Range    Extra Tube hold for add-on    Light Blue Top   Result Value Ref Range    Extra Tube hold for add-on    Lipase   Result Value Ref Range    Lipase 19 13 - 60 U/L   Comprehensive Metabolic Panel   Result Value Ref Range    Glucose 97 65 - 99 mg/dL    BUN 6 6 - 20 mg/dL    Creatinine 0.65 0.57 - 1.00 mg/dL    Sodium 140 136 - 145 mmol/L    Potassium 3.5 3.5 - 5.2 mmol/L    Chloride 103 98 - 107 mmol/L    CO2 28.0 22.0 - 29.0 mmol/L    Calcium 8.5 (L) 8.6 - 10.5 mg/dL    Total Protein 6.3 6.0 - 8.5 g/dL    Albumin 4.00 3.50 - 5.20 g/dL    ALT (SGPT) 18 1 - 33 U/L    AST (SGOT) 21 1 - 32 U/L    Alkaline Phosphatase 61 39 - 117 U/L    Total Bilirubin 0.3 0.2 - 1.2 mg/dL    eGFR Non African Amer 96 >60 mL/min/1.73    Globulin 2.3 gm/dL    A/G Ratio 1.7 g/dL    BUN/Creatinine Ratio 9.2 7.0 - 25.0    Anion Gap 9.0 5.0 - 15.0 mmol/L   Results for orders placed or performed during the hospital encounter of 03/02/20   Gold Top - SST   Result Value Ref Range    Extra Tube Hold for add-ons.    Urinalysis, Microscopic Only - Urine, Clean Catch   Result Value Ref Range    RBC, UA 6-12 (A) None Seen /HPF    WBC, UA None Seen None Seen, 0-2, 3-5 /HPF    Bacteria, UA None Seen None Seen /HPF    Squamous Epithelial Cells, UA None Seen None Seen, 0-2 /HPF    Hyaline Casts, UA 0-2 None Seen /LPF    Methodology Automated Microscopy    Urinalysis With Culture If Indicated - Urine, Clean Catch   Result Value Ref Range    Color, UA Yellow Yellow, Straw, Dark Yellow, Ana M    Appearance, UA Clear Clear    pH, UA 6.5 5.0 - 9.0    Specific Gravity, UA 1.021 1.003 - 1.030    Glucose, UA Negative Negative    Ketones, UA Negative Negative    Bilirubin, UA Negative Negative    Blood, UA Small (1+) (A) Negative    Protein, UA Negative Negative    Leuk Esterase, UA Negative Negative    Nitrite, UA Negative Negative    Urobilinogen, UA 0.2 E.U./dL 0.2 - 1.0 E.U./dL   CBC Auto  Differential   Result Value Ref Range    WBC 9.73 3.40 - 10.80 10*3/mm3    RBC 5.20 3.77 - 5.28 10*6/mm3    Hemoglobin 16.4 (H) 12.0 - 15.9 g/dL    Hematocrit 49.4 (H) 34.0 - 46.6 %    MCV 95.0 79.0 - 97.0 fL    MCH 31.5 26.6 - 33.0 pg    MCHC 33.2 31.5 - 35.7 g/dL    RDW 13.2 12.3 - 15.4 %    RDW-SD 46.1 37.0 - 54.0 fl    MPV 9.2 6.0 - 12.0 fL    Platelets 439 140 - 450 10*3/mm3    Neutrophil % 54.7 42.7 - 76.0 %    Lymphocyte % 34.5 19.6 - 45.3 %    Monocyte % 7.6 5.0 - 12.0 %    Eosinophil % 1.7 0.3 - 6.2 %    Basophil % 0.8 0.0 - 1.5 %    Immature Grans % 0.7 (H) 0.0 - 0.5 %    Neutrophils, Absolute 5.31 1.70 - 7.00 10*3/mm3    Lymphocytes, Absolute 3.36 (H) 0.70 - 3.10 10*3/mm3    Monocytes, Absolute 0.74 0.10 - 0.90 10*3/mm3    Eosinophils, Absolute 0.17 0.00 - 0.40 10*3/mm3    Basophils, Absolute 0.08 0.00 - 0.20 10*3/mm3    Immature Grans, Absolute 0.07 (H) 0.00 - 0.05 10*3/mm3    nRBC 0.0 0.0 - 0.2 /100 WBC     *Note: Due to a large number of results and/or encounters for the requested time period, some results have not been displayed. A complete set of results can be found in Results Review.

## 2020-07-07 NOTE — PATIENT INSTRUCTIONS
"  BMI for Adults    Body mass index (BMI) is a number that is calculated from a person's weight and height. BMI may help to estimate how much of a person's weight is composed of fat. BMI can help identify those who may be at higher risk for certain medical problems.  How is BMI used with adults?  BMI is used as a screening tool to identify possible weight problems. It is used to check whether a person is obese, overweight, healthy weight, or underweight.  How is BMI calculated?  BMI measures your weight and compares it to your height. This can be done either in English ([place] or metric measurements. Note that charts are available to help you find your BMI quickly and easily without having to do these calculations yourself.  To calculate your BMI in English ([place] measurements, your health care provider will:  1. Measure your weight in pounds (lb).  2. Multiply the number of pounds by 703.  ? For example, for a person who weighs 180 lb, multiply that number by 703, which equals 126,540.  3. Measure your height in inches (in). Then multiply that number by itself to get a measurement called \"inches squared.\"  ? For example, for a person who is 70 in tall, the \"inches squared\" measurement is 70 in x 70 in, which equals 4900 inches squared.  4. Divide the total from Step 2 (number of lb x 703) by the total from Step 3 (inches squared): 126,540 ÷ 4900 = 25.8. This is your BMI.  To calculate your BMI in metric measurements, your health care provider will:  1. Measure your weight in kilograms (kg).  2. Measure your height in meters (m). Then multiply that number by itself to get a measurement called \"meters squared.\"  ? For example, for a person who is 1.75 m tall, the \"meters squared\" measurement is 1.75 m x 1.75 m, which is equal to 3.1 meters squared.  3. Divide the number of kilograms (your weight) by the meters squared number. In this example: 70 ÷ 3.1 = 22.6. This is your BMI.  How is BMI interpreted?  To interpret " your results, your health care provider will use BMI charts to identify whether you are underweight, normal weight, overweight, or obese. The following guidelines will be used:  · Underweight: BMI less than 18.5.  · Normal weight: BMI between 18.5 and 24.9.  · Overweight: BMI between 25 and 29.9.  · Obese: BMI of 30 and above.  Please note:  · Weight includes both fat and muscle, so someone with a muscular build, such as an athlete, may have a BMI that is higher than 24.9. In cases like these, BMI is not an accurate measure of body fat.  · To determine if excess body fat is the cause of a BMI of 25 or higher, further assessments may need to be done by a health care provider.  · BMI is usually interpreted in the same way for men and women.  Why is BMI a useful tool?  BMI is useful in two ways:  · Identifying a weight problem that may be related to a medical condition, or that may increase the risk for medical problems.  · Promoting lifestyle and diet changes in order to reach a healthy weight.  Summary  · Body mass index (BMI) is a number that is calculated from a person's weight and height.  · BMI may help to estimate how much of a person's weight is composed of fat. BMI can help identify those who may be at higher risk for certain medical problems.  · BMI can be measured using English measurements or metric measurements.  · To interpret your results, your health care provider will use BMI charts to identify whether you are underweight, normal weight, overweight, or obese.  This information is not intended to replace advice given to you by your health care provider. Make sure you discuss any questions you have with your health care provider.  Document Released: 08/29/2005 Document Revised: 11/30/2018 Document Reviewed: 10/31/2018  [employer] Patient Education © 2020 [employer]    Clostridioides Difficile Infection  Clostridioides difficile (C. difficile or C. diff) infection is a condition that occurs when an  overgrowth of C. diff bacteria causes irritation and swelling (inflammation) of the colon (colitis). It is typically associated with recent use of antibiotic medication.  This infection can be passed from person to person (is contagious). You may also be exposed to the bacteria from the environment, such as from food or water, or from touching surfaces that have the bacteria on them.  What are the causes?  Certain bacteria normally live in the colon and help to digest food. This infection develops when the balance of bacteria in the colon is changed and the C. diff bacteria grow out of control. This is often caused by taking antibiotics.  What increases the risk?  You are more likely to develop this condition if you:  · Need to take antibiotics for a long period of time.  · Take certain antibiotics that kill a wide range of bacteria.  · Are in the hospital.  · Are older than 65 years of age.  · Live in a place where there is a lot of contact with others, such as a nursing home.  · Have had a C. diff infection before.  · Have a weak defense (immune) system.  · Take a medicine called a proton pump inhibitor over a long period of time.  · Have serious underlying conditions, such as colon cancer.  · Have had a gastrointestinal (GI) tract procedure or surgery.  What are the signs or symptoms?  Symptoms of this condition include:  · Diarrhea. This may be bloody, watery, or yellow or green in color.  · Fever.  · Fatigue.  · Loss of appetite.  · Nausea.  · Swelling, pain, or tenderness in the abdomen.  How is this diagnosed?  This condition is diagnosed with medical history and physical exam. You may also have other tests, including:  · A test that checks for C. diff in your stool.  · Blood tests.  · Imaging tests, such as a CT scan of your abdomen.  In rare cases, a sigmoidoscopy or colonoscopy may be used to look directly into your colon. These procedures involve passing an instrument through your rectum to look at the  inside of your colon.  How is this treated?  Treatment for this condition includes:  · Stopping the antibiotics that you were on when the C. diff infection began. Do this only as told by your health care provider.  · Taking certain antibiotics to stop C. diff from growing.  · Taking donor stool from a healthy person and placing it into the colon (fecal transplant) through a colonoscope or nasogastric tube. This may be done if you have a recurrent infection.  · Having surgery to remove the infected part of the colon. This is rare.  Follow these instructions at home:  Eating and drinking    · Eat bland, easy-to-digest foods in small amounts as you are able. These foods include bananas, applesauce, rice, lean meats, toast, and crackers.  · Follow specific instructions on how to get enough water into your body (rehydrate). This may include:  ? Drinking clear fluids, such as water, ice chips, diluted fruit juice, and low-calorie sports drinks.  ? Taking an oral rehydration solution (ORS). This is a drink that is sold at pharmacies and retail stores.  · Avoid milk, caffeine, and alcohol.  · Drink enough fluid to keep your urine pale yellow.  General instructions  · Take over-the-counter and prescription medicines only as told by your health care provider.  · Take your antibiotic medicine as told by your health care provider. Do not stop taking the antibiotic, even if you start to feel better. You may stop taking it only if your health care provider tells you to stop.  · Wash your hands with soap and water.  · Do not use medicines to help with diarrhea. You may use these medicines only if your health care provider tells you to.  · Keep all follow-up visits as told by your health care provider. This is important.  How is this prevented?  Hand hygiene    · Use soap and water to wash your hands thoroughly before you prepare food and after you use the bathroom. Make sure that people who live with you also wash their hands  often.  · If you are being treated at a hospital or clinic, make sure that all health care providers wash their hands with soap and water before touching you.  · If you are staying in the hospital, make sure that all visitors wash their hands with soap and water before touching you.  Contact precautions  · If you develop diarrhea while in the hospital or a long-term care facility, let your health care team know right away.  · When visiting someone in the hospital or a long-term care facility, follow guidelines for wearing a gown, gloves, or other protective equipment.  · If possible, avoid contact with people who have diarrhea.  Clean environment  · Clean surfaces with a product that contains chlorine bleach.  · If you are in the hospital, make sure that staff members clean the surfaces in your room daily. Let a staff person know right away if body fluids have splashed or spilled in your room.  · Use chlorine bleach and high heat when cleaning soiled clothing or linens.  Contact a health care provider if:  · Your symptoms do not get better with treatment.  · Your symptoms get worse, even with treatment.  · Your symptoms go away and then come back.  · You have a fever.  · You have new symptoms.  Get help right away if you:  · Have more pain or tenderness in your abdomen.  · Have stool that is mostly bloody, or your stool looks dark black and tarry.  · Cannot eat or drink without vomiting.  · Have signs or symptoms of dehydration, such as:  ? Dark urine, very little urine, or no urine.  ? Cracked lips.  ? Not making tears when you cry.  ? Dry mouth.  ? Sunken eyes.  ? Sleepiness.  ? Weakness.  ? Dizziness.  Summary  · Clostridioides difficile (C. difficile or C. diff) infection is a condition that occurs when an overgrowth of C. diff bacteria causes irritation and swelling (inflammation) of the colon; typically after taking antibiotics.  · Symptoms of C. diff infection include diarrhea, fever, fatigue, loss of  appetite, nausea, and swelling, pain, or tenderness in the abdomen.  · C. diff infection is treated by stopping the antibiotics you were on when the C. diff infection began, and then taking certain antibiotics to keep C. diff from growing. In some cases, fecal transplant or surgery is performed.  · Hand washing with soap and water, contact precautions, and a clean environment can help prevent or limit the spread of C. diff infection.  This information is not intended to replace advice given to you by your health care provider. Make sure you discuss any questions you have with your health care provider.  Document Released: 09/27/2006 Document Revised: 08/22/2019 Document Reviewed: 08/22/2019  [employer] Patient Education © 2020 [employer]

## 2020-07-10 ENCOUNTER — OFFICE VISIT (OUTPATIENT)
Dept: GASTROENTEROLOGY | Facility: CLINIC | Age: 50
End: 2020-07-10

## 2020-07-10 VITALS
HEART RATE: 107 BPM | DIASTOLIC BLOOD PRESSURE: 80 MMHG | SYSTOLIC BLOOD PRESSURE: 116 MMHG | BODY MASS INDEX: 19.19 KG/M2 | WEIGHT: 115.2 LBS | HEIGHT: 65 IN

## 2020-07-10 DIAGNOSIS — R10.13 EPIGASTRIC PAIN: Primary | ICD-10-CM

## 2020-07-10 PROCEDURE — 99214 OFFICE O/P EST MOD 30 MIN: CPT | Performed by: INTERNAL MEDICINE

## 2020-07-10 RX ORDER — DEXTROSE AND SODIUM CHLORIDE 5; .45 G/100ML; G/100ML
30 INJECTION, SOLUTION INTRAVENOUS CONTINUOUS PRN
Status: CANCELLED | OUTPATIENT
Start: 2020-07-27

## 2020-07-10 NOTE — PATIENT INSTRUCTIONS

## 2020-07-10 NOTE — H&P (VIEW-ONLY)
[facility]      Chief Complaint:   Chief Complaint   Patient presents with   • Diarrhea   • c-diff       Subjective     HPI:   Patient with history of C. difficile colitis.  Patient is currently on vancomycin p.o. and states her bowel movements are normal at this time.  Patient had been getting constipated and began drinking coffee and improved her bowel movements on review of old colonoscopies patient does have diverticular disease discussed with patient taking tablespoon of Benefiber daily.  Patient also with epigastric abdominal pain which is occasionally severe nature patient will need EGD to evaluate this.    Plan; we will schedule patient for EGD.  Will have patient follow-up early if diarrhea returns after stopping the antibiotics to consider possible fecal transplant    Past Medical History:   Past Medical History:   Diagnosis Date   • Asthma    • Cancer (CMS/HCC)     cervical   • Colon polyp    • COPD (chronic obstructive pulmonary disease) (CMS/HCC)    • Crohn's disease (CMS/HCC)    • Diverticulitis of colon    • Elevated cholesterol    • Essential hypertension 4/15/2020   • GERD (gastroesophageal reflux disease)    • History of transfusion    • Irritable bowel syndrome    • Pancreatitis        Past Surgical History:  Past Surgical History:   Procedure Laterality Date   • COLONOSCOPY     • COLONOSCOPY N/A 10/18/2019    Procedure: COLONOSCOPY;  Surgeon: Tom Davis MD;  Location: [facility] ENDOSCOPY;  Service: Gastroenterology   • ENDOSCOPY N/A 10/18/2019    Procedure: ESOPHAGOGASTRODUODENOSCOPY;  Surgeon: Tom Davis MD;  Location: [facility] ENDOSCOPY;  Service: Gastroenterology   • HYSTERECTOMY     • TONSILECTOMY, ADENOIDECTOMY, BILATERAL MYRINGOTOMY AND TUBES     • UPPER GASTROINTESTINAL ENDOSCOPY         Family History:  Family History   Problem Relation Age of Onset   • Inflammatory bowel disease Mother    • Arthritis Mother    • Diabetes Mother    • Hypertension Mother     • Hyperlipidemia Mother    • Obesity Mother    • Osteoporosis Mother    • Heart disease Father    • Hyperlipidemia Father    • Diabetes Sister    • Liver disease Daughter    • Mental illness Daughter    • Obesity Daughter    • Diabetes Maternal Grandmother    • Cancer Maternal Grandmother         lung   • Cancer Other         lung   • Heart disease Other        Social History:   reports that she has been smoking cigarettes. She has been smoking about 1.00 pack per day. She has never used smokeless tobacco. She reports that she does not drink alcohol or use drugs.    Medications:   Prior to Admission medications    Medication Sig Start Date End Date Taking? Authorizing Provider   albuterol sulfate  (90 Base) MCG/ACT inhaler Inhale 2 puffs Every 4 (Four) Hours As Needed for Wheezing. 8/29/19  Yes Xavier Wick MD   amitriptyline (ELAVIL) 25 MG tablet Take 25 mg by mouth Every Night. 3/6/20  Yes ProviderByron MD   budesonide-formoterol (SYMBICORT) 160-4.5 MCG/ACT inhaler Inhale 2 puffs 2 (Two) Times a Day. 9/25/19  Yes Iliana Jacobson MD   cetirizine (zyrTEC) 10 MG tablet Take 1 tablet by mouth Daily. 9/25/19  Yes Iliana Jacobson MD   Cyanocobalamin (B-12 COMPLIANCE INJECTION IJ) Inject  as directed.   Yes ProviderByron MD   dicyclomine (Bentyl) 20 MG tablet Take 2 tablets by mouth Every 6 (Six) Hours. 6/23/20  Yes Xavier Wick MD   escitalopram (LEXAPRO) 10 MG tablet Take 1 tablet by mouth Daily. 2/4/20  Yes Xavier Wick MD   fluticasone (FLONASE) 50 MCG/ACT nasal spray 2 sprays into the nostril(s) as directed by provider Daily. 9/25/19  Yes Iliana Jacobson MD   HYDROcodone-acetaminophen (NORCO) 5-325 MG per tablet Take 1 tablet by mouth Every 6 (Six) Hours As Needed for Moderate Pain . 7/5/20  Yes Vikas Tilley,    ipratropium-albuterol (DUO-NEB) 0.5-2.5 mg/3 ml nebulizer Take 3 mL by nebulization Every 4 (Four) Hours As Needed for Wheezing or Shortness of Air. 6/23/20  " Yes Xavier Wick MD   metoprolol succinate XL (TOPROL-XL) 50 MG 24 hr tablet Take 1 tablet by mouth Daily. 4/15/20  Yes Xavier Wick MD   montelukast (SINGULAIR) 10 MG tablet Take 1 tablet by mouth Every Night. 10/17/19  Yes Xavier Wick MD   omeprazole (priLOSEC) 20 MG capsule Take 1 capsule by mouth Daily. 6/23/20  Yes Xavier Wick MD   ondansetron ODT (ZOFRAN ODT) 8 MG disintegrating tablet Take 1 tablet by mouth Every 8 (Eight) Hours As Needed for Nausea or Vomiting. 3/4/20  Yes Xavier Wick MD   predniSONE (DELTASONE) 10 MG tablet Take 4 pills for 4 days 3 pills for 3 days 2 pills for 2 days 1 pill for one day then stop. 6/23/20  Yes Xavier Wick MD   vancomycin (VANCOCIN) 125 MG capsule Take 125 mg by mouth 4 (Four) Times a Day. 6/30/20  Yes Byron Medina MD   vitamin D (ERGOCALCIFEROL) 04743 units capsule capsule Take 1 capsule by mouth 1 (One) Time Per Week. 7/17/19  Yes Xavier Wick MD       Allergies:  Nsaids; Azithromycin; Ciprofloxacin; Doxycycline; Other; Levofloxacin; and Phenergan [promethazine hcl]    ROS:    Review of Systems   Constitutional: Negative for activity change, appetite change, chills, diaphoresis, fatigue, fever and unexpected weight change.   HENT: Negative for sore throat and trouble swallowing.    Respiratory: Negative for shortness of breath.    Gastrointestinal: Positive for abdominal pain. Negative for abdominal distention, anal bleeding, blood in stool, constipation, diarrhea, nausea, rectal pain and vomiting.   Endocrine: Negative for polydipsia, polyphagia and polyuria.   Genitourinary: Negative for difficulty urinating.   Musculoskeletal: Negative for arthralgias.   Skin: Negative for pallor.   Allergic/Immunologic: Negative for food allergies.   Neurological: Negative for weakness and light-headedness.   Psychiatric/Behavioral: Negative for behavioral problems.     Objective     Blood pressure 116/80, pulse 107, height 165.1 cm (65\"), " weight 52.3 kg (115 lb 3.2 oz), not currently breastfeeding.    Physical Exam   Constitutional: She is oriented to person, place, and time. She appears well-developed and well-nourished. No distress.   HENT:   Head: Normocephalic and atraumatic.   Cardiovascular: Normal rate, regular rhythm, normal heart sounds and intact distal pulses. Exam reveals no gallop and no friction rub.   No murmur heard.  Pulmonary/Chest: Breath sounds normal. No respiratory distress. She has no wheezes. She has no rales. She exhibits no tenderness.   Abdominal: Soft. Bowel sounds are normal. She exhibits no distension and no mass. There is no tenderness. There is no rebound and no guarding. No hernia.   Musculoskeletal: Normal range of motion. She exhibits no edema.   Neurological: She is alert and oriented to person, place, and time.   Skin: Skin is warm and dry. No rash noted. She is not diaphoretic. No erythema. No pallor.   Psychiatric: She has a normal mood and affect. Her behavior is normal. Judgment and thought content normal.        Assessment/Plan   Carla was seen today for diarrhea and c-diff.    Diagnoses and all orders for this visit:    Epigastric pain  -     Case Request; Standing  -     Case Request    Other orders  -     Follow Anesthesia Guidelines / Standing Orders; Future  -     Obtain Informed Consent; Future        ESOPHAGOGASTRODUODENOSCOPY (N/A)     Diagnosis Plan   1. Epigastric pain  Case Request    dextrose 5 % and sodium chloride 0.45 % infusion    Case Request       Anticipated Surgical Procedure:  Orders Placed This Encounter   Procedures   • Follow Anesthesia Guidelines / Standing Orders     Standing Status:   Future   • Obtain Informed Consent     Standing Status:   Future     Order Specific Question:   Informed Consent Given For     Answer:   ESOPHAGOGASTRODUODENOSCOPY       The risks, benefits, and alternatives of this procedure have been discussed with the patient or the responsible party- the patient  understands and agrees to proceed.

## 2020-07-10 NOTE — PROGRESS NOTES
[facility]      Chief Complaint:   Chief Complaint   Patient presents with   • Diarrhea   • c-diff       Subjective     HPI:   Patient with history of C. difficile colitis.  Patient is currently on vancomycin p.o. and states her bowel movements are normal at this time.  Patient had been getting constipated and began drinking coffee and improved her bowel movements on review of old colonoscopies patient does have diverticular disease discussed with patient taking tablespoon of Benefiber daily.  Patient also with epigastric abdominal pain which is occasionally severe nature patient will need EGD to evaluate this.    Plan; we will schedule patient for EGD.  Will have patient follow-up early if diarrhea returns after stopping the antibiotics to consider possible fecal transplant    Past Medical History:   Past Medical History:   Diagnosis Date   • Asthma    • Cancer (CMS/HCC)     cervical   • Colon polyp    • COPD (chronic obstructive pulmonary disease) (CMS/HCC)    • Crohn's disease (CMS/HCC)    • Diverticulitis of colon    • Elevated cholesterol    • Essential hypertension 4/15/2020   • GERD (gastroesophageal reflux disease)    • History of transfusion    • Irritable bowel syndrome    • Pancreatitis        Past Surgical History:  Past Surgical History:   Procedure Laterality Date   • COLONOSCOPY     • COLONOSCOPY N/A 10/18/2019    Procedure: COLONOSCOPY;  Surgeon: Tom Davis MD;  Location: [facility] ENDOSCOPY;  Service: Gastroenterology   • ENDOSCOPY N/A 10/18/2019    Procedure: ESOPHAGOGASTRODUODENOSCOPY;  Surgeon: Tom Davis MD;  Location: [facility] ENDOSCOPY;  Service: Gastroenterology   • HYSTERECTOMY     • TONSILECTOMY, ADENOIDECTOMY, BILATERAL MYRINGOTOMY AND TUBES     • UPPER GASTROINTESTINAL ENDOSCOPY         Family History:  Family History   Problem Relation Age of Onset   • Inflammatory bowel disease Mother    • Arthritis Mother    • Diabetes Mother    • Hypertension Mother     • Hyperlipidemia Mother    • Obesity Mother    • Osteoporosis Mother    • Heart disease Father    • Hyperlipidemia Father    • Diabetes Sister    • Liver disease Daughter    • Mental illness Daughter    • Obesity Daughter    • Diabetes Maternal Grandmother    • Cancer Maternal Grandmother         lung   • Cancer Other         lung   • Heart disease Other        Social History:   reports that she has been smoking cigarettes. She has been smoking about 1.00 pack per day. She has never used smokeless tobacco. She reports that she does not drink alcohol or use drugs.    Medications:   Prior to Admission medications    Medication Sig Start Date End Date Taking? Authorizing Provider   albuterol sulfate  (90 Base) MCG/ACT inhaler Inhale 2 puffs Every 4 (Four) Hours As Needed for Wheezing. 8/29/19  Yes Xavier Wick MD   amitriptyline (ELAVIL) 25 MG tablet Take 25 mg by mouth Every Night. 3/6/20  Yes ProviderByron MD   budesonide-formoterol (SYMBICORT) 160-4.5 MCG/ACT inhaler Inhale 2 puffs 2 (Two) Times a Day. 9/25/19  Yes Iliana Jacobson MD   cetirizine (zyrTEC) 10 MG tablet Take 1 tablet by mouth Daily. 9/25/19  Yes Iliana Jacobson MD   Cyanocobalamin (B-12 COMPLIANCE INJECTION IJ) Inject  as directed.   Yes ProviderByron MD   dicyclomine (Bentyl) 20 MG tablet Take 2 tablets by mouth Every 6 (Six) Hours. 6/23/20  Yes Xavier Wick MD   escitalopram (LEXAPRO) 10 MG tablet Take 1 tablet by mouth Daily. 2/4/20  Yes Xavier Wick MD   fluticasone (FLONASE) 50 MCG/ACT nasal spray 2 sprays into the nostril(s) as directed by provider Daily. 9/25/19  Yes Iliana Jacobson MD   HYDROcodone-acetaminophen (NORCO) 5-325 MG per tablet Take 1 tablet by mouth Every 6 (Six) Hours As Needed for Moderate Pain . 7/5/20  Yes Vikas Tilley,    ipratropium-albuterol (DUO-NEB) 0.5-2.5 mg/3 ml nebulizer Take 3 mL by nebulization Every 4 (Four) Hours As Needed for Wheezing or Shortness of Air. 6/23/20  " Yes Xavier Wick MD   metoprolol succinate XL (TOPROL-XL) 50 MG 24 hr tablet Take 1 tablet by mouth Daily. 4/15/20  Yes Xavier Wick MD   montelukast (SINGULAIR) 10 MG tablet Take 1 tablet by mouth Every Night. 10/17/19  Yes Xavier Wick MD   omeprazole (priLOSEC) 20 MG capsule Take 1 capsule by mouth Daily. 6/23/20  Yes Xavier Wick MD   ondansetron ODT (ZOFRAN ODT) 8 MG disintegrating tablet Take 1 tablet by mouth Every 8 (Eight) Hours As Needed for Nausea or Vomiting. 3/4/20  Yes Xavier Wick MD   predniSONE (DELTASONE) 10 MG tablet Take 4 pills for 4 days 3 pills for 3 days 2 pills for 2 days 1 pill for one day then stop. 6/23/20  Yes Xavier Wick MD   vancomycin (VANCOCIN) 125 MG capsule Take 125 mg by mouth 4 (Four) Times a Day. 6/30/20  Yes Byron Medina MD   vitamin D (ERGOCALCIFEROL) 16081 units capsule capsule Take 1 capsule by mouth 1 (One) Time Per Week. 7/17/19  Yes Xavier Wick MD       Allergies:  Nsaids; Azithromycin; Ciprofloxacin; Doxycycline; Other; Levofloxacin; and Phenergan [promethazine hcl]    ROS:    Review of Systems   Constitutional: Negative for activity change, appetite change, chills, diaphoresis, fatigue, fever and unexpected weight change.   HENT: Negative for sore throat and trouble swallowing.    Respiratory: Negative for shortness of breath.    Gastrointestinal: Positive for abdominal pain. Negative for abdominal distention, anal bleeding, blood in stool, constipation, diarrhea, nausea, rectal pain and vomiting.   Endocrine: Negative for polydipsia, polyphagia and polyuria.   Genitourinary: Negative for difficulty urinating.   Musculoskeletal: Negative for arthralgias.   Skin: Negative for pallor.   Allergic/Immunologic: Negative for food allergies.   Neurological: Negative for weakness and light-headedness.   Psychiatric/Behavioral: Negative for behavioral problems.     Objective     Blood pressure 116/80, pulse 107, height 165.1 cm (65\"), " weight 52.3 kg (115 lb 3.2 oz), not currently breastfeeding.    Physical Exam   Constitutional: She is oriented to person, place, and time. She appears well-developed and well-nourished. No distress.   HENT:   Head: Normocephalic and atraumatic.   Cardiovascular: Normal rate, regular rhythm, normal heart sounds and intact distal pulses. Exam reveals no gallop and no friction rub.   No murmur heard.  Pulmonary/Chest: Breath sounds normal. No respiratory distress. She has no wheezes. She has no rales. She exhibits no tenderness.   Abdominal: Soft. Bowel sounds are normal. She exhibits no distension and no mass. There is no tenderness. There is no rebound and no guarding. No hernia.   Musculoskeletal: Normal range of motion. She exhibits no edema.   Neurological: She is alert and oriented to person, place, and time.   Skin: Skin is warm and dry. No rash noted. She is not diaphoretic. No erythema. No pallor.   Psychiatric: She has a normal mood and affect. Her behavior is normal. Judgment and thought content normal.        Assessment/Plan   Carla was seen today for diarrhea and c-diff.    Diagnoses and all orders for this visit:    Epigastric pain  -     Case Request; Standing  -     Case Request    Other orders  -     Follow Anesthesia Guidelines / Standing Orders; Future  -     Obtain Informed Consent; Future        ESOPHAGOGASTRODUODENOSCOPY (N/A)     Diagnosis Plan   1. Epigastric pain  Case Request    dextrose 5 % and sodium chloride 0.45 % infusion    Case Request       Anticipated Surgical Procedure:  Orders Placed This Encounter   Procedures   • Follow Anesthesia Guidelines / Standing Orders     Standing Status:   Future   • Obtain Informed Consent     Standing Status:   Future     Order Specific Question:   Informed Consent Given For     Answer:   ESOPHAGOGASTRODUODENOSCOPY       The risks, benefits, and alternatives of this procedure have been discussed with the patient or the responsible party- the patient  understands and agrees to proceed.

## 2020-07-15 ENCOUNTER — PATIENT MESSAGE (OUTPATIENT)
Dept: GASTROENTEROLOGY | Facility: CLINIC | Age: 50
End: 2020-07-15

## 2020-07-15 ENCOUNTER — TELEPHONE (OUTPATIENT)
Dept: GASTROENTEROLOGY | Facility: CLINIC | Age: 50
End: 2020-07-15

## 2020-07-15 NOTE — PROGRESS NOTES
Subjective:  Carla Richard is a 50 y.o. female who presents for       Patient Active Problem List   Diagnosis   • Tobacco abuse counseling   • Chronic idiopathic constipation   • B12 deficiency   • Vitamin D deficiency    • Tobacco user   • Stage 2 moderate COPD by GOLD classification (CMS/[facility])   • Cervical lymphadenopathy   • Generalized abdominal pain   • Nausea   • Neurological abnormality   • Tremor   • RUQ pain   • COPD exacerbation (CMS/[facility])   • Pertussis exposure   • Chronic bronchitis (CMS/[facility])   • Cigarette nicotine dependence, uncomplicated   • Chronic nonseasonal allergic rhinitis due to pollen   • Episode of recurrent major depressive disorder (CMS/[facility])   • Diarrhea   • Chronic diarrhea   • Thrombocytosis (CMS/[facility])   • Nausea and vomiting   • Gluten intolerance   • C. difficile diarrhea   • Shiga toxin-producing Escherichia coli infection   • Generalized weakness   • Head injury   • Concussion with loss of consciousness   • Gastritis without bleeding   • Irritable bowel syndrome   • Tachycardia   • Essential hypertension   • JELENA (generalized anxiety disorder)   • Marijuana use   • Epigastric pain   • History of Clostridioides difficile colitis           Current Outpatient Medications:   •  albuterol sulfate  (90 Base) MCG/ACT inhaler, Inhale 2 puffs Every 4 (Four) Hours As Needed for Wheezing., Disp: 18 g, Rfl: 3  •  amitriptyline (ELAVIL) 25 MG tablet, Take 25 mg by mouth Every Night., Disp: , Rfl:   •  budesonide-formoterol (SYMBICORT) 160-4.5 MCG/ACT inhaler, Inhale 2 puffs 2 (Two) Times a Day., Disp: 1 inhaler, Rfl: 11  •  cetirizine (zyrTEC) 10 MG tablet, Take 1 tablet by mouth Daily., Disp: 30 tablet, Rfl: 11  •  Cyanocobalamin (B-12 COMPLIANCE INJECTION IJ), Inject  as directed., Disp: , Rfl:   •  dicyclomine (Bentyl) 20 MG tablet, Take 2 tablets by mouth Every 6 (Six) Hours., Disp: 240 tablet, Rfl: 3  •  escitalopram (LEXAPRO) 10 MG tablet, Take 1 tablet by mouth Daily., Disp:  30 tablet, Rfl: 3  •  fluticasone (FLONASE) 50 MCG/ACT nasal spray, 2 sprays into the nostril(s) as directed by provider Daily., Disp: 1 bottle, Rfl: 11  •  ipratropium-albuterol (DUO-NEB) 0.5-2.5 mg/3 ml nebulizer, Take 3 mL by nebulization Every 4 (Four) Hours As Needed for Wheezing or Shortness of Air., Disp: 360 mL, Rfl: 3  •  montelukast (SINGULAIR) 10 MG tablet, Take 1 tablet by mouth Every Night., Disp: 30 tablet, Rfl: 3  •  omeprazole (priLOSEC) 20 MG capsule, Take 1 capsule by mouth Daily., Disp: 30 capsule, Rfl: 3  •  ondansetron ODT (ZOFRAN ODT) 8 MG disintegrating tablet, Take 1 tablet by mouth Every 8 (Eight) Hours As Needed for Nausea or Vomiting., Disp: 90 tablet, Rfl: 3  •  vitamin D (ERGOCALCIFEROL) 1.25 MG (35727 UT) capsule capsule, Take 1 capsule by mouth 1 (One) Time Per Week., Disp: 4 capsule, Rfl: 3        Pt is 48 yo female with history of moderateC OPD, Vitamin B12 deficiency, Vitamin D deficiency, chronic abdominal pain, tremor,  RUQ pain,  Sp hysterectomy,  History of chron's disease, history of pertussis infection  sp right shoulder surgery. X 3, sp rotator cuff repair , history of C diff diarrhea, gluten sensitivity, gastritis, marijuana use, COPD         6/23/20 in office visit today for recheck on pt's COPD, chronic abdominal pain, termor, COPD< tobacco use..  She continues to have abodminal pain. She was supposed to get another endoscopy but that was canceled. Pt had Ct angioram done on 5/15/20 that was normal.  Normal superior mesenteric artery and normal inferior mesenteric artery.  She also had endoscopy GI with capsule and the capsule did not go down intestine and stay mostly in stomach. She continues to smoke 1 ppd. She has been under stress for past few weeks. She has upcoming appt with GI soon but no Pulmonology appt.    She continues to take symbicort and abluterol inhaler . She has upcoming appt with Hematology in [place] for polycythemia. She is to get a procedure soon.    BP stable     7/23/20 telemedicine visit  For pt's above medical issues. SHe was admitted to [facility] a few weeks ago for C diffcolitis and was on oral vancomycin. She was  Also recently treated at [facility] ED for abdominal pain on 7/5/20. Pt was positive for C diff colitis. She had CT of abdomen that showed mildly thick walled appeaance of descending colon along with sigmoid diverticolosisi  She also had hepatomegaly.  Her labwork at the time showed lipase normal UA normal CMP showed calcium at 8.5 wit stable kidney and liver function. CBC showed elevated WBC at 11.22. Pt saw GI at [facility] on 7/7/20 for her colitis. Also saw Dr. Broderick on 7/10/20 and has upcoming endoscopy on 7/27/20.  She states abdominal pain is better and has no diarrhea now but has constipation she is not taking  Bentyl regularly. She also takes elavil only as needed for her tremors.  She is adhering to high fiber diet and has increased fluid intake.  No vomiting.           Constipation   This is a chronic problem. The current episode started more than 1 year ago. The problem is unchanged. The patient is on a high fiber diet. She does not exercise regularly. There has not been adequate water intake. Associated symptoms include abdominal pain. Pertinent negatives include no anorexia, back pain, bloating, diarrhea, difficulty urinating, fever, flatus, hematochezia, hemorrhoids, nausea or vomiting.    Abdominal Pain   This is a recurrent problem. The current episode started more than 1 year ago. The onset quality is sudden. The problem has been waxing and waning. The pain is located in the generalized abdominal region, epigastric region and RUQ. The pain is at a severity of 5/10. The pain is moderate. The quality of the pain is aching, a sensation of fullness and burning. The abdominal pain radiates to the epigastric region. Associated symptoms include constipation. Pertinent negatives include  no anorexia, arthralgias, belching, diarrhea, dysuria, fever, flatus, frequency, headaches, hematochezia, hematuria, melena, myalgias, nausea, vomiting or weight loss. Nothing aggravates the pain. The pain is relieved by being still. The treatment provided no relief. Prior diagnostic workup includes ultrasound. Her past medical history is significant for Crohn's disease. There is no history of abdominal surgery, colon cancer, gallstones, GERD, irritable bowel syndrome, pancreatitis, PUD or ulcerative colitis.   COPD   This is a chronic problem. The current episode started more than 1 year ago. The problem occurs constantly. The problem has been waxing and waning  Associated symptoms include abdominal pain, arthralgias, fatigue, joint swelling, numbness and weakness. Pertinent negatives include no anorexia, change in bowel habit, chest pain, chills, congestion, coughing, diaphoresis, fever, headaches, myalgias, nausea, neck pain, sore throat, swollen glands, urinary symptoms, vertigo, visual change or vomiting. The symptoms are aggravated by smoking. She has tried nothing (combivent ) for the symptoms.        Review of Systems  Review of Systems   Constitutional: Positive for activity change and fatigue. Negative for appetite change, chills, diaphoresis and fever.   HENT: Negative for congestion, postnasal drip, rhinorrhea, sinus pressure, sinus pain, sneezing, sore throat, trouble swallowing and voice change.    Respiratory: Positive for cough and shortness of breath. Negative for choking, chest tightness, wheezing and stridor.    Cardiovascular: Negative for chest pain.   Gastrointestinal: Positive for abdominal pain and constipation. Negative for anorexia, bloating, diarrhea, flatus, hematochezia, hemorrhoids, nausea and vomiting.   Genitourinary: Negative for difficulty urinating.   Musculoskeletal: Positive for arthralgias. Negative for back pain.   Neurological: Positive for tremors, weakness and numbness.  Negative for headaches.   Psychiatric/Behavioral: Positive for agitation.       Patient Active Problem List   Diagnosis   • Tobacco abuse counseling   • Chronic idiopathic constipation   • B12 deficiency   • Vitamin D deficiency    • Tobacco user   • Stage 2 moderate COPD by GOLD classification (CMS/[facility])   • Cervical lymphadenopathy   • Generalized abdominal pain   • Nausea   • Neurological abnormality   • Tremor   • RUQ pain   • COPD exacerbation (CMS/[facility])   • Pertussis exposure   • Chronic bronchitis (CMS/[facility])   • Cigarette nicotine dependence, uncomplicated   • Chronic nonseasonal allergic rhinitis due to pollen   • Episode of recurrent major depressive disorder (CMS/[facility])   • Diarrhea   • Chronic diarrhea   • Thrombocytosis (CMS/[facility])   • Nausea and vomiting   • Gluten intolerance   • C. difficile diarrhea   • Shiga toxin-producing Escherichia coli infection   • Generalized weakness   • Head injury   • Concussion with loss of consciousness   • Gastritis without bleeding   • Irritable bowel syndrome   • Tachycardia   • Essential hypertension   • JELENA (generalized anxiety disorder)   • Marijuana use   • Epigastric pain   • History of Clostridioides difficile colitis     Past Surgical History:   Procedure Laterality Date   • COLONOSCOPY     • COLONOSCOPY N/A 10/18/2019    Procedure: COLONOSCOPY;  Surgeon: Tom Davis MD;  Location: [facility] ENDOSCOPY;  Service: Gastroenterology   • ENDOSCOPY N/A 10/18/2019    Procedure: ESOPHAGOGASTRODUODENOSCOPY;  Surgeon: Tom Davis MD;  Location: [facility] ENDOSCOPY;  Service: Gastroenterology   • HYSTERECTOMY     • TONSILECTOMY, ADENOIDECTOMY, BILATERAL MYRINGOTOMY AND TUBES     • UPPER GASTROINTESTINAL ENDOSCOPY       Social History     Socioeconomic History   • Marital status: Single     Spouse name: Not on file   • Number of children: Not on file   • Years of education: Not on file   • Highest education level: Not on file   Tobacco Use   • Smoking status: Current Every  Day Smoker     Packs/day: 1.00     Types: Cigarettes   • Smokeless tobacco: Never Used   Substance and Sexual Activity   • Alcohol use: No     Frequency: Never   • Drug use: No   • Sexual activity: Defer     Family History   Problem Relation Age of Onset   • Inflammatory bowel disease Mother    • Arthritis Mother    • Diabetes Mother    • Hypertension Mother    • Hyperlipidemia Mother    • Obesity Mother    • Osteoporosis Mother    • Heart disease Father    • Hyperlipidemia Father    • Diabetes Sister    • Liver disease Daughter    • Mental illness Daughter    • Obesity Daughter    • Diabetes Maternal Grandmother    • Cancer Maternal Grandmother         lung   • Cancer Other         lung   • Heart disease Other      Admission on 07/05/2020, Discharged on 07/05/2020   Component Date Value Ref Range Status   • Glucose 07/05/2020 97  65 - 99 mg/dL Final   • BUN 07/05/2020 6  6 - 20 mg/dL Final   • Creatinine 07/05/2020 0.65  0.57 - 1.00 mg/dL Final   • Sodium 07/05/2020 140  136 - 145 mmol/L Final   • Potassium 07/05/2020 3.5  3.5 - 5.2 mmol/L Final   • Chloride 07/05/2020 103  98 - 107 mmol/L Final   • CO2 07/05/2020 28.0  22.0 - 29.0 mmol/L Final   • Calcium 07/05/2020 8.5* 8.6 - 10.5 mg/dL Final   • Total Protein 07/05/2020 6.3  6.0 - 8.5 g/dL Final   • Albumin 07/05/2020 4.00  3.50 - 5.20 g/dL Final   • ALT (SGPT) 07/05/2020 18  1 - 33 U/L Final   • AST (SGOT) 07/05/2020 21  1 - 32 U/L Final   • Alkaline Phosphatase 07/05/2020 61  39 - 117 U/L Final   • Total Bilirubin 07/05/2020 0.3  0.2 - 1.2 mg/dL Final   • eGFR Non African Amer 07/05/2020 96  >60 mL/min/1.73 Final   • Globulin 07/05/2020 2.3  gm/dL Final   • A/G Ratio 07/05/2020 1.7  g/dL Final   • BUN/Creatinine Ratio 07/05/2020 9.2  7.0 - 25.0 Final   • Anion Gap 07/05/2020 9.0  5.0 - 15.0 mmol/L Final   • Lipase 07/05/2020 19  13 - 60 U/L Final   • Color,  07/05/2020 Yellow  Yellow, Straw, Dark Yellow, Ana M Final   • Appearance,  07/05/2020 Clear  Clear  Final   • pH, UA 07/05/2020 8.0  5.0 - 9.0 Final   • Specific Gravity, UA 07/05/2020 1.015  1.003 - 1.030 Final   • Glucose, UA 07/05/2020 Negative  Negative Final   • Ketones, UA 07/05/2020 Negative  Negative Final   • Bilirubin, UA 07/05/2020 Negative  Negative Final   • Blood, UA 07/05/2020 Negative  Negative Final   • Protein, UA 07/05/2020 Negative  Negative Final   • Leuk Esterase, UA 07/05/2020 Negative  Negative Final   • Nitrite, UA 07/05/2020 Negative  Negative Final   • Urobilinogen, UA 07/05/2020 0.2 E.U./dL  0.2 - 1.0 E.U./dL Final   • Extra Tube 07/05/2020 hold for add-on   Final    Auto resulted   • Extra Tube 07/05/2020 Hold for add-ons.   Final    Auto resulted.   • Extra Tube 07/05/2020 hold for add-on   Final    Auto resulted   • Extra Tube 07/05/2020 Hold for add-ons.   Final    Auto resulted.   • WBC 07/05/2020 11.22* 3.40 - 10.80 10*3/mm3 Final   • RBC 07/05/2020 4.01  3.77 - 5.28 10*6/mm3 Final   • Hemoglobin 07/05/2020 12.6  12.0 - 15.9 g/dL Final   • Hematocrit 07/05/2020 37.7  34.0 - 46.6 % Final   • MCV 07/05/2020 94.0  79.0 - 97.0 fL Final   • MCH 07/05/2020 31.4  26.6 - 33.0 pg Final   • MCHC 07/05/2020 33.4  31.5 - 35.7 g/dL Final   • RDW 07/05/2020 13.1  12.3 - 15.4 % Final   • RDW-SD 07/05/2020 45.1  37.0 - 54.0 fl Final   • MPV 07/05/2020 9.2  6.0 - 12.0 fL Final   • Platelets 07/05/2020 368  140 - 450 10*3/mm3 Final   • Neutrophil % 07/05/2020 50.9  42.7 - 76.0 % Final   • Lymphocyte % 07/05/2020 33.2  19.6 - 45.3 % Final   • Monocyte % 07/05/2020 10.4  5.0 - 12.0 % Final   • Eosinophil % 07/05/2020 4.1  0.3 - 6.2 % Final   • Basophil % 07/05/2020 0.5  0.0 - 1.5 % Final   • Immature Grans % 07/05/2020 0.9* 0.0 - 0.5 % Final   • Neutrophils, Absolute 07/05/2020 5.71  1.70 - 7.00 10*3/mm3 Final   • Lymphocytes, Absolute 07/05/2020 3.72* 0.70 - 3.10 10*3/mm3 Final   • Monocytes, Absolute 07/05/2020 1.17* 0.10 - 0.90 10*3/mm3 Final   • Eosinophils, Absolute 07/05/2020 0.46* 0.00 - 0.40  10*3/mm3 Final   • Basophils, Absolute 07/05/2020 0.06  0.00 - 0.20 10*3/mm3 Final   • Immature Grans, Absolute 07/05/2020 0.10* 0.00 - 0.05 10*3/mm3 Final   • nRBC 07/05/2020 0.0  0.0 - 0.2 /100 WBC Final   Admission on 03/02/2020, Discharged on 03/02/2020   Component Date Value Ref Range Status   • THC, Screen, Urine 03/02/2020 Positive* Negative Final   • Phencyclidine (PCP), Urine 03/02/2020 Negative  Negative Final   • Cocaine Screen, Urine 03/02/2020 Negative  Negative Final   • Methamphetamine, Ur 03/02/2020 Negative  Negative Final   • Opiate Screen 03/02/2020 Negative  Negative Final   • Amphetamine Screen, Urine 03/02/2020 Negative  Negative Final   • Benzodiazepine Screen, Urine 03/02/2020 Negative  Negative Final   • Tricyclic Antidepressants Screen 03/02/2020 Negative  Negative Final   • Methadone Screen, Urine 03/02/2020 Negative  Negative Final   • Barbiturates Screen, Urine 03/02/2020 Positive* Negative Final   • Oxycodone Screen, Urine 03/02/2020 Negative  Negative Final   • Propoxyphene Screen 03/02/2020 Negative  Negative Final   • Buprenorphine, Screen, Urine 03/02/2020 Negative  Negative Final   • Glucose 03/02/2020 97  65 - 99 mg/dL Final   • BUN 03/02/2020 13  6 - 20 mg/dL Final   • Creatinine 03/02/2020 0.69  0.57 - 1.00 mg/dL Final   • Sodium 03/02/2020 141  136 - 145 mmol/L Final   • Potassium 03/02/2020 4.0  3.5 - 5.2 mmol/L Final   • Chloride 03/02/2020 102  98 - 107 mmol/L Final   • CO2 03/02/2020 26.0  22.0 - 29.0 mmol/L Final   • Calcium 03/02/2020 10.0  8.6 - 10.5 mg/dL Final   • Total Protein 03/02/2020 7.7  6.0 - 8.5 g/dL Final   • Albumin 03/02/2020 4.80  3.50 - 5.20 g/dL Final   • ALT (SGPT) 03/02/2020 19  1 - 33 U/L Final   • AST (SGOT) 03/02/2020 18  1 - 32 U/L Final   • Alkaline Phosphatase 03/02/2020 95  39 - 117 U/L Final   • Total Bilirubin 03/02/2020 0.3  0.2 - 1.2 mg/dL Final   • eGFR Non African Amer 03/02/2020 89  >60 mL/min/1.73 Final   • Globulin 03/02/2020 2.9  gm/dL  Final   • A/G Ratio 03/02/2020 1.7  g/dL Final   • BUN/Creatinine Ratio 03/02/2020 18.8  7.0 - 25.0 Final   • Anion Gap 03/02/2020 13.0  5.0 - 15.0 mmol/L Final   • WBC 03/02/2020 9.73  3.40 - 10.80 10*3/mm3 Final   • RBC 03/02/2020 5.20  3.77 - 5.28 10*6/mm3 Final   • Hemoglobin 03/02/2020 16.4* 12.0 - 15.9 g/dL Final   • Hematocrit 03/02/2020 49.4* 34.0 - 46.6 % Final   • MCV 03/02/2020 95.0  79.0 - 97.0 fL Final   • MCH 03/02/2020 31.5  26.6 - 33.0 pg Final   • MCHC 03/02/2020 33.2  31.5 - 35.7 g/dL Final   • RDW 03/02/2020 13.2  12.3 - 15.4 % Final   • RDW-SD 03/02/2020 46.1  37.0 - 54.0 fl Final   • MPV 03/02/2020 9.2  6.0 - 12.0 fL Final   • Platelets 03/02/2020 439  140 - 450 10*3/mm3 Final   • Neutrophil % 03/02/2020 54.7  42.7 - 76.0 % Final   • Lymphocyte % 03/02/2020 34.5  19.6 - 45.3 % Final   • Monocyte % 03/02/2020 7.6  5.0 - 12.0 % Final   • Eosinophil % 03/02/2020 1.7  0.3 - 6.2 % Final   • Basophil % 03/02/2020 0.8  0.0 - 1.5 % Final   • Immature Grans % 03/02/2020 0.7* 0.0 - 0.5 % Final   • Neutrophils, Absolute 03/02/2020 5.31  1.70 - 7.00 10*3/mm3 Final   • Lymphocytes, Absolute 03/02/2020 3.36* 0.70 - 3.10 10*3/mm3 Final   • Monocytes, Absolute 03/02/2020 0.74  0.10 - 0.90 10*3/mm3 Final   • Eosinophils, Absolute 03/02/2020 0.17  0.00 - 0.40 10*3/mm3 Final   • Basophils, Absolute 03/02/2020 0.08  0.00 - 0.20 10*3/mm3 Final   • Immature Grans, Absolute 03/02/2020 0.07* 0.00 - 0.05 10*3/mm3 Final   • nRBC 03/02/2020 0.0  0.0 - 0.2 /100 WBC Final   • Extra Tube 03/02/2020 hold for add-on   Final    Auto resulted   • Extra Tube 03/02/2020 Hold for add-ons.   Final    Auto resulted.   • Color, UA 03/02/2020 Yellow  Yellow, Straw, Dark Yellow, Ana M Final   • Appearance, UA 03/02/2020 Clear  Clear Final   • pH, UA 03/02/2020 6.5  5.0 - 9.0 Final   • Specific Gravity, UA 03/02/2020 1.021  1.003 - 1.030 Final   • Glucose, UA 03/02/2020 Negative  Negative Final   • Ketones, UA 03/02/2020 Negative   Negative Final   • Bilirubin, UA 03/02/2020 Negative  Negative Final   • Blood, UA 03/02/2020 Small (1+)* Negative Final   • Protein, UA 03/02/2020 Negative  Negative Final   • Leuk Esterase, UA 03/02/2020 Negative  Negative Final   • Nitrite, UA 03/02/2020 Negative  Negative Final   • Urobilinogen, UA 03/02/2020 0.2 E.U./dL  0.2 - 1.0 E.U./dL Final   • RBC, UA 03/02/2020 6-12* None Seen /HPF Final   • WBC, UA 03/02/2020 None Seen  None Seen, 0-2, 3-5 /HPF Final   • Bacteria, UA 03/02/2020 None Seen  None Seen /HPF Final   • Squamous Epithelial Cells, UA 03/02/2020 None Seen  None Seen, 0-2 /HPF Final   • Hyaline Casts, UA 03/02/2020 0-2  None Seen /LPF Final   • Methodology 03/02/2020 Automated Microscopy   Final   • Troponin T 03/02/2020 <0.010  0.000 - 0.030 ng/mL Final   • proBNP 03/02/2020 27.8  5.0 - 900.0 pg/mL Final   Admission on 02/06/2020, Discharged on 02/06/2020   Component Date Value Ref Range Status   • Glucose 02/06/2020 99  65 - 99 mg/dL Final   • BUN 02/06/2020 12  6 - 20 mg/dL Final   • Creatinine 02/06/2020 0.66  0.57 - 1.00 mg/dL Final   • Sodium 02/06/2020 139  136 - 145 mmol/L Final   • Potassium 02/06/2020 3.6  3.5 - 5.2 mmol/L Final   • Chloride 02/06/2020 103  98 - 107 mmol/L Final   • CO2 02/06/2020 24.0  22.0 - 29.0 mmol/L Final   • Calcium 02/06/2020 9.2  8.6 - 10.5 mg/dL Final   • Total Protein 02/06/2020 6.6  6.0 - 8.5 g/dL Final   • Albumin 02/06/2020 4.40  3.50 - 5.20 g/dL Final   • ALT (SGPT) 02/06/2020 6  1 - 33 U/L Final   • AST (SGOT) 02/06/2020 10  1 - 32 U/L Final   • Alkaline Phosphatase 02/06/2020 73  39 - 117 U/L Final   • Total Bilirubin 02/06/2020 0.4  0.2 - 1.2 mg/dL Final   • eGFR Non African Amer 02/06/2020 94  >60 mL/min/1.73 Final   • Globulin 02/06/2020 2.2  gm/dL Final   • A/G Ratio 02/06/2020 2.0  g/dL Final   • BUN/Creatinine Ratio 02/06/2020 18.2  7.0 - 25.0 Final   • Anion Gap 02/06/2020 12.0  5.0 - 15.0 mmol/L Final   • Lipase 02/06/2020 39  13 - 60 U/L Final    • Color, UA 02/06/2020 Yellow  Yellow, Straw, Dark Yellow, Ana M Final   • Appearance, UA 02/06/2020 Clear  Clear Final   • pH, UA 02/06/2020 6.5  5.0 - 9.0 Final   • Specific Gravity, UA 02/06/2020 1.029  1.003 - 1.030 Final   • Glucose, UA 02/06/2020 Negative  Negative Final   • Ketones, UA 02/06/2020 Negative  Negative Final   • Bilirubin, UA 02/06/2020 Negative  Negative Final   • Blood, UA 02/06/2020 Negative  Negative Final   • Protein, UA 02/06/2020 Negative  Negative Final   • Leuk Esterase, UA 02/06/2020 Negative  Negative Final   • Nitrite, UA 02/06/2020 Negative  Negative Final   • Urobilinogen, UA 02/06/2020 1.0 E.U./dL  0.2 - 1.0 E.U./dL Final   • WBC 02/06/2020 14.21* 3.40 - 10.80 10*3/mm3 Final   • RBC 02/06/2020 4.89  3.77 - 5.28 10*6/mm3 Final   • Hemoglobin 02/06/2020 15.3  12.0 - 15.9 g/dL Final   • Hematocrit 02/06/2020 45.7  34.0 - 46.6 % Final   • MCV 02/06/2020 93.5  79.0 - 97.0 fL Final   • MCH 02/06/2020 31.3  26.6 - 33.0 pg Final   • MCHC 02/06/2020 33.5  31.5 - 35.7 g/dL Final   • RDW 02/06/2020 12.7  12.3 - 15.4 % Final   • RDW-SD 02/06/2020 43.5  37.0 - 54.0 fl Final   • MPV 02/06/2020 9.1  6.0 - 12.0 fL Final   • Platelets 02/06/2020 432  140 - 450 10*3/mm3 Final   • Neutrophil % 02/06/2020 60.1  42.7 - 76.0 % Final   • Lymphocyte % 02/06/2020 28.6  19.6 - 45.3 % Final   • Monocyte % 02/06/2020 8.3  5.0 - 12.0 % Final   • Eosinophil % 02/06/2020 1.7  0.3 - 6.2 % Final   • Basophil % 02/06/2020 0.6  0.0 - 1.5 % Final   • Immature Grans % 02/06/2020 0.7* 0.0 - 0.5 % Final   • Neutrophils, Absolute 02/06/2020 8.54* 1.70 - 7.00 10*3/mm3 Final   • Lymphocytes, Absolute 02/06/2020 4.07* 0.70 - 3.10 10*3/mm3 Final   • Monocytes, Absolute 02/06/2020 1.18* 0.10 - 0.90 10*3/mm3 Final   • Eosinophils, Absolute 02/06/2020 0.24  0.00 - 0.40 10*3/mm3 Final   • Basophils, Absolute 02/06/2020 0.08  0.00 - 0.20 10*3/mm3 Final   • Immature Grans, Absolute 02/06/2020 0.10* 0.00 - 0.05 10*3/mm3 Final   •  nRBC 02/06/2020 0.0  0.0 - 0.2 /100 WBC Final   • Extra Tube 02/06/2020 hold for add-on   Final    Auto resulted   • Extra Tube 02/06/2020 Hold for add-ons.   Final    Auto resulted.   • Neutrophil % 02/06/2020 62.0  42.7 - 76.0 % Final   • Lymphocyte % 02/06/2020 31.0  19.6 - 45.3 % Final   • Monocyte % 02/06/2020 6.0  5.0 - 12.0 % Final   • Bands %  02/06/2020 1.0  0.0 - 5.0 % Final   • Neutrophils Absolute 02/06/2020 8.95* 1.70 - 7.00 10*3/mm3 Final   • Lymphocytes Absolute 02/06/2020 4.41* 0.70 - 3.10 10*3/mm3 Final   • Monocytes Absolute 02/06/2020 0.85  0.10 - 0.90 10*3/mm3 Final   • RBC Morphology 02/06/2020 Normal  Normal Final   • WBC Morphology 02/06/2020 Normal  Normal Final   • Platelet Morphology 02/06/2020 Normal  Normal Final   Lab on 02/04/2020   Component Date Value Ref Range Status   • WBC 02/04/2020 18.81* 3.40 - 10.80 10*3/mm3 Final   • RBC 02/04/2020 4.62  3.77 - 5.28 10*6/mm3 Final   • Hemoglobin 02/04/2020 15.2  12.0 - 15.9 g/dL Final   • Hematocrit 02/04/2020 43.0  34.0 - 46.6 % Final   • MCV 02/04/2020 93.1  79.0 - 97.0 fL Final   • MCH 02/04/2020 32.9  26.6 - 33.0 pg Final   • MCHC 02/04/2020 35.3  31.5 - 35.7 g/dL Final   • RDW 02/04/2020 12.4  12.3 - 15.4 % Final   • RDW-SD 02/04/2020 42.2  37.0 - 54.0 fl Final   • MPV 02/04/2020 9.7  6.0 - 12.0 fL Final   • Platelets 02/04/2020 489* 140 - 450 10*3/mm3 Final   • Glucose 02/04/2020 93  65 - 99 mg/dL Final   • BUN 02/04/2020 14  6 - 20 mg/dL Final   • Creatinine 02/04/2020 0.76  0.57 - 1.00 mg/dL Final   • Sodium 02/04/2020 140  136 - 145 mmol/L Final   • Potassium 02/04/2020 4.1  3.5 - 5.2 mmol/L Final   • Chloride 02/04/2020 101  98 - 107 mmol/L Final   • CO2 02/04/2020 22.7  22.0 - 29.0 mmol/L Final   • Calcium 02/04/2020 9.0  8.6 - 10.5 mg/dL Final   • Total Protein 02/04/2020 6.4  6.0 - 8.5 g/dL Final   • Albumin 02/04/2020 4.40  3.50 - 5.20 g/dL Final   • ALT (SGPT) 02/04/2020 6  1 - 33 U/L Final   • AST (SGOT) 02/04/2020 11  1 - 32  U/L Final   • Alkaline Phosphatase 02/04/2020 62  39 - 117 U/L Final   • Total Bilirubin 02/04/2020 0.3  0.2 - 1.2 mg/dL Final   • eGFR Non African Amer 02/04/2020 80  >60 mL/min/1.73 Final   • Globulin 02/04/2020 2.0  gm/dL Final   • A/G Ratio 02/04/2020 2.2  g/dL Final   • BUN/Creatinine Ratio 02/04/2020 18.4  7.0 - 25.0 Final   • Anion Gap 02/04/2020 16.3* 5.0 - 15.0 mmol/L Final   • Amylase 02/04/2020 51  28 - 100 U/L Final   • Lipase 02/04/2020 31  13 - 60 U/L Final   • Beef 02/04/2020 <0.10  <0.35 kU/L Final   • Class Description 02/04/2020 0   Final   • [place] 02/04/2020 <0.10  <0.35 kU/L Final   • Class Interpretation 02/04/2020 0   Final   • Pork 02/04/2020 <0.10  <0.35 kU/L Final   • Class Interpretation 02/04/2020 0   Final    The test method is the Multichannel ImmunoCAP allergen-specific  IgE system. CLASS INTERPRETATION   <0.10 kU/L= 0,  Negative; 0.10 - 0.34 kU/L= 0/1, Equivocal/Borderline;  0.35 - 0.69  kU/L=1, Low Positive; 0.70 - 3.49 kU/L=2,  Moderate Positive;  3.50  - 17.49 kU/L=3, High Positive;  17.50 - 49.99 kU/L= 4, Very High Positive; 50.00 - 99.99  kU/L= 5, Very High Positive;   >99.99 kU/L=6, Very High  Positive  *This test was developed and its performance  characteristics determined by [facility]. It has not  been cleared or approved by the U.S. Food and Drug  Administration.   • Alpha Gal IgE 02/04/2020 <0.10  <0.10 kU/L Final    Previous reports (ABIEL 2009;123:426-433) have demonstrated  that patients with IgE antibodies to  cegvskqyy-o-4,3-galactose are at risk for delayed  anaphylaxis, angioedema, or urticaria following  consumption of beef, pork, or lamb.   • Gliadin Deamidated Peptide Ab, IgA 02/04/2020 4  0 - 19 units Final                       Negative                   0 - 19                     Weak Positive             20 - 30                     Moderate to Strong Positive   >30   • Deaminated Gliadin Ab IgG 02/04/2020 5  0 - 19 units Final                       Negative                    0 - 19                     Weak Positive             20 - 30                     Moderate to Strong Positive   >30   • Tissue Transglutaminase IgA 02/04/2020 <2  0 - 3 U/mL Final                                  Negative        0 -  3                                Weak Positive   4 - 10                                Positive           >10   Tissue Transglutaminase (tTG) has been identified   as the endomysial antigen.  Studies have demonstr-   ated that endomysial IgA antibodies have over 99%   specificity for gluten sensitive enteropathy.   • Tissue Transglutaminase IgG 02/04/2020 10* 0 - 5 U/mL Final                                  Negative        0 - 5                                Weak Positive   6 - 9                                Positive           >9   • Endomysial IgA 02/04/2020 Negative  Negative Final   • IgA 02/04/2020 64* 87 - 352 mg/dL Final   • Class Description 02/04/2020 Comment   Final        Levels of Specific IgE       Class  Description of Class      ---------------------------  -----  --------------------                     < 0.10         0         Negative             0.10 -    0.31         0/I       Equivocal/Low             0.32 -    0.55         I         Low             0.56 -    1.40         II        Moderate             1.41 -    3.90         III       High             3.91 -   19.00         IV        Very High            19.01 -  100.00         V         Very High                    >100.00         VI        Very High   • Egg White 02/04/2020 <0.10  Class 0 kU/L Final   • Milk, Cow's 02/04/2020 <0.10  Class 0 kU/L Final   • CodFish 02/04/2020 <0.10  Class 0 kU/L Final   • Sesame Seed 02/04/2020 <0.10  Class 0 kU/L Final   • Peanut 02/04/2020 <0.10  Class 0 kU/L Final   • Soybean 02/04/2020 <0.10  Class 0 kU/L Final   • Hazelnut 02/04/2020 <0.10  Class 0 kU/L Final   • Shrimp 02/04/2020 <0.10  Class 0 kU/L Final   • Scallop 02/04/2020 <0.10  Class 0 kU/L Final   •  Gluten 02/04/2020 <0.10  Class 0 kU/L Final   • [place] 02/04/2020 <0.10  Class 0 kU/L Final   • Wheat 02/04/2020 <0.10  Class 0 kU/L Final   • Gliadin Deamidated Peptide Ab, IgA 02/04/2020 4  0 - 19 units Final                       Negative                   0 - 19                     Weak Positive             20 - 30                     Moderate to Strong Positive   >30   • Deaminated Gliadin Ab IgG 02/04/2020 6  0 - 19 units Final                       Negative                   0 - 19                     Weak Positive             20 - 30                     Moderate to Strong Positive   >30   • Tissue Transglutaminase IgA 02/04/2020 <2  0 - 3 U/mL Final                                  Negative        0 -  3                                Weak Positive   4 - 10                                Positive           >10   Tissue Transglutaminase (tTG) has been identified   as the endomysial antigen.  Studies have demonstr-   ated that endomysial IgA antibodies have over 99%   specificity for gluten sensitive enteropathy.   • Tissue Transglutaminase IgG 02/04/2020 11* 0 - 5 U/mL Final                                  Negative        0 - 5                                Weak Positive   6 - 9                                Positive           >9   • Campylobacter 02/04/2020 Not Detected  Not Detected, Invalid Final   • Plesiomonas shigelloides 02/04/2020 Not Detected  Not Detected Final   • Salmonella 02/04/2020 Not Detected  Not Detected Final   • Vibrio 02/04/2020 Not Detected  Not Detected Final   • Vibrio cholerae 02/04/2020 Not Detected  Not Detected Final   • Yersinia enterocolitica 02/04/2020 Not Detected  Not Detected Final   • Enteroaggregative E. coli (EAEC) 02/04/2020 Not Detected  Not Detected Final   • Enteropathogenic E. coli (EPEC) 02/04/2020 Not Detected  Not Detected Final   • Enterotoxigenic E. coli (ETEC) lt/* 02/04/2020 Not Detected  Not Detected Final   • Shiga-like toxin-producing E. coli* 02/04/2020  Detected* Not Detected Final   • E. coli O157 02/04/2020 Not Detected  Not Detected Final   • Shigella/Enteroinvasive E. coli (E* 02/04/2020 Not Detected  Not Detected Final   • Cryptosporidium 02/04/2020 Not Detected  Not Detected, Invalid Final   • Cyclospora cayetanensis 02/04/2020 Not Detected  Not Detected Final   • Entamoeba histolytica 02/04/2020 Not Detected  Not Detected Final   • Giardia lamblia 02/04/2020 Not Detected  Not Detected Final   • Adenovirus F40/41 02/04/2020 Not Detected  Not Detected Final   • Astrovirus 02/04/2020 Not Detected  Not Detected Final   • Norovirus GI/GII 02/04/2020 Not Detected  Not Detected Final   • Rotavirus A 02/04/2020 Not Detected  Not Detected Final   • Sapovirus (I, II, IV or V) 02/04/2020 Not Detected  Not Detected Final   • C. Difficile Toxins by PCR 02/04/2020 Positive* Negative Final   • Neutrophil % 02/04/2020 60.6  42.7 - 76.0 % Final   • Lymphocyte % 02/04/2020 25.3  19.6 - 45.3 % Final   • Monocyte % 02/04/2020 11.1  5.0 - 12.0 % Final   • Eosinophil % 02/04/2020 1.0  0.3 - 6.2 % Final   • Basophil % 02/04/2020 2.0* 0.0 - 1.5 % Final   • Neutrophils Absolute 02/04/2020 11.40* 1.70 - 7.00 10*3/mm3 Final   • Lymphocytes Absolute 02/04/2020 4.76* 0.70 - 3.10 10*3/mm3 Final   • Monocytes Absolute 02/04/2020 2.09* 0.10 - 0.90 10*3/mm3 Final   • Eosinophils Absolute 02/04/2020 0.19  0.00 - 0.40 10*3/mm3 Final   • Basophils Absolute 02/04/2020 0.38* 0.00 - 0.20 10*3/mm3 Final   • Poikilocytes 02/04/2020 Slight/1+  None Seen Final   • WBC Morphology 02/04/2020 Normal  Normal Final   • Platelet Morphology 02/04/2020 Normal  Normal Final      CT Abdomen Pelvis With Contrast  Narrative: PROCEDURE: CT ABDOMEN PELVIS W CONTRAST    INDICATION: Right upper quadrant pain, known C. difficile    HISTORY: Crohn's, COPD, cervical cancer, hysterectomy    COMPARISON: 5/15/2020     TECHNIQUE:   - reconstructions:  Axial, coronal, sagittal   - contrast:  oral:  No      intravenous:  80  mL of Isovue-300     This exam was performed according to our departmental  dose-optimization program, which includes automated exposure  control, adjustment of the mA and/or kV according to patient size  and/or use of iterative reconstruction technique.  DLP is 314.3    FINDINGS:      - - - CT ABDOMEN - - -      THORAX (INFERIOR):   - LUNG BASES:  Clear   - PLEURA:  No fluid or mass   - HEART: Normal size, no pericardial fluid   - MISC:  N/A      ABDOMEN:   - LIVER:  Enlarged, measuring 20 cm in greatest craniocaudal  dimension. No ductal dilatation, no focal lesion   - GB:  Grossly negative    - CBD:  Grossly negative   - SPLEEN:  Normal size and contour   - PANCREAS:  Normal in size, contour, no focal mass    - VISCERA:  Normal caliber, no wall thickening   - MESENTERY: No mesenteric mass   - CAVITY:  No free abdominal fluid, no free intraperitoneal air   - BODY WALL:  With normal limits   - OSSEOUS:  Grossly negative for age   - MISC:   RETROPERITONEUM:   - KIDNEYS:Normal size/contour, no collecting system dilation                    No evidence of an enhancing mass   - URETERS:  Normal course, caliber   - ADRENALS:  Normal size, contour   - MISC:  No sig. retroperitoneal adenopathy or mass   - VASCULAR:  Aorta / iliacs: Mild atherosclerotic vascular  ossification     - - - CT PELVIS - - -      - VISCERA:  Normal caliber small/large bowel. Mildly  thick-walled appearance of the ascending colon may be due to  patient's reported C. difficile infection. Sigmoid diverticuli  without radiographic evidence of diverticulosis.        - APPENDIX: Within normal limits   - MESENTERY:  No mass   - VASCULAR:  Within normal limits for age   - CAVITY:  No free fluid / air   - BLADDER:  Unremarkable   - OSSEOUS:  Within normal limits    - MISC:   - UTERUS/OVARY: Uterus is surgically absent. Ovaries are not  well seen and may be absent as well.       Impression: 1. Mildly thick-walled appearance of the descending colon may  "be  due to patient's reported C. difficile infection. Clinical  correlation needed  2. Sigmoid diverticulosis without radiographic evidence of  diverticulitis  3. Hepatomegaly without focal lesions identified  4. Status post hysterectomy.  5. Please see findings section above for further details     Electronically signed by:  Sally Rodriguez MD  7/5/2020 8:59 PM CDT  Workstation: [id number]    [email or web address]  Immunization History   Administered Date(s) Administered   • flucelvax quad pfs =>4 YRS 09/19/2019       The following portions of the patient's history were reviewed and updated as appropriate: allergies, current medications, past family history, past medical history, past social history, past surgical history and problem list.        Physical Exam  /86 (BP Location: Left arm, Patient Position: Sitting)   Pulse 100   Temp 97.4 °F (36.3 °C) (Oral)   Ht 165.1 cm (65\")   Wt 51.2 kg (112 lb 14.4 oz)   LMP  (LMP Unknown)   SpO2 97%   BMI 18.79 kg/m²     Physical Exam   Constitutional: She is oriented to person, place, and time.   Neurological: She is alert and oriented to person, place, and time.   Psychiatric: She has a normal mood and affect. Her behavior is normal.   Nursing note and vitals reviewed.      Assessment/Plan    Diagnosis Plan   1. Generalized abdominal pain     2. Chronic idiopathic constipation     3. History of Clostridioides difficile colitis             -will get ER report and CT of ehead   -HTN/tachcycardia -controlledon toprol XL 50 mg daily.   -schedule mammogram screening    -JELENA - pt admits to smoking marijuana. Recommend pt continue that since it calms her down but also discussed about potential side effects if long term use   -thrombocytosis /polycythemia  continue to monitor. She is seeing Hematology   -gluten sensitivity -recommend gluten free diet   -COPD/COPD exacerbation- Pulmonlogy following on Symbicort 160/45 2 puffs BID  Albuterol inhaler PRN.  advised pt to quit smoking " >5 minutes.  continue duo nebs. Advised pt to make appt with Pulmonlogist. urrged importance of smoking cessation   -vitamin B12 deficiency  - b12 injections weekly  -tobacco user - counseled to quit smoking >5 minutes. She could not tolerate chantix  urged the importance of quitting smoking.   Insurance would not cover nicotine patch and gum. Recommend pt call 1 [phone] NOW  -chronic bronchitis, - mucinex advised pt to cut down or quit smoking >5 minutes   -abdominal pain/gastritis/IBS /GERD- Gastroenterology following. On PPI prilosec 20 mg q daily  OFF prucalopride 2 mg daily. She is to followup regarding abodminal issues on bentyl 40 mg every 6 hours PRN.  advised pt to limit elavil if possible since it can cause constipation. Has upcoming EGD soon with GI.    -nausea/ vomting - continue zofran PRN  -allergic rhinitis - flonase nasal spray and singulair   -vitamin D deficiency - vitamin D once a week   -pt has upcoming mammogram soon.    -tremor of head/concussion - she has seen neurology with Dr. Chen. Had MRI of brain. Recommended 2nd opinon with Dr. Manjarrez She also has family history of Friedreich's ataxia. On elavail 25 mg at bedtime. Continue with Neurology followup with Dr. Manjarrez    -advised pt to go to ER or call 911 if symptoms worrisome or severe  -advised pt to be  Safe and call with questions and concerns  -advised to followup with all referrals and Specialist  -advised pt to be safe during COVID -19 pandemic   This visit has been rescheduled as a phone visit to comply with patient safety concerns in accordance with CDC recommendations. Total time of discussion was 25  Minutes.  -recheck in 3 weeks             This document has been electronically signed by Xavier Wick MD on July 23, 2020 09:49

## 2020-07-21 ENCOUNTER — TELEPHONE (OUTPATIENT)
Dept: PULMONOLOGY | Facility: CLINIC | Age: 50
End: 2020-07-21

## 2020-07-21 DIAGNOSIS — J44.9 STAGE 2 MODERATE COPD BY GOLD CLASSIFICATION (HCC): Primary | ICD-10-CM

## 2020-07-21 NOTE — TELEPHONE ENCOUNTER
----- Message from Jacy Apodaca MA sent at 7/21/2020 10:54 AM CDT -----  Patient called stating her Nub machine broke and is needing a new one. Please call at back at [phone]

## 2020-07-21 NOTE — TELEPHONE ENCOUNTER
Patient would like to send RX for nebulizer machine to [facility] in [place].     Phone# [phone]  Fax # [phone]    RX faxed.

## 2020-07-23 ENCOUNTER — OFFICE VISIT (OUTPATIENT)
Dept: FAMILY MEDICINE CLINIC | Facility: CLINIC | Age: 50
End: 2020-07-23

## 2020-07-23 VITALS
DIASTOLIC BLOOD PRESSURE: 86 MMHG | WEIGHT: 112.9 LBS | BODY MASS INDEX: 18.81 KG/M2 | HEART RATE: 100 BPM | OXYGEN SATURATION: 97 % | SYSTOLIC BLOOD PRESSURE: 117 MMHG | TEMPERATURE: 97.4 F | HEIGHT: 65 IN

## 2020-07-23 DIAGNOSIS — Z86.19 HISTORY OF CLOSTRIDIOIDES DIFFICILE COLITIS: ICD-10-CM

## 2020-07-23 DIAGNOSIS — K59.04 CHRONIC IDIOPATHIC CONSTIPATION: Primary | ICD-10-CM

## 2020-07-23 DIAGNOSIS — R10.84 GENERALIZED ABDOMINAL PAIN: ICD-10-CM

## 2020-07-23 PROCEDURE — 99213 OFFICE O/P EST LOW 20 MIN: CPT | Performed by: FAMILY MEDICINE

## 2020-07-23 RX ORDER — ERGOCALCIFEROL 1.25 MG/1
50000 CAPSULE ORAL WEEKLY
Qty: 4 CAPSULE | Refills: 3 | Status: SHIPPED | OUTPATIENT
Start: 2020-07-23 | End: 2020-08-24

## 2020-07-24 ENCOUNTER — LAB (OUTPATIENT)
Dept: LAB | Facility: HOSPITAL | Age: 50
End: 2020-07-24

## 2020-07-24 PROCEDURE — C9803 HOPD COVID-19 SPEC COLLECT: HCPCS | Performed by: INTERNAL MEDICINE

## 2020-07-24 PROCEDURE — U0003 INFECTIOUS AGENT DETECTION BY NUCLEIC ACID (DNA OR RNA); SEVERE ACUTE RESPIRATORY SYNDROME CORONAVIRUS 2 (SARS-COV-2) (CORONAVIRUS DISEASE [COVID-19]), AMPLIFIED PROBE TECHNIQUE, MAKING USE OF HIGH THROUGHPUT TECHNOLOGIES AS DESCRIBED BY CMS-2020-01-R: HCPCS | Performed by: INTERNAL MEDICINE

## 2020-07-25 LAB
COVID LABCORP PRIORITY: NORMAL
SARS-COV-2 RNA RESP QL NAA+PROBE: NOT DETECTED

## 2020-07-26 ENCOUNTER — HOSPITAL ENCOUNTER (EMERGENCY)
Facility: HOSPITAL | Age: 50
Discharge: HOME | End: 2020-07-26
Attending: STUDENT IN AN ORGANIZED HEALTH CARE EDUCATION/TRAINING PROGRAM | Admitting: STUDENT IN AN ORGANIZED HEALTH CARE EDUCATION/TRAINING PROGRAM

## 2020-07-26 ENCOUNTER — APPOINTMENT (OUTPATIENT)
Dept: CT IMAGING | Facility: HOSPITAL | Age: 50
End: 2020-07-26

## 2020-07-26 VITALS
DIASTOLIC BLOOD PRESSURE: 59 MMHG | OXYGEN SATURATION: 97 % | WEIGHT: 112 LBS | HEIGHT: 65 IN | TEMPERATURE: 98.2 F | BODY MASS INDEX: 18.66 KG/M2 | HEART RATE: 86 BPM | RESPIRATION RATE: 18 BRPM | SYSTOLIC BLOOD PRESSURE: 109 MMHG

## 2020-07-26 DIAGNOSIS — R11.2 NON-INTRACTABLE VOMITING WITH NAUSEA, UNSPECIFIED VOMITING TYPE: ICD-10-CM

## 2020-07-26 DIAGNOSIS — R10.84 GENERALIZED ABDOMINAL PAIN: Primary | ICD-10-CM

## 2020-07-26 DIAGNOSIS — R19.7 DIARRHEA, UNSPECIFIED TYPE: ICD-10-CM

## 2020-07-26 LAB
ALBUMIN SERPL-MCNC: 4.4 G/DL (ref 3.5–5.2)
ALBUMIN/GLOB SERPL: 2.2 G/DL
ALP SERPL-CCNC: 66 U/L (ref 39–117)
ALT SERPL W P-5'-P-CCNC: 9 U/L (ref 1–33)
ANION GAP SERPL CALCULATED.3IONS-SCNC: 10 MMOL/L (ref 5–15)
AST SERPL-CCNC: 14 U/L (ref 1–32)
BACTERIA UR QL AUTO: ABNORMAL /HPF
BASOPHILS # BLD AUTO: 0.06 10*3/MM3 (ref 0–0.2)
BASOPHILS NFR BLD AUTO: 0.7 % (ref 0–1.5)
BILIRUB SERPL-MCNC: 0.5 MG/DL (ref 0–1.2)
BILIRUB UR QL STRIP: NEGATIVE
BUN SERPL-MCNC: 11 MG/DL (ref 6–20)
BUN/CREAT SERPL: 16.2 (ref 7–25)
CALCIUM SPEC-SCNC: 9 MG/DL (ref 8.6–10.5)
CHLORIDE SERPL-SCNC: 105 MMOL/L (ref 98–107)
CLARITY UR: CLEAR
CO2 SERPL-SCNC: 25 MMOL/L (ref 22–29)
COLOR UR: YELLOW
CREAT SERPL-MCNC: 0.68 MG/DL (ref 0.57–1)
D-LACTATE SERPL-SCNC: 1.2 MMOL/L (ref 0.5–2)
DEPRECATED RDW RBC AUTO: 42.5 FL (ref 37–54)
EOSINOPHIL # BLD AUTO: 0.34 10*3/MM3 (ref 0–0.4)
EOSINOPHIL NFR BLD AUTO: 3.7 % (ref 0.3–6.2)
ERYTHROCYTE [DISTWIDTH] IN BLOOD BY AUTOMATED COUNT: 12.4 % (ref 12.3–15.4)
GFR SERPL CREATININE-BSD FRML MDRD: 91 ML/MIN/1.73
GLOBULIN UR ELPH-MCNC: 2 GM/DL
GLUCOSE SERPL-MCNC: 89 MG/DL (ref 65–99)
GLUCOSE UR STRIP-MCNC: NEGATIVE MG/DL
HCG SERPL QL: NEGATIVE
HCT VFR BLD AUTO: 40.7 % (ref 34–46.6)
HGB BLD-MCNC: 13.7 G/DL (ref 12–15.9)
HGB UR QL STRIP.AUTO: ABNORMAL
HOLD SPECIMEN: NORMAL
HYALINE CASTS UR QL AUTO: ABNORMAL /LPF
IMM GRANULOCYTES # BLD AUTO: 0.02 10*3/MM3 (ref 0–0.05)
IMM GRANULOCYTES NFR BLD AUTO: 0.2 % (ref 0–0.5)
KETONES UR QL STRIP: ABNORMAL
LEUKOCYTE ESTERASE UR QL STRIP.AUTO: NEGATIVE
LIPASE SERPL-CCNC: 24 U/L (ref 13–60)
LYMPHOCYTES # BLD AUTO: 3.32 10*3/MM3 (ref 0.7–3.1)
LYMPHOCYTES NFR BLD AUTO: 36 % (ref 19.6–45.3)
MCH RBC QN AUTO: 31.1 PG (ref 26.6–33)
MCHC RBC AUTO-ENTMCNC: 33.7 G/DL (ref 31.5–35.7)
MCV RBC AUTO: 92.5 FL (ref 79–97)
MONOCYTES # BLD AUTO: 0.77 10*3/MM3 (ref 0.1–0.9)
MONOCYTES NFR BLD AUTO: 8.4 % (ref 5–12)
NEUTROPHILS NFR BLD AUTO: 4.7 10*3/MM3 (ref 1.7–7)
NEUTROPHILS NFR BLD AUTO: 51 % (ref 42.7–76)
NITRITE UR QL STRIP: NEGATIVE
NRBC BLD AUTO-RTO: 0 /100 WBC (ref 0–0.2)
PH UR STRIP.AUTO: 7 [PH] (ref 5–9)
PLATELET # BLD AUTO: 422 10*3/MM3 (ref 140–450)
PMV BLD AUTO: 9.3 FL (ref 6–12)
POTASSIUM SERPL-SCNC: 3.6 MMOL/L (ref 3.5–5.2)
PROT SERPL-MCNC: 6.4 G/DL (ref 6–8.5)
PROT UR QL STRIP: NEGATIVE
RBC # BLD AUTO: 4.4 10*6/MM3 (ref 3.77–5.28)
RBC # UR: ABNORMAL /HPF
REF LAB TEST METHOD: ABNORMAL
SODIUM SERPL-SCNC: 140 MMOL/L (ref 136–145)
SP GR UR STRIP: 1.01 (ref 1–1.03)
SQUAMOUS #/AREA URNS HPF: ABNORMAL /HPF
UROBILINOGEN UR QL STRIP: ABNORMAL
WBC # BLD AUTO: 9.21 10*3/MM3 (ref 3.4–10.8)
WBC UR QL AUTO: ABNORMAL /HPF
WHOLE BLOOD HOLD SPECIMEN: NORMAL
WHOLE BLOOD HOLD SPECIMEN: NORMAL

## 2020-07-26 PROCEDURE — 80053 COMPREHEN METABOLIC PANEL: CPT | Performed by: STUDENT IN AN ORGANIZED HEALTH CARE EDUCATION/TRAINING PROGRAM

## 2020-07-26 PROCEDURE — 99284 EMERGENCY DEPT VISIT MOD MDM: CPT

## 2020-07-26 PROCEDURE — 96375 TX/PRO/DX INJ NEW DRUG ADDON: CPT

## 2020-07-26 PROCEDURE — 85025 COMPLETE CBC W/AUTO DIFF WBC: CPT | Performed by: STUDENT IN AN ORGANIZED HEALTH CARE EDUCATION/TRAINING PROGRAM

## 2020-07-26 PROCEDURE — 25010000002 ONDANSETRON PER 1 MG: Performed by: STUDENT IN AN ORGANIZED HEALTH CARE EDUCATION/TRAINING PROGRAM

## 2020-07-26 PROCEDURE — 81001 URINALYSIS AUTO W/SCOPE: CPT | Performed by: STUDENT IN AN ORGANIZED HEALTH CARE EDUCATION/TRAINING PROGRAM

## 2020-07-26 PROCEDURE — 96374 THER/PROPH/DIAG INJ IV PUSH: CPT

## 2020-07-26 PROCEDURE — 83605 ASSAY OF LACTIC ACID: CPT | Performed by: STUDENT IN AN ORGANIZED HEALTH CARE EDUCATION/TRAINING PROGRAM

## 2020-07-26 PROCEDURE — 84703 CHORIONIC GONADOTROPIN ASSAY: CPT | Performed by: STUDENT IN AN ORGANIZED HEALTH CARE EDUCATION/TRAINING PROGRAM

## 2020-07-26 PROCEDURE — 25010000002 IOPAMIDOL 61 % SOLUTION: Performed by: STUDENT IN AN ORGANIZED HEALTH CARE EDUCATION/TRAINING PROGRAM

## 2020-07-26 PROCEDURE — 74177 CT ABD & PELVIS W/CONTRAST: CPT

## 2020-07-26 PROCEDURE — 83690 ASSAY OF LIPASE: CPT | Performed by: STUDENT IN AN ORGANIZED HEALTH CARE EDUCATION/TRAINING PROGRAM

## 2020-07-26 PROCEDURE — 25010000002 MORPHINE PER 10 MG: Performed by: STUDENT IN AN ORGANIZED HEALTH CARE EDUCATION/TRAINING PROGRAM

## 2020-07-26 RX ORDER — ONDANSETRON 4 MG/1
4 TABLET, ORALLY DISINTEGRATING ORAL 4 TIMES DAILY PRN
Qty: 12 TABLET | Refills: 0 | Status: SHIPPED | OUTPATIENT
Start: 2020-07-26 | End: 2022-01-28 | Stop reason: SDUPTHER

## 2020-07-26 RX ORDER — SODIUM CHLORIDE 0.9 % (FLUSH) 0.9 %
10 SYRINGE (ML) INJECTION AS NEEDED
Status: DISCONTINUED | OUTPATIENT
Start: 2020-07-26 | End: 2020-07-26 | Stop reason: HOSPADM

## 2020-07-26 RX ORDER — ONDANSETRON 2 MG/ML
4 INJECTION INTRAMUSCULAR; INTRAVENOUS ONCE
Status: COMPLETED | OUTPATIENT
Start: 2020-07-26 | End: 2020-07-26

## 2020-07-26 RX ADMIN — IOPAMIDOL 70 ML: 612 INJECTION, SOLUTION INTRAVENOUS at 16:49

## 2020-07-26 RX ADMIN — ONDANSETRON 4 MG: 2 INJECTION INTRAMUSCULAR; INTRAVENOUS at 14:26

## 2020-07-26 RX ADMIN — SODIUM CHLORIDE, POTASSIUM CHLORIDE, SODIUM LACTATE AND CALCIUM CHLORIDE 1000 ML: 600; 310; 30; 20 INJECTION, SOLUTION INTRAVENOUS at 14:26

## 2020-07-26 RX ADMIN — MORPHINE SULFATE 4 MG: 4 INJECTION, SOLUTION INTRAMUSCULAR; INTRAVENOUS at 14:26

## 2020-07-26 NOTE — ED NOTES
Pt presents to ED with C/O abdominal pain that began intermittently Friday, and started constantly yesterday at 1600. Pt reports vomiting and diarrhea      Delmi Perla RN  07/26/20 3645

## 2020-07-26 NOTE — ED PROVIDER NOTES
"Subjective   50-year-old female history of prior C. difficile infections and colitis presents to the ER chief complaint of 1 day history of worsening abdominal pain, nausea, vomiting, diarrhea.  Patient states that started yesterday afternoon.  Nothing makes the pain better or worse.  She describes her diarrhea as \"colicky poop\" with mucus in it.  She sees GI and has been told she does not have ulcerative colitis or Crohn's.  Patient has decreased p.o. intake since yesterday.  She endorses some chills and sweating but has attributed it to the pain.          Review of Systems   Constitutional: Positive for activity change, appetite change, chills, diaphoresis and fatigue. Negative for fever.   HENT: Negative for congestion and rhinorrhea.    Respiratory: Negative for cough, shortness of breath and wheezing.    Cardiovascular: Negative for chest pain, palpitations and leg swelling.   Gastrointestinal: Positive for abdominal pain, blood in stool, diarrhea, nausea and vomiting.   Genitourinary: Negative for dysuria and flank pain.   Skin: Negative for color change and rash.   Neurological: Negative for dizziness and headaches.   Psychiatric/Behavioral: Positive for sleep disturbance. Negative for agitation. The patient is not nervous/anxious.        Past Medical History:   Diagnosis Date   • Asthma    • Cancer (CMS/HCC)     cervical   • Colon polyp    • COPD (chronic obstructive pulmonary disease) (CMS/HCC)    • Crohn's disease (CMS/HCC)    • Diverticulitis of colon    • Elevated cholesterol    • Essential hypertension 4/15/2020   • GERD (gastroesophageal reflux disease)    • History of transfusion    • Irritable bowel syndrome    • Kidney calculus    • Pancreatitis    • Sphincter of Oddi dysfunction        Allergies   Allergen Reactions   • Nsaids Swelling and GI Intolerance   • Azithromycin Swelling   • Ciprofloxacin Hives   • Doxycycline Swelling   • Other Other (See Comments)     nicotine patch   Caused body " twitching/seizures   • Levofloxacin Rash   • Phenergan [Promethazine Hcl] GI Intolerance       Past Surgical History:   Procedure Laterality Date   • COLONOSCOPY     • COLONOSCOPY N/A 10/18/2019    Procedure: COLONOSCOPY;  Surgeon: Tom Davis MD;  Location: [facility] ENDOSCOPY;  Service: Gastroenterology   • ENDOSCOPY N/A 10/18/2019    Procedure: ESOPHAGOGASTRODUODENOSCOPY;  Surgeon: Tom Davis MD;  Location: [facility] ENDOSCOPY;  Service: Gastroenterology   • HYSTERECTOMY     • TONSILECTOMY, ADENOIDECTOMY, BILATERAL MYRINGOTOMY AND TUBES     • UPPER GASTROINTESTINAL ENDOSCOPY         Family History   Problem Relation Age of Onset   • Inflammatory bowel disease Mother    • Arthritis Mother    • Diabetes Mother    • Hypertension Mother    • Hyperlipidemia Mother    • Obesity Mother    • Osteoporosis Mother    • Heart disease Father    • Hyperlipidemia Father    • Diabetes Sister    • Liver disease Daughter    • Mental illness Daughter    • Obesity Daughter    • Diabetes Maternal Grandmother    • Cancer Maternal Grandmother         lung   • Cancer Other         lung   • Heart disease Other        Social History     Socioeconomic History   • Marital status: Single     Spouse name: Not on file   • Number of children: Not on file   • Years of education: Not on file   • Highest education level: Not on file   Tobacco Use   • Smoking status: Current Every Day Smoker     Packs/day: 1.00     Types: Cigarettes   • Smokeless tobacco: Never Used   Substance and Sexual Activity   • Alcohol use: No     Frequency: Never   • Drug use: No   • Sexual activity: Defer           Objective    Vitals:    07/26/20 1431 07/26/20 1531 07/26/20 1601 07/26/20 1631   BP: 132/63 121/75 134/72 109/59   BP Location: Right arm Right arm Right arm    Patient Position: Lying Sitting Sitting    Pulse: 86 70 74 86   Resp: 18 18 18    Temp:       TempSrc:       SpO2: 100% 99% 98% 97%   Weight:       Height:           Physical Exam   Constitutional:  She appears well-developed and well-nourished. She is active.  Non-toxic appearance. She has a sickly appearance. She appears ill. She appears distressed.   HENT:   Head: Normocephalic and atraumatic.   Mouth/Throat: Oropharynx is clear and moist.   Eyes: Conjunctivae are normal. No scleral icterus.   Neck: Normal range of motion. No tracheal deviation present.   Cardiovascular: Normal rate.   Pulmonary/Chest: Effort normal and breath sounds normal. No respiratory distress. She has no wheezes. She exhibits no tenderness.   Abdominal: Soft. Bowel sounds are normal. She exhibits no distension. There is generalized tenderness. There is no rebound and no guarding.   Musculoskeletal: Normal range of motion. She exhibits no deformity.   Neurological: She is alert.   Skin: Skin is warm and dry. Capillary refill takes less than 2 seconds. She is not diaphoretic.   Psychiatric: She has a normal mood and affect. Her behavior is normal.   Nursing note and vitals reviewed.      Procedures           ED Course      Results for orders placed or performed during the hospital encounter of 07/26/20   Comprehensive Metabolic Panel   Result Value Ref Range    Glucose 89 65 - 99 mg/dL    BUN 11 6 - 20 mg/dL    Creatinine 0.68 0.57 - 1.00 mg/dL    Sodium 140 136 - 145 mmol/L    Potassium 3.6 3.5 - 5.2 mmol/L    Chloride 105 98 - 107 mmol/L    CO2 25.0 22.0 - 29.0 mmol/L    Calcium 9.0 8.6 - 10.5 mg/dL    Total Protein 6.4 6.0 - 8.5 g/dL    Albumin 4.40 3.50 - 5.20 g/dL    ALT (SGPT) 9 1 - 33 U/L    AST (SGOT) 14 1 - 32 U/L    Alkaline Phosphatase 66 39 - 117 U/L    Total Bilirubin 0.5 0.0 - 1.2 mg/dL    eGFR Non African Amer 91 >60 mL/min/1.73    Globulin 2.0 gm/dL    A/G Ratio 2.2 g/dL    BUN/Creatinine Ratio 16.2 7.0 - 25.0    Anion Gap 10.0 5.0 - 15.0 mmol/L   CBC Auto Differential   Result Value Ref Range    WBC 9.21 3.40 - 10.80 10*3/mm3    RBC 4.40 3.77 - 5.28 10*6/mm3    Hemoglobin 13.7 12.0 - 15.9 g/dL    Hematocrit 40.7 34.0 -  46.6 %    MCV 92.5 79.0 - 97.0 fL    MCH 31.1 26.6 - 33.0 pg    MCHC 33.7 31.5 - 35.7 g/dL    RDW 12.4 12.3 - 15.4 %    RDW-SD 42.5 37.0 - 54.0 fl    MPV 9.3 6.0 - 12.0 fL    Platelets 422 140 - 450 10*3/mm3    Neutrophil % 51.0 42.7 - 76.0 %    Lymphocyte % 36.0 19.6 - 45.3 %    Monocyte % 8.4 5.0 - 12.0 %    Eosinophil % 3.7 0.3 - 6.2 %    Basophil % 0.7 0.0 - 1.5 %    Immature Grans % 0.2 0.0 - 0.5 %    Neutrophils, Absolute 4.70 1.70 - 7.00 10*3/mm3    Lymphocytes, Absolute 3.32 (H) 0.70 - 3.10 10*3/mm3    Monocytes, Absolute 0.77 0.10 - 0.90 10*3/mm3    Eosinophils, Absolute 0.34 0.00 - 0.40 10*3/mm3    Basophils, Absolute 0.06 0.00 - 0.20 10*3/mm3    Immature Grans, Absolute 0.02 0.00 - 0.05 10*3/mm3    nRBC 0.0 0.0 - 0.2 /100 WBC   Urinalysis With Microscopic If Indicated (No Culture) - Urine, Clean Catch   Result Value Ref Range    Color, UA Yellow Yellow, Straw, Dark Yellow, Ana M    Appearance, UA Clear Clear    pH, UA 7.0 5.0 - 9.0    Specific Gravity, UA 1.010 1.003 - 1.030    Glucose, UA Negative Negative    Ketones, UA Trace (A) Negative    Bilirubin, UA Negative Negative    Blood, UA Small (1+) (A) Negative    Protein, UA Negative Negative    Leuk Esterase, UA Negative Negative    Nitrite, UA Negative Negative    Urobilinogen, UA 0.2 E.U./dL 0.2 - 1.0 E.U./dL   Lactic Acid, Plasma   Result Value Ref Range    Lactate 1.2 0.5 - 2.0 mmol/L   Lipase   Result Value Ref Range    Lipase 24 13 - 60 U/L   hCG, Serum, Qualitative   Result Value Ref Range    HCG Qualitative Negative Negative   Urinalysis, Microscopic Only - Urine, Clean Catch   Result Value Ref Range    RBC, UA 3-5 (A) None Seen /HPF    WBC, UA None Seen None Seen, 0-2, 3-5 /HPF    Bacteria, UA None Seen None Seen /HPF    Squamous Epithelial Cells, UA None Seen None Seen, 0-2 /HPF    Hyaline Casts, UA None Seen None Seen /LPF    Methodology Automated Microscopy    Light Blue Top   Result Value Ref Range    Extra Tube hold for add-on    Green Top  (Gel)   Result Value Ref Range    Extra Tube Hold for add-ons.    Lavender Top   Result Value Ref Range    Extra Tube hold for add-on      CT Abdomen Pelvis With Contrast   Final Result   Impression:   There are no acute findings in the abdomen or the pelvis   Large bowel diverticulosis, without acute diverticulitis.   5 mm benign cyst/hemangioma in the lateral segment of the left   lobe of the liver.      Electronically signed by:  Ronnie Suarez MD  7/26/2020 5:10 PM CDT   Workstation: [id number]                                             Sojeans  Number of Diagnoses or Management Options  Diarrhea, unspecified type: new and requires workup  Generalized abdominal pain: new and requires workup  Non-intractable vomiting with nausea, unspecified vomiting type: new and requires workup  Diagnosis management comments: Patient placed in bed 23 and evaluated by me. Vital signs are stable, afebrile.  Labs are unremarkable with a normal lactate, lipase, WBC, electrolytes.  Urine is clean.  CT abdomen pelvis did not show any acute intra-abdominal pathology.  Patient received IVF bolus, Zofran, morphine.  On reevaluation, patient is alert and resting comfortably playing games on her phone. I discussed the results of the emergency department evaluation with the patient.  I recommended primary care and GI follow-up.  Patient has an appointment with GI tomorrow.  Return precautions discussed.  Discharged with prescription of Zofran.       Amount and/or Complexity of Data Reviewed  Clinical lab tests: reviewed and ordered  Tests in the radiology section of CPT®: ordered and reviewed  Tests in the medicine section of CPT®: ordered and reviewed  Decide to obtain previous medical records or to obtain history from someone other than the patient: yes    Patient Progress  Patient progress: improved      Final diagnoses:   Generalized abdominal pain   Diarrhea, unspecified type   Non-intractable vomiting with nausea, unspecified  vomiting type            Gregory Kaur MD  07/26/20 1680

## 2020-07-26 NOTE — ED NOTES
Pt ambulated to ED bathroom to attempt to provide urine sample.     Delmi Perla, RN  07/26/20 1774

## 2020-07-27 ENCOUNTER — HOSPITAL ENCOUNTER (OUTPATIENT)
Facility: HOSPITAL | Age: 50
Setting detail: HOSPITAL OUTPATIENT SURGERY
Discharge: HOME | End: 2020-07-27
Attending: INTERNAL MEDICINE | Admitting: INTERNAL MEDICINE

## 2020-07-27 ENCOUNTER — ANESTHESIA EVENT (OUTPATIENT)
Dept: GASTROENTEROLOGY | Facility: HOSPITAL | Age: 50
End: 2020-07-27

## 2020-07-27 ENCOUNTER — ANESTHESIA (OUTPATIENT)
Dept: GASTROENTEROLOGY | Facility: HOSPITAL | Age: 50
End: 2020-07-27

## 2020-07-27 VITALS
HEART RATE: 80 BPM | HEIGHT: 65 IN | SYSTOLIC BLOOD PRESSURE: 103 MMHG | BODY MASS INDEX: 18.66 KG/M2 | OXYGEN SATURATION: 93 % | TEMPERATURE: 97.3 F | WEIGHT: 112 LBS | RESPIRATION RATE: 18 BRPM | DIASTOLIC BLOOD PRESSURE: 56 MMHG

## 2020-07-27 DIAGNOSIS — R10.13 EPIGASTRIC PAIN: ICD-10-CM

## 2020-07-27 PROCEDURE — 25010000002 PROPOFOL 10 MG/ML EMULSION: Performed by: NURSE ANESTHETIST, CERTIFIED REGISTERED

## 2020-07-27 PROCEDURE — 88305 TISSUE EXAM BY PATHOLOGIST: CPT

## 2020-07-27 PROCEDURE — 43239 EGD BIOPSY SINGLE/MULTIPLE: CPT | Performed by: INTERNAL MEDICINE

## 2020-07-27 PROCEDURE — 25010000002 ONDANSETRON PER 1 MG: Performed by: NURSE ANESTHETIST, CERTIFIED REGISTERED

## 2020-07-27 RX ORDER — PROPOFOL 10 MG/ML
VIAL (ML) INTRAVENOUS AS NEEDED
Status: DISCONTINUED | OUTPATIENT
Start: 2020-07-27 | End: 2020-07-27 | Stop reason: SURG

## 2020-07-27 RX ORDER — LIDOCAINE HYDROCHLORIDE 20 MG/ML
INJECTION, SOLUTION INTRAVENOUS AS NEEDED
Status: DISCONTINUED | OUTPATIENT
Start: 2020-07-27 | End: 2020-07-27 | Stop reason: SURG

## 2020-07-27 RX ORDER — DEXTROSE AND SODIUM CHLORIDE 5; .45 G/100ML; G/100ML
30 INJECTION, SOLUTION INTRAVENOUS CONTINUOUS PRN
Status: DISCONTINUED | OUTPATIENT
Start: 2020-07-27 | End: 2020-07-27 | Stop reason: HOSPADM

## 2020-07-27 RX ORDER — ONDANSETRON 2 MG/ML
4 INJECTION INTRAMUSCULAR; INTRAVENOUS ONCE
Status: COMPLETED | OUTPATIENT
Start: 2020-07-27 | End: 2020-07-27

## 2020-07-27 RX ADMIN — PROPOFOL 100 MG: 10 INJECTION, EMULSION INTRAVENOUS at 12:48

## 2020-07-27 RX ADMIN — DEXTROSE AND SODIUM CHLORIDE 30 ML/HR: 5; 450 INJECTION, SOLUTION INTRAVENOUS at 12:25

## 2020-07-27 RX ADMIN — ONDANSETRON 4 MG: 2 INJECTION INTRAMUSCULAR; INTRAVENOUS at 13:13

## 2020-07-27 RX ADMIN — PROPOFOL 30 MG: 10 INJECTION, EMULSION INTRAVENOUS at 12:50

## 2020-07-27 RX ADMIN — LIDOCAINE HYDROCHLORIDE 100 MG: 20 INJECTION, SOLUTION INTRAVENOUS at 12:48

## 2020-07-27 NOTE — ANESTHESIA PREPROCEDURE EVALUATION
Anesthesia Evaluation     Patient summary reviewed and Nursing notes reviewed   NPO Solid Status: > 8 hours  NPO Liquid Status: > 8 hours           Airway   Mallampati: II  TM distance: >3 FB  Neck ROM: full  No difficulty expected  Dental    (+) edentulous    Pulmonary - normal exam   (+) COPD moderate, asthma,  Cardiovascular - normal exam    (+) hypertension well controlled less than 2 medications, hyperlipidemia,       Neuro/Psych  (+) tremors, psychiatric history Anxiety and Depression,     GI/Hepatic/Renal/Endo    (+)  GERD,  renal disease,     Musculoskeletal (-) negative ROS    Abdominal  - normal exam   Substance History - negative use     OB/GYN negative ob/gyn ROS         Other        (-) history of cancer                Anesthesia Plan    ASA 3     MAC     intravenous induction     Anesthetic plan, all risks, benefits, and alternatives have been provided, discussed and informed consent has been obtained with: patient.

## 2020-07-27 NOTE — NURSING NOTE
Pt. Says she never has had another stool test for c diff and is still symptomatic. Pt. Treated as positive.

## 2020-07-27 NOTE — ANESTHESIA POSTPROCEDURE EVALUATION
Patient: Carla Richard    Procedure Summary     Date:  07/27/20 Room / Location:  [facility] ENDOSCOPY 1 / [facility] ENDOSCOPY    Anesthesia Start:  1241 Anesthesia Stop:  1253    Procedure:  ESOPHAGOGASTRODUODENOSCOPY (N/A ) Diagnosis:       Epigastric pain      (Epigastric pain [R10.13])    Surgeon:  Tom Davis MD Provider:  Wilma Garcia CRNA    Anesthesia Type:  MAC ASA Status:  3          Anesthesia Type: MAC    Vitals  No vitals data found for the desired time range.          Post Anesthesia Care and Evaluation    Patient location during evaluation: bedside  Patient participation: waiting for patient participation  Level of consciousness: sleepy but conscious  Pain score: 0  Pain management: adequate  Airway patency: patent  Anesthetic complications: No anesthetic complications  PONV Status: none  Cardiovascular status: acceptable  Respiratory status: acceptable  Hydration status: acceptable

## 2020-07-29 LAB
LAB AP CASE REPORT: NORMAL
PATH REPORT.FINAL DX SPEC: NORMAL

## 2020-08-17 ENCOUNTER — TELEPHONE (OUTPATIENT)
Dept: GASTROENTEROLOGY | Facility: CLINIC | Age: 50
End: 2020-08-17

## 2020-08-17 NOTE — TELEPHONE ENCOUNTER
----- Message from Carla Richard sent at 8/17/2020  2:40 PM CDT -----  Regarding: Complaint  Contact: [phone]  I have been bleeding.  The one with the black dot is from today.  The other one is August 3rd. This is very painful and Bentyl doesn't help me and I am still taking it.  I will see you tomorrow at 11. If you can help today please call me.  I'm miserable.      Thanks         I talked to pt. pt will come into office tomorrow. She stated she will drink fluids and keep a eye on the bleeding. She said she doesn't feel the bleeding is bad enough to go to the er.

## 2020-08-18 ENCOUNTER — OFFICE VISIT (OUTPATIENT)
Dept: GASTROENTEROLOGY | Facility: CLINIC | Age: 50
End: 2020-08-18

## 2020-08-18 VITALS
WEIGHT: 112.2 LBS | DIASTOLIC BLOOD PRESSURE: 60 MMHG | HEIGHT: 65 IN | HEART RATE: 84 BPM | OXYGEN SATURATION: 98 % | BODY MASS INDEX: 18.69 KG/M2 | SYSTOLIC BLOOD PRESSURE: 100 MMHG

## 2020-08-18 DIAGNOSIS — R19.7 DIARRHEA, UNSPECIFIED TYPE: Primary | ICD-10-CM

## 2020-08-18 PROCEDURE — 99214 OFFICE O/P EST MOD 30 MIN: CPT | Performed by: INTERNAL MEDICINE

## 2020-08-18 RX ORDER — DEXTROSE AND SODIUM CHLORIDE 5; .45 G/100ML; G/100ML
30 INJECTION, SOLUTION INTRAVENOUS CONTINUOUS PRN
Status: CANCELLED | OUTPATIENT
Start: 2020-08-27

## 2020-08-18 NOTE — PROGRESS NOTES
[facility]      Chief Complaint:   Chief Complaint   Patient presents with   • Abdominal Pain   • Constipation   • Diarrhea   • Vomiting   • Nausea   • Heartburn       Subjective     HPI:   Patient with severe abdominal pain and episodes of diarrhea.  Patient also has difficulty with constipation at times.  Patient states that her diarrhea occurs after having severe constipation and then after a bowel movement followed by diarrhea patient has some blood in the stool also seeing some mucus in the stool.  Patient has a history of C. difficile colitis.  Patient states this getting worse over the past few weeks and she is taking Bentyl and Imodium.    Plan; we will start patient on Trulance see if we can improve the patient's constipation also send stool samples for C. difficile and infectious causes of colitis.  We will schedule patient for colonoscopy to evaluate for any other causes of patient's bleeding.    Past Medical History:   Past Medical History:   Diagnosis Date   • Asthma    • Cancer (CMS/HCC)     cervical   • Colon polyp    • COPD (chronic obstructive pulmonary disease) (CMS/HCC)    • Crohn's disease (CMS/HCC)    • Diverticulitis of colon    • Elevated cholesterol    • Essential hypertension 4/15/2020   • GERD (gastroesophageal reflux disease)    • History of transfusion    • Irritable bowel syndrome    • Kidney calculus    • Pancreatitis    • Sphincter of Oddi dysfunction        Past Surgical History:  Past Surgical History:   Procedure Laterality Date   • COLONOSCOPY     • COLONOSCOPY N/A 10/18/2019    Procedure: COLONOSCOPY;  Surgeon: Tom Davis MD;  Location: [facility] ENDOSCOPY;  Service: Gastroenterology   • ENDOSCOPY N/A 10/18/2019    Procedure: ESOPHAGOGASTRODUODENOSCOPY;  Surgeon: Tom Davis MD;  Location: [facility] ENDOSCOPY;  Service: Gastroenterology   • ENDOSCOPY N/A 7/27/2020    Procedure: ESOPHAGOGASTRODUODENOSCOPY;  Surgeon: Tom Davis MD;  Location:   [facility] ENDOSCOPY;  Service: Gastroenterology;  Laterality: N/A;   • HYSTERECTOMY     • TONSILECTOMY, ADENOIDECTOMY, BILATERAL MYRINGOTOMY AND TUBES     • UPPER GASTROINTESTINAL ENDOSCOPY         Family History:  Family History   Problem Relation Age of Onset   • Inflammatory bowel disease Mother    • Arthritis Mother    • Diabetes Mother    • Hypertension Mother    • Hyperlipidemia Mother    • Obesity Mother    • Osteoporosis Mother    • Heart disease Father    • Hyperlipidemia Father    • Diabetes Sister    • Liver disease Daughter    • Mental illness Daughter    • Obesity Daughter    • Diabetes Maternal Grandmother    • Cancer Maternal Grandmother         lung   • Cancer Other         lung   • Heart disease Other        Social History:   reports that she has been smoking cigarettes. She has been smoking about 1.00 pack per day. She has never used smokeless tobacco. She reports that she does not drink alcohol or use drugs.    Medications:   Prior to Admission medications    Medication Sig Start Date End Date Taking? Authorizing Provider   albuterol sulfate  (90 Base) MCG/ACT inhaler Inhale 2 puffs Every 4 (Four) Hours As Needed for Wheezing. 8/29/19  Yes Xavier Wick MD   amitriptyline (ELAVIL) 25 MG tablet Take 25 mg by mouth Every Night. 3/6/20  Yes ProviderByron MD   budesonide-formoterol (SYMBICORT) 160-4.5 MCG/ACT inhaler Inhale 2 puffs 2 (Two) Times a Day. 9/25/19  Yes Iliana Jacobson MD   cetirizine (zyrTEC) 10 MG tablet Take 1 tablet by mouth Daily. 9/25/19  Yes Iliana Jacobson MD   Cyanocobalamin (B-12 COMPLIANCE INJECTION IJ) Inject  as directed.   Yes ProviderByron MD   dicyclomine (Bentyl) 20 MG tablet Take 2 tablets by mouth Every 6 (Six) Hours. 6/23/20  Yes Xavier Wick MD   escitalopram (LEXAPRO) 10 MG tablet Take 1 tablet by mouth Daily. 2/4/20  Yes Xavier Wick MD   fluticasone (FLONASE) 50 MCG/ACT nasal spray 2 sprays into the nostril(s) as directed by provider  "Daily. 9/25/19  Yes Iliana Jacobson MD   ipratropium-albuterol (DUO-NEB) 0.5-2.5 mg/3 ml nebulizer Take 3 mL by nebulization Every 4 (Four) Hours As Needed for Wheezing or Shortness of Air. 6/23/20  Yes Xavier Wick MD   montelukast (SINGULAIR) 10 MG tablet Take 1 tablet by mouth Every Night. 10/17/19  Yes Xavier Wick MD   omeprazole (priLOSEC) 20 MG capsule Take 1 capsule by mouth Daily. 6/23/20  Yes Xavier Wick MD   ondansetron ODT (ZOFRAN-ODT) 4 MG disintegrating tablet Place 1 tablet on the tongue 4 (Four) Times a Day As Needed for Nausea or Vomiting. 7/26/20  Yes Gregory Kaur MD   vitamin D (ERGOCALCIFEROL) 1.25 MG (84606 UT) capsule capsule Take 1 capsule by mouth 1 (One) Time Per Week. 7/23/20  Yes Xavier Wick MD   Plecanatide (Trulance) 3 MG tablet Take 1 tablet by mouth Daily. 8/18/20   Tom Davis MD       Allergies:  Nsaids; Azithromycin; Ciprofloxacin; Doxycycline; Other; Levofloxacin; and Phenergan [promethazine hcl]    ROS:    Review of Systems   Constitutional: Negative for activity change, appetite change, chills, diaphoresis, fatigue, fever and unexpected weight change.   HENT: Negative for sore throat and trouble swallowing.    Respiratory: Negative for shortness of breath.    Gastrointestinal: Positive for abdominal pain, constipation and diarrhea. Negative for abdominal distention, anal bleeding, blood in stool, nausea, rectal pain and vomiting.   Endocrine: Negative for polydipsia, polyphagia and polyuria.   Genitourinary: Negative for difficulty urinating.   Musculoskeletal: Negative for arthralgias.   Skin: Negative for pallor.   Allergic/Immunologic: Negative for food allergies.   Neurological: Negative for weakness and light-headedness.   Psychiatric/Behavioral: Negative for behavioral problems.     Objective     Blood pressure 100/60, pulse 84, height 165.1 cm (65\"), weight 50.9 kg (112 lb 3.2 oz), SpO2 98 %, not currently breastfeeding.    Physical Exam "   Constitutional: She is oriented to person, place, and time. She appears well-developed and well-nourished. No distress.   HENT:   Head: Normocephalic and atraumatic.   Cardiovascular: Normal rate, regular rhythm, normal heart sounds and intact distal pulses. Exam reveals no gallop and no friction rub.   No murmur heard.  Pulmonary/Chest: Breath sounds normal. No respiratory distress. She has no wheezes. She has no rales. She exhibits no tenderness.   Abdominal: Soft. Bowel sounds are normal. She exhibits no distension and no mass. There is no tenderness. There is no rebound and no guarding. No hernia.   Musculoskeletal: Normal range of motion. She exhibits no edema.   Neurological: She is alert and oriented to person, place, and time.   Skin: Skin is warm and dry. No rash noted. She is not diaphoretic. No erythema. No pallor.   Psychiatric: She has a normal mood and affect. Her behavior is normal. Judgment and thought content normal.        Assessment/Plan   Carla was seen today for abdominal pain, constipation, diarrhea, vomiting, nausea and heartburn.    Diagnoses and all orders for this visit:    Diarrhea, unspecified type  -     Clostridium Difficile Toxin, PCR - Stool, Per Rectum; Future  -     Case Request; Standing  -     Case Request  -     Gastrointestinal Panel, PCR - Stool, Per Rectum; Future    Other orders  -     Follow Anesthesia Guidelines / Standing Orders; Future  -     Obtain Informed Consent; Future  -     Plecanatide (Trulance) 3 MG tablet; Take 1 tablet by mouth Daily.        COLONOSCOPY (N/A)     Diagnosis Plan   1. Diarrhea, unspecified type  Clostridium Difficile Toxin, PCR - Stool, Per Rectum    Case Request    dextrose 5 % and sodium chloride 0.45 % infusion    Case Request    Gastrointestinal Panel, PCR - Stool, Per Rectum       Anticipated Surgical Procedure:  Orders Placed This Encounter   Procedures   • Clostridium Difficile Toxin, PCR - Stool, Per Rectum     Standing Status:    Future     Standing Expiration Date:   8/18/2021   • Gastrointestinal Panel, PCR - Stool, Per Rectum     Standing Status:   Future     Standing Expiration Date:   8/18/2021   • Follow Anesthesia Guidelines / Standing Orders     Standing Status:   Future   • Obtain Informed Consent     Standing Status:   Future     Order Specific Question:   Informed Consent Given For     Answer:   colonoscopy       The risks, benefits, and alternatives of this procedure have been discussed with the patient or the responsible party- the patient understands and agrees to proceed.

## 2020-08-19 ENCOUNTER — LAB (OUTPATIENT)
Dept: LAB | Facility: HOSPITAL | Age: 50
End: 2020-08-19

## 2020-08-19 DIAGNOSIS — A09 DIARRHEA OF INFECTIOUS ORIGIN: ICD-10-CM

## 2020-08-19 DIAGNOSIS — Z86.19 HISTORY OF CLOSTRIDIOIDES DIFFICILE INFECTION: ICD-10-CM

## 2020-08-19 DIAGNOSIS — K52.9 CHRONIC DIARRHEA: ICD-10-CM

## 2020-08-19 DIAGNOSIS — R19.7 DIARRHEA, UNSPECIFIED TYPE: ICD-10-CM

## 2020-08-19 PROCEDURE — 87493 C DIFF AMPLIFIED PROBE: CPT

## 2020-08-19 PROCEDURE — 0097U HC BIOFIRE FILMARRAY GI PANEL: CPT

## 2020-08-20 ENCOUNTER — TELEPHONE (OUTPATIENT)
Dept: FAMILY MEDICINE CLINIC | Facility: CLINIC | Age: 50
End: 2020-08-20

## 2020-08-20 LAB
ADV 40+41 DNA STL QL NAA+NON-PROBE: NOT DETECTED
ASTRO TYP 1-8 RNA STL QL NAA+NON-PROBE: NOT DETECTED
C CAYETANENSIS DNA STL QL NAA+NON-PROBE: NOT DETECTED
C DIFF TOX GENS STL QL NAA+PROBE: NEGATIVE
CAMPY SP DNA.DIARRHEA STL QL NAA+PROBE: NOT DETECTED
CRYPTOSP STL CULT: NOT DETECTED
E COLI DNA SPEC QL NAA+PROBE: NOT DETECTED
E HISTOLYT AG STL-ACNC: NOT DETECTED
EAEC PAA PLAS AGGR+AATA ST NAA+NON-PRB: NOT DETECTED
EC STX1 + STX2 GENES STL NAA+PROBE: NOT DETECTED
EPEC EAE GENE STL QL NAA+NON-PROBE: NOT DETECTED
ETEC LTA+ST1A+ST1B TOX ST NAA+NON-PROBE: NOT DETECTED
G LAMBLIA DNA SPEC QL NAA+PROBE: NOT DETECTED
NOROVIRUS GI+II RNA STL QL NAA+NON-PROBE: NOT DETECTED
P SHIGELLOIDES DNA STL QL NAA+PROBE: NOT DETECTED
RV RNA STL NAA+PROBE: NOT DETECTED
SALMONELLA DNA SPEC QL NAA+PROBE: NOT DETECTED
SAPO I+II+IV+V RNA STL QL NAA+NON-PROBE: NOT DETECTED
SHIGELLA SP+EIEC IPAH STL QL NAA+PROBE: NOT DETECTED
V CHOLERAE DNA SPEC QL NAA+PROBE: NOT DETECTED
VIBRIO DNA SPEC NAA+PROBE: NOT DETECTED
YERSINIA STL CULT: NOT DETECTED

## 2020-08-20 NOTE — TELEPHONE ENCOUNTER
----- Message from Xavier Wick MD sent at 8/20/2020  2:41 PM CDT -----  c diff test negative     Recheck on next visit

## 2020-08-21 ENCOUNTER — TELEPHONE (OUTPATIENT)
Dept: GASTROENTEROLOGY | Facility: CLINIC | Age: 50
End: 2020-08-21

## 2020-08-24 ENCOUNTER — LAB (OUTPATIENT)
Dept: LAB | Facility: HOSPITAL | Age: 50
End: 2020-08-24

## 2020-08-24 PROCEDURE — C9803 HOPD COVID-19 SPEC COLLECT: HCPCS | Performed by: INTERNAL MEDICINE

## 2020-08-24 PROCEDURE — C9803 HOPD COVID-19 SPEC COLLECT: HCPCS

## 2020-08-24 PROCEDURE — U0003 INFECTIOUS AGENT DETECTION BY NUCLEIC ACID (DNA OR RNA); SEVERE ACUTE RESPIRATORY SYNDROME CORONAVIRUS 2 (SARS-COV-2) (CORONAVIRUS DISEASE [COVID-19]), AMPLIFIED PROBE TECHNIQUE, MAKING USE OF HIGH THROUGHPUT TECHNOLOGIES AS DESCRIBED BY CMS-2020-01-R: HCPCS | Performed by: INTERNAL MEDICINE

## 2020-08-25 LAB
COVID LABCORP PRIORITY: NORMAL
SARS-COV-2 RNA RESP QL NAA+PROBE: NOT DETECTED

## 2020-08-27 ENCOUNTER — ANESTHESIA EVENT (OUTPATIENT)
Dept: GASTROENTEROLOGY | Facility: HOSPITAL | Age: 50
End: 2020-08-27

## 2020-08-27 ENCOUNTER — HOSPITAL ENCOUNTER (OUTPATIENT)
Facility: HOSPITAL | Age: 50
Setting detail: HOSPITAL OUTPATIENT SURGERY
Discharge: HOME | End: 2020-08-27
Attending: INTERNAL MEDICINE | Admitting: INTERNAL MEDICINE

## 2020-08-27 ENCOUNTER — ANESTHESIA (OUTPATIENT)
Dept: GASTROENTEROLOGY | Facility: HOSPITAL | Age: 50
End: 2020-08-27

## 2020-08-27 VITALS
HEART RATE: 99 BPM | DIASTOLIC BLOOD PRESSURE: 55 MMHG | HEIGHT: 65 IN | WEIGHT: 111.38 LBS | RESPIRATION RATE: 18 BRPM | TEMPERATURE: 97.7 F | OXYGEN SATURATION: 96 % | SYSTOLIC BLOOD PRESSURE: 106 MMHG | BODY MASS INDEX: 18.56 KG/M2

## 2020-08-27 DIAGNOSIS — R19.7 DIARRHEA, UNSPECIFIED TYPE: ICD-10-CM

## 2020-08-27 PROCEDURE — 88305 TISSUE EXAM BY PATHOLOGIST: CPT

## 2020-08-27 PROCEDURE — 25010000002 PROPOFOL 10 MG/ML EMULSION: Performed by: NURSE ANESTHETIST, CERTIFIED REGISTERED

## 2020-08-27 PROCEDURE — 45380 COLONOSCOPY AND BIOPSY: CPT | Performed by: INTERNAL MEDICINE

## 2020-08-27 RX ORDER — PROPOFOL 10 MG/ML
VIAL (ML) INTRAVENOUS AS NEEDED
Status: DISCONTINUED | OUTPATIENT
Start: 2020-08-27 | End: 2020-08-27 | Stop reason: SURG

## 2020-08-27 RX ORDER — DEXTROSE AND SODIUM CHLORIDE 5; .45 G/100ML; G/100ML
30 INJECTION, SOLUTION INTRAVENOUS CONTINUOUS PRN
Status: DISCONTINUED | OUTPATIENT
Start: 2020-08-27 | End: 2020-08-27 | Stop reason: HOSPADM

## 2020-08-27 RX ORDER — LIDOCAINE HYDROCHLORIDE 20 MG/ML
INJECTION, SOLUTION INTRAVENOUS AS NEEDED
Status: DISCONTINUED | OUTPATIENT
Start: 2020-08-27 | End: 2020-08-27 | Stop reason: SURG

## 2020-08-27 RX ADMIN — PROPOFOL 50 MG: 10 INJECTION, EMULSION INTRAVENOUS at 14:33

## 2020-08-27 RX ADMIN — LIDOCAINE HYDROCHLORIDE 50 MG: 20 INJECTION, SOLUTION INTRAVENOUS at 14:25

## 2020-08-27 RX ADMIN — PROPOFOL 50 MG: 10 INJECTION, EMULSION INTRAVENOUS at 14:41

## 2020-08-27 RX ADMIN — DEXTROSE AND SODIUM CHLORIDE 30 ML/HR: 5; 450 INJECTION, SOLUTION INTRAVENOUS at 14:10

## 2020-08-27 RX ADMIN — PROPOFOL 20 MG: 10 INJECTION, EMULSION INTRAVENOUS at 14:45

## 2020-08-27 RX ADMIN — PROPOFOL 20 MG: 10 INJECTION, EMULSION INTRAVENOUS at 14:37

## 2020-08-27 RX ADMIN — PROPOFOL 50 MG: 10 INJECTION, EMULSION INTRAVENOUS at 14:25

## 2020-08-28 ENCOUNTER — TELEPHONE (OUTPATIENT)
Dept: GASTROENTEROLOGY | Facility: CLINIC | Age: 50
End: 2020-08-28

## 2020-08-28 NOTE — TELEPHONE ENCOUNTER
----- Message from Carla Richard sent at 8/28/2020  5:14 AM CDT -----  Regarding: Visit Follow-Up Question  Contact: [phone]  I had my colonoscopy yesterday.  I was in bad pain after. I woke with no relief.  I've never had any problems after before.  What can I do to ease it?       I made a attempt to call pt to check on her. Pt stated she was on the phone with endo and hung up on me.

## 2020-09-01 LAB
LAB AP CASE REPORT: NORMAL
PATH REPORT.FINAL DX SPEC: NORMAL

## 2020-09-11 ENCOUNTER — OFFICE VISIT (OUTPATIENT)
Dept: GASTROENTEROLOGY | Facility: CLINIC | Age: 50
End: 2020-09-11

## 2020-09-11 VITALS
SYSTOLIC BLOOD PRESSURE: 100 MMHG | OXYGEN SATURATION: 93 % | BODY MASS INDEX: 18.83 KG/M2 | WEIGHT: 113 LBS | HEART RATE: 58 BPM | HEIGHT: 65 IN | DIASTOLIC BLOOD PRESSURE: 60 MMHG

## 2020-09-11 DIAGNOSIS — K90.0 CELIAC DISEASE: ICD-10-CM

## 2020-09-11 DIAGNOSIS — K58.2 IRRITABLE BOWEL SYNDROME WITH BOTH CONSTIPATION AND DIARRHEA: Primary | ICD-10-CM

## 2020-09-11 PROCEDURE — 99214 OFFICE O/P EST MOD 30 MIN: CPT | Performed by: INTERNAL MEDICINE

## 2020-09-11 NOTE — PATIENT INSTRUCTIONS

## 2020-09-11 NOTE — PROGRESS NOTES
[facility]      Chief Complaint:   Chief Complaint   Patient presents with   • Follow-up     After Endo       Subjective     HPI:   Patient for follow-up from Endo.  Patient continues to have episodes of diarrhea and constipation but mostly diarrhea.  Patient has been taking Trulance but states that when she takes that she gets a large amount of watery stools.  Discussed with patient that this is normal and she should only take Trulance when she is very constipated.  Patient also had lab work done which shows patient to have celiac disease.  Discussed with patient that a gluten-free diet may improve her symptoms significantly.    Plan; have patient follow-up in 3 months after being on a gluten-free diet to see if any improvement in symptoms.    Past Medical History:   Past Medical History:   Diagnosis Date   • Asthma    • Cancer (CMS/HCC)     cervical   • Colon polyp    • COPD (chronic obstructive pulmonary disease) (CMS/HCC)    • Crohn's disease (CMS/HCC)    • Diverticulitis of colon    • Elevated cholesterol    • Essential hypertension 4/15/2020   • GERD (gastroesophageal reflux disease)    • History of transfusion    • Irritable bowel syndrome    • Kidney calculus    • Pancreatitis    • Sphincter of Oddi dysfunction        Past Surgical History:  Past Surgical History:   Procedure Laterality Date   • BACK SURGERY      C5-6   • COLONOSCOPY     • COLONOSCOPY N/A 10/18/2019    Procedure: COLONOSCOPY;  Surgeon: Tom Davis MD;  Location: [facility] ENDOSCOPY;  Service: Gastroenterology   • COLONOSCOPY N/A 8/27/2020    Procedure: COLONOSCOPY;  Surgeon: Tom Davis MD;  Location: [facility] ENDOSCOPY;  Service: Gastroenterology;  Laterality: N/A;   • ENDOSCOPY N/A 10/18/2019    Procedure: ESOPHAGOGASTRODUODENOSCOPY;  Surgeon: Tom Davis MD;  Location: [facility] ENDOSCOPY;  Service: Gastroenterology   • ENDOSCOPY N/A 7/27/2020    Procedure: ESOPHAGOGASTRODUODENOSCOPY;  Surgeon: Ryan  Tom FRANCO MD;  Location: [facility] ENDOSCOPY;  Service: Gastroenterology;  Laterality: N/A;   • HYSTERECTOMY     • SHOULDER ARTHROSCOPY W/ ROTATOR CUFF REPAIR Right    • TOE SURGERY      left small toe    • TONSILECTOMY, ADENOIDECTOMY, BILATERAL MYRINGOTOMY AND TUBES     • TONSILLECTOMY     • UPPER GASTROINTESTINAL ENDOSCOPY         Family History:  Family History   Problem Relation Age of Onset   • Inflammatory bowel disease Mother    • Arthritis Mother    • Diabetes Mother    • Hypertension Mother    • Hyperlipidemia Mother    • Obesity Mother    • Osteoporosis Mother    • Heart disease Father    • Hyperlipidemia Father    • Diabetes Sister    • Liver disease Daughter    • Mental illness Daughter    • Obesity Daughter    • Diabetes Maternal Grandmother    • Cancer Maternal Grandmother         lung   • Cancer Other         lung   • Heart disease Other        Social History:   reports that she has been smoking cigarettes. She has a 40.00 pack-year smoking history. She has never used smokeless tobacco. She reports that she has current or past drug history. Drug: Marijuana. She reports that she does not drink alcohol.    Medications:   Prior to Admission medications    Medication Sig Start Date End Date Taking? Authorizing Provider   albuterol sulfate  (90 Base) MCG/ACT inhaler Inhale 2 puffs Every 4 (Four) Hours As Needed for Wheezing. 8/29/19  Yes Xavier Wick MD   amitriptyline (ELAVIL) 25 MG tablet Take 25 mg by mouth Every Night. 3/6/20  Yes ProviderByron MD   budesonide-formoterol (SYMBICORT) 160-4.5 MCG/ACT inhaler Inhale 2 puffs 2 (Two) Times a Day. 9/25/19  Yes Iliana Jacobson MD   cetirizine (zyrTEC) 10 MG tablet Take 1 tablet by mouth Daily. 9/25/19  Yes Iliana Jacobson MD   Cyanocobalamin (B-12 COMPLIANCE INJECTION IJ) Inject  as directed.   Yes ProviderByron MD   fluticasone (FLONASE) 50 MCG/ACT nasal spray 2 sprays into the nostril(s) as directed by provider Daily. 9/25/19  Yes  "Iliana Jacobson MD   ipratropium-albuterol (DUO-NEB) 0.5-2.5 mg/3 ml nebulizer Take 3 mL by nebulization Every 4 (Four) Hours As Needed for Wheezing or Shortness of Air. 6/23/20  Yes Xavier Wick MD   montelukast (SINGULAIR) 10 MG tablet Take 1 tablet by mouth Every Night. 10/17/19  Yes Xavier Wick MD   omeprazole (priLOSEC) 20 MG capsule Take 1 capsule by mouth Daily. 6/23/20  Yes Xavier Wick MD   ondansetron ODT (ZOFRAN-ODT) 4 MG disintegrating tablet Place 1 tablet on the tongue 4 (Four) Times a Day As Needed for Nausea or Vomiting. 7/26/20  Yes Gregory Kaur MD   Plecanatide (Trulance) 3 MG tablet Take 1 tablet by mouth Daily. 8/18/20   Tom Davis MD       Allergies:  Nsaids; Azithromycin; Ciprofloxacin; Doxycycline; Other; Levofloxacin; and Phenergan [promethazine hcl]    ROS:    Review of Systems   Constitutional: Negative for activity change, appetite change, chills, diaphoresis, fatigue, fever and unexpected weight change.   HENT: Negative for sore throat and trouble swallowing.    Respiratory: Negative for shortness of breath.    Gastrointestinal: Positive for abdominal pain, constipation and diarrhea. Negative for abdominal distention, anal bleeding, blood in stool, nausea, rectal pain and vomiting.   Endocrine: Negative for polydipsia, polyphagia and polyuria.   Genitourinary: Negative for difficulty urinating.   Musculoskeletal: Negative for arthralgias.   Skin: Negative for pallor.   Allergic/Immunologic: Negative for food allergies.   Neurological: Negative for weakness and light-headedness.   Psychiatric/Behavioral: Negative for behavioral problems.     Objective     Blood pressure 100/60, pulse 58, height 165.1 cm (65\"), weight 51.3 kg (113 lb), SpO2 93 %, not currently breastfeeding.    Physical Exam   Constitutional: She is oriented to person, place, and time. She appears well-developed and well-nourished. No distress.   HENT:   Head: Normocephalic and atraumatic. "   Cardiovascular: Normal rate, regular rhythm, normal heart sounds and intact distal pulses. Exam reveals no gallop and no friction rub.   No murmur heard.  Pulmonary/Chest: Breath sounds normal. No respiratory distress. She has no wheezes. She has no rales. She exhibits no tenderness.   Abdominal: Soft. Bowel sounds are normal. She exhibits no distension and no mass. There is no tenderness. There is no rebound and no guarding. No hernia.   Musculoskeletal: Normal range of motion. She exhibits no edema.   Neurological: She is alert and oriented to person, place, and time.   Skin: Skin is warm and dry. No rash noted. She is not diaphoretic. No erythema. No pallor.   Psychiatric: She has a normal mood and affect. Her behavior is normal. Judgment and thought content normal.        Assessment/Plan   Carla was seen today for follow-up.    Diagnoses and all orders for this visit:    Irritable bowel syndrome with both constipation and diarrhea    Celiac disease        * Surgery not found *     Diagnosis Plan   1. Irritable bowel syndrome with both constipation and diarrhea     2. Celiac disease         Anticipated Surgical Procedure:  No orders of the defined types were placed in this encounter.      The risks, benefits, and alternatives of this procedure have been discussed with the patient or the responsible party- the patient understands and agrees to proceed.

## 2020-09-14 ENCOUNTER — DOCUMENTATION (OUTPATIENT)
Dept: PULMONOLOGY | Facility: CLINIC | Age: 50
End: 2020-09-14

## 2020-09-29 ENCOUNTER — TELEPHONE (OUTPATIENT)
Dept: FAMILY MEDICINE CLINIC | Facility: CLINIC | Age: 50
End: 2020-09-29

## 2020-09-29 RX ORDER — PERMETHRIN 0.25 %
SPRAY, NON-AEROSOL (ML) MISCELLANEOUS
Qty: 120 ML | Refills: 0 | Status: SHIPPED | OUTPATIENT
Start: 2020-09-29 | End: 2020-11-09

## 2020-09-29 NOTE — TELEPHONE ENCOUNTER
Spoke to pharmacy and they stated insurance will cover Rid. Gave verbal to change from Nix to Rid.

## 2020-09-30 ENCOUNTER — OFFICE VISIT (OUTPATIENT)
Dept: FAMILY MEDICINE CLINIC | Facility: CLINIC | Age: 50
End: 2020-09-30

## 2020-09-30 VITALS
HEIGHT: 65 IN | WEIGHT: 108.2 LBS | HEART RATE: 98 BPM | DIASTOLIC BLOOD PRESSURE: 82 MMHG | SYSTOLIC BLOOD PRESSURE: 104 MMHG | OXYGEN SATURATION: 98 % | BODY MASS INDEX: 18.03 KG/M2 | TEMPERATURE: 98.2 F

## 2020-09-30 DIAGNOSIS — J06.9 UPPER RESPIRATORY TRACT INFECTION, UNSPECIFIED TYPE: ICD-10-CM

## 2020-09-30 DIAGNOSIS — Z72.0 TOBACCO USER: ICD-10-CM

## 2020-09-30 DIAGNOSIS — J44.1 COPD EXACERBATION (HCC): Primary | ICD-10-CM

## 2020-09-30 DIAGNOSIS — J44.9 STAGE 2 MODERATE COPD BY GOLD CLASSIFICATION (HCC): ICD-10-CM

## 2020-09-30 DIAGNOSIS — R05.9 COUGH: ICD-10-CM

## 2020-09-30 PROCEDURE — 99443 PR PHYS/QHP TELEPHONE EVALUATION 21-30 MIN: CPT | Performed by: FAMILY MEDICINE

## 2020-09-30 RX ORDER — DOXYCYCLINE 100 MG/1
100 CAPSULE ORAL 2 TIMES DAILY
Qty: 14 CAPSULE | Refills: 0 | Status: SHIPPED | OUTPATIENT
Start: 2020-09-30 | End: 2020-10-07

## 2020-09-30 RX ORDER — BUDESONIDE AND FORMOTEROL FUMARATE DIHYDRATE 160; 4.5 UG/1; UG/1
2 AEROSOL RESPIRATORY (INHALATION) 2 TIMES DAILY
Qty: 1 INHALER | Refills: 11 | Status: SHIPPED | OUTPATIENT
Start: 2020-09-30 | End: 2020-10-28

## 2020-09-30 RX ORDER — AZITHROMYCIN 250 MG/1
TABLET, FILM COATED ORAL
Qty: 6 TABLET | Refills: 0 | Status: SHIPPED | OUTPATIENT
Start: 2020-09-30 | End: 2020-09-30

## 2020-09-30 RX ORDER — BENZONATATE 100 MG/1
100 CAPSULE ORAL 3 TIMES DAILY PRN
Qty: 30 CAPSULE | Refills: 2 | Status: SHIPPED | OUTPATIENT
Start: 2020-09-30 | End: 2020-10-27

## 2020-09-30 RX ORDER — PREDNISONE 10 MG/1
TABLET ORAL
Qty: 30 TABLET | Refills: 0 | Status: SHIPPED | OUTPATIENT
Start: 2020-09-30 | End: 2020-10-27

## 2020-09-30 NOTE — PROGRESS NOTES
Subjective:  Carla Richard is a 50 y.o. female who presents for       Patient Active Problem List   Diagnosis   • Tobacco abuse counseling   • Chronic idiopathic constipation   • B12 deficiency   • Vitamin D deficiency    • Tobacco user   • Stage 2 moderate COPD by GOLD classification (CMS/[facility])   • Cervical lymphadenopathy   • Generalized abdominal pain   • Nausea   • Neurological abnormality   • Tremor   • RUQ pain   • COPD exacerbation (CMS/[facility])   • Pertussis exposure   • Chronic bronchitis (CMS/[facility])   • Cigarette nicotine dependence, uncomplicated   • Chronic nonseasonal allergic rhinitis due to pollen   • Episode of recurrent major depressive disorder (CMS/[facility])   • Diarrhea   • Chronic diarrhea   • Thrombocytosis (CMS/[facility])   • Nausea and vomiting   • Gluten intolerance   • C. difficile diarrhea   • Shiga toxin-producing Escherichia coli infection   • Generalized weakness   • Head injury   • Concussion with loss of consciousness   • Gastritis without bleeding   • Irritable bowel syndrome   • Tachycardia   • Essential hypertension   • JELENA (generalized anxiety disorder)   • Marijuana use   • Epigastric pain   • History of Clostridioides difficile colitis   • Celiac disease   • Cough   • Upper respiratory tract infection           Current Outpatient Medications:   •  albuterol sulfate  (90 Base) MCG/ACT inhaler, Inhale 2 puffs Every 4 (Four) Hours As Needed for Wheezing., Disp: 18 g, Rfl: 3  •  amitriptyline (ELAVIL) 25 MG tablet, Take 25 mg by mouth Every Night., Disp: , Rfl:   •  budesonide-formoterol (SYMBICORT) 160-4.5 MCG/ACT inhaler, Inhale 2 puffs 2 (Two) Times a Day., Disp: 1 inhaler, Rfl: 11  •  cetirizine (zyrTEC) 10 MG tablet, Take 1 tablet by mouth Daily., Disp: 30 tablet, Rfl: 11  •  Cyanocobalamin (B-12 COMPLIANCE INJECTION IJ), Inject  as directed., Disp: , Rfl:   •  fluticasone (FLONASE) 50 MCG/ACT nasal spray, 2 sprays into the nostril(s) as directed by provider Daily., Disp: 1  bottle, Rfl: 11  •  ipratropium-albuterol (DUO-NEB) 0.5-2.5 mg/3 ml nebulizer, Take 3 mL by nebulization Every 4 (Four) Hours As Needed for Wheezing or Shortness of Air., Disp: 360 mL, Rfl: 3  •  montelukast (SINGULAIR) 10 MG tablet, Take 1 tablet by mouth Every Night., Disp: 30 tablet, Rfl: 3  •  omeprazole (priLOSEC) 20 MG capsule, Take 1 capsule by mouth Daily., Disp: 30 capsule, Rfl: 3  •  ondansetron ODT (ZOFRAN-ODT) 4 MG disintegrating tablet, Place 1 tablet on the tongue 4 (Four) Times a Day As Needed for Nausea or Vomiting., Disp: 12 tablet, Rfl: 0  •  permethrin (Nix Creme Rinse) 1 % liquid, Apply once on scalp for 10 minutes repeat in 7 days if needed, Disp: 120 mL, Rfl: 0  •  Plecanatide (Trulance) 3 MG tablet, Take 1 tablet by mouth Daily., Disp: 90 tablet, Rfl: 5  •  benzonatate (Tessalon Perles) 100 MG capsule, Take 1 capsule by mouth 3 (Three) Times a Day As Needed for Cough., Disp: 30 capsule, Rfl: 2  •  doxycycline (MONODOX) 100 MG capsule, Take 1 capsule by mouth 2 (Two) Times a Day for 7 days., Disp: 14 capsule, Rfl: 0  •  nicotine (NICODERM CQ) 7 MG/24HR patch, Place 1 patch on the skin as directed by provider Daily., Disp: 30 patch, Rfl: 3  •  predniSONE (DELTASONE) 10 MG tablet, Take 4 tablets for 4 days 3 tablets for 3 days 2 tablets for 2 days and 1 tablet for one day then stop, Disp: 30 tablet, Rfl: 0        Pt is 48 yo female with history of moderateC OPD, Vitamin B12 deficiency, Vitamin D deficiency, chronic abdominal pain, tremor,  RUQ pain,  Sp hysterectomy,  History of chron's disease, history of pertussis infection  sp right shoulder surgery. X 3, sp rotator cuff repair , history of C diff diarrhea, gluten sensitivity, gastritis, marijuana use, COPD      7/23/20 telemedicine visit  For pt's above medical issues. SHe was admitted to [facility] a few weeks ago for C diffcolitis and was on oral vancomycin. She was  Also recently treated at [facility] ED for abdominal pain on 7/5/20. Pt was positive  for C diff colitis. She had CT of abdomen that showed mildly thick walled appeaance of descending colon along with sigmoid diverticolosisi  She also had hepatomegaly.  Her labwork at the time showed lipase normal UA normal CMP showed calcium at 8.5 wit stable kidney and liver function. CBC showed elevated WBC at 11.22. Pt saw GI at [facility] on 7/7/20 for her colitis. Also saw Dr. Broderick on 7/10/20 and has upcoming endoscopy on 7/27/20.  She states abdominal pain is better and has no diarrhea now but has constipation she is not taking  Bentyl regularly. She also takes elavil only as needed for her tremors.  She is adhering to high fiber diet and has increased fluid intake.  No vomiting.      9/30/20 telemedicine visit today for recheck on pt's above medical issues.  She has been having cough/congestion/dyspnea for past few days. Recently came back from [facility]. Pt has history of COPD. Was recently tested at [facility] and negative for COVID-19.  SHe ran out of her symbicort and is doing nebulizer every 4 hours. She is also smoking about 1/2 ppd. Pt states she is afebrile.  She is not coughing up any blood. No sick contact.s. She went to see Stepmother in [facility] who is having post COVID-19 symptoms.           Shortness of Breath  This is a new problem. The problem occurs daily. The problem has been gradually worsening. Associated symptoms include abdominal pain and sputum production. Pertinent negatives include no chest pain, claudication, coryza, ear pain, fever, headaches, hemoptysis, leg pain, leg swelling, neck pain, orthopnea, PND, rash, rhinorrhea, sore throat, swollen glands, syncope, vomiting or wheezing. The symptoms are aggravated by smoke. She has tried beta agonist inhalers for the symptoms. The treatment provided no relief. Her past medical history is significant for chronic lung disease and COPD. There is no history of allergies, aspirin allergies, asthma, bronchiolitis, CAD, DVT, a heart failure, PE,  pneumonia or a recent surgery.   Cough  This is a new problem. The current episode started today. The problem has been gradually worsening. The problem occurs constantly. The cough is productive of sputum. Associated symptoms include shortness of breath. Pertinent negatives include no chest pain, chills, ear congestion, ear pain, fever, headaches, heartburn, hemoptysis, myalgias, nasal congestion, postnasal drip, rash, rhinorrhea, sore throat, sweats, weight loss or wheezing. Nothing aggravates the symptoms. She has tried a beta-agonist inhaler for the symptoms. The treatment provided no relief. Her past medical history is significant for bronchitis and COPD. There is no history of asthma, bronchiectasis, emphysema, environmental allergies or pneumonia.   URI   This is a recurrent problem. The current episode started 1 to 4 weeks ago. The problem has been unchanged. There has been no fever. Associated symptoms include abdominal pain, congestion and coughing. Pertinent negatives include no chest pain, diarrhea, dysuria, ear pain, headaches, joint pain, joint swelling, nausea, neck pain, plugged ear sensation, rash, rhinorrhea, sinus pain, sneezing, sore throat, swollen glands, vomiting or wheezing. She has tried nothing for the symptoms. The treatment provided no relief.   COPD   This is a chronic problem. The current episode started more than 1 year ago. The problem occurs constantly. The problem has been waxing and waning  Associated symptoms include abdominal pain, arthralgias, fatigue, joint swelling, numbness and weakness. Pertinent negatives include no anorexia, change in bowel habit, chest pain, chills, congestion, coughing, diaphoresis, fever, headaches, myalgias, nausea, neck pain, sore throat, swollen glands, urinary symptoms, vertigo, visual change or vomiting. The symptoms are aggravated by smoking. She has tried nothing (combivent ) for the symptoms.        Review of Systems  Review of Systems    Constitutional: Positive for activity change. Negative for chills, fever and weight loss.   HENT: Positive for congestion. Negative for ear pain, postnasal drip, rhinorrhea, sinus pain, sneezing and sore throat.    Respiratory: Positive for cough, sputum production and shortness of breath. Negative for hemoptysis and wheezing.    Cardiovascular: Negative for chest pain, orthopnea, claudication, leg swelling, syncope and PND.   Gastrointestinal: Positive for abdominal pain. Negative for anal bleeding, diarrhea, heartburn, nausea and vomiting.   Genitourinary: Positive for vaginal pain. Negative for dysuria.   Musculoskeletal: Negative for joint pain, myalgias and neck pain.   Skin: Negative for rash.   Allergic/Immunologic: Negative for environmental allergies.   Neurological: Positive for weakness and numbness. Negative for headaches.       Patient Active Problem List   Diagnosis   • Tobacco abuse counseling   • Chronic idiopathic constipation   • B12 deficiency   • Vitamin D deficiency    • Tobacco user   • Stage 2 moderate COPD by GOLD classification (CMS/[facility])   • Cervical lymphadenopathy   • Generalized abdominal pain   • Nausea   • Neurological abnormality   • Tremor   • RUQ pain   • COPD exacerbation (CMS/[facility])   • Pertussis exposure   • Chronic bronchitis (CMS/[facility])   • Cigarette nicotine dependence, uncomplicated   • Chronic nonseasonal allergic rhinitis due to pollen   • Episode of recurrent major depressive disorder (CMS/[facility])   • Diarrhea   • Chronic diarrhea   • Thrombocytosis (CMS/[facility])   • Nausea and vomiting   • Gluten intolerance   • C. difficile diarrhea   • Shiga toxin-producing Escherichia coli infection   • Generalized weakness   • Head injury   • Concussion with loss of consciousness   • Gastritis without bleeding   • Irritable bowel syndrome   • Tachycardia   • Essential hypertension   • JELENA (generalized anxiety disorder)   • Marijuana use   • Epigastric pain   • History of Clostridioides  difficile colitis   • Celiac disease   • Cough   • Upper respiratory tract infection     Past Surgical History:   Procedure Laterality Date   • BACK SURGERY      C5-6   • COLONOSCOPY     • COLONOSCOPY N/A 10/18/2019    Procedure: COLONOSCOPY;  Surgeon: Tom Davis MD;  Location: [facility] [place] ENDOSCOPY;  Service: Gastroenterology   • COLONOSCOPY N/A 8/27/2020    Procedure: COLONOSCOPY;  Surgeon: Tom Davis MD;  Location: [facility] [place] ENDOSCOPY;  Service: Gastroenterology;  Laterality: N/A;   • ENDOSCOPY N/A 10/18/2019    Procedure: ESOPHAGOGASTRODUODENOSCOPY;  Surgeon: Tom Davis MD;  Location: [facility] [place] ENDOSCOPY;  Service: Gastroenterology   • ENDOSCOPY N/A 7/27/2020    Procedure: ESOPHAGOGASTRODUODENOSCOPY;  Surgeon: Tom Davis MD;  Location: [facility] [place] ENDOSCOPY;  Service: Gastroenterology;  Laterality: N/A;   • HYSTERECTOMY     • SHOULDER ARTHROSCOPY W/ ROTATOR CUFF REPAIR Right    • TOE SURGERY      left small toe    • TONSILECTOMY, ADENOIDECTOMY, BILATERAL MYRINGOTOMY AND TUBES     • TONSILLECTOMY     • UPPER GASTROINTESTINAL ENDOSCOPY       Social History     Socioeconomic History   • Marital status: Single     Spouse name: Not on file   • Number of children: Not on file   • Years of education: Not on file   • Highest education level: Not on file   Tobacco Use   • Smoking status: Current Every Day Smoker     Packs/day: 1.00     Years: 40.00     Pack years: 40.00     Types: Cigarettes   • Smokeless tobacco: Never Used   Substance and Sexual Activity   • Alcohol use: No     Frequency: Never   • Drug use: Yes     Types: Marijuana     Comment: nightly   • Sexual activity: Defer     Family History   Problem Relation Age of Onset   • Inflammatory bowel disease Mother    • Arthritis Mother    • Diabetes Mother    • Hypertension Mother    • Hyperlipidemia Mother    • Obesity Mother    • Osteoporosis Mother    • Heart disease Father    • Hyperlipidemia Father    • Diabetes Sister    • Liver disease Daughter    •  Mental illness Daughter    • Obesity Daughter    • Diabetes Maternal Grandmother    • Cancer Maternal Grandmother         lung   • Cancer Other         lung   • Heart disease Other      Admission on 08/27/2020, Discharged on 08/27/2020   Component Date Value Ref Range Status   • SARS-CoV-2, ANAIS 08/24/2020 Not Detected  Not Detected Final    This test was developed and its performance characteristics determined  by [facility]. This test has not been FDA cleared or  approved. This test has been authorized by FDA under an Emergency Use  Authorization (EUA). This test is only authorized for the duration of  time the declaration that circumstances exist justifying the  authorization of the emergency use of in vitro diagnostic tests for  detection of SARS-CoV-2 virus and/or diagnosis of COVID-19 infection  under section 564(b)(1) of the Act, 21 U.S.C. 360bbb-3(b)(1), unless  the authorization is terminated or revoked sooner.  When diagnostic testing is negative, the possibility of a false  negative result should be considered in the context of a patient's  recent exposures and the presence of clinical signs and symptoms  consistent with COVID-19. An individual without symptoms of COVID-19  and who is not shedding SARS-CoV-2 virus would expect to have a  negative (not detected) result in this assay.   • COVID [facility] PRIORITY 08/24/2020 Comment   Final    Received   • Case Report 08/27/2020    Final                    Value:Surgical Pathology Report                         Case: [id number]                                  Authorizing Provider:  Tom Davis MD        Collected:           08/27/2020 02:44 PM          Ordering Location:     [facility]             Received:            08/28/2020 07:02 AM                                 [facility]                                                     Pathologist:           Kunal Dia MD                                                            Specimen:    Large Intestine, colonic mucosa bx                                                        • Final Diagnosis 08/27/2020    Final                    Value:This result contains rich text formatting which cannot be displayed here.   Lab on 08/19/2020   Component Date Value Ref Range Status   • C. Difficile Toxins by PCR 08/19/2020 Negative  Negative Final   • Campylobacter 08/19/2020 Not Detected  Not Detected Final   • Plesiomonas shigelloides 08/19/2020 Not Detected  Not Detected Final   • Salmonella 08/19/2020 Not Detected  Not Detected Final   • Vibrio 08/19/2020 Not Detected  Not Detected Final   • Vibrio cholerae 08/19/2020 Not Detected  Not Detected Final   • Yersinia enterocolitica 08/19/2020 Not Detected  Not Detected Final   • Enteroaggregative E. coli (EAEC) 08/19/2020 Not Detected  Not Detected Final   • Enteropathogenic E. coli (EPEC) 08/19/2020 Not Detected  Not Detected Final   • Enterotoxigenic E. coli (ETEC) lt/* 08/19/2020 Not Detected  Not Detected Final   • Shiga-like toxin-producing E. coli* 08/19/2020 Not Detected  Not Detected Final   • E. coli O157 08/19/2020 Not Detected  Not Detected Final   • Shigella/Enteroinvasive E. coli (E* 08/19/2020 Not Detected  Not Detected Final   • Cryptosporidium 08/19/2020 Not Detected  Not Detected Final   • Cyclospora cayetanensis 08/19/2020 Not Detected  Not Detected Final   • Entamoeba histolytica 08/19/2020 Not Detected  Not Detected Final   • Giardia lamblia 08/19/2020 Not Detected  Not Detected Final   • Adenovirus F40/41 08/19/2020 Not Detected  Not Detected Final   • Astrovirus 08/19/2020 Not Detected  Not Detected Final   • Norovirus GI/GII 08/19/2020 Not Detected  Not Detected Final   • Rotavirus A 08/19/2020 Not Detected  Not Detected Final   • Sapovirus (I, II, IV or V) 08/19/2020 Not Detected  Not Detected Final   Admission on 07/27/2020, Discharged on 07/27/2020   Component Date Value Ref Range Status   • SARS-CoV-2, ANAIS 07/24/2020 Not  Detected  Not Detected Final    This test was developed and its performance characteristics determined  by [facility]. This test has not been FDA cleared or  approved. This test has been authorized by FDA under an Emergency Use  Authorization (EUA). This test is only authorized for the duration of  time the declaration that circumstances exist justifying the  authorization of the emergency use of in vitro diagnostic tests for  detection of SARS-CoV-2 virus and/or diagnosis of COVID-19 infection  under section 564(b)(1) of the Act, 21 U.S.C. 360bbb-3(b)(1), unless  the authorization is terminated or revoked sooner.  When diagnostic testing is negative, the possibility of a false  negative result should be considered in the context of a patient's  recent exposures and the presence of clinical signs and symptoms  consistent with COVID-19. An individual without symptoms of COVID-19  and who is not shedding SARS-CoV-2 virus would expect to have a  negative (not detected) result in this assay.   • COVID [facility] PRIORITY 07/24/2020 Comment   Final    Received   • Case Report 07/27/2020    Final                    Value:Surgical Pathology Report                         Case: [id number]                                  Authorizing Provider:  Tom Davis MD        Collected:           07/27/2020 12:52 PM          Ordering Location:     [facility]             Received:            07/28/2020 07:21 AM                                 [facility]                                                     Pathologist:           Karl Lee MD                                                            Specimens:   1) - Gastric, Antrum                                                                                2) - Small Intestine                                                                                3) - Esophagus, Distal                                                                    •  Final Diagnosis 07/27/2020    Final                    Value:This result contains rich text formatting which cannot be displayed here.   Admission on 07/26/2020, Discharged on 07/26/2020   Component Date Value Ref Range Status   • Glucose 07/26/2020 89  65 - 99 mg/dL Final   • BUN 07/26/2020 11  6 - 20 mg/dL Final   • Creatinine 07/26/2020 0.68  0.57 - 1.00 mg/dL Final   • Sodium 07/26/2020 140  136 - 145 mmol/L Final   • Potassium 07/26/2020 3.6  3.5 - 5.2 mmol/L Final   • Chloride 07/26/2020 105  98 - 107 mmol/L Final   • CO2 07/26/2020 25.0  22.0 - 29.0 mmol/L Final   • Calcium 07/26/2020 9.0  8.6 - 10.5 mg/dL Final   • Total Protein 07/26/2020 6.4  6.0 - 8.5 g/dL Final   • Albumin 07/26/2020 4.40  3.50 - 5.20 g/dL Final   • ALT (SGPT) 07/26/2020 9  1 - 33 U/L Final   • AST (SGOT) 07/26/2020 14  1 - 32 U/L Final   • Alkaline Phosphatase 07/26/2020 66  39 - 117 U/L Final   • Total Bilirubin 07/26/2020 0.5  0.0 - 1.2 mg/dL Final   • eGFR Non African Amer 07/26/2020 91  >60 mL/min/1.73 Final   • Globulin 07/26/2020 2.0  gm/dL Final   • A/G Ratio 07/26/2020 2.2  g/dL Final   • BUN/Creatinine Ratio 07/26/2020 16.2  7.0 - 25.0 Final   • Anion Gap 07/26/2020 10.0  5.0 - 15.0 mmol/L Final   • Extra Tube 07/26/2020 hold for add-on   Final    Auto resulted   • Extra Tube 07/26/2020 Hold for add-ons.   Final    Auto resulted.   • Extra Tube 07/26/2020 hold for add-on   Final    Auto resulted   • WBC 07/26/2020 9.21  3.40 - 10.80 10*3/mm3 Final   • RBC 07/26/2020 4.40  3.77 - 5.28 10*6/mm3 Final   • Hemoglobin 07/26/2020 13.7  12.0 - 15.9 g/dL Final   • Hematocrit 07/26/2020 40.7  34.0 - 46.6 % Final   • MCV 07/26/2020 92.5  79.0 - 97.0 fL Final   • MCH 07/26/2020 31.1  26.6 - 33.0 pg Final   • MCHC 07/26/2020 33.7  31.5 - 35.7 g/dL Final   • RDW 07/26/2020 12.4  12.3 - 15.4 % Final   • RDW-SD 07/26/2020 42.5  37.0 - 54.0 fl Final   • MPV 07/26/2020 9.3  6.0 - 12.0 fL Final   • Platelets 07/26/2020 422  140 - 450 10*3/mm3  Final   • Neutrophil % 07/26/2020 51.0  42.7 - 76.0 % Final   • Lymphocyte % 07/26/2020 36.0  19.6 - 45.3 % Final   • Monocyte % 07/26/2020 8.4  5.0 - 12.0 % Final   • Eosinophil % 07/26/2020 3.7  0.3 - 6.2 % Final   • Basophil % 07/26/2020 0.7  0.0 - 1.5 % Final   • Immature Grans % 07/26/2020 0.2  0.0 - 0.5 % Final   • Neutrophils, Absolute 07/26/2020 4.70  1.70 - 7.00 10*3/mm3 Final   • Lymphocytes, Absolute 07/26/2020 3.32* 0.70 - 3.10 10*3/mm3 Final   • Monocytes, Absolute 07/26/2020 0.77  0.10 - 0.90 10*3/mm3 Final   • Eosinophils, Absolute 07/26/2020 0.34  0.00 - 0.40 10*3/mm3 Final   • Basophils, Absolute 07/26/2020 0.06  0.00 - 0.20 10*3/mm3 Final   • Immature Grans, Absolute 07/26/2020 0.02  0.00 - 0.05 10*3/mm3 Final   • nRBC 07/26/2020 0.0  0.0 - 0.2 /100 WBC Final   • Color, UA 07/26/2020 Yellow  Yellow, Straw, Dark Yellow, Ana M Final   • Appearance, UA 07/26/2020 Clear  Clear Final   • pH, UA 07/26/2020 7.0  5.0 - 9.0 Final   • Specific Gravity, UA 07/26/2020 1.010  1.003 - 1.030 Final   • Glucose, UA 07/26/2020 Negative  Negative Final   • Ketones, UA 07/26/2020 Trace* Negative Final   • Bilirubin, UA 07/26/2020 Negative  Negative Final   • Blood, UA 07/26/2020 Small (1+)* Negative Final   • Protein, UA 07/26/2020 Negative  Negative Final   • Leuk Esterase, UA 07/26/2020 Negative  Negative Final   • Nitrite, UA 07/26/2020 Negative  Negative Final   • Urobilinogen, UA 07/26/2020 0.2 E.U./dL  0.2 - 1.0 E.U./dL Final   • Lactate 07/26/2020 1.2  0.5 - 2.0 mmol/L Final   • Lipase 07/26/2020 24  13 - 60 U/L Final   • HCG Qualitative 07/26/2020 Negative  Negative Final   • RBC, UA 07/26/2020 3-5* None Seen /HPF Final   • WBC, UA 07/26/2020 None Seen  None Seen, 0-2, 3-5 /HPF Final   • Bacteria, UA 07/26/2020 None Seen  None Seen /HPF Final   • Squamous Epithelial Cells, UA 07/26/2020 None Seen  None Seen, 0-2 /HPF Final   • Hyaline Casts, UA 07/26/2020 None Seen  None Seen /LPF Final   • Methodology  07/26/2020 Automated Microscopy   Final   Admission on 07/05/2020, Discharged on 07/05/2020   Component Date Value Ref Range Status   • Glucose 07/05/2020 97  65 - 99 mg/dL Final   • BUN 07/05/2020 6  6 - 20 mg/dL Final   • Creatinine 07/05/2020 0.65  0.57 - 1.00 mg/dL Final   • Sodium 07/05/2020 140  136 - 145 mmol/L Final   • Potassium 07/05/2020 3.5  3.5 - 5.2 mmol/L Final   • Chloride 07/05/2020 103  98 - 107 mmol/L Final   • CO2 07/05/2020 28.0  22.0 - 29.0 mmol/L Final   • Calcium 07/05/2020 8.5* 8.6 - 10.5 mg/dL Final   • Total Protein 07/05/2020 6.3  6.0 - 8.5 g/dL Final   • Albumin 07/05/2020 4.00  3.50 - 5.20 g/dL Final   • ALT (SGPT) 07/05/2020 18  1 - 33 U/L Final   • AST (SGOT) 07/05/2020 21  1 - 32 U/L Final   • Alkaline Phosphatase 07/05/2020 61  39 - 117 U/L Final   • Total Bilirubin 07/05/2020 0.3  0.2 - 1.2 mg/dL Final   • eGFR Non African Amer 07/05/2020 96  >60 mL/min/1.73 Final   • Globulin 07/05/2020 2.3  gm/dL Final   • A/G Ratio 07/05/2020 1.7  g/dL Final   • BUN/Creatinine Ratio 07/05/2020 9.2  7.0 - 25.0 Final   • Anion Gap 07/05/2020 9.0  5.0 - 15.0 mmol/L Final   • Lipase 07/05/2020 19  13 - 60 U/L Final   • Color, UA 07/05/2020 Yellow  Yellow, Straw, Dark Yellow, Ana M Final   • Appearance, UA 07/05/2020 Clear  Clear Final   • pH, UA 07/05/2020 8.0  5.0 - 9.0 Final   • Specific Gravity, UA 07/05/2020 1.015  1.003 - 1.030 Final   • Glucose, UA 07/05/2020 Negative  Negative Final   • Ketones, UA 07/05/2020 Negative  Negative Final   • Bilirubin, UA 07/05/2020 Negative  Negative Final   • Blood, UA 07/05/2020 Negative  Negative Final   • Protein, UA 07/05/2020 Negative  Negative Final   • Leuk Esterase, UA 07/05/2020 Negative  Negative Final   • Nitrite, UA 07/05/2020 Negative  Negative Final   • Urobilinogen, UA 07/05/2020 0.2 E.U./dL  0.2 - 1.0 E.U./dL Final   • Extra Tube 07/05/2020 hold for add-on   Final    Auto resulted   • Extra Tube 07/05/2020 Hold for add-ons.   Final    Auto  resulted.   • Extra Tube 07/05/2020 hold for add-on   Final    Auto resulted   • Extra Tube 07/05/2020 Hold for add-ons.   Final    Auto resulted.   • WBC 07/05/2020 11.22* 3.40 - 10.80 10*3/mm3 Final   • RBC 07/05/2020 4.01  3.77 - 5.28 10*6/mm3 Final   • Hemoglobin 07/05/2020 12.6  12.0 - 15.9 g/dL Final   • Hematocrit 07/05/2020 37.7  34.0 - 46.6 % Final   • MCV 07/05/2020 94.0  79.0 - 97.0 fL Final   • MCH 07/05/2020 31.4  26.6 - 33.0 pg Final   • MCHC 07/05/2020 33.4  31.5 - 35.7 g/dL Final   • RDW 07/05/2020 13.1  12.3 - 15.4 % Final   • RDW-SD 07/05/2020 45.1  37.0 - 54.0 fl Final   • MPV 07/05/2020 9.2  6.0 - 12.0 fL Final   • Platelets 07/05/2020 368  140 - 450 10*3/mm3 Final   • Neutrophil % 07/05/2020 50.9  42.7 - 76.0 % Final   • Lymphocyte % 07/05/2020 33.2  19.6 - 45.3 % Final   • Monocyte % 07/05/2020 10.4  5.0 - 12.0 % Final   • Eosinophil % 07/05/2020 4.1  0.3 - 6.2 % Final   • Basophil % 07/05/2020 0.5  0.0 - 1.5 % Final   • Immature Grans % 07/05/2020 0.9* 0.0 - 0.5 % Final   • Neutrophils, Absolute 07/05/2020 5.71  1.70 - 7.00 10*3/mm3 Final   • Lymphocytes, Absolute 07/05/2020 3.72* 0.70 - 3.10 10*3/mm3 Final   • Monocytes, Absolute 07/05/2020 1.17* 0.10 - 0.90 10*3/mm3 Final   • Eosinophils, Absolute 07/05/2020 0.46* 0.00 - 0.40 10*3/mm3 Final   • Basophils, Absolute 07/05/2020 0.06  0.00 - 0.20 10*3/mm3 Final   • Immature Grans, Absolute 07/05/2020 0.10* 0.00 - 0.05 10*3/mm3 Final   • nRBC 07/05/2020 0.0  0.0 - 0.2 /100 WBC Final      CT Abdomen Pelvis With Contrast  Narrative: PROCEDURE: CT ABDOMEN PELVIS WITH IV CONTRAST    Clinical History: abd pain, diarrhea since yesterday. hx of  colitis.    Indication: Same as above    Comparison: 7/5/2020 .    Technique: CT of the abdomen and pelvis was done with   intravenous contrast.     Images were obtained from the lung base to the level of the pubic  symphysis in axial plane, followed by orthogonal sagittal and  coronal reconstruction.    Oral  contrast was not  given for the study.    The patient was injected with 70 mL of Isovue-300 radiographic  contrast intravenously, without any documented immediate adverse  reactions.    This exam was performed according to the departmental  dose-optimization program which includes automated exposure  control, adjustment of the mA and/or kV according to patient size  and/or use of iterative reconstruction technique.      Findings:  Images through the lung bases do not show any focal infiltrates  or pleural effusions.    The gallbladder, pancreas, spleen and the bilateral adrenal  glands appear unremarkable. There is 5 mm low-attenuation lesion  in the lateral segment of the left lobe of the liver, most likely  a benign cyst/liver hemangioma and unchanged    The bilateral kidneys enhance and excrete contrast in a normal  fashion. There is a tiny benign cortical cyst in the lateral  midpole of the left kidney    The bilateral ureters and the bilateral periureteral soft tissues  and fat planes are unremarkable.     The urinary bladder is unremarkable.    The stomach appears unremarkable.    The small bowel appears unremarkable, without any evidence of  small bowel obstruction or bowel wall thickening.    There is no CT evidence of acute appendicitis, pericecal  inflammatory change or ileocecal mesenteric adenitis.     The ileocecal junction appears unremarkable.    There is no CT evidence of acute colonic diverticulitis or  colitis or large bowel obstruction. Note is again made of large  bowel diverticulosis    The splenic and portal veins are of normal caliber, without any  filling defects.    There is no pathological lymphadenopathy in the retroperitoneum  or in the pelvic region.    There is no evidence of free fluid or free air in the abdomen or  the pelvic region.    There is no clinically significant abdominal aortic aneurysm.    There is no clinically significant inguinal or ventral hernia.  There is prior  hysterectomy    The visualized lower thoracic spine and the lumbar spine are  unremarkable.    The paravertebral soft tissues are unremarkable.    The  remainder of the pelvic structures are unremarkable.  Impression: Impression:  There are no acute findings in the abdomen or the pelvis  Large bowel diverticulosis, without acute diverticulitis.  5 mm benign cyst/hemangioma in the lateral segment of the left  lobe of the liver.    Electronically signed by:  Ronnie Suarez MD  7/26/2020 5:10 PM CDT  Workstation: [email or web address]  Immunization History   Administered Date(s) Administered   • Flulaval/Fluarix Quad 11/30/2015, 09/05/2020   • Influenza Quad Vaccine (Inpatient) 09/13/2011, 11/08/2016   • Influenza TIV (IM) 10/07/2010   • Influenza, Unspecified 09/03/2020   • Pneumococcal Polysaccharide (PPSV23) 11/30/2015   • Tdap 10/07/2010   • flucelvax quad pfs =>4 YRS 09/19/2019       The following portions of the patient's history were reviewed and updated as appropriate: allergies, current medications, past family history, past medical history, past social history, past surgical history and problem list.        Physical Exam  Physical Exam  Constitutional:       Comments: Pt sounds ill   Neurological:      General: No focal deficit present.      Mental Status: She is alert and oriented to person, place, and time.         Assessment/Plan    Diagnosis Plan   1. COPD exacerbation (CMS/HCC)     2. Stage 2 moderate COPD by GOLD classification (CMS/HCC)  budesonide-formoterol (SYMBICORT) 160-4.5 MCG/ACT inhaler   3. Upper respiratory tract infection, unspecified type     4. Cough     5. Tobacco user               -cough/acute bronchitis/URI - start on doxycycline 100 mg PO BID for 7 days. tesalon perles 100 mg PO TID PRN for 10 days.  Recommend rest hydration. Tylenol as needed for fever.  Advised to quit smoking or cut back. Will get last COVID -19 test. Pt states if she takes probiotics with abx her stomach  issues would be controlled.    -HTN/tachcycardia -controlledon toprol XL 50 mg daily.   -schedule mammogram screening    -JELENA - pt admits to smoking marijuana. Recommend pt continue that since it calms her down but also discussed about potential side effects if long term use   -thrombocytosis /polycythemia  continue to monitor. She is seeing Hematology   -gluten sensitivity -recommend gluten free diet   -COPD/COPD exacerbation- Pulmonlogy following on Symbicort 160/45 2 puffs BID  Albuterol inhaler PRN.  advised pt to quit smoking >5 minutes.  continue duo nebs. Advised pt to make appt with Pulmonlogist. urrged importance of smoking cessation . Duo Nebs every 4 hours. Prednisone tapering dose for 10 days.   -vitamin B12 deficiency  - b12 injections weekly  -tobacco user - counseled to quit smoking >5 minutes. She could not tolerate chantix  urged the importance of quitting smoking.   Insurance would not cover nicotine patch and gum. Recommend pt call [phone] start on nicotine patch 7 mg daily.    -chronic bronchitis, - mucinex advised pt to cut down or quit smoking >5 minutes   -abdominal pain/gastritis/IBS /GERD- Gastroenterology following. On PPI prilosec 20 mg q daily  OFF prucalopride 2 mg daily. She is to followup regarding abodminal issues on bentyl 40 mg every 6 hours PRN.  advised pt to limit elavil if possible since it can cause constipation. Has upcoming EGD soon with GI.    -nausea/ vomting - continue zofran PRN  -allergic rhinitis - flonase nasal spray and singulair   -vitamin D deficiency - vitamin D once a week   -pt has upcoming mammogram soon.    -tremor of head/concussion - she has seen neurology with Dr. Chen. Had MRI of brain. Recommended 2nd opinon with Dr. Manjarrez She also has family history of Friedreich's ataxia. On elavail 25 mg at bedtime. Continue with Neurology followup with Dr. Manjarrez    -advised pt to go to ER or call 911 if symptoms worrisome or severe  -advised pt to be  Safe and  call with questions and concerns  -advised to followup with all referrals and Specialist  -advised pt to be safe during COVID -19 pandemic   This visit has been rescheduled as a phone visit to comply with patient safety concerns in accordance with CDC recommendations. Total time of discussion was 25  Minutes.  -recheck in 1 week         This document has been electronically signed by Xavier Wick MD on September 30, 2020 10:31 CDT

## 2020-10-06 RX ORDER — GUAIFENESIN 600 MG/1
1200 TABLET, EXTENDED RELEASE ORAL 2 TIMES DAILY
Qty: 60 TABLET | Refills: 3 | Status: SHIPPED | OUTPATIENT
Start: 2020-10-06 | End: 2020-10-27

## 2020-10-09 ENCOUNTER — OFFICE VISIT (OUTPATIENT)
Dept: FAMILY MEDICINE CLINIC | Facility: CLINIC | Age: 50
End: 2020-10-09

## 2020-10-09 VITALS
WEIGHT: 111.5 LBS | HEART RATE: 98 BPM | HEIGHT: 65 IN | BODY MASS INDEX: 18.58 KG/M2 | DIASTOLIC BLOOD PRESSURE: 88 MMHG | SYSTOLIC BLOOD PRESSURE: 118 MMHG

## 2020-10-09 DIAGNOSIS — J44.9 STAGE 2 MODERATE COPD BY GOLD CLASSIFICATION (HCC): ICD-10-CM

## 2020-10-09 DIAGNOSIS — R05.9 COUGH: Primary | ICD-10-CM

## 2020-10-09 DIAGNOSIS — J42 CHRONIC BRONCHITIS, UNSPECIFIED CHRONIC BRONCHITIS TYPE (HCC): ICD-10-CM

## 2020-10-09 DIAGNOSIS — J06.9 UPPER RESPIRATORY TRACT INFECTION, UNSPECIFIED TYPE: ICD-10-CM

## 2020-10-09 DIAGNOSIS — R10.84 GENERALIZED ABDOMINAL PAIN: ICD-10-CM

## 2020-10-09 DIAGNOSIS — J44.1 COPD EXACERBATION (HCC): ICD-10-CM

## 2020-10-09 PROCEDURE — 99443 PR PHYS/QHP TELEPHONE EVALUATION 21-30 MIN: CPT | Performed by: FAMILY MEDICINE

## 2020-10-14 NOTE — PROGRESS NOTES
Subjective:  Carla Richard is a 50 y.o. female who presents for       Patient Active Problem List   Diagnosis   • Tobacco abuse counseling   • Chronic idiopathic constipation   • B12 deficiency   • Vitamin D deficiency    • Tobacco user   • Stage 2 moderate COPD by GOLD classification (CMS/[facility])   • Cervical lymphadenopathy   • Generalized abdominal pain   • Nausea   • Neurological abnormality   • Tremor   • RUQ pain   • COPD exacerbation (CMS/[facility])   • Pertussis exposure   • Chronic bronchitis (CMS/[facility])   • Cigarette nicotine dependence, uncomplicated   • Chronic nonseasonal allergic rhinitis due to pollen   • Episode of recurrent major depressive disorder (CMS/[facility])   • Diarrhea   • Chronic diarrhea   • Thrombocytosis (CMS/[facility])   • Nausea and vomiting   • Gluten intolerance   • C. difficile diarrhea   • Shiga toxin-producing Escherichia coli infection   • Generalized weakness   • Head injury   • Concussion with loss of consciousness   • Gastritis without bleeding   • Irritable bowel syndrome   • Tachycardia   • Essential hypertension   • JELENA (generalized anxiety disorder)   • Marijuana use   • Epigastric pain   • History of Clostridioides difficile colitis   • Celiac disease   • Cough   • Upper respiratory tract infection   • Irritable bowel syndrome with both constipation and diarrhea   • Moderate protein-calorie malnutrition (CMS/[facility])           Current Outpatient Medications:   •  albuterol sulfate  (90 Base) MCG/ACT inhaler, Inhale 2 puffs Every 4 (Four) Hours As Needed for Wheezing., Disp: 18 g, Rfl: 3  •  amitriptyline (ELAVIL) 25 MG tablet, Take 25 mg by mouth Every Night., Disp: , Rfl:   •  budesonide-formoterol (SYMBICORT) 160-4.5 MCG/ACT inhaler, Inhale 2 puffs 2 (Two) Times a Day., Disp: 1 inhaler, Rfl: 11  •  cetirizine (zyrTEC) 10 MG tablet, Take 1 tablet by mouth Daily., Disp: 30 tablet, Rfl: 11  •  Cyanocobalamin (B-12 COMPLIANCE INJECTION IJ), Inject  as directed., Disp: , Rfl:   •   fluticasone (FLONASE) 50 MCG/ACT nasal spray, 2 sprays into the nostril(s) as directed by provider Daily., Disp: 1 bottle, Rfl: 11  •  ipratropium-albuterol (DUO-NEB) 0.5-2.5 mg/3 ml nebulizer, Take 3 mL by nebulization Every 4 (Four) Hours As Needed for Wheezing or Shortness of Air., Disp: 360 mL, Rfl: 3  •  montelukast (SINGULAIR) 10 MG tablet, Take 1 tablet by mouth Every Night., Disp: 30 tablet, Rfl: 3  •  nicotine (NICODERM CQ) 7 MG/24HR patch, Place 1 patch on the skin as directed by provider Daily., Disp: 30 patch, Rfl: 3  •  omeprazole (priLOSEC) 20 MG capsule, Take 1 capsule by mouth Daily., Disp: 30 capsule, Rfl: 3  •  ondansetron ODT (ZOFRAN-ODT) 4 MG disintegrating tablet, Place 1 tablet on the tongue 4 (Four) Times a Day As Needed for Nausea or Vomiting., Disp: 12 tablet, Rfl: 0  •  permethrin (Nix Creme Rinse) 1 % liquid, Apply once on scalp for 10 minutes repeat in 7 days if needed, Disp: 120 mL, Rfl: 0  •  Plecanatide (Trulance) 3 MG tablet, Take 1 tablet by mouth Daily., Disp: 90 tablet, Rfl: 5      Pt is 50 yo female with history of moderateC OPD, Vitamin B12 deficiency, Vitamin D deficiency, chronic abdominal pain, tremor,  RUQ pain,  Sp hysterectomy,  History of chron's disease, history of pertussis infection  sp right shoulder surgery. X 3, sp rotator cuff repair , history of C diff diarrhea, gluten sensitivity, gastritis, marijuana use, COPD      10/9/20 Pt agreed to telemedicine visit today  telemedicine visit for recheck on pt's above medical issues and recheck on pt's URI/cough/bronchtiis/COPD exacerbation.  She finished abx. She did not  her nicotine patch from pharamcy.  She continues to smoke 1 ppd. No fever.  She has a hypnosis appt next week for tobacco user     10/27/20 in office visit for recheck on pt's above medical issues. She has felt better since cutting on smoking. She was previously treated for COPD exacerbaation and bronchitis. Breathing better. Coughing up  Mucous is  better. She also has refrained from gluten free diet. And her abdominal pain has improved. Constipation and diarrhea have improved.  Has  appt with Pulmonolgy tomorrow. She started hypnosis for smoking cessation. Also has been on gluten free diet for 3 months now. Her outlook in life is better. Overall her health feels better     GI Problem  The primary symptoms include fatigue, abdominal pain, diarrhea and arthralgias. Primary symptoms do not include fever, nausea, vomiting or melena.   The illness is also significant for constipation. The illness does not include chills, anorexia or dysphagia. Associated medical issues do not include inflammatory bowel disease, GERD, gallstones, liver disease, alcohol abuse, PUD, gastric bypass, bowel resection, irritable bowel syndrome, hemorrhoids or diverticulitis. Associated medical issues comments: gluten sensitivity .   Shortness of Breath  This is a chronic problem. The problem occurs daily. The problem has been gradually improving  Associated symptoms include abdominal pain and sputum production. Pertinent negatives include no chest pain, claudication, coryza, ear pain, fever, headaches, hemoptysis, leg pain, leg swelling, neck pain, orthopnea, PND, rash, rhinorrhea, sore throat, swollen glands, syncope, vomiting or wheezing. The symptoms are aggravated by smoke. She has tried beta agonist inhalers for the symptoms. The treatment provided no relief. Her past medical history is significant for chronic lung disease and COPD. There is no history of allergies, aspirin allergies, asthma, bronchiolitis, CAD, DVT, a heart failure, PE, pneumonia or a recent surgery.   COPD   This is a chronic problem. The current episode started more than 1 year ago. The problem occurs constantly. The problem has been iomproving Associated symptoms include abdominal pain, arthralgias, fatigue, joint swelling, numbness and weakness. Pertinent negatives include no anorexia, change in bowel  habit, chest pain, chills, congestion, coughing, diaphoresis, fever, headaches, myalgias, nausea, neck pain, sore throat, swollen glands, urinary symptoms, vertigo, visual change or vomiting. The symptoms are aggravated by smoking. She has tried nothing (combivent ) for the symptoms.     Review of Systems  Review of Systems   Constitutional: Positive for activity change and fatigue. Negative for appetite change, chills, diaphoresis and fever.   HENT: Negative for congestion, postnasal drip, rhinorrhea, sinus pressure, sinus pain, sneezing, sore throat, trouble swallowing and voice change.    Respiratory: Positive for cough and shortness of breath. Negative for choking, chest tightness, wheezing and stridor.    Cardiovascular: Negative for chest pain.   Gastrointestinal: Positive for abdominal pain, constipation and diarrhea. Negative for anorexia, dysphagia, melena, nausea and vomiting.   Musculoskeletal: Positive for arthralgias.   Neurological: Positive for tremors, weakness and numbness. Negative for headaches.   Psychiatric/Behavioral: The patient is nervous/anxious.         Depressed mood        Patient Active Problem List   Diagnosis   • Tobacco abuse counseling   • Chronic idiopathic constipation   • B12 deficiency   • Vitamin D deficiency    • Tobacco user   • Stage 2 moderate COPD by GOLD classification (CMS/[facility])   • Cervical lymphadenopathy   • Generalized abdominal pain   • Nausea   • Neurological abnormality   • Tremor   • RUQ pain   • COPD exacerbation (CMS/HCC)   • Pertussis exposure   • Chronic bronchitis (CMS/[facility])   • Cigarette nicotine dependence, uncomplicated   • Chronic nonseasonal allergic rhinitis due to pollen   • Episode of recurrent major depressive disorder (CMS/HCC)   • Diarrhea   • Chronic diarrhea   • Thrombocytosis (CMS/HCC)   • Nausea and vomiting   • Gluten intolerance   • C. difficile diarrhea   • Shiga toxin-producing Escherichia coli infection   • Generalized weakness   • Head  injury   • Concussion with loss of consciousness   • Gastritis without bleeding   • Irritable bowel syndrome   • Tachycardia   • Essential hypertension   • JELENA (generalized anxiety disorder)   • Marijuana use   • Epigastric pain   • History of Clostridioides difficile colitis   • Celiac disease   • Cough   • Upper respiratory tract infection   • Irritable bowel syndrome with both constipation and diarrhea   • Moderate protein-calorie malnutrition (CMS/HCC)     Past Surgical History:   Procedure Laterality Date   • BACK SURGERY      C5-6   • COLONOSCOPY     • COLONOSCOPY N/A 10/18/2019    Procedure: COLONOSCOPY;  Surgeon: Tom Davis MD;  Location: [facility] ENDOSCOPY;  Service: Gastroenterology   • COLONOSCOPY N/A 8/27/2020    Procedure: COLONOSCOPY;  Surgeon: Tom Davis MD;  Location: [facility] ENDOSCOPY;  Service: Gastroenterology;  Laterality: N/A;   • ENDOSCOPY N/A 10/18/2019    Procedure: ESOPHAGOGASTRODUODENOSCOPY;  Surgeon: Tom Davis MD;  Location: [facility] ENDOSCOPY;  Service: Gastroenterology   • ENDOSCOPY N/A 7/27/2020    Procedure: ESOPHAGOGASTRODUODENOSCOPY;  Surgeon: Tom Davis MD;  Location: [facility] ENDOSCOPY;  Service: Gastroenterology;  Laterality: N/A;   • HYSTERECTOMY     • SHOULDER ARTHROSCOPY W/ ROTATOR CUFF REPAIR Right    • TOE SURGERY      left small toe    • TONSILECTOMY, ADENOIDECTOMY, BILATERAL MYRINGOTOMY AND TUBES     • TONSILLECTOMY     • UPPER GASTROINTESTINAL ENDOSCOPY       Social History     Socioeconomic History   • Marital status: Single     Spouse name: Not on file   • Number of children: Not on file   • Years of education: Not on file   • Highest education level: Not on file   Tobacco Use   • Smoking status: Current Every Day Smoker     Packs/day: 1.00     Years: 40.00     Pack years: 40.00     Types: Cigarettes   • Smokeless tobacco: Never Used   Substance and Sexual Activity   • Alcohol use: No     Frequency: Never   • Drug use: Yes     Types: Marijuana      Comment: nightly   • Sexual activity: Defer     Family History   Problem Relation Age of Onset   • Inflammatory bowel disease Mother    • Arthritis Mother    • Diabetes Mother    • Hypertension Mother    • Hyperlipidemia Mother    • Obesity Mother    • Osteoporosis Mother    • Heart disease Father    • Hyperlipidemia Father    • Diabetes Sister    • Liver disease Daughter    • Mental illness Daughter    • Obesity Daughter    • Diabetes Maternal Grandmother    • Cancer Maternal Grandmother         lung   • Cancer Other         lung   • Heart disease Other      Admission on 08/27/2020, Discharged on 08/27/2020   Component Date Value Ref Range Status   • SARS-CoV-2, ANAIS 08/24/2020 Not Detected  Not Detected Final    This test was developed and its performance characteristics determined  by [employer]. This test has not been FDA cleared or  approved. This test has been authorized by FDA under an Emergency Use  Authorization (EUA). This test is only authorized for the duration of  time the declaration that circumstances exist justifying the  authorization of the emergency use of in vitro diagnostic tests for  detection of SARS-CoV-2 virus and/or diagnosis of COVID-19 infection  under section 564(b)(1) of the Act, 21 U.S.C. 360bbb-3(b)(1), unless  the authorization is terminated or revoked sooner.  When diagnostic testing is negative, the possibility of a false  negative result should be considered in the context of a patient's  recent exposures and the presence of clinical signs and symptoms  consistent with COVID-19. An individual without symptoms of COVID-19  and who is not shedding SARS-CoV-2 virus would expect to have a  negative (not detected) result in this assay.   • COVID [facility] PRIORITY 08/24/2020 Comment   Final    Received   • Case Report 08/27/2020    Final                    Value:Surgical Pathology Report                         Case: [id number]                                  Authorizing  Provider:  Tom Davis MD        Collected:           08/27/2020 02:44 PM          Ordering Location:     [facility]             Received:            08/28/2020 07:02 AM                                 [place] [facility]                                                     Pathologist:           Kunal Dia MD                                                           Specimen:    Large Intestine, colonic mucosa bx                                                        • Final Diagnosis 08/27/2020    Final                    Value:This result contains rich text formatting which cannot be displayed here.   Lab on 08/19/2020   Component Date Value Ref Range Status   • C. Difficile Toxins by PCR 08/19/2020 Negative  Negative Final   • Campylobacter 08/19/2020 Not Detected  Not Detected Final   • Plesiomonas shigelloides 08/19/2020 Not Detected  Not Detected Final   • Salmonella 08/19/2020 Not Detected  Not Detected Final   • Vibrio 08/19/2020 Not Detected  Not Detected Final   • Vibrio cholerae 08/19/2020 Not Detected  Not Detected Final   • Yersinia enterocolitica 08/19/2020 Not Detected  Not Detected Final   • Enteroaggregative E. coli (EAEC) 08/19/2020 Not Detected  Not Detected Final   • Enteropathogenic E. coli (EPEC) 08/19/2020 Not Detected  Not Detected Final   • Enterotoxigenic E. coli (ETEC) lt/* 08/19/2020 Not Detected  Not Detected Final   • Shiga-like toxin-producing E. coli* 08/19/2020 Not Detected  Not Detected Final   • E. coli O157 08/19/2020 Not Detected  Not Detected Final   • Shigella/Enteroinvasive E. coli (E* 08/19/2020 Not Detected  Not Detected Final   • Cryptosporidium 08/19/2020 Not Detected  Not Detected Final   • Cyclospora cayetanensis 08/19/2020 Not Detected  Not Detected Final   • Entamoeba histolytica 08/19/2020 Not Detected  Not Detected Final   • Giardia lamblia 08/19/2020 Not Detected  Not Detected Final   • Adenovirus F40/41 08/19/2020 Not Detected  Not Detected Final    • Astrovirus 08/19/2020 Not Detected  Not Detected Final   • Norovirus GI/GII 08/19/2020 Not Detected  Not Detected Final   • Rotavirus A 08/19/2020 Not Detected  Not Detected Final   • Sapovirus (I, II, IV or V) 08/19/2020 Not Detected  Not Detected Final   Admission on 07/27/2020, Discharged on 07/27/2020   Component Date Value Ref Range Status   • SARS-CoV-2, ANAIS 07/24/2020 Not Detected  Not Detected Final    This test was developed and its performance characteristics determined  by [employer]. This test has not been FDA cleared or  approved. This test has been authorized by FDA under an Emergency Use  Authorization (EUA). This test is only authorized for the duration of  time the declaration that circumstances exist justifying the  authorization of the emergency use of in vitro diagnostic tests for  detection of SARS-CoV-2 virus and/or diagnosis of COVID-19 infection  under section 564(b)(1) of the Act, 21 U.S.C. 360bbb-3(b)(1), unless  the authorization is terminated or revoked sooner.  When diagnostic testing is negative, the possibility of a false  negative result should be considered in the context of a patient's  recent exposures and the presence of clinical signs and symptoms  consistent with COVID-19. An individual without symptoms of COVID-19  and who is not shedding SARS-CoV-2 virus would expect to have a  negative (not detected) result in this assay.   • COVID [facility] PRIORITY 07/24/2020 Comment   Final    Received   • Case Report 07/27/2020    Final                    Value:Surgical Pathology Report                         Case: [id number]                                  Authorizing Provider:  Tom Davis MD        Collected:           07/27/2020 12:52 PM          Ordering Location:     [facility]             Received:            07/28/2020 07:21 AM                                 [place] [facility]                                                     Pathologist:            Karl Lee MD                                                            Specimens:   1) - Gastric, Antrum                                                                                2) - Small Intestine                                                                                3) - Esophagus, Distal                                                                    • Final Diagnosis 07/27/2020    Final                    Value:This result contains rich text formatting which cannot be displayed here.   Admission on 07/26/2020, Discharged on 07/26/2020   Component Date Value Ref Range Status   • Glucose 07/26/2020 89  65 - 99 mg/dL Final   • BUN 07/26/2020 11  6 - 20 mg/dL Final   • Creatinine 07/26/2020 0.68  0.57 - 1.00 mg/dL Final   • Sodium 07/26/2020 140  136 - 145 mmol/L Final   • Potassium 07/26/2020 3.6  3.5 - 5.2 mmol/L Final   • Chloride 07/26/2020 105  98 - 107 mmol/L Final   • CO2 07/26/2020 25.0  22.0 - 29.0 mmol/L Final   • Calcium 07/26/2020 9.0  8.6 - 10.5 mg/dL Final   • Total Protein 07/26/2020 6.4  6.0 - 8.5 g/dL Final   • Albumin 07/26/2020 4.40  3.50 - 5.20 g/dL Final   • ALT (SGPT) 07/26/2020 9  1 - 33 U/L Final   • AST (SGOT) 07/26/2020 14  1 - 32 U/L Final   • Alkaline Phosphatase 07/26/2020 66  39 - 117 U/L Final   • Total Bilirubin 07/26/2020 0.5  0.0 - 1.2 mg/dL Final   • eGFR Non African Amer 07/26/2020 91  >60 mL/min/1.73 Final   • Globulin 07/26/2020 2.0  gm/dL Final   • A/G Ratio 07/26/2020 2.2  g/dL Final   • BUN/Creatinine Ratio 07/26/2020 16.2  7.0 - 25.0 Final   • Anion Gap 07/26/2020 10.0  5.0 - 15.0 mmol/L Final   • Extra Tube 07/26/2020 hold for add-on   Final    Auto resulted   • Extra Tube 07/26/2020 Hold for add-ons.   Final    Auto resulted.   • Extra Tube 07/26/2020 hold for add-on   Final    Auto resulted   • WBC 07/26/2020 9.21  3.40 - 10.80 10*3/mm3 Final   • RBC 07/26/2020 4.40  3.77 - 5.28 10*6/mm3 Final   • Hemoglobin 07/26/2020 13.7  12.0 - 15.9 g/dL Final    • Hematocrit 07/26/2020 40.7  34.0 - 46.6 % Final   • MCV 07/26/2020 92.5  79.0 - 97.0 fL Final   • MCH 07/26/2020 31.1  26.6 - 33.0 pg Final   • MCHC 07/26/2020 33.7  31.5 - 35.7 g/dL Final   • RDW 07/26/2020 12.4  12.3 - 15.4 % Final   • RDW-SD 07/26/2020 42.5  37.0 - 54.0 fl Final   • MPV 07/26/2020 9.3  6.0 - 12.0 fL Final   • Platelets 07/26/2020 422  140 - 450 10*3/mm3 Final   • Neutrophil % 07/26/2020 51.0  42.7 - 76.0 % Final   • Lymphocyte % 07/26/2020 36.0  19.6 - 45.3 % Final   • Monocyte % 07/26/2020 8.4  5.0 - 12.0 % Final   • Eosinophil % 07/26/2020 3.7  0.3 - 6.2 % Final   • Basophil % 07/26/2020 0.7  0.0 - 1.5 % Final   • Immature Grans % 07/26/2020 0.2  0.0 - 0.5 % Final   • Neutrophils, Absolute 07/26/2020 4.70  1.70 - 7.00 10*3/mm3 Final   • Lymphocytes, Absolute 07/26/2020 3.32* 0.70 - 3.10 10*3/mm3 Final   • Monocytes, Absolute 07/26/2020 0.77  0.10 - 0.90 10*3/mm3 Final   • Eosinophils, Absolute 07/26/2020 0.34  0.00 - 0.40 10*3/mm3 Final   • Basophils, Absolute 07/26/2020 0.06  0.00 - 0.20 10*3/mm3 Final   • Immature Grans, Absolute 07/26/2020 0.02  0.00 - 0.05 10*3/mm3 Final   • nRBC 07/26/2020 0.0  0.0 - 0.2 /100 WBC Final   • Color, UA 07/26/2020 Yellow  Yellow, Straw, Dark Yellow, Ana M Final   • Appearance, UA 07/26/2020 Clear  Clear Final   • pH, UA 07/26/2020 7.0  5.0 - 9.0 Final   • Specific Gravity, UA 07/26/2020 1.010  1.003 - 1.030 Final   • Glucose, UA 07/26/2020 Negative  Negative Final   • Ketones, UA 07/26/2020 Trace* Negative Final   • Bilirubin, UA 07/26/2020 Negative  Negative Final   • Blood, UA 07/26/2020 Small (1+)* Negative Final   • Protein, UA 07/26/2020 Negative  Negative Final   • Leuk Esterase, UA 07/26/2020 Negative  Negative Final   • Nitrite, UA 07/26/2020 Negative  Negative Final   • Urobilinogen, UA 07/26/2020 0.2 E.U./dL  0.2 - 1.0 E.U./dL Final   • Lactate 07/26/2020 1.2  0.5 - 2.0 mmol/L Final   • Lipase 07/26/2020 24  13 - 60 U/L Final   • HCG Qualitative  07/26/2020 Negative  Negative Final   • RBC, UA 07/26/2020 3-5* None Seen /HPF Final   • WBC, UA 07/26/2020 None Seen  None Seen, 0-2, 3-5 /HPF Final   • Bacteria, UA 07/26/2020 None Seen  None Seen /HPF Final   • Squamous Epithelial Cells, UA 07/26/2020 None Seen  None Seen, 0-2 /HPF Final   • Hyaline Casts, UA 07/26/2020 None Seen  None Seen /LPF Final   • Methodology 07/26/2020 Automated Microscopy   Final   Admission on 07/05/2020, Discharged on 07/05/2020   Component Date Value Ref Range Status   • Glucose 07/05/2020 97  65 - 99 mg/dL Final   • BUN 07/05/2020 6  6 - 20 mg/dL Final   • Creatinine 07/05/2020 0.65  0.57 - 1.00 mg/dL Final   • Sodium 07/05/2020 140  136 - 145 mmol/L Final   • Potassium 07/05/2020 3.5  3.5 - 5.2 mmol/L Final   • Chloride 07/05/2020 103  98 - 107 mmol/L Final   • CO2 07/05/2020 28.0  22.0 - 29.0 mmol/L Final   • Calcium 07/05/2020 8.5* 8.6 - 10.5 mg/dL Final   • Total Protein 07/05/2020 6.3  6.0 - 8.5 g/dL Final   • Albumin 07/05/2020 4.00  3.50 - 5.20 g/dL Final   • ALT (SGPT) 07/05/2020 18  1 - 33 U/L Final   • AST (SGOT) 07/05/2020 21  1 - 32 U/L Final   • Alkaline Phosphatase 07/05/2020 61  39 - 117 U/L Final   • Total Bilirubin 07/05/2020 0.3  0.2 - 1.2 mg/dL Final   • eGFR Non African Amer 07/05/2020 96  >60 mL/min/1.73 Final   • Globulin 07/05/2020 2.3  gm/dL Final   • A/G Ratio 07/05/2020 1.7  g/dL Final   • BUN/Creatinine Ratio 07/05/2020 9.2  7.0 - 25.0 Final   • Anion Gap 07/05/2020 9.0  5.0 - 15.0 mmol/L Final   • Lipase 07/05/2020 19  13 - 60 U/L Final   • Color, UA 07/05/2020 Yellow  Yellow, Straw, Dark Yellow, Ana M Final   • Appearance, UA 07/05/2020 Clear  Clear Final   • pH, UA 07/05/2020 8.0  5.0 - 9.0 Final   • Specific Gravity, UA 07/05/2020 1.015  1.003 - 1.030 Final   • Glucose, UA 07/05/2020 Negative  Negative Final   • Ketones, UA 07/05/2020 Negative  Negative Final   • Bilirubin, UA 07/05/2020 Negative  Negative Final   • Blood, UA 07/05/2020 Negative   Negative Final   • Protein, UA 07/05/2020 Negative  Negative Final   • Leuk Esterase, UA 07/05/2020 Negative  Negative Final   • Nitrite, UA 07/05/2020 Negative  Negative Final   • Urobilinogen, UA 07/05/2020 0.2 E.U./dL  0.2 - 1.0 E.U./dL Final   • Extra Tube 07/05/2020 hold for add-on   Final    Auto resulted   • Extra Tube 07/05/2020 Hold for add-ons.   Final    Auto resulted.   • Extra Tube 07/05/2020 hold for add-on   Final    Auto resulted   • Extra Tube 07/05/2020 Hold for add-ons.   Final    Auto resulted.   • WBC 07/05/2020 11.22* 3.40 - 10.80 10*3/mm3 Final   • RBC 07/05/2020 4.01  3.77 - 5.28 10*6/mm3 Final   • Hemoglobin 07/05/2020 12.6  12.0 - 15.9 g/dL Final   • Hematocrit 07/05/2020 37.7  34.0 - 46.6 % Final   • MCV 07/05/2020 94.0  79.0 - 97.0 fL Final   • MCH 07/05/2020 31.4  26.6 - 33.0 pg Final   • MCHC 07/05/2020 33.4  31.5 - 35.7 g/dL Final   • RDW 07/05/2020 13.1  12.3 - 15.4 % Final   • RDW-SD 07/05/2020 45.1  37.0 - 54.0 fl Final   • MPV 07/05/2020 9.2  6.0 - 12.0 fL Final   • Platelets 07/05/2020 368  140 - 450 10*3/mm3 Final   • Neutrophil % 07/05/2020 50.9  42.7 - 76.0 % Final   • Lymphocyte % 07/05/2020 33.2  19.6 - 45.3 % Final   • Monocyte % 07/05/2020 10.4  5.0 - 12.0 % Final   • Eosinophil % 07/05/2020 4.1  0.3 - 6.2 % Final   • Basophil % 07/05/2020 0.5  0.0 - 1.5 % Final   • Immature Grans % 07/05/2020 0.9* 0.0 - 0.5 % Final   • Neutrophils, Absolute 07/05/2020 5.71  1.70 - 7.00 10*3/mm3 Final   • Lymphocytes, Absolute 07/05/2020 3.72* 0.70 - 3.10 10*3/mm3 Final   • Monocytes, Absolute 07/05/2020 1.17* 0.10 - 0.90 10*3/mm3 Final   • Eosinophils, Absolute 07/05/2020 0.46* 0.00 - 0.40 10*3/mm3 Final   • Basophils, Absolute 07/05/2020 0.06  0.00 - 0.20 10*3/mm3 Final   • Immature Grans, Absolute 07/05/2020 0.10* 0.00 - 0.05 10*3/mm3 Final   • nRBC 07/05/2020 0.0  0.0 - 0.2 /100 WBC Final      CT Abdomen Pelvis With Contrast  Narrative: PROCEDURE: CT ABDOMEN PELVIS WITH IV  CONTRAST    Clinical History: abd pain, diarrhea since yesterday. hx of  colitis.    Indication: Same as above    Comparison: 7/5/2020 .    Technique: CT of the abdomen and pelvis was done with   intravenous contrast.     Images were obtained from the lung base to the level of the pubic  symphysis in axial plane, followed by orthogonal sagittal and  coronal reconstruction.    Oral contrast was not  given for the study.    The patient was injected with 70 mL of Isovue-300 radiographic  contrast intravenously, without any documented immediate adverse  reactions.    This exam was performed according to the departmental  dose-optimization program which includes automated exposure  control, adjustment of the mA and/or kV according to patient size  and/or use of iterative reconstruction technique.      Findings:  Images through the lung bases do not show any focal infiltrates  or pleural effusions.    The gallbladder, pancreas, spleen and the bilateral adrenal  glands appear unremarkable. There is 5 mm low-attenuation lesion  in the lateral segment of the left lobe of the liver, most likely  a benign cyst/liver hemangioma and unchanged    The bilateral kidneys enhance and excrete contrast in a normal  fashion. There is a tiny benign cortical cyst in the lateral  midpole of the left kidney    The bilateral ureters and the bilateral periureteral soft tissues  and fat planes are unremarkable.     The urinary bladder is unremarkable.    The stomach appears unremarkable.    The small bowel appears unremarkable, without any evidence of  small bowel obstruction or bowel wall thickening.    There is no CT evidence of acute appendicitis, pericecal  inflammatory change or ileocecal mesenteric adenitis.     The ileocecal junction appears unremarkable.    There is no CT evidence of acute colonic diverticulitis or  colitis or large bowel obstruction. Note is again made of large  bowel diverticulosis    The splenic and portal veins are  "of normal caliber, without any  filling defects.    There is no pathological lymphadenopathy in the retroperitoneum  or in the pelvic region.    There is no evidence of free fluid or free air in the abdomen or  the pelvic region.    There is no clinically significant abdominal aortic aneurysm.    There is no clinically significant inguinal or ventral hernia.  There is prior hysterectomy    The visualized lower thoracic spine and the lumbar spine are  unremarkable.    The paravertebral soft tissues are unremarkable.    The  remainder of the pelvic structures are unremarkable.  Impression: Impression:  There are no acute findings in the abdomen or the pelvis  Large bowel diverticulosis, without acute diverticulitis.  5 mm benign cyst/hemangioma in the lateral segment of the left  lobe of the liver.    Electronically signed by:  Ronnie Suarez MD  7/26/2020 5:10 PM CDT  Workstation: [email or web address]    [email or web address]@  Immunization History   Administered Date(s) Administered   • Flulaval/Fluarix/Fluzone Quad 11/30/2015, 09/05/2020   • Influenza Quad Vaccine (Inpatient) 09/13/2011, 11/08/2016   • Influenza TIV (IM) 10/07/2010   • Influenza, Unspecified 09/03/2020   • Pneumococcal Polysaccharide (PPSV23) 11/30/2015   • Tdap 10/07/2010   • flucelvax quad pfs =>4 YRS 09/19/2019       The following portions of the patient's history were reviewed and updated as appropriate: allergies, current medications, past family history, past medical history, past social history, past surgical history and problem list.        Physical Exam  /70 (BP Location: Right arm, Patient Position: Sitting, Cuff Size: Adult)   Pulse 86   Temp 97.7 °F (36.5 °C)   Ht 165.1 cm (65\")   Wt 49.8 kg (109 lb 12.8 oz)   LMP  (LMP Unknown)   SpO2 97%   BMI 18.27 kg/m²     Physical Exam  Vitals signs and nursing note reviewed.   Constitutional:       Appearance: She is well-developed. She is not diaphoretic.   HENT:      Head: Normocephalic and " atraumatic.      Right Ear: External ear normal.   Eyes:      Conjunctiva/sclera: Conjunctivae normal.      Pupils: Pupils are equal, round, and reactive to light.   Neck:      Musculoskeletal: Normal range of motion and neck supple.   Cardiovascular:      Rate and Rhythm: Normal rate and regular rhythm.      Heart sounds: Normal heart sounds. No murmur.   Pulmonary:      Effort: No respiratory distress.      Comments: Decreased breath sounds   Abdominal:      General: Bowel sounds are normal. There is no distension.      Palpations: Abdomen is soft.      Tenderness: There is abdominal tenderness.   Musculoskeletal: Normal range of motion.         General: No deformity.   Skin:     General: Skin is warm.      Coloration: Skin is not pale.      Findings: No erythema or rash.   Neurological:      Mental Status: She is alert and oriented to person, place, and time.      Cranial Nerves: No cranial nerve deficit.   Psychiatric:         Behavior: Behavior normal.         Assessment/Plan    Diagnosis Plan   1. Stage 2 moderate COPD by GOLD classification (CMS/HCC)  CBC Auto Differential    Comprehensive Metabolic Panel    Lipid Panel    Hepatitis C Antibody   2. Tobacco user  CBC Auto Differential    Comprehensive Metabolic Panel    Lipid Panel    Hepatitis C Antibody   3. Chronic bronchitis, unspecified chronic bronchitis type (CMS/HCC)  CBC Auto Differential    Comprehensive Metabolic Panel    Lipid Panel    Hepatitis C Antibody   4. Need for hepatitis C screening test  CBC Auto Differential    Comprehensive Metabolic Panel    Lipid Panel    Hepatitis C Antibody   5. Encounter for screening for cardiovascular disorders  CBC Auto Differential    Comprehensive Metabolic Panel    Lipid Panel    Hepatitis C Antibody   6. Celiac disease     7. Generalized abdominal pain     8. Moderate protein-calorie malnutrition (CMS/HCC)     9. Irritable bowel syndrome with both constipation and diarrhea          -recommend labwork  -hep C  screening - hep C antibody test   -cough/acute bronchitis/URI - improved with abx.   -HTN/tachcycardia -controlledon toprol XL 50 mg daily.   -schedule mammogram screening    -moderate protein malnutrition -  Gave high calorie diet information   -JELENA - pt admits to smoking marijuana. Recommend pt continue that since it calms her down but also discussed about potential side effects if long term use   -thrombocytosis /polycythemia  continue to monitor. She is seeing Hematology   -gluten sensitivity -recommend gluten free diet   -COPD/COPD exacerbation- Pulmonlogy following on Symbicort 160/45 2 puffs BID  Albuterol inhaler PRN.  advised pt to quit smoking >5 minutes.  continue duo nebs. Advised pt to make appt with Pulmonlogist. urrged importance of smoking cessation . Duo Nebs every 4 hours. Prednisone tapering dose for 10 days.   -vitamin B12 deficiency  - b12 injections weekly  -tobacco user - counseled to quit smoking >5 minutes. She could not tolerate chantix  urged the importance of quitting smoking.   Insurance would not cover nicotine patch and gum. Recommend pt call 1 [phone] start on nicotine patch 7 mg daily.  has cut down on smoking   -abdominal pain/gastritis/IBS /GERD/celiac - Gastroenterology following. On PPI prilosec 20 mg q daily  OFF prucalopride 2 mg daily. She is to followup regarding abodminal issues on bentyl 40 mg every 6 hours PRN.  advised pt to limit elavil if possible since it can cause constipation. Has upcoming EGD soon with GI.    -nausea/ vomting - continue zofran PRN  -allergic rhinitis - flonase nasal spray and singulair   -vitamin D deficiency - vitamin D once a week   -pt has upcoming mammogram soon.    -tremor of head/concussion - she has seen neurology with Dr. Chen. Had MRI of brain. Recommended 2nd opinon with Dr. Manjarrez She also has family history of Friedreich's ataxia. On elavail 25 mg at bedtime. Continue with Neurology followup with Dr. Manjarrez    -advised pt to go to  ER or call 911 if symptoms worrisome or severe  -advised pt to be  Safe and call with questions and concerns  -advised to followup with all referrals and Specialist  -advised pt to be safe during COVID -19 pandemic   -total time with pt >25 minutes   -recheck in 2 months         This document has been electronically signed by Xavier Wick MD on October 27, 2020 08:58 CDT

## 2020-10-27 ENCOUNTER — OFFICE VISIT (OUTPATIENT)
Dept: FAMILY MEDICINE CLINIC | Facility: CLINIC | Age: 50
End: 2020-10-27

## 2020-10-27 VITALS
WEIGHT: 109.8 LBS | HEIGHT: 65 IN | HEART RATE: 86 BPM | SYSTOLIC BLOOD PRESSURE: 108 MMHG | BODY MASS INDEX: 18.29 KG/M2 | OXYGEN SATURATION: 97 % | DIASTOLIC BLOOD PRESSURE: 70 MMHG | TEMPERATURE: 97.7 F

## 2020-10-27 DIAGNOSIS — J44.9 STAGE 2 MODERATE COPD BY GOLD CLASSIFICATION (HCC): Primary | ICD-10-CM

## 2020-10-27 DIAGNOSIS — Z13.6 ENCOUNTER FOR SCREENING FOR CARDIOVASCULAR DISORDERS: ICD-10-CM

## 2020-10-27 DIAGNOSIS — E44.0 MODERATE PROTEIN-CALORIE MALNUTRITION (HCC): ICD-10-CM

## 2020-10-27 DIAGNOSIS — K90.0 CELIAC DISEASE: ICD-10-CM

## 2020-10-27 DIAGNOSIS — K58.2 IRRITABLE BOWEL SYNDROME WITH BOTH CONSTIPATION AND DIARRHEA: ICD-10-CM

## 2020-10-27 DIAGNOSIS — R10.84 GENERALIZED ABDOMINAL PAIN: ICD-10-CM

## 2020-10-27 DIAGNOSIS — Z72.0 TOBACCO USER: ICD-10-CM

## 2020-10-27 DIAGNOSIS — J42 CHRONIC BRONCHITIS, UNSPECIFIED CHRONIC BRONCHITIS TYPE (HCC): ICD-10-CM

## 2020-10-27 DIAGNOSIS — Z11.59 NEED FOR HEPATITIS C SCREENING TEST: ICD-10-CM

## 2020-10-27 PROCEDURE — 99214 OFFICE O/P EST MOD 30 MIN: CPT | Performed by: FAMILY MEDICINE

## 2020-10-27 NOTE — PATIENT INSTRUCTIONS
High-Protein and High-Calorie Diet  Eating high-protein and high-calorie foods can help you to gain weight, heal after an injury, and recover after an illness or surgery. The specific amount of daily protein and calories you need depends on:  · Your body weight.  · The reason this diet is recommended for you.  What is my plan?  Generally, a high-protein, high-calorie diet involves:  · Eating 250-500 extra calories each day.  · Making sure that you get enough of your daily calories from protein. Ask your health care provider how many of your calories should come from protein.  Talk with a health care provider, such as a diet and nutrition specialist (dietitian), about how much protein and how many calories you need each day. Follow the diet as directed by your health care provider.  What are tips for following this plan?    Preparing meals  · Add whole milk, half-and-half, or heavy cream to cereal, pudding, soup, or hot cocoa.  · Add whole milk to instant breakfast drinks.  · Add peanut butter to oatmeal or smoothies.  · Add powdered milk to baked goods, smoothies, or milkshakes.  · Add powdered milk, cream, or butter to mashed potatoes.  · Add cheese to cooked vegetables.  · Make whole-milk yogurt parfaits. Top them with granola, fruit, or nuts.  · Add cottage cheese to your fruit.  · Add avocado, cheese, or both to sandwiches or salads.  · Add meat, poultry, or seafood to rice, pasta, casseroles, salads, and soups.  · Use mayonnaise when making egg salad, chicken salad, or tuna salad.  · Use peanut butter as a dip for vegetables or as a topping for pretzels, celery, or crackers.  · Add beans to casseroles, dips, and spreads.  · Add pureed beans to sauces and soups.  · Replace calorie-free drinks with calorie-containing drinks, such as milk and fruit juice.  · Replace water with milk or heavy cream when making foods such as oatmeal, pudding, or cocoa.  General instructions  · Ask your health care provider if you  should take a nutritional supplement.  · Try to eat six small meals each day instead of three large meals.  · Eat a balanced diet. In each meal, include one food that is high in protein.  · Keep nutritious snacks available, such as nuts, trail mixes, dried fruit, and yogurt.  · If you have kidney disease or diabetes, talk with your health care provider about how much protein is safe for you. Too much protein may put extra stress on your kidneys.  · Drink your calories. Choose high-calorie drinks and have them after your meals.  What high-protein foods should I eat?    Vegetables  Soybeans. Peas.  Grains  Quinoa. Bulgur wheat.  Meats and other proteins  Beef, pork, and poultry. Fish and seafood. Eggs. Tofu. Textured vegetable protein (TVP). Peanut butter. Nuts and seeds. Dried beans. Protein powders.  Dairy  Whole milk. Whole-milk yogurt. Powdered milk. Cheese. Cottage Cheese. Eggnog.  Beverages  High-protein supplement drinks. Soy milk.  Other foods  Protein bars.  The items listed above may not be a complete list of high-protein foods and beverages. Contact a dietitian for more options.  What high-calorie foods should I eat?  Fruits  Dried fruit. Fruit leather. Canned fruit in syrup. Fruit juice. Avocado.  Vegetables  Vegetables cooked in oil or butter. Fried potatoes.  Grains  Pasta. Quick breads. Muffins. Pancakes. Ready-to-eat cereal.  Meats and other proteins  Peanut butter. Nuts and seeds.  Dairy  Heavy cream. Whipped cream. Cream cheese. Sour cream. Ice cream. Custard. Pudding.  Beverages  Meal-replacement beverages. Nutrition shakes. Fruit juice. Sugar-sweetened soft drinks.  Seasonings and condiments  Salad dressing. Mayonnaise. [place]ilar sauce. Fruit preserves or jelly. Honey. Syrup.  Sweets and desserts  Cake. Cookies. Pie. Pastries. Candy bars. Chocolate.  Fats and oils  Butter or margarine. Oil. Gravy.  Other foods  Meal-replacement bars.  The items listed above may not be a complete list of high-calorie  foods and beverages. Contact a dietitian for more options.  Summary  · A high-protein, high-calorie diet can help you gain weight or heal faster after an injury, illness, or surgery.  · To increase your protein and calories, add ingredients such as whole milk, peanut butter, cheese, beans, meat, or seafood to meal items.  · To get enough extra calories each day, include high-calorie foods and beverages at each meal.  · Adding a high-calorie drink or shake can be an easy way to help you get enough calories each day. Talk with your healthcare provider or dietitian about the best options for you.  This information is not intended to replace advice given to you by your health care provider. Make sure you discuss any questions you have with your health care provider.  Document Released: 12/18/2006 Document Revised: 11/30/2018 Document Reviewed: 10/30/2018  [employer] Patient Education © 2020 [employer]

## 2020-10-27 NOTE — PROGRESS NOTES
Pulmonary Office Follow-Up     Carla Richard is seen in the office today for   Chief Complaint   Patient presents with   • COPD     Follow-up   .    Subjective     History of Present Illness  Carla Richard is a 50 y.o. female with a PMH significant for COPD, tobacco use, and Crohn's disease who presents for follow-up of COPD.    10/24/19: She continued to have significant symptoms related to chronic bronchitis I recommend continuing on Symbicort and scheduled duo nebs as well as her allergy regimen.  Unfortunately, she did continue to smoke but was going to set a quit date for 2/10/2020.    10/28/20: She states that she was unable to follow-up last year she developed a lot of issues with her intestines and had some memory loss.  Patient took the Symbicort for about 3 months but then forgot to take it so she has not been using it recently.  Her GI issues have settled down but she is still having some issues with her COPD.  She continues to get out of breath with exertion and has frequent cough which is mostly nonproductive.  She is only using duo nebs up to 4-5 times a day with some benefit.  Patient also endorses increased allergy symptoms with postnasal drip.  She is using her cetirizine and montelukast but does not have any Flonase.  She has not had any recent fevers, chills, or exacerbations.  She has cut back on her smoking down to 5 cigarettes a day and is planning on quitting but she is having difficulty due to recent stress.    Tobacco use history:  Type: cigarettes  Amount: 1 ppd  Duration: 40 years  Cessation: n/a   Willing to quit: Yes      Review of Systems: History obtained from chart review and the patient.  Review of Systems   Constitutional: Negative for fever and unexpected weight change.   HENT: Positive for congestion and postnasal drip.    Respiratory: Positive for cough and shortness of breath. Negative for wheezing.    Cardiovascular: Negative for chest pain and leg  swelling.   Gastrointestinal: Negative for abdominal pain.   Psychiatric/Behavioral: Positive for dysphoric mood. The patient is nervous/anxious.      As described in the HPI. Otherwise, remainder of ROS (14 systems) were negative.    Patient Active Problem List   Diagnosis   • Tobacco abuse counseling   • Chronic idiopathic constipation   • B12 deficiency   • Vitamin D deficiency    • Tobacco user   • Stage 2 moderate COPD by GOLD classification (CMS/[facility])   • Cervical lymphadenopathy   • Generalized abdominal pain   • Nausea   • Neurological abnormality   • Tremor   • RUQ pain   • COPD exacerbation (CMS/[facility])   • Pertussis exposure   • Chronic bronchitis (CMS/[facility])   • Cigarette nicotine dependence, uncomplicated   • Chronic nonseasonal allergic rhinitis due to pollen   • Episode of recurrent major depressive disorder (CMS/[facility])   • Diarrhea   • Chronic diarrhea   • Thrombocytosis (CMS/[facility])   • Nausea and vomiting   • Gluten intolerance   • C. difficile diarrhea   • Shiga toxin-producing Escherichia coli infection   • Generalized weakness   • Head injury   • Concussion with loss of consciousness   • Gastritis without bleeding   • Irritable bowel syndrome   • Tachycardia   • Essential hypertension   • JELENA (generalized anxiety disorder)   • Marijuana use   • Epigastric pain   • History of Clostridioides difficile colitis   • Celiac disease   • Cough   • Upper respiratory tract infection   • Irritable bowel syndrome with both constipation and diarrhea   • Moderate protein-calorie malnutrition (CMS/[facility])         Current Outpatient Medications:   •  albuterol sulfate  (90 Base) MCG/ACT inhaler, Inhale 2 puffs Every 4 (Four) Hours As Needed for Wheezing., Disp: 18 g, Rfl: 5  •  amitriptyline (ELAVIL) 25 MG tablet, Take 25 mg by mouth Every Night., Disp: , Rfl:   •  cetirizine (zyrTEC) 10 MG tablet, Take 1 tablet by mouth Daily., Disp: 30 tablet, Rfl: 11  •  Cyanocobalamin (B-12 COMPLIANCE INJECTION IJ), Inject  as  directed., Disp: , Rfl:   •  fluticasone (FLONASE) 50 MCG/ACT nasal spray, 2 sprays into the nostril(s) as directed by provider Daily., Disp: 1 bottle, Rfl: 5  •  montelukast (SINGULAIR) 10 MG tablet, Take 1 tablet by mouth Every Night., Disp: 30 tablet, Rfl: 3  •  omeprazole (priLOSEC) 20 MG capsule, Take 1 capsule by mouth Daily., Disp: 30 capsule, Rfl: 3  •  ondansetron ODT (ZOFRAN-ODT) 4 MG disintegrating tablet, Place 1 tablet on the tongue 4 (Four) Times a Day As Needed for Nausea or Vomiting., Disp: 12 tablet, Rfl: 0  •  Plecanatide (Trulance) 3 MG tablet, Take 1 tablet by mouth Daily., Disp: 90 tablet, Rfl: 5  •  nicotine (NICODERM CQ) 7 MG/24HR patch, Place 1 patch on the skin as directed by provider Daily., Disp: 30 patch, Rfl: 3  •  permethrin (Nix Creme Rinse) 1 % liquid, Apply once on scalp for 10 minutes repeat in 7 days if needed, Disp: 120 mL, Rfl: 0  •  umeclidinium-vilanterol (ANORO ELLIPTA) 62.5-25 MCG/INH aerosol powder  inhaler, Inhale 1 puff Daily., Disp: 1 each, Rfl: 5    Allergies   Allergen Reactions   • Nsaids Swelling and GI Intolerance   • Azithromycin Swelling   • Ciprofloxacin Hives   • Doxycycline Swelling   • Other Other (See Comments)     nicotine patch   Caused body twitching/seizures   • Levofloxacin Rash   • Phenergan [Promethazine Hcl] GI Intolerance       Past Medical History:   Diagnosis Date   • Asthma    • Cancer (CMS/HCC)     cervical   • Colon polyp    • COPD (chronic obstructive pulmonary disease) (CMS/HCC)    • Crohn's disease (CMS/HCC)    • Diverticulitis of colon    • Elevated cholesterol    • Essential hypertension 4/15/2020   • GERD (gastroesophageal reflux disease)    • History of transfusion    • Irritable bowel syndrome    • Kidney calculus    • Pancreatitis    • Sphincter of Oddi dysfunction      Past Surgical History:   Procedure Laterality Date   • BACK SURGERY      C5-6   • COLONOSCOPY     • COLONOSCOPY N/A 10/18/2019    Procedure: COLONOSCOPY;  Surgeon:  Tom Davis MD;  Location: [facility] ENDOSCOPY;  Service: Gastroenterology   • COLONOSCOPY N/A 8/27/2020    Procedure: COLONOSCOPY;  Surgeon: Tom Davis MD;  Location: [facility] ENDOSCOPY;  Service: Gastroenterology;  Laterality: N/A;   • ENDOSCOPY N/A 10/18/2019    Procedure: ESOPHAGOGASTRODUODENOSCOPY;  Surgeon: Tom Davis MD;  Location: [facility] ENDOSCOPY;  Service: Gastroenterology   • ENDOSCOPY N/A 7/27/2020    Procedure: ESOPHAGOGASTRODUODENOSCOPY;  Surgeon: Tom Davis MD;  Location: [facility] ENDOSCOPY;  Service: Gastroenterology;  Laterality: N/A;   • HYSTERECTOMY     • SHOULDER ARTHROSCOPY W/ ROTATOR CUFF REPAIR Right    • TOE SURGERY      left small toe    • TONSILECTOMY, ADENOIDECTOMY, BILATERAL MYRINGOTOMY AND TUBES     • TONSILLECTOMY     • UPPER GASTROINTESTINAL ENDOSCOPY       Social History     Socioeconomic History   • Marital status: Single     Spouse name: Not on file   • Number of children: Not on file   • Years of education: Not on file   • Highest education level: Not on file   Tobacco Use   • Smoking status: Current Every Day Smoker     Packs/day: 1.00     Years: 40.00     Pack years: 40.00     Types: Cigarettes   • Smokeless tobacco: Never Used   Substance and Sexual Activity   • Alcohol use: No     Frequency: Never   • Drug use: Yes     Types: Marijuana     Comment: nightly   • Sexual activity: Defer     Family History   Problem Relation Age of Onset   • Inflammatory bowel disease Mother    • Arthritis Mother    • Diabetes Mother    • Hypertension Mother    • Hyperlipidemia Mother    • Obesity Mother    • Osteoporosis Mother    • Heart disease Father    • Hyperlipidemia Father    • Diabetes Sister    • Liver disease Daughter    • Mental illness Daughter    • Obesity Daughter    • Diabetes Maternal Grandmother    • Cancer Maternal Grandmother         lung   • Cancer Other         lung   • Heart disease Other           Objective     Blood pressure 130/68, pulse 103, weight 49 kg  (108 lb), SpO2 98 %, not currently breastfeeding.  Physical Exam   Constitutional: She is oriented to person, place, and time. She appears well-developed.   HENT:   Head: Normocephalic and atraumatic.   Eyes: Pupils are equal, round, and reactive to light. Conjunctivae and lids are normal.   Neck: Trachea normal and normal range of motion. No tracheal tenderness present. No thyroid mass present.   Cardiovascular: Normal rate, regular rhythm and normal heart sounds. PMI is not displaced. Exam reveals no gallop.   No murmur heard.  Pulmonary/Chest: Effort normal and breath sounds normal. No respiratory distress. She has no decreased breath sounds. She has no wheezes. She has no rhonchi. She exhibits no tenderness.   Abdominal: Soft. Normal appearance and bowel sounds are normal. There is no hepatomegaly. There is no abdominal tenderness.   Musculoskeletal:      Comments: Normal gait, no extremity edema     Vascular Status -  Her right foot exhibits no edema. Her left foot exhibits no edema.  Lymphadenopathy:        Head (right side): No submandibular adenopathy present.        Head (left side): No submandibular adenopathy present.     She has no cervical adenopathy.        Right: No supraclavicular adenopathy present.        Left: No supraclavicular adenopathy present.   Neurological: She is alert and oriented to person, place, and time.   Skin: Skin is warm and dry. No rash noted. Nails show no clubbing.   Psychiatric: Her speech is normal and behavior is normal. Judgment normal.   Nursing note and vitals reviewed.      PFTs: 10/28/20 (independently reviewed and interpreted by me)  Ratio 64   FVC 2.82 (2.96)/ 79%  FEV1 1.79 (1.56)/ 63%  TLC 4.58/80%  DLCO 14.54/56%  Variable effort.  Moderate obstruction. Normal lung volumes. Normal diffusing capacity.  No significant change compared to 9/25/19.    Radiology (independently reviewed and interpreted by me): CXR 8/29/19 showed NACPD    CT chest with contrast 2/8/2018  (report reviewed): No PE, no adenopathy, mild scarring at the apices, mild emphysematous changes, mild patchy infiltrates in the right middle lobe along major fissure and in the lingula not present on prior CT from 3/27/2015     Assessment/Plan     Diagnoses and all orders for this visit:    1. Stage 2 moderate COPD by GOLD classification (CMS/[facility]) (Primary)  -     Pulmonary Function Test  -     albuterol sulfate  (90 Base) MCG/ACT inhaler; Inhale 2 puffs Every 4 (Four) Hours As Needed for Wheezing.  Dispense: 18 g; Refill: 5  -     umeclidinium-vilanterol (ANORO ELLIPTA) 62.5-25 MCG/INH aerosol powder  inhaler; Inhale 1 puff Daily.  Dispense: 1 each; Refill: 5    2. Chronic nonseasonal allergic rhinitis due to pollen  -     fluticasone (FLONASE) 50 MCG/ACT nasal spray; 2 sprays into the nostril(s) as directed by provider Daily.  Dispense: 1 bottle; Refill: 5    3. Cigarette nicotine dependence, uncomplicated         Discussion/ Recommendations:   PFTs showed moderate obstruction with air trapping.  Unfortunately, she has not been on any long-acting bronchodilators and I do think she would benefit from the addition.  Also, her cough is likely driven by her ongoing tobacco use in the setting of COPD as well as some persistent rhinitis.  She has been out of cut back on her smoking but has not quit yet.    -Stop duo nebs  -Start Anoro daily.  Sample provided instructed on use.  -Use albuterol as needed for dyspnea, wheeze, or congestion  -Continue Zyrtec and Singulair daily  -Restart Flonase daily.  -Allergen and trigger avoidance.  -Recommended she discuss an antidepressant with her PCP.  -Carla Richard  reports that she has been smoking cigarettes. She has a 40.00 pack-year smoking history. She has never used smokeless tobacco.. I have educated her on the risk of diseases from using tobacco products such as cancer, COPD and heart disease.  I advised her to quit and she is not willing to quit. I spent  2 minutes counseling the patient.  -Up-to-date with the flu vaccine.  Encouraged her to get the Pneumovax 23 booster.      Patient's Body mass index is 17.97 kg/m². BMI is below normal parameters. Recommendations include: treating the underlying disease process.           Return in about 6 weeks (around 12/9/2020) for Recheck COPD.      Thank you for allowing me to participate in the care of Carla Richard. Please do not hesitate to contact me with any questions.         This document has been electronically signed by Iliana Jacobson MD on October 28, 2020 15:19 CDT      Dictated using Dragon

## 2020-10-28 ENCOUNTER — OFFICE VISIT (OUTPATIENT)
Dept: PULMONOLOGY | Facility: CLINIC | Age: 50
End: 2020-10-28

## 2020-10-28 ENCOUNTER — PROCEDURE VISIT (OUTPATIENT)
Dept: PULMONOLOGY | Facility: CLINIC | Age: 50
End: 2020-10-28

## 2020-10-28 VITALS
BODY MASS INDEX: 17.97 KG/M2 | OXYGEN SATURATION: 98 % | WEIGHT: 108 LBS | DIASTOLIC BLOOD PRESSURE: 68 MMHG | HEART RATE: 103 BPM | SYSTOLIC BLOOD PRESSURE: 130 MMHG

## 2020-10-28 VITALS
DIASTOLIC BLOOD PRESSURE: 68 MMHG | BODY MASS INDEX: 17.97 KG/M2 | OXYGEN SATURATION: 98 % | SYSTOLIC BLOOD PRESSURE: 130 MMHG | WEIGHT: 108 LBS | HEART RATE: 103 BPM

## 2020-10-28 DIAGNOSIS — F17.210 CIGARETTE NICOTINE DEPENDENCE, UNCOMPLICATED: ICD-10-CM

## 2020-10-28 DIAGNOSIS — J44.9 STAGE 2 MODERATE COPD BY GOLD CLASSIFICATION (HCC): Primary | ICD-10-CM

## 2020-10-28 DIAGNOSIS — J44.9 CHRONIC OBSTRUCTIVE PULMONARY DISEASE, UNSPECIFIED COPD TYPE (HCC): Primary | ICD-10-CM

## 2020-10-28 DIAGNOSIS — J30.89 CHRONIC NONSEASONAL ALLERGIC RHINITIS DUE TO POLLEN: ICD-10-CM

## 2020-10-28 PROCEDURE — 99214 OFFICE O/P EST MOD 30 MIN: CPT | Performed by: INTERNAL MEDICINE

## 2020-10-28 PROCEDURE — 94727 GAS DIL/WSHOT DETER LNG VOL: CPT | Performed by: INTERNAL MEDICINE

## 2020-10-28 PROCEDURE — 94010 BREATHING CAPACITY TEST: CPT | Performed by: INTERNAL MEDICINE

## 2020-10-28 PROCEDURE — 94729 DIFFUSING CAPACITY: CPT | Performed by: INTERNAL MEDICINE

## 2020-10-28 RX ORDER — FLUTICASONE PROPIONATE 50 MCG
2 SPRAY, SUSPENSION (ML) NASAL DAILY
Qty: 1 BOTTLE | Refills: 5 | Status: SHIPPED | OUTPATIENT
Start: 2020-10-28

## 2020-10-28 RX ORDER — FLUTICASONE PROPIONATE 50 MCG
2 SPRAY, SUSPENSION (ML) NASAL DAILY
COMMUNITY
End: 2020-10-28 | Stop reason: SDUPTHER

## 2020-10-28 RX ORDER — ALBUTEROL SULFATE 90 UG/1
2 AEROSOL, METERED RESPIRATORY (INHALATION) EVERY 4 HOURS PRN
Qty: 18 G | Refills: 5 | Status: SHIPPED | OUTPATIENT
Start: 2020-10-28 | End: 2021-08-17 | Stop reason: SDUPTHER

## 2020-10-28 NOTE — PROCEDURES
Pulmonary Function Test  Performed by: Iliana Jacobson MD  Authorized by: Iliana Jacobson MD      Pre Drug    FVC: 79%   FEV1: 63%   FEV1/FVC: 64%   T%   RV: 191%   DLCO: 56%    Interpretation   Spirometry   Spirometry shows moderate obstruction.   Review of FVL curve   Effort is reduced.   Lung Volume Measurements  Measurements show: normal results and elevated residual volume consistent with gas trapping.   Diffusion Capacity  The patient's diffusion capacity is moderately reduced.    Overall comments: No significant change in FVC and FEV1 compared to 2019.

## 2020-10-29 PROCEDURE — U0003 INFECTIOUS AGENT DETECTION BY NUCLEIC ACID (DNA OR RNA); SEVERE ACUTE RESPIRATORY SYNDROME CORONAVIRUS 2 (SARS-COV-2) (CORONAVIRUS DISEASE [COVID-19]), AMPLIFIED PROBE TECHNIQUE, MAKING USE OF HIGH THROUGHPUT TECHNOLOGIES AS DESCRIBED BY CMS-2020-01-R: HCPCS | Performed by: EMERGENCY MEDICINE

## 2020-11-02 ENCOUNTER — OFFICE VISIT (OUTPATIENT)
Dept: FAMILY MEDICINE CLINIC | Facility: CLINIC | Age: 50
End: 2020-11-02

## 2020-11-02 VITALS
TEMPERATURE: 97.1 F | OXYGEN SATURATION: 99 % | WEIGHT: 107.6 LBS | SYSTOLIC BLOOD PRESSURE: 100 MMHG | HEART RATE: 80 BPM | BODY MASS INDEX: 17.63 KG/M2 | DIASTOLIC BLOOD PRESSURE: 60 MMHG

## 2020-11-02 DIAGNOSIS — J42 CHRONIC BRONCHITIS, UNSPECIFIED CHRONIC BRONCHITIS TYPE (HCC): ICD-10-CM

## 2020-11-02 DIAGNOSIS — K90.0 CELIAC DISEASE: ICD-10-CM

## 2020-11-02 DIAGNOSIS — L29.9 ITCHY SCALP: Primary | ICD-10-CM

## 2020-11-02 DIAGNOSIS — L73.9 FOLLICULITIS: ICD-10-CM

## 2020-11-02 DIAGNOSIS — J44.9 STAGE 2 MODERATE COPD BY GOLD CLASSIFICATION (HCC): ICD-10-CM

## 2020-11-02 DIAGNOSIS — Z72.0 TOBACCO USER: ICD-10-CM

## 2020-11-02 DIAGNOSIS — B35.0 TINEA CAPITIS: ICD-10-CM

## 2020-11-02 PROCEDURE — 99214 OFFICE O/P EST MOD 30 MIN: CPT | Performed by: FAMILY MEDICINE

## 2020-11-02 RX ORDER — KETOCONAZOLE 20 MG/ML
SHAMPOO TOPICAL 2 TIMES WEEKLY
Qty: 120 ML | Refills: 3 | Status: SHIPPED | OUTPATIENT
Start: 2020-11-02 | End: 2021-02-09

## 2020-11-02 RX ORDER — FLUCONAZOLE 150 MG/1
TABLET ORAL
Qty: 2 TABLET | Refills: 0 | Status: SHIPPED | OUTPATIENT
Start: 2020-11-02 | End: 2020-11-09

## 2020-11-02 RX ORDER — CEPHALEXIN 500 MG/1
500 CAPSULE ORAL 2 TIMES DAILY
Qty: 14 CAPSULE | Refills: 0 | Status: SHIPPED | OUTPATIENT
Start: 2020-11-02 | End: 2020-11-09

## 2020-11-02 NOTE — PROGRESS NOTES
Subjective:  Carla Richard is a 50 y.o. female who presents for rash       Patient Active Problem List   Diagnosis   • Tobacco abuse counseling   • Chronic idiopathic constipation   • B12 deficiency   • Vitamin D deficiency    • Tobacco user   • Stage 2 moderate COPD by GOLD classification (CMS/[facility])   • Cervical lymphadenopathy   • Generalized abdominal pain   • Nausea   • Neurological abnormality   • Tremor   • RUQ pain   • COPD exacerbation (CMS/[facility])   • Pertussis exposure   • Chronic bronchitis (CMS/[facility])   • Cigarette nicotine dependence, uncomplicated   • Chronic nonseasonal allergic rhinitis due to pollen   • Episode of recurrent major depressive disorder (CMS/[facility])   • Diarrhea   • Chronic diarrhea   • Thrombocytosis (CMS/[facility])   • Nausea and vomiting   • Gluten intolerance   • C. difficile diarrhea   • Shiga toxin-producing Escherichia coli infection   • Generalized weakness   • Head injury   • Concussion with loss of consciousness   • Gastritis without bleeding   • Irritable bowel syndrome   • Tachycardia   • Essential hypertension   • JELENA (generalized anxiety disorder)   • Marijuana use   • Epigastric pain   • History of Clostridioides difficile colitis   • Celiac disease   • Cough   • Upper respiratory tract infection   • Irritable bowel syndrome with both constipation and diarrhea   • Moderate protein-calorie malnutrition (CMS/[facility])           Current Outpatient Medications:   •  albuterol sulfate  (90 Base) MCG/ACT inhaler, Inhale 2 puffs Every 4 (Four) Hours As Needed for Wheezing., Disp: 18 g, Rfl: 5  •  amitriptyline (ELAVIL) 25 MG tablet, Take 25 mg by mouth Every Night., Disp: , Rfl:   •  cetirizine (zyrTEC) 10 MG tablet, Take 1 tablet by mouth Daily., Disp: 30 tablet, Rfl: 11  •  Cyanocobalamin (B-12 COMPLIANCE INJECTION IJ), Inject  as directed., Disp: , Rfl:   •  fluticasone (FLONASE) 50 MCG/ACT nasal spray, 2 sprays into the nostril(s) as directed by provider Daily., Disp: 1  bottle, Rfl: 5  •  guaifenesin-dextromethorphan (MUCINEX DM)  MG tablet sustained-release 12 hour tablet, Take 1 tablet by mouth 2 (Two) Times a Day As Needed (cough) for up to 7 days., Disp: 14 tablet, Rfl: 0  •  montelukast (SINGULAIR) 10 MG tablet, Take 1 tablet by mouth Every Night., Disp: 30 tablet, Rfl: 3  •  omeprazole (priLOSEC) 20 MG capsule, Take 1 capsule by mouth Daily., Disp: 30 capsule, Rfl: 3  •  ondansetron ODT (ZOFRAN-ODT) 4 MG disintegrating tablet, Place 1 tablet on the tongue 4 (Four) Times a Day As Needed for Nausea or Vomiting., Disp: 12 tablet, Rfl: 0  •  permethrin (Nix Creme Rinse) 1 % liquid, Apply once on scalp for 10 minutes repeat in 7 days if needed, Disp: 120 mL, Rfl: 0  •  Plecanatide (Trulance) 3 MG tablet, Take 1 tablet by mouth Daily., Disp: 90 tablet, Rfl: 5  •  umeclidinium-vilanterol (ANORO ELLIPTA) 62.5-25 MCG/INH aerosol powder  inhaler, Inhale 1 puff Daily., Disp: 1 each, Rfl: 5      Pt is 49 yo female with history of moderateC OPD, Vitamin B12 deficiency, Vitamin D deficiency, chronic abdominal pain, tremor,  RUQ pain,  Sp hysterectomy,  History of chron's disease, history of pertussis infection  sp right shoulder surgery. X 3, sp rotator cuff repair , history of C diff diarrhea, gluten sensitivity, gastritis, marijuana use, COPD        10/27/20 in office visit for recheck on pt's above medical issues. She has felt better since cutting on smoking. She was previously treated for COPD exacerbaation and bronchitis. Breathing better. Coughing up  Mucous is better. She also has refrained from gluten free diet. And her abdominal pain has improved. Constipation and diarrhea have improved.  Has  appt with Pulmonolgy tomorrow. She started hypnosis for smoking cessation. Also has been on gluten free diet for 3 months now. Her outlook in life is better. Overall her health feels better     11/2/20 in office visit for recheck on pt's above medical issues.  Pt saw Pulmonology  recently and PFTS showed moderate obstruciotn with air trapping.  She was started on ANoro along with albuteol as needed. Along with zyrtec and singulair.  Pt was seen on 10/29/20 at urgent care for cough/ chest wall pain. She had chest x-ray done on 10/29/20 that showed active cardiopulmonary disease.  She continues to smoke but has cut down on tobacco. She continues to adhere to gluten freed diet for celiac disease Her COVID-19 test was negative. Her main concern is rash on scalp. Pt was previously treated for lice with nix cream solution.  Pt states she developed drainage and irriation on scalp last Friday.  It is tender to touch. It is also itchy.  She states that nix creme did help with lice     Rash  This is a new problem. The problem has been gradually worsening since onset. The affected locations include the scalp. The rash is characterized by blistering, itchiness and dryness. She was exposed to nothing. Associated symptoms include coughing, diarrhea, fatigue and shortness of breath. Pertinent negatives include no congestion, fever, rhinorrhea, sore throat or vomiting. Past treatments include nothing. The treatment provided no relief. There is no history of allergies, asthma or eczema.   GI Problem  The primary symptoms include fatigue, abdominal pain, diarrhea and arthralgias. Primary symptoms do not include fever, nausea, vomiting or melena.   The illness is also significant for constipation. The illness does not include chills, anorexia or dysphagia. Associated medical issues do not include inflammatory bowel disease, GERD, gallstones, liver disease, alcohol abuse, PUD, gastric bypass, bowel resection, irritable bowel syndrome, hemorrhoids or diverticulitis. Associated medical issues comments: gluten sensitivity .   COPD   This is a chronic problem. The current episode started more than 1 year ago. The problem occurs constantly. The problem has been iomproving Associated symptoms include abdominal  pain, arthralgias, fatigue, joint swelling, numbness and weakness. Pertinent negatives include no anorexia, change in bowel habit, chest pain, chills, congestion, coughing, diaphoresis, fever, headaches, myalgias, nausea, neck pain, sore throat, swollen glands, urinary symptoms, vertigo, visual change or vomiting. The symptoms are aggravated by smoking. She has tried (combivent  symbicort anoro, albuterol inhaler ) for the symptoms.     Review of Systems  Review of Systems   Constitutional: Positive for activity change and fatigue. Negative for appetite change, chills, diaphoresis and fever.   HENT: Negative for congestion, postnasal drip, rhinorrhea, sinus pressure, sinus pain, sneezing, sore throat, trouble swallowing and voice change.    Respiratory: Positive for cough and shortness of breath. Negative for choking, chest tightness, wheezing and stridor.    Cardiovascular: Negative for chest pain.   Gastrointestinal: Positive for abdominal pain, constipation and diarrhea. Negative for nausea and vomiting.   Skin: Positive for rash.        Itchy scalp    Neurological: Positive for tremors. Negative for weakness and headaches.   Psychiatric/Behavioral: The patient is nervous/anxious.         Depressed mood        Patient Active Problem List   Diagnosis   • Tobacco abuse counseling   • Chronic idiopathic constipation   • B12 deficiency   • Vitamin D deficiency    • Tobacco user   • Stage 2 moderate COPD by GOLD classification (CMS/[facility])   • Cervical lymphadenopathy   • Generalized abdominal pain   • Nausea   • Neurological abnormality   • Tremor   • RUQ pain   • COPD exacerbation (CMS/[facility])   • Pertussis exposure   • Chronic bronchitis (CMS/[facility])   • Cigarette nicotine dependence, uncomplicated   • Chronic nonseasonal allergic rhinitis due to pollen   • Episode of recurrent major depressive disorder (CMS/[facility])   • Diarrhea   • Chronic diarrhea   • Thrombocytosis (CMS/[facility])   • Nausea and vomiting   • Gluten intolerance    • C. difficile diarrhea   • Shiga toxin-producing Escherichia coli infection   • Generalized weakness   • Head injury   • Concussion with loss of consciousness   • Gastritis without bleeding   • Irritable bowel syndrome   • Tachycardia   • Essential hypertension   • JELENA (generalized anxiety disorder)   • Marijuana use   • Epigastric pain   • History of Clostridioides difficile colitis   • Celiac disease   • Cough   • Upper respiratory tract infection   • Irritable bowel syndrome with both constipation and diarrhea   • Moderate protein-calorie malnutrition (CMS/[facility])     Past Surgical History:   Procedure Laterality Date   • BACK SURGERY      C5-6   • COLONOSCOPY     • COLONOSCOPY N/A 10/18/2019    Procedure: COLONOSCOPY;  Surgeon: Tom Davis MD;  Location: [facility] ENDOSCOPY;  Service: Gastroenterology   • COLONOSCOPY N/A 8/27/2020    Procedure: COLONOSCOPY;  Surgeon: Tom Davis MD;  Location: [facility] ENDOSCOPY;  Service: Gastroenterology;  Laterality: N/A;   • ENDOSCOPY N/A 10/18/2019    Procedure: ESOPHAGOGASTRODUODENOSCOPY;  Surgeon: Tom Davis MD;  Location: [facility] ENDOSCOPY;  Service: Gastroenterology   • ENDOSCOPY N/A 7/27/2020    Procedure: ESOPHAGOGASTRODUODENOSCOPY;  Surgeon: Tom Davis MD;  Location: [facility] ENDOSCOPY;  Service: Gastroenterology;  Laterality: N/A;   • HYSTERECTOMY     • SHOULDER ARTHROSCOPY W/ ROTATOR CUFF REPAIR Right    • TOE SURGERY      left small toe    • TONSILECTOMY, ADENOIDECTOMY, BILATERAL MYRINGOTOMY AND TUBES     • TONSILLECTOMY     • UPPER GASTROINTESTINAL ENDOSCOPY       Social History     Socioeconomic History   • Marital status: Single     Spouse name: Not on file   • Number of children: Not on file   • Years of education: Not on file   • Highest education level: Not on file   Tobacco Use   • Smoking status: Current Every Day Smoker     Packs/day: 1.00     Years: 40.00     Pack years: 40.00     Types: Cigarettes   • Smokeless tobacco: Never Used    Substance and Sexual Activity   • Alcohol use: No     Frequency: Never   • Drug use: Yes     Types: Marijuana     Comment: nightly   • Sexual activity: Defer     Family History   Problem Relation Age of Onset   • Inflammatory bowel disease Mother    • Arthritis Mother    • Diabetes Mother    • Hypertension Mother    • Hyperlipidemia Mother    • Obesity Mother    • Osteoporosis Mother    • Heart disease Father    • Hyperlipidemia Father    • Diabetes Sister    • Liver disease Daughter    • Mental illness Daughter    • Obesity Daughter    • Diabetes Maternal Grandmother    • Cancer Maternal Grandmother         lung   • Cancer Other         lung   • Heart disease Other      Admission on 10/29/2020, Discharged on 10/29/2020   Component Date Value Ref Range Status   • SARS-CoV-2, ANAIS 10/29/2020 Not Detected  Not Detected Final    Comment: This nucleic acid amplification test was developed and its performance  characteristics determined by [facility]. Nucleic acid  amplification tests include PCR and TMA. This test has not been FDA  cleared or approved. This test has been authorized by FDA under an  Emergency Use Authorization (EUA). This test is only authorized for  the duration of time the declaration that circumstances exist  justifying the authorization of the emergency use of in vitro  diagnostic tests for detection of SARS-CoV-2 virus and/or diagnosis  of COVID-19 infection under section 564(b)(1) of the Act, 21 U.S.C.  360bbb-3(b) (1), unless the authorization is terminated or revoked  sooner.  When diagnostic testing is negative, the possibility of a false  negative result should be considered in the context of a patient's  recent exposures and the presence of clinical signs and symptoms  consistent with COVID-19. An individual without symptoms of COVID-19  and who is not shedding SARS-CoV-2 virus would                            expect to have a  negative (not detected) result in this assay.   • COVID  [facility] PRIORITY 10/29/2020 Comment   Final    Received   Admission on 08/27/2020, Discharged on 08/27/2020   Component Date Value Ref Range Status   • SARS-CoV-2, ANAIS 08/24/2020 Not Detected  Not Detected Final    This test was developed and its performance characteristics determined  by [facility]. This test has not been FDA cleared or  approved. This test has been authorized by FDA under an Emergency Use  Authorization (EUA). This test is only authorized for the duration of  time the declaration that circumstances exist justifying the  authorization of the emergency use of in vitro diagnostic tests for  detection of SARS-CoV-2 virus and/or diagnosis of COVID-19 infection  under section 564(b)(1) of the Act, 21 U.S.C. 360bbb-3(b)(1), unless  the authorization is terminated or revoked sooner.  When diagnostic testing is negative, the possibility of a false  negative result should be considered in the context of a patient's  recent exposures and the presence of clinical signs and symptoms  consistent with COVID-19. An individual without symptoms of COVID-19  and who is not shedding SARS-CoV-2 virus would expect to have a  negative (not detected) result in this assay.   • COVID [facility] PRIORITY 08/24/2020 Comment   Final    Received   • Case Report 08/27/2020    Final                    Value:Surgical Pathology Report                         Case: [id number]                                  Authorizing Provider:  Tom Davis MD        Collected:           08/27/2020 02:44 PM          Ordering Location:     [facility]             Received:            08/28/2020 07:02 AM                                 [place] [facility]                                                     Pathologist:           Kunal Dia MD                                                           Specimen:    Large Intestine, colonic mucosa bx                                                        • Final Diagnosis  08/27/2020    Final                    Value:This result contains rich text formatting which cannot be displayed here.   Lab on 08/19/2020   Component Date Value Ref Range Status   • C. Difficile Toxins by PCR 08/19/2020 Negative  Negative Final   • Campylobacter 08/19/2020 Not Detected  Not Detected Final   • Plesiomonas shigelloides 08/19/2020 Not Detected  Not Detected Final   • Salmonella 08/19/2020 Not Detected  Not Detected Final   • Vibrio 08/19/2020 Not Detected  Not Detected Final   • Vibrio cholerae 08/19/2020 Not Detected  Not Detected Final   • Yersinia enterocolitica 08/19/2020 Not Detected  Not Detected Final   • Enteroaggregative E. coli (EAEC) 08/19/2020 Not Detected  Not Detected Final   • Enteropathogenic E. coli (EPEC) 08/19/2020 Not Detected  Not Detected Final   • Enterotoxigenic E. coli (ETEC) lt/* 08/19/2020 Not Detected  Not Detected Final   • Shiga-like toxin-producing E. coli* 08/19/2020 Not Detected  Not Detected Final   • E. coli O157 08/19/2020 Not Detected  Not Detected Final   • Shigella/Enteroinvasive E. coli (E* 08/19/2020 Not Detected  Not Detected Final   • Cryptosporidium 08/19/2020 Not Detected  Not Detected Final   • Cyclospora cayetanensis 08/19/2020 Not Detected  Not Detected Final   • Entamoeba histolytica 08/19/2020 Not Detected  Not Detected Final   • Giardia lamblia 08/19/2020 Not Detected  Not Detected Final   • Adenovirus F40/41 08/19/2020 Not Detected  Not Detected Final   • Astrovirus 08/19/2020 Not Detected  Not Detected Final   • Norovirus GI/GII 08/19/2020 Not Detected  Not Detected Final   • Rotavirus A 08/19/2020 Not Detected  Not Detected Final   • Sapovirus (I, II, IV or V) 08/19/2020 Not Detected  Not Detected Final   Admission on 07/27/2020, Discharged on 07/27/2020   Component Date Value Ref Range Status   • SARS-CoV-2, ANAIS 07/24/2020 Not Detected  Not Detected Final    This test was developed and its performance characteristics determined  by [facility]. This test has not been FDA cleared or  approved. This test has been authorized by FDA under an Emergency Use  Authorization (EUA). This test is only authorized for the duration of  time the declaration that circumstances exist justifying the  authorization of the emergency use of in vitro diagnostic tests for  detection of SARS-CoV-2 virus and/or diagnosis of COVID-19 infection  under section 564(b)(1) of the Act, 21 U.S.C. 360bbb-3(b)(1), unless  the authorization is terminated or revoked sooner.  When diagnostic testing is negative, the possibility of a false  negative result should be considered in the context of a patient's  recent exposures and the presence of clinical signs and symptoms  consistent with COVID-19. An individual without symptoms of COVID-19  and who is not shedding SARS-CoV-2 virus would expect to have a  negative (not detected) result in this assay.   • COVID [facility] PRIORITY 07/24/2020 Comment   Final    Received   • Case Report 07/27/2020    Final                    Value:Surgical Pathology Report                         Case: [id number]                                  Authorizing Provider:  Tom Davis MD        Collected:           07/27/2020 12:52 PM          Ordering Location:     [facility]             Received:            07/28/2020 07:21 AM                                 [place] [facility]                                                     Pathologist:           Karl Lee MD                                                            Specimens:   1) - Gastric, Antrum                                                                                2) - Small Intestine                                                                                3) - Esophagus, Distal                                                                    • Final Diagnosis 07/27/2020    Final                    Value:This result contains rich text formatting which cannot be  displayed here.   Admission on 07/26/2020, Discharged on 07/26/2020   Component Date Value Ref Range Status   • Glucose 07/26/2020 89  65 - 99 mg/dL Final   • BUN 07/26/2020 11  6 - 20 mg/dL Final   • Creatinine 07/26/2020 0.68  0.57 - 1.00 mg/dL Final   • Sodium 07/26/2020 140  136 - 145 mmol/L Final   • Potassium 07/26/2020 3.6  3.5 - 5.2 mmol/L Final   • Chloride 07/26/2020 105  98 - 107 mmol/L Final   • CO2 07/26/2020 25.0  22.0 - 29.0 mmol/L Final   • Calcium 07/26/2020 9.0  8.6 - 10.5 mg/dL Final   • Total Protein 07/26/2020 6.4  6.0 - 8.5 g/dL Final   • Albumin 07/26/2020 4.40  3.50 - 5.20 g/dL Final   • ALT (SGPT) 07/26/2020 9  1 - 33 U/L Final   • AST (SGOT) 07/26/2020 14  1 - 32 U/L Final   • Alkaline Phosphatase 07/26/2020 66  39 - 117 U/L Final   • Total Bilirubin 07/26/2020 0.5  0.0 - 1.2 mg/dL Final   • eGFR Non African Amer 07/26/2020 91  >60 mL/min/1.73 Final   • Globulin 07/26/2020 2.0  gm/dL Final   • A/G Ratio 07/26/2020 2.2  g/dL Final   • BUN/Creatinine Ratio 07/26/2020 16.2  7.0 - 25.0 Final   • Anion Gap 07/26/2020 10.0  5.0 - 15.0 mmol/L Final   • Extra Tube 07/26/2020 hold for add-on   Final    Auto resulted   • Extra Tube 07/26/2020 Hold for add-ons.   Final    Auto resulted.   • Extra Tube 07/26/2020 hold for add-on   Final    Auto resulted   • WBC 07/26/2020 9.21  3.40 - 10.80 10*3/mm3 Final   • RBC 07/26/2020 4.40  3.77 - 5.28 10*6/mm3 Final   • Hemoglobin 07/26/2020 13.7  12.0 - 15.9 g/dL Final   • Hematocrit 07/26/2020 40.7  34.0 - 46.6 % Final   • MCV 07/26/2020 92.5  79.0 - 97.0 fL Final   • MCH 07/26/2020 31.1  26.6 - 33.0 pg Final   • MCHC 07/26/2020 33.7  31.5 - 35.7 g/dL Final   • RDW 07/26/2020 12.4  12.3 - 15.4 % Final   • RDW-SD 07/26/2020 42.5  37.0 - 54.0 fl Final   • MPV 07/26/2020 9.3  6.0 - 12.0 fL Final   • Platelets 07/26/2020 422  140 - 450 10*3/mm3 Final   • Neutrophil % 07/26/2020 51.0  42.7 - 76.0 % Final   • Lymphocyte % 07/26/2020 36.0  19.6 - 45.3 % Final   •  Monocyte % 07/26/2020 8.4  5.0 - 12.0 % Final   • Eosinophil % 07/26/2020 3.7  0.3 - 6.2 % Final   • Basophil % 07/26/2020 0.7  0.0 - 1.5 % Final   • Immature Grans % 07/26/2020 0.2  0.0 - 0.5 % Final   • Neutrophils, Absolute 07/26/2020 4.70  1.70 - 7.00 10*3/mm3 Final   • Lymphocytes, Absolute 07/26/2020 3.32* 0.70 - 3.10 10*3/mm3 Final   • Monocytes, Absolute 07/26/2020 0.77  0.10 - 0.90 10*3/mm3 Final   • Eosinophils, Absolute 07/26/2020 0.34  0.00 - 0.40 10*3/mm3 Final   • Basophils, Absolute 07/26/2020 0.06  0.00 - 0.20 10*3/mm3 Final   • Immature Grans, Absolute 07/26/2020 0.02  0.00 - 0.05 10*3/mm3 Final   • nRBC 07/26/2020 0.0  0.0 - 0.2 /100 WBC Final   • Color, UA 07/26/2020 Yellow  Yellow, Straw, Dark Yellow, Ana M Final   • Appearance, UA 07/26/2020 Clear  Clear Final   • pH, UA 07/26/2020 7.0  5.0 - 9.0 Final   • Specific Gravity, UA 07/26/2020 1.010  1.003 - 1.030 Final   • Glucose, UA 07/26/2020 Negative  Negative Final   • Ketones, UA 07/26/2020 Trace* Negative Final   • Bilirubin, UA 07/26/2020 Negative  Negative Final   • Blood, UA 07/26/2020 Small (1+)* Negative Final   • Protein, UA 07/26/2020 Negative  Negative Final   • Leuk Esterase, UA 07/26/2020 Negative  Negative Final   • Nitrite, UA 07/26/2020 Negative  Negative Final   • Urobilinogen, UA 07/26/2020 0.2 E.U./dL  0.2 - 1.0 E.U./dL Final   • Lactate 07/26/2020 1.2  0.5 - 2.0 mmol/L Final   • Lipase 07/26/2020 24  13 - 60 U/L Final   • HCG Qualitative 07/26/2020 Negative  Negative Final   • RBC, UA 07/26/2020 3-5* None Seen /HPF Final   • WBC, UA 07/26/2020 None Seen  None Seen, 0-2, 3-5 /HPF Final   • Bacteria, UA 07/26/2020 None Seen  None Seen /HPF Final   • Squamous Epithelial Cells, UA 07/26/2020 None Seen  None Seen, 0-2 /HPF Final   • Hyaline Casts, UA 07/26/2020 None Seen  None Seen /LPF Final   • Methodology 07/26/2020 Automated Microscopy   Final   Admission on 07/05/2020, Discharged on 07/05/2020   Component Date Value Ref Range  Status   • Glucose 07/05/2020 97  65 - 99 mg/dL Final   • BUN 07/05/2020 6  6 - 20 mg/dL Final   • Creatinine 07/05/2020 0.65  0.57 - 1.00 mg/dL Final   • Sodium 07/05/2020 140  136 - 145 mmol/L Final   • Potassium 07/05/2020 3.5  3.5 - 5.2 mmol/L Final   • Chloride 07/05/2020 103  98 - 107 mmol/L Final   • CO2 07/05/2020 28.0  22.0 - 29.0 mmol/L Final   • Calcium 07/05/2020 8.5* 8.6 - 10.5 mg/dL Final   • Total Protein 07/05/2020 6.3  6.0 - 8.5 g/dL Final   • Albumin 07/05/2020 4.00  3.50 - 5.20 g/dL Final   • ALT (SGPT) 07/05/2020 18  1 - 33 U/L Final   • AST (SGOT) 07/05/2020 21  1 - 32 U/L Final   • Alkaline Phosphatase 07/05/2020 61  39 - 117 U/L Final   • Total Bilirubin 07/05/2020 0.3  0.2 - 1.2 mg/dL Final   • eGFR Non African Amer 07/05/2020 96  >60 mL/min/1.73 Final   • Globulin 07/05/2020 2.3  gm/dL Final   • A/G Ratio 07/05/2020 1.7  g/dL Final   • BUN/Creatinine Ratio 07/05/2020 9.2  7.0 - 25.0 Final   • Anion Gap 07/05/2020 9.0  5.0 - 15.0 mmol/L Final   • Lipase 07/05/2020 19  13 - 60 U/L Final   • Color, UA 07/05/2020 Yellow  Yellow, Straw, Dark Yellow, Ana M Final   • Appearance, UA 07/05/2020 Clear  Clear Final   • pH, UA 07/05/2020 8.0  5.0 - 9.0 Final   • Specific Gravity, UA 07/05/2020 1.015  1.003 - 1.030 Final   • Glucose, UA 07/05/2020 Negative  Negative Final   • Ketones, UA 07/05/2020 Negative  Negative Final   • Bilirubin, UA 07/05/2020 Negative  Negative Final   • Blood, UA 07/05/2020 Negative  Negative Final   • Protein, UA 07/05/2020 Negative  Negative Final   • Leuk Esterase, UA 07/05/2020 Negative  Negative Final   • Nitrite, UA 07/05/2020 Negative  Negative Final   • Urobilinogen, UA 07/05/2020 0.2 E.U./dL  0.2 - 1.0 E.U./dL Final   • Extra Tube 07/05/2020 hold for add-on   Final    Auto resulted   • Extra Tube 07/05/2020 Hold for add-ons.   Final    Auto resulted.   • Extra Tube 07/05/2020 hold for add-on   Final    Auto resulted   • Extra Tube 07/05/2020 Hold for add-ons.   Final     Auto resulted.   • WBC 07/05/2020 11.22* 3.40 - 10.80 10*3/mm3 Final   • RBC 07/05/2020 4.01  3.77 - 5.28 10*6/mm3 Final   • Hemoglobin 07/05/2020 12.6  12.0 - 15.9 g/dL Final   • Hematocrit 07/05/2020 37.7  34.0 - 46.6 % Final   • MCV 07/05/2020 94.0  79.0 - 97.0 fL Final   • MCH 07/05/2020 31.4  26.6 - 33.0 pg Final   • MCHC 07/05/2020 33.4  31.5 - 35.7 g/dL Final   • RDW 07/05/2020 13.1  12.3 - 15.4 % Final   • RDW-SD 07/05/2020 45.1  37.0 - 54.0 fl Final   • MPV 07/05/2020 9.2  6.0 - 12.0 fL Final   • Platelets 07/05/2020 368  140 - 450 10*3/mm3 Final   • Neutrophil % 07/05/2020 50.9  42.7 - 76.0 % Final   • Lymphocyte % 07/05/2020 33.2  19.6 - 45.3 % Final   • Monocyte % 07/05/2020 10.4  5.0 - 12.0 % Final   • Eosinophil % 07/05/2020 4.1  0.3 - 6.2 % Final   • Basophil % 07/05/2020 0.5  0.0 - 1.5 % Final   • Immature Grans % 07/05/2020 0.9* 0.0 - 0.5 % Final   • Neutrophils, Absolute 07/05/2020 5.71  1.70 - 7.00 10*3/mm3 Final   • Lymphocytes, Absolute 07/05/2020 3.72* 0.70 - 3.10 10*3/mm3 Final   • Monocytes, Absolute 07/05/2020 1.17* 0.10 - 0.90 10*3/mm3 Final   • Eosinophils, Absolute 07/05/2020 0.46* 0.00 - 0.40 10*3/mm3 Final   • Basophils, Absolute 07/05/2020 0.06  0.00 - 0.20 10*3/mm3 Final   • Immature Grans, Absolute 07/05/2020 0.10* 0.00 - 0.05 10*3/mm3 Final   • nRBC 07/05/2020 0.0  0.0 - 0.2 /100 WBC Final      XR Chest 2 View  Narrative: EXAM DESCRIPTION:     XR CHEST 2 VW    CLINICAL HISTORY:     50 years  Female  cough and left chest pain., R05 Cough    COMPARISON:     March 2, 2020    TECHNIQUE:     Two views-PA and lateral radiographs of the chest    FINDINGS:     There are emphysematous changes noted with hyperaeration of the  lungs. There is no evidence of an acute superimposed infiltrate.  The cardiac silhouette and pulmonary vasculature are within  normal limits. There are no pleural effusions.  Impression: 1. Stable appearance the chest without radiographic evidence of  acute  cardiopulmonary disease.    Electronically signed by:  Lizbeth Rehman MD  10/29/2020 11:17 AM  CDT Workstation: [id number]    @[employer]@  Immunization History   Administered Date(s) Administered   • Flulaval/Fluarix/Fluzone Quad 11/30/2015, 09/05/2020   • Influenza Quad Vaccine (Inpatient) 09/13/2011, 11/08/2016   • Influenza TIV (IM) 10/07/2010   • Influenza, Unspecified 09/03/2020   • Pneumococcal Polysaccharide (PPSV23) 11/30/2015   • Tdap 10/07/2010   • flucelvax quad pfs =>4 YRS 09/19/2019       The following portions of the patient's history were reviewed and updated as appropriate: allergies, current medications, past family history, past medical history, past social history, past surgical history and problem list.        Physical Exam  /60 (BP Location: Left arm, Patient Position: Sitting, Cuff Size: Adult)   Pulse 80   Temp 97.1 °F (36.2 °C)   Wt 48.8 kg (107 lb 9.6 oz)   LMP  (LMP Unknown)   SpO2 99%   BMI 17.63 kg/m²     Physical Exam  Vitals signs and nursing note reviewed.   Constitutional:       Appearance: She is well-developed. She is not diaphoretic.   HENT:      Head: Normocephalic and atraumatic.        Right Ear: External ear normal.   Eyes:      Conjunctiva/sclera: Conjunctivae normal.      Pupils: Pupils are equal, round, and reactive to light.   Neck:      Musculoskeletal: Normal range of motion and neck supple.   Cardiovascular:      Rate and Rhythm: Normal rate and regular rhythm.      Heart sounds: Normal heart sounds. No murmur.   Pulmonary:      Effort: No respiratory distress.      Comments: Decreased breath sounds   Abdominal:      General: Bowel sounds are normal. There is no distension.      Palpations: Abdomen is soft.      Tenderness: There is no abdominal tenderness.   Musculoskeletal: Normal range of motion.         General: No deformity.   Skin:     General: Skin is warm.      Coloration: Skin is not pale.      Findings: Rash present. No erythema.   Neurological:       Mental Status: She is alert and oriented to person, place, and time.      Cranial Nerves: No cranial nerve deficit.   Psychiatric:         Behavior: Behavior normal.         Assessment/Plan    Diagnosis Plan   1. Stage 2 moderate COPD by GOLD classification (CMS/HCC)     2. Tobacco user     3. Chronic bronchitis, unspecified chronic bronchitis type (CMS/HCC)     4. Celiac disease     5. Itchy scalp             -recommend labwork  -hep C screening - hep C antibody test   -rash on scalp -possibly folliculitis vs tinea capitis. previousl treated with nix creme rinse solution. Will treat with keflex 500 mg PO BID for 7 days also gave diflucan in event of yeast infection. Also nizoral shampoo for possible fungal infection   -cough/acute bronchitis/URI - improved with abx. recently went to . Pt tested negative for COVD19   -HTN/tachcycardia -controlledon toprol XL 50 mg daily.   -schedule mammogram screening    -moderate protein malnutrition -  Gave high calorie diet information   -JELENA - pt admits to smoking marijuana. Recommend pt continue that since it calms her down but also discussed about potential side effects if long term use   -thrombocytosis /polycythemia  continue to monitor. She is seeing Hematology   -gluten sensitivity -recommend gluten free diet   -COPD/COPD exacerbation- Pulmonology following duo nebs stopped now on Anoro daily.  Albuterol PRN.    -vitamin B12 deficiency  - b12 injections weekly  -tobacco user - counseled to quit smoking >5 minutes. She could not tolerate chantix  urged the importance of quitting smoking.   Insurance would not cover nicotine patch and gum. Recommend pt call [phone] start on nicotine patch 7 mg daily.  has cut down on smoking   -abdominal pain/gastritis/IBS /GERD/celiac - Gastroenterology following. On PPI prilosec 20 mg q daily  OFF prucalopride 2 mg daily. She is to followup regarding abodminal issues on bentyl 40 mg every 6 hours PRN.  advised pt to limit elavil  if possible since it can cause constipation. Has upcoming EGD soon with GI.    -nausea/ vomting - continue zofran PRN  -allergic rhinitis - flonase nasal spray and singulair along with zyrtec   -vitamin D deficiency - vitamin D once a week   -pt has upcoming mammogram soon.    -tremor of head/concussion - she has seen neurology with Dr. Chen. Had MRI of brain. Recommended 2nd opinon with Dr. Manjarrez She also has family history of Friedreich's ataxia. On elavail 25 mg at bedtime. Continue with Neurology followup with Dr. Manjarrez    -advised pt to go to ER or call 911 if symptoms worrisome or severe  -advised pt to be  Safe and call with questions and concerns  -advised to followup with all referrals and Specialist  -advised pt to be safe during COVID -19 pandemic   -total time with pt >25 minutes   -recheck in 1  Week  Telephone visit         This document has been electronically signed by Xavier Wick MD on November 2, 2020 15:32 CST

## 2020-11-02 NOTE — PATIENT INSTRUCTIONS
Body Ringworm  Body ringworm is an infection of the skin that often causes a ring-shaped rash. Body ringworm is also called tinea corporis.  Body ringworm can affect any part of your skin. This condition is easily spread from person to person (is very contagious).  What are the causes?  This condition is caused by fungi called dermatophytes. The condition develops when these fungi grow out of control on the skin.  You can get this condition if you touch a person or animal that has it. You can also get it if you share any items with an infected person or pet. These include:  · Clothing, bedding, and towels.  · Brushes or baker.  · Gym equipment.  · Any other object that has the fungus on it.  What increases the risk?  You are more likely to develop this condition if you:  · Play sports that involve close physical contact, such as wrestling.  · Sweat a lot.  · Live in areas that are hot and humid.  · Use public showers.  · Have a weakened immune system.  What are the signs or symptoms?  Symptoms of this condition include:  · Itchy, raised red spots and bumps.  · Red scaly patches.  · A ring-shaped rash. The rash may have:  ? A clear center.  ? Scales or red bumps at its center.  ? Redness near its borders.  ? Dry and scaly skin on or around it.  How is this diagnosed?  This condition can usually be diagnosed with a skin exam. A skin scraping may be taken from the affected area and examined under a microscope to see if the fungus is present.  How is this treated?  This condition may be treated with:  · An antifungal cream or ointment.  · An antifungal shampoo.  · Antifungal medicines. These may be prescribed if your ringworm:  ? Is severe.  ? Keeps coming back.  ? Lasts a long time.  Follow these instructions at home:  · Take over-the-counter and prescription medicines only as told by your health care provider.  · If you were given an antifungal cream or ointment:  ? Use it as told by your health care provider.  ? Wash  the infected area and dry it completely before applying the cream or ointment.  · If you were given an antifungal shampoo:  ? Use it as told by your health care provider.  ? Leave the shampoo on your body for 3-5 minutes before rinsing.  · While you have a rash:  ? Wear loose clothing to stop clothes from rubbing and irritating it.  ? Wash or change your bed sheets every night.  ? Disinfect or throw out items that may be infected.  ? Wash clothes and bed sheets in hot water.  ? Wash your hands often with soap and water. If soap and water are not available, use hand .  · If your pet has the same infection, take your pet to see a  for treatment.  How is this prevented?  · Take a bath or shower every day and after every time you work out or play sports.  · Dry your skin completely after bathing.  · Wear sandals or shoes in public places and showers.  · Change your clothes every day.  · Wash athletic clothes after each use.  · Do not share personal items with others.  · Avoid touching red patches of skin on other people.  · Avoid touching pets that have bald spots.  · If you touch an animal that has a bald spot, wash your hands.  Contact a health care provider if:  · Your rash continues to spread after 7 days of treatment.  · Your rash is not gone in 4 weeks.  · The area around your rash gets red, warm, tender, and swollen.  Summary  · Body ringworm is an infection of the skin that often causes a ring-shaped rash.  · This condition is easily spread from person to person (is very contagious).  · This condition may be treated with antifungal cream or ointment, antifungal shampoo, or antifungal medicines.  · Take over-the-counter and prescription medicines only as told by your health care provider.  This information is not intended to replace advice given to you by your health care provider. Make sure you discuss any questions you have with your health care provider.  Document Released: 12/15/2001  Document Revised: 08/16/2019 Document Reviewed: 08/16/2019  [employer] Patient Education © 2020 [employer]    Folliculitis    Folliculitis is inflammation of the hair follicles. Folliculitis most commonly occurs on the scalp, thighs, legs, back, and buttocks. However, it can occur anywhere on the body.  What are the causes?  This condition may be caused by:  · A bacterial infection (common).  · A fungal infection.  · A viral infection.  · Contact with certain chemicals, especially oils and tars.  · Shaving or waxing.  · Greasy ointments or creams applied to the skin.  Long-lasting folliculitis and folliculitis that keeps coming back may be caused by bacteria. This bacteria can live anywhere on your skin and is often found in the nostrils.  What increases the risk?  You are more likely to develop this condition if you have:  · A weakened immune system.  · Diabetes.  · Obesity.  What are the signs or symptoms?  Symptoms of this condition include:  · Redness.  · Soreness.  · Swelling.  · Itching.  · Small white or yellow, pus-filled, itchy spots (pustules) that appear over a reddened area. If there is an infection that goes deep into the follicle, these may develop into a boil (furuncle).  · A group of closely packed boils (carbuncle). These tend to form in hairy, sweaty areas of the body.  How is this diagnosed?  This condition is diagnosed with a skin exam. To find what is causing the condition, your health care provider may take a sample of one of the pustules or boils for testing in a lab.  How is this treated?  This condition may be treated by:  · Applying warm compresses to the affected areas.  · Taking an antibiotic medicine or applying an antibiotic medicine to the skin.  · Applying or bathing with an antiseptic solution.  · Taking an over-the-counter medicine to help with itching.  · Having a procedure to drain any pustules or boils. This may be done if a pustule or boil contains a lot of pus or  fluid.  · Having laser hair removal. This may be done to treat long-lasting folliculitis.  Follow these instructions at home:  Managing pain and swelling    · If directed, apply heat to the affected area as often as told by your health care provider. Use the heat source that your health care provider recommends, such as a moist heat pack or a heating pad.  ? Place a towel between your skin and the heat source.  ? Leave the heat on for 20-30 minutes.  ? Remove the heat if your skin turns bright red. This is especially important if you are unable to feel pain, heat, or cold. You may have a greater risk of getting burned.  General instructions  · If you were prescribed an antibiotic medicine, take it or apply it as told by your health care provider. Do not stop using the antibiotic even if your condition improves.  · Check the irritated area every day for signs of infection. Check for:  ? Redness, swelling, or pain.  ? Fluid or blood.  ? Warmth.  ? Pus or a bad smell.  · Do not shave irritated skin.  · Take over-the-counter and prescription medicines only as told by your health care provider.  · Keep all follow-up visits as told by your health care provider. This is important.  Get help right away if:  · You have more redness, swelling, or pain in the affected area.  · Red streaks are spreading from the affected area.  · You have a fever.  Summary  · Folliculitis is inflammation of the hair follicles. Folliculitis most commonly occurs on the scalp, thighs, legs, back, and buttocks.  · This condition may be treated by taking an antibiotic medicine or applying an antibiotic medicine to the skin, and applying or bathing with an antiseptic solution.  · If you were prescribed an antibiotic medicine, take it or apply it as told by your health care provider. Do not stop using the antibiotic even if your condition improves.  · Get help right away if you have new or worsening symptoms.  · Keep all follow-up visits as told by  your health care provider. This is important.  This information is not intended to replace advice given to you by your health care provider. Make sure you discuss any questions you have with your health care provider.  Document Released: 02/26/2003 Document Revised: 07/27/2019 Document Reviewed: 07/27/2019  [employer] Patient Education © 2020 [employer]  Cephalexin Tablets or Capsules  What is this medicine?  CEPHALEXIN (sef a KENN in) is a cephalosporin antibiotic. It treats some infections caused by bacteria. It will not work for colds, the flu, or other viruses.  This medicine may be used for other purposes; ask your health care provider or pharmacist if you have questions.  COMMON BRAND NAME(S): Biocef, Daxbia, Keflex, Keftab  What should I tell my health care provider before I take this medicine?  They need to know if you have any of these conditions:  · kidney disease  · stomach or intestine problems, especially colitis  · an unusual or allergic reaction to cephalexin, other cephalosporins, penicillins, other antibiotics, medicines, foods, dyes or preservatives  · pregnant or trying to get pregnant  · breast-feeding  How should I use this medicine?  Take this drug by mouth. Take it as directed on the prescription label at the same time every day. You can take it with or without food. If it upsets your stomach, take it with food. Take all of this drug unless your health care provider tells you to stop it early. Keep taking it even if you think you are better.  Talk to your health care provider about the use of this drug in children. While it may be prescribed for selected conditions, precautions do apply.  Overdosage: If you think you have taken too much of this medicine contact a poison control center or emergency room at once.  NOTE: This medicine is only for you. Do not share this medicine with others.  What if I miss a dose?  If you miss a dose, take it as soon as you can. If it is almost time for your next  dose, take only that dose. Do not take double or extra doses.  What may interact with this medicine?  · probenecid  · some other antibiotics  This list may not describe all possible interactions. Give your health care provider a list of all the medicines, herbs, non-prescription drugs, or dietary supplements you use. Also tell them if you smoke, drink alcohol, or use illegal drugs. Some items may interact with your medicine.  What should I watch for while using this medicine?  Tell your doctor or health care provider if your symptoms do not begin to improve in a few days.  This medicine may cause serious skin reactions. They can happen weeks to months after starting the medicine. Contact your health care provider right away if you notice fevers or flu-like symptoms with a rash. The rash may be red or purple and then turn into blisters or peeling of the skin. Or, you might notice a red rash with swelling of the face, lips or lymph nodes in your neck or under your arms.  Do not treat diarrhea with over the counter products. Contact your doctor if you have diarrhea that lasts more than 2 days or if it is severe and watery.  If you have diabetes, you may get a false-positive result for sugar in your urine. Check with your doctor or health care provider.  What side effects may I notice from receiving this medicine?  Side effects that you should report to your doctor or health care professional as soon as possible:  · allergic reactions like skin rash, itching or hives, swelling of the face, lips, or tongue  · breathing problems  · pain or trouble passing urine  · redness, blistering, peeling or loosening of the skin, including inside the mouth  · severe or watery diarrhea  · unusually weak or tired  · yellowing of the eyes, skin  Side effects that usually do not require medical attention (report to your doctor or health care professional if they continue or are bothersome):  · gas or heartburn  · genital or anal  irritation  · headache  · joint or muscle pain  · nausea, vomiting  This list may not describe all possible side effects. Call your doctor for medical advice about side effects. You may report side effects to FDA at 9-878-GDP-2374.  Where should I keep my medicine?  Keep out of the reach of children and pets.  Store at room temperature between 20 and 25 degrees C (68 and 77 degrees F). Throw away any unused drug after the expiration date.  NOTE: This sheet is a summary. It may not cover all possible information. If you have questions about this medicine, talk to your doctor, pharmacist, or health care provider.  © 2020 [employer] (2020-07-24 11:27:00)  Ketoconazole shampoo  What is this medicine?  KETOCONAZOLE (nikos toe PEDRO na zole) is an antifungal medicine. It is used to treat certain kinds of fungal or yeast infections.  This medicine may be used for other purposes; ask your health care provider or pharmacist if you have questions.  COMMON BRAND NAME(S): Nizoral, Nizoral A-D  What should I tell my health care provider before I take this medicine?  They need to know if you have any of these conditions:  · an unusual or allergic reaction to ketoconazole, itraconazole, miconazole, sulfites, other foods, dyes or preservatives  · pregnant or trying to get pregnant  · breast-feeding  How should I use this medicine?  This medicine is for external use only. Follow the directions on the prescription label. Wash your hands before and after use. Apply the shampoo to damp skin of the affected area of the skin or scalp. Use enough shampoo to cover the affected area and a wide margin surrounding the affected area. Work the shampoo into a lather and leave on for 5 minutes. Rinse the treated area completely with water. Do not get the shampoo in your eyes. If you do, rinse out with plenty of cool tap water. Finish the full course prescribed by your doctor or health care professional even if you think your condition is  better. Do not stop using except on the advice of your doctor or health care professional.  Talk to your pediatrician regarding the use of this medicine in children. Special care may be needed.  Overdosage: If you think you have taken too much of this medicine contact a poison control center or emergency room at once.  NOTE: This medicine is only for you. Do not share this medicine with others.  What if I miss a dose?  If you miss a dose, use it as soon as you can. If it is almost time for your next dose, use only that dose. Do not use double or extra doses.  What may interact with this medicine?  Interactions are not expected. Do not use any other skin products without telling your doctor or health care professional.  This list may not describe all possible interactions. Give your health care provider a list of all the medicines, herbs, non-prescription drugs, or dietary supplements you use. Also tell them if you smoke, drink alcohol, or use illegal drugs. Some items may interact with your medicine.  What should I watch for while using this medicine?  Tell your doctor or health care professional if your symptoms do not begin to improve in 1 to 2 weeks.  If your hair has been permanently waved the shampoo may remove the curl.  What side effects may I notice from receiving this medicine?  Side effects that you should report to your doctor or health care professional as soon as possible:  · allergic reactions like skin rash, itching or hives, swelling of the face, lips, or tongue  · pain, tingling, numbness  Side effects that usually do not require medical attention (report to your doctor or health care professional if they continue or are bothersome):  · dry skin  · hair loss, hair discoloration or abnormal texture  · skin irritation  This list may not describe all possible side effects. Call your doctor for medical advice about side effects. You may report side effects to FDA at 7-585-FDA-5210.  Where should I keep  my medicine?  Keep out of the reach of children.  Store at room temperature between 20 to 25 degrees C (68 to 77 degrees F). Throw away any unused medicine after the expiration date.  NOTE: This sheet is a summary. It may not cover all possible information. If you have questions about this medicine, talk to your doctor, pharmacist, or health care provider.  © 2020 [employer] (2020-04-21 15:38:47)    Body Ringworm  Body ringworm is an infection of the skin that often causes a ring-shaped rash. Body ringworm is also called tinea corporis.  Body ringworm can affect any part of your skin. This condition is easily spread from person to person (is very contagious).  What are the causes?  This condition is caused by fungi called dermatophytes. The condition develops when these fungi grow out of control on the skin.  You can get this condition if you touch a person or animal that has it. You can also get it if you share any items with an infected person or pet. These include:  · Clothing, bedding, and towels.  · Brushes or baker.  · Gym equipment.  · Any other object that has the fungus on it.  What increases the risk?  You are more likely to develop this condition if you:  · Play sports that involve close physical contact, such as wrestling.  · Sweat a lot.  · Live in areas that are hot and humid.  · Use public showers.  · Have a weakened immune system.  What are the signs or symptoms?  Symptoms of this condition include:  · Itchy, raised red spots and bumps.  · Red scaly patches.  · A ring-shaped rash. The rash may have:  ? A clear center.  ? Scales or red bumps at its center.  ? Redness near its borders.  ? Dry and scaly skin on or around it.  How is this diagnosed?  This condition can usually be diagnosed with a skin exam. A skin scraping may be taken from the affected area and examined under a microscope to see if the fungus is present.  How is this treated?  This condition may be treated with:  · An  antifungal cream or ointment.  · An antifungal shampoo.  · Antifungal medicines. These may be prescribed if your ringworm:  ? Is severe.  ? Keeps coming back.  ? Lasts a long time.  Follow these instructions at home:  · Take over-the-counter and prescription medicines only as told by your health care provider.  · If you were given an antifungal cream or ointment:  ? Use it as told by your health care provider.  ? Wash the infected area and dry it completely before applying the cream or ointment.  · If you were given an antifungal shampoo:  ? Use it as told by your health care provider.  ? Leave the shampoo on your body for 3-5 minutes before rinsing.  · While you have a rash:  ? Wear loose clothing to stop clothes from rubbing and irritating it.  ? Wash or change your bed sheets every night.  ? Disinfect or throw out items that may be infected.  ? Wash clothes and bed sheets in hot water.  ? Wash your hands often with soap and water. If soap and water are not available, use hand .  · If your pet has the same infection, take your pet to see a  for treatment.  How is this prevented?  · Take a bath or shower every day and after every time you work out or play sports.  · Dry your skin completely after bathing.  · Wear sandals or shoes in public places and showers.  · Change your clothes every day.  · Wash athletic clothes after each use.  · Do not share personal items with others.  · Avoid touching red patches of skin on other people.  · Avoid touching pets that have bald spots.  · If you touch an animal that has a bald spot, wash your hands.  Contact a health care provider if:  · Your rash continues to spread after 7 days of treatment.  · Your rash is not gone in 4 weeks.  · The area around your rash gets red, warm, tender, and swollen.  Summary  · Body ringworm is an infection of the skin that often causes a ring-shaped rash.  · This condition is easily spread from person to person (is very  contagious).  · This condition may be treated with antifungal cream or ointment, antifungal shampoo, or antifungal medicines.  · Take over-the-counter and prescription medicines only as told by your health care provider.  This information is not intended to replace advice given to you by your health care provider. Make sure you discuss any questions you have with your health care provider.  Document Released: 12/15/2001 Document Revised: 08/16/2019 Document Reviewed: 08/16/2019  [employer] Patient Education © 2020 [employer]

## 2020-11-03 NOTE — PROGRESS NOTES
Subjective:  Carla Richard is a 50 y.o. female who presents for scalp rash/itch       Patient Active Problem List   Diagnosis   • Tobacco abuse counseling   • Chronic idiopathic constipation   • B12 deficiency   • Vitamin D deficiency    • Tobacco user   • Stage 2 moderate COPD by GOLD classification (CMS/[facility])   • Cervical lymphadenopathy   • Generalized abdominal pain   • Nausea   • Neurological abnormality   • Tremor   • RUQ pain   • COPD exacerbation (CMS/[facility])   • Pertussis exposure   • Chronic bronchitis (CMS/[facility])   • Cigarette nicotine dependence, uncomplicated   • Chronic nonseasonal allergic rhinitis due to pollen   • Episode of recurrent major depressive disorder (CMS/[facility])   • Diarrhea   • Chronic diarrhea   • Thrombocytosis (CMS/[facility])   • Nausea and vomiting   • Gluten intolerance   • C. difficile diarrhea   • Shiga toxin-producing Escherichia coli infection   • Generalized weakness   • Head injury   • Concussion with loss of consciousness   • Gastritis without bleeding   • Irritable bowel syndrome   • Tachycardia   • Essential hypertension   • JELENA (generalized anxiety disorder)   • Marijuana use   • Epigastric pain   • History of Clostridioides difficile colitis   • Celiac disease   • Cough   • Upper respiratory tract infection   • Irritable bowel syndrome with both constipation and diarrhea   • Moderate protein-calorie malnutrition (CMS/[facility])   • Folliculitis   • Itchy scalp   • Tinea capitis           Current Outpatient Medications:   •  albuterol sulfate  (90 Base) MCG/ACT inhaler, Inhale 2 puffs Every 4 (Four) Hours As Needed for Wheezing., Disp: 18 g, Rfl: 5  •  amitriptyline (ELAVIL) 25 MG tablet, Take 25 mg by mouth Every Night., Disp: , Rfl:   •  cetirizine (zyrTEC) 10 MG tablet, Take 1 tablet by mouth Daily., Disp: 30 tablet, Rfl: 11  •  Cyanocobalamin (B-12 COMPLIANCE INJECTION IJ), Inject  as directed., Disp: , Rfl:   •  fluticasone (FLONASE) 50 MCG/ACT nasal spray, 2 sprays  into the nostril(s) as directed by provider Daily., Disp: 1 bottle, Rfl: 5  •  ketoconazole (Nizoral) 2 % shampoo, Apply  topically to the appropriate area as directed 2 (Two) Times a Week., Disp: 120 mL, Rfl: 3  •  montelukast (SINGULAIR) 10 MG tablet, Take 1 tablet by mouth Every Night., Disp: 30 tablet, Rfl: 3  •  omeprazole (priLOSEC) 20 MG capsule, Take 1 capsule by mouth Daily., Disp: 30 capsule, Rfl: 3  •  ondansetron ODT (ZOFRAN-ODT) 4 MG disintegrating tablet, Place 1 tablet on the tongue 4 (Four) Times a Day As Needed for Nausea or Vomiting., Disp: 12 tablet, Rfl: 0  •  Plecanatide (Trulance) 3 MG tablet, Take 1 tablet by mouth Daily., Disp: 90 tablet, Rfl: 5  •  umeclidinium-vilanterol (ANORO ELLIPTA) 62.5-25 MCG/INH aerosol powder  inhaler, Inhale 1 puff Daily., Disp: 1 each, Rfl: 5    HPI     Pt is 51 yo female with history of moderateC OPD, Vitamin B12 deficiency, Vitamin D deficiency, chronic abdominal pain, tremor,  RUQ pain,  Sp hysterectomy,  History of chron's disease, history of pertussis infection  sp right shoulder surgery. X 3, sp rotator cuff repair , history of C diff diarrhea, gluten sensitivity, gastritis, marijuana use, COPD        11/2/20 in office visit for recheck on pt's above medical issues.  Pt saw Pulmonology recently and PFTS showed moderate obstruciotn with air trapping.  She was started on ANoro along with albuteol as needed. Along with zyrtec and singulair.  Pt was seen on 10/29/20 at urgent care for cough/ chest wall pain. She had chest x-ray done on 10/29/20 that showed active cardiopulmonary disease.  She continues to smoke but has cut down on tobacco. She continues to adhere to gluten freed diet for celiac disease Her COVID-19 test was negative. Her main concern is rash on scalp. Pt was previously treated for lice with nix cream solution.  Pt states she developed drainage and irriation on scalp last Friday.  It is tender to touch. It is also itchy.  She states that nix  creme did help with lice     11/9/20 telemedicine  visit for recheck on pt's above medical issues. Is here for recheck on rash on scalp. Was given keflex along with nizoral shampoo for possible follliculitis vs tinea capitis. Her scalp has improved with the treatment.  Her stomach issues is better with refraining on gluten free diet.  Breathing stable and pt continue to cut back on smoking       Rash  This is a recurring  problem. The problem has been gradually improving  since onset. The affected locations include the scalp. The rash is characterized by blistering, itchiness and dryness. She was exposed to nothing. Associated symptoms include coughing, diarrhea, fatigue and shortness of breath. Pertinent negatives include no congestion, fever, rhinorrhea, sore throat or vomiting. Past treatments include nothing. The treatment provided no relief. There is no history of allergies, asthma or eczema.   GI Problem  The primary symptoms include fatigue, abdominal pain, diarrhea and arthralgias. Primary symptoms do not include fever, nausea, vomiting or melena.   The illness is also significant for constipation. The illness does not include chills, anorexia or dysphagia. Associated medical issues do not include inflammatory bowel disease, GERD, gallstones, liver disease, alcohol abuse, PUD, gastric bypass, bowel resection, irritable bowel syndrome, hemorrhoids or diverticulitis. Associated medical issues comments: gluten sensitivity. Pt has tried nizoral and keflex which has improved condition   COPD   This is a chronic problem. The current episode started more than 1 year ago. The problem occurs constantly. The problem has been iomproving Associated symptoms include abdominal pain, arthralgias, fatigue, joint swelling, numbness and weakness. Pertinent negatives include no anorexia, change in bowel habit, chest pain, chills, congestion, coughing, diaphoresis, fever, headaches, myalgias, nausea, neck pain, sore  throat, swollen glands, urinary symptoms, vertigo, visual change or vomiting. The symptoms are aggravated by smoking. She has tried (combivent  symbicort anoro, albuterol inhaler ) for the symptoms.     Review of Systems  Review of Systems   Constitutional: Positive for activity change and fatigue. Negative for appetite change, chills, diaphoresis and fever.   HENT: Negative for congestion, postnasal drip, rhinorrhea, sinus pressure, sinus pain, sneezing, sore throat, trouble swallowing and voice change.    Respiratory: Positive for shortness of breath. Negative for cough, choking, chest tightness, wheezing and stridor.    Cardiovascular: Negative for chest pain.   Gastrointestinal: Positive for abdominal pain, constipation and diarrhea. Negative for nausea and vomiting.   Skin: Positive for rash and wound.   Neurological: Positive for weakness and numbness. Negative for headaches.       Patient Active Problem List   Diagnosis   • Tobacco abuse counseling   • Chronic idiopathic constipation   • B12 deficiency   • Vitamin D deficiency    • Tobacco user   • Stage 2 moderate COPD by GOLD classification (CMS/HCC)   • Cervical lymphadenopathy   • Generalized abdominal pain   • Nausea   • Neurological abnormality   • Tremor   • RUQ pain   • COPD exacerbation (CMS/HCC)   • Pertussis exposure   • Chronic bronchitis (CMS/HCC)   • Cigarette nicotine dependence, uncomplicated   • Chronic nonseasonal allergic rhinitis due to pollen   • Episode of recurrent major depressive disorder (CMS/HCC)   • Diarrhea   • Chronic diarrhea   • Thrombocytosis (CMS/HCC)   • Nausea and vomiting   • Gluten intolerance   • C. difficile diarrhea   • Shiga toxin-producing Escherichia coli infection   • Generalized weakness   • Head injury   • Concussion with loss of consciousness   • Gastritis without bleeding   • Irritable bowel syndrome   • Tachycardia   • Essential hypertension   • JELENA (generalized anxiety disorder)   • Marijuana use   •  Epigastric pain   • History of Clostridioides difficile colitis   • Celiac disease   • Cough   • Upper respiratory tract infection   • Irritable bowel syndrome with both constipation and diarrhea   • Moderate protein-calorie malnutrition (CMS/HCC)   • Folliculitis   • Itchy scalp   • Tinea capitis     Past Surgical History:   Procedure Laterality Date   • BACK SURGERY      C5-6   • COLONOSCOPY     • COLONOSCOPY N/A 10/18/2019    Procedure: COLONOSCOPY;  Surgeon: Tom Davis MD;  Location: [facility] ENDOSCOPY;  Service: Gastroenterology   • COLONOSCOPY N/A 8/27/2020    Procedure: COLONOSCOPY;  Surgeon: Tom Davis MD;  Location: [facility] ENDOSCOPY;  Service: Gastroenterology;  Laterality: N/A;   • ENDOSCOPY N/A 10/18/2019    Procedure: ESOPHAGOGASTRODUODENOSCOPY;  Surgeon: Tom Davis MD;  Location: [facility] ENDOSCOPY;  Service: Gastroenterology   • ENDOSCOPY N/A 7/27/2020    Procedure: ESOPHAGOGASTRODUODENOSCOPY;  Surgeon: Tom Davis MD;  Location: [facility] ENDOSCOPY;  Service: Gastroenterology;  Laterality: N/A;   • HYSTERECTOMY     • SHOULDER ARTHROSCOPY W/ ROTATOR CUFF REPAIR Right    • TOE SURGERY      left small toe    • TONSILECTOMY, ADENOIDECTOMY, BILATERAL MYRINGOTOMY AND TUBES     • TONSILLECTOMY     • UPPER GASTROINTESTINAL ENDOSCOPY       Social History     Socioeconomic History   • Marital status: Single     Spouse name: Not on file   • Number of children: Not on file   • Years of education: Not on file   • Highest education level: Not on file   Tobacco Use   • Smoking status: Current Every Day Smoker     Packs/day: 1.00     Years: 40.00     Pack years: 40.00     Types: Cigarettes   • Smokeless tobacco: Never Used   Substance and Sexual Activity   • Alcohol use: No     Frequency: Never   • Drug use: Yes     Types: Marijuana     Comment: nightly   • Sexual activity: Defer     Family History   Problem Relation Age of Onset   • Inflammatory bowel disease Mother    • Arthritis Mother    •  Diabetes Mother    • Hypertension Mother    • Hyperlipidemia Mother    • Obesity Mother    • Osteoporosis Mother    • Heart disease Father    • Hyperlipidemia Father    • Diabetes Sister    • Liver disease Daughter    • Mental illness Daughter    • Obesity Daughter    • Diabetes Maternal Grandmother    • Cancer Maternal Grandmother         lung   • Cancer Other         lung   • Heart disease Other      Admission on 10/29/2020, Discharged on 10/29/2020   Component Date Value Ref Range Status   • SARS-CoV-2, ANAIS 10/29/2020 Not Detected  Not Detected Final    Comment: This nucleic acid amplification test was developed and its performance  characteristics determined by [employer]. Nucleic acid  amplification tests include PCR and TMA. This test has not been FDA  cleared or approved. This test has been authorized by FDA under an  Emergency Use Authorization (EUA). This test is only authorized for  the duration of time the declaration that circumstances exist  justifying the authorization of the emergency use of in vitro  diagnostic tests for detection of SARS-CoV-2 virus and/or diagnosis  of COVID-19 infection under section 564(b)(1) of the Act, 21 U.S.C.  360bbb-3(b) (1), unless the authorization is terminated or revoked  sooner.  When diagnostic testing is negative, the possibility of a false  negative result should be considered in the context of a patient's  recent exposures and the presence of clinical signs and symptoms  consistent with COVID-19. An individual without symptoms of COVID-19  and who is not shedding SARS-CoV-2 virus would                            expect to have a  negative (not detected) result in this assay.   • COVID [facility] PRIORITY 10/29/2020 Comment   Final    Received   Admission on 08/27/2020, Discharged on 08/27/2020   Component Date Value Ref Range Status   • SARS-CoV-2, ANAIS 08/24/2020 Not Detected  Not Detected Final    This test was developed and its performance characteristics  determined  by [facility]. This test has not been FDA cleared or  approved. This test has been authorized by FDA under an Emergency Use  Authorization (EUA). This test is only authorized for the duration of  time the declaration that circumstances exist justifying the  authorization of the emergency use of in vitro diagnostic tests for  detection of SARS-CoV-2 virus and/or diagnosis of COVID-19 infection  under section 564(b)(1) of the Act, 21 U.S.C. 360bbb-3(b)(1), unless  the authorization is terminated or revoked sooner.  When diagnostic testing is negative, the possibility of a false  negative result should be considered in the context of a patient's  recent exposures and the presence of clinical signs and symptoms  consistent with COVID-19. An individual without symptoms of COVID-19  and who is not shedding SARS-CoV-2 virus would expect to have a  negative (not detected) result in this assay.   • COVID [facility] PRIORITY 08/24/2020 Comment   Final    Received   • Case Report 08/27/2020    Final                    Value:Surgical Pathology Report                         Case: [id number]                                  Authorizing Provider:  Tom Davis MD        Collected:           08/27/2020 02:44 PM          Ordering Location:     [facility]             Received:            08/28/2020 07:02 AM                                 [facility]                                                     Pathologist:           Kunal Dia MD                                                           Specimen:    Large Intestine, colonic mucosa bx                                                        • Final Diagnosis 08/27/2020    Final                    Value:This result contains rich text formatting which cannot be displayed here.   Lab on 08/19/2020   Component Date Value Ref Range Status   • C. Difficile Toxins by PCR 08/19/2020 Negative  Negative Final   • Campylobacter 08/19/2020 Not  Detected  Not Detected Final   • Plesiomonas shigelloides 08/19/2020 Not Detected  Not Detected Final   • Salmonella 08/19/2020 Not Detected  Not Detected Final   • Vibrio 08/19/2020 Not Detected  Not Detected Final   • Vibrio cholerae 08/19/2020 Not Detected  Not Detected Final   • Yersinia enterocolitica 08/19/2020 Not Detected  Not Detected Final   • Enteroaggregative E. coli (EAEC) 08/19/2020 Not Detected  Not Detected Final   • Enteropathogenic E. coli (EPEC) 08/19/2020 Not Detected  Not Detected Final   • Enterotoxigenic E. coli (ETEC) lt/* 08/19/2020 Not Detected  Not Detected Final   • Shiga-like toxin-producing E. coli* 08/19/2020 Not Detected  Not Detected Final   • E. coli O157 08/19/2020 Not Detected  Not Detected Final   • Shigella/Enteroinvasive E. coli (E* 08/19/2020 Not Detected  Not Detected Final   • Cryptosporidium 08/19/2020 Not Detected  Not Detected Final   • Cyclospora cayetanensis 08/19/2020 Not Detected  Not Detected Final   • Entamoeba histolytica 08/19/2020 Not Detected  Not Detected Final   • Giardia lamblia 08/19/2020 Not Detected  Not Detected Final   • Adenovirus F40/41 08/19/2020 Not Detected  Not Detected Final   • Astrovirus 08/19/2020 Not Detected  Not Detected Final   • Norovirus GI/GII 08/19/2020 Not Detected  Not Detected Final   • Rotavirus A 08/19/2020 Not Detected  Not Detected Final   • Sapovirus (I, II, IV or V) 08/19/2020 Not Detected  Not Detected Final   Admission on 07/27/2020, Discharged on 07/27/2020   Component Date Value Ref Range Status   • SARS-CoV-2, ANAIS 07/24/2020 Not Detected  Not Detected Final    This test was developed and its performance characteristics determined  by [employer]. This test has not been FDA cleared or  approved. This test has been authorized by FDA under an Emergency Use  Authorization (EUA). This test is only authorized for the duration of  time the declaration that circumstances exist justifying the  authorization of the  emergency use of in vitro diagnostic tests for  detection of SARS-CoV-2 virus and/or diagnosis of COVID-19 infection  under section 564(b)(1) of the Act, 21 U.S.C. 360bbb-3(b)(1), unless  the authorization is terminated or revoked sooner.  When diagnostic testing is negative, the possibility of a false  negative result should be considered in the context of a patient's  recent exposures and the presence of clinical signs and symptoms  consistent with COVID-19. An individual without symptoms of COVID-19  and who is not shedding SARS-CoV-2 virus would expect to have a  negative (not detected) result in this assay.   • COVID [facility] PRIORITY 07/24/2020 Comment   Final    Received   • Case Report 07/27/2020    Final                    Value:Surgical Pathology Report                         Case: [id number]                                  Authorizing Provider:  Tom Davis MD        Collected:           07/27/2020 12:52 PM          Ordering Location:     [facility]             Received:            07/28/2020 07:21 AM                                 [facility]                                                     Pathologist:           Karl Lee MD                                                            Specimens:   1) - Gastric, Antrum                                                                                2) - Small Intestine                                                                                3) - Esophagus, Distal                                                                    • Final Diagnosis 07/27/2020    Final                    Value:This result contains rich text formatting which cannot be displayed here.   Admission on 07/26/2020, Discharged on 07/26/2020   Component Date Value Ref Range Status   • Glucose 07/26/2020 89  65 - 99 mg/dL Final   • BUN 07/26/2020 11  6 - 20 mg/dL Final   • Creatinine 07/26/2020 0.68  0.57 - 1.00 mg/dL Final   • Sodium 07/26/2020 140   136 - 145 mmol/L Final   • Potassium 07/26/2020 3.6  3.5 - 5.2 mmol/L Final   • Chloride 07/26/2020 105  98 - 107 mmol/L Final   • CO2 07/26/2020 25.0  22.0 - 29.0 mmol/L Final   • Calcium 07/26/2020 9.0  8.6 - 10.5 mg/dL Final   • Total Protein 07/26/2020 6.4  6.0 - 8.5 g/dL Final   • Albumin 07/26/2020 4.40  3.50 - 5.20 g/dL Final   • ALT (SGPT) 07/26/2020 9  1 - 33 U/L Final   • AST (SGOT) 07/26/2020 14  1 - 32 U/L Final   • Alkaline Phosphatase 07/26/2020 66  39 - 117 U/L Final   • Total Bilirubin 07/26/2020 0.5  0.0 - 1.2 mg/dL Final   • eGFR Non African Amer 07/26/2020 91  >60 mL/min/1.73 Final   • Globulin 07/26/2020 2.0  gm/dL Final   • A/G Ratio 07/26/2020 2.2  g/dL Final   • BUN/Creatinine Ratio 07/26/2020 16.2  7.0 - 25.0 Final   • Anion Gap 07/26/2020 10.0  5.0 - 15.0 mmol/L Final   • Extra Tube 07/26/2020 hold for add-on   Final    Auto resulted   • Extra Tube 07/26/2020 Hold for add-ons.   Final    Auto resulted.   • Extra Tube 07/26/2020 hold for add-on   Final    Auto resulted   • WBC 07/26/2020 9.21  3.40 - 10.80 10*3/mm3 Final   • RBC 07/26/2020 4.40  3.77 - 5.28 10*6/mm3 Final   • Hemoglobin 07/26/2020 13.7  12.0 - 15.9 g/dL Final   • Hematocrit 07/26/2020 40.7  34.0 - 46.6 % Final   • MCV 07/26/2020 92.5  79.0 - 97.0 fL Final   • MCH 07/26/2020 31.1  26.6 - 33.0 pg Final   • MCHC 07/26/2020 33.7  31.5 - 35.7 g/dL Final   • RDW 07/26/2020 12.4  12.3 - 15.4 % Final   • RDW-SD 07/26/2020 42.5  37.0 - 54.0 fl Final   • MPV 07/26/2020 9.3  6.0 - 12.0 fL Final   • Platelets 07/26/2020 422  140 - 450 10*3/mm3 Final   • Neutrophil % 07/26/2020 51.0  42.7 - 76.0 % Final   • Lymphocyte % 07/26/2020 36.0  19.6 - 45.3 % Final   • Monocyte % 07/26/2020 8.4  5.0 - 12.0 % Final   • Eosinophil % 07/26/2020 3.7  0.3 - 6.2 % Final   • Basophil % 07/26/2020 0.7  0.0 - 1.5 % Final   • Immature Grans % 07/26/2020 0.2  0.0 - 0.5 % Final   • Neutrophils, Absolute 07/26/2020 4.70  1.70 - 7.00 10*3/mm3 Final   •  Lymphocytes, Absolute 07/26/2020 3.32* 0.70 - 3.10 10*3/mm3 Final   • Monocytes, Absolute 07/26/2020 0.77  0.10 - 0.90 10*3/mm3 Final   • Eosinophils, Absolute 07/26/2020 0.34  0.00 - 0.40 10*3/mm3 Final   • Basophils, Absolute 07/26/2020 0.06  0.00 - 0.20 10*3/mm3 Final   • Immature Grans, Absolute 07/26/2020 0.02  0.00 - 0.05 10*3/mm3 Final   • nRBC 07/26/2020 0.0  0.0 - 0.2 /100 WBC Final   • Color, UA 07/26/2020 Yellow  Yellow, Straw, Dark Yellow, Ana M Final   • Appearance, UA 07/26/2020 Clear  Clear Final   • pH, UA 07/26/2020 7.0  5.0 - 9.0 Final   • Specific Gravity, UA 07/26/2020 1.010  1.003 - 1.030 Final   • Glucose, UA 07/26/2020 Negative  Negative Final   • Ketones, UA 07/26/2020 Trace* Negative Final   • Bilirubin, UA 07/26/2020 Negative  Negative Final   • Blood, UA 07/26/2020 Small (1+)* Negative Final   • Protein, UA 07/26/2020 Negative  Negative Final   • Leuk Esterase, UA 07/26/2020 Negative  Negative Final   • Nitrite, UA 07/26/2020 Negative  Negative Final   • Urobilinogen, UA 07/26/2020 0.2 E.U./dL  0.2 - 1.0 E.U./dL Final   • Lactate 07/26/2020 1.2  0.5 - 2.0 mmol/L Final   • Lipase 07/26/2020 24  13 - 60 U/L Final   • HCG Qualitative 07/26/2020 Negative  Negative Final   • RBC, UA 07/26/2020 3-5* None Seen /HPF Final   • WBC, UA 07/26/2020 None Seen  None Seen, 0-2, 3-5 /HPF Final   • Bacteria, UA 07/26/2020 None Seen  None Seen /HPF Final   • Squamous Epithelial Cells, UA 07/26/2020 None Seen  None Seen, 0-2 /HPF Final   • Hyaline Casts, UA 07/26/2020 None Seen  None Seen /LPF Final   • Methodology 07/26/2020 Automated Microscopy   Final   Admission on 07/05/2020, Discharged on 07/05/2020   Component Date Value Ref Range Status   • Glucose 07/05/2020 97  65 - 99 mg/dL Final   • BUN 07/05/2020 6  6 - 20 mg/dL Final   • Creatinine 07/05/2020 0.65  0.57 - 1.00 mg/dL Final   • Sodium 07/05/2020 140  136 - 145 mmol/L Final   • Potassium 07/05/2020 3.5  3.5 - 5.2 mmol/L Final   • Chloride  07/05/2020 103  98 - 107 mmol/L Final   • CO2 07/05/2020 28.0  22.0 - 29.0 mmol/L Final   • Calcium 07/05/2020 8.5* 8.6 - 10.5 mg/dL Final   • Total Protein 07/05/2020 6.3  6.0 - 8.5 g/dL Final   • Albumin 07/05/2020 4.00  3.50 - 5.20 g/dL Final   • ALT (SGPT) 07/05/2020 18  1 - 33 U/L Final   • AST (SGOT) 07/05/2020 21  1 - 32 U/L Final   • Alkaline Phosphatase 07/05/2020 61  39 - 117 U/L Final   • Total Bilirubin 07/05/2020 0.3  0.2 - 1.2 mg/dL Final   • eGFR Non African Amer 07/05/2020 96  >60 mL/min/1.73 Final   • Globulin 07/05/2020 2.3  gm/dL Final   • A/G Ratio 07/05/2020 1.7  g/dL Final   • BUN/Creatinine Ratio 07/05/2020 9.2  7.0 - 25.0 Final   • Anion Gap 07/05/2020 9.0  5.0 - 15.0 mmol/L Final   • Lipase 07/05/2020 19  13 - 60 U/L Final   • Color, UA 07/05/2020 Yellow  Yellow, Straw, Dark Yellow, Ana M Final   • Appearance, UA 07/05/2020 Clear  Clear Final   • pH, UA 07/05/2020 8.0  5.0 - 9.0 Final   • Specific Gravity, UA 07/05/2020 1.015  1.003 - 1.030 Final   • Glucose, UA 07/05/2020 Negative  Negative Final   • Ketones, UA 07/05/2020 Negative  Negative Final   • Bilirubin, UA 07/05/2020 Negative  Negative Final   • Blood, UA 07/05/2020 Negative  Negative Final   • Protein, UA 07/05/2020 Negative  Negative Final   • Leuk Esterase, UA 07/05/2020 Negative  Negative Final   • Nitrite, UA 07/05/2020 Negative  Negative Final   • Urobilinogen, UA 07/05/2020 0.2 E.U./dL  0.2 - 1.0 E.U./dL Final   • Extra Tube 07/05/2020 hold for add-on   Final    Auto resulted   • Extra Tube 07/05/2020 Hold for add-ons.   Final    Auto resulted.   • Extra Tube 07/05/2020 hold for add-on   Final    Auto resulted   • Extra Tube 07/05/2020 Hold for add-ons.   Final    Auto resulted.   • WBC 07/05/2020 11.22* 3.40 - 10.80 10*3/mm3 Final   • RBC 07/05/2020 4.01  3.77 - 5.28 10*6/mm3 Final   • Hemoglobin 07/05/2020 12.6  12.0 - 15.9 g/dL Final   • Hematocrit 07/05/2020 37.7  34.0 - 46.6 % Final   • MCV 07/05/2020 94.0  79.0 - 97.0  fL Final   • MCH 07/05/2020 31.4  26.6 - 33.0 pg Final   • MCHC 07/05/2020 33.4  31.5 - 35.7 g/dL Final   • RDW 07/05/2020 13.1  12.3 - 15.4 % Final   • RDW-SD 07/05/2020 45.1  37.0 - 54.0 fl Final   • MPV 07/05/2020 9.2  6.0 - 12.0 fL Final   • Platelets 07/05/2020 368  140 - 450 10*3/mm3 Final   • Neutrophil % 07/05/2020 50.9  42.7 - 76.0 % Final   • Lymphocyte % 07/05/2020 33.2  19.6 - 45.3 % Final   • Monocyte % 07/05/2020 10.4  5.0 - 12.0 % Final   • Eosinophil % 07/05/2020 4.1  0.3 - 6.2 % Final   • Basophil % 07/05/2020 0.5  0.0 - 1.5 % Final   • Immature Grans % 07/05/2020 0.9* 0.0 - 0.5 % Final   • Neutrophils, Absolute 07/05/2020 5.71  1.70 - 7.00 10*3/mm3 Final   • Lymphocytes, Absolute 07/05/2020 3.72* 0.70 - 3.10 10*3/mm3 Final   • Monocytes, Absolute 07/05/2020 1.17* 0.10 - 0.90 10*3/mm3 Final   • Eosinophils, Absolute 07/05/2020 0.46* 0.00 - 0.40 10*3/mm3 Final   • Basophils, Absolute 07/05/2020 0.06  0.00 - 0.20 10*3/mm3 Final   • Immature Grans, Absolute 07/05/2020 0.10* 0.00 - 0.05 10*3/mm3 Final   • nRBC 07/05/2020 0.0  0.0 - 0.2 /100 WBC Final      XR Chest 2 View  Narrative: EXAM DESCRIPTION:     XR CHEST 2 VW    CLINICAL HISTORY:     50 years  Female  cough and left chest pain., R05 Cough    COMPARISON:     March 2, 2020    TECHNIQUE:     Two views-PA and lateral radiographs of the chest    FINDINGS:     There are emphysematous changes noted with hyperaeration of the  lungs. There is no evidence of an acute superimposed infiltrate.  The cardiac silhouette and pulmonary vasculature are within  normal limits. There are no pleural effusions.  Impression: 1. Stable appearance the chest without radiographic evidence of  acute cardiopulmonary disease.    Electronically signed by:  Lizbeth Rehman MD  10/29/2020 11:17 AM  CDT Workstation: [id number]    @[email or web address]@  Immunization History   Administered Date(s) Administered   • Flulaval/Fluarix/Fluzone Quad 11/30/2015, 09/05/2020   • Influenza Quad Vaccine  "(Inpatient) 09/13/2011, 11/08/2016   • Influenza TIV (IM) 10/07/2010   • Influenza, Unspecified 09/03/2020   • Pneumococcal Polysaccharide (PPSV23) 11/30/2015   • Tdap 10/07/2010   • flucelvax quad pfs =>4 YRS 09/19/2019       The following portions of the patient's history were reviewed and updated as appropriate: allergies, current medications, past family history, past medical history, past social history, past surgical history and problem list.        Physical Exam  /94 (BP Location: Left arm, Patient Position: Sitting)   Pulse 81   Ht 166.4 cm (65.5\")   Wt 47.8 kg (105 lb 4.8 oz)   LMP  (LMP Unknown)   BMI 17.26 kg/m²     Physical Exam  Vitals signs and nursing note reviewed.   Constitutional:       Appearance: Normal appearance.   Neurological:      Mental Status: She is alert.   Psychiatric:         Mood and Affect: Mood normal.         Behavior: Behavior normal.         Assessment/Plan    Diagnosis Plan   1. Tinea capitis     2. Itchy scalp     3. Chronic obstructive pulmonary disease, unspecified COPD type (CMS/HCC)     4. Irritable bowel syndrome with both constipation and diarrhea             -recommend labwork  -hep C screening - hep C antibody test   -rash on scalp -improving with nizoral shampoo and keflex abx.   -cough/acute bronchitis/URI - improved with abx. recently went to . Pt tested negative for COVD19   -HTN/tachcycardia -controlledon toprol XL 50 mg daily.   -schedule mammogram screening    -moderate protein malnutrition -  Gave high calorie diet information   -JELENA - pt admits to smoking marijuana. Recommend pt continue that since it calms her down but also discussed about potential side effects if long term use   -thrombocytosis /polycythemia  continue to monitor. She is seeing Hematology   -gluten sensitivity -recommend gluten free diet   -COPD/COPD exacerbation- Pulmonology following duo nebs stopped now on Anoro daily.  Albuterol PRN.    -vitamin B12 deficiency  - b12 " injections weekly  -tobacco user - counseled to quit smoking >5 minutes. She could not tolerate chantix  urged the importance of quitting smoking.   Insurance would not cover nicotine patch and gum. Recommend pt call [phone] start on nicotine patch 7 mg daily.  has cut down on smoking   -abdominal pain/gastritis/IBS /GERD/celiac - Gastroenterology following. On PPI prilosec 20 mg q daily  OFF prucalopride 2 mg daily. She is to followup regarding abodminal issues on bentyl 40 mg every 6 hours PRN.  advised pt to limit elavil if possible since it can cause constipation. Has upcoming EGD soon with GI.    -nausea/ vomting - continue zofran PRN  -allergic rhinitis - flonase nasal spray and singulair along with zyrtec   -vitamin D deficiency - vitamin D once a week   -pt has upcoming mammogram soon.    -tremor of head/concussion - she has seen neurology with Dr. Chen. Had MRI of brain. Recommended 2nd opinon with Dr. Manjarrez She also has family history of Friedreich's ataxia. On elavail 25 mg at bedtime. Continue with Neurology followup with Dr. Manjarrez    -advised pt to go to ER or call 911 if symptoms worrisome or severe  -advised pt to be  Safe and call with questions and concerns  -advised to followup with all referrals and Specialist  -advised pt to be safe during COVID -19 pandemic   -total time with pt >25 minutes   -recheck in 3 months         This document has been electronically signed by Xavier Wick MD on November 9, 2020 08:34 CST

## 2020-11-05 ENCOUNTER — OFFICE VISIT (OUTPATIENT)
Dept: FAMILY MEDICINE CLINIC | Facility: CLINIC | Age: 50
End: 2020-11-05

## 2020-11-05 VITALS
OXYGEN SATURATION: 98 % | WEIGHT: 108.4 LBS | DIASTOLIC BLOOD PRESSURE: 60 MMHG | HEART RATE: 80 BPM | TEMPERATURE: 98.6 F | HEIGHT: 66 IN | SYSTOLIC BLOOD PRESSURE: 104 MMHG | BODY MASS INDEX: 17.42 KG/M2

## 2020-11-05 DIAGNOSIS — Z00.00 MEDICARE ANNUAL WELLNESS VISIT, SUBSEQUENT: Primary | ICD-10-CM

## 2020-11-05 PROCEDURE — G0439 PPPS, SUBSEQ VISIT: HCPCS | Performed by: FAMILY MEDICINE

## 2020-11-05 NOTE — PATIENT INSTRUCTIONS
Medicare Wellness  Personal Prevention Plan of Service     Date of Office Visit:  2020  Encounter Provider:  Xavier Wick MD  Place of Service:  [facility]  Patient Name: Carla Richard  :  1970    As part of the Medicare Wellness portion of your visit today, we are providing you with this personalized preventive plan of services (PPPS). This plan is based upon recommendations of the [place] Preventive Services Task Force (USPSTF) and the Advisory Committee on Immunization Practices (ACIP).    This lists the preventive care services that should be considered, and provides dates of when you are due. Items listed as completed are up-to-date and do not require any further intervention.    Health Maintenance   Topic Date Due   • HEPATITIS C SCREENING  2019   • LIPID PANEL  2020   • ANNUAL WELLNESS VISIT  2020   • TDAP/TD VACCINES (2 - Td) 2029 (Originally 10/7/2020)   • MAMMOGRAM  2022   • COLONOSCOPY  2025   • Pneumococcal Vaccine 0-64  Completed   • INFLUENZA VACCINE  Completed   • PAP SMEAR  Discontinued   • ZOSTER VACCINE  Discontinued       No orders of the defined types were placed in this encounter.      Return in about 1 year (around 2021) for Medicare Wellness.

## 2020-11-06 RX ORDER — IPRATROPIUM BROMIDE AND ALBUTEROL SULFATE 2.5; .5 MG/3ML; MG/3ML
SOLUTION RESPIRATORY (INHALATION)
Qty: 360 ML | Refills: 3 | OUTPATIENT
Start: 2020-11-06

## 2020-11-09 ENCOUNTER — OFFICE VISIT (OUTPATIENT)
Dept: FAMILY MEDICINE CLINIC | Facility: CLINIC | Age: 50
End: 2020-11-09

## 2020-11-09 VITALS
WEIGHT: 105.3 LBS | BODY MASS INDEX: 16.92 KG/M2 | HEIGHT: 66 IN | DIASTOLIC BLOOD PRESSURE: 94 MMHG | HEART RATE: 81 BPM | SYSTOLIC BLOOD PRESSURE: 129 MMHG

## 2020-11-09 DIAGNOSIS — J44.9 CHRONIC OBSTRUCTIVE PULMONARY DISEASE, UNSPECIFIED COPD TYPE (HCC): ICD-10-CM

## 2020-11-09 DIAGNOSIS — K58.2 IRRITABLE BOWEL SYNDROME WITH BOTH CONSTIPATION AND DIARRHEA: ICD-10-CM

## 2020-11-09 DIAGNOSIS — L29.9 ITCHY SCALP: ICD-10-CM

## 2020-11-09 DIAGNOSIS — B35.0 TINEA CAPITIS: Primary | ICD-10-CM

## 2020-11-09 PROCEDURE — 99443 PR PHYS/QHP TELEPHONE EVALUATION 21-30 MIN: CPT | Performed by: FAMILY MEDICINE

## 2020-12-01 ENCOUNTER — OFFICE VISIT (OUTPATIENT)
Dept: GASTROENTEROLOGY | Facility: CLINIC | Age: 50
End: 2020-12-01

## 2020-12-01 VITALS
HEART RATE: 88 BPM | WEIGHT: 104.2 LBS | BODY MASS INDEX: 16.74 KG/M2 | HEIGHT: 66 IN | SYSTOLIC BLOOD PRESSURE: 109 MMHG | DIASTOLIC BLOOD PRESSURE: 77 MMHG

## 2020-12-01 DIAGNOSIS — K90.0 CELIAC DISEASE: Primary | ICD-10-CM

## 2020-12-01 DIAGNOSIS — K58.2 IRRITABLE BOWEL SYNDROME WITH BOTH CONSTIPATION AND DIARRHEA: ICD-10-CM

## 2020-12-01 PROBLEM — K83.1 OBSTRUCTION OF BILE DUCT: Status: ACTIVE | Noted: 2020-12-01

## 2020-12-01 PROBLEM — K21.9 GASTRIC REFLUX: Status: ACTIVE | Noted: 2020-12-01

## 2020-12-01 PROBLEM — J45.909 ASTHMA: Status: ACTIVE | Noted: 2020-12-01

## 2020-12-01 PROBLEM — N30.10 CHRONIC INTERSTITIAL CYSTITIS: Status: ACTIVE | Noted: 2020-12-01

## 2020-12-01 PROBLEM — R07.0 THROAT PAIN: Status: ACTIVE | Noted: 2020-12-01

## 2020-12-01 PROBLEM — M79.7 FIBROMYALGIA: Status: ACTIVE | Noted: 2020-12-01

## 2020-12-01 PROBLEM — R31.29 MICROSCOPIC HEMATURIA: Status: ACTIVE | Noted: 2020-12-01

## 2020-12-01 PROCEDURE — 99213 OFFICE O/P EST LOW 20 MIN: CPT | Performed by: NURSE PRACTITIONER

## 2020-12-01 NOTE — PROGRESS NOTES
Chief Complaint   Patient presents with   • Celiac Disease   • Irritable Bowel Syndrome       Subjective    Carla Richard is a 50 y.o. female. she is here today for follow-up.    50-year-old female presents for recheck regarding celiac disease and irritable bowel syndrome with variable symptoms.  She has done very well with gluten-free diet however had cross-contamination Saturday and had severe pain cramping and constipation.  Reports overall since going gluten-free bowel movements have been more constipated has not needed Trulance on a regular basis.    Celiac Disease  This is a new problem. The current episode started more than 1 month ago. The problem occurs intermittently. Associated symptoms include abdominal pain, anorexia, fatigue, nausea and vomiting. Pertinent negatives include no arthralgias, change in bowel habit, chest pain, chills, congestion, coughing, diaphoresis, fever, rash, sore throat, urinary symptoms, vertigo or visual change.   Irritable Bowel Syndrome  This is a chronic problem. The current episode started more than 1 year ago. The problem occurs daily. The problem has been waxing and waning. Associated symptoms include abdominal pain, anorexia, fatigue, nausea and vomiting. Pertinent negatives include no arthralgias, change in bowel habit, chest pain, chills, congestion, coughing, diaphoresis, fever, rash, sore throat, urinary symptoms, vertigo or visual change.            The following portions of the patient's history were reviewed and updated as appropriate:   Past Medical History:   Diagnosis Date   • Asthma    • Cancer (CMS/HCC)     cervical   • Colon polyp    • COPD (chronic obstructive pulmonary disease) (CMS/HCC)    • Crohn's disease (CMS/HCC)    • Diverticulitis of colon    • Elevated cholesterol    • Essential hypertension 4/15/2020   • GERD (gastroesophageal reflux disease)    • History of transfusion    • Irritable bowel syndrome    • Kidney calculus    • Pancreatitis     • Sphincter of Oddi dysfunction      Past Surgical History:   Procedure Laterality Date   • BACK SURGERY      C5-6   • COLONOSCOPY     • COLONOSCOPY N/A 10/18/2019    Procedure: COLONOSCOPY;  Surgeon: Tom Davis MD;  Location: [facility] ENDOSCOPY;  Service: Gastroenterology   • COLONOSCOPY N/A 8/27/2020    Procedure: COLONOSCOPY;  Surgeon: Tom Davis MD;  Location: [facility] ENDOSCOPY;  Service: Gastroenterology;  Laterality: N/A;   • ENDOSCOPY N/A 10/18/2019    Procedure: ESOPHAGOGASTRODUODENOSCOPY;  Surgeon: Tom Davis MD;  Location: [facility] ENDOSCOPY;  Service: Gastroenterology   • ENDOSCOPY N/A 7/27/2020    Procedure: ESOPHAGOGASTRODUODENOSCOPY;  Surgeon: Tom Davis MD;  Location: [facility] ENDOSCOPY;  Service: Gastroenterology;  Laterality: N/A;   • HYSTERECTOMY     • SHOULDER ARTHROSCOPY W/ ROTATOR CUFF REPAIR Right    • TOE SURGERY      left small toe    • TONSILECTOMY, ADENOIDECTOMY, BILATERAL MYRINGOTOMY AND TUBES     • TONSILLECTOMY     • UPPER GASTROINTESTINAL ENDOSCOPY       Family History   Problem Relation Age of Onset   • Inflammatory bowel disease Mother    • Arthritis Mother    • Diabetes Mother    • Hypertension Mother    • Hyperlipidemia Mother    • Obesity Mother    • Osteoporosis Mother    • Heart disease Father    • Hyperlipidemia Father    • Diabetes Sister    • Liver disease Daughter    • Mental illness Daughter    • Obesity Daughter    • Diabetes Maternal Grandmother    • Cancer Maternal Grandmother         lung   • Cancer Other         lung   • Heart disease Other      OB History    No obstetric history on file.       Prior to Admission medications    Medication Sig Start Date End Date Taking? Authorizing Provider   albuterol sulfate  (90 Base) MCG/ACT inhaler Inhale 2 puffs Every 4 (Four) Hours As Needed for Wheezing. 10/28/20  Yes Iliana Jacobson MD   amitriptyline (ELAVIL) 25 MG tablet Take 25 mg by mouth Every Night. 3/6/20  Yes ProviderByron MD    cetirizine (zyrTEC) 10 MG tablet Take 1 tablet by mouth Daily. 9/25/19  Yes Iliana Jacobson MD   Cyanocobalamin (B-12 COMPLIANCE INJECTION IJ) Inject  as directed.   Yes ProviderByron MD   fluticasone (FLONASE) 50 MCG/ACT nasal spray 2 sprays into the nostril(s) as directed by provider Daily. 10/28/20  Yes Iliana Jacobson MD   ketoconazole (Nizoral) 2 % shampoo Apply  topically to the appropriate area as directed 2 (Two) Times a Week. 11/2/20  Yes Xavier Wick MD   montelukast (SINGULAIR) 10 MG tablet Take 1 tablet by mouth Every Night. 10/17/19  Yes Xavier Wick MD   omeprazole (priLOSEC) 20 MG capsule Take 1 capsule by mouth Daily. 6/23/20  Yes Xavier Wick MD   ondansetron ODT (ZOFRAN-ODT) 4 MG disintegrating tablet Place 1 tablet on the tongue 4 (Four) Times a Day As Needed for Nausea or Vomiting. 7/26/20  Yes Gregory Kaur MD   umeclidinium-vilanterol (ANORO ELLIPTA) 62.5-25 MCG/INH aerosol powder  inhaler Inhale 1 puff Daily. 10/28/20  Yes Iliana Jacobson MD   Plecanatide (Trulance) 3 MG tablet Take 1 tablet by mouth Daily. 8/18/20   Tom Davis MD     Allergies   Allergen Reactions   • Nsaids Swelling and GI Intolerance   • Azithromycin Swelling   • Ciprofloxacin Hives   • Doxycycline Swelling   • Other Other (See Comments)     nicotine patch   Caused body twitching/seizures   • Levofloxacin Rash   • Phenergan [Promethazine Hcl] GI Intolerance     Social History     Socioeconomic History   • Marital status: Single     Spouse name: Not on file   • Number of children: Not on file   • Years of education: Not on file   • Highest education level: Not on file   Tobacco Use   • Smoking status: Current Every Day Smoker     Packs/day: 1.00     Years: 40.00     Pack years: 40.00     Types: Cigarettes   • Smokeless tobacco: Never Used   Substance and Sexual Activity   • Alcohol use: No     Frequency: Never   • Drug use: Yes     Types: Marijuana     Comment: nightly   • Sexual  "activity: Defer       Review of Systems  Review of Systems   Constitutional: Positive for appetite change, fatigue and unexpected weight change (down 3 pounds ). Negative for activity change, chills, diaphoresis and fever.   HENT: Negative for congestion, sore throat and trouble swallowing.    Respiratory: Negative for cough and shortness of breath.    Cardiovascular: Negative for chest pain.   Gastrointestinal: Positive for abdominal pain, anorexia, constipation, diarrhea, nausea and vomiting. Negative for abdominal distention, anal bleeding, blood in stool, change in bowel habit and rectal pain.   Musculoskeletal: Negative for arthralgias.   Skin: Negative for pallor and rash.   Neurological: Negative for vertigo and light-headedness.        /77 (BP Location: Left arm)   Pulse 88   Ht 166.4 cm (65.5\")   Wt 47.3 kg (104 lb 3.2 oz)   LMP  (LMP Unknown)   BMI 17.08 kg/m²     Objective    Physical Exam  Constitutional:       General: She is not in acute distress.     Appearance: Normal appearance. She is well-developed.   HENT:      Head: Normocephalic and atraumatic.   Neck:      Musculoskeletal: Normal range of motion and neck supple.      Thyroid: No thyromegaly.   Cardiovascular:      Rate and Rhythm: Normal rate and regular rhythm.      Heart sounds: Normal heart sounds.   Pulmonary:      Effort: Pulmonary effort is normal.      Breath sounds: Normal breath sounds. No wheezing, rhonchi or rales.   Abdominal:      General: Bowel sounds are normal. There is no distension.      Palpations: Abdomen is soft. Abdomen is not rigid.      Tenderness: There is generalized abdominal tenderness. There is no guarding.      Hernia: No hernia is present.   Lymphadenopathy:      Cervical: No cervical adenopathy.   Skin:     General: Skin is warm and dry.      Coloration: Skin is not pale.      Findings: No rash.   Neurological:      Mental Status: She is alert and oriented to person, place, and time.   Psychiatric:    "      Speech: Speech normal.         Behavior: Behavior is cooperative.       Admission on 10/29/2020, Discharged on 10/29/2020   Component Date Value Ref Range Status   • SARS-CoV-2, ANAIS 10/29/2020 Not Detected  Not Detected Final    Comment: This nucleic acid amplification test was developed and its performance  characteristics determined by [employer]. Nucleic acid  amplification tests include PCR and TMA. This test has not been FDA  cleared or approved. This test has been authorized by FDA under an  Emergency Use Authorization (EUA). This test is only authorized for  the duration of time the declaration that circumstances exist  justifying the authorization of the emergency use of in vitro  diagnostic tests for detection of SARS-CoV-2 virus and/or diagnosis  of COVID-19 infection under section 564(b)(1) of the Act, 21 U.S.C.  360bbb-3(b) (1), unless the authorization is terminated or revoked  sooner.  When diagnostic testing is negative, the possibility of a false  negative result should be considered in the context of a patient's  recent exposures and the presence of clinical signs and symptoms  consistent with COVID-19. An individual without symptoms of COVID-19  and who is not shedding SARS-CoV-2 virus would                            expect to have a  negative (not detected) result in this assay.   • COVID [facility] PRIORITY 10/29/2020 Comment   Final    Received     Assessment/Plan      1. Celiac disease    2. Irritable bowel syndrome with both constipation and diarrhea    .   Recommend she continue gluten-free diet.  Recommend dietary supplements with boost or Ensure that are gluten-free to help boost daily calories and increase weight.  Continue Trulance as needed for constipation and Bentyl as needed for flares of cramping or diarrhea.    Orders placed during this encounter include:  No orders of the defined types were placed in this encounter.      * Surgery not found *    Review and/or summary  of lab tests, radiology, procedures, medications. Review and summary of old records and obtaining of history. The risks and benefits of my recommendations, as well as other treatment options were discussed with the patient today. Questions were answered.    No orders of the defined types were placed in this encounter.      Follow-up: Return in about 3 months (around 3/1/2021).          This document has been electronically signed by CROW Dudley on December 1, 2020 16:35 CST             Results for orders placed or performed during the hospital encounter of 10/29/20   COVID [facility] Priority - Swab, Anterior nasal    Specimen: Anterior nasal; Swab   Result Value Ref Range    COVID [facility] PRIORITY Comment    COVID-19,[facility] ROUTINE, NP/OP SWAB IN TRANSPORT MEDIA OR ESWAB 72 HR TAT - Swab, Anterior nasal    Specimen: Anterior nasal; Swab   Result Value Ref Range    SARS-CoV-2, ANAIS Not Detected Not Detected   Results for orders placed or performed during the hospital encounter of 08/27/20   COVID [facility] Priority - Swab, Nasopharynx    Specimen: Nasopharynx; Swab   Result Value Ref Range    COVID [facility] PRIORITY Comment    COVID-19,[facility] ROUTINE, NP/OP SWAB IN TRANSPORT MEDIA OR ESWAB 72 HR TAT - Swab, Nasopharynx    Specimen: Nasopharynx; Swab   Result Value Ref Range    SARS-CoV-2, ANAIS Not Detected Not Detected   Tissue Pathology Exam    Specimen: Large Intestine; Tissue   Result Value Ref Range    Case Report       Surgical Pathology Report                         Case: [id number]                                  Authorizing Provider:  Tom Davis MD        Collected:           08/27/2020 02:44 PM          Ordering Location:     [facility]             Received:            08/28/2020 07:02 AM                                 [facility]                                                     Pathologist:           Kunal Dia MD                                                            Specimen:    Large Intestine, colonic mucosa bx                                                         Final Diagnosis       See Scanned Report       Results for orders placed or performed in visit on 08/19/20   Gastrointestinal Panel, PCR - Stool, Per Rectum    Specimen: Per Rectum; Stool   Result Value Ref Range    Campylobacter Not Detected Not Detected    Plesiomonas shigelloides Not Detected Not Detected    Salmonella Not Detected Not Detected    Vibrio Not Detected Not Detected    Vibrio cholerae Not Detected Not Detected    Yersinia enterocolitica Not Detected Not Detected    Enteroaggregative E. coli (EAEC) Not Detected Not Detected    Enteropathogenic E. coli (EPEC) Not Detected Not Detected    Enterotoxigenic E. coli (ETEC) lt/st Not Detected Not Detected    Shiga-like toxin-producing E. coli (STEC) stx1/stx2 Not Detected Not Detected    E. coli O157 Not Detected Not Detected    Shigella/Enteroinvasive E. coli (EIEC) Not Detected Not Detected    Cryptosporidium Not Detected Not Detected    Cyclospora cayetanensis Not Detected Not Detected    Entamoeba histolytica Not Detected Not Detected    Giardia lamblia Not Detected Not Detected    Adenovirus F40/41 Not Detected Not Detected    Astrovirus Not Detected Not Detected    Norovirus GI/GII Not Detected Not Detected    Rotavirus A Not Detected Not Detected    Sapovirus (I, II, IV or V) Not Detected Not Detected   Clostridium Difficile Toxin, PCR - Stool, Per Rectum    Specimen: Per Rectum; Stool   Result Value Ref Range    C. Difficile Toxins by PCR Negative Negative   Results for orders placed or performed during the hospital encounter of 07/27/20   COVID [facility] Priority - Swab, Nasopharynx    Specimen: Nasopharynx; Swab   Result Value Ref Range    COVID [facility] PRIORITY Comment    COVID-19,[facility] ROUTINE, NP/OP SWAB IN TRANSPORT MEDIA OR ESWAB 72 HR TAT - Swab, Nasopharynx    Specimen: Nasopharynx; Swab   Result Value Ref Range    SARS-CoV-2, ANAIS Not  Detected Not Detected   Tissue Pathology Exam    Specimen: A: Gastric, Antrum; Tissue    B: Small Intestine; Tissue    C: Esophagus, Distal; Tissue   Result Value Ref Range    Case Report       Surgical Pathology Report                         Case: [id number]                                  Authorizing Provider:  Tom Davis MD        Collected:           07/27/2020 12:52 PM          Ordering Location:     [facility]             Received:            07/28/2020 07:21 AM                                 [place] [facility]                                                     Pathologist:           Karl Lee MD                                                            Specimens:   1) - Gastric, Antrum                                                                                2) - Small Intestine                                                                                3) - Esophagus, Distal                                                                     Final Diagnosis       SEE SCANNED REPORT       Results for orders placed or performed during the hospital encounter of 07/26/20   Green Top (Gel)   Result Value Ref Range    Extra Tube Hold for add-ons.    Urinalysis, Microscopic Only - Urine, Clean Catch    Specimen: Urine, Clean Catch   Result Value Ref Range    RBC, UA 3-5 (A) None Seen /HPF    WBC, UA None Seen None Seen, 0-2, 3-5 /HPF    Bacteria, UA None Seen None Seen /HPF    Squamous Epithelial Cells, UA None Seen None Seen, 0-2 /HPF    Hyaline Casts, UA None Seen None Seen /LPF    Methodology Automated Microscopy    Urinalysis With Microscopic If Indicated (No Culture) - Urine, Clean Catch    Specimen: Urine, Clean Catch   Result Value Ref Range    Color, UA Yellow Yellow, Straw, Dark Yellow, Ana M    Appearance, UA Clear Clear    pH, UA 7.0 5.0 - 9.0    Specific Gravity, UA 1.010 1.003 - 1.030    Glucose, UA Negative Negative    Ketones, UA Trace (A) Negative    Bilirubin, UA  Negative Negative    Blood, UA Small (1+) (A) Negative    Protein, UA Negative Negative    Leuk Esterase, UA Negative Negative    Nitrite, UA Negative Negative    Urobilinogen, UA 0.2 E.U./dL 0.2 - 1.0 E.U./dL   CBC Auto Differential    Specimen: Blood   Result Value Ref Range    WBC 9.21 3.40 - 10.80 10*3/mm3    RBC 4.40 3.77 - 5.28 10*6/mm3    Hemoglobin 13.7 12.0 - 15.9 g/dL    Hematocrit 40.7 34.0 - 46.6 %    MCV 92.5 79.0 - 97.0 fL    MCH 31.1 26.6 - 33.0 pg    MCHC 33.7 31.5 - 35.7 g/dL    RDW 12.4 12.3 - 15.4 %    RDW-SD 42.5 37.0 - 54.0 fl    MPV 9.3 6.0 - 12.0 fL    Platelets 422 140 - 450 10*3/mm3    Neutrophil % 51.0 42.7 - 76.0 %    Lymphocyte % 36.0 19.6 - 45.3 %    Monocyte % 8.4 5.0 - 12.0 %    Eosinophil % 3.7 0.3 - 6.2 %    Basophil % 0.7 0.0 - 1.5 %    Immature Grans % 0.2 0.0 - 0.5 %    Neutrophils, Absolute 4.70 1.70 - 7.00 10*3/mm3    Lymphocytes, Absolute 3.32 (H) 0.70 - 3.10 10*3/mm3    Monocytes, Absolute 0.77 0.10 - 0.90 10*3/mm3    Eosinophils, Absolute 0.34 0.00 - 0.40 10*3/mm3    Basophils, Absolute 0.06 0.00 - 0.20 10*3/mm3    Immature Grans, Absolute 0.02 0.00 - 0.05 10*3/mm3    nRBC 0.0 0.0 - 0.2 /100 WBC   Lavender Top   Result Value Ref Range    Extra Tube hold for add-on    Light Blue Top   Result Value Ref Range    Extra Tube hold for add-on    hCG, Serum, Qualitative    Specimen: Blood   Result Value Ref Range    HCG Qualitative Negative Negative   Lipase    Specimen: Blood   Result Value Ref Range    Lipase 24 13 - 60 U/L   Lactic Acid, Plasma    Specimen: Blood   Result Value Ref Range    Lactate 1.2 0.5 - 2.0 mmol/L   Comprehensive Metabolic Panel    Specimen: Blood   Result Value Ref Range    Glucose 89 65 - 99 mg/dL    BUN 11 6 - 20 mg/dL    Creatinine 0.68 0.57 - 1.00 mg/dL    Sodium 140 136 - 145 mmol/L    Potassium 3.6 3.5 - 5.2 mmol/L    Chloride 105 98 - 107 mmol/L    CO2 25.0 22.0 - 29.0 mmol/L    Calcium 9.0 8.6 - 10.5 mg/dL    Total Protein 6.4 6.0 - 8.5 g/dL     Albumin 4.40 3.50 - 5.20 g/dL    ALT (SGPT) 9 1 - 33 U/L    AST (SGOT) 14 1 - 32 U/L    Alkaline Phosphatase 66 39 - 117 U/L    Total Bilirubin 0.5 0.0 - 1.2 mg/dL    eGFR Non African Amer 91 >60 mL/min/1.73    Globulin 2.0 gm/dL    A/G Ratio 2.2 g/dL    BUN/Creatinine Ratio 16.2 7.0 - 25.0    Anion Gap 10.0 5.0 - 15.0 mmol/L   Results for orders placed or performed during the hospital encounter of 07/05/20   Gold Top - [facility]   Result Value Ref Range    Extra Tube Hold for add-ons.      *Note: Due to a large number of results and/or encounters for the requested time period, some results have not been displayed. A complete set of results can be found in Results Review.

## 2020-12-01 NOTE — PATIENT INSTRUCTIONS
Celiac Disease    Celiac disease is an allergy to the protein called gluten. When a person with celiac disease eats a food that has gluten in it, his or her natural defense system (immune system) attacks the cells that line the small intestine. Over time, this reaction damages the small intestine and makes the small intestine unable to absorb nutrients from food.  Gluten is found in wheat, rye, and barley and in foods like pasta, pizza, and cereal. Celiac disease is also known as celiac sprue, nontropical sprue, and gluten-sensitive enteropathy.  What are the causes?  This condition is caused by a gene that is passed down through families (inherited).  What increases the risk?  You are more likely to develop this condition if you:  · Have a family member with the disease.  · Have an autoimmune condition, such as type 1 diabetes or a thyroid disorder.  · Are female.  What are the signs or symptoms?  Symptoms of this condition include:  · Recurring bloating and pain in the abdomen.  · Gas.  · Long-term (chronic) diarrhea.  · Pale, bad-smelling, greasy, or oily stool.  · Weight loss.  · Missed menstrual periods.  · Weakening bones (osteoporosis).  · Fatigue and weakness.  · Tingling or other signs of nerve damage.  · Depression.  · Poor appetite.  · Rash.  In some cases, there are no symptoms of this condition.  How is this diagnosed?  This condition is diagnosed with a physical exam and tests. Tests may include:  · Blood tests to check for nutritional deficiencies.  · Blood tests to look for evidence that the body is attacking cells in the small intestine.  · A test in which a sample of tissue is taken from the small intestine and examined under a microscope (biopsy).  · X-rays of the intestine.  · Stool tests.  · Tests to check for nutrient absorption from the intestine.  How is this treated?  There is no cure for this condition, but it may be managed with a gluten-free diet.  · Treatment may also involve avoiding  dairy foods, such as milk and cheese, because they are hard to digest.  · Most people who follow a gluten-free diet feel better and stop having symptoms.  · The intestine usually heals within 3 months to 2 years.  In a small percentage of people, this condition does not improve on the gluten-free diet. If your condition does not improve, more tests will be done. You will also need to work with a specialist in celiac disease to find the best treatment for you.  Follow these instructions at home:    · Follow instructions from your health care provider about diet.  · Monitor your body's response to the gluten-free diet. Write down any changes in your symptoms and changes in how you feel.  · If you decide to eat outside of the home, prepare your meal ahead of time, or make sure that the place where you are going has gluten-free options.  · Keep all follow-up visits as told by your health care provider. This is important.  · Suggest to family members that they get screened for early signs of the disease.  Contact a health care provider if:  · You continue to have symptoms, even when you are eating a gluten-free diet.  · You have trouble sticking to the gluten-free diet.  · You develop an itchy rash with groups of tiny blisters.  · You develop severe weakness.  · You develop balance problems.  · You develop new symptoms.  Summary  · Celiac disease is an allergy to the protein called gluten. Over time, this reaction damages the small intestine and makes the small intestine unable to absorb nutrients from food.  · There is no cure for this condition, but it may be managed with a gluten-free diet and by avoiding dairy foods.  · Follow instructions from your health care provider about diet. Write down any changes in your symptoms and changes in how you feel.  · Contact a health care provider if you continue to have symptoms, even when you are eating a gluten-free diet, or you have new symptoms.  · Keep all follow-up visits as  "told by your health care provider. This is important.  This information is not intended to replace advice given to you by your health care provider. Make sure you discuss any questions you have with your health care provider.  Document Revised: 05/08/2019 Document Reviewed: 05/08/2019  Elsevier Patient Education © 2020 [employer]    Gluten-Free Diet for Celiac Disease, Adult    The gluten-free diet includes all foods that do not contain gluten. Gluten is a protein that is found in wheat, rye, barley, and some other grains. Following the gluten-free diet is the only treatment for people with celiac disease. It helps to prevent damage to the intestines and improves or eliminates the symptoms of celiac disease.  Following the gluten-free diet requires some planning. It can be challenging at first, but it gets easier with time and practice. There are more gluten-free options available today than ever before. If you need help finding gluten-free foods or if you have questions, talk with your diet and nutrition specialist (registered dietitian) or your health care provider.  What do I need to know about a gluten-free diet?  · All fruits, vegetables, and meats are safe to eat and do not contain gluten.  · When grocery shopping, start by shopping in the produce, meat, and dairy sections. These sections are more likely to contain gluten-free foods. Then move to the aisles that contain packaged foods if you need to.  · Read all food labels. Gluten is often added to foods. Always check the ingredient list and look for warnings, such as “may contain gluten.\"  · Talk with your dietitian or health care provider before taking a gluten-free multivitamin or mineral supplement.  · Be aware of gluten-free foods having contact with foods that contain gluten (cross-contamination). This can happen at home and with any processed foods.  ? Talk with your health care provider or dietitian about how to reduce the risk of " cross-contamination in your home.  ? If you have questions about how a food is processed, ask the .  What key words help to identify gluten?  Foods that list any of these key words on the label usually contain gluten:  · Wheat, flour, enriched flour, bromated flour, white flour, durum flour, yani flour, phosphated flour, self-rising flour, semolina, farina, barley (malt), rye, and oats.  · Starch, dextrin, modified food starch, or cereal.  · Thickening, fillers, or emulsifiers.  · Malt flavoring, malt extract, or malt syrup.  · Hydrolyzed vegetable protein.  In the [place] packaged foods that are gluten-free are required to be labeled “GF.” These foods should be easy to identify and are safe to eat. In the [place] food companies are also required to list common food allergens, including wheat, on their labels.  Recommended foods  Grains  · Amaranth, bean flours, 100% buckwheat flour, corn, millet, nut flours or nut meals, GF oats, quinoa, rice, sorghum, teff, rice wafers, pure cornmeal tortillas, popcorn, and hot cereals made from cornmeal. [place], rice, wild rice. Some Asian rice noodles or bean noodles. Arrowroot starch, corn bran, corn flour, corn germ, cornmeal, corn starch, potato flour, potato starch flour, and rice bran. Plain, brown, and sweet rice flours. Rice polish, soy flour, and tapioca starch.  Vegetables  · All plain fresh, frozen, and canned vegetables.  Fruits  · All plain fresh, frozen, canned, and dried fruits, and 100% fruit juices.  Meats and other protein foods  · All fresh beef, pork, poultry, fish, seafood, and eggs. Fish canned in water, oil, brine, or vegetable broth. Plain nuts and seeds, peanut butter. Some lunch meat and some frankfurters. Dried beans, dried peas, and lentils.  Dairy  · Fresh plain, dry, evaporated, or condensed milk. Cream, butter, sour cream, whipping cream, and most yogurts. Unprocessed cheese, most processed cheeses, some cottage cheese, some cream  cheeses.  Beverages  · Coffee, tea, most herbal teas. Carbonated beverages and some root beers. Wine, sake, and distilled spirits, such as gin, vodka, and whiskey. Most hard ciders.  Fats and oils  · Butter, margarine, vegetable oil, hydrogenated butter, olive oil, shortening, lard, cream, and some mayonnaise. Some commercial salad dressings. Olives.  Sweets and desserts  · Sugar, honey, some syrups, molasses, jelly, and jam. Plain hard candy, marshmallows, and gumdrops. Pure cocoa powder. Plain chocolate. Custard and some pudding mixes. Gelatin desserts, sorbets, frozen ice pops, and sherbet. Cake, cookies, and other desserts prepared with allowed flours. Some commercial ice creams. Cornstarch, tapioca, and rice puddings.  Seasoning and other foods  · Some canned or frozen soups. Monosodium glutamate (MSG). Cider, rice, and wine vinegar. Baking soda and baking powder. Cream of tartar. Baking and nutritional yeast. Certain soy sauces made without wheat (ask your dietitian about specific brands that are allowed). Nuts, coconut, and chocolate. Salt, pepper, herbs, spices, flavoring extracts, imitation or artificial flavorings, natural flavorings, and food colorings. Some medicines and supplements. Some lip glosses and other cosmetics. Rice syrups.  The items listed may not be a complete list. Talk with your dietitian about what dietary choices are best for you.  Foods to avoid  Grains  · Barley, bran, bulgur, couscous, cracked wheat, [place], farro, yani, malt, matzo, semolina, wheat germ, and all wheat and rye cereals including spelt and kamut. Cereals containing malt as a flavoring, such as rice cereal. Noodles, spaghetti, macaroni, most packaged rice mixes, and all mixes containing wheat, rye, barley, or triticale.  Vegetables  · Most creamed vegetables and most vegetables canned in sauces. Some commercially prepared vegetables and salads.  Fruits  · Thickened or prepared fruits and some pie fillings. Some fruit  snacks and fruit roll-ups.  Meats and other protein foods  · Any meat or meat alternative containing wheat, rye, barley, or gluten stabilizers. These are often marinated or packaged meats and lunch meats. Bread-containing products, such as Swiss steak, croquettes, meatballs, and meatloaf. Most tuna canned in vegetable broth and turkey with hydrolyzed vegetable protein (HVP) injected as part of the basting. Seitan. Imitation fish. Eggs in sauces made from ingredients to avoid.  Dairy  · Commercial chocolate milk drinks and malted milk. Some non-dairy creamers. Any cheese product containing ingredients to avoid.  Beverages  · Certain cereal beverages. Beer, sanya, malted milk, and some root beers. Some hard ciders. Some instant flavored coffees. Some herbal teas made with barley or with barley malt added.  Fats and oils  · Some commercial salad dressings. Sour cream containing modified food starch.  Sweets and desserts  · Some toffees. Chocolate-coated nuts (may be rolled in wheat flour) and some commercial candies and candy bars. Most cakes, cookies, donuts, pastries, and other baked goods. Some commercial ice cream. Ice cream cones. Commercially prepared mixes for cakes, cookies, and other desserts. Bread pudding and other puddings thickened with flour. Products containing brown rice syrup made with barley malt enzyme. Desserts and sweets made with malt flavoring.  Seasoning and other foods  · Some parikh powders, some dry seasoning mixes, some gravy extracts, some meat sauces, some ketchups, some prepared mustards, and horseradish. Certain soy sauces. Malt vinegar. Bouillon and bouillon cubes that contain HVP. Some chip dips, and some chewing gum. Yeast extract. Mir’s yeast. Caramel color. Some medicines and supplements. Some lip glosses and other cosmetics.  The items listed may not be a complete list. Talk with your dietitian about what dietary choices are best for you.  Summary  · Gluten is a protein that is  "found in wheat, rye, barley, and some other grains. The gluten-free diet includes all foods that do not contain gluten.  · If you need help finding gluten-free foods or if you have questions, talk with your diet and nutrition specialist (registered dietitian) or your health care provider.  · Read all food labels. Gluten is often added to foods. Always check the ingredient list and look for warnings, such as “may contain gluten.\"  This information is not intended to replace advice given to you by your health care provider. Make sure you discuss any questions you have with your health care provider.  Document Revised: 11/30/2018 Document Reviewed: 10/02/2017  Elsevier Patient Education © 2020 [employer] Inc.    "

## 2020-12-04 ENCOUNTER — OFFICE VISIT (OUTPATIENT)
Dept: PULMONOLOGY | Facility: CLINIC | Age: 50
End: 2020-12-04

## 2020-12-04 VITALS
SYSTOLIC BLOOD PRESSURE: 110 MMHG | HEIGHT: 66 IN | DIASTOLIC BLOOD PRESSURE: 76 MMHG | BODY MASS INDEX: 16.88 KG/M2 | HEART RATE: 78 BPM | WEIGHT: 105 LBS

## 2020-12-04 DIAGNOSIS — Z72.0 TOBACCO USE: Primary | ICD-10-CM

## 2020-12-04 PROCEDURE — 99213 OFFICE O/P EST LOW 20 MIN: CPT | Performed by: INTERNAL MEDICINE

## 2020-12-04 RX ORDER — POLYETHYLENE GLYCOL 3350 17 G
4 POWDER IN PACKET (EA) ORAL AS NEEDED
Qty: 100 LOZENGE | Refills: 11 | Status: SHIPPED | OUTPATIENT
Start: 2020-12-04 | End: 2021-02-09

## 2020-12-04 NOTE — PROGRESS NOTES
Pulmonary Office Follow-up    Subjective     Carla Richard is seen today at the office for   Chief Complaint   Patient presents with   • COPD         HPI  Carla Richard is a 50 y.o. female with a PMH significant for COPD.  She was last in the office on 10/28/2020 and saw Dr. Jacobson at that time.  She has been noncompliant with stopping smoking.      Tobacco use history:  She is smoked most of her adult life and unfortunately still actively smoking now.  She has about 40 pack years.      Patient Active Problem List   Diagnosis   • Tobacco abuse counseling   • Chronic idiopathic constipation   • B12 deficiency   • Vitamin D deficiency    • Tobacco user   • Stage 2 moderate COPD by GOLD classification (CMS/[facility])   • Cervical lymphadenopathy   • Generalized abdominal pain   • Nausea   • Neurological abnormality   • Tremor   • RUQ pain   • COPD exacerbation (CMS/[facility])   • Pertussis exposure   • Chronic bronchitis (CMS/[facility])   • Cigarette nicotine dependence, uncomplicated   • Chronic nonseasonal allergic rhinitis due to pollen   • Episode of recurrent major depressive disorder (CMS/[facility])   • Diarrhea   • Chronic diarrhea   • Thrombocytosis (CMS/[facility])   • Nausea and vomiting   • Gluten intolerance   • C. difficile diarrhea   • Shiga toxin-producing Escherichia coli infection   • Generalized weakness   • Head injury   • Concussion with loss of consciousness   • Gastritis without bleeding   • Irritable bowel syndrome   • Tachycardia   • Essential hypertension   • JELENA (generalized anxiety disorder)   • Marijuana use   • Epigastric pain   • History of Clostridioides difficile colitis   • Celiac disease   • Cough   • Upper respiratory tract infection   • Irritable bowel syndrome with both constipation and diarrhea   • Moderate protein-calorie malnutrition (CMS/HCC)   • Folliculitis   • Itchy scalp   • Tinea capitis   • Asthma   • Chronic interstitial cystitis   • Fibromyalgia   • Gastric reflux   • Kidney  "stone   • Lymphocytic colitis   • Microscopic hematuria   • Obstruction of bile duct   • Throat pain       Review of Systems  Review of Systems  As described in the HPI. Otherwise, remainder of ROS (14 systems) were negative.    Medications, Allergies, Social, and Family Histories reviewed as per EMR.    Objective     Vitals:    12/04/20 1309   BP: 110/76   Pulse: 78         12/04/20  1309   Weight: 47.6 kg (105 lb)     [unfilled]  Physical Exam  Vitals signs reviewed.   Constitutional:       General: She is not in acute distress.     Appearance: She is not ill-appearing, toxic-appearing or diaphoretic.      Comments: Cachectic   HENT:      Head: Atraumatic.      Nose: Nose normal.      Mouth/Throat:      Comments: Edentulous no lesions no thrush  Neck:      Musculoskeletal: Normal range of motion. No neck rigidity.   Cardiovascular:      Rate and Rhythm: Regular rhythm.      Heart sounds: No murmur. No gallop.    Pulmonary:      Effort: No respiratory distress.      Breath sounds: No stridor. No wheezing or rhonchi.      Comments: Decreased excursion  Musculoskeletal:         General: No swelling.   Lymphadenopathy:      Cervical: No cervical adenopathy.   Skin:     General: Skin is warm.      Findings: No rash.   Neurological:      General: No focal deficit present.      Mental Status: She is alert and oriented to person, place, and time.      Cranial Nerves: No cranial nerve deficit.   Psychiatric:         Judgment: Judgment normal.      Comments: Anxious             Assessment/Plan     Diagnoses and all orders for this visit:    1. Tobacco use (Primary)  -     nicotine polacrilex (Nicotine Mini) 4 MG lozenge; Dissolve 1 lozenge in the mouth As Needed for Smoking Cessation.  Dispense: 100 lozenge; Refill: 11    She told me that Chantix makes her \"suicidal\".  She has tried other things but does not feel that she is ever tried nicotine lozenges.  Hopefully these will be of benefit to her.  I do not feel she " will ever improve until she can stop smoking.    2.  COPD: On 10/28/2020 her FEV1 was only 1.79 L (63% of predicted).  Her residual volume was 191% predicted and her DLCO was 56% of predicted.  I did not change her inhalers today.  I feel that inhalers, as long as the patient continues to smoke, are essentially worthless.    3.  Celiac disease: Apparently this was recently diagnosed        Patient's Body mass index is 17.21 kg/m². BMI is below normal parameters. Recommendations include: referral to primary care.        Return in about 6 months (around 6/4/2021).          This document has been electronically signed by Shaheen Rodriguez MD on December 4, 2020 13:19 CST      Dictated using Dragon

## 2020-12-24 RX ORDER — PERMETHRIN 0.25 %
SPRAY, NON-AEROSOL (ML) MISCELLANEOUS
Qty: 120 ML | Refills: 1 | Status: SHIPPED | OUTPATIENT
Start: 2020-12-24 | End: 2021-02-09

## 2021-01-20 ENCOUNTER — OFFICE VISIT (OUTPATIENT)
Dept: FAMILY MEDICINE CLINIC | Facility: CLINIC | Age: 51
End: 2021-01-20

## 2021-01-20 VITALS
TEMPERATURE: 97.5 F | BODY MASS INDEX: 16.88 KG/M2 | OXYGEN SATURATION: 99 % | HEART RATE: 77 BPM | RESPIRATION RATE: 20 BRPM | WEIGHT: 105 LBS | DIASTOLIC BLOOD PRESSURE: 64 MMHG | SYSTOLIC BLOOD PRESSURE: 102 MMHG | HEIGHT: 66 IN

## 2021-01-20 DIAGNOSIS — R10.9 FLANK PAIN: Primary | ICD-10-CM

## 2021-01-20 DIAGNOSIS — R10.32 LLQ PAIN: ICD-10-CM

## 2021-01-20 DIAGNOSIS — R30.0 DYSURIA: ICD-10-CM

## 2021-01-20 LAB
BILIRUB BLD-MCNC: NEGATIVE MG/DL
CLARITY, POC: CLEAR
COLOR UR: YELLOW
GLUCOSE UR STRIP-MCNC: NEGATIVE MG/DL
KETONES UR QL: NEGATIVE
LEUKOCYTE EST, POC: NEGATIVE
NITRITE UR-MCNC: NEGATIVE MG/ML
PH UR: 6 [PH] (ref 5–8)
PROT UR STRIP-MCNC: NEGATIVE MG/DL
RBC # UR STRIP: NEGATIVE /UL
SP GR UR: 1.01 (ref 1–1.03)
UROBILINOGEN UR QL: NORMAL

## 2021-01-20 PROCEDURE — 99214 OFFICE O/P EST MOD 30 MIN: CPT | Performed by: NURSE PRACTITIONER

## 2021-01-20 PROCEDURE — 87086 URINE CULTURE/COLONY COUNT: CPT | Performed by: NURSE PRACTITIONER

## 2021-01-20 PROCEDURE — 81003 URINALYSIS AUTO W/O SCOPE: CPT | Performed by: NURSE PRACTITIONER

## 2021-01-20 RX ORDER — FLUCONAZOLE 150 MG/1
150 TABLET ORAL ONCE
Qty: 1 TABLET | Refills: 0 | Status: SHIPPED | OUTPATIENT
Start: 2021-01-20 | End: 2021-01-20

## 2021-01-20 RX ORDER — CEPHALEXIN 500 MG/1
500 CAPSULE ORAL 2 TIMES DAILY
Qty: 14 CAPSULE | Refills: 0 | Status: CANCELLED | OUTPATIENT
Start: 2021-01-20

## 2021-01-20 RX ORDER — PHENAZOPYRIDINE HYDROCHLORIDE 100 MG/1
100 TABLET, FILM COATED ORAL 3 TIMES DAILY PRN
Qty: 6 TABLET | Refills: 0 | Status: SHIPPED | OUTPATIENT
Start: 2021-01-20 | End: 2021-02-09

## 2021-01-20 RX ORDER — CEPHALEXIN 500 MG/1
500 CAPSULE ORAL 2 TIMES DAILY
Qty: 20 CAPSULE | Refills: 0 | Status: SHIPPED | OUTPATIENT
Start: 2021-01-20 | End: 2021-01-30

## 2021-01-20 RX ORDER — TRAMADOL HYDROCHLORIDE 50 MG/1
TABLET ORAL
COMMUNITY
Start: 2021-01-12 | End: 2021-02-04

## 2021-01-20 NOTE — PROGRESS NOTES
"Chief Complaint  No chief complaint on file.    Subjective          Carla Richard presents to [facility] for   [facility] Day[facility]    PCP: Dr. Wick    CC: \"bilateral flank pain, burn with urination, abdominal pain, vomiting\"    Patient originally on Dr. Wick's schedule, but moved to Same Day Clinic due to fever.  Precharting done per Dr. Wick, but patient not seen by him in office today.     PMH: kidney stones, diverticulosis, ? Celiac    CT and labs from July 2020 reviewed.  Renal function normal at that time.  CT showed benign cyst on liver and diverticulosis large colon WITHOUT diverticulitis.      Reports fever, diarrhea, vomiting since yesterday.  Having urgency, dysuria and flank pain as well. Taking Zofran with some relief.      Flank Pain  This is a new problem. Episode onset: x 2-3 days. The problem occurs constantly. The problem is unchanged. Pain location: bilateral, worse on left. The quality of the pain is described as aching. Radiates to: left abdomen. The pain is at a severity of 7/10. The pain is the same all the time. Associated symptoms include abdominal pain and dysuria. Pertinent negatives include no bladder incontinence, bowel incontinence, chest pain, fever, headaches, leg pain, numbness, paresis, paresthesias, pelvic pain, perianal numbness, tingling, weakness or weight loss. Treatments tried: zofran for n/v. The treatment provided mild relief.   Abdominal Pain  This is a new problem. Episode onset: x 2-3 days. The problem occurs daily. The problem has been unchanged. The pain is located in the LLQ. The pain is at a severity of 7/10. The quality of the pain is aching. Pain radiation: radiates from flank area. Associated symptoms include diarrhea ( reports no change from her normal, recently dx with Celiac), dysuria, frequency, nausea and vomiting. Pertinent negatives include no constipation, fever, flatus, headaches, " "hematochezia, hematuria, melena, myalgias or weight loss. Nothing aggravates the pain. The pain is relieved by nothing. Treatments tried: zofran. The treatment provided mild relief. Prior workup: none since symptoms started 2-3 days ago.       Objective   Vital Signs:   /64 (BP Location: Right arm, Patient Position: Sitting, Cuff Size: Adult)   Pulse 77   Temp 97.5 °F (36.4 °C) (Temporal)   Resp 20   Ht 166.4 cm (65.5\")   Wt 47.6 kg (105 lb)   SpO2 99%   BMI 17.21 kg/m²     Physical Exam  Vitals signs and nursing note reviewed.   Constitutional:       General: She is in acute distress (in some noted discomfort).      Appearance: Normal appearance.   HENT:      Head: Normocephalic and atraumatic.   Neck:      Musculoskeletal: Neck supple.   Cardiovascular:      Rate and Rhythm: Normal rate and regular rhythm.   Pulmonary:      Effort: Pulmonary effort is normal. No respiratory distress.      Breath sounds: Normal breath sounds. No wheezing, rhonchi or rales.   Abdominal:      General: Bowel sounds are normal. Distention:  LLQ.      Palpations: Abdomen is soft.      Tenderness: There is abdominal tenderness. There is right CVA tenderness and left CVA tenderness ( mild). There is no guarding or rebound.   Skin:     General: Skin is warm and dry.   Neurological:      General: No focal deficit present.      Mental Status: She is alert and oriented to person, place, and time.        Result Review :            Recent Results (from the past 24 hour(s))   POCT urinalysis dipstick, automated    Collection Time: 01/20/21 11:45 AM    Specimen: Urine   Result Value Ref Range    Color Yellow Yellow, Straw, Dark Yellow, Ana M    Clarity, UA Clear Clear    Specific Gravity  1.015 1.005 - 1.030    pH, Urine 6.0 5.0 - 8.0    Leukocytes Negative Negative    Nitrite, UA Negative Negative    Protein, POC Negative Negative mg/dL    Glucose, UA Negative Negative, 1000 mg/dL (3+) mg/dL    Ketones, UA Negative Negative    " Urobilinogen, UA Normal Normal    Bilirubin Negative Negative    Blood, UA Negative Negative              Assessment and Plan    Problem List Items Addressed This Visit     None      Visit Diagnoses     Flank pain    -  Primary    Relevant Orders    POCT urinalysis dipstick, automated    Urine Culture - Urine, Urine, Clean Catch    XR Abdomen KUB (In Office)    Comprehensive metabolic panel    Dysuria        Relevant Medications    phenazopyridine (Pyridium) 100 MG tablet    Other Relevant Orders    POCT urinalysis dipstick, automated    Urine Culture - Urine, Urine, Clean Catch    LLQ pain        Relevant Orders    CBC No Differential    XR Abdomen KUB (In Office)    Comprehensive metabolic panel        Clear liquids today  Has Zofran at home to use as needed.  Declines Zofran IM in office.  CBC, CMP, KUB pending--will call with results when available  UA WNL. Urine culture pending.   Rx for Keflex, Pyridium provided due to UTI symptoms, hx of diverticulosis pending above results  Tylenol PRN.  Has Tramadol at home for pain if needed    See PCP or RTC if symptoms persist/worsen--ER if significantly worsening pain  See PCP for routine f/u visit and management of chronic medical conditions      This document has been electronically signed by CROW Padgett on January 20, 2021 11:48 CST,.

## 2021-01-20 NOTE — PROGRESS NOTES
Chief Complaint  No chief complaint on file.    Subjective          Carla Richard presents to [facility] [place] for        Pt is 49 yo female with history of moderateC OPD, Vitamin B12 deficiency, Vitamin D deficiency, chronic abdominal pain, tremor,  RUQ pain,  Sp hysterectomy,  History of chron's disease, history of pertussis infection  sp right shoulder surgery. X 3, sp rotator cuff repair , history of C diff diarrhea, gluten sensitivity, gastritis, marijuana use, COPD, celiac disease      11/9/20 telemedicine  visit for recheck on pt's above medical issues. Is here for recheck on rash on scalp. Was given keflex along with nizoral shampoo for possible follliculitis vs tinea capitis. Her scalp has improved with the treatment.  Her stomach issues is better with refraining on gluten free diet.  Breathing stable and pt continue to cut back on smoking      1/20/21 in office visit for recheck on pt's above medical issues. Saw GI on 12/1/20 for celiac disease and IBS constipation and was recommended gluten free diet.  Also saw Pulmonology on 12/4/20 for COPD and was advised to continue smoking cessation and continue inhaler. She states chantix made her feel suicidial. Pt has yet to get labwork ordered on 10/27/20     Urinary Tract Infection   Associated symptoms include vomiting.   Vomiting     GI Problem  The primary symptoms include fatigue, abdominal pain, diarrhea and arthralgias. Primary symptoms do not include fever, nausea, vomiting or melena.   The illness is also significant for constipation. The illness does not include chills, anorexia or dysphagia. Associated medical issues do not include inflammatory bowel disease, GERD, gallstones, liver disease, alcohol abuse, PUD, gastric bypass, bowel resection, irritable bowel syndrome, hemorrhoids or diverticulitis. Associated medical issues comments: gluten sensitivity. Pt has tried nizoral and keflex which has  improved condition   COPD   This is a chronic problem. The current episode started more than 1 year ago. The problem occurs constantly. The problem has been iomproving Associated symptoms include abdominal pain, arthralgias, fatigue, joint swelling, numbness and weakness. Pertinent negatives include no anorexia, change in bowel habit, chest pain, chills, congestion, coughing, diaphoresis, fever, headaches, myalgias, nausea, neck pain, sore throat, swollen glands, urinary symptoms, vertigo, visual change or vomiting. The symptoms are aggravated by smoking. She has tried (combivent  symbicort anoro, albuterol inhaler ) for the symptoms.        Objective   Vital Signs:   There were no vitals taken for this visit.    Physical Exam  Vitals signs and nursing note reviewed.   Constitutional:       Appearance: She is well-developed. She is not diaphoretic.   HENT:      Head: Normocephalic and atraumatic.      Right Ear: External ear normal.   Eyes:      Conjunctiva/sclera: Conjunctivae normal.      Pupils: Pupils are equal, round, and reactive to light.   Neck:      Musculoskeletal: Normal range of motion and neck supple.   Cardiovascular:      Rate and Rhythm: Normal rate and regular rhythm.      Heart sounds: Normal heart sounds. No murmur.   Pulmonary:      Effort: No respiratory distress.      Comments: Decreased breath sounds   Abdominal:      General: Bowel sounds are normal. There is no distension.      Palpations: Abdomen is soft.      Tenderness: There is abdominal tenderness.   Musculoskeletal: Normal range of motion.         General: Tenderness present. No deformity.   Skin:     General: Skin is warm.      Coloration: Skin is not pale.      Findings: No erythema or rash.   Neurological:      Mental Status: She is alert and oriented to person, place, and time.      Cranial Nerves: No cranial nerve deficit.   Psychiatric:         Behavior: Behavior normal.        Result Review :                 Assessment and Plan     Problem List Items Addressed This Visit     None         -recommend labwork  -hep C screening - hep C antibody test   -UTI -  -nausea/vomiting -   -rash on scalp -improving with nizoral shampoo and keflex abx.   -cough/acute bronchitis/URI - improved with abx. recently went to . Pt tested negative for COVD19   -HTN/tachcycardia -controlledon toprol XL 50 mg daily.   -schedule mammogram screening    -moderate protein malnutrition -  Gave high calorie diet information   -JELENA - pt admits to smoking marijuana. Recommend pt continue that since it calms her down but also discussed about potential side effects if long term use   -thrombocytosis /polycythemia  continue to monitor. She is seeing Hematology   -gluten sensitivity -recommend gluten free diet   -COPD/COPD exacerbation- Pulmonology following duo nebs stopped now on Anoro daily.  Albuterol PRN.    -vitamin B12 deficiency  - b12 injections weekly  -tobacco user - counseled to quit smoking >5 minutes. She could not tolerate chantix  urged the importance of quitting smoking.   Insurance would not cover nicotine patch and gum. Recommend pt call 1 [phone] NOW start on nicotine patch 7 mg daily.  has cut down on smoking   -abdominal pain/gastritis/IBS /GERD/celiac - Gastroenterology following. On PPI prilosec 20 mg q daily  OFF prucalopride 2 mg daily. She is to followup regarding abodminal issues on bentyl 40 mg every 6 hours PRN.  advised pt to limit elavil if possible since it can cause constipation. Has upcoming EGD soon with GI.    -nausea/ vomting - continue zofran PRN  -allergic rhinitis - flonase nasal spray and singulair along with zyrtec   -vitamin D deficiency - vitamin D once a week   -pt has upcoming mammogram soon.    -tremor of head/concussion - she has seen neurology with Dr. Chen. Had MRI of brain. Recommended 2nd opinon with Dr. Manjarrez She also has family history of Friedreich's ataxia. On elavail 25 mg at bedtime. Continue with Neurology  followup with Dr. Manjarrez    -advised pt to go to ER or call 911 if symptoms worrisome or severe  -advised pt to be  Safe and call with questions and concerns  -advised to followup with all referrals and Specialist  -advised pt to be safe during COVID -19 pandemic   -total time with pt >25 minutes         This document has been electronically signed by Xavier Wick MD on January 20, 2021 08:57 CST        Follow Up   No follow-ups on file.  Patient was given instructions and counseling regarding her condition or for health maintenance advice. Please see specific information pulled into the AVS if appropriate.

## 2021-01-22 LAB — BACTERIA SPEC AEROBE CULT: NO GROWTH

## 2021-01-28 ENCOUNTER — TELEPHONE (OUTPATIENT)
Dept: FAMILY MEDICINE CLINIC | Facility: CLINIC | Age: 51
End: 2021-01-28

## 2021-01-28 RX ORDER — LACTOSE-REDUCED FOOD
237 LIQUID (ML) ORAL DAILY
Qty: 30 EACH | Refills: 3 | Status: SHIPPED | OUTPATIENT
Start: 2021-01-28 | End: 2021-06-16

## 2021-01-28 NOTE — TELEPHONE ENCOUNTER
----- Message from Xavier Wick MD sent at 1/28/2021 11:32 AM CST -----  Regarding: FW: Non-Urgent Medical Question  Contact: [phone]  I sent in Ensure to pharmacy for her underweight issues. Can you run a PA? Thanks   ----- Message -----  From: Sina Sousa MA  Sent: 1/28/2021  10:15 AM CST  To: Xavier Wick MD  Subject: FW: Non-Urgent Medical Question                    ----- Message -----  From: Carla Richard  Sent: 1/28/2021  10:05 AM CST  To: [facility]  Subject: RE: Non-Urgent Medical Question                  I called.  They said that I need a prior authority it's not covered.     ----- Message -----  From: Xavier Wick MD  Sent: 1/28/21 11:02 AM  To: Carla Richard  Subject: RE: Non-Urgent Medical Question    I sent Ensure to your pharmacy    Let me know if this is covered. I gave 30 cans or 1 can per day    Xavier Wick MD       ----- Message -----       From:Carla Richard       Sent:1/28/2021 10:38 AM EST         To:Xavier Wick MD    Subject:Non-Urgent Medical Question    I just finished my first call with a nutritionist through my [employer] insurance.  Its been recommended that I go on Ensure. Given the fact that I have dropped down to 100 lbs and the gluten free diet limits my intake and the need for nutrition isn't being met. I'm not sure if I need an appointment for this or if it can just be ordered.

## 2021-02-04 ENCOUNTER — OFFICE VISIT (OUTPATIENT)
Dept: GASTROENTEROLOGY | Facility: CLINIC | Age: 51
End: 2021-02-04

## 2021-02-04 VITALS
SYSTOLIC BLOOD PRESSURE: 104 MMHG | HEART RATE: 87 BPM | BODY MASS INDEX: 16.07 KG/M2 | WEIGHT: 100 LBS | DIASTOLIC BLOOD PRESSURE: 74 MMHG | HEIGHT: 66 IN

## 2021-02-04 DIAGNOSIS — K59.04 CHRONIC IDIOPATHIC CONSTIPATION: ICD-10-CM

## 2021-02-04 DIAGNOSIS — R10.13 EPIGASTRIC PAIN: Primary | ICD-10-CM

## 2021-02-04 PROCEDURE — 99214 OFFICE O/P EST MOD 30 MIN: CPT | Performed by: INTERNAL MEDICINE

## 2021-02-04 RX ORDER — DEXTROSE AND SODIUM CHLORIDE 5; .45 G/100ML; G/100ML
30 INJECTION, SOLUTION INTRAVENOUS CONTINUOUS PRN
Status: CANCELLED | OUTPATIENT
Start: 2021-02-10

## 2021-02-04 NOTE — PROGRESS NOTES
[facility]      Chief Complaint:   Chief Complaint   Patient presents with   • Diarrhea       Subjective     HPI:   Patient with severe constipation.  Patient has severe abdominal pain with constipation.  Patient states that she has been losing weight because of difficulty eating.  Patient states she is also having some renal problems because of the difficulty drinking patient was recently placed on Ensure.  Patient has been taking Trulance but only occasionally because she states when she takes it she gets diarrhea.  Discussed with patient she needs to take 1 tablespoon of Benefiber daily and to take her Trulance daily.  Also schedule patient for EGD to evaluate why patient has the nausea and vomiting will reevaluate the hiatal hernia to determine if any surgical intervention may be necessary for this.    Plan; we will have patient follow-up following the procedure    Past Medical History:   Past Medical History:   Diagnosis Date   • Asthma    • Cancer (CMS/HCC)     cervical   • Colon polyp    • COPD (chronic obstructive pulmonary disease) (CMS/HCC)    • Crohn's disease (CMS/HCC)    • Diverticulitis of colon    • Elevated cholesterol    • Essential hypertension 4/15/2020   • GERD (gastroesophageal reflux disease)    • History of transfusion    • Irritable bowel syndrome    • Kidney calculus    • Pancreatitis    • Sphincter of Oddi dysfunction        Past Surgical History:  Past Surgical History:   Procedure Laterality Date   • BACK SURGERY      C5-6   • COLONOSCOPY     • COLONOSCOPY N/A 10/18/2019    Procedure: COLONOSCOPY;  Surgeon: Tom Davis MD;  Location: [facility] ENDOSCOPY;  Service: Gastroenterology   • COLONOSCOPY N/A 8/27/2020    Procedure: COLONOSCOPY;  Surgeon: Tom Davis MD;  Location: [facility] ENDOSCOPY;  Service: Gastroenterology;  Laterality: N/A;   • ENDOSCOPY N/A 10/18/2019    Procedure: ESOPHAGOGASTRODUODENOSCOPY;  Surgeon: Tom Davis MD;  Location: [facility]  ENDOSCOPY;  Service: Gastroenterology   • ENDOSCOPY N/A 7/27/2020    Procedure: ESOPHAGOGASTRODUODENOSCOPY;  Surgeon: Tom Davis MD;  Location: [facility] ENDOSCOPY;  Service: Gastroenterology;  Laterality: N/A;   • HYSTERECTOMY     • SHOULDER ARTHROSCOPY W/ ROTATOR CUFF REPAIR Right    • TOE SURGERY      left small toe    • TONSILECTOMY, ADENOIDECTOMY, BILATERAL MYRINGOTOMY AND TUBES     • TONSILLECTOMY     • UPPER GASTROINTESTINAL ENDOSCOPY         Family History:  Family History   Problem Relation Age of Onset   • Inflammatory bowel disease Mother    • Arthritis Mother    • Diabetes Mother    • Hypertension Mother    • Hyperlipidemia Mother    • Obesity Mother    • Osteoporosis Mother    • Heart disease Father    • Hyperlipidemia Father    • Diabetes Sister    • Liver disease Daughter    • Mental illness Daughter    • Obesity Daughter    • Diabetes Maternal Grandmother    • Cancer Maternal Grandmother         lung   • Cancer Other         lung   • Heart disease Other        Social History:   reports that she has been smoking cigarettes. She has a 40.00 pack-year smoking history. She has never used smokeless tobacco. She reports current drug use. Drug: Marijuana. She reports that she does not drink alcohol.    Medications:   Prior to Admission medications    Medication Sig Start Date End Date Taking? Authorizing Provider   albuterol sulfate  (90 Base) MCG/ACT inhaler Inhale 2 puffs Every 4 (Four) Hours As Needed for Wheezing. 10/28/20  Yes Iliana Jacobson MD   amitriptyline (ELAVIL) 25 MG tablet Take 25 mg by mouth Every Night. 3/6/20  Yes ProviderByron MD   cetirizine (zyrTEC) 10 MG tablet Take 1 tablet by mouth Daily. 9/25/19  Yes Iliana Jacobson MD   Cyanocobalamin (B-12 COMPLIANCE INJECTION IJ) Inject  as directed.   Yes ProviderByron MD   fluticasone (FLONASE) 50 MCG/ACT nasal spray 2 sprays into the nostril(s) as directed by provider Daily. 10/28/20  Yes Iliana Jacobson MD    ketoconazole (Nizoral) 2 % shampoo Apply  topically to the appropriate area as directed 2 (Two) Times a Week. 11/2/20  Yes Xavier Wick MD   montelukast (SINGULAIR) 10 MG tablet Take 1 tablet by mouth Every Night. 10/17/19  Yes Xavier Wick MD   nicotine polacrilex (Nicotine Mini) 4 MG lozenge Dissolve 1 lozenge in the mouth As Needed for Smoking Cessation. 12/4/20  Yes Shaheen Rodriguez MD   Nutritional Supplements (Ensure Active High Protein) liquid Take 237 mL by mouth Daily. 1/28/21  Yes Xavier Wick MD   omeprazole (priLOSEC) 20 MG capsule Take 1 capsule by mouth Daily. 6/23/20  Yes Xavier Wick MD   ondansetron ODT (ZOFRAN-ODT) 4 MG disintegrating tablet Place 1 tablet on the tongue 4 (Four) Times a Day As Needed for Nausea or Vomiting. 7/26/20  Yes Gregory Kaur MD   permethrin (Nix Creme Rinse) 1 % liquid Apply on scalp for 10 minutes then rinse and can repeat in 7 days 12/24/20  Yes Xavier Wick MD   phenazopyridine (Pyridium) 100 MG tablet Take 1 tablet by mouth 3 (Three) Times a Day As Needed for Bladder Spasms. 1/20/21  Yes Niles Roldan APRN   Plecanatide (Trulance) 3 MG tablet Take 1 tablet by mouth Daily. 8/18/20  Yes Tom Davis MD   umeclidinium-vilanterol (ANORO ELLIPTA) 62.5-25 MCG/INH aerosol powder  inhaler Inhale 1 puff Daily. 10/28/20  Yes Iliana Jacobson MD   traMADol (ULTRAM) 50 MG tablet  1/12/21 2/4/21 Yes ProviderByron MD       Allergies:  Nsaids, Azithromycin, Ciprofloxacin, Doxycycline, Other, Levofloxacin, and Phenergan [promethazine hcl]    ROS:    Review of Systems   Constitutional: Negative for activity change, appetite change, chills, diaphoresis, fatigue, fever and unexpected weight change.   HENT: Negative for sore throat and trouble swallowing.    Respiratory: Negative for shortness of breath.    Gastrointestinal: Negative for abdominal distention, abdominal pain, anal bleeding, blood in stool, constipation, diarrhea, nausea, rectal  "pain and vomiting.   Endocrine: Negative for polydipsia, polyphagia and polyuria.   Genitourinary: Negative for difficulty urinating.   Musculoskeletal: Negative for arthralgias.   Skin: Negative for pallor.   Allergic/Immunologic: Negative for food allergies.   Neurological: Negative for weakness and light-headedness.   Psychiatric/Behavioral: Negative for behavioral problems.     Objective     Blood pressure 104/74, pulse 87, height 166.4 cm (65.5\"), weight 45.4 kg (100 lb), not currently breastfeeding.    Physical Exam  Constitutional:       General: She is not in acute distress.     Appearance: She is well-developed. She is not diaphoretic.   HENT:      Head: Normocephalic and atraumatic.   Cardiovascular:      Rate and Rhythm: Normal rate and regular rhythm.      Heart sounds: Normal heart sounds. No murmur. No friction rub. No gallop.    Pulmonary:      Effort: No respiratory distress.      Breath sounds: Normal breath sounds. No wheezing or rales.   Chest:      Chest wall: No tenderness.   Abdominal:      General: Bowel sounds are normal. There is no distension.      Palpations: Abdomen is soft. There is no mass.      Tenderness: There is no abdominal tenderness. There is no guarding or rebound.      Hernia: No hernia is present.   Musculoskeletal: Normal range of motion.   Skin:     General: Skin is warm and dry.      Coloration: Skin is not pale.      Findings: No erythema or rash.   Neurological:      Mental Status: She is alert and oriented to person, place, and time.   Psychiatric:         Behavior: Behavior normal.         Thought Content: Thought content normal.         Judgment: Judgment normal.          Assessment/Plan   Diagnoses and all orders for this visit:    1. Epigastric pain (Primary)  -     Case Request; Standing  -     dextrose 5 % and sodium chloride 0.45 % infusion  -     Case Request    2. Chronic idiopathic constipation    Other orders  -     Follow Anesthesia Guidelines / Standing " Orders; Future  -     Obtain Informed Consent; Future  -     Implement Anesthesia Orders Day of Procedure; Standing  -     Obtain Informed Consent; Standing  -     POC Glucose Once; Standing  -     Pregnancy, Urine -; Standing  -     Insert Peripheral IV; Standing        ESOPHAGOGASTRODUODENOSCOPY (N/A)     Diagnosis Plan   1. Epigastric pain  Case Request    dextrose 5 % and sodium chloride 0.45 % infusion    Case Request   2. Chronic idiopathic constipation         Anticipated Surgical Procedure:  Orders Placed This Encounter   Procedures   • Follow Anesthesia Guidelines / Standing Orders     Standing Status:   Future   • Obtain Informed Consent     Standing Status:   Future     Order Specific Question:   Informed Consent Given For     Answer:   ESOPHAGOGASTRODUODENOSCOPY       The risks, benefits, and alternatives of this procedure have been discussed with the patient or the responsible party- the patient understands and agrees to proceed.

## 2021-02-04 NOTE — PATIENT INSTRUCTIONS
"BMI for Adults  What is BMI?  Body mass index (BMI) is a number that is calculated from a person's weight and height. BMI can help estimate how much of a person's weight is composed of fat. BMI does not measure body fat directly. Rather, it is an alternative to procedures that directly measure body fat, which can be difficult and expensive.  BMI can help identify people who may be at higher risk for certain medical problems.  What are BMI measurements used for?  BMI is used as a screening tool to identify possible weight problems. It helps determine whether a person is obese, overweight, a healthy weight, or underweight.  BMI is useful for:  · Identifying a weight problem that may be related to a medical condition or may increase the risk for medical problems.  · Promoting changes, such as changes in diet and exercise, to help reach a healthy weight. BMI screening can be repeated to see if these changes are working.  How is BMI calculated?  BMI involves measuring your weight in relation to your height. Both height and weight are measured, and the BMI is calculated from those numbers. This can be done either in English ([place] or metric measurements. Note that charts and online BMI calculators are available to help you find your BMI quickly and easily without having to do these calculations yourself.  To calculate your BMI in English ([place] measurements:    1. Measure your weight in pounds (lb).  2. Multiply the number of pounds by 703.  ? For example, for a person who weighs 180 lb, multiply that number by 703, which equals 126,540.  3. Measure your height in inches. Then multiply that number by itself to get a measurement called \"inches squared.\"  ? For example, for a person who is 70 inches tall, the \"inches squared\" measurement is 70 inches x 70 inches, which equals 4,900 inches squared.  4. Divide the total from step 2 (number of lb x 703) by the total from step 3 (inches squared): 126,540 ÷ 4,900 = 25.8. This is " "your BMI.  To calculate your BMI in metric measurements:  1. Measure your weight in kilograms (kg).  2. Measure your height in meters (m). Then multiply that number by itself to get a measurement called \"meters squared.\"  ? For example, for a person who is 1.75 m tall, the \"meters squared\" measurement is 1.75 m x 1.75 m, which is equal to 3.1 meters squared.  3. Divide the number of kilograms (your weight) by the meters squared number. In this example: 70 ÷ 3.1 = 22.6. This is your BMI.  What do the results mean?  BMI charts are used to identify whether you are underweight, normal weight, overweight, or obese. The following guidelines will be used:  · Underweight: BMI less than 18.5.  · Normal weight: BMI between 18.5 and 24.9.  · Overweight: BMI between 25 and 29.9.  · Obese: BMI of 30 or above.  Keep these notes in mind:  · Weight includes both fat and muscle, so someone with a muscular build, such as an athlete, may have a BMI that is higher than 24.9. In cases like these, BMI is not an accurate measure of body fat.  · To determine if excess body fat is the cause of a BMI of 25 or higher, further assessments may need to be done by a health care provider.  · BMI is usually interpreted in the same way for men and women.  Where to find more information  For more information about BMI, including tools to quickly calculate your BMI, go to these websites:  · Centers for Disease Control and Prevention: www.cdc.gov  · American Heart Association: www.heart.org  · National Heart, Lung, and Blood [place]: www.nhlbi.nih.gov  Summary  · Body mass index (BMI) is a number that is calculated from a person's weight and height.  · BMI may help estimate how much of a person's weight is composed of fat. BMI can help identify those who may be at higher risk for certain medical problems.  · BMI can be measured using English measurements or metric measurements.  · BMI charts are used to identify whether you are underweight, normal " weight, overweight, or obese.  This information is not intended to replace advice given to you by your health care provider. Make sure you discuss any questions you have with your health care provider.  Document Revised: 09/09/2020 Document Reviewed: 07/17/2020  Elsevier Patient Education © 2020 [employer]

## 2021-02-04 NOTE — H&P (VIEW-ONLY)
[facility]      Chief Complaint:   Chief Complaint   Patient presents with   • Diarrhea       Subjective     HPI:   Patient with severe constipation.  Patient has severe abdominal pain with constipation.  Patient states that she has been losing weight because of difficulty eating.  Patient states she is also having some renal problems because of the difficulty drinking patient was recently placed on Ensure.  Patient has been taking Trulance but only occasionally because she states when she takes it she gets diarrhea.  Discussed with patient she needs to take 1 tablespoon of Benefiber daily and to take her Trulance daily.  Also schedule patient for EGD to evaluate why patient has the nausea and vomiting will reevaluate the hiatal hernia to determine if any surgical intervention may be necessary for this.    Plan; we will have patient follow-up following the procedure    Past Medical History:   Past Medical History:   Diagnosis Date   • Asthma    • Cancer (CMS/HCC)     cervical   • Colon polyp    • COPD (chronic obstructive pulmonary disease) (CMS/HCC)    • Crohn's disease (CMS/HCC)    • Diverticulitis of colon    • Elevated cholesterol    • Essential hypertension 4/15/2020   • GERD (gastroesophageal reflux disease)    • History of transfusion    • Irritable bowel syndrome    • Kidney calculus    • Pancreatitis    • Sphincter of Oddi dysfunction        Past Surgical History:  Past Surgical History:   Procedure Laterality Date   • BACK SURGERY      C5-6   • COLONOSCOPY     • COLONOSCOPY N/A 10/18/2019    Procedure: COLONOSCOPY;  Surgeon: Tom Davis MD;  Location: [facility] ENDOSCOPY;  Service: Gastroenterology   • COLONOSCOPY N/A 8/27/2020    Procedure: COLONOSCOPY;  Surgeon: Tom Davis MD;  Location: [facility] ENDOSCOPY;  Service: Gastroenterology;  Laterality: N/A;   • ENDOSCOPY N/A 10/18/2019    Procedure: ESOPHAGOGASTRODUODENOSCOPY;  Surgeon: Tom Davis MD;  Location: [facility]  ENDOSCOPY;  Service: Gastroenterology   • ENDOSCOPY N/A 7/27/2020    Procedure: ESOPHAGOGASTRODUODENOSCOPY;  Surgeon: Tom Davis MD;  Location: [facility] ENDOSCOPY;  Service: Gastroenterology;  Laterality: N/A;   • HYSTERECTOMY     • SHOULDER ARTHROSCOPY W/ ROTATOR CUFF REPAIR Right    • TOE SURGERY      left small toe    • TONSILECTOMY, ADENOIDECTOMY, BILATERAL MYRINGOTOMY AND TUBES     • TONSILLECTOMY     • UPPER GASTROINTESTINAL ENDOSCOPY         Family History:  Family History   Problem Relation Age of Onset   • Inflammatory bowel disease Mother    • Arthritis Mother    • Diabetes Mother    • Hypertension Mother    • Hyperlipidemia Mother    • Obesity Mother    • Osteoporosis Mother    • Heart disease Father    • Hyperlipidemia Father    • Diabetes Sister    • Liver disease Daughter    • Mental illness Daughter    • Obesity Daughter    • Diabetes Maternal Grandmother    • Cancer Maternal Grandmother         lung   • Cancer Other         lung   • Heart disease Other        Social History:   reports that she has been smoking cigarettes. She has a 40.00 pack-year smoking history. She has never used smokeless tobacco. She reports current drug use. Drug: Marijuana. She reports that she does not drink alcohol.    Medications:   Prior to Admission medications    Medication Sig Start Date End Date Taking? Authorizing Provider   albuterol sulfate  (90 Base) MCG/ACT inhaler Inhale 2 puffs Every 4 (Four) Hours As Needed for Wheezing. 10/28/20  Yes Iliana Jacobson MD   amitriptyline (ELAVIL) 25 MG tablet Take 25 mg by mouth Every Night. 3/6/20  Yes ProviderByron MD   cetirizine (zyrTEC) 10 MG tablet Take 1 tablet by mouth Daily. 9/25/19  Yes Iliana Jacobson MD   Cyanocobalamin (B-12 COMPLIANCE INJECTION IJ) Inject  as directed.   Yes ProviderByron MD   fluticasone (FLONASE) 50 MCG/ACT nasal spray 2 sprays into the nostril(s) as directed by provider Daily. 10/28/20  Yes Iliana Jacobson MD    ketoconazole (Nizoral) 2 % shampoo Apply  topically to the appropriate area as directed 2 (Two) Times a Week. 11/2/20  Yes Xavier Wick MD   montelukast (SINGULAIR) 10 MG tablet Take 1 tablet by mouth Every Night. 10/17/19  Yes Xavier Wick MD   nicotine polacrilex (Nicotine Mini) 4 MG lozenge Dissolve 1 lozenge in the mouth As Needed for Smoking Cessation. 12/4/20  Yes Shaheen Rodriguez MD   Nutritional Supplements (Ensure Active High Protein) liquid Take 237 mL by mouth Daily. 1/28/21  Yes Xavier Wick MD   omeprazole (priLOSEC) 20 MG capsule Take 1 capsule by mouth Daily. 6/23/20  Yes Xavier Wick MD   ondansetron ODT (ZOFRAN-ODT) 4 MG disintegrating tablet Place 1 tablet on the tongue 4 (Four) Times a Day As Needed for Nausea or Vomiting. 7/26/20  Yes Gregory Kaur MD   permethrin (Nix Creme Rinse) 1 % liquid Apply on scalp for 10 minutes then rinse and can repeat in 7 days 12/24/20  Yes Xavier Wick MD   phenazopyridine (Pyridium) 100 MG tablet Take 1 tablet by mouth 3 (Three) Times a Day As Needed for Bladder Spasms. 1/20/21  Yes Niles Roldan APRN   Plecanatide (Trulance) 3 MG tablet Take 1 tablet by mouth Daily. 8/18/20  Yes Tom Davis MD   umeclidinium-vilanterol (ANORO ELLIPTA) 62.5-25 MCG/INH aerosol powder  inhaler Inhale 1 puff Daily. 10/28/20  Yes Iliana Jacobson MD   traMADol (ULTRAM) 50 MG tablet  1/12/21 2/4/21 Yes ProviderByron MD       Allergies:  Nsaids, Azithromycin, Ciprofloxacin, Doxycycline, Other, Levofloxacin, and Phenergan [promethazine hcl]    ROS:    Review of Systems   Constitutional: Negative for activity change, appetite change, chills, diaphoresis, fatigue, fever and unexpected weight change.   HENT: Negative for sore throat and trouble swallowing.    Respiratory: Negative for shortness of breath.    Gastrointestinal: Negative for abdominal distention, abdominal pain, anal bleeding, blood in stool, constipation, diarrhea, nausea, rectal  "pain and vomiting.   Endocrine: Negative for polydipsia, polyphagia and polyuria.   Genitourinary: Negative for difficulty urinating.   Musculoskeletal: Negative for arthralgias.   Skin: Negative for pallor.   Allergic/Immunologic: Negative for food allergies.   Neurological: Negative for weakness and light-headedness.   Psychiatric/Behavioral: Negative for behavioral problems.     Objective     Blood pressure 104/74, pulse 87, height 166.4 cm (65.5\"), weight 45.4 kg (100 lb), not currently breastfeeding.    Physical Exam  Constitutional:       General: She is not in acute distress.     Appearance: She is well-developed. She is not diaphoretic.   HENT:      Head: Normocephalic and atraumatic.   Cardiovascular:      Rate and Rhythm: Normal rate and regular rhythm.      Heart sounds: Normal heart sounds. No murmur. No friction rub. No gallop.    Pulmonary:      Effort: No respiratory distress.      Breath sounds: Normal breath sounds. No wheezing or rales.   Chest:      Chest wall: No tenderness.   Abdominal:      General: Bowel sounds are normal. There is no distension.      Palpations: Abdomen is soft. There is no mass.      Tenderness: There is no abdominal tenderness. There is no guarding or rebound.      Hernia: No hernia is present.   Musculoskeletal: Normal range of motion.   Skin:     General: Skin is warm and dry.      Coloration: Skin is not pale.      Findings: No erythema or rash.   Neurological:      Mental Status: She is alert and oriented to person, place, and time.   Psychiatric:         Behavior: Behavior normal.         Thought Content: Thought content normal.         Judgment: Judgment normal.          Assessment/Plan   Diagnoses and all orders for this visit:    1. Epigastric pain (Primary)  -     Case Request; Standing  -     dextrose 5 % and sodium chloride 0.45 % infusion  -     Case Request    2. Chronic idiopathic constipation    Other orders  -     Follow Anesthesia Guidelines / Standing " Orders; Future  -     Obtain Informed Consent; Future  -     Implement Anesthesia Orders Day of Procedure; Standing  -     Obtain Informed Consent; Standing  -     POC Glucose Once; Standing  -     Pregnancy, Urine -; Standing  -     Insert Peripheral IV; Standing        ESOPHAGOGASTRODUODENOSCOPY (N/A)     Diagnosis Plan   1. Epigastric pain  Case Request    dextrose 5 % and sodium chloride 0.45 % infusion    Case Request   2. Chronic idiopathic constipation         Anticipated Surgical Procedure:  Orders Placed This Encounter   Procedures   • Follow Anesthesia Guidelines / Standing Orders     Standing Status:   Future   • Obtain Informed Consent     Standing Status:   Future     Order Specific Question:   Informed Consent Given For     Answer:   ESOPHAGOGASTRODUODENOSCOPY       The risks, benefits, and alternatives of this procedure have been discussed with the patient or the responsible party- the patient understands and agrees to proceed.

## 2021-02-07 ENCOUNTER — LAB (OUTPATIENT)
Dept: LAB | Facility: HOSPITAL | Age: 51
End: 2021-02-07

## 2021-02-07 DIAGNOSIS — Z01.818 PREOP TESTING: Primary | ICD-10-CM

## 2021-02-07 PROCEDURE — C9803 HOPD COVID-19 SPEC COLLECT: HCPCS

## 2021-02-07 PROCEDURE — U0004 COV-19 TEST NON-CDC HGH THRU: HCPCS

## 2021-02-08 LAB — SARS-COV-2 ORF1AB RESP QL NAA+PROBE: NOT DETECTED

## 2021-02-09 ENCOUNTER — LAB (OUTPATIENT)
Dept: LAB | Facility: HOSPITAL | Age: 51
End: 2021-02-09

## 2021-02-09 ENCOUNTER — OFFICE VISIT (OUTPATIENT)
Dept: FAMILY MEDICINE CLINIC | Facility: CLINIC | Age: 51
End: 2021-02-09

## 2021-02-09 VITALS
HEART RATE: 81 BPM | OXYGEN SATURATION: 98 % | RESPIRATION RATE: 20 BRPM | TEMPERATURE: 97.8 F | BODY MASS INDEX: 17.06 KG/M2 | HEIGHT: 65 IN | SYSTOLIC BLOOD PRESSURE: 108 MMHG | WEIGHT: 102.4 LBS | DIASTOLIC BLOOD PRESSURE: 64 MMHG

## 2021-02-09 DIAGNOSIS — M53.3 TAIL BONE PAIN: ICD-10-CM

## 2021-02-09 DIAGNOSIS — L08.9 SUPERFICIAL SKIN INFECTION: Primary | ICD-10-CM

## 2021-02-09 LAB
ALBUMIN SERPL-MCNC: 4.2 G/DL (ref 3.5–5.2)
ALBUMIN/GLOB SERPL: 1.9 G/DL
ALP SERPL-CCNC: 69 U/L (ref 39–117)
ALT SERPL W P-5'-P-CCNC: 9 U/L (ref 1–33)
ANION GAP SERPL CALCULATED.3IONS-SCNC: 7 MMOL/L (ref 5–15)
AST SERPL-CCNC: 14 U/L (ref 1–32)
BILIRUB SERPL-MCNC: 0.3 MG/DL (ref 0–1.2)
BUN SERPL-MCNC: 8 MG/DL (ref 6–20)
BUN/CREAT SERPL: 11.8 (ref 7–25)
CALCIUM SPEC-SCNC: 9.2 MG/DL (ref 8.6–10.5)
CHLORIDE SERPL-SCNC: 105 MMOL/L (ref 98–107)
CO2 SERPL-SCNC: 30 MMOL/L (ref 22–29)
CREAT SERPL-MCNC: 0.68 MG/DL (ref 0.57–1)
DEPRECATED RDW RBC AUTO: 48 FL (ref 37–54)
ERYTHROCYTE [DISTWIDTH] IN BLOOD BY AUTOMATED COUNT: 13.3 % (ref 12.3–15.4)
GFR SERPL CREATININE-BSD FRML MDRD: 91 ML/MIN/1.73
GLOBULIN UR ELPH-MCNC: 2.2 GM/DL
GLUCOSE SERPL-MCNC: 97 MG/DL (ref 65–99)
HCT VFR BLD AUTO: 42.3 % (ref 34–46.6)
HGB BLD-MCNC: 14.1 G/DL (ref 12–15.9)
MCH RBC QN AUTO: 32.9 PG (ref 26.6–33)
MCHC RBC AUTO-ENTMCNC: 33.3 G/DL (ref 31.5–35.7)
MCV RBC AUTO: 98.6 FL (ref 79–97)
PLATELET # BLD AUTO: 470 10*3/MM3 (ref 140–450)
PMV BLD AUTO: 9.6 FL (ref 6–12)
POTASSIUM SERPL-SCNC: 4 MMOL/L (ref 3.5–5.2)
PROT SERPL-MCNC: 6.4 G/DL (ref 6–8.5)
RBC # BLD AUTO: 4.29 10*6/MM3 (ref 3.77–5.28)
SODIUM SERPL-SCNC: 142 MMOL/L (ref 136–145)
WBC # BLD AUTO: 9.82 10*3/MM3 (ref 3.4–10.8)

## 2021-02-09 PROCEDURE — 99214 OFFICE O/P EST MOD 30 MIN: CPT | Performed by: NURSE PRACTITIONER

## 2021-02-09 PROCEDURE — 80053 COMPREHEN METABOLIC PANEL: CPT | Performed by: NURSE PRACTITIONER

## 2021-02-09 PROCEDURE — 85027 COMPLETE CBC AUTOMATED: CPT | Performed by: NURSE PRACTITIONER

## 2021-02-09 RX ORDER — CEPHALEXIN 500 MG/1
500 CAPSULE ORAL 2 TIMES DAILY
Qty: 20 CAPSULE | Refills: 0 | Status: SHIPPED | OUTPATIENT
Start: 2021-02-09 | End: 2021-03-04

## 2021-02-09 NOTE — PROGRESS NOTES
"Chief Complaint  Lump under arm and Tailbone Pain    Subjective          Carla Patricia Richard presents to [facility] [place]  Back Pain  This is a new problem. The problem occurs intermittently. The problem has been waxing and waning since onset. The pain is present in the sacro-iliac. The quality of the pain is described as aching. The pain does not radiate. The pain is moderate. The symptoms are aggravated by sitting. Pertinent negatives include no bladder incontinence, bowel incontinence, fever, pelvic pain or perianal numbness. She has tried nothing for the symptoms.   Rash  This is a new problem. The current episode started yesterday. The problem has been gradually worsening since onset. The affected locations include the left axilla. The rash is characterized by pain and redness. She was exposed to nothing. Pertinent negatives include no fever. Past treatments include nothing.       Review of Systems   Constitutional: Negative for fever.   Gastrointestinal: Negative for bowel incontinence.   Genitourinary: Negative for pelvic pain and urinary incontinence.   Musculoskeletal: Positive for back pain (tailbone pain).   Skin: Positive for color change (erythemat left axilla) and rash (painful papule left axilla).     Objective   Vital Signs:   /64 (BP Location: Right arm, Patient Position: Sitting, Cuff Size: Adult)   Pulse 81   Temp 97.8 °F (36.6 °C)   Resp 20   Ht 165.1 cm (65\")   Wt 46.4 kg (102 lb 6.4 oz)   SpO2 98%   BMI 17.04 kg/m²     Physical Exam  Vitals signs and nursing note reviewed.   Constitutional:       General: She is not in acute distress.     Appearance: Normal appearance. She is normal weight. She is not ill-appearing.   HENT:      Head: Normocephalic and atraumatic.   Eyes:      Conjunctiva/sclera: Conjunctivae normal.      Pupils: Pupils are equal, round, and reactive to light.   Neck:      Musculoskeletal: Normal range of motion and neck " supple.   Cardiovascular:      Rate and Rhythm: Normal rate and regular rhythm.      Heart sounds: Normal heart sounds.   Pulmonary:      Effort: Pulmonary effort is normal.      Breath sounds: Normal breath sounds.   Musculoskeletal: Normal range of motion.         General: Tenderness (left axilla and tailbone ) present.   Lymphadenopathy:      Cervical: No cervical adenopathy.   Skin:     General: Skin is warm and dry.      Findings: Erythema (left axilla with single hard papule that is ttp) present.   Neurological:      General: No focal deficit present.      Mental Status: She is alert and oriented to person, place, and time.   Psychiatric:         Mood and Affect: Mood normal.         Behavior: Behavior normal.        Result Review :                 Assessment and Plan    Diagnoses and all orders for this visit:    1. Superficial skin infection (Primary)  -     mupirocin (Bactroban) 2 % ointment; Apply  topically to the appropriate area as directed 3 (Three) Times a Day.  Dispense: 30 g; Refill: 0  -     cephalexin (Keflex) 500 MG capsule; Take 1 capsule by mouth 2 (Two) Times a Day.  Dispense: 20 capsule; Refill: 0    2. Tail bone pain  -     XR Sacrum & Coccyx (In Office)    - Pain to L axilla appears superficial skin infection v/s ingrown hair follicle - surrounding erythema present - will tx with cephalexin 500mg BID x 10 days and topical mupirocin ointment TID. Pt is going for upper scope tomorrow - advised to hold oral abx until after procedure - start abx ointment today - apply warm compresses and tylenol for pain. Advised to take OTC probiotics during abx course.    - Xray of sacrum/coccyx ordered today - will call with results.    - Discussed pending lab orders for CBC and CMP from walk in visit on 1/20/21 - advised pt to have these done today to eval for infection. Pt v/u. Pt does report hx of elevated WBC and sees hematology every 2-3 mos - last appt was 1/21/21 also receives B12 injections. Pt  presents with labs performed from hematology on 1/21/21 WBC 14, Platelets 463, hgb 14.8, hct 45.        Follow Up   Return if symptoms worsen or fail to improve.  Patient was given instructions and counseling regarding her condition or for health maintenance advice. Please see specific information pulled into the AVS if appropriate.

## 2021-02-10 ENCOUNTER — HOSPITAL ENCOUNTER (OUTPATIENT)
Facility: HOSPITAL | Age: 51
Setting detail: HOSPITAL OUTPATIENT SURGERY
Discharge: HOME | End: 2021-02-10
Attending: INTERNAL MEDICINE | Admitting: INTERNAL MEDICINE

## 2021-02-10 ENCOUNTER — ANESTHESIA EVENT (OUTPATIENT)
Dept: GASTROENTEROLOGY | Facility: HOSPITAL | Age: 51
End: 2021-02-10

## 2021-02-10 ENCOUNTER — ANESTHESIA (OUTPATIENT)
Dept: GASTROENTEROLOGY | Facility: HOSPITAL | Age: 51
End: 2021-02-10

## 2021-02-10 VITALS
RESPIRATION RATE: 18 BRPM | TEMPERATURE: 98.3 F | HEIGHT: 65 IN | HEART RATE: 82 BPM | DIASTOLIC BLOOD PRESSURE: 45 MMHG | SYSTOLIC BLOOD PRESSURE: 111 MMHG | OXYGEN SATURATION: 97 % | WEIGHT: 104.2 LBS | BODY MASS INDEX: 17.36 KG/M2

## 2021-02-10 DIAGNOSIS — R10.13 EPIGASTRIC PAIN: ICD-10-CM

## 2021-02-10 PROCEDURE — 88305 TISSUE EXAM BY PATHOLOGIST: CPT

## 2021-02-10 PROCEDURE — 43239 EGD BIOPSY SINGLE/MULTIPLE: CPT | Performed by: INTERNAL MEDICINE

## 2021-02-10 PROCEDURE — 25010000002 PROPOFOL 10 MG/ML EMULSION: Performed by: NURSE ANESTHETIST, CERTIFIED REGISTERED

## 2021-02-10 RX ORDER — DEXTROSE AND SODIUM CHLORIDE 5; .45 G/100ML; G/100ML
30 INJECTION, SOLUTION INTRAVENOUS CONTINUOUS PRN
Status: DISCONTINUED | OUTPATIENT
Start: 2021-02-10 | End: 2021-02-10 | Stop reason: HOSPADM

## 2021-02-10 RX ORDER — PROPOFOL 10 MG/ML
VIAL (ML) INTRAVENOUS AS NEEDED
Status: DISCONTINUED | OUTPATIENT
Start: 2021-02-10 | End: 2021-02-10 | Stop reason: SURG

## 2021-02-10 RX ORDER — LIDOCAINE HYDROCHLORIDE 20 MG/ML
INJECTION, SOLUTION INTRAVENOUS AS NEEDED
Status: DISCONTINUED | OUTPATIENT
Start: 2021-02-10 | End: 2021-02-10 | Stop reason: SURG

## 2021-02-10 RX ADMIN — PROPOFOL 30 MG: 10 INJECTION, EMULSION INTRAVENOUS at 11:48

## 2021-02-10 RX ADMIN — PROPOFOL 100 MG: 10 INJECTION, EMULSION INTRAVENOUS at 11:45

## 2021-02-10 RX ADMIN — DEXTROSE AND SODIUM CHLORIDE 30 ML/HR: 5; 450 INJECTION, SOLUTION INTRAVENOUS at 11:25

## 2021-02-10 RX ADMIN — LIDOCAINE HYDROCHLORIDE 100 MG: 20 INJECTION, SOLUTION INTRAVENOUS at 11:45

## 2021-02-10 NOTE — ANESTHESIA POSTPROCEDURE EVALUATION
Patient: Carla Richard    Procedure Summary     Date: 02/10/21 Room / Location: [facility] ENDOSCOPY 1 / [facility] ENDOSCOPY    Anesthesia Start: 1141 Anesthesia Stop: 1153    Procedure: ESOPHAGOGASTRODUODENOSCOPY (N/A ) Diagnosis:       Epigastric pain      (Epigastric pain [R10.13])    Surgeon: Tom Davis MD Provider: Wilma Garcia CRNA    Anesthesia Type: MAC ASA Status: 4          Anesthesia Type: MAC    Vitals  No vitals data found for the desired time range.          Post Anesthesia Care and Evaluation    Patient location during evaluation: bedside  Patient participation: waiting for patient participation  Level of consciousness: sleepy but conscious  Pain score: 0  Pain management: adequate  Airway patency: patent  Anesthetic complications: No anesthetic complications  PONV Status: none  Cardiovascular status: acceptable  Respiratory status: acceptable  Hydration status: acceptable

## 2021-02-10 NOTE — ANESTHESIA PREPROCEDURE EVALUATION
Anesthesia Evaluation     Patient summary reviewed and Nursing notes reviewed   NPO Solid Status: > 8 hours  NPO Liquid Status: > 8 hours           Airway   Mallampati: II  TM distance: >3 FB  Neck ROM: full  No difficulty expected  Dental    (+) edentulous    Pulmonary    (+) a smoker Current Smoked day of surgery, COPD moderate, asthma,recent URI resolved, decreased breath sounds,   Cardiovascular - normal exam    (+) hypertension well controlled, hyperlipidemia,       Neuro/Psych  (+) tremors, psychiatric history Anxiety and Depression,     GI/Hepatic/Renal/Endo    (+)  GERD,  renal disease CRI,     Musculoskeletal     (+) neck pain,   Abdominal  - normal exam   Substance History   (+) drug use      Comment: Smokes marijuana daily   OB/GYN negative ob/gyn ROS         Other      history of cancer                    Anesthesia Plan    ASA 4     MAC     intravenous induction     Anesthetic plan, all risks, benefits, and alternatives have been provided, discussed and informed consent has been obtained with: patient.

## 2021-02-11 RX ORDER — FLUCONAZOLE 150 MG/1
TABLET ORAL
Qty: 2 TABLET | Refills: 0 | Status: SHIPPED | OUTPATIENT
Start: 2021-02-11 | End: 2021-03-04

## 2021-02-11 RX ORDER — CEPHALEXIN 500 MG/1
500 CAPSULE ORAL 2 TIMES DAILY
Qty: 14 CAPSULE | Refills: 0 | Status: SHIPPED | OUTPATIENT
Start: 2021-02-11 | End: 2021-02-18

## 2021-02-16 LAB
LAB AP CASE REPORT: NORMAL
PATH REPORT.FINAL DX SPEC: NORMAL

## 2021-03-01 RX ORDER — ALBUTEROL SULFATE 1.25 MG/3ML
1 SOLUTION RESPIRATORY (INHALATION) EVERY 6 HOURS PRN
Qty: 9 ML | Refills: 3 | Status: SHIPPED | OUTPATIENT
Start: 2021-03-01 | End: 2021-06-03 | Stop reason: SDUPTHER

## 2021-03-04 ENCOUNTER — OFFICE VISIT (OUTPATIENT)
Dept: FAMILY MEDICINE CLINIC | Facility: CLINIC | Age: 51
End: 2021-03-04

## 2021-03-04 VITALS
HEIGHT: 65 IN | OXYGEN SATURATION: 100 % | TEMPERATURE: 97.8 F | HEART RATE: 89 BPM | RESPIRATION RATE: 20 BRPM | WEIGHT: 102.5 LBS | BODY MASS INDEX: 17.08 KG/M2 | DIASTOLIC BLOOD PRESSURE: 80 MMHG | SYSTOLIC BLOOD PRESSURE: 104 MMHG

## 2021-03-04 DIAGNOSIS — S89.91XA INJURY OF RIGHT KNEE, INITIAL ENCOUNTER: Primary | ICD-10-CM

## 2021-03-04 DIAGNOSIS — S80.01XA CONTUSION OF RIGHT KNEE, INITIAL ENCOUNTER: ICD-10-CM

## 2021-03-04 PROCEDURE — 99213 OFFICE O/P EST LOW 20 MIN: CPT | Performed by: NURSE PRACTITIONER

## 2021-03-04 NOTE — PATIENT INSTRUCTIONS
Contusion  A contusion is a deep bruise. This is a result of an injury that causes bleeding under the skin. Symptoms of bruising include pain, swelling, and discolored skin. The skin may turn blue, purple, or yellow.  Follow these instructions at home:  Managing pain, stiffness, and swelling  You may use RICE. This stands for:  · Resting.  · Icing.  · Compression, or putting pressure.  · Elevating, or raising the injured area.  To follow this method, do these actions:  · Rest the injured area.  · If told, put ice on the injured area.  ? Put ice in a plastic bag.  ? Place a towel between your skin and the bag.  ? Leave the ice on for 20 minutes, 2-3 times per day.  · If told, put light pressure (compression) on the injured area using an elastic bandage. Make sure the bandage is not too tight. If the area tingles or becomes numb, remove it and put it back on as told by your doctor.  · If possible, raise (elevate) the injured area above the level of your heart while you are sitting or lying down.    General instructions  · Take over-the-counter and prescription medicines only as told by your doctor.  · Keep all follow-up visits as told by your doctor. This is important.  Contact a doctor if:  · Your symptoms do not get better after several days of treatment.  · Your symptoms get worse.  · You have trouble moving the injured area.  Get help right away if:  · You have very bad pain.  · You have a loss of feeling (numbness) in a hand or foot.  · Your hand or foot turns pale or cold.  Summary  · A contusion is a deep bruise. This is a result of an injury that causes bleeding under the skin.  · Symptoms of bruising include pain, swelling, and discolored skin. The skin may turn blue, purple, or yellow.  · This condition is treated with rest, ice, compression, and elevation. This is also called RICE. You may be given over-the-counter medicines for pain.  · Contact a doctor if you do not feel better, or you feel worse. Get  help right away if you have very bad pain, have lost feeling in a hand or foot, or the area turns pale or cold.  This information is not intended to replace advice given to you by your health care provider. Make sure you discuss any questions you have with your health care provider.  Document Revised: 08/09/2019 Document Reviewed: 08/09/2019  Elsevier Patient Education © 2020 [employer]

## 2021-03-04 NOTE — PROGRESS NOTES
"Chief Complaint  No chief complaint on file.    Subjective          Carla Richard presents to [facility]    PCP: Dr. Wick    CC: \"I woke with right knee pain.  It is hard to walk.  Touching it creates intense pain\"      Knee Pain   The incident occurred 12 to 24 hours ago (during the night last night). The incident occurred at home. The injury mechanism was a direct blow (ran into corner of wooden heater). The pain is present in the right knee. The quality of the pain is described as burning. The pain is at a severity of 6/10. The pain has been constant since onset. Associated symptoms include an inability to bear weight ( this morning, some better now). Pertinent negatives include no loss of motion, loss of sensation, muscle weakness, numbness or tingling. She reports no foreign bodies present. The symptoms are aggravated by palpation and weight bearing. Treatments tried: tramadol; elevation. The treatment provided no relief.       Review of Systems   Constitutional: Negative.    HENT: Negative.    Respiratory: Negative.    Cardiovascular: Negative.    Gastrointestinal: Negative.    Genitourinary: Negative for difficulty urinating.   Musculoskeletal: Positive for arthralgias (right knee).   Skin: Negative.    Neurological: Negative for tingling and numbness.     Objective   Vital Signs:   /80 (BP Location: Left arm, Patient Position: Sitting, Cuff Size: Adult)   Pulse 89   Temp 97.8 °F (36.6 °C) (Temporal)   Resp 20   Ht 165.1 cm (65\")   Wt 46.5 kg (102 lb 8 oz)   SpO2 100%   BMI 17.06 kg/m²     Physical Exam  Vitals signs and nursing note reviewed.   Constitutional:       General: She is not in acute distress.     Appearance: Normal appearance. She is not ill-appearing.   Cardiovascular:      Rate and Rhythm: Normal rate and regular rhythm.   Pulmonary:      Effort: Pulmonary effort is normal. No respiratory distress.      Breath " sounds: No wheezing, rhonchi or rales.      Comments: Decreased aeration    Musculoskeletal:      Right knee: She exhibits normal range of motion and no swelling. Tenderness found. Medial joint line tenderness noted.        Legs:    Skin:     General: Skin is warm and dry.   Neurological:      General: No focal deficit present.      Mental Status: She is alert and oriented to person, place, and time.        Result Review :          Xr Knee 4+ Vw Right    Result Date: 3/4/2021  1. Negative right knee.. Electronically signed by:  Madhav Rogers MD  3/4/2021 2:36 PM CST Workstation: [id number]           Assessment and Plan    Diagnoses and all orders for this visit:    1. Injury of right knee, initial encounter (Primary)  -     XR knee 4+ vw right    2. Contusion of right knee, initial encounter      No acute abnormality on xrays  Will treat as contusion  Knee sleeve provided at patient request for comfort ([place])  May take Tylenol or Motrin OTC, also has Tramadol at home if needed for pain  Ice to knee a few times per day    See PCP or RTC if symptoms persist/worsen  See PCP for routine f/u visit and management of chronic medical conditions      This document has been electronically signed by CROW Padgett on March 4, 2021 15:44 CST,.

## 2021-03-11 ENCOUNTER — OFFICE VISIT (OUTPATIENT)
Dept: GASTROENTEROLOGY | Facility: CLINIC | Age: 51
End: 2021-03-11

## 2021-03-11 DIAGNOSIS — K59.04 CHRONIC IDIOPATHIC CONSTIPATION: Primary | ICD-10-CM

## 2021-03-11 PROCEDURE — 99442 PR PHYS/QHP TELEPHONE EVALUATION 11-20 MIN: CPT | Performed by: INTERNAL MEDICINE

## 2021-03-11 NOTE — PROGRESS NOTES
[facility]      Chief Complaint:   No chief complaint on file.  This visit has been rescheduled as a phone visit to comply with patient safety concerns in accordance with CDC recommendations. Total time of discussion was 20 minutes.  You have chosen to receive care through a telephone visit. Do you consent to use a telephone visit for your medical care today? Yes    Subjective     HPI:   Patient with recent EGD for abdominal pain and weight loss.  Patient states that she feels much better at this time is taking Ensure and this is significantly improved her weight loss.  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  Patient is on Trulance for constipation and doing well.    Plan; outpatient follow-up in 3 months continue patient on Trulance.    Past Medical History:   Past Medical History:   Diagnosis Date   • Asthma    • Cancer (CMS/HCC)     cervical   • Colon polyp    • COPD (chronic obstructive pulmonary disease) (CMS/HCC)    • Crohn's disease (CMS/HCC)    • Diverticulitis of colon    • Elevated cholesterol    • Essential hypertension 4/15/2020   • GERD (gastroesophageal reflux disease)    • History of transfusion    • Irritable bowel syndrome    • Kidney calculus    • Pancreatitis    • Sphincter of Oddi dysfunction        Past Surgical History:    Past Surgical History:   Procedure Laterality Date   • BACK SURGERY      C5-6   • COLONOSCOPY     • COLONOSCOPY N/A 10/18/2019    Procedure: COLONOSCOPY;  Surgeon: Tom Davis MD;  Location: [facility] ENDOSCOPY;  Service: Gastroenterology   • COLONOSCOPY N/A 8/27/2020    Procedure: COLONOSCOPY;  Surgeon: Tom Davis MD;  Location: [facility] ENDOSCOPY;  Service: Gastroenterology;  Laterality: N/A;   • ENDOSCOPY N/A 10/18/2019    Procedure: ESOPHAGOGASTRODUODENOSCOPY;  Surgeon: Tom Davis MD;   Location: [facility] ENDOSCOPY;  Service: Gastroenterology   • ENDOSCOPY N/A 7/27/2020    Procedure: ESOPHAGOGASTRODUODENOSCOPY;  Surgeon: Tom Davis MD;  Location: [facility] ENDOSCOPY;  Service: Gastroenterology;  Laterality: N/A;   • ENDOSCOPY N/A 2/10/2021    Procedure: ESOPHAGOGASTRODUODENOSCOPY;  Surgeon: Tom Davis MD;  Location: [facility] ENDOSCOPY;  Service: Gastroenterology;  Laterality: N/A;   • HYSTERECTOMY     • SHOULDER ARTHROSCOPY W/ ROTATOR CUFF REPAIR Right    • TOE SURGERY      left small toe    • TONSILECTOMY, ADENOIDECTOMY, BILATERAL MYRINGOTOMY AND TUBES     • TONSILLECTOMY     • UPPER GASTROINTESTINAL ENDOSCOPY         Family History:  Family History   Problem Relation Age of Onset   • Inflammatory bowel disease Mother    • Arthritis Mother    • Diabetes Mother    • Hypertension Mother    • Hyperlipidemia Mother    • Obesity Mother    • Osteoporosis Mother    • Heart disease Father    • Hyperlipidemia Father    • Diabetes Sister    • Liver disease Daughter    • Mental illness Daughter    • Obesity Daughter    • Diabetes Maternal Grandmother    • Cancer Maternal Grandmother         lung   • Cancer Other         lung   • Heart disease Other        Social History:   reports that she has been smoking cigarettes. She has a 40.00 pack-year smoking history. She has never used smokeless tobacco. She reports current drug use. Drug: Marijuana. She reports that she does not drink alcohol.    Medications:   Prior to Admission medications    Medication Sig Start Date End Date Taking? Authorizing Provider   albuterol (ACCUNEB) 1.25 MG/3ML nebulizer solution Take 3 mL by nebulization Every 6 (Six) Hours As Needed for Wheezing or Shortness of Air. 3/1/21   Xavier Wick MD   albuterol sulfate  (90 Base) MCG/ACT inhaler Inhale 2 puffs Every 4 (Four) Hours As Needed for Wheezing. 10/28/20   Iliana Jacobson MD   amitriptyline (ELAVIL) 25 MG tablet Take 25 mg by mouth Every Night. 3/6/20   Provider,  MD Byron   cetirizine (zyrTEC) 10 MG tablet Take 1 tablet by mouth Daily. 9/25/19   Iliana Jacobson MD   Cyanocobalamin (B-12 COMPLIANCE INJECTION IJ) Inject  as directed.    Provider, MD Byron   fluticasone (FLONASE) 50 MCG/ACT nasal spray 2 sprays into the nostril(s) as directed by provider Daily. 10/28/20   Iliana Jacobson MD   montelukast (SINGULAIR) 10 MG tablet Take 1 tablet by mouth Every Night. 10/17/19   Xavier Wick MD   mupirocin (Bactroban) 2 % ointment Apply  topically to the appropriate area as directed 3 (Three) Times a Day. 2/9/21   Melissa Reynolds APRN   Nutritional Supplements (Ensure Active High Protein) liquid Take 237 mL by mouth Daily. 1/28/21   Xavier Wick MD   omeprazole (priLOSEC) 20 MG capsule Take 1 capsule by mouth Daily. 6/23/20   Xavier Wick MD   ondansetron ODT (ZOFRAN-ODT) 4 MG disintegrating tablet Place 1 tablet on the tongue 4 (Four) Times a Day As Needed for Nausea or Vomiting. 7/26/20   Gregory Kaur MD   Plecanatide (Trulance) 3 MG tablet Take 1 tablet by mouth Daily. 8/18/20   Tom Davis MD   umeclidinium-vilanterol (ANORO ELLIPTA) 62.5-25 MCG/INH aerosol powder  inhaler Inhale 1 puff Daily. 10/28/20   Iliana Jacobson MD       Allergies:  Nsaids, Azithromycin, Ciprofloxacin, Doxycycline, Other, Levofloxacin, and Phenergan [promethazine hcl]    ROS:    Review of Systems   Constitutional: Negative for activity change, appetite change, chills, diaphoresis, fatigue, fever and unexpected weight change.   HENT: Negative for sore throat and trouble swallowing.    Respiratory: Negative for shortness of breath.    Gastrointestinal: Negative for abdominal distention, abdominal pain, anal bleeding, blood in stool, constipation, diarrhea, nausea, rectal pain and vomiting.   Endocrine: Negative for polydipsia, polyphagia and polyuria.   Genitourinary: Negative for difficulty urinating.   Musculoskeletal: Negative for arthralgias.   Skin: Negative  for pallor.   Allergic/Immunologic: Negative for food allergies.   Neurological: Negative for weakness and light-headedness.   Psychiatric/Behavioral: Negative for behavioral problems.     Objective     not currently breastfeeding.    Physical Exam     Assessment/Plan   Diagnoses and all orders for this visit:    1. Chronic idiopathic constipation (Primary)        * Surgery not found *     Diagnosis Plan   1. Chronic idiopathic constipation         Anticipated Surgical Procedure:  No orders of the defined types were placed in this encounter.      The risks, benefits, and alternatives of this procedure have been discussed with the patient or the responsible party- the patient understands and agrees to proceed.

## 2021-03-25 ENCOUNTER — OFFICE VISIT (OUTPATIENT)
Dept: FAMILY MEDICINE CLINIC | Facility: CLINIC | Age: 51
End: 2021-03-25

## 2021-03-25 VITALS
HEART RATE: 90 BPM | SYSTOLIC BLOOD PRESSURE: 110 MMHG | HEIGHT: 65 IN | OXYGEN SATURATION: 99 % | WEIGHT: 103.2 LBS | TEMPERATURE: 97.8 F | BODY MASS INDEX: 17.19 KG/M2 | DIASTOLIC BLOOD PRESSURE: 80 MMHG

## 2021-03-25 DIAGNOSIS — M25.551 RIGHT HIP PAIN: ICD-10-CM

## 2021-03-25 DIAGNOSIS — S76.011A STRAIN OF RIGHT HIP, INITIAL ENCOUNTER: Primary | ICD-10-CM

## 2021-03-25 PROCEDURE — 96372 THER/PROPH/DIAG INJ SC/IM: CPT | Performed by: NURSE PRACTITIONER

## 2021-03-25 PROCEDURE — 99213 OFFICE O/P EST LOW 20 MIN: CPT | Performed by: NURSE PRACTITIONER

## 2021-03-25 RX ORDER — TRIAMCINOLONE ACETONIDE 40 MG/ML
40 INJECTION, SUSPENSION INTRA-ARTICULAR; INTRAMUSCULAR ONCE
Status: COMPLETED | OUTPATIENT
Start: 2021-03-25 | End: 2021-03-25

## 2021-03-25 RX ORDER — CYCLOBENZAPRINE HCL 5 MG
5 TABLET ORAL 3 TIMES DAILY PRN
Qty: 30 TABLET | Refills: 0 | Status: SHIPPED | OUTPATIENT
Start: 2021-03-25 | End: 2021-04-29 | Stop reason: SDUPTHER

## 2021-03-25 NOTE — PROGRESS NOTES
"Chief Complaint  Back Pain and Pelvic Pain    Subjective          Carla Richard presents to [facility]  [facility]    PCP: KASHMIR Wick MD    CC: \"My right back hurts and lower left pelvic bone hurts\"    Ms. Richard is a 52yo female who presents to urgent care today with CC: \"my right back hurts and left lower pelvic bone hurts\". Patient states that 2 days ago she stretched to grab her [employer] tickets as they were flying away and felt a crunch in her hips. Later that evening patient began to feel pain in her lower right back, right hip, and left pelvic area. Patient states that this morning she woke up and noticed crepitus in her right hip. Pain in her lower right back is 7/10 this morning and is more severe than past sciatica pain she has experienced. Patient endorses a hx of pelvic fracture after a fall in 2010 and sciatica that is managed with non-pharm interventions. Patient reports pain and difficulty with driving and walking. Pain is worse upon sitting or standing and relieved upon lying down. Patient has been using a heating pad at home and took tylenol yesterday with mild relief of symptoms. Patient denies any pain management today.     Hip Injury  This is a new problem. Episode onset: x 2 days ago. The problem occurs daily. The problem has been unchanged. Associated symptoms include arthralgias (right hip/pelvis). Pertinent negatives include no abdominal pain, anorexia, change in bowel habit, chest pain, chills, congestion, coughing, diaphoresis, fatigue, fever, headaches, joint swelling, myalgias, nausea, neck pain, numbness, rash, sore throat, swollen glands, urinary symptoms, vertigo, visual change, vomiting or weakness. The symptoms are aggravated by standing and walking (postition changes). Treatments tried: tylenol, ice. The treatment provided mild relief.       Objective   Vital Signs:   /80 (BP Location: Left arm, Patient Position: " "Sitting, Cuff Size: Adult)   Pulse 90   Temp 97.8 °F (36.6 °C) (Temporal)   Ht 165.1 cm (65\")   Wt 46.8 kg (103 lb 3.2 oz)   SpO2 99%   BMI 17.17 kg/m²     Physical Exam  Vitals reviewed.   Constitutional:       General: She is in acute distress ( in some noted discomfort).      Appearance: Normal appearance. She is not ill-appearing.   HENT:      Head: Normocephalic and atraumatic.   Cardiovascular:      Rate and Rhythm: Normal rate and regular rhythm.      Pulses: Normal pulses.      Heart sounds: Normal heart sounds.   Pulmonary:      Effort: Pulmonary effort is normal.      Breath sounds: Normal breath sounds.   Musculoskeletal:         General: Tenderness ( Right hip, right lower back) present.      Cervical back: Neck supple.      Right hip: Tenderness and crepitus (patient reports) present. Decreased range of motion ( due to pain).        Legs:    Skin:     General: Skin is warm and dry.      Capillary Refill: Capillary refill takes less than 2 seconds.   Neurological:      General: No focal deficit present.      Mental Status: She is alert and oriented to person, place, and time. Mental status is at baseline.   Psychiatric:         Mood and Affect: Mood normal.         Behavior: Behavior normal.         Thought Content: Thought content normal.         Judgment: Judgment normal.        Result Review :             XR Hip With or Without Pelvis 2 - 3 View Right    Result Date: 3/25/2021  No radiographic evidence of an acute fracture Electronically signed by:  Chester Palacios MD  3/25/2021 11:16 AM CDT Workstation: [id number]       Assessment and Plan    Diagnoses and all orders for this visit:    1. Strain of right hip, initial encounter (Primary)  -     triamcinolone acetonide (KENALOG-40) injection 40 mg  -     cyclobenzaprine (FLEXERIL) 5 MG tablet; Take 1 tablet by mouth 3 (Three) Times a Day As Needed (hip pain). Do not drive while taking  Dispense: 30 tablet; Refill: 0    2. Right hip pain  -     XR " Hip With or Without Pelvis 2 - 3 View Right    Other orders  -     Cancel: XR Abdomen Flat & Upright (In Office)      Kenalog 40 mg IM x 1 in office  Rx for Flexeril as needed--do not drive/work while taking  Continue with Tylenol OTC as needed  Ice as needed  Avoid any straining, lifting.  Take frequent breaks from standing as needed.     RTC or see PCP if symptoms last longer than 10-14 days for further evaluation/imaging if needed    See PCP or RTC if symptoms persist/worsen  See PCP for routine f/u visit and management of chronic medical conditions      This document has been electronically signed by CROW Padgett on March 25, 2021 11:53 CDT,.

## 2021-03-25 NOTE — PATIENT INSTRUCTIONS
Muscle Strain  A muscle strain is an injury that happens when a muscle is stretched longer than normal. This can happen during a fall, sports, or lifting. This can tear some muscle fibers. Usually, recovery from muscle strain takes 1-2 weeks. Complete healing normally takes 5-6 weeks.  This condition is first treated with PRICE therapy. This involves:  · Protecting your muscle from being injured again.  · Resting your injured muscle.  · Icing your injured muscle.  · Applying pressure (compression) to your injured muscle. This may be done with a splint or elastic bandage.  · Raising (elevating) your injured muscle.  Your doctor may also recommend medicine for pain.  Follow these instructions at home:  If you have a splint:  · Wear the splint as told by your doctor. Take it off only as told by your doctor.  · Loosen the splint if your fingers or toes tingle, get numb, or turn cold and blue.  · Keep the splint clean.  · If the splint is not waterproof:  ? Do not let it get wet.  ? Cover it with a watertight covering when you take a bath or a shower.  Managing pain, stiffness, and swelling    · If told, put ice on your injured area.  ? If you have a removable splint, take it off as told by your doctor.  ? Put ice in a plastic bag.  ? Place a towel between your skin and the bag.  ? Leave the ice on for 20 minutes, 2-3 times a day.  · Move your fingers or toes often. This helps to avoid stiffness and lessen swelling.  · Raise your injured area above the level of your heart while you are sitting or lying down.  · Wear an elastic bandage as told by your doctor. Make sure it is not too tight.  General instructions  · Take over-the-counter and prescription medicines only as told by your doctor. This may include medicines for pain and swelling that are taken by mouth or put on the skin, prescription pain medicine, or muscle relaxants.  · Limit your activity. Rest your injured muscle as told by your doctor. Your doctor may say  that gentle movements are okay.  · If physical therapy was prescribed, do exercises as told by your doctor.  · Do not put pressure on any part of the splint until it is fully hardened. This may take many hours.  · Do not use any products that contain nicotine or tobacco, such as cigarettes and e-cigarettes. These can delay bone healing. If you need help quitting, ask your doctor.  · Warm up before you exercise. This helps to prevent more muscle strains.  · Ask your doctor when it is safe to drive if you have a splint.  · Keep all follow-up visits as told by your doctor. This is important.  Contact a doctor if:  · You have more pain or swelling in your injured area.  Get help right away if:  · You have any of these problems in your injured area:  ? You have numbness.  ? You have tingling.  ? You lose a lot of strength.  Summary  · A muscle strain is an injury that happens when a muscle is stretched longer than normal.  · This condition is first treated with PRICE therapy. This includes protecting, resting, icing, adding pressure, and raising your injury.  · Limit your activity. Rest your injured muscle as told by your doctor. Your doctor may say that gentle movements are okay.  · Warm up before you exercise. This helps to prevent more muscle strains.  This information is not intended to replace advice given to you by your health care provider. Make sure you discuss any questions you have with your health care provider.  Document Revised: 10/15/2020 Document Reviewed: 01/24/2018  [employer] Patient Education © 2021 [employer]

## 2021-04-14 NOTE — PROGRESS NOTES
Chief Complaint  Wrist Pain, Cough, Fever, URI, Earache, Generalized Body Aches, and Vomiting    Subjective          Carla Patricia Richard presents to [facility]     Pt is 51 yo female with history of moderate COPD, chronic idiopathic constipation  Vitamin B12 deficiency, Vitamin D deficiency, chronic abdominal pain, tremor,  RUQ pain,  Sp hysterectomy,  History of chron's disease, history of pertussis infection  sp right shoulder surgery. X 3, sp rotator cuff repair , history of C diff diarrhea, gluten sensitivity, gastritis, marijuana use,        11/9/20 telemedicine  visit for recheck on pt's above medical issues. Is here for recheck on rash on scalp. Was given keflex along with nizoral shampoo for possible follliculitis vs tinea capitis. Her scalp has improved with the treatment.  Her stomach issues is better with refraining on gluten free diet.  Breathing stable and pt continue to cut back on smoking      4/14/21 in office visit for recheck on pt's above medical issues. Since last visit pt has seen GI for her chronic idiopathic constipation. Did see walk in clinic in March 2021 for hip and knee pain. X-rays of both hip and knee were negative. Pt continue to take her medications for her COPD, IBS, GERD, allergic rhinitis,  termors  She states she was tested for COVID-19  At [facility] yesterday and per pt was normal. Pt states her breathing has been worse this past Monday. She continues to smoke 3/4 ppd.  She denies any fever her 02 saturation is at 97% at [facility].  Has been coughing up mucous.  She continues Anoro and albuterol inhaler. She does have pets a keturah          Heartburn  She complains of abdominal pain, chest pain and heartburn. She reports no belching, no choking, no coughing, no dysphagia, no early satiety, no globus sensation, no hoarse voice, no nausea, no sore throat, no stridor, no tooth decay or no wheezing. This is a chronic problem. The current episode started more  than 1 year ago. The problem occurs constantly. The problem has been waxing and waning. The heartburn is of moderate intensity. The heartburn does not wake her from sleep. The heartburn does not limit her activity. The heartburn doesn't change with position. The symptoms are aggravated by stress and smoking. Pertinent negatives include no fatigue, melena, muscle weakness or weight loss. Risk factors include smoking/tobacco exposure. She has tried a PPI for the symptoms. The treatment provided moderate relief. Past procedures include an EGD. Past procedures do not include an abdominal ultrasound, esophageal manometry, esophageal pH monitoring, H. pylori antibody titer or a UGI.   Abdominal Pain  This is a chronic problem. The current episode started more than 1 year ago. The onset quality is sudden. The problem occurs constantly. The problem has been unchanged. The pain is located in the generalized abdominal region. The pain is at a severity of 3/10. The pain is moderate. The quality of the pain is burning. Associated symptoms include constipation and diarrhea. Pertinent negatives include no anorexia, arthralgias, belching, dysuria, flatus, frequency, headaches, hematochezia, hematuria, melena, myalgias, nausea, vomiting or weight loss. The pain is aggravated by eating. Treatments tried: trulance  The treatment provided moderate relief. Prior diagnostic workup includes GI consult, lower endoscopy and upper endoscopy. Her past medical history is significant for GERD and irritable bowel syndrome. There is no history of abdominal surgery, colon cancer, Crohn's disease, gallstones, pancreatitis, PUD or ulcerative colitis.   Nicotine Dependence  Presents for follow-up visit. Symptoms are negative for fatigue and sore throat. Her urge triggers include company of smokers. The symptoms have been stable. She smokes 1 pack of cigarettes per day. Compliance with prior treatments has been poor.   Shortness of Breath  This is a  "recurrent problem. The current episode started more than 1 month ago. The problem occurs constantly. The problem has been gradually worsening. Associated symptoms include abdominal pain and chest pain. Pertinent negatives include no claudication, coryza, ear pain, headaches, hemoptysis, leg pain, leg swelling, neck pain, orthopnea, PND, rash, sore throat, sputum production, swollen glands, syncope, vomiting or wheezing. The symptoms are aggravated by URIs, smoke and occupational exposure (cats). She has tried beta agonist inhalers and steroid inhalers for the symptoms. Her past medical history is significant for allergies, chronic lung disease, COPD and pneumonia. There is no history of asthma, bronchiolitis, CAD, DVT, a heart failure, PE or a recent surgery.         COPD   This is a chronic problem. The current episode started more than 1 year ago. The problem occurs constantly. The problem has been iomproving Associated symptoms include abdominal pain, arthralgias, fatigue, joint swelling, numbness and weakness. Pertinent negatives include no anorexia, change in bowel habit, chest pain, chills, congestion, coughing, diaphoresis, fever, headaches, myalgias, nausea, neck pain, sore throat, swollen glands, urinary symptoms, vertigo, visual change or vomiting. The symptoms are aggravated by smoking. She has tried (combivent  symbicort anoro, albuterol inhaler ) for the symptoms.      Objective   Vital Signs:   /60 (BP Location: Left arm, Patient Position: Sitting, Cuff Size: Adult)   Pulse 82   Temp 97.7 °F (36.5 °C)   Ht 165.1 cm (65\")   Wt 46.3 kg (102 lb)   LMP  (LMP Unknown)   SpO2 97%   BMI 16.97 kg/m²     Physical Exam  Vitals and nursing note reviewed.   Constitutional:       Appearance: She is well-developed. She is not diaphoretic.      Comments: Thin appearance     Appears ill    HENT:      Head: Normocephalic and atraumatic.      Right Ear: External ear normal.   Eyes:      Conjunctiva/sclera: " Conjunctivae normal.      Pupils: Pupils are equal, round, and reactive to light.   Cardiovascular:      Rate and Rhythm: Normal rate and regular rhythm.      Heart sounds: Normal heart sounds. No murmur heard.     Pulmonary:      Effort: No respiratory distress.      Comments: Decreased breath sounds     Coarse breath sounds in all lung fields   Abdominal:      General: Bowel sounds are normal. There is no distension.      Palpations: Abdomen is soft.      Tenderness: There is abdominal tenderness.   Musculoskeletal:         General: Tenderness present. No deformity. Normal range of motion.      Cervical back: Normal range of motion and neck supple.   Skin:     General: Skin is warm.      Coloration: Skin is not pale.      Findings: No erythema or rash.   Neurological:      Mental Status: She is oriented to person, place, and time.      Cranial Nerves: No cranial nerve deficit.   Psychiatric:         Behavior: Behavior normal.        Result Review :                 Assessment and Plan    Diagnoses and all orders for this visit:    1. Acute bronchitis, unspecified organism (Primary)  -     XR Chest PA & Lateral; Future  -     CBC Auto Differential; Future  -     Comprehensive metabolic panel; Future  -     C-reactive protein; Future  -     cefTRIAXone (ROCEPHIN) injection 1 g    2. Stage 2 moderate COPD by GOLD classification (CMS/HCC)  -     XR Chest PA & Lateral; Future  -     CBC Auto Differential; Future  -     Comprehensive metabolic panel; Future  -     C-reactive protein; Future    3. Irritable bowel syndrome with constipation    4. Gastroesophageal reflux disease with esophagitis without hemorrhage    5. Tobacco user    6. Cigarette nicotine dependence with nicotine-induced disorder    7. Underweight    8. Moderate protein-calorie malnutrition (CMS/HCC)    9. COPD with exacerbation (CMS/HCC)  -     XR Chest PA & Lateral; Future  -     CBC Auto Differential; Future  -     Comprehensive metabolic panel;  Future  -     C-reactive protein; Future  -     methylPREDNISolone sodium succinate (SOLU-Medrol) injection 125 mg    10. B12 deficiency  -     Vitamin B12; Future  -     Vitamin D 25 hydroxy; Future    11. Vitamin D deficiency  -     Vitamin B12; Future  -     Vitamin D 25 hydroxy; Future    Other orders  -     amoxicillin-clavulanate (Augmentin) 875-125 MG per tablet; Take 1 tablet by mouth 2 (Two) Times a Day for 14 days.  Dispense: 28 tablet; Refill: 0  -     predniSONE (DELTASONE) 10 MG tablet; Take 4 pills for 4 days 3 pills for 3 days 2 pills for 2 days and 1 pill for one  Day then stop  Dispense: 30 tablet; Refill: 0  -     albuterol (ACCUNEB) 1.25 MG/3ML nebulizer solution; Take 3 mL by nebulization Every 4 (Four) Hours As Needed for Wheezing or Shortness of Air.  Dispense: 9 mL; Refill: 3  -     vitamin D (ERGOCALCIFEROL) 1.25 MG (20285 UT) capsule capsule; Take 1 capsule by mouth 1 (One) Time Per Week.  Dispense: 5 capsule; Refill: 3       -recommend labwork   -HTN/tachcycardia -controlled on toprol XL 50 mg daily.   -vitamin D deficiency -check levels. Restart vitamin D once a week  -vitamin b12  Deficiency -on weekly injections. Check b12 levels   -moderate protein malnutrition/underweight  -  recommend  high calorie diet information/ smoking cesation   -gluten sensitivity -recommend gluten free diet    -COPD/COPD exacerbation/acute bronchitis - Pulmonology following duo nebs stopped now on Anoro daily.  Albuterol PRN.  Chest x-ray today. Prednisone tapering dose along with rocephin shot 1 gram IM  X 1 today.  augmentin 875/125 mg every 12 hours for 14 days.  Check CBC, CMP and CRP.  Advised importance of smoking cessation. Advised pt to go to ER if not better 24-48 hours.    -tobacco user - counseled to quit smoking >5 minutes. She could not tolerate chantix  urged the importance of quitting smoking.   Insurance would not cover nicotine patch and gum. Recommend pt call 1 800 QUIT NOW recommend nicotine  replacement therapy   -abdominal pain/gastritis/IBS /GERD/celiac - Gastroenterology following. On PPI prilosec 20 mg q daily on trulance 3 mg PO qdaily.     -allergic rhinitis - flonase nasal spray and singulair along with zyrtec   -advised pt to be safe and call with questions and concerns  -advised pt to go to ER or call 911 if symptoms worrisome or severe  -advised pt to be safe during COVID-19 pandemic  I spent 25 minutes caring for Carla on this date of service. This time includes time spent by me in the following activities: preparing for the visit, reviewing tests, obtaining and/or reviewing a separately obtained history, performing a medically appropriate examination and/or evaluation, counseling and educating the patient/family/caregiver, ordering medications, tests, or procedures, referring and communicating with other health care professionals, documenting information in the medical record and care coordination.   -recheck in 2 weeks         This document has been electronically signed by Xavier Wick MD on April 16, 2021 07:19 CDT        Follow Up   No follow-ups on file.  Patient was given instructions and counseling regarding her condition or for health maintenance advice. Please see specific information pulled into the AVS if appropriate.

## 2021-04-15 ENCOUNTER — OFFICE VISIT (OUTPATIENT)
Dept: FAMILY MEDICINE CLINIC | Facility: CLINIC | Age: 51
End: 2021-04-15

## 2021-04-15 VITALS
OXYGEN SATURATION: 97 % | SYSTOLIC BLOOD PRESSURE: 112 MMHG | WEIGHT: 102 LBS | DIASTOLIC BLOOD PRESSURE: 60 MMHG | BODY MASS INDEX: 17 KG/M2 | HEIGHT: 65 IN | HEART RATE: 82 BPM | TEMPERATURE: 97.7 F

## 2021-04-15 DIAGNOSIS — J44.9 STAGE 2 MODERATE COPD BY GOLD CLASSIFICATION (HCC): ICD-10-CM

## 2021-04-15 DIAGNOSIS — K21.00 GASTROESOPHAGEAL REFLUX DISEASE WITH ESOPHAGITIS WITHOUT HEMORRHAGE: ICD-10-CM

## 2021-04-15 DIAGNOSIS — Z72.0 TOBACCO USER: ICD-10-CM

## 2021-04-15 DIAGNOSIS — K58.1 IRRITABLE BOWEL SYNDROME WITH CONSTIPATION: ICD-10-CM

## 2021-04-15 DIAGNOSIS — F17.219 CIGARETTE NICOTINE DEPENDENCE WITH NICOTINE-INDUCED DISORDER: ICD-10-CM

## 2021-04-15 DIAGNOSIS — J20.9 ACUTE BRONCHITIS, UNSPECIFIED ORGANISM: Primary | ICD-10-CM

## 2021-04-15 DIAGNOSIS — E53.8 B12 DEFICIENCY: ICD-10-CM

## 2021-04-15 DIAGNOSIS — E55.9 VITAMIN D DEFICIENCY: ICD-10-CM

## 2021-04-15 DIAGNOSIS — J44.1 COPD WITH EXACERBATION (HCC): ICD-10-CM

## 2021-04-15 DIAGNOSIS — E44.0 MODERATE PROTEIN-CALORIE MALNUTRITION (HCC): ICD-10-CM

## 2021-04-15 DIAGNOSIS — R63.6 UNDERWEIGHT: ICD-10-CM

## 2021-04-15 PROBLEM — I10 ESSENTIAL HYPERTENSION: Status: RESOLVED | Noted: 2020-04-15 | Resolved: 2021-04-15

## 2021-04-15 PROCEDURE — 96372 THER/PROPH/DIAG INJ SC/IM: CPT | Performed by: FAMILY MEDICINE

## 2021-04-15 PROCEDURE — 99214 OFFICE O/P EST MOD 30 MIN: CPT | Performed by: FAMILY MEDICINE

## 2021-04-15 RX ORDER — METHYLPREDNISOLONE SODIUM SUCCINATE 125 MG/2ML
125 INJECTION, POWDER, LYOPHILIZED, FOR SOLUTION INTRAMUSCULAR; INTRAVENOUS ONCE
Status: COMPLETED | OUTPATIENT
Start: 2021-04-15 | End: 2021-04-15

## 2021-04-15 RX ORDER — PREDNISONE 10 MG/1
TABLET ORAL
Qty: 30 TABLET | Refills: 0 | Status: SHIPPED | OUTPATIENT
Start: 2021-04-15 | End: 2021-04-29

## 2021-04-15 RX ORDER — AMOXICILLIN AND CLAVULANATE POTASSIUM 875; 125 MG/1; MG/1
1 TABLET, FILM COATED ORAL 2 TIMES DAILY
Qty: 28 TABLET | Refills: 0 | Status: SHIPPED | OUTPATIENT
Start: 2021-04-15 | End: 2021-04-29 | Stop reason: SINTOL

## 2021-04-15 RX ORDER — ERGOCALCIFEROL 1.25 MG/1
50000 CAPSULE ORAL WEEKLY
Qty: 5 CAPSULE | Refills: 3 | Status: SHIPPED | OUTPATIENT
Start: 2021-04-15 | End: 2022-03-31 | Stop reason: SDUPTHER

## 2021-04-15 RX ORDER — CEFTRIAXONE 1 G/1
1 INJECTION, POWDER, FOR SOLUTION INTRAMUSCULAR; INTRAVENOUS ONCE
Status: COMPLETED | OUTPATIENT
Start: 2021-04-15 | End: 2021-04-15

## 2021-04-15 RX ORDER — ALBUTEROL SULFATE 1.25 MG/3ML
1 SOLUTION RESPIRATORY (INHALATION) EVERY 4 HOURS PRN
Qty: 9 ML | Refills: 3 | Status: SHIPPED | OUTPATIENT
Start: 2021-04-15 | End: 2021-06-04

## 2021-04-15 RX ADMIN — CEFTRIAXONE 1 G: 1 INJECTION, POWDER, FOR SOLUTION INTRAMUSCULAR; INTRAVENOUS at 14:04

## 2021-04-15 RX ADMIN — METHYLPREDNISOLONE SODIUM SUCCINATE 125 MG: 125 INJECTION, POWDER, LYOPHILIZED, FOR SOLUTION INTRAMUSCULAR; INTRAVENOUS at 14:06

## 2021-04-15 NOTE — PATIENT INSTRUCTIONS
Go to ER if not better in 24-48 hours    augmentin 875-125 mg every 12 hours for 14 days    Prednisone tapering dose. For 10 days    Nebulizer every 4-6 hours     Recheck in 2 weeks

## 2021-04-16 NOTE — PROGRESS NOTES
Chief Complaint  Hypertension, COPD, Allergies, and Irritable Bowel Syndrome    Subjective          Carla Patricia Richard presents to [facility]         Pt is 51 yo female with history of moderate COPD, chronic idiopathic constipation  Vitamin B12 deficiency, Vitamin D deficiency, chronic abdominal pain, tremor,  RUQ pain,  Sp hysterectomy,  History of chron's disease, history of pertussis infection  sp right shoulder surgery. X 3, sp rotator cuff repair , history of C diff diarrhea, gluten sensitivity, gastritis, marijuana use,        4/14/21 in office visit for recheck on pt's above medical issues. Since last visit pt has seen GI for her chronic idiopathic constipation. Did see walk in clinic in March 2021 for hip and knee pain. X-rays of both hip and knee were negative. Pt continue to take her medications for her COPD, IBS, GERD, allergic rhinitis,  termors  She states she was tested for COVID-19  At [facility] yesterday and per pt was normal. Pt states her breathing has been worse this past Monday. She continues to smoke 3/4 ppd.  She denies any fever her 02 saturation is at 97% at [facility].  Has been coughing up mucous.  She continues Anoro and albuterol inhaler. She does have pets a keturah     4/29/21 in office visit for recheck on pt's above medical issues. On previous visit pt had acute bronchitis. . Pt could not tolerate augmentin.She states it made her abdomen swollen. She continues to smoke 1/2 ppd. She has been stressed out. She did finish prednisone tapering dose.   She has tried doxycycline in past. And she states she can tolerate it if she takes a probiotics.  She continues to take her medications for heartburn  IBS. She continues to adhere to gluten free diet.  She also continues to take  Elavil for tremors. She continues to use her albuterol and nebulizer medications       Neurologic Problem  The patient's pertinent negatives include no altered mental status, clumsiness, focal  sensory loss, focal weakness, loss of balance, memory loss, near-syncope, slurred speech, syncope, visual change or weakness. This is a new problem. The problem is unchanged. There was no focality noted. Pertinent negatives include no abdominal pain, auditory change, aura, bladder incontinence, bowel incontinence, chest pain, confusion, diaphoresis, dizziness, fatigue, fever, headaches, light-headedness, nausea, neck pain, palpitations, shortness of breath, vertigo or vomiting. Past treatments include nothing. The treatment provided no relief. There is no history of a bleeding disorder, a clotting disorder, a CVA, dementia, head trauma, liver disease, mood changes or seizures.   Heartburn  She complains of abdominal pain, chest pain and heartburn. She reports no belching, no choking, no coughing, no dysphagia, no early satiety, no globus sensation, no hoarse voice, no nausea, no sore throat, no stridor, no tooth decay or no wheezing. This is a chronic problem. The current episode started more than 1 year ago. The problem occurs constantly. The problem has been waxing and waning. The heartburn is of moderate intensity. The heartburn does not wake her from sleep. The heartburn does not limit her activity. The heartburn doesn't change with position. The symptoms are aggravated by stress and smoking. Pertinent negatives include no fatigue, melena, muscle weakness or weight loss. Risk factors include smoking/tobacco exposure. She has tried a PPI for the symptoms. The treatment provided moderate relief. Past procedures include an EGD. Past procedures do not include an abdominal ultrasound, esophageal manometry, esophageal pH monitoring, H. pylori antibody titer or a UGI.   Abdominal Pain  This is a chronic problem. The current episode started more than 1 year ago. The onset quality is sudden. The problem occurs constantly. The problem has been unchanged. The pain is located in the generalized abdominal region. The pain is  at a severity of 3/10. The pain is moderate. The quality of the pain is burning. Associated symptoms include constipation and diarrhea. Pertinent negatives include no anorexia, arthralgias, belching, dysuria, flatus, frequency, headaches, hematochezia, hematuria, melena, myalgias, nausea, vomiting or weight loss. The pain is aggravated by eating. Treatments tried: trulance  The treatment provided moderate relief. Prior diagnostic workup includes GI consult, lower endoscopy and upper endoscopy. Her past medical history is significant for GERD and irritable bowel syndrome. There is no history of abdominal surgery, colon cancer, Crohn's disease, gallstones, pancreatitis, PUD or ulcerative colitis.   Nicotine Dependence  Presents for follow-up visit. Symptoms are negative for fatigue and sore throat. Her urge triggers include company of smokers. The symptoms have been stable. She smokes 1 pack of cigarettes per day. Compliance with prior treatments has been poor.   Shortness of Breath  This is a recurrent problem. The current episode started more than 1 month ago. The problem occurs constantly. The problem has been gradually worsening. Associated symptoms include abdominal pain and chest pain. Pertinent negatives include no claudication, coryza, ear pain, headaches, hemoptysis, leg pain, leg swelling, neck pain, orthopnea, PND, rash, sore throat, sputum production, swollen glands, syncope, vomiting or wheezing. The symptoms are aggravated by URIs, smoke and occupational exposure (cats). She has tried beta agonist inhalers and steroid inhalers for the symptoms. Her past medical history is significant for allergies, chronic lung disease, COPD and pneumonia. There is no history of asthma, bronchiolitis, CAD, DVT, a heart failure, PE or a recent surgery.   COPD   This is a chronic problem. The current episode started more than 1 year ago. The problem occurs constantly. The problem has been waxing and waning  Associated  "symptoms include abdominal pain, arthralgias, fatigue, joint swelling, numbness and weakness. Pertinent negatives include no anorexia, change in bowel habit, chest pain, chills, congestion, coughing, diaphoresis, fever, headaches, myalgias, nausea, neck pain, sore throat, swollen glands, urinary symptoms, vertigo, visual change or vomiting. The symptoms are aggravated by smoking. She has tried (combivent  symbicort anoro, albuterol inhaler ) for the symptoms.       Objective   Vital Signs:   /70 (BP Location: Left arm, Patient Position: Sitting, Cuff Size: Adult)   Pulse 84   Temp 97.3 °F (36.3 °C)   Ht 165.1 cm (65\")   Wt 46.7 kg (103 lb)   SpO2 98%   BMI 17.14 kg/m²     Physical Exam  Vitals and nursing note reviewed.   Constitutional:       Appearance: She is well-developed. She is not diaphoretic.      Comments: Underweight     Thin appearance    Agitated today    HENT:      Head: Normocephalic and atraumatic.      Right Ear: External ear normal.   Eyes:      Conjunctiva/sclera: Conjunctivae normal.      Pupils: Pupils are equal, round, and reactive to light.   Cardiovascular:      Rate and Rhythm: Normal rate and regular rhythm.      Heart sounds: Normal heart sounds. No murmur heard.     Pulmonary:      Effort: No respiratory distress.      Comments: Decreased breath sounds   Abdominal:      General: Bowel sounds are normal. There is no distension.      Palpations: Abdomen is soft.      Tenderness: There is abdominal tenderness.   Musculoskeletal:         General: Tenderness present. No deformity. Normal range of motion.      Cervical back: Normal range of motion and neck supple.   Skin:     General: Skin is warm.      Coloration: Skin is not pale.      Findings: No erythema or rash.   Neurological:      Mental Status: She is oriented to person, place, and time.      Cranial Nerves: No cranial nerve deficit.   Psychiatric:         Behavior: Behavior normal.        Result Review :               "   Assessment and Plan    Diagnoses and all orders for this visit:    1. Chronic bronchitis, unspecified chronic bronchitis type (CMS/HCC) (Primary)    2. Vitamin D deficiency     3. Stage 2 moderate COPD by GOLD classification (CMS/[facility])    4. Tobacco user    5. COPD exacerbation (CMS/[facility])    6. Gastroesophageal reflux disease with esophagitis without hemorrhage    7. Celiac disease    8. Strain of right hip, initial encounter  -     cyclobenzaprine (FLEXERIL) 5 MG tablet; Take 1 tablet by mouth 3 (Three) Times a Day As Needed (hip pain). Do not drive while taking  Dispense: 30 tablet; Refill: 0    9. Tremor    10. Underweight    11. Moderate protein-calorie malnutrition (CMS/[facility])    Other orders  -     amitriptyline (ELAVIL) 25 MG tablet; Take 1 tablet by mouth Every Night.  Dispense: 30 tablet; Refill: 3  -     doxycycline (MONODOX) 50 MG capsule; Take 2 capsules by mouth 2 (Two) Times a Day for 14 days.  Dispense: 56 capsule; Refill: 0  -     guaiFENesin (Mucinex) 600 MG 12 hr tablet; Take 2 tablets by mouth 2 (Two) Times a Day.  Dispense: 30 tablet; Refill: 3  -     predniSONE (DELTASONE) 10 MG tablet; Take 4 pills for 4 days 3 pills for 3 days 2 pills for 2 days and 1 pill one day  Dispense: 30 tablet; Refill: 0            -recommend labwork   -HTN/tachcycardia -controlled on toprol XL 50 mg daily.   -vitamin D deficiency -on vitamin  D once a week  -moderate protein malnutrition/underweight  -  recommend  high calorie diet information/ smoking cesation   -COPD/COPD exacerbation/acute bronchitis - Pulmonology following on albuterol nebs and inhalernow on Anoro daily.  Albuterol PRN.  Chest x-ray today. Prednisone tapering dose along with rocephin shot 1 gram IM  X 1 today.   Check CBC, CMP and CRP.  Advised importance of smoking cessation. Advised pt to go to ER if not better 24-48 hours  Will try doxycycline 100 mg PO BID for 14 days with probiotics. mucinex twice a day. Prednisone tapering dose   -tobacco  user - counseled to quit smoking >5 minutes. She could not tolerate chantix  urged the importance of quitting smoking.   Insurance would not cover nicotine patch and gum. Recommend pt call 1 800 QUIT NOW recommend nicotine replacement therapy   -abdominal pain/gastritis/IBS /GERD/celiac - Gastroenterology following.stable  On PPI prilosec 20 mg q daily on trulance 3 mg PO q daily.   continue with gluten free diet   -advised pt to be safe and call with questions and concerns  -advised pt to go to ER or call 911 if symptoms worrisome or severe  -advised pt to be safe during COVID-19 pandemic  I spent 25  minutes caring for Carla on this date of service. This time includes time spent by me in the following activities: preparing for the visit, reviewing tests, obtaining and/or reviewing a separately obtained history, performing a medically appropriate examination and/or evaluation, counseling and educating the patient/family/caregiver, ordering medications, tests, or procedures, referring and communicating with other health care professionals, documenting information in the medical record and care coordination.   -recheck in 2 weeks   -       This document has been electronically signed by Xavier Wick MD on April 29, 2021 09:58 CDT        Follow Up   No follow-ups on file.  Patient was given instructions and counseling regarding her condition or for health maintenance advice. Please see specific information pulled into the AVS if appropriate.

## 2021-04-29 ENCOUNTER — OFFICE VISIT (OUTPATIENT)
Dept: FAMILY MEDICINE CLINIC | Facility: CLINIC | Age: 51
End: 2021-04-29

## 2021-04-29 VITALS
BODY MASS INDEX: 17.16 KG/M2 | OXYGEN SATURATION: 98 % | TEMPERATURE: 97.3 F | HEIGHT: 65 IN | HEART RATE: 84 BPM | SYSTOLIC BLOOD PRESSURE: 132 MMHG | WEIGHT: 103 LBS | DIASTOLIC BLOOD PRESSURE: 70 MMHG

## 2021-04-29 DIAGNOSIS — E44.0 MODERATE PROTEIN-CALORIE MALNUTRITION (HCC): ICD-10-CM

## 2021-04-29 DIAGNOSIS — Z72.0 TOBACCO USER: ICD-10-CM

## 2021-04-29 DIAGNOSIS — K21.00 GASTROESOPHAGEAL REFLUX DISEASE WITH ESOPHAGITIS WITHOUT HEMORRHAGE: ICD-10-CM

## 2021-04-29 DIAGNOSIS — R25.1 TREMOR: ICD-10-CM

## 2021-04-29 DIAGNOSIS — J44.9 STAGE 2 MODERATE COPD BY GOLD CLASSIFICATION (HCC): ICD-10-CM

## 2021-04-29 DIAGNOSIS — R63.6 UNDERWEIGHT: ICD-10-CM

## 2021-04-29 DIAGNOSIS — E55.9 VITAMIN D DEFICIENCY: ICD-10-CM

## 2021-04-29 DIAGNOSIS — J44.1 COPD EXACERBATION (HCC): ICD-10-CM

## 2021-04-29 DIAGNOSIS — S76.011A STRAIN OF RIGHT HIP, INITIAL ENCOUNTER: ICD-10-CM

## 2021-04-29 DIAGNOSIS — J42 CHRONIC BRONCHITIS, UNSPECIFIED CHRONIC BRONCHITIS TYPE (HCC): Primary | ICD-10-CM

## 2021-04-29 DIAGNOSIS — K90.0 CELIAC DISEASE: ICD-10-CM

## 2021-04-29 PROCEDURE — 99214 OFFICE O/P EST MOD 30 MIN: CPT | Performed by: FAMILY MEDICINE

## 2021-04-29 RX ORDER — AMITRIPTYLINE HYDROCHLORIDE 25 MG/1
25 TABLET, FILM COATED ORAL NIGHTLY
Qty: 30 TABLET | Refills: 3 | Status: SHIPPED | OUTPATIENT
Start: 2021-04-29 | End: 2021-08-25

## 2021-04-29 RX ORDER — DOXYCYCLINE 50 MG/1
100 CAPSULE ORAL 2 TIMES DAILY
Qty: 56 CAPSULE | Refills: 0 | Status: SHIPPED | OUTPATIENT
Start: 2021-04-29 | End: 2021-05-13

## 2021-04-29 RX ORDER — PREDNISONE 10 MG/1
TABLET ORAL
Qty: 30 TABLET | Refills: 0 | Status: SHIPPED | OUTPATIENT
Start: 2021-04-29 | End: 2021-06-03

## 2021-04-29 RX ORDER — CYCLOBENZAPRINE HCL 5 MG
5 TABLET ORAL 3 TIMES DAILY PRN
Qty: 30 TABLET | Refills: 0 | Status: SHIPPED | OUTPATIENT
Start: 2021-04-29 | End: 2021-06-25 | Stop reason: SDUPTHER

## 2021-05-11 ENCOUNTER — LAB (OUTPATIENT)
Dept: LAB | Facility: HOSPITAL | Age: 51
End: 2021-05-11

## 2021-05-12 ENCOUNTER — TELEPHONE (OUTPATIENT)
Dept: FAMILY MEDICINE CLINIC | Facility: CLINIC | Age: 51
End: 2021-05-12

## 2021-05-12 DIAGNOSIS — Z12.2 ENCOUNTER FOR SCREENING FOR LUNG CANCER: ICD-10-CM

## 2021-05-12 DIAGNOSIS — R07.81 RIB PAIN: Primary | ICD-10-CM

## 2021-05-13 ENCOUNTER — TELEPHONE (OUTPATIENT)
Dept: FAMILY MEDICINE CLINIC | Facility: CLINIC | Age: 51
End: 2021-05-13

## 2021-05-13 DIAGNOSIS — G25.0 BENIGN HEAD TREMOR: Primary | ICD-10-CM

## 2021-05-24 ENCOUNTER — APPOINTMENT (OUTPATIENT)
Dept: GENERAL RADIOLOGY | Facility: HOSPITAL | Age: 51
End: 2021-05-24

## 2021-05-24 ENCOUNTER — HOSPITAL ENCOUNTER (EMERGENCY)
Facility: HOSPITAL | Age: 51
Discharge: HOME | End: 2021-05-24
Attending: STUDENT IN AN ORGANIZED HEALTH CARE EDUCATION/TRAINING PROGRAM | Admitting: STUDENT IN AN ORGANIZED HEALTH CARE EDUCATION/TRAINING PROGRAM

## 2021-05-24 VITALS
DIASTOLIC BLOOD PRESSURE: 81 MMHG | BODY MASS INDEX: 16.83 KG/M2 | SYSTOLIC BLOOD PRESSURE: 111 MMHG | HEART RATE: 84 BPM | OXYGEN SATURATION: 100 % | HEIGHT: 65 IN | TEMPERATURE: 98.6 F | RESPIRATION RATE: 18 BRPM | WEIGHT: 101 LBS

## 2021-05-24 DIAGNOSIS — J44.1 COPD WITH ACUTE EXACERBATION (HCC): Primary | ICD-10-CM

## 2021-05-24 LAB
ALBUMIN SERPL-MCNC: 5 G/DL (ref 3.5–5.2)
ALBUMIN/GLOB SERPL: 1.9 G/DL
ALP SERPL-CCNC: 87 U/L (ref 39–117)
ALT SERPL W P-5'-P-CCNC: 10 U/L (ref 1–33)
ANION GAP SERPL CALCULATED.3IONS-SCNC: 9 MMOL/L (ref 5–15)
ARTERIAL PATENCY WRIST A: ABNORMAL
AST SERPL-CCNC: 13 U/L (ref 1–32)
ATMOSPHERIC PRESS: 753 MMHG
BASE EXCESS BLDA CALC-SCNC: 3.2 MMOL/L (ref 0–2)
BASOPHILS # BLD AUTO: 0.11 10*3/MM3 (ref 0–0.2)
BASOPHILS NFR BLD AUTO: 0.9 % (ref 0–1.5)
BDY SITE: ABNORMAL
BILIRUB SERPL-MCNC: 0.4 MG/DL (ref 0–1.2)
BUN SERPL-MCNC: 9 MG/DL (ref 6–20)
BUN/CREAT SERPL: 14.8 (ref 7–25)
CALCIUM SPEC-SCNC: 10.1 MG/DL (ref 8.6–10.5)
CHLORIDE SERPL-SCNC: 103 MMOL/L (ref 98–107)
CO2 SERPL-SCNC: 31 MMOL/L (ref 22–29)
CREAT SERPL-MCNC: 0.61 MG/DL (ref 0.57–1)
DEPRECATED RDW RBC AUTO: 45.5 FL (ref 37–54)
EOSINOPHIL # BLD AUTO: 0.18 10*3/MM3 (ref 0–0.4)
EOSINOPHIL NFR BLD AUTO: 1.5 % (ref 0.3–6.2)
ERYTHROCYTE [DISTWIDTH] IN BLOOD BY AUTOMATED COUNT: 12.9 % (ref 12.3–15.4)
FLUAV RNA RESP QL NAA+PROBE: NOT DETECTED
FLUBV RNA RESP QL NAA+PROBE: NOT DETECTED
GFR SERPL CREATININE-BSD FRML MDRD: 103 ML/MIN/1.73
GLOBULIN UR ELPH-MCNC: 2.6 GM/DL
GLUCOSE SERPL-MCNC: 97 MG/DL (ref 65–99)
HCO3 BLDA-SCNC: 27 MMOL/L (ref 20–26)
HCT VFR BLD AUTO: 45.2 % (ref 34–46.6)
HGB BLD-MCNC: 15.2 G/DL (ref 12–15.9)
HOLD SPECIMEN: NORMAL
HOLD SPECIMEN: NORMAL
IMM GRANULOCYTES # BLD AUTO: 0.18 10*3/MM3 (ref 0–0.05)
IMM GRANULOCYTES NFR BLD AUTO: 1.5 % (ref 0–0.5)
LYMPHOCYTES # BLD AUTO: 3.05 10*3/MM3 (ref 0.7–3.1)
LYMPHOCYTES NFR BLD AUTO: 25.2 % (ref 19.6–45.3)
Lab: ABNORMAL
MCH RBC QN AUTO: 33 PG (ref 26.6–33)
MCHC RBC AUTO-ENTMCNC: 33.6 G/DL (ref 31.5–35.7)
MCV RBC AUTO: 98.3 FL (ref 79–97)
MODALITY: ABNORMAL
MONOCYTES # BLD AUTO: 0.94 10*3/MM3 (ref 0.1–0.9)
MONOCYTES NFR BLD AUTO: 7.8 % (ref 5–12)
NEUTROPHILS NFR BLD AUTO: 63.1 % (ref 42.7–76)
NEUTROPHILS NFR BLD AUTO: 7.65 10*3/MM3 (ref 1.7–7)
NRBC BLD AUTO-RTO: 0 /100 WBC (ref 0–0.2)
NT-PROBNP SERPL-MCNC: 99.7 PG/ML (ref 0–900)
PCO2 BLDA: 37.4 MM HG (ref 35–45)
PH BLDA: 7.47 PH UNITS (ref 7.35–7.45)
PLATELET # BLD AUTO: 475 10*3/MM3 (ref 140–450)
PMV BLD AUTO: 9.6 FL (ref 6–12)
PO2 BLDA: 85.8 MM HG (ref 83–108)
POTASSIUM SERPL-SCNC: 3.9 MMOL/L (ref 3.5–5.2)
PROT SERPL-MCNC: 7.6 G/DL (ref 6–8.5)
RBC # BLD AUTO: 4.6 10*6/MM3 (ref 3.77–5.28)
SAO2 % BLDCOA: 97.9 % (ref 94–99)
SARS-COV-2 RNA RESP QL NAA+PROBE: NOT DETECTED
SODIUM SERPL-SCNC: 143 MMOL/L (ref 136–145)
TROPONIN T SERPL-MCNC: <0.01 NG/ML (ref 0–0.03)
VENTILATOR MODE: ABNORMAL
WBC # BLD AUTO: 12.11 10*3/MM3 (ref 3.4–10.8)
WHOLE BLOOD HOLD SPECIMEN: NORMAL
WHOLE BLOOD HOLD SPECIMEN: NORMAL

## 2021-05-24 PROCEDURE — 36600 WITHDRAWAL OF ARTERIAL BLOOD: CPT

## 2021-05-24 PROCEDURE — 82803 BLOOD GASES ANY COMBINATION: CPT

## 2021-05-24 PROCEDURE — 94640 AIRWAY INHALATION TREATMENT: CPT

## 2021-05-24 PROCEDURE — 85025 COMPLETE CBC W/AUTO DIFF WBC: CPT | Performed by: STUDENT IN AN ORGANIZED HEALTH CARE EDUCATION/TRAINING PROGRAM

## 2021-05-24 PROCEDURE — 93010 ELECTROCARDIOGRAM REPORT: CPT | Performed by: INTERNAL MEDICINE

## 2021-05-24 PROCEDURE — 83880 ASSAY OF NATRIURETIC PEPTIDE: CPT | Performed by: STUDENT IN AN ORGANIZED HEALTH CARE EDUCATION/TRAINING PROGRAM

## 2021-05-24 PROCEDURE — 93005 ELECTROCARDIOGRAM TRACING: CPT | Performed by: PHYSICIAN ASSISTANT

## 2021-05-24 PROCEDURE — 80053 COMPREHEN METABOLIC PANEL: CPT | Performed by: STUDENT IN AN ORGANIZED HEALTH CARE EDUCATION/TRAINING PROGRAM

## 2021-05-24 PROCEDURE — 87636 SARSCOV2 & INF A&B AMP PRB: CPT | Performed by: PHYSICIAN ASSISTANT

## 2021-05-24 PROCEDURE — 93005 ELECTROCARDIOGRAM TRACING: CPT

## 2021-05-24 PROCEDURE — 84484 ASSAY OF TROPONIN QUANT: CPT | Performed by: STUDENT IN AN ORGANIZED HEALTH CARE EDUCATION/TRAINING PROGRAM

## 2021-05-24 PROCEDURE — 99283 EMERGENCY DEPT VISIT LOW MDM: CPT

## 2021-05-24 PROCEDURE — 25010000002 METHYLPREDNISOLONE PER 125 MG: Performed by: PHYSICIAN ASSISTANT

## 2021-05-24 PROCEDURE — 96374 THER/PROPH/DIAG INJ IV PUSH: CPT

## 2021-05-24 PROCEDURE — 93005 ELECTROCARDIOGRAM TRACING: CPT | Performed by: STUDENT IN AN ORGANIZED HEALTH CARE EDUCATION/TRAINING PROGRAM

## 2021-05-24 PROCEDURE — 71046 X-RAY EXAM CHEST 2 VIEWS: CPT

## 2021-05-24 RX ORDER — SODIUM CHLORIDE 0.9 % (FLUSH) 0.9 %
10 SYRINGE (ML) INJECTION AS NEEDED
Status: DISCONTINUED | OUTPATIENT
Start: 2021-05-24 | End: 2021-05-24 | Stop reason: HOSPADM

## 2021-05-24 RX ORDER — IPRATROPIUM BROMIDE AND ALBUTEROL SULFATE 2.5; .5 MG/3ML; MG/3ML
3 SOLUTION RESPIRATORY (INHALATION) ONCE
Status: COMPLETED | OUTPATIENT
Start: 2021-05-24 | End: 2021-05-24

## 2021-05-24 RX ORDER — METHYLPREDNISOLONE SODIUM SUCCINATE 125 MG/2ML
80 INJECTION, POWDER, LYOPHILIZED, FOR SOLUTION INTRAMUSCULAR; INTRAVENOUS ONCE
Status: COMPLETED | OUTPATIENT
Start: 2021-05-24 | End: 2021-05-24

## 2021-05-24 RX ORDER — CEFUROXIME AXETIL 500 MG/1
500 TABLET ORAL 2 TIMES DAILY
Qty: 14 TABLET | Refills: 0 | Status: SHIPPED | OUTPATIENT
Start: 2021-05-24 | End: 2021-05-31

## 2021-05-24 RX ADMIN — Medication 10 ML: at 12:13

## 2021-05-24 RX ADMIN — IPRATROPIUM BROMIDE AND ALBUTEROL SULFATE 3 ML: 2.5; .5 SOLUTION RESPIRATORY (INHALATION) at 12:55

## 2021-05-24 RX ADMIN — METHYLPREDNISOLONE SODIUM SUCCINATE 80 MG: 125 INJECTION, POWDER, FOR SOLUTION INTRAMUSCULAR; INTRAVENOUS at 12:09

## 2021-05-25 ENCOUNTER — TELEPHONE (OUTPATIENT)
Dept: FAMILY MEDICINE CLINIC | Facility: CLINIC | Age: 51
End: 2021-05-25

## 2021-05-25 NOTE — TELEPHONE ENCOUNTER
Pt called wanting an appt with Dr. Wick- pt states she has been in the ER with problems from her COPD, she was exposed to gas in her neighborhood and it is now causing her to have trouble breathing, pt was advised to go to the ER with any difficulty breathing or SOB-- pt voiced understanding and just wanted the next available appt with her PCP because her pulm. Doctor is no longer available at [facility] [place]. Pt was sent back to  to schedule appt.

## 2021-06-01 ENCOUNTER — LAB (OUTPATIENT)
Dept: LAB | Facility: HOSPITAL | Age: 51
End: 2021-06-01

## 2021-06-01 DIAGNOSIS — E55.9 VITAMIN D DEFICIENCY: ICD-10-CM

## 2021-06-01 DIAGNOSIS — E53.8 B12 DEFICIENCY: ICD-10-CM

## 2021-06-01 DIAGNOSIS — J20.9 ACUTE BRONCHITIS, UNSPECIFIED ORGANISM: ICD-10-CM

## 2021-06-01 DIAGNOSIS — Z72.0 TOBACCO USER: ICD-10-CM

## 2021-06-01 DIAGNOSIS — Z13.6 ENCOUNTER FOR SCREENING FOR CARDIOVASCULAR DISORDERS: ICD-10-CM

## 2021-06-01 DIAGNOSIS — J44.9 STAGE 2 MODERATE COPD BY GOLD CLASSIFICATION (HCC): ICD-10-CM

## 2021-06-01 DIAGNOSIS — Z11.59 NEED FOR HEPATITIS C SCREENING TEST: ICD-10-CM

## 2021-06-01 DIAGNOSIS — J42 CHRONIC BRONCHITIS, UNSPECIFIED CHRONIC BRONCHITIS TYPE (HCC): ICD-10-CM

## 2021-06-01 DIAGNOSIS — J44.1 COPD WITH EXACERBATION (HCC): ICD-10-CM

## 2021-06-01 LAB
25(OH)D3 SERPL-MCNC: 28.7 NG/ML (ref 30–100)
ALBUMIN SERPL-MCNC: 4.6 G/DL (ref 3.5–5.2)
ALBUMIN/GLOB SERPL: 2.1 G/DL
ALP SERPL-CCNC: 69 U/L (ref 39–117)
ALT SERPL W P-5'-P-CCNC: 13 U/L (ref 1–33)
ANION GAP SERPL CALCULATED.3IONS-SCNC: 8.7 MMOL/L (ref 5–15)
AST SERPL-CCNC: 17 U/L (ref 1–32)
BASOPHILS # BLD AUTO: 0.09 10*3/MM3 (ref 0–0.2)
BASOPHILS NFR BLD AUTO: 0.8 % (ref 0–1.5)
BILIRUB SERPL-MCNC: 0.6 MG/DL (ref 0–1.2)
BUN SERPL-MCNC: 12 MG/DL (ref 6–20)
BUN/CREAT SERPL: 17.9 (ref 7–25)
CALCIUM SPEC-SCNC: 9.2 MG/DL (ref 8.6–10.5)
CHLORIDE SERPL-SCNC: 104 MMOL/L (ref 98–107)
CHOLEST SERPL-MCNC: 167 MG/DL (ref 0–200)
CO2 SERPL-SCNC: 27.3 MMOL/L (ref 22–29)
CREAT SERPL-MCNC: 0.67 MG/DL (ref 0.57–1)
CRP SERPL-MCNC: <0.3 MG/DL (ref 0–0.5)
DEPRECATED RDW RBC AUTO: 43.6 FL (ref 37–54)
EOSINOPHIL # BLD AUTO: 0.23 10*3/MM3 (ref 0–0.4)
EOSINOPHIL NFR BLD AUTO: 2 % (ref 0.3–6.2)
ERYTHROCYTE [DISTWIDTH] IN BLOOD BY AUTOMATED COUNT: 12.1 % (ref 12.3–15.4)
GFR SERPL CREATININE-BSD FRML MDRD: 92 ML/MIN/1.73
GLOBULIN UR ELPH-MCNC: 2.2 GM/DL
GLUCOSE SERPL-MCNC: 89 MG/DL (ref 65–99)
HCT VFR BLD AUTO: 47.1 % (ref 34–46.6)
HCV AB SER DONR QL: NORMAL
HDLC SERPL-MCNC: 46 MG/DL (ref 40–60)
HGB BLD-MCNC: 15.8 G/DL (ref 12–15.9)
IMM GRANULOCYTES # BLD AUTO: 0.11 10*3/MM3 (ref 0–0.05)
IMM GRANULOCYTES NFR BLD AUTO: 1 % (ref 0–0.5)
LDLC SERPL CALC-MCNC: 105 MG/DL (ref 0–100)
LDLC/HDLC SERPL: 2.26 {RATIO}
LYMPHOCYTES # BLD AUTO: 3.44 10*3/MM3 (ref 0.7–3.1)
LYMPHOCYTES NFR BLD AUTO: 30.6 % (ref 19.6–45.3)
MCH RBC QN AUTO: 32.6 PG (ref 26.6–33)
MCHC RBC AUTO-ENTMCNC: 33.5 G/DL (ref 31.5–35.7)
MCV RBC AUTO: 97.1 FL (ref 79–97)
MONOCYTES # BLD AUTO: 0.96 10*3/MM3 (ref 0.1–0.9)
MONOCYTES NFR BLD AUTO: 8.5 % (ref 5–12)
NEUTROPHILS NFR BLD AUTO: 57.1 % (ref 42.7–76)
NEUTROPHILS NFR BLD AUTO: 6.4 10*3/MM3 (ref 1.7–7)
NRBC BLD AUTO-RTO: 0 /100 WBC (ref 0–0.2)
PLATELET # BLD AUTO: 498 10*3/MM3 (ref 140–450)
PMV BLD AUTO: 9.9 FL (ref 6–12)
POTASSIUM SERPL-SCNC: 4 MMOL/L (ref 3.5–5.2)
PROT SERPL-MCNC: 6.8 G/DL (ref 6–8.5)
RBC # BLD AUTO: 4.85 10*6/MM3 (ref 3.77–5.28)
SODIUM SERPL-SCNC: 140 MMOL/L (ref 136–145)
TRIGL SERPL-MCNC: 86 MG/DL (ref 0–150)
VIT B12 BLD-MCNC: >2000 PG/ML (ref 211–946)
VLDLC SERPL-MCNC: 16 MG/DL (ref 5–40)
WBC # BLD AUTO: 11.23 10*3/MM3 (ref 3.4–10.8)

## 2021-06-01 PROCEDURE — 86140 C-REACTIVE PROTEIN: CPT

## 2021-06-01 PROCEDURE — 80053 COMPREHEN METABOLIC PANEL: CPT

## 2021-06-01 PROCEDURE — 85025 COMPLETE CBC W/AUTO DIFF WBC: CPT

## 2021-06-01 PROCEDURE — 82306 VITAMIN D 25 HYDROXY: CPT

## 2021-06-01 PROCEDURE — 86803 HEPATITIS C AB TEST: CPT

## 2021-06-01 PROCEDURE — 82607 VITAMIN B-12: CPT

## 2021-06-01 PROCEDURE — 80061 LIPID PANEL: CPT

## 2021-06-03 ENCOUNTER — LAB (OUTPATIENT)
Dept: LAB | Facility: HOSPITAL | Age: 51
End: 2021-06-03

## 2021-06-03 ENCOUNTER — OFFICE VISIT (OUTPATIENT)
Dept: FAMILY MEDICINE CLINIC | Facility: CLINIC | Age: 51
End: 2021-06-03

## 2021-06-03 VITALS
TEMPERATURE: 98.4 F | DIASTOLIC BLOOD PRESSURE: 60 MMHG | HEIGHT: 65 IN | WEIGHT: 101 LBS | OXYGEN SATURATION: 98 % | BODY MASS INDEX: 16.83 KG/M2 | SYSTOLIC BLOOD PRESSURE: 100 MMHG | HEART RATE: 64 BPM

## 2021-06-03 DIAGNOSIS — Z77.098 SUSPECTED EXPOSURE TO HAZARDOUS CHEMICALS: Primary | ICD-10-CM

## 2021-06-03 DIAGNOSIS — Z72.0 TOBACCO USER: ICD-10-CM

## 2021-06-03 DIAGNOSIS — K58.2 IRRITABLE BOWEL SYNDROME WITH BOTH CONSTIPATION AND DIARRHEA: ICD-10-CM

## 2021-06-03 DIAGNOSIS — D72.829 LEUKOCYTOSIS, UNSPECIFIED TYPE: ICD-10-CM

## 2021-06-03 DIAGNOSIS — R10.84 GENERALIZED ABDOMINAL PAIN: ICD-10-CM

## 2021-06-03 DIAGNOSIS — D75.839 THROMBOCYTOSIS: ICD-10-CM

## 2021-06-03 DIAGNOSIS — K90.41 GLUTEN INTOLERANCE: ICD-10-CM

## 2021-06-03 DIAGNOSIS — E55.9 VITAMIN D DEFICIENCY: ICD-10-CM

## 2021-06-03 DIAGNOSIS — J42 CHRONIC BRONCHITIS, UNSPECIFIED CHRONIC BRONCHITIS TYPE (HCC): ICD-10-CM

## 2021-06-03 DIAGNOSIS — J44.9 STAGE 2 MODERATE COPD BY GOLD CLASSIFICATION (HCC): ICD-10-CM

## 2021-06-03 DIAGNOSIS — R63.6 UNDERWEIGHT: ICD-10-CM

## 2021-06-03 DIAGNOSIS — E53.8 B12 DEFICIENCY: ICD-10-CM

## 2021-06-03 DIAGNOSIS — F17.219 CIGARETTE NICOTINE DEPENDENCE WITH NICOTINE-INDUCED DISORDER: ICD-10-CM

## 2021-06-03 PROCEDURE — 83540 ASSAY OF IRON: CPT

## 2021-06-03 PROCEDURE — 85025 COMPLETE CBC W/AUTO DIFF WBC: CPT

## 2021-06-03 PROCEDURE — 82728 ASSAY OF FERRITIN: CPT

## 2021-06-03 PROCEDURE — 85007 BL SMEAR W/DIFF WBC COUNT: CPT

## 2021-06-03 PROCEDURE — 99214 OFFICE O/P EST MOD 30 MIN: CPT | Performed by: FAMILY MEDICINE

## 2021-06-03 PROCEDURE — 85060 BLOOD SMEAR INTERPRETATION: CPT

## 2021-06-03 PROCEDURE — 80053 COMPREHEN METABOLIC PANEL: CPT

## 2021-06-03 PROCEDURE — 84466 ASSAY OF TRANSFERRIN: CPT

## 2021-06-03 RX ORDER — IPRATROPIUM BROMIDE AND ALBUTEROL SULFATE 2.5; .5 MG/3ML; MG/3ML
3 SOLUTION RESPIRATORY (INHALATION)
Qty: 9 ML | Refills: 3 | Status: SHIPPED | OUTPATIENT
Start: 2021-06-03 | End: 2021-10-15

## 2021-06-04 ENCOUNTER — OFFICE VISIT (OUTPATIENT)
Dept: GASTROENTEROLOGY | Facility: CLINIC | Age: 51
End: 2021-06-04

## 2021-06-04 ENCOUNTER — HOSPITAL ENCOUNTER (OUTPATIENT)
Dept: CT IMAGING | Facility: HOSPITAL | Age: 51
Discharge: HOME | End: 2021-06-04
Admitting: FAMILY MEDICINE

## 2021-06-04 VITALS
SYSTOLIC BLOOD PRESSURE: 117 MMHG | WEIGHT: 101.2 LBS | DIASTOLIC BLOOD PRESSURE: 77 MMHG | HEIGHT: 65 IN | HEART RATE: 91 BPM | BODY MASS INDEX: 16.86 KG/M2

## 2021-06-04 DIAGNOSIS — R91.8 ABNORMAL CT SCAN OF LUNG: ICD-10-CM

## 2021-06-04 DIAGNOSIS — K59.04 CHRONIC IDIOPATHIC CONSTIPATION: Primary | ICD-10-CM

## 2021-06-04 DIAGNOSIS — J44.9 STAGE 2 MODERATE COPD BY GOLD CLASSIFICATION (HCC): ICD-10-CM

## 2021-06-04 DIAGNOSIS — F17.200 HEAVY TOBACCO SMOKER: ICD-10-CM

## 2021-06-04 DIAGNOSIS — J42 CHRONIC BRONCHITIS, UNSPECIFIED CHRONIC BRONCHITIS TYPE (HCC): ICD-10-CM

## 2021-06-04 DIAGNOSIS — R05.3 CHRONIC COUGH: ICD-10-CM

## 2021-06-04 DIAGNOSIS — Z77.098 SUSPECTED EXPOSURE TO HAZARDOUS CHEMICALS: ICD-10-CM

## 2021-06-04 DIAGNOSIS — J44.9 CHRONIC OBSTRUCTIVE PULMONARY DISEASE, UNSPECIFIED COPD TYPE (HCC): Primary | ICD-10-CM

## 2021-06-04 LAB
ALBUMIN SERPL-MCNC: 4.6 G/DL (ref 3.5–5.2)
ALBUMIN/GLOB SERPL: 2.3 G/DL
ALP SERPL-CCNC: 72 U/L (ref 39–117)
ALT SERPL W P-5'-P-CCNC: 11 U/L (ref 1–33)
ANION GAP SERPL CALCULATED.3IONS-SCNC: 7 MMOL/L (ref 5–15)
AST SERPL-CCNC: 14 U/L (ref 1–32)
BILIRUB SERPL-MCNC: 0.4 MG/DL (ref 0–1.2)
BUN SERPL-MCNC: 8 MG/DL (ref 6–20)
BUN/CREAT SERPL: 11.4 (ref 7–25)
CALCIUM SPEC-SCNC: 9.5 MG/DL (ref 8.6–10.5)
CHLORIDE SERPL-SCNC: 102 MMOL/L (ref 98–107)
CO2 SERPL-SCNC: 30 MMOL/L (ref 22–29)
CREAT SERPL-MCNC: 0.7 MG/DL (ref 0.57–1)
DEPRECATED RDW RBC AUTO: 44.4 FL (ref 37–54)
EOSINOPHIL # BLD MANUAL: 0.13 10*3/MM3 (ref 0–0.4)
EOSINOPHIL NFR BLD MANUAL: 1 % (ref 0.3–6.2)
ERYTHROCYTE [DISTWIDTH] IN BLOOD BY AUTOMATED COUNT: 12.4 % (ref 12.3–15.4)
FERRITIN SERPL-MCNC: 87.83 NG/ML (ref 13–150)
GFR SERPL CREATININE-BSD FRML MDRD: 88 ML/MIN/1.73
GLOBULIN UR ELPH-MCNC: 2 GM/DL
GLUCOSE SERPL-MCNC: 96 MG/DL (ref 65–99)
HCT VFR BLD AUTO: 42.6 % (ref 34–46.6)
HGB BLD-MCNC: 14.3 G/DL (ref 12–15.9)
IRON 24H UR-MRATE: 65 MCG/DL (ref 37–145)
IRON SATN MFR SERPL: 18 % (ref 20–50)
LYMPHOCYTES # BLD MANUAL: 4 10*3/MM3 (ref 0.7–3.1)
LYMPHOCYTES NFR BLD MANUAL: 30 % (ref 19.6–45.3)
LYMPHOCYTES NFR BLD MANUAL: 9 % (ref 5–12)
MCH RBC QN AUTO: 32.9 PG (ref 26.6–33)
MCHC RBC AUTO-ENTMCNC: 33.6 G/DL (ref 31.5–35.7)
MCV RBC AUTO: 97.9 FL (ref 79–97)
MONOCYTES # BLD AUTO: 1.2 10*3/MM3 (ref 0.1–0.9)
NEUTROPHILS # BLD AUTO: 8 10*3/MM3 (ref 1.7–7)
NEUTROPHILS NFR BLD MANUAL: 60 % (ref 42.7–76)
PLATELET # BLD AUTO: 423 10*3/MM3 (ref 140–450)
PMV BLD AUTO: 9.9 FL (ref 6–12)
POTASSIUM SERPL-SCNC: 3.8 MMOL/L (ref 3.5–5.2)
PROT SERPL-MCNC: 6.6 G/DL (ref 6–8.5)
RBC # BLD AUTO: 4.35 10*6/MM3 (ref 3.77–5.28)
RBC MORPH BLD: NORMAL
SMALL PLATELETS BLD QL SMEAR: ADEQUATE
SODIUM SERPL-SCNC: 139 MMOL/L (ref 136–145)
TIBC SERPL-MCNC: 353 MCG/DL (ref 298–536)
TRANSFERRIN SERPL-MCNC: 237 MG/DL (ref 200–360)
WBC # BLD AUTO: 13.33 10*3/MM3 (ref 3.4–10.8)
WBC MORPH BLD: NORMAL

## 2021-06-04 PROCEDURE — 99213 OFFICE O/P EST LOW 20 MIN: CPT | Performed by: INTERNAL MEDICINE

## 2021-06-04 PROCEDURE — 25010000002 IOPAMIDOL 61 % SOLUTION: Performed by: FAMILY MEDICINE

## 2021-06-04 PROCEDURE — 71260 CT THORAX DX C+: CPT

## 2021-06-04 RX ORDER — PLECANATIDE 3 MG/1
1 TABLET ORAL DAILY
Qty: 90 TABLET | Refills: 5 | Status: SHIPPED | OUTPATIENT
Start: 2021-06-04 | End: 2021-10-15 | Stop reason: SDUPTHER

## 2021-06-04 RX ORDER — ALBUTEROL SULFATE 90 UG/1
2 AEROSOL, METERED RESPIRATORY (INHALATION) EVERY 4 HOURS PRN
Qty: 18 G | Refills: 0 | Status: CANCELLED | OUTPATIENT
Start: 2021-06-04

## 2021-06-04 RX ADMIN — IOPAMIDOL 75 ML: 612 INJECTION, SOLUTION INTRAVENOUS at 12:19

## 2021-06-04 NOTE — PROGRESS NOTES
[facility]      Chief Complaint:   Chief Complaint   Patient presents with   • 3 Month Clinical Appointment     Chronic Idiopathic Constipation       Subjective     HPI:   Patient with recently being told that she may have lung cancer and probably has blood cancer.  Patient states her GI issues are markedly improved and at this point she is not really worried about them.  Patient states her constipation is markedly improved occasionally taking Trulance for bowel movement but otherwise normal.    Plan; we will have patient follow-up in 3 months after work-up for her lungs and her possible cancer are completed.    Past Medical History:   Past Medical History:   Diagnosis Date   • Asthma    • Cancer (CMS/HCC)     cervical   • Colon polyp    • COPD (chronic obstructive pulmonary disease) (CMS/HCC)    • Crohn's disease (CMS/HCC)    • Diverticulitis of colon    • Elevated cholesterol    • Essential hypertension 4/15/2020   • GERD (gastroesophageal reflux disease)    • History of transfusion    • Irritable bowel syndrome    • Kidney calculus    • Pancreatitis    • Sphincter of Oddi dysfunction        Past Surgical History:    Past Surgical History:   Procedure Laterality Date   • BACK SURGERY      C5-6   • COLONOSCOPY     • COLONOSCOPY N/A 10/18/2019    Procedure: COLONOSCOPY;  Surgeon: Tom Davis MD;  Location: [facility] ENDOSCOPY;  Service: Gastroenterology   • COLONOSCOPY N/A 8/27/2020    Procedure: COLONOSCOPY;  Surgeon: Tom Davis MD;  Location: [facility] ENDOSCOPY;  Service: Gastroenterology;  Laterality: N/A;   • ENDOSCOPY N/A 10/18/2019    Procedure: ESOPHAGOGASTRODUODENOSCOPY;  Surgeon: Tom Davis MD;  Location: [facility] ENDOSCOPY;  Service: Gastroenterology   • ENDOSCOPY N/A 7/27/2020    Procedure: ESOPHAGOGASTRODUODENOSCOPY;  Surgeon: Tom Davis MD;  Location: [facility] ENDOSCOPY;  Service: Gastroenterology;  Laterality: N/A;   • ENDOSCOPY N/A 2/10/2021    Procedure:  ESOPHAGOGASTRODUODENOSCOPY;  Surgeon: Tom Davis MD;  Location: [facility] ENDOSCOPY;  Service: Gastroenterology;  Laterality: N/A;   • HYSTERECTOMY     • SHOULDER ARTHROSCOPY W/ ROTATOR CUFF REPAIR Right    • TOE SURGERY      left small toe    • TONSILECTOMY, ADENOIDECTOMY, BILATERAL MYRINGOTOMY AND TUBES     • TONSILLECTOMY     • UPPER GASTROINTESTINAL ENDOSCOPY  02/10/2021       Family History:  Family History   Problem Relation Age of Onset   • Inflammatory bowel disease Mother    • Arthritis Mother    • Diabetes Mother    • Hypertension Mother    • Hyperlipidemia Mother    • Obesity Mother    • Osteoporosis Mother    • Heart disease Father    • Hyperlipidemia Father    • Heart attack Father    • Diabetes Sister    • Liver disease Daughter    • Mental illness Daughter    • Obesity Daughter    • Diabetes Maternal Grandmother    • Cancer Maternal Grandmother         lung   • Cancer Other         lung   • Heart disease Other        Social History:   reports that she has been smoking cigarettes. She has a 40.00 pack-year smoking history. She has never used smokeless tobacco. She reports current drug use. Drug: Marijuana. She reports that she does not drink alcohol.    Medications:   Prior to Admission medications    Medication Sig Start Date End Date Taking? Authorizing Provider   albuterol sulfate  (90 Base) MCG/ACT inhaler Inhale 2 puffs Every 4 (Four) Hours As Needed for Wheezing. 10/28/20  Yes Iliana Jacobson MD   amitriptyline (ELAVIL) 25 MG tablet Take 1 tablet by mouth Every Night. 4/29/21  Yes Xavier Wick MD   cetirizine (zyrTEC) 10 MG tablet Take 1 tablet by mouth Daily. 9/25/19  Yes Iliana Jacobson MD   Cyanocobalamin (B-12 COMPLIANCE INJECTION IJ) Inject  as directed.   Yes Provider, MD Byron   cyclobenzaprine (FLEXERIL) 5 MG tablet Take 1 tablet by mouth 3 (Three) Times a Day As Needed (hip pain). Do not drive while taking 4/29/21  Yes Xavier Wick MD   fluticasone (FLONASE)  50 MCG/ACT nasal spray 2 sprays into the nostril(s) as directed by provider Daily. 10/28/20  Yes Iliana Jacobson MD   guaiFENesin (Mucinex) 600 MG 12 hr tablet Take 2 tablets by mouth 2 (Two) Times a Day. 4/29/21  Yes Xavier Wick MD   ipratropium-albuterol (DUO-NEB) 0.5-2.5 mg/3 ml nebulizer Take 3 mL by nebulization 4 (Four) Times a Day. 6/3/21  Yes Xavier Wick MD   montelukast (SINGULAIR) 10 MG tablet Take 1 tablet by mouth Every Night. 10/17/19  Yes Xavier Wick MD   Nutritional Supplements (Ensure Active High Protein) liquid Take 237 mL by mouth Daily. 1/28/21  Yes Xavier Wick MD   omeprazole (priLOSEC) 20 MG capsule Take 1 capsule by mouth Daily. 6/23/20  Yes Xavier Wick MD   ondansetron ODT (ZOFRAN-ODT) 4 MG disintegrating tablet Place 1 tablet on the tongue 4 (Four) Times a Day As Needed for Nausea or Vomiting. 7/26/20  Yes Gregory Kaur MD   Plecanatide (Trulance) 3 MG tablet Take 1 tablet by mouth Daily. 6/4/21  Yes Tom Davis MD   umeclidinium-vilanterol (ANORO ELLIPTA) 62.5-25 MCG/INH aerosol powder  inhaler Inhale 1 puff Daily. 10/28/20  Yes Iliana Jacobson MD   vitamin D (ERGOCALCIFEROL) 1.25 MG (55042 UT) capsule capsule Take 1 capsule by mouth 1 (One) Time Per Week. 4/15/21  Yes Xavier Wick MD   Plecanatide (Trulance) 3 MG tablet Take 1 tablet by mouth Daily. 8/18/20 6/4/21 Yes Tom Davis MD   albuterol (ACCUNEB) 1.25 MG/3ML nebulizer solution Take 3 mL by nebulization Every 4 (Four) Hours As Needed for Wheezing or Shortness of Air. 4/15/21 6/4/21  Xavier Wick MD       Allergies:  Nsaids, Azithromycin, Ciprofloxacin, Doxycycline, Other, Levofloxacin, and Phenergan [promethazine hcl]    ROS:    Review of Systems   Constitutional: Negative for activity change, appetite change, chills, diaphoresis, fatigue, fever and unexpected weight change.   HENT: Negative for sore throat and trouble swallowing.    Respiratory: Negative for shortness of breath.   "  Gastrointestinal: Negative for abdominal distention, abdominal pain, anal bleeding, blood in stool, constipation, diarrhea, nausea, rectal pain and vomiting.   Endocrine: Negative for polydipsia, polyphagia and polyuria.   Genitourinary: Negative for difficulty urinating.   Musculoskeletal: Negative for arthralgias.   Skin: Negative for pallor.   Allergic/Immunologic: Negative for food allergies.   Neurological: Negative for weakness and light-headedness.   Psychiatric/Behavioral: Negative for behavioral problems.     Objective     Blood pressure 117/77, pulse 91, height 165.1 cm (65\"), weight 45.9 kg (101 lb 3.2 oz), not currently breastfeeding.    Physical Exam  Constitutional:       General: She is not in acute distress.     Appearance: She is well-developed. She is not diaphoretic.   HENT:      Head: Normocephalic and atraumatic.   Cardiovascular:      Rate and Rhythm: Normal rate and regular rhythm.      Heart sounds: Normal heart sounds. No murmur heard.   No friction rub. No gallop.    Pulmonary:      Effort: No respiratory distress.      Breath sounds: Normal breath sounds. No wheezing or rales.   Chest:      Chest wall: No tenderness.   Abdominal:      General: Bowel sounds are normal. There is no distension.      Palpations: Abdomen is soft. There is no mass.      Tenderness: There is no abdominal tenderness. There is no guarding or rebound.      Hernia: No hernia is present.   Musculoskeletal:         General: Normal range of motion.   Skin:     General: Skin is warm and dry.      Coloration: Skin is not pale.      Findings: No erythema or rash.   Neurological:      Mental Status: She is alert and oriented to person, place, and time.   Psychiatric:         Behavior: Behavior normal.         Thought Content: Thought content normal.         Judgment: Judgment normal.          Assessment/Plan   Diagnoses and all orders for this visit:    1. Chronic idiopathic constipation (Primary)    Other orders  -     " Plecanatide (Trulance) 3 MG tablet; Take 1 tablet by mouth Daily.  Dispense: 90 tablet; Refill: 5        * Surgery not found *     Diagnosis Plan   1. Chronic idiopathic constipation         Anticipated Surgical Procedure:  No orders of the defined types were placed in this encounter.      The risks, benefits, and alternatives of this procedure have been discussed with the patient or the responsible party- the patient understands and agrees to proceed.

## 2021-06-07 LAB
CYTOLOGIST CVX/VAG CYTO: NORMAL
PATH INTERP BLD-IMP: NORMAL

## 2021-06-07 NOTE — PROGRESS NOTES
Background:  Pt w mod copd, hx, crohns, celiac disease.   Chief Complaint  tobacco use    Subjective    History of Present Illness       Carla Richard presents to [facility].  She developed severe dyspnea without much warning after some exposure to some fumes, possibly methamphetamine manufacturing in the neighborhood.  She has had copd dx several years, tx with albuterol and previously on duoneb.  She had previous PFT.  She has pain in left chest since that episode.  She was treated in the ED and was found to have tree in bud opacities on ct.  She improved a little bit and then plateued.  She uses anoro daily.         has a past medical history of Asthma, Cancer (CMS/HCC), Colon polyp, COPD (chronic obstructive pulmonary disease) (CMS/HCC), Crohn's disease (CMS/HCC), Diverticulitis of colon, Elevated cholesterol, Essential hypertension (4/15/2020), GERD (gastroesophageal reflux disease), History of transfusion, Irritable bowel syndrome, Kidney calculus, Pancreatitis, and Sphincter of Oddi dysfunction.   has a past surgical history that includes Hysterectomy; Colonoscopy; Tonsilectomy, adenoidectomy, bilateral myringotomy and tubes; Esophagogastroduodenoscopy (N/A, 10/18/2019); Colonoscopy (N/A, 10/18/2019); Esophagogastroduodenoscopy (N/A, 7/27/2020); Tonsillectomy; Shoulder arthroscopy w/ rotator cuff repair (Right); Back surgery; Toe Surgery; Colonoscopy (N/A, 8/27/2020); Esophagogastroduodenoscopy (N/A, 2/10/2021); and Upper gastrointestinal endoscopy (02/10/2021).  family history includes Arthritis in her mother; Cancer in her maternal grandmother and another family member; Diabetes in her maternal grandmother, mother, and sister; Heart attack in her father; Heart disease in her father and another family member; Hyperlipidemia in her father and mother; Hypertension in her mother; Inflammatory bowel disease in her mother; Liver disease in her  "daughter; Mental illness in her daughter; Obesity in her daughter and mother; Osteoporosis in her mother.   reports that she has been smoking cigarettes. She has a 40.00 pack-year smoking history. She has never used smokeless tobacco. She reports current drug use. Drug: Marijuana. She reports that she does not drink alcohol.  Allergies   Allergen Reactions   • Nsaids Swelling and GI Intolerance   • Azithromycin Swelling   • Ciprofloxacin Hives   • Doxycycline Swelling   • Other Other (See Comments)     nicotine patch   Caused body twitching/seizures   • Levofloxacin Rash   • Phenergan [Promethazine Hcl] GI Intolerance       Objective     Vital Signs:   /82   Pulse 84   Ht 165.1 cm (65\")   Wt 45.3 kg (99 lb 12.8 oz)   SpO2 97% Comment: RA  BMI 16.61 kg/m²   Physical Exam  Constitutional:       General: She is not in acute distress.     Appearance: She is well-developed. She is not ill-appearing or toxic-appearing.   HENT:      Head: Atraumatic.   Eyes:      General: No scleral icterus.     Conjunctiva/sclera: Conjunctivae normal.   Cardiovascular:      Rate and Rhythm: Normal rate and regular rhythm.      Heart sounds: S1 normal and S2 normal.   Pulmonary:      Effort: Pulmonary effort is normal. No respiratory distress.      Breath sounds: Wheezing present. No rhonchi.   Abdominal:      General: There is no distension.   Musculoskeletal:         General: No deformity.      Cervical back: Neck supple.   Skin:     Coloration: Skin is not pale.      Findings: No rash.   Neurological:      Mental Status: She is alert.        Result Review  Data Reviewed:{ Labs  Result Review  Imaging  Med Tab  Media :23}     My interpretation of radiograph: tree in bud opacities, areas of centrilobular emphysema    PFT Values        Some values may be hidden. Unless noted otherwise, only the newest values recorded on each date are displayed.         Old Values PFT Results 10/28/20   No data to display.      Pre Drug PFT " Results 10/28/20   FVC 79   FEV1 63   FEV1/FVC 64      Post Drug PFT Results 10/28/20   No data to display.      Other Tests PFT Results 10/28/20   TLC 88      DLCO 56         Pulmonary Function Test  Performed by: Iliana Jacobson MD  Authorized by: Iliana Jacobson MD       Pre Drug    FVC: 79%   FEV1: 63%   FEV1/FVC: 64%   T%   RV: 191%   DLCO: 56%     Interpretation   Spirometry   Spirometry shows moderate obstruction.   Review of FVL curve   Effort is reduced.   Lung Volume Measurements  Measurements show: normal results and elevated residual volume consistent with gas trapping.   Diffusion Capacity  The patient's diffusion capacity is moderately reduced.    Overall comments: No significant change in FVC and FEV1 compared to 2019.             Assessment and Plan {CC Problem List  Visit Diagnosis  ROS  Review (Popup)  Health Maintenance  Quality  BestPractice  Medications  SmartSets  SnapShot Encounters  Media :23}   Problem List Items Addressed This Visit        Pulmonary Problems    Stage 2 moderate COPD by GOLD classification (CMS/[facility])    Relevant Medications    Fluticasone-Umeclidin-Vilant (Trelegy Ellipta) 200-62.5-25 MCG/INH inhaler    methylPREDNISolone acetate (DEPO-medrol) injection 80 mg (Completed)    Other Relevant Orders    CT Chest Without Contrast Diagnostic      Other Visit Diagnoses     Lung infiltrate    -  Primary    tree in bud opacity ct chest [place] 2021    Relevant Medications    Fluticasone-Umeclidin-Vilant (Trelegy Ellipta) 200-62.5-25 MCG/INH inhaler    Tobacco use          we discussed findings of tree in bud opacities.  Suspect small mucus plugs, cannot rule out m. Avium  DepoMedrol today for dyspnea and wheeze, recrudescence of bronchospasm after finishing steroids for recent copd-ae associated with fume exposure  Repeat ct in 3 mos and follow up to reassess the tib infiltrate.  Recommend smoking cessation.  Has failed multiple techniques.    Follow  Up {Instructions Charge Capture  Follow-up Communications :23}   Return in about 3 months (around 9/9/2021) for review CT.  Patient was given instructions and counseling regarding her condition or for health maintenance advice. Please see specific information pulled into the AVS if appropriate.    Electronically signed by Talha Payton MD, 6/9/2021, 12:40 CDT

## 2021-06-09 ENCOUNTER — OFFICE VISIT (OUTPATIENT)
Dept: PULMONOLOGY | Facility: CLINIC | Age: 51
End: 2021-06-09

## 2021-06-09 VITALS
HEART RATE: 84 BPM | HEIGHT: 65 IN | SYSTOLIC BLOOD PRESSURE: 124 MMHG | OXYGEN SATURATION: 97 % | WEIGHT: 99.8 LBS | DIASTOLIC BLOOD PRESSURE: 82 MMHG | BODY MASS INDEX: 16.63 KG/M2

## 2021-06-09 DIAGNOSIS — R91.8 LUNG INFILTRATE: Primary | ICD-10-CM

## 2021-06-09 DIAGNOSIS — Z72.0 TOBACCO USE: ICD-10-CM

## 2021-06-09 DIAGNOSIS — J44.9 STAGE 2 MODERATE COPD BY GOLD CLASSIFICATION (HCC): ICD-10-CM

## 2021-06-09 PROCEDURE — 96372 THER/PROPH/DIAG INJ SC/IM: CPT | Performed by: INTERNAL MEDICINE

## 2021-06-09 PROCEDURE — 99214 OFFICE O/P EST MOD 30 MIN: CPT | Performed by: INTERNAL MEDICINE

## 2021-06-09 RX ORDER — METHYLPREDNISOLONE ACETATE 80 MG/ML
80 INJECTION, SUSPENSION INTRA-ARTICULAR; INTRALESIONAL; INTRAMUSCULAR; SOFT TISSUE ONCE
Status: COMPLETED | OUTPATIENT
Start: 2021-06-09 | End: 2021-06-09

## 2021-06-09 RX ORDER — METHYLPREDNISOLONE ACETATE 80 MG/ML
80 INJECTION, SUSPENSION INTRA-ARTICULAR; INTRALESIONAL; INTRAMUSCULAR; SOFT TISSUE ONCE
Status: DISCONTINUED | OUTPATIENT
Start: 2021-06-09 | End: 2021-06-09

## 2021-06-09 RX ADMIN — METHYLPREDNISOLONE ACETATE 80 MG: 80 INJECTION, SUSPENSION INTRA-ARTICULAR; INTRALESIONAL; INTRAMUSCULAR; SOFT TISSUE at 09:53

## 2021-06-16 RX ORDER — FEEDER CONTAINER WITH PUMP SET
EACH MISCELLANEOUS
Qty: 7110 ML | Refills: 3 | Status: SHIPPED | OUTPATIENT
Start: 2021-06-16 | End: 2021-08-25

## 2021-06-18 LAB
QT INTERVAL: 392 MS
QTC INTERVAL: 423 MS

## 2021-06-25 DIAGNOSIS — S76.011A STRAIN OF RIGHT HIP, INITIAL ENCOUNTER: ICD-10-CM

## 2021-06-25 RX ORDER — CYCLOBENZAPRINE HCL 5 MG
5 TABLET ORAL 3 TIMES DAILY PRN
Qty: 30 TABLET | Refills: 1 | Status: SHIPPED | OUTPATIENT
Start: 2021-06-25 | End: 2022-08-23 | Stop reason: SDUPTHER

## 2021-06-28 ENCOUNTER — LAB (OUTPATIENT)
Dept: ONCOLOGY | Facility: HOSPITAL | Age: 51
End: 2021-06-28

## 2021-06-28 ENCOUNTER — CONSULT (OUTPATIENT)
Dept: ONCOLOGY | Facility: CLINIC | Age: 51
End: 2021-06-28

## 2021-06-28 VITALS
RESPIRATION RATE: 18 BRPM | WEIGHT: 99.6 LBS | HEART RATE: 88 BPM | HEIGHT: 65 IN | TEMPERATURE: 96.8 F | DIASTOLIC BLOOD PRESSURE: 54 MMHG | BODY MASS INDEX: 16.6 KG/M2 | SYSTOLIC BLOOD PRESSURE: 113 MMHG

## 2021-06-28 DIAGNOSIS — D72.829 LEUKOCYTOSIS, UNSPECIFIED TYPE: ICD-10-CM

## 2021-06-28 DIAGNOSIS — D75.839 THROMBOCYTOSIS: ICD-10-CM

## 2021-06-28 DIAGNOSIS — E61.1 IRON DEFICIENCY: ICD-10-CM

## 2021-06-28 DIAGNOSIS — Z85.41 HISTORY OF CERVICAL CANCER: ICD-10-CM

## 2021-06-28 DIAGNOSIS — E53.8 B12 DEFICIENCY: Primary | ICD-10-CM

## 2021-06-28 DIAGNOSIS — R59.0 CERVICAL LYMPHADENOPATHY: ICD-10-CM

## 2021-06-28 DIAGNOSIS — E53.8 B12 DEFICIENCY: ICD-10-CM

## 2021-06-28 LAB
BASOPHILS # BLD AUTO: 0.1 10*3/MM3 (ref 0–0.2)
BASOPHILS NFR BLD AUTO: 0.8 % (ref 0–1.5)
DEPRECATED RDW RBC AUTO: 43.6 FL (ref 37–54)
EOSINOPHIL # BLD AUTO: 0.2 10*3/MM3 (ref 0–0.4)
EOSINOPHIL NFR BLD AUTO: 1.5 % (ref 0.3–6.2)
ERYTHROCYTE [DISTWIDTH] IN BLOOD BY AUTOMATED COUNT: 12.6 % (ref 12.3–15.4)
HCT VFR BLD AUTO: 44 % (ref 34–46.6)
HGB BLD-MCNC: 14.9 G/DL (ref 12–15.9)
IMM GRANULOCYTES # BLD AUTO: 0.06 10*3/MM3 (ref 0–0.05)
IMM GRANULOCYTES NFR BLD AUTO: 0.5 % (ref 0–0.5)
LDH SERPL-CCNC: 430 U/L (ref 135–214)
LYMPHOCYTES # BLD AUTO: 3.78 10*3/MM3 (ref 0.7–3.1)
LYMPHOCYTES NFR BLD AUTO: 29.1 % (ref 19.6–45.3)
MCH RBC QN AUTO: 32.1 PG (ref 26.6–33)
MCHC RBC AUTO-ENTMCNC: 33.9 G/DL (ref 31.5–35.7)
MCV RBC AUTO: 94.8 FL (ref 79–97)
MONOCYTES # BLD AUTO: 0.89 10*3/MM3 (ref 0.1–0.9)
MONOCYTES NFR BLD AUTO: 6.9 % (ref 5–12)
NEUTROPHILS NFR BLD AUTO: 61.2 % (ref 42.7–76)
NEUTROPHILS NFR BLD AUTO: 7.95 10*3/MM3 (ref 1.7–7)
NRBC BLD AUTO-RTO: 0 /100 WBC (ref 0–0.2)
PLATELET # BLD AUTO: 445 10*3/MM3 (ref 140–450)
PMV BLD AUTO: 9.1 FL (ref 6–12)
RBC # BLD AUTO: 4.64 10*6/MM3 (ref 3.77–5.28)
WBC # BLD AUTO: 12.98 10*3/MM3 (ref 3.4–10.8)

## 2021-06-28 PROCEDURE — G0463 HOSPITAL OUTPT CLINIC VISIT: HCPCS | Performed by: INTERNAL MEDICINE

## 2021-06-28 PROCEDURE — G9902 PT SCRN TBCO AND ID AS USER: HCPCS | Performed by: INTERNAL MEDICINE

## 2021-06-28 PROCEDURE — 85025 COMPLETE CBC W/AUTO DIFF WBC: CPT | Performed by: INTERNAL MEDICINE

## 2021-06-28 PROCEDURE — 1126F AMNT PAIN NOTED NONE PRSNT: CPT | Performed by: INTERNAL MEDICINE

## 2021-06-28 PROCEDURE — 83615 LACTATE (LD) (LDH) ENZYME: CPT | Performed by: INTERNAL MEDICINE

## 2021-06-28 PROCEDURE — 1157F ADVNC CARE PLAN IN RCRD: CPT | Performed by: INTERNAL MEDICINE

## 2021-06-28 PROCEDURE — 99204 OFFICE O/P NEW MOD 45 MIN: CPT | Performed by: INTERNAL MEDICINE

## 2021-06-28 NOTE — PROGRESS NOTES
DATE OF CONSULT: 6/29/2021    REQUESTING SOURCE:  Xavier Wick MD  [address]  [place] [address]      REASON FOR CONSULTATION: Leukocytosis, macrocytosis, thrombocytosis      HISTORY OF PRESENT ILLNESS:    50-year-old female with medical problem consisting of history of cervical cancer age 19 status post conization procedure followed by hysterectomy at age 23, chronic obstructive pulmonary disease, celiac disease, hypertension, pancreatitis, history of polycythemia for which patient has been followed up at [facility] in [place] and requiring intermittent phlebotomies at per patient, B12 deficiency has been referred to [facility] for further evaluation and recommendation regarding abnormal CBC showing leukocytosis and intermittent thrombocytosis with iron deficiency.  Patient states currently she is injecting B12 injection every other day at home.  Admits to 18 pound weight loss since September of last year where she was diagnosed with celiac disease.  Denies any new lymph node enlargement.  Denies any hemoptysis.  Complains of chronic nausea.  Complains of intermittent blood in stool due to hemorrhoids.  Complains of chronic blood in urine secondary to history of stones.  Complains of chronic tingling and numbness affecting bilateral ring finger and little finger secondary to ulnar nerve injury many years ago.  Denies any personal history of DVT or pulmonary embolism.  Denies any new lymph node enlargement.              PAST MEDICAL HISTORY:    Past Medical History:   Diagnosis Date   • Asthma    • Cancer (CMS/HCC)     cervical   • Colon polyp    • COPD (chronic obstructive pulmonary disease) (CMS/HCC)    • Crohn's disease (CMS/HCC)    • Diverticulitis of colon    • Elevated cholesterol    • Essential hypertension 4/15/2020   • GERD (gastroesophageal reflux disease)    • History of transfusion    • Irritable bowel syndrome    • Kidney calculus    • Pancreatitis    •  Sphincter of Oddi dysfunction        PAST SURGICAL HISTORY:  Past Surgical History:   Procedure Laterality Date   • BACK SURGERY      C5-6   • COLONOSCOPY     • COLONOSCOPY N/A 10/18/2019    Procedure: COLONOSCOPY;  Surgeon: Tom Davis MD;  Location: [facility] ENDOSCOPY;  Service: Gastroenterology   • COLONOSCOPY N/A 8/27/2020    Procedure: COLONOSCOPY;  Surgeon: Tom Davis MD;  Location: [facility] ENDOSCOPY;  Service: Gastroenterology;  Laterality: N/A;   • ENDOSCOPY N/A 10/18/2019    Procedure: ESOPHAGOGASTRODUODENOSCOPY;  Surgeon: Tom Davis MD;  Location: [facility] ENDOSCOPY;  Service: Gastroenterology   • ENDOSCOPY N/A 7/27/2020    Procedure: ESOPHAGOGASTRODUODENOSCOPY;  Surgeon: Tom Davis MD;  Location: [facility] ENDOSCOPY;  Service: Gastroenterology;  Laterality: N/A;   • ENDOSCOPY N/A 2/10/2021    Procedure: ESOPHAGOGASTRODUODENOSCOPY;  Surgeon: Tom Davis MD;  Location: [facility] ENDOSCOPY;  Service: Gastroenterology;  Laterality: N/A;   • HYSTERECTOMY     • SHOULDER ARTHROSCOPY W/ ROTATOR CUFF REPAIR Right    • TOE SURGERY      left small toe    • TONSILECTOMY, ADENOIDECTOMY, BILATERAL MYRINGOTOMY AND TUBES     • TONSILLECTOMY     • UPPER GASTROINTESTINAL ENDOSCOPY  02/10/2021       ALLERGIES:    Allergies   Allergen Reactions   • Nsaids Swelling and GI Intolerance   • Azithromycin Swelling   • Ciprofloxacin Hives   • Doxycycline Swelling   • Other Other (See Comments)     nicotine patch   Caused body twitching/seizures   • Levofloxacin Rash   • Phenergan [Promethazine Hcl] GI Intolerance       SOCIAL HISTORY:   Social History     Tobacco Use   • Smoking status: Current Every Day Smoker     Packs/day: 1.00     Years: 40.00     Pack years: 40.00     Types: Cigarettes   • Smokeless tobacco: Never Used   Vaping Use   • Vaping Use: Former   • Substances: Nicotine   • Devices: Disposable, Refillable tank   Substance Use Topics   • Alcohol use: No   • Drug use: Yes     Types: Marijuana      Comment: 06/04/2021 - Patient states she utilizes Marijuana nightly to increase appetite and decrease nausea.       CURRENT MEDICATIONS:    Current Outpatient Medications   Medication Sig Dispense Refill   • albuterol sulfate  (90 Base) MCG/ACT inhaler Inhale 2 puffs Every 4 (Four) Hours As Needed for Wheezing. 18 g 5   • amitriptyline (ELAVIL) 25 MG tablet Take 1 tablet by mouth Every Night. 30 tablet 3   • cetirizine (zyrTEC) 10 MG tablet Take 1 tablet by mouth Daily. 30 tablet 11   • Cyanocobalamin (B-12 COMPLIANCE INJECTION IJ) Inject  as directed.     • cyclobenzaprine (FLEXERIL) 5 MG tablet Take 1 tablet by mouth 3 (Three) Times a Day As Needed (hip pain). Do not drive while taking 30 tablet 1   • fluticasone (FLONASE) 50 MCG/ACT nasal spray 2 sprays into the nostril(s) as directed by provider Daily. 1 bottle 5   • guaiFENesin (Mucinex) 600 MG 12 hr tablet Take 2 tablets by mouth 2 (Two) Times a Day. 30 tablet 3   • ipratropium-albuterol (DUO-NEB) 0.5-2.5 mg/3 ml nebulizer Take 3 mL by nebulization 4 (Four) Times a Day. 9 mL 3   • montelukast (SINGULAIR) 10 MG tablet Take 1 tablet by mouth Every Night. 30 tablet 3   • Nutritional Supplements (Ensure High Protein) liquid DRINK EIGHT OUNCES DAILY (#(5) six packs) 7110 mL 3   • omeprazole (priLOSEC) 20 MG capsule Take 1 capsule by mouth Daily. 30 capsule 3   • ondansetron ODT (ZOFRAN-ODT) 4 MG disintegrating tablet Place 1 tablet on the tongue 4 (Four) Times a Day As Needed for Nausea or Vomiting. 12 tablet 0   • Plecanatide (Trulance) 3 MG tablet Take 1 tablet by mouth Daily. 90 tablet 5   • umeclidinium-vilanterol (ANORO ELLIPTA) 62.5-25 MCG/INH aerosol powder  inhaler Inhale 1 puff Daily. 1 each 5   • vitamin D (ERGOCALCIFEROL) 1.25 MG (49389 UT) capsule capsule Take 1 capsule by mouth 1 (One) Time Per Week. 5 capsule 3     No current facility-administered medications for this visit.        HOME MEDICATIONS:   Current Outpatient Medications on File  Prior to Visit   Medication Sig Dispense Refill   • albuterol sulfate  (90 Base) MCG/ACT inhaler Inhale 2 puffs Every 4 (Four) Hours As Needed for Wheezing. 18 g 5   • amitriptyline (ELAVIL) 25 MG tablet Take 1 tablet by mouth Every Night. 30 tablet 3   • cetirizine (zyrTEC) 10 MG tablet Take 1 tablet by mouth Daily. 30 tablet 11   • Cyanocobalamin (B-12 COMPLIANCE INJECTION IJ) Inject  as directed.     • cyclobenzaprine (FLEXERIL) 5 MG tablet Take 1 tablet by mouth 3 (Three) Times a Day As Needed (hip pain). Do not drive while taking 30 tablet 1   • fluticasone (FLONASE) 50 MCG/ACT nasal spray 2 sprays into the nostril(s) as directed by provider Daily. 1 bottle 5   • guaiFENesin (Mucinex) 600 MG 12 hr tablet Take 2 tablets by mouth 2 (Two) Times a Day. 30 tablet 3   • ipratropium-albuterol (DUO-NEB) 0.5-2.5 mg/3 ml nebulizer Take 3 mL by nebulization 4 (Four) Times a Day. 9 mL 3   • montelukast (SINGULAIR) 10 MG tablet Take 1 tablet by mouth Every Night. 30 tablet 3   • Nutritional Supplements (Ensure High Protein) liquid DRINK EIGHT OUNCES DAILY (#(5) six packs) 7110 mL 3   • omeprazole (priLOSEC) 20 MG capsule Take 1 capsule by mouth Daily. 30 capsule 3   • ondansetron ODT (ZOFRAN-ODT) 4 MG disintegrating tablet Place 1 tablet on the tongue 4 (Four) Times a Day As Needed for Nausea or Vomiting. 12 tablet 0   • Plecanatide (Trulance) 3 MG tablet Take 1 tablet by mouth Daily. 90 tablet 5   • umeclidinium-vilanterol (ANORO ELLIPTA) 62.5-25 MCG/INH aerosol powder  inhaler Inhale 1 puff Daily. 1 each 5   • vitamin D (ERGOCALCIFEROL) 1.25 MG (25202 UT) capsule capsule Take 1 capsule by mouth 1 (One) Time Per Week. 5 capsule 3     No current facility-administered medications on file prior to visit.       FAMILY HISTORY:    Family History   Problem Relation Age of Onset   • Inflammatory bowel disease Mother    • Arthritis Mother    • Diabetes Mother    • Hypertension Mother    • Hyperlipidemia Mother    • Obesity  "Mother    • Osteoporosis Mother    • Heart disease Father    • Hyperlipidemia Father    • Heart attack Father    • Diabetes Sister    • Liver disease Daughter    • Mental illness Daughter    • Obesity Daughter    • Diabetes Maternal Grandmother    • Cancer Maternal Grandmother         lung   • Cancer Other         lung   • Heart disease Other        REVIEW OF SYSTEMS:        CONSTITUTIONAL:  Complains of fatigue.  Admits to 18 pound weight loss since September 2020 when she was diagnosed with celiac disease.  Denies any fever, chills .     EYES: No visual disturbances. No discharge. No new lesions    ENMT:  No epistaxis, mouth sores or difficulty swallowing.    RESPIRATORY:  No new shortness of breath. No new cough or hemoptysis.    CARDIOVASCULAR:  No chest pain or palpitations.    GASTROINTESTINAL: Positive for nausea.  Positive for intermittent blood in stool due to hemorrhoids.  No abdominal pain.     GENITOURINARY: Positive for chronic blood in urine secondary to kidney stone.     MUSCULOSKELETAL: Positive for chronic arthralgias.    LYMPHATICS:  Denies any abnormal swollen glands anywhere in the body.    NEUROLOGICAL : Positive for chronic tingling and numbness affecting bilateral ring finger and little finger. No headache or dizziness. No seizures or balance problems.    SKIN: No new skin lesions.    ENDOCRINE : No new hot or cold intolerance. No new polyuria . No polydipsia.        PHYSICAL EXAMINATION:      VITAL SIGNS:  /54   Pulse 88   Temp 96.8 °F (36 °C)   Resp 18   Ht 165.1 cm (65\")   Wt 45.2 kg (99 lb 9.6 oz)   LMP  (LMP Unknown)   BMI 16.57 kg/m²       06/28/21  1345   Weight: 45.2 kg (99 lb 9.6 oz)       ECOG performance status: 1    CONSTITUTIONAL:  Not in any distress.    EYES: Mild conjunctival Pallor. No Icterus. No Pterygium. Extraocular Movements intact.No ptosis.    ENMT:  Normocephalic, Atraumatic.No Facial Asymmetry noted.    NECK:  No adenopathy.Trachea midline. NO " JVD.    RESPIRATORY:  Fair air entry bilateral. No rhonchi or wheezing.Fair respiratory effort.    CARDIOVASCULAR:  S1, S2. Regular rate and rhythm. No murmur or gallop appreciated.    ABDOMEN:  Soft,  nontender. Bowel sounds present in all four quadrants.  No Hepatosplenomegaly appreciated.    MUSCULOSKELETAL:  No edema.No Calf Tenderness.Decreased range of motion.    NEUROLOGIC:    No  Motor deficit appreciated. Cranial Nerves 2-12 grossly intact.    SKIN : No new skin lesion identified. Skin is warm and dry to touch.    LYMPHATICS: No new enlarged lymph nodes in neck or supraclavicular area.    PSYCHIATRY: Alert, awake and oriented ×3.            DIAGNOSTIC DATA:    WBC   Date Value Ref Range Status   06/28/2021 12.98 (H) 3.40 - 10.80 10*3/mm3 Final     RBC   Date Value Ref Range Status   06/28/2021 4.64 3.77 - 5.28 10*6/mm3 Final     Hemoglobin   Date Value Ref Range Status   06/28/2021 14.9 12.0 - 15.9 g/dL Final     Hematocrit   Date Value Ref Range Status   06/28/2021 44.0 34.0 - 46.6 % Final     MCV   Date Value Ref Range Status   06/28/2021 94.8 79.0 - 97.0 fL Final     MCH   Date Value Ref Range Status   06/28/2021 32.1 26.6 - 33.0 pg Final     MCHC   Date Value Ref Range Status   06/28/2021 33.9 31.5 - 35.7 g/dL Final     RDW   Date Value Ref Range Status   06/28/2021 12.6 12.3 - 15.4 % Final     RDW-SD   Date Value Ref Range Status   06/28/2021 43.6 37.0 - 54.0 fl Final     MPV   Date Value Ref Range Status   06/28/2021 9.1 6.0 - 12.0 fL Final     Platelets   Date Value Ref Range Status   06/28/2021 445 140 - 450 10*3/mm3 Final     Neutrophil %   Date Value Ref Range Status   06/28/2021 61.2 42.7 - 76.0 % Final     Lymphocyte %   Date Value Ref Range Status   06/28/2021 29.1 19.6 - 45.3 % Final     Monocyte %   Date Value Ref Range Status   06/28/2021 6.9 5.0 - 12.0 % Final     Eosinophil %   Date Value Ref Range Status   06/28/2021 1.5 0.3 - 6.2 % Final     Basophil %   Date Value Ref Range Status    06/28/2021 0.8 0.0 - 1.5 % Final     Immature Grans %   Date Value Ref Range Status   06/28/2021 0.5 0.0 - 0.5 % Final     Neutrophils, Absolute   Date Value Ref Range Status   06/28/2021 7.95 (H) 1.70 - 7.00 10*3/mm3 Final     Lymphocytes, Absolute   Date Value Ref Range Status   06/28/2021 3.78 (H) 0.70 - 3.10 10*3/mm3 Final     Monocytes, Absolute   Date Value Ref Range Status   06/28/2021 0.89 0.10 - 0.90 10*3/mm3 Final     Eosinophils, Absolute   Date Value Ref Range Status   06/28/2021 0.20 0.00 - 0.40 10*3/mm3 Final     Basophils, Absolute   Date Value Ref Range Status   06/28/2021 0.10 0.00 - 0.20 10*3/mm3 Final     Immature Grans, Absolute   Date Value Ref Range Status   06/28/2021 0.06 (H) 0.00 - 0.05 10*3/mm3 Final     nRBC   Date Value Ref Range Status   06/28/2021 0.0 0.0 - 0.2 /100 WBC Final     Glucose   Date Value Ref Range Status   06/03/2021 96 65 - 99 mg/dL Final     Sodium   Date Value Ref Range Status   06/03/2021 139 136 - 145 mmol/L Final     Potassium   Date Value Ref Range Status   06/03/2021 3.8 3.5 - 5.2 mmol/L Final     CO2   Date Value Ref Range Status   06/03/2021 30.0 (H) 22.0 - 29.0 mmol/L Final     Chloride   Date Value Ref Range Status   06/03/2021 102 98 - 107 mmol/L Final     Anion Gap   Date Value Ref Range Status   06/03/2021 7.0 5.0 - 15.0 mmol/L Final     Creatinine   Date Value Ref Range Status   06/03/2021 0.70 0.57 - 1.00 mg/dL Final     BUN   Date Value Ref Range Status   06/03/2021 8 6 - 20 mg/dL Final     BUN/Creatinine Ratio   Date Value Ref Range Status   06/03/2021 11.4 7.0 - 25.0 Final     Calcium   Date Value Ref Range Status   06/03/2021 9.5 8.6 - 10.5 mg/dL Final     eGFR Non  Amer   Date Value Ref Range Status   06/03/2021 88 >60 mL/min/1.73 Final     Alkaline Phosphatase   Date Value Ref Range Status   06/03/2021 72 39 - 117 U/L Final     Total Protein   Date Value Ref Range Status   06/03/2021 6.6 6.0 - 8.5 g/dL Final     ALT (SGPT)   Date Value Ref  Range Status   06/03/2021 11 1 - 33 U/L Final     AST (SGOT)   Date Value Ref Range Status   06/03/2021 14 1 - 32 U/L Final     Total Bilirubin   Date Value Ref Range Status   06/03/2021 0.4 0.0 - 1.2 mg/dL Final     Albumin   Date Value Ref Range Status   06/03/2021 4.60 3.50 - 5.20 g/dL Final     Globulin   Date Value Ref Range Status   06/03/2021 2.0 gm/dL Final     Lab Results   Component Value Date    IRON 65 06/03/2021    TIBC 353 06/03/2021    LABIRON 18 (L) 06/03/2021    FERRITIN 87.83 06/03/2021    DFZHURRD17 >2,000 (H) 06/01/2021     No results found for: , LABCA2, AFPTM, HCGQUANT, , CHROMGRNA, 4QQPI30BCM, CEA, REFLABREPO]    Radiology Data :  No radiology results for the last 7 days    Pathology :  * Cannot find OR log *    ASSESSMENT AND PLAN:      1.  Leukocytosis:  -Patient has ongoing leukocytosis since August 2019 which is intermittent in nature.  -Her white blood cell count has been ranging between 9000 and 18,000.  -Patient does have a predominant increase in neutrophil as well as intermittent increase in lymphocyte.  -Differential diagnosis for her leukocytosis includes reactive in nature due to chronic inflammation from Crohn's disease plus or minus smoking plus or minus bone marrow pathology  -We will repeat CBC with differential today along with  LDH and peripheral blood flow cytometry  -CBC done today shows white blood cell count is 12.98 which is again predominantly increase in neutrophil plus borderline elevation in lymphocytes.  -We will ask patient to return to clinic in 2 weeks to go over the result of blood work and further recommendation      2.  Thrombocytosis:  -Patient has ongoing intermittent thrombocytosis since August 2019  -Intermittent thrombocytosis is likely reactive due to iron deficiency versus inflammation versus infection plus or minus blood loss  -CBC done today shows platelet count is 445,000.    3.  Macrocytosis:  -Likely reactive to intermittent GI blood loss  due to evidence of iron deficiency.  -We will monitor with CBC for now    4.  History of cervical cancer:  Patient was diagnosed with cervical cancer at age 19 had a cervical conization procedure done without any chemotherapy or radiation.  -States at age 23 she had a hysterectomy done for endometriosis and there was no evidence of malignancy at that point.  -We do not have any pathology report available for cervical cancer history since it is more than 30 years ago.    5.  Iron deficiency:  -Secondary to phlebotomies for her polycythemia.  -[facility] in [place].  We will try to obtain some records.      6.  B12 deficiency:  -Patient states she has a B12 deficiency for which she was getting intramuscular B12 by Dr. Antonio Esquivel every 3 months.  Due to her symptoms of B12 deficiency she has started injecting herself every other day.  -Her most recent B12 level is greater than 2000.  Recommend B12 injection once a month.  -Prescription for B12 injection has been sent to her pharmacy today.      7. Carla Richard  reports that she has been smoking cigarettes. She has a 40.00 pack-year smoking history. She has never used smokeless tobacco.. I have educated her on the risk of diseases from using tobacco products such as cancer, COPD, heart disease and cataracts.     I advised her to quit and she is not willing to quit.    I spent 3  minutes counseling the patient.         8. Advance Care Planning   ACP discussion was held with the patient during this visit. Patient has an advance directive in EMR which is still valid.         PHQ-9 Total Score: 0   -Patient is not homicidal or suicidal.  No acute intervention required.      Carla Richard reports a pain score of 0.  Given her pain assessment as noted, treatment options were discussed and the following options were decided upon as a follow-up plan to address the patient's pain: continuation of current  treatment plan for pain.             Thank you for this consultation.    Ramirez Burger MD  6/29/2021  06:49 CDT        Part of this note may be an electronic transcription/translation of spoken language to printed text using the Dragon Dictation System.

## 2021-06-29 RX ORDER — CYANOCOBALAMIN, ISOPROPYL ALCOHOL 1000MCG/ML
1 KIT INJECTION
Qty: 1 KIT | Refills: 5 | Status: SHIPPED | OUTPATIENT
Start: 2021-06-29

## 2021-06-30 LAB — REF LAB TEST METHOD: NORMAL

## 2021-07-02 ENCOUNTER — OFFICE VISIT (OUTPATIENT)
Dept: FAMILY MEDICINE CLINIC | Facility: CLINIC | Age: 51
End: 2021-07-02

## 2021-07-02 VITALS
TEMPERATURE: 97.3 F | WEIGHT: 100.8 LBS | OXYGEN SATURATION: 98 % | HEART RATE: 90 BPM | HEIGHT: 65 IN | BODY MASS INDEX: 16.79 KG/M2 | DIASTOLIC BLOOD PRESSURE: 58 MMHG | SYSTOLIC BLOOD PRESSURE: 110 MMHG

## 2021-07-02 DIAGNOSIS — D72.829 LEUKOCYTOSIS, UNSPECIFIED TYPE: ICD-10-CM

## 2021-07-02 DIAGNOSIS — M25.50 MULTIPLE JOINT PAIN: ICD-10-CM

## 2021-07-02 DIAGNOSIS — Z72.0 TOBACCO USER: ICD-10-CM

## 2021-07-02 DIAGNOSIS — K58.2 IRRITABLE BOWEL SYNDROME WITH BOTH CONSTIPATION AND DIARRHEA: ICD-10-CM

## 2021-07-02 DIAGNOSIS — J42 CHRONIC BRONCHITIS, UNSPECIFIED CHRONIC BRONCHITIS TYPE (HCC): ICD-10-CM

## 2021-07-02 DIAGNOSIS — E44.0 MODERATE PROTEIN-CALORIE MALNUTRITION (HCC): ICD-10-CM

## 2021-07-02 DIAGNOSIS — R63.6 UNDERWEIGHT: ICD-10-CM

## 2021-07-02 DIAGNOSIS — F41.1 GAD (GENERALIZED ANXIETY DISORDER): ICD-10-CM

## 2021-07-02 DIAGNOSIS — E61.1 IRON DEFICIENCY: ICD-10-CM

## 2021-07-02 DIAGNOSIS — R93.89 ABNORMAL CT SCAN, CHEST: ICD-10-CM

## 2021-07-02 DIAGNOSIS — E55.9 VITAMIN D DEFICIENCY: ICD-10-CM

## 2021-07-02 DIAGNOSIS — F33.9 EPISODE OF RECURRENT MAJOR DEPRESSIVE DISORDER, UNSPECIFIED DEPRESSION EPISODE SEVERITY (HCC): ICD-10-CM

## 2021-07-02 DIAGNOSIS — J44.9 CHRONIC OBSTRUCTIVE PULMONARY DISEASE, UNSPECIFIED COPD TYPE (HCC): Primary | ICD-10-CM

## 2021-07-02 PROCEDURE — 99214 OFFICE O/P EST MOD 30 MIN: CPT | Performed by: FAMILY MEDICINE

## 2021-07-02 RX ORDER — PREDNISONE 10 MG/1
TABLET ORAL
Qty: 30 TABLET | Refills: 0 | Status: SHIPPED | OUTPATIENT
Start: 2021-07-02 | End: 2021-09-16

## 2021-07-16 ENCOUNTER — APPOINTMENT (OUTPATIENT)
Dept: ONCOLOGY | Facility: CLINIC | Age: 51
End: 2021-07-16

## 2021-08-05 ENCOUNTER — DOCUMENTATION (OUTPATIENT)
Dept: NUTRITION | Facility: HOSPITAL | Age: 51
End: 2021-08-05

## 2021-08-05 ENCOUNTER — OFFICE VISIT (OUTPATIENT)
Dept: ONCOLOGY | Facility: CLINIC | Age: 51
End: 2021-08-05

## 2021-08-05 VITALS
TEMPERATURE: 97.7 F | HEART RATE: 78 BPM | HEIGHT: 65 IN | DIASTOLIC BLOOD PRESSURE: 68 MMHG | SYSTOLIC BLOOD PRESSURE: 110 MMHG | WEIGHT: 103.6 LBS | BODY MASS INDEX: 17.26 KG/M2 | RESPIRATION RATE: 18 BRPM

## 2021-08-05 DIAGNOSIS — D72.829 LEUKOCYTOSIS, UNSPECIFIED TYPE: Primary | ICD-10-CM

## 2021-08-05 DIAGNOSIS — E53.8 B12 DEFICIENCY: ICD-10-CM

## 2021-08-05 DIAGNOSIS — D75.839 THROMBOCYTOSIS: ICD-10-CM

## 2021-08-05 DIAGNOSIS — Z85.41 HISTORY OF CERVICAL CANCER: ICD-10-CM

## 2021-08-05 PROCEDURE — G0463 HOSPITAL OUTPT CLINIC VISIT: HCPCS | Performed by: INTERNAL MEDICINE

## 2021-08-05 PROCEDURE — 99214 OFFICE O/P EST MOD 30 MIN: CPT | Performed by: INTERNAL MEDICINE

## 2021-08-05 PROCEDURE — 1157F ADVNC CARE PLAN IN RCRD: CPT | Performed by: INTERNAL MEDICINE

## 2021-08-05 PROCEDURE — G9902 PT SCRN TBCO AND ID AS USER: HCPCS | Performed by: INTERNAL MEDICINE

## 2021-08-05 PROCEDURE — 1126F AMNT PAIN NOTED NONE PRSNT: CPT | Performed by: INTERNAL MEDICINE

## 2021-08-05 NOTE — PROGRESS NOTES
"Adult Outpatient Nutrition  Assessment    Patient Name:  Carla Richard  YOB: 1970  MRN: [id number]    Assessment Date:  8/5/2021    Comments: Chart review revealed 51WF coming to [facility] due to blood disorder. Ht 65 in. Wt 103.8 lb--\"gained 9 lb\" stated pt. BMI 17.24. Pt states follows gluten free diet to celiac disease. Has supplemented diet with Ensure. Income limited. Discussed less expensive supplement options. Recipes provided/reviewed. Also advised re: high calorie high protein snacks. Pt very excited about supplement recipes. Written information with RDN's name/number provided.                         Electronically signed by:  Lia Puga RD  08/05/21 14:51 CDT   "

## 2021-08-05 NOTE — PROGRESS NOTES
"DATE OF VISIT: 8/5/2021      REASON FOR VISIT: Leukocytosis, microcytosis, thrombocytosis, history of cervical cancer, B12 deficiency      HISTORY OF PRESENT ILLNESS:   51-year-old female with medical problem consisting of history of cervical cancer at age 19 s/p conization procedure followed by hysterectomy at age 23, chronic obstructive pulmonary disease, chronic nicotine addiction, celiac disease, pancreatitis, leukocytosis, thrombocytosis was initially seen in consultation on June 28, 2021. Patient had a blood work done for evaluation patient is here to discuss the results and further recommendation. Denies any new lymph node enlargement. Denies any bleeding.      Past Medical History, Past Surgical History, Social History, Family History have been reviewed and are without significant changes except as mentioned.    Review of Systems   Gastrointestinal: Positive for nausea.   Genitourinary: Positive for hematuria (Due to chronic kidney stones).   Musculoskeletal: Positive for arthralgias.   Neurological: Positive for numbness (Affecting bilateral ring finger and little finger).   Hematological: Negative for adenopathy.      A comprehensive 14 point review of systems was performed and was negative except as mentioned.    Medications:  The current medication list was reviewed in the EMR    ALLERGIES:    Allergies   Allergen Reactions   • Nsaids Swelling and GI Intolerance   • Azithromycin Swelling   • Ciprofloxacin Hives   • Doxycycline Swelling   • Other Other (See Comments)     nicotine patch   Caused body twitching/seizures   • Levofloxacin Rash   • Phenergan [Promethazine Hcl] GI Intolerance       Objective      Vitals:    08/05/21 1051   BP: 110/68   Pulse: 78   Resp: 18   Temp: 97.7 °F (36.5 °C)   TempSrc: Temporal   Weight: 47 kg (103 lb 9.6 oz)   Height: 165.1 cm (65\")   PainSc: 0-No pain     Current Status 8/5/2021   ECOG score 0       Physical Exam  Pulmonary:      Breath sounds: Normal breath sounds. "   Neurological:      Mental Status: She is alert and oriented to person, place, and time.           RECENT LABS:  Glucose   Date Value Ref Range Status   06/03/2021 96 65 - 99 mg/dL Final     Sodium   Date Value Ref Range Status   06/03/2021 139 136 - 145 mmol/L Final     Potassium   Date Value Ref Range Status   06/03/2021 3.8 3.5 - 5.2 mmol/L Final     CO2   Date Value Ref Range Status   06/03/2021 30.0 (H) 22.0 - 29.0 mmol/L Final     Chloride   Date Value Ref Range Status   06/03/2021 102 98 - 107 mmol/L Final     Anion Gap   Date Value Ref Range Status   06/03/2021 7.0 5.0 - 15.0 mmol/L Final     Creatinine   Date Value Ref Range Status   06/03/2021 0.70 0.57 - 1.00 mg/dL Final     BUN   Date Value Ref Range Status   06/03/2021 8 6 - 20 mg/dL Final     BUN/Creatinine Ratio   Date Value Ref Range Status   06/03/2021 11.4 7.0 - 25.0 Final     Calcium   Date Value Ref Range Status   06/03/2021 9.5 8.6 - 10.5 mg/dL Final     eGFR Non  Amer   Date Value Ref Range Status   06/03/2021 88 >60 mL/min/1.73 Final     Alkaline Phosphatase   Date Value Ref Range Status   06/03/2021 72 39 - 117 U/L Final     Total Protein   Date Value Ref Range Status   06/03/2021 6.6 6.0 - 8.5 g/dL Final     ALT (SGPT)   Date Value Ref Range Status   06/03/2021 11 1 - 33 U/L Final     AST (SGOT)   Date Value Ref Range Status   06/03/2021 14 1 - 32 U/L Final     Total Bilirubin   Date Value Ref Range Status   06/03/2021 0.4 0.0 - 1.2 mg/dL Final     Albumin   Date Value Ref Range Status   06/03/2021 4.60 3.50 - 5.20 g/dL Final     Globulin   Date Value Ref Range Status   06/03/2021 2.0 gm/dL Final     Lab Results   Component Value Date    WBC 12.98 (H) 06/28/2021    HGB 14.9 06/28/2021    HCT 44.0 06/28/2021    MCV 94.8 06/28/2021     06/28/2021     Lab Results   Component Value Date    NEUTROABS 7.95 (H) 06/28/2021    IRON 65 06/03/2021    TIBC 353 06/03/2021    LABIRON 18 (L) 06/03/2021    FERRITIN 87.83 06/03/2021     AOFWBJOF54 >2,000 (H) 06/01/2021    RZBBONKF92 782 07/16/2019     No results found for: , LABCA2, AFPTM, HCGQUANT, , CHROMGRNA, [id number], CEA, REFLABREPO      Peripheral blood flow cytometry done on June 28, 2021 showed:            PATHOLOGY:  * Cannot find OR log *         RADIOLOGY DATA :  No radiology results for the last 7 days        Assessment/Plan     1. Leukocytosis:  -Patient has ongoing leukocytosis since August 2019 which is intermittent in nature  -Most recent white blood cell count is 12.9  -Peripheral blood flow cytometry done on June 28, 2021 does not show any evidence of leukemia or lymphoma based on immunophenotype.  -Discussed with patient her leukocytosis is likely reactive to smoking plus or minus inflammation from Crohn's disease  -We'll continue with clinical monitoring  -We'll ask patient to return to clinic in 4 months with repeat CBC, iron studies, ferritin, B12, folate to be done on that day.    2. Thrombocytosis:  -Patient is a chronic intermittent thrombocytosis  -Most recent platelet count is 445,000. Will monitor with CBC    3. History of cervical cancer  -Patient had a hysterectomy done at age 23. Initially was diagnosed at age 19 and had a conization procedure at that time    4. B12 deficiency  -Patient is currently taking B12 injection every 2 weeks.  -We'll check B12 level upon next clinic visit. Patient has enough supply for intramuscular B12 for now    5. Health maintenance: Unfortunately patient continues to smoke    6. Advance Care Planning   ACP discussion was held with the patient during this visit. Patient has an advance directive in EMR which is still valid.                  PHQ-9 Total Score: 0   -Patient is not homicidal or suicidal.  No acute intervention required.    Carla Gomez Bonla reports a pain score of 0.  Given her pain assessment as noted, treatment options were discussed and the following options were decided upon as a follow-up plan to address  the patient's pain: continuation of current treatment plan for pain.         Ramirez Burger MD  8/5/2021  18:57 CDT        Part of this note may be an electronic transcription/translation of spoken language to printed text using the Dragon Dictation System.          CC:

## 2021-08-17 ENCOUNTER — OFFICE VISIT (OUTPATIENT)
Dept: FAMILY MEDICINE CLINIC | Facility: CLINIC | Age: 51
End: 2021-08-17

## 2021-08-17 VITALS
OXYGEN SATURATION: 91 % | WEIGHT: 104.6 LBS | HEIGHT: 65 IN | TEMPERATURE: 98.4 F | SYSTOLIC BLOOD PRESSURE: 126 MMHG | DIASTOLIC BLOOD PRESSURE: 72 MMHG | BODY MASS INDEX: 17.43 KG/M2 | HEART RATE: 96 BPM | RESPIRATION RATE: 24 BRPM

## 2021-08-17 DIAGNOSIS — J44.9 STAGE 2 MODERATE COPD BY GOLD CLASSIFICATION (HCC): ICD-10-CM

## 2021-08-17 DIAGNOSIS — R11.2 NON-INTRACTABLE VOMITING WITH NAUSEA, UNSPECIFIED VOMITING TYPE: ICD-10-CM

## 2021-08-17 DIAGNOSIS — R05.9 COUGH: Primary | ICD-10-CM

## 2021-08-17 DIAGNOSIS — R68.89 GENERAL ILL FEELING: ICD-10-CM

## 2021-08-17 LAB
EXPIRATION DATE: NORMAL
FLUAV AG NPH QL: NEGATIVE
FLUBV AG NPH QL: NEGATIVE
INTERNAL CONTROL: NORMAL
Lab: 2780
SARS-COV-2 N GENE RESP QL NAA+PROBE: NOT DETECTED

## 2021-08-17 PROCEDURE — 87635 SARS-COV-2 COVID-19 AMP PRB: CPT | Performed by: NURSE PRACTITIONER

## 2021-08-17 PROCEDURE — 87804 INFLUENZA ASSAY W/OPTIC: CPT | Performed by: NURSE PRACTITIONER

## 2021-08-17 PROCEDURE — 99214 OFFICE O/P EST MOD 30 MIN: CPT | Performed by: NURSE PRACTITIONER

## 2021-08-17 RX ORDER — ALBUTEROL SULFATE 90 UG/1
2 AEROSOL, METERED RESPIRATORY (INHALATION) EVERY 4 HOURS PRN
Qty: 18 G | Refills: 0 | Status: SHIPPED | OUTPATIENT
Start: 2021-08-17 | End: 2021-08-25 | Stop reason: SDUPTHER

## 2021-08-17 RX ORDER — TRAMADOL HYDROCHLORIDE 50 MG/1
50 TABLET ORAL EVERY 6 HOURS PRN
COMMUNITY

## 2021-08-17 RX ORDER — AMOXICILLIN 875 MG/1
875 TABLET, COATED ORAL 2 TIMES DAILY
Qty: 20 TABLET | Refills: 0 | Status: SHIPPED | OUTPATIENT
Start: 2021-08-17 | End: 2021-08-27

## 2021-08-17 NOTE — PROGRESS NOTES
"Chief Complaint  Cough, Earache, and Sore Throat    Subjective    History of Present Illness {CC  Problem List  Visit  Diagnosis   Encounters  Notes  Medications  Labs  Result Review Imaging  Media :23}     Carla Richard presents to [facility] for   C/o sore throat, earache, coughing that worsened yesterday after she got \"choked\" on cough syrup. Reports nausea, vomiting and chills that started 4 days ago. Pt does have COPD and is a smoker but states this feels different than her normal COPD flare. Denies fever or loss of taste/smell. Denies knowledge of COVID exposure. Has had [facility] COVID vaccination on 3/10/21. Pt states she has oral steroids at home that she takes PRN and took 40mg yesterday.        Objective     Physical Exam  Vitals and nursing note reviewed.   Constitutional:       General: She is not in acute distress.     Appearance: Normal appearance. She is normal weight. She is ill-appearing.   HENT:      Head: Normocephalic and atraumatic.      Right Ear: Tympanic membrane, ear canal and external ear normal.      Left Ear: Tympanic membrane, ear canal and external ear normal.      Nose: Congestion and rhinorrhea present.      Right Sinus: No maxillary sinus tenderness or frontal sinus tenderness.      Left Sinus: No maxillary sinus tenderness or frontal sinus tenderness.      Mouth/Throat:      Mouth: Mucous membranes are moist.      Pharynx: Oropharynx is clear. No posterior oropharyngeal erythema.      Comments: Post nasal drainage, yellow in color, present  Eyes:      Conjunctiva/sclera: Conjunctivae normal.      Pupils: Pupils are equal, round, and reactive to light.   Cardiovascular:      Rate and Rhythm: Normal rate and regular rhythm.      Heart sounds: Normal heart sounds.   Pulmonary:      Effort: Pulmonary effort is normal.      Breath sounds: Decreased breath sounds and wheezing (left lower lobe) present. No rhonchi.      Comments: Cough " "present during examination  Musculoskeletal:         General: Normal range of motion.      Cervical back: Normal range of motion and neck supple.   Lymphadenopathy:      Cervical: No cervical adenopathy.   Skin:     General: Skin is warm and dry.   Neurological:      General: No focal deficit present.      Mental Status: She is alert and oriented to person, place, and time.   Psychiatric:         Mood and Affect: Mood normal.         Behavior: Behavior normal.        Result Review  Data Reviewed:{ Labs  Result Review  Imaging  Med Tab  Media :23}   The following data was reviewed by (Optional):[id number]}       Lab Results   Component Value Date    RAPFLUA Negative 08/17/2021    RAPFLUB Negative 08/17/2021       Vital Signs:   /72 (BP Location: Right arm, Patient Position: Sitting, Cuff Size: Adult)   Pulse 96   Temp 98.4 °F (36.9 °C)   Resp 24   Ht 165.1 cm (65\")   Wt 47.4 kg (104 lb 9.6 oz)   SpO2 91%   BMI 17.41 kg/m²          Assessment and Plan {CC Problem List  Visit Diagnosis  ROS  Review (Popup)  Health Maintenance  Quality  BestPractice  Medications  SmartSets  SnapShot Encounters  Media :23}   Problem List Items Addressed This Visit        Gastrointestinal Abdominal     Nausea and vomiting    Relevant Orders    COVID-19, [facility] IN-HOUSE, NP SWAB IN TRANSPORT MEDIA 8-10 HR TAT - Swab, Nasopharynx       Pulmonary and Pneumonias    Stage 2 moderate COPD by GOLD classification (CMS/HCC)    Relevant Medications    albuterol sulfate  (90 Base) MCG/ACT inhaler    Cough - Primary    Relevant Medications    amoxicillin (AMOXIL) 875 MG tablet    albuterol sulfate  (90 Base) MCG/ACT inhaler    Other Relevant Orders    COVID-19, [facility] IN-HOUSE, NP SWAB IN TRANSPORT MEDIA 8-10 HR TAT - Swab, Nasopharynx    XR Chest PA & Lateral    POCT Influenza A/B (Completed)      Other Visit Diagnoses     General ill feeling        Relevant Orders    COVID-19, [facility] IN-HOUSE, NP " SWAB IN TRANSPORT MEDIA 8-10 HR TAT - Swab, Nasopharynx    XR Chest PA & Lateral    POCT Influenza A/B (Completed)         Diagnosis Plan   1. Cough  COVID-19,  [facility] IN-HOUSE, NP SWAB IN TRANSPORT MEDIA 8-10 HR TAT - Swab, Nasopharynx    XR Chest PA & Lateral    amoxicillin (AMOXIL) 875 MG tablet    albuterol sulfate  (90 Base) MCG/ACT inhaler    POCT Influenza A/B    COVID-19,  [facility] IN-HOUSE, NP SWAB IN TRANSPORT MEDIA 8-10 HR TAT - Swab, Nasopharynx   2. General ill feeling  COVID-19,  [facility] IN-HOUSE, NP SWAB IN TRANSPORT MEDIA 8-10 HR TAT - Swab, Nasopharynx    XR Chest PA & Lateral    POCT Influenza A/B    COVID-19,  [facility] IN-HOUSE, NP SWAB IN TRANSPORT MEDIA 8-10 HR TAT - Swab, Nasopharynx   3. Non-intractable vomiting with nausea, unspecified vomiting type  COVID-19,  [facility] IN-HOUSE, NP SWAB IN TRANSPORT MEDIA 8-10 HR TAT - Swab, Nasopharynx    COVID-19,  [facility] IN-HOUSE, NP SWAB IN TRANSPORT MEDIA 8-10 HR TAT - Swab, Nasopharynx   4. Stage 2 moderate COPD by GOLD classification (CMS/[facility])  albuterol sulfate  (90 Base) MCG/ACT inhaler     - POCT flu negative; COVID test obtained - pt advised to home quarantine until results available - usually 24 hours.  - CXR ordered for further evaluation of presenting sx.  - Will initiate tx of Amoxicillin 875mg BID x 10 days d/t COPD hx - refill of albuterol submitted - pt stated she will contact her pulmonologist for additional refills of albuterol. No steroid needed as pt states she has a current prescription at home.   - RTC PRN or f/u with PCP, Dr. Wick, as scheduled or if sx persist/worsen - to ER for severe worsening of sx.    Follow Up {Instructions Charge Capture  Follow-up Communications :23}   Return if symptoms worsen or fail to improve, for Next scheduled follow up.  Patient was given instructions and counseling regarding her condition or for health maintenance advice. Please see specific information pulled into the  AVS if appropriate            .  This document has been electronically signed by CROW Link on August 17, 2021 12:16 CDT

## 2021-08-17 NOTE — PATIENT INSTRUCTIONS

## 2021-08-23 NOTE — PROGRESS NOTES
Chief Complaint  Hot Flashes    Subjective          Carla Patricia Richard presents to [facility]  History of Present Illness     Pt is 50 yo female with history of moderate COPD, GERD with esophagitis, gastritis, hiatal hernia chronic idiopathic constipation  Vitamin B12 deficiency, Vitamin D deficiency, chronic abdominal pain, tremor,  RUQ pain,  Sp hysterectomy,  History of crohn's  disease, history of pertussis infection  sp right shoulder surgery. X 3, sp rotator cuff repair , history of C diff diarrhea, gluten sensitivity, gastritis, marijuana use, history of cervical cancer.        7/2/21 in office visit for recheck on pt's above medical issues. Since last visit pt had CT of chest with contrast done at 6/4/21 that showed small foci of tree-in-bud appearance in lingula of right middle lobe either an infectious or inflammatory process. A followup examination in 3-6 month was suggested. Pt was referred to Pulmonology in [place] on 6/9/21   who ordered another CT of chest.  MAC could not be ruled out and was given a depomerol shot she was given trelegy to replace anoro.  She continues to take her medications for tremors, COPD, allergic rhinitis, GERD, vitamin D deficiency, IB. She also saw Hematology for her iron deficiency, leukocytosis and had labwork done on 6/28/21 that showed WBC At 12.98 with eleation of neturophils at 7.95 and lymphocytes at 3.78.  LDH was high at 430. Had flow cytometry that showed no significant immunophenotypic abnormality.  Pt lost 1 lb since her last visit.   She still has breathing issues. She has cut back on her tobacco to 3/4 ppd.   She sates Trelegy did help.  She was only given a sample of relegy. She is having pain in most in her joints, back and knees.  She cannot take NSAIDS.  Her next appt with   Pulmonology is in 9/21 and Hematology on 7/6/2. Her stomach feels better since being on gluten free diet.      8/25/21 in office visit for recheck on  pt's above medical issues . Pt continues to take her medicaitons for COPD, Vitamin D deficency, IBS, GERDwith esophagitis, gastritis, allergic rhinitis, tremors. Pt saw Hematology on 8/5/21 for her leukocytosis andl ikely reactive to smoking. She is also being monitored for her thrombocytosis.  Pt asaw walk in clinic on 8/17/21 for stage 2 COPD and cough and was given abx amoxicillin. COVID and influenza test were both negative on 8/17/21. Her chest x-ray was negative for actiive disease. She started having vasomotor symptoms due to menopause about 2 weeks ago along with hot flashes. She had a total hysterectomy about 20 years ago due to endometriosis. She had history of cervical cancer at age 19. She continues to smoke 1/2 ppd.   She has been taking amitriptyline for tremors. She has upcoming appt with Pulmonology and GI soon         Cough  This is a chronic problem. The current episode started more than 1 month ago. The problem has been gradually worsening. Pertinent negatives include no chest pain, chills, ear congestion, ear pain, fever, headaches, heartburn, hemoptysis, myalgias, nasal congestion, postnasal drip, rash, rhinorrhea, sore throat, shortness of breath, sweats, weight loss or wheezing. The symptoms are aggravated by stress and fumes. Risk factors for lung disease include smoking/tobacco exposure. She has tried a beta-agonist inhaler and steroid inhaler for the symptoms. The treatment provided moderate relief. Her past medical history is significant for bronchitis and COPD. There is no history of asthma, bronchiectasis, emphysema or environmental allergies.    Heartburn  She complains of abdominal pain, chest pain and heartburn. She reports no belching, no choking, no coughing, no dysphagia, no early satiety, no globus sensation, no hoarse voice, no nausea, no sore throat, no stridor, no tooth decay or no wheezing. This is a chronic problem. The current episode started more than 1 year ago. The  problem occurs constantly. The problem has been waxing and waning. The heartburn is of moderate intensity. The heartburn does not wake her from sleep. The heartburn does not limit her activity. The heartburn doesn't change with position. The symptoms are aggravated by stress and smoking. Pertinent negatives include no fatigue, melena, muscle weakness or weight loss. Risk factors include smoking/tobacco exposure. She has tried a PPI for the symptoms. The treatment provided moderate relief. Past procedures include an EGD. Past procedures do not include an abdominal ultrasound, esophageal manometry, esophageal pH monitoring, H. pylori antibody titer or a UGI.   Abdominal Pain  This is a chronic problem. The current episode started more than 1 year ago. The onset quality is sudden. The problem occurs constantly. The problem has been unchanged. The pain is located in the generalized abdominal region. The pain is at a severity of 3/10. The pain is moderate. The quality of the pain is burning. Associated symptoms include constipation and diarrhea. Pertinent negatives include no anorexia, arthralgias, belching, dysuria, flatus, frequency, headaches, hematochezia, hematuria, melena, myalgias, nausea, vomiting or weight loss. The pain is aggravated by eating. Treatments tried: trulance  The treatment provided moderate relief. Prior diagnostic workup includes GI consult, lower endoscopy and upper endoscopy. Her past medical history is significant for GERD and irritable bowel syndrome. There is no history of abdominal surgery, colon cancer, Crohn's disease, gallstones, pancreatitis, PUD or ulcerative colitis.   Nicotine Dependence  Presents for follow-up visit. Symptoms are negative for fatigue and sore throat. Her urge triggers include company of smokers. The symptoms have been stable. She smokes 1 pack of cigarettes per day. Compliance with prior treatments has been poor.   Shortness of Breath  This is a recurrent problem.  "The current episode started more than 1 month ago. The problem occurs constantly. The problem has been gradually worsening. Associated symptoms include abdominal pain and chest pain. Pertinent negatives include no claudication, coryza, ear pain, headaches, hemoptysis, leg pain, leg swelling, neck pain, orthopnea, PND, rash, sore throat, sputum production, swollen glands, syncope, vomiting or wheezing. The symptoms are aggravated by URIs, smoke and occupational exposure (cats). She has tried beta agonist inhalers and steroid inhalers for the symptoms. Her past medical history is significant for allergies, chronic lung disease, COPD and pneumonia. There is no history of asthma, bronchiolitis, CAD, DVT, a heart failure, PE or a recent surgery.   COPD   This is a chronic problem. The current episode started more than 1 year ago. The problem occurs constantly. The problem has been waxing and waning  Associated symptoms include abdominal pain, arthralgias, fatigue, joint swelling, numbness and weakness. Pertinent negatives include no anorexia, change in bowel habit, chest pain, chills, congestion, coughing, diaphoresis, fever, headaches, myalgias, nausea, neck pain, sore throat, swollen glands, urinary symptoms, vertigo, visual change or vomiting. The symptoms are aggravated by smoking. She has tried (combivent  symbicort anoro, albuterol inhaler ) for the symptoms.      Objective   Vital Signs:   /60 (BP Location: Left arm, Patient Position: Sitting, Cuff Size: Adult)   Pulse 92   Temp 98 °F (36.7 °C)   Ht 165.1 cm (65\")   Wt 47.4 kg (104 lb 6.4 oz)   LMP  (LMP Unknown)   SpO2 99%   BMI 17.37 kg/m²     Physical Exam  Vitals and nursing note reviewed.   Constitutional:       Appearance: She is well-developed. She is not diaphoretic.   HENT:      Head: Normocephalic and atraumatic.      Right Ear: External ear normal.   Eyes:      Conjunctiva/sclera: Conjunctivae normal.      Pupils: Pupils are equal, round, " and reactive to light.   Cardiovascular:      Rate and Rhythm: Normal rate and regular rhythm.      Heart sounds: Normal heart sounds. No murmur heard.     Pulmonary:      Effort: Pulmonary effort is normal. No respiratory distress.      Breath sounds: Normal breath sounds.   Abdominal:      General: Bowel sounds are normal. There is no distension.      Palpations: Abdomen is soft.      Tenderness: There is no abdominal tenderness.   Musculoskeletal:         General: No deformity. Normal range of motion.      Cervical back: Normal range of motion and neck supple.   Skin:     General: Skin is warm.      Coloration: Skin is not pale.      Findings: No erythema or rash.   Neurological:      Mental Status: She is alert and oriented to person, place, and time.      Cranial Nerves: No cranial nerve deficit.   Psychiatric:         Behavior: Behavior normal.        Result Review :                 Assessment and Plan    Diagnoses and all orders for this visit:    1. Tobacco user (Primary)    2. Vitamin D deficiency     3. Irritable bowel syndrome with both constipation and diarrhea    4. Gluten intolerance    5. Gastroesophageal reflux disease with esophagitis without hemorrhage    6. Chronic obstructive pulmonary disease, unspecified COPD type (CMS/[facility])    7. Cough  -     albuterol sulfate  (90 Base) MCG/ACT inhaler; Inhale 2 puffs Every 4 (Four) Hours As Needed for Wheezing.  Dispense: 18 g; Refill: 3    8. Stage 2 moderate COPD by GOLD classification (CMS/[facility])  -     albuterol sulfate  (90 Base) MCG/ACT inhaler; Inhale 2 puffs Every 4 (Four) Hours As Needed for Wheezing.  Dispense: 18 g; Refill: 3    9. Vasomotor symptoms due to menopause    Other orders  -     Cancel: PARoxetine (Paxil) 20 MG tablet; Take 1 tablet by mouth Every Morning.  -     PARoxetine (Paxil) 10 MG tablet; Take 1 tablet by mouth Every Morning.  Dispense: 30 tablet; Refill: 3  -     Black Cohosh 200 MG capsule; Take 200 mg by mouth  Daily.  Dispense: 30 each; Refill: 3            -recommend labwork    -vasomotor symptoms dues to menopause - cannot start hormonal therapy due to history of tobacco use. Start on paxil 10 mg dialy and black cohosh   -multiple joint pain - prednisone tapering dose   -vitamin D deficiency -on vitamin  D once a week  -moderate protein malnutrition/underweight  -  recommend  high calorie diet information/ smoking cesation   -leukocytosis/thrombocytosis - Hematology following   -exposure to toxic chemical - will get CT of chest with contrast. Continue combivent and albuterol inhaler PRN.    -b12 deficiency -  Pt giving herself b12 injections   -COPD/COPD exacerbation/chronic  bronchitis/abnormal CT of hest  - Pulmonology following on albuterol nebs and inhalernow   Albuterol PRN. Continue combivent   C.  Advised importance of smoking cessation.. advised pt to start on singulair 10 mg at bedtime. Was started on Trelegy with Pulmonology. Will give sample of trelegy today   -tobacco user - counseled to quit smoking >5 minutes. She could not tolerate chantix  urged the importance of quitting smoking.  Insurance would not cover nicotine patch and gum. Recommend pt call 1 [phone] recommend nicotine replacement therapy   -tremor - stop elavil Neurology folowing   -abdominal pain/gastritis/IBS /GERD/gluten sensitivity  - Gastroenterology following.stable  On PPI prilosec 20 mg q daily on trulance 3 mg PO q daily.   continue with gluten free diet   -advised pt to be safe and call with questions and concerns  -advised pt to go to ER or call 911 if symptoms worrisome or severe  -advised pt to be safe during COVID-19 pandemic  I spent 30 minutes caring for Carla on this date of service. This time includes time spent by me in the following activities: preparing for the visit, reviewing tests, obtaining and/or reviewing a separately obtained history, performing a medically appropriate examination and/or evaluation, counseling  and educating the patient/family/caregiver, ordering medications, tests, or procedures, referring and communicating with other health care professionals, documenting information in the medical record, independently interpreting results and communicating that information with the patient/family/caregiver and care coordination.         This document has been electronically signed by Xavier Wick MD on August 25, 2021 11:21 CDT        Follow Up   Return in about 1 month (around 9/25/2021).  Patient was given instructions and counseling regarding her condition or for health maintenance advice. Please see specific information pulled into the AVS if appropriate.

## 2021-08-25 ENCOUNTER — OFFICE VISIT (OUTPATIENT)
Dept: FAMILY MEDICINE CLINIC | Facility: CLINIC | Age: 51
End: 2021-08-25

## 2021-08-25 VITALS
DIASTOLIC BLOOD PRESSURE: 60 MMHG | WEIGHT: 104.4 LBS | BODY MASS INDEX: 17.4 KG/M2 | HEIGHT: 65 IN | SYSTOLIC BLOOD PRESSURE: 102 MMHG | OXYGEN SATURATION: 99 % | HEART RATE: 92 BPM | TEMPERATURE: 98 F

## 2021-08-25 DIAGNOSIS — E55.9 VITAMIN D DEFICIENCY: ICD-10-CM

## 2021-08-25 DIAGNOSIS — K21.00 GASTROESOPHAGEAL REFLUX DISEASE WITH ESOPHAGITIS WITHOUT HEMORRHAGE: ICD-10-CM

## 2021-08-25 DIAGNOSIS — J44.9 STAGE 2 MODERATE COPD BY GOLD CLASSIFICATION (HCC): ICD-10-CM

## 2021-08-25 DIAGNOSIS — K90.41 GLUTEN INTOLERANCE: ICD-10-CM

## 2021-08-25 DIAGNOSIS — Z72.0 TOBACCO USER: Primary | ICD-10-CM

## 2021-08-25 DIAGNOSIS — N95.1 VASOMOTOR SYMPTOMS DUE TO MENOPAUSE: ICD-10-CM

## 2021-08-25 DIAGNOSIS — K58.2 IRRITABLE BOWEL SYNDROME WITH BOTH CONSTIPATION AND DIARRHEA: ICD-10-CM

## 2021-08-25 DIAGNOSIS — R05.9 COUGH: ICD-10-CM

## 2021-08-25 DIAGNOSIS — J44.9 CHRONIC OBSTRUCTIVE PULMONARY DISEASE, UNSPECIFIED COPD TYPE (HCC): ICD-10-CM

## 2021-08-25 PROCEDURE — 99214 OFFICE O/P EST MOD 30 MIN: CPT | Performed by: FAMILY MEDICINE

## 2021-08-25 RX ORDER — PAROXETINE HYDROCHLORIDE 20 MG/1
20 TABLET, FILM COATED ORAL EVERY MORNING
Status: CANCELLED | OUTPATIENT
Start: 2021-08-25

## 2021-08-25 RX ORDER — ALBUTEROL SULFATE 90 UG/1
2 AEROSOL, METERED RESPIRATORY (INHALATION) EVERY 4 HOURS PRN
Qty: 18 G | Refills: 3 | Status: SHIPPED | OUTPATIENT
Start: 2021-08-25 | End: 2022-03-11

## 2021-08-25 RX ORDER — PAROXETINE 10 MG/1
10 TABLET, FILM COATED ORAL EVERY MORNING
Qty: 30 TABLET | Refills: 3 | Status: SHIPPED | OUTPATIENT
Start: 2021-08-25 | End: 2022-10-15

## 2021-08-25 NOTE — PATIENT INSTRUCTIONS
Paroxetine tablets  What is this medicine?  PAROXETINE (pa EMY e teen) is used to treat depression. It may also be used to treat anxiety disorders, obsessive compulsive disorder, panic attacks, post traumatic stress, and premenstrual dysphoric disorder (PMDD).  This medicine may be used for other purposes; ask your health care provider or pharmacist if you have questions.  COMMON BRAND NAME(S): Paxil, Pexeva  What should I tell my health care provider before I take this medicine?  They need to know if you have any of these conditions:  · bipolar disorder or a family history of bipolar disorder  · bleeding disorders  · glaucoma  · heart disease  · kidney disease  · liver disease  · low levels of sodium in the blood  · seizures  · suicidal thoughts, plans, or attempt; a previous suicide attempt by you or a family member  · take MAOIs like Carbex, Eldepryl, Marplan, Nardil, and Parnate  · take medicines that treat or prevent blood clots  · thyroid disease  · an unusual or allergic reaction to paroxetine, other medicines, foods, dyes, or preservatives  · pregnant or trying to get pregnant  · breast-feeding  How should I use this medicine?  Take this medicine by mouth with a glass of water. Follow the directions on the prescription label. You can take it with or without food. Take your medicine at regular intervals. Do not take your medicine more often than directed. Do not stop taking this medicine suddenly except upon the advice of your doctor. Stopping this medicine too quickly may cause serious side effects or your condition may worsen.  A special MedGuide will be given to you by the pharmacist with each prescription and refill. Be sure to read this information carefully each time.  Talk to your pediatrician regarding the use of this medicine in children. Special care may be needed.  Overdosage: If you think you have taken too much of this medicine contact a poison control center or emergency room at once.  NOTE:  This medicine is only for you. Do not share this medicine with others.  What if I miss a dose?  If you miss a dose, take it as soon as you can. If it is almost time for your next dose, take only that dose. Do not take double or extra doses.  What may interact with this medicine?  Do not take this medicine with any of the following medications:  · linezolid  · MAOIs like Carbex, Eldepryl, Marplan, Nardil, and Parnate  · methylene blue (injected into a vein)  · pimozide  · thioridazine  This medicine may also interact with the following medications:  · alcohol  · amphetamines  · aspirin and aspirin-like medicines  · atomoxetine  · certain medicines for depression, anxiety, or psychotic disturbances  · certain medicines for irregular heart beat like propafenone, flecainide, encainide, and quinidine  · certain medicines for migraine headache like almotriptan, eletriptan, frovatriptan, naratriptan, rizatriptan, sumatriptan, zolmitriptan  · cimetidine  · digoxin  · diuretics  · fentanyl  · fosamprenavir  · furazolidone  · isoniazid  · lithium  · medicines that treat or prevent blood clots like warfarin, enoxaparin, and dalteparin  · medicines for sleep  · NSAIDs, medicines for pain and inflammation, like ibuprofen or naproxen  · phenobarbital  · phenytoin  · procarbazine  · rasagiline  · ritonavir  · supplements like Beena's wort, kava kava, valerian  · tamoxifen  · tramadol  · tryptophan  This list may not describe all possible interactions. Give your health care provider a list of all the medicines, herbs, non-prescription drugs, or dietary supplements you use. Also tell them if you smoke, drink alcohol, or use illegal drugs. Some items may interact with your medicine.  What should I watch for while using this medicine?  Tell your doctor if your symptoms do not get better or if they get worse. Visit your doctor or health care professional for regular checks on your progress. Because it may take several weeks to see  the full effects of this medicine, it is important to continue your treatment as prescribed by your doctor.  Patients and their families should watch out for new or worsening thoughts of suicide or depression. Also watch out for sudden changes in feelings such as feeling anxious, agitated, panicky, irritable, hostile, aggressive, impulsive, severely restless, overly excited and hyperactive, or not being able to sleep. If this happens, especially at the beginning of treatment or after a change in dose, call your health care professional.  You may get drowsy or dizzy. Do not drive, use machinery, or do anything that needs mental alertness until you know how this medicine affects you. Do not stand or sit up quickly, especially if you are an older patient. This reduces the risk of dizzy or fainting spells. Alcohol may interfere with the effect of this medicine. Avoid alcoholic drinks.  Your mouth may get dry. Chewing sugarless gum or sucking hard candy, and drinking plenty of water will help. Contact your doctor if the problem does not go away or is severe.  What side effects may I notice from receiving this medicine?  Side effects that you should report to your doctor or health care professional as soon as possible:  · allergic reactions like skin rash, itching or hives, swelling of the face, lips, or tongue  · anxious  · black, tarry stools  · changes in vision  · confusion  · elevated mood, decreased need for sleep, racing thoughts, impulsive behavior  · eye pain  · fast, irregular heartbeat  · feeling faint or lightheaded, falls  · feeling agitated, angry, or irritable  · hallucination, loss of contact with reality  · loss of balance or coordination  · loss of memory  · painful or prolonged erections  · restlessness, pacing, inability to keep still  · seizures  · stiff muscles  · suicidal thoughts or other mood changes  · trouble sleeping  · unusual bleeding or bruising  · unusually weak or tired  · vomiting  Side  effects that usually do not require medical attention (report to your doctor or health care professional if they continue or are bothersome):  · change in appetite or weight  · change in sex drive or performance  · diarrhea  · dizziness  · dry mouth  · increased sweating  · indigestion, nausea  · tired  · tremors  This list may not describe all possible side effects. Call your doctor for medical advice about side effects. You may report side effects to FDA at 4-328-APG-5537.  Where should I keep my medicine?  Keep out of the reach of children.  Store at room temperature between 15 and 30 degrees C (59 and 86 degrees F). Keep container tightly closed. Throw away any unused medicine after the expiration date.  NOTE: This sheet is a summary. It may not cover all possible information. If you have questions about this medicine, talk to your doctor, pharmacist, or health care provider.  © 2021 [email or web address] Standard (2017-05-20 15:50:32)  Black Cohosh, Cimicifuga racemosa oral dosage forms  What is this medicine?  BLACK COHOSH (felipe KOH hosh) or Cimicifuga racemosa is a dietary supplement. It is promoted to relieve symptoms of menopause, such as hot flashes. The FDA has not approved this supplement for any medical use.  This supplement may be used for other purposes; ask your health care provider or pharmacist if you have questions.  This medicine may be used for other purposes; ask your health care provider or pharmacist if you have questions.  What should I tell my health care provider before I take this medicine?  They need to know if you have any of these conditions:  · breast cancer  · cervical, ovarian or uterine cancer  · high blood pressure  · infertility  · liver disease  · menstrual changes or irregular periods  · unusual vaginal or uterine bleeding  · an unusual or allergic reaction to black cohosh, soybeans, tartrazine dye (yellow dye number 5), other medicines, foods, dyes, or preservatives  · pregnant or  trying to get pregnant  · breast-feeding  How should I use this medicine?  Take this herb by mouth with a glass of water. Follow the directions on the package labeling, or talk to your health care professional. Do not use for longer than 6 months without the advice of a health care professional. Do not use if you are pregnant or breast-feeding. Talk to your obstetrician-gynecologist or certified nurse-midwife.  This herb is not for use in children under the age of 18 years.  Overdosage: If you think you have taken too much of this medicine contact a poison control center or emergency room at once.  NOTE: This medicine is only for you. Do not share this medicine with others.  What if I miss a dose?  If you miss a dose, take it as soon as you can. If it is almost time for your next dose, take only that dose. Do not take double or extra doses.  What may interact with this medicine?  · atorvastatin  · cisplatin  · fertility treatments  This list may not describe all possible interactions. Give your health care provider a list of all the medicines, herbs, non-prescription drugs, or dietary supplements you use. Also tell them if you smoke, drink alcohol, or use illegal drugs. Some items may interact with your medicine.  What should I watch for while using this medicine?  Since this herb is derived from a plant, allergic reactions are possible. Stop using this herb if you develop a rash. You may need to see your health care professional, or inform them that this occurred. Report any unusual side effects promptly.  If you are taking this herb for menstrual or menopausal symptoms, visit your doctor or health care professional for regular checks on your progress. You should have a complete check-up every 6 months. You will need a regular breast and pelvic exam while on this therapy. Follow the advice of your doctor or health care professional.  Women should inform their doctor if they wish to become pregnant or think they  might be pregnant. If you have any reason to think you are pregnant, stop taking this herb at once and contact your doctor or health care professional.  Herbal or dietary supplements are not regulated like medicines. Rigid  standards are not required for dietary supplements. The purity and strength of these products can vary. The safety and effect of this dietary supplement for a certain disease or illness is not well known. This product is not intended to diagnose, treat, cure or prevent any disease.  The Food and Drug Administration suggests the following to help consumers protect themselves:  · Always read product labels and follow directions.  · Natural does not mean a product is safe for humans to take.  · Look for products that include USP after the ingredient name. This means that the  followed the standards of the [place] Pharmacopoeia.  · Supplements made or sold by a nationally known food or drug company are more likely to be made under tight controls. You can write to the company for more information about how the product was made.  What side effects may I notice from receiving this medicine?  Side effects that you should report to your doctor or health care professional as soon as possible:  · allergic reactions like skin rash, itching or hives, swelling of the face, lips, or tongue  · breathing problems  · dizziness  · palpitations  · signs and symptoms of liver injury like dark yellow or brown urine; general ill feeling or flu-like symptoms; light-colored stools; loss of appetite; nausea; right upper belly pain; unusually weak or tired; yellowing of the eyes or skin  · unusual vaginal bleeding  Side effects that usually do not require medical attention (report to your doctor or health care professional if they continue or are bothersome):  · breast tenderness  · headache  · nausea  · upset stomach  This list may not describe all possible side effects. Call your doctor for  medical advice about side effects. You may report side effects to FDA at 7-320-GBC-8435.  Where should I keep my medicine?  Keep out of the reach of children.  Store at room temperature between 15 and 30 degrees C (59 and 86 degrees C). Throw away any unused herb after the expiration date.  NOTE: This sheet is a summary. It may not cover all possible information. If you have questions about this medicine, talk to your doctor, pharmacist, or health care provider.  © 2021 [employer]/Gold Standard (2017-06-28 14:35:09)  Paroxetine tablets  What is this medicine?  PAROXETINE (pa EMY e teen) is used to treat depression. It may also be used to treat anxiety disorders, obsessive compulsive disorder, panic attacks, post traumatic stress, and premenstrual dysphoric disorder (PMDD).  This medicine may be used for other purposes; ask your health care provider or pharmacist if you have questions.  COMMON BRAND NAME(S): Paxil, Pexeva  What should I tell my health care provider before I take this medicine?  They need to know if you have any of these conditions:  · bipolar disorder or a family history of bipolar disorder  · bleeding disorders  · glaucoma  · heart disease  · kidney disease  · liver disease  · low levels of sodium in the blood  · seizures  · suicidal thoughts, plans, or attempt; a previous suicide attempt by you or a family member  · take MAOIs like Carbex, Eldepryl, Marplan, Nardil, and Parnate  · take medicines that treat or prevent blood clots  · thyroid disease  · an unusual or allergic reaction to paroxetine, other medicines, foods, dyes, or preservatives  · pregnant or trying to get pregnant  · breast-feeding  How should I use this medicine?  Take this medicine by mouth with a glass of water. Follow the directions on the prescription label. You can take it with or without food. Take your medicine at regular intervals. Do not take your medicine more often than directed. Do not stop taking this medicine suddenly  except upon the advice of your doctor. Stopping this medicine too quickly may cause serious side effects or your condition may worsen.  A special MedGuide will be given to you by the pharmacist with each prescription and refill. Be sure to read this information carefully each time.  Talk to your pediatrician regarding the use of this medicine in children. Special care may be needed.  Overdosage: If you think you have taken too much of this medicine contact a poison control center or emergency room at once.  NOTE: This medicine is only for you. Do not share this medicine with others.  What if I miss a dose?  If you miss a dose, take it as soon as you can. If it is almost time for your next dose, take only that dose. Do not take double or extra doses.  What may interact with this medicine?  Do not take this medicine with any of the following medications:  · linezolid  · MAOIs like Carbex, Eldepryl, Marplan, Nardil, and Parnate  · methylene blue (injected into a vein)  · pimozide  · thioridazine  This medicine may also interact with the following medications:  · alcohol  · amphetamines  · aspirin and aspirin-like medicines  · atomoxetine  · certain medicines for depression, anxiety, or psychotic disturbances  · certain medicines for irregular heart beat like propafenone, flecainide, encainide, and quinidine  · certain medicines for migraine headache like almotriptan, eletriptan, frovatriptan, naratriptan, rizatriptan, sumatriptan, zolmitriptan  · cimetidine  · digoxin  · diuretics  · fentanyl  · fosamprenavir  · furazolidone  · isoniazid  · lithium  · medicines that treat or prevent blood clots like warfarin, enoxaparin, and dalteparin  · medicines for sleep  · NSAIDs, medicines for pain and inflammation, like ibuprofen or naproxen  · phenobarbital  · phenytoin  · procarbazine  · rasagiline  · ritonavir  · supplements like Beena's wort, kava kava, valerian  · tamoxifen  · tramadol  · tryptophan  This list may not  describe all possible interactions. Give your health care provider a list of all the medicines, herbs, non-prescription drugs, or dietary supplements you use. Also tell them if you smoke, drink alcohol, or use illegal drugs. Some items may interact with your medicine.  What should I watch for while using this medicine?  Tell your doctor if your symptoms do not get better or if they get worse. Visit your doctor or health care professional for regular checks on your progress. Because it may take several weeks to see the full effects of this medicine, it is important to continue your treatment as prescribed by your doctor.  Patients and their families should watch out for new or worsening thoughts of suicide or depression. Also watch out for sudden changes in feelings such as feeling anxious, agitated, panicky, irritable, hostile, aggressive, impulsive, severely restless, overly excited and hyperactive, or not being able to sleep. If this happens, especially at the beginning of treatment or after a change in dose, call your health care professional.  You may get drowsy or dizzy. Do not drive, use machinery, or do anything that needs mental alertness until you know how this medicine affects you. Do not stand or sit up quickly, especially if you are an older patient. This reduces the risk of dizzy or fainting spells. Alcohol may interfere with the effect of this medicine. Avoid alcoholic drinks.  Your mouth may get dry. Chewing sugarless gum or sucking hard candy, and drinking plenty of water will help. Contact your doctor if the problem does not go away or is severe.  What side effects may I notice from receiving this medicine?  Side effects that you should report to your doctor or health care professional as soon as possible:  · allergic reactions like skin rash, itching or hives, swelling of the face, lips, or tongue  · anxious  · black, tarry stools  · changes in vision  · confusion  · elevated mood, decreased need  for sleep, racing thoughts, impulsive behavior  · eye pain  · fast, irregular heartbeat  · feeling faint or lightheaded, falls  · feeling agitated, angry, or irritable  · hallucination, loss of contact with reality  · loss of balance or coordination  · loss of memory  · painful or prolonged erections  · restlessness, pacing, inability to keep still  · seizures  · stiff muscles  · suicidal thoughts or other mood changes  · trouble sleeping  · unusual bleeding or bruising  · unusually weak or tired  · vomiting  Side effects that usually do not require medical attention (report to your doctor or health care professional if they continue or are bothersome):  · change in appetite or weight  · change in sex drive or performance  · diarrhea  · dizziness  · dry mouth  · increased sweating  · indigestion, nausea  · tired  · tremors  This list may not describe all possible side effects. Call your doctor for medical advice about side effects. You may report side effects to FDA at 9-659-JAG-0936.  Where should I keep my medicine?  Keep out of the reach of children.  Store at room temperature between 15 and 30 degrees C (59 and 86 degrees F). Keep container tightly closed. Throw away any unused medicine after the expiration date.  NOTE: This sheet is a summary. It may not cover all possible information. If you have questions about this medicine, talk to your doctor, pharmacist, or health care provider.  ©  [employer]/Gold Standard (2017 15:50:32)  Managing Hot Flashes During Menopause  You will learn what hot flashes/night sweats are, what causes them and what the treatment methods are.  To view the content, go to this web address:  [email or web address]    This video will  on: 2023. If you need access to this video following this date, please reach out to the healthcare provider who assigned it to you.  This information is not intended to replace advice given to you by your health care provider.  Make sure you discuss any questions you have with your health care provider.  Elsevier Patient Education © 2021 [employer].

## 2021-09-16 ENCOUNTER — OFFICE VISIT (OUTPATIENT)
Dept: GASTROENTEROLOGY | Facility: CLINIC | Age: 51
End: 2021-09-16

## 2021-09-16 VITALS
BODY MASS INDEX: 17.03 KG/M2 | SYSTOLIC BLOOD PRESSURE: 120 MMHG | WEIGHT: 102.2 LBS | HEIGHT: 65 IN | HEART RATE: 91 BPM | DIASTOLIC BLOOD PRESSURE: 79 MMHG

## 2021-09-16 DIAGNOSIS — K59.09 OTHER CONSTIPATION: ICD-10-CM

## 2021-09-16 DIAGNOSIS — R19.4 CHANGE IN BOWEL HABITS: Primary | ICD-10-CM

## 2021-09-16 DIAGNOSIS — K59.04 CHRONIC IDIOPATHIC CONSTIPATION: ICD-10-CM

## 2021-09-16 PROCEDURE — 99214 OFFICE O/P EST MOD 30 MIN: CPT | Performed by: INTERNAL MEDICINE

## 2021-09-16 RX ORDER — DEXTROSE AND SODIUM CHLORIDE 5; .45 G/100ML; G/100ML
30 INJECTION, SOLUTION INTRAVENOUS CONTINUOUS PRN
Status: CANCELLED | OUTPATIENT
Start: 2021-09-29

## 2021-09-16 RX ORDER — SODIUM, POTASSIUM,MAG SULFATES 17.5-3.13G
1 SOLUTION, RECONSTITUTED, ORAL ORAL EVERY 12 HOURS
Qty: 1 ML | Refills: 0 | Status: SHIPPED | OUTPATIENT
Start: 2021-09-16 | End: 2021-09-29 | Stop reason: HOSPADM

## 2021-09-16 RX ORDER — DICYCLOMINE HCL 20 MG
20 TABLET ORAL EVERY 6 HOURS
Qty: 240 TABLET | Refills: 5 | Status: SHIPPED | OUTPATIENT
Start: 2021-09-16 | End: 2021-10-16

## 2021-09-16 NOTE — PATIENT INSTRUCTIONS
Constipation, Adult  Constipation is when a person has trouble pooping (having a bowel movement). When you have this condition, you may poop fewer than 3 times a week. Your poop (stool) may also be dry, hard, or bigger than normal.  Follow these instructions at home:  Eating and drinking    · Eat foods that have a lot of fiber, such as:  ? Fresh fruits and vegetables.  ? Whole grains.  ? Beans.  · Eat less of foods that are low in fiber and high in fat and sugar, such as:  ? French fries.  ? Hamburgers.  ? Cookies.  ? Candy.  ? Soda.  · Drink enough fluid to keep your pee (urine) pale yellow.    General instructions  · Exercise regularly or as told by your doctor. Try to do 150 minutes of exercise each week.  · Go to the restroom when you feel like you need to poop. Do not hold it in.  · Take over-the-counter and prescription medicines only as told by your doctor. These include any fiber supplements.  · When you poop:  ? Do deep breathing while relaxing your lower belly (abdomen).  ? Relax your pelvic floor. The pelvic floor is a group of muscles that support the rectum, bladder, and intestines (as well as the uterus in women).  · Watch your condition for any changes. Tell your doctor if you notice any.  · Keep all follow-up visits as told by your doctor. This is important.  Contact a doctor if:  · You have pain that gets worse.  · You have a fever.  · You have not pooped for 4 days.  · You vomit.  · You are not hungry.  · You lose weight.  · You are bleeding from the opening of the butt (anus).  · You have thin, pencil-like poop.  Get help right away if:  · You have a fever, and your symptoms suddenly get worse.  · You leak poop or have blood in your poop.  · Your belly feels hard or bigger than normal (bloated).  · You have very bad belly pain.  · You feel dizzy or you faint.  Summary  · Constipation is when a person poops fewer than 3 times a week, has trouble pooping, or has poop that is dry, hard, or bigger  than normal.  · Eat foods that have a lot of fiber.  · Drink enough fluid to keep your pee (urine) pale yellow.  · Take over-the-counter and prescription medicines only as told by your doctor. These include any fiber supplements.  This information is not intended to replace advice given to you by your health care provider. Make sure you discuss any questions you have with your health care provider.  Document Revised: 11/04/2020 Document Reviewed: 11/04/2020  Elsevier Patient Education © 2021 [employer].

## 2021-09-16 NOTE — PROGRESS NOTES
[facility]      Chief Complaint:   Chief Complaint   Patient presents with   • Chronic Idiopathic Constipation     3 Month Clinical Appointment       Subjective     HPI:   Patient with history of chronic idiopathic constipation.  Patient states she had a recent change in bowel habits with severe abdominal pain.  Patient has been taking Bentyl but has run out.  Patient states her bowels have mucus in her stool.  Patient denies seeing any blood.  Patient states she gets severe abdominal pain after having a bowel movement.  Patient does have history of Crohn's disease.    Plan; we will schedule patient for colonoscopy to evaluate and will do biopsies during the procedure.    Past Medical History:   Past Medical History:   Diagnosis Date   • Asthma    • Cancer (CMS/HCC)     cervical   • Colon polyp    • COPD (chronic obstructive pulmonary disease) (CMS/HCC)    • Crohn's disease (CMS/HCC)    • Diverticulitis of colon    • Elevated cholesterol    • Essential hypertension 4/15/2020   • GERD (gastroesophageal reflux disease)    • History of transfusion    • Irritable bowel syndrome    • Kidney calculus    • Pancreatitis    • Sphincter of Oddi dysfunction        Past Surgical History:  Past Surgical History:   Procedure Laterality Date   • BACK SURGERY      C5-6   • COLONOSCOPY     • COLONOSCOPY N/A 10/18/2019    Procedure: COLONOSCOPY;  Surgeon: Tom Davis MD;  Location: [facility] ENDOSCOPY;  Service: Gastroenterology   • COLONOSCOPY N/A 8/27/2020    Procedure: COLONOSCOPY;  Surgeon: Tom Davis MD;  Location: [facility] ENDOSCOPY;  Service: Gastroenterology;  Laterality: N/A;   • ENDOSCOPY N/A 10/18/2019    Procedure: ESOPHAGOGASTRODUODENOSCOPY;  Surgeon: Tom Davis MD;  Location: [facility] ENDOSCOPY;  Service: Gastroenterology   • ENDOSCOPY N/A 7/27/2020    Procedure: ESOPHAGOGASTRODUODENOSCOPY;  Surgeon: Tom Davis MD;  Location: [facility] ENDOSCOPY;  Service: Gastroenterology;   Laterality: N/A;   • ENDOSCOPY N/A 2/10/2021    Procedure: ESOPHAGOGASTRODUODENOSCOPY;  Surgeon: Tom Davis MD;  Location: [facility] ENDOSCOPY;  Service: Gastroenterology;  Laterality: N/A;   • HYSTERECTOMY     • SHOULDER ARTHROSCOPY W/ ROTATOR CUFF REPAIR Right    • TOE SURGERY      left small toe    • TONSILECTOMY, ADENOIDECTOMY, BILATERAL MYRINGOTOMY AND TUBES     • TONSILLECTOMY     • UPPER GASTROINTESTINAL ENDOSCOPY  02/10/2021       Family History:  Family History   Problem Relation Age of Onset   • Inflammatory bowel disease Mother    • Arthritis Mother    • Diabetes Mother    • Hypertension Mother    • Hyperlipidemia Mother    • Obesity Mother    • Osteoporosis Mother    • Heart disease Father    • Hyperlipidemia Father    • Heart attack Father    • Diabetes Sister    • Liver disease Daughter    • Mental illness Daughter    • Obesity Daughter    • Diabetes Maternal Grandmother    • Cancer Maternal Grandmother         lung   • Cancer Other         lung   • Heart disease Other        Social History:   reports that she has been smoking cigarettes. She has a 40.00 pack-year smoking history. She has never used smokeless tobacco. She reports current drug use. Drug: Marijuana. She reports that she does not drink alcohol.    Medications:   Prior to Admission medications    Medication Sig Start Date End Date Taking? Authorizing Provider   albuterol sulfate  (90 Base) MCG/ACT inhaler Inhale 2 puffs Every 4 (Four) Hours As Needed for Wheezing. 8/25/21  Yes Xavier Wick MD   cetirizine (zyrTEC) 10 MG tablet Take 1 tablet by mouth Daily. 9/25/19  Yes Iliana Jacobson MD   Cyanocobalamin (B-12 Compliance Injection) 1000 MCG/ML kit Inject 1 mL as directed Every 28 (Twenty-Eight) Days. 6/29/21  Yes Ramirez Burger MD   cyclobenzaprine (FLEXERIL) 5 MG tablet Take 1 tablet by mouth 3 (Three) Times a Day As Needed (hip pain). Do not drive while taking 6/25/21  Yes Xavier Wick MD   fluticasone (FLONASE) 50  MCG/ACT nasal spray 2 sprays into the nostril(s) as directed by provider Daily. 10/28/20  Yes Iliana Jacobson MD   ipratropium-albuterol (DUO-NEB) 0.5-2.5 mg/3 ml nebulizer Take 3 mL by nebulization 4 (Four) Times a Day. 6/3/21  Yes Xavier Wick MD   montelukast (SINGULAIR) 10 MG tablet Take 1 tablet by mouth Every Night. 10/17/19  Yes Xavier Wick MD   omeprazole (priLOSEC) 20 MG capsule Take 1 capsule by mouth Daily. 6/23/20  Yes Xavier Wick MD   ondansetron ODT (ZOFRAN-ODT) 4 MG disintegrating tablet Place 1 tablet on the tongue 4 (Four) Times a Day As Needed for Nausea or Vomiting. 7/26/20  Yes Gregory Kaur MD   PARoxetine (Paxil) 10 MG tablet Take 1 tablet by mouth Every Morning. 8/25/21  Yes Xavier Wick MD   Plecanatide (Trulance) 3 MG tablet Take 1 tablet by mouth Daily. 6/4/21  Yes Tom Davis MD   traMADol (Ultram) 50 MG tablet Take 50 mg by mouth Every 6 (Six) Hours As Needed.   Yes ProviderByron MD   umeclidinium-vilanterol (ANORO ELLIPTA) 62.5-25 MCG/INH aerosol powder  inhaler Inhale 1 puff Daily. 10/28/20  Yes Iliana Jacobson MD   vitamin D (ERGOCALCIFEROL) 1.25 MG (79365 UT) capsule capsule Take 1 capsule by mouth 1 (One) Time Per Week. 4/15/21  Yes Xavier Wick MD   Black Cohosh 200 MG capsule Take 200 mg by mouth Daily. 8/25/21   Xavier Wick MD   dicyclomine (BENTYL) 20 MG tablet Take 1 tablet by mouth Every 6 (Six) Hours for 30 days. 9/16/21 10/16/21  Tom Davis MD   sodium-potassium-magnesium sulfates (SUPREP) 17.5-3.13-1.6 GM/177ML solution oral solution Take 1 bottle by mouth Every 12 (Twelve) Hours. 9/16/21   Tom Davis MD   guaiFENesin (Mucinex) 600 MG 12 hr tablet Take 2 tablets by mouth 2 (Two) Times a Day. 4/29/21 9/16/21  Xavier Wick MD   predniSONE (DELTASONE) 10 MG tablet Take 4 pills for 4 days 3 pills for 3 days 2 pills for 2 days and 1 pill for one day then stop 7/2/21 9/16/21  Xavier Wick MD  "      Allergies:  Nsaids, Azithromycin, Ciprofloxacin, Doxycycline, Other, Levofloxacin, and Phenergan [promethazine hcl]    ROS:    Review of Systems   Constitutional: Negative for activity change, appetite change, chills, diaphoresis, fatigue, fever and unexpected weight change.   HENT: Negative for sore throat and trouble swallowing.    Respiratory: Negative for shortness of breath.    Gastrointestinal: Positive for abdominal pain and constipation. Negative for abdominal distention, anal bleeding, blood in stool, diarrhea, nausea, rectal pain and vomiting.   Endocrine: Negative for polydipsia, polyphagia and polyuria.   Genitourinary: Negative for difficulty urinating.   Musculoskeletal: Negative for arthralgias.   Skin: Negative for pallor.   Allergic/Immunologic: Negative for food allergies.   Neurological: Negative for weakness and light-headedness.   Psychiatric/Behavioral: Negative for behavioral problems.     Objective     Blood pressure 120/79, pulse 91, height 165.1 cm (65\"), weight 46.4 kg (102 lb 3.2 oz), not currently breastfeeding.    Physical Exam  Constitutional:       General: She is not in acute distress.     Appearance: She is well-developed. She is not diaphoretic.   HENT:      Head: Normocephalic and atraumatic.   Cardiovascular:      Rate and Rhythm: Normal rate and regular rhythm.      Heart sounds: Normal heart sounds. No murmur heard.   No friction rub. No gallop.    Pulmonary:      Effort: No respiratory distress.      Breath sounds: Normal breath sounds. No wheezing or rales.   Chest:      Chest wall: No tenderness.   Abdominal:      General: Bowel sounds are normal. There is no distension.      Palpations: Abdomen is soft. There is no mass.      Tenderness: There is no abdominal tenderness. There is no guarding or rebound.      Hernia: No hernia is present.   Musculoskeletal:         General: Normal range of motion.   Skin:     General: Skin is warm and dry.      Coloration: Skin is not " pale.      Findings: No erythema or rash.   Neurological:      Mental Status: She is alert and oriented to person, place, and time.   Psychiatric:         Behavior: Behavior normal.         Thought Content: Thought content normal.         Judgment: Judgment normal.          Assessment/Plan   Diagnoses and all orders for this visit:    1. Change in bowel habits (Primary)  -     Case Request; Standing  -     dextrose 5 % and sodium chloride 0.45 % infusion  -     Case Request    2. Other constipation  -     Case Request; Standing  -     dextrose 5 % and sodium chloride 0.45 % infusion  -     Case Request    3. Chronic idiopathic constipation  -     Case Request; Standing  -     dextrose 5 % and sodium chloride 0.45 % infusion  -     Case Request    Other orders  -     dicyclomine (BENTYL) 20 MG tablet; Take 1 tablet by mouth Every 6 (Six) Hours for 30 days.  Dispense: 240 tablet; Refill: 5  -     Follow Anesthesia Guidelines / Standing Orders; Future  -     Obtain Informed Consent; Future  -     Implement Anesthesia Orders Day of Procedure; Standing  -     Obtain Informed Consent; Standing  -     POC Glucose Once; Standing  -     Pregnancy, Urine -; Standing  -     Insert Peripheral IV; Standing  -     sodium-potassium-magnesium sulfates (SUPREP) 17.5-3.13-1.6 GM/177ML solution oral solution; Take 1 bottle by mouth Every 12 (Twelve) Hours.  Dispense: 1 mL; Refill: 0        COLONOSCOPY (N/A)     Diagnosis Plan   1. Change in bowel habits  Case Request    dextrose 5 % and sodium chloride 0.45 % infusion    Case Request   2. Other constipation  Case Request    dextrose 5 % and sodium chloride 0.45 % infusion    Case Request   3. Chronic idiopathic constipation  Case Request    dextrose 5 % and sodium chloride 0.45 % infusion    Case Request       Anticipated Surgical Procedure:  Orders Placed This Encounter   Procedures   • Follow Anesthesia Guidelines / Standing Orders     Standing Status:   Future   • Obtain Informed  Consent     Standing Status:   Future     Order Specific Question:   Informed Consent Given For     Answer:   colonoscopy       The risks, benefits, and alternatives of this procedure have been discussed with the patient or the responsible party- the patient understands and agrees to proceed.

## 2021-09-29 ENCOUNTER — ANESTHESIA (OUTPATIENT)
Dept: GASTROENTEROLOGY | Facility: HOSPITAL | Age: 51
End: 2021-09-29

## 2021-09-29 ENCOUNTER — HOSPITAL ENCOUNTER (OUTPATIENT)
Facility: HOSPITAL | Age: 51
Setting detail: HOSPITAL OUTPATIENT SURGERY
Discharge: HOME | End: 2021-09-29
Attending: INTERNAL MEDICINE | Admitting: INTERNAL MEDICINE

## 2021-09-29 ENCOUNTER — ANESTHESIA EVENT (OUTPATIENT)
Dept: GASTROENTEROLOGY | Facility: HOSPITAL | Age: 51
End: 2021-09-29

## 2021-09-29 VITALS
TEMPERATURE: 97 F | OXYGEN SATURATION: 97 % | RESPIRATION RATE: 18 BRPM | SYSTOLIC BLOOD PRESSURE: 104 MMHG | WEIGHT: 99.8 LBS | DIASTOLIC BLOOD PRESSURE: 59 MMHG | HEART RATE: 73 BPM | BODY MASS INDEX: 17.68 KG/M2 | HEIGHT: 63 IN

## 2021-09-29 DIAGNOSIS — K59.09 OTHER CONSTIPATION: ICD-10-CM

## 2021-09-29 DIAGNOSIS — K59.04 CHRONIC IDIOPATHIC CONSTIPATION: ICD-10-CM

## 2021-09-29 DIAGNOSIS — R19.4 CHANGE IN BOWEL HABITS: ICD-10-CM

## 2021-09-29 PROCEDURE — 25010000002 GLUCAGON (HUMAN RECOMBINANT) 1 MG RECONSTITUTED SOLUTION: Performed by: NURSE ANESTHETIST, CERTIFIED REGISTERED

## 2021-09-29 PROCEDURE — 45380 COLONOSCOPY AND BIOPSY: CPT | Performed by: INTERNAL MEDICINE

## 2021-09-29 PROCEDURE — 25010000002 PROPOFOL 10 MG/ML EMULSION: Performed by: NURSE ANESTHETIST, CERTIFIED REGISTERED

## 2021-09-29 PROCEDURE — 88305 TISSUE EXAM BY PATHOLOGIST: CPT

## 2021-09-29 RX ORDER — PROPOFOL 10 MG/ML
VIAL (ML) INTRAVENOUS AS NEEDED
Status: DISCONTINUED | OUTPATIENT
Start: 2021-09-29 | End: 2021-09-29 | Stop reason: SURG

## 2021-09-29 RX ORDER — LIDOCAINE HYDROCHLORIDE 20 MG/ML
INJECTION, SOLUTION INTRAVENOUS AS NEEDED
Status: DISCONTINUED | OUTPATIENT
Start: 2021-09-29 | End: 2021-09-29 | Stop reason: SURG

## 2021-09-29 RX ORDER — DEXTROSE AND SODIUM CHLORIDE 5; .45 G/100ML; G/100ML
30 INJECTION, SOLUTION INTRAVENOUS CONTINUOUS PRN
Status: DISCONTINUED | OUTPATIENT
Start: 2021-09-29 | End: 2021-09-29 | Stop reason: HOSPADM

## 2021-09-29 RX ADMIN — GLUCAGON HYDROCHLORIDE 0.5 MG: KIT at 10:25

## 2021-09-29 RX ADMIN — DEXTROSE AND SODIUM CHLORIDE 30 ML/HR: 5; 450 INJECTION, SOLUTION INTRAVENOUS at 09:47

## 2021-09-29 RX ADMIN — PROPOFOL 20 MG: 10 INJECTION, EMULSION INTRAVENOUS at 10:33

## 2021-09-29 RX ADMIN — LIDOCAINE HYDROCHLORIDE 80 MG: 20 INJECTION, SOLUTION INTRAVENOUS at 10:23

## 2021-09-29 RX ADMIN — PROPOFOL 80 MG: 10 INJECTION, EMULSION INTRAVENOUS at 10:23

## 2021-09-29 RX ADMIN — PROPOFOL 30 MG: 10 INJECTION, EMULSION INTRAVENOUS at 10:27

## 2021-09-29 RX ADMIN — PROPOFOL 30 MG: 10 INJECTION, EMULSION INTRAVENOUS at 10:25

## 2021-09-29 NOTE — ANESTHESIA POSTPROCEDURE EVALUATION
Patient: Carla Richard    Procedure Summary     Date: 09/29/21 Room / Location: [facility] ENDOSCOPY 1 / [facility] ENDOSCOPY    Anesthesia Start: 1015 Anesthesia Stop: 1043    Procedure: COLONOSCOPY (N/A ) Diagnosis:       Change in bowel habits      Other constipation      Chronic idiopathic constipation      (Change in bowel habits [R19.4])      (Other constipation [K59.09])      (Chronic idiopathic constipation [K59.04])    Surgeons: Tom Davis MD Provider: Huan Bright CRNA    Anesthesia Type: MAC ASA Status: 4          Anesthesia Type: MAC    Vitals  No vitals data found for the desired time range.          Post Anesthesia Care and Evaluation    Patient location during evaluation: bedside  Patient participation: complete - patient participated  Level of consciousness: sleepy but conscious  Pain score: 0  Pain management: adequate  Airway patency: patent  Anesthetic complications: No anesthetic complications  PONV Status: none  Cardiovascular status: acceptable  Respiratory status: acceptable  Hydration status: acceptable    Comments: ---------------------------               09/29/21                      1044         ---------------------------   BP:          141/85         Pulse:         81           Resp:          18           Temp:   97.5 °F (36.4 °C)   SpO2:          99%         ---------------------------

## 2021-09-29 NOTE — ANESTHESIA PREPROCEDURE EVALUATION
Anesthesia Evaluation     Patient summary reviewed and Nursing notes reviewed   NPO Solid Status: > 8 hours  NPO Liquid Status: > 8 hours           Airway   Mallampati: II  TM distance: >3 FB  Neck ROM: full  No difficulty expected  Dental    (+) edentulous    Pulmonary    (+) a smoker Current Smoked day of surgery, COPD moderate, asthma,recent URI resolved, decreased breath sounds,   Cardiovascular - normal exam    (+) hypertension well controlled, hyperlipidemia,       Neuro/Psych  (+) tremors, psychiatric history Anxiety and Depression,     GI/Hepatic/Renal/Endo    (+)  GERD,  renal disease CRI,     Musculoskeletal     (+) neck pain,   Abdominal  - normal exam   Substance History   (+) drug use      Comment: Smokes marijuana daily   OB/GYN negative ob/gyn ROS         Other      history of cancer                      Anesthesia Plan    ASA 4     MAC     intravenous induction     Anesthetic plan, all risks, benefits, and alternatives have been provided, discussed and informed consent has been obtained with: patient.    Plan discussed with CRNA.

## 2021-10-01 ENCOUNTER — OFFICE VISIT (OUTPATIENT)
Dept: FAMILY MEDICINE CLINIC | Facility: CLINIC | Age: 51
End: 2021-10-01

## 2021-10-01 VITALS
RESPIRATION RATE: 20 BRPM | WEIGHT: 99.5 LBS | OXYGEN SATURATION: 99 % | HEART RATE: 79 BPM | TEMPERATURE: 96.8 F | HEIGHT: 63 IN | DIASTOLIC BLOOD PRESSURE: 70 MMHG | SYSTOLIC BLOOD PRESSURE: 116 MMHG | BODY MASS INDEX: 17.63 KG/M2

## 2021-10-01 DIAGNOSIS — R19.7 DIARRHEA, UNSPECIFIED TYPE: ICD-10-CM

## 2021-10-01 DIAGNOSIS — Z20.822 CLOSE EXPOSURE TO COVID-19 VIRUS: ICD-10-CM

## 2021-10-01 DIAGNOSIS — J02.9 SORE THROAT: ICD-10-CM

## 2021-10-01 DIAGNOSIS — J98.9 RESPIRATORY ILLNESS: Primary | ICD-10-CM

## 2021-10-01 LAB
EXPIRATION DATE: NORMAL
INTERNAL CONTROL: NORMAL
LAB AP CASE REPORT: NORMAL
Lab: NORMAL
PATH REPORT.FINAL DX SPEC: NORMAL
S PYO AG THROAT QL: NEGATIVE
SARS-COV-2 N GENE RESP QL NAA+PROBE: NOT DETECTED

## 2021-10-01 PROCEDURE — 87635 SARS-COV-2 COVID-19 AMP PRB: CPT | Performed by: NURSE PRACTITIONER

## 2021-10-01 PROCEDURE — 87880 STREP A ASSAY W/OPTIC: CPT | Performed by: NURSE PRACTITIONER

## 2021-10-01 PROCEDURE — 87081 CULTURE SCREEN ONLY: CPT | Performed by: NURSE PRACTITIONER

## 2021-10-01 PROCEDURE — 99213 OFFICE O/P EST LOW 20 MIN: CPT | Performed by: NURSE PRACTITIONER

## 2021-10-01 RX ORDER — PREDNISONE 10 MG/1
TABLET ORAL
Qty: 30 TABLET | Refills: 0 | Status: SHIPPED | OUTPATIENT
Start: 2021-10-01 | End: 2022-03-28

## 2021-10-01 NOTE — PATIENT INSTRUCTIONS

## 2021-10-01 NOTE — PROGRESS NOTES
"Chief Complaint  exposure to covid-19 and nausea/diarrhea/headache    Subjective          Carla Patricia Richard presents to [facility]    PCP: Dr. Wick    CC: \"cough, stuffy nose, sore throat'    Has had Covid vaccine.  Granddaughter that lives with her tested + for Covid yesterday.  She developed headache, nausea, diarrhea, cough, sore throat and nasal congestion last night.  No fever.  Has of COPD, smoker.      Illness  This is a new problem. Episode onset: last night. The problem occurs constantly. The problem has been unchanged. Associated symptoms include a change in bowel habit (diarrhea), chills, congestion, coughing, headaches, myalgias, nausea and a sore throat. Pertinent negatives include no abdominal pain, anorexia, arthralgias, chest pain, diaphoresis, fatigue, fever, joint swelling, neck pain, numbness, rash, swollen glands, urinary symptoms, vertigo, visual change, vomiting or weakness. Nothing aggravates the symptoms. Treatments tried: dayquil. The treatment provided mild relief.       Review of Systems   Constitutional: Positive for chills. Negative for appetite change, diaphoresis, fatigue and fever.   HENT: Positive for congestion, ear pain, sinus pressure and sore throat. Negative for ear discharge, postnasal drip, rhinorrhea, sinus pain, sneezing and trouble swallowing.    Eyes: Negative.    Respiratory: Positive for cough, chest tightness, shortness of breath and wheezing.         Reports chest/lung symptoms don't seem much worse than her normal   Cardiovascular: Negative.  Negative for chest pain, palpitations and leg swelling.   Gastrointestinal: Positive for change in bowel habit (diarrhea), diarrhea and nausea. Negative for abdominal pain, anorexia, blood in stool and vomiting.   Genitourinary: Negative.    Musculoskeletal: Positive for myalgias. Negative for arthralgias, joint swelling and neck pain. " "  Skin: Negative.  Negative for rash.   Neurological: Positive for headaches. Negative for dizziness, vertigo, weakness and numbness.        Objective   Vital Signs:   /70 (BP Location: Right arm, Patient Position: Sitting, Cuff Size: Adult)   Pulse 79   Temp 96.8 °F (36 °C) (Temporal)   Resp 20   Ht 160 cm (63\")   Wt 45.1 kg (99 lb 8 oz)   SpO2 99%   BMI 17.63 kg/m²       Physical Exam  Vitals and nursing note reviewed.   Constitutional:       General: She is not in acute distress.     Appearance: Normal appearance. She is not ill-appearing.   HENT:      Head: Normocephalic and atraumatic.      Right Ear: Tympanic membrane and ear canal normal.      Left Ear: Tympanic membrane normal.      Nose: Congestion present.      Mouth/Throat:      Mouth: Mucous membranes are moist.      Pharynx: Oropharynx is clear. Posterior oropharyngeal erythema ( mild injection) present. No oropharyngeal exudate.   Eyes:      General:         Right eye: No discharge.         Left eye: No discharge.      Conjunctiva/sclera: Conjunctivae normal.   Cardiovascular:      Rate and Rhythm: Normal rate and regular rhythm.   Pulmonary:      Effort: Pulmonary effort is normal. No respiratory distress.      Breath sounds: Wheezing present. No rhonchi or rales.      Comments: Slightly decreased aeration with tight, congested, wheezy cough noted  Abdominal:      General: Bowel sounds are normal.      Palpations: Abdomen is soft.      Tenderness: There is no abdominal tenderness. There is no right CVA tenderness, left CVA tenderness, guarding or rebound.   Musculoskeletal:      Cervical back: Neck supple. Tenderness (mild) present.   Lymphadenopathy:      Cervical: No cervical adenopathy.   Skin:     General: Skin is warm and dry.   Neurological:      General: No focal deficit present.      Mental Status: She is alert and oriented to person, place, and time.   Psychiatric:         Mood and Affect: Mood normal.         Thought Content: " Thought content normal.          Result Review :              Recent Results (from the past 24 hour(s))   POC Rapid Strep A    Collection Time: 10/01/21 12:04 PM    Specimen: Swab   Result Value Ref Range    Rapid Strep A Screen Negative Negative, VALID, INVALID, Not Performed    Internal Control Passed Passed    Lot Number [id number]     Expiration Date 10-            Assessment and Plan    Diagnoses and all orders for this visit:    1. Respiratory illness (Primary)  -     predniSONE (DELTASONE) 10 MG tablet; 4 tabs po daily x 4 days, then 3 tabs daily x 3 days, then 2 tabs daily x 2 days, then 1 tab po daily x 1 day  Dispense: 30 tablet; Refill: 0    2. Diarrhea, unspecified type  -     COVID-19, [facility] IN-HOUSE, NP SWAB IN TRANSPORT MEDIA 8-10 HR TAT - Swab, Nasopharynx    3. Close exposure to COVID-19 virus  -     COVID-19, [facility] IN-HOUSE, NP SWAB IN TRANSPORT MEDIA 8-10 HR TAT - Swab, Nasopharynx    4. Sore throat  -     POC Rapid Strep A    Push fluids  Rest  Avoid prolonged lying on back, get up and move around frequently  Immune boosters--Vit C, Zinc encouraged  Rx for Prednisone taper provded  Continue with Dayquil for symptom relief  Has nebulizer at home--encouraged treatments every 4-6 hours    Covid PCR pending--already in quarantine due to exposure    See PCP or RTC if symptoms persist/worsen  See PCP for routine f/u visit and management of chronic medical conditions      This document has been electronically signed by CROW Padgett on October 1, 2021 12:07 CDT,.

## 2021-10-03 LAB — BACTERIA SPEC AEROBE CULT: NORMAL

## 2021-10-07 ENCOUNTER — OFFICE VISIT (OUTPATIENT)
Dept: FAMILY MEDICINE CLINIC | Facility: CLINIC | Age: 51
End: 2021-10-07

## 2021-10-07 ENCOUNTER — TELEPHONE (OUTPATIENT)
Dept: FAMILY MEDICINE CLINIC | Facility: CLINIC | Age: 51
End: 2021-10-07

## 2021-10-07 DIAGNOSIS — Z20.822 CLOSE EXPOSURE TO COVID-19 VIRUS: Primary | ICD-10-CM

## 2021-10-07 DIAGNOSIS — Z72.0 TOBACCO USER: ICD-10-CM

## 2021-10-07 DIAGNOSIS — J44.9 CHRONIC OBSTRUCTIVE PULMONARY DISEASE, UNSPECIFIED COPD TYPE (HCC): ICD-10-CM

## 2021-10-07 DIAGNOSIS — J44.9 STAGE 2 MODERATE COPD BY GOLD CLASSIFICATION (HCC): ICD-10-CM

## 2021-10-07 DIAGNOSIS — E53.8 B12 DEFICIENCY: ICD-10-CM

## 2021-10-07 DIAGNOSIS — J42 CHRONIC BRONCHITIS, UNSPECIFIED CHRONIC BRONCHITIS TYPE (HCC): ICD-10-CM

## 2021-10-07 DIAGNOSIS — E55.9 VITAMIN D DEFICIENCY: ICD-10-CM

## 2021-10-07 DIAGNOSIS — K21.00 GASTROESOPHAGEAL REFLUX DISEASE WITH ESOPHAGITIS WITHOUT HEMORRHAGE: ICD-10-CM

## 2021-10-07 PROCEDURE — 99443 PR PHYS/QHP TELEPHONE EVALUATION 21-30 MIN: CPT | Performed by: FAMILY MEDICINE

## 2021-10-07 RX ORDER — ZINC GLUCONATE 50 MG
50 TABLET ORAL DAILY
Qty: 30 TABLET | Refills: 3 | Status: SHIPPED | OUTPATIENT
Start: 2021-10-07 | End: 2022-04-25

## 2021-10-07 RX ORDER — ONDANSETRON 8 MG/1
TABLET, ORALLY DISINTEGRATING ORAL
Qty: 90 TABLET | Refills: 0 | Status: SHIPPED | OUTPATIENT
Start: 2021-10-07 | End: 2022-01-28

## 2021-10-07 RX ORDER — VIT C/VIT D3/E/ZINC/ELDERBERRY 65 MG-3.15
1 TABLET,CHEWABLE ORAL DAILY
Qty: 30 TABLET | Refills: 3 | Status: SHIPPED | OUTPATIENT
Start: 2021-10-07 | End: 2022-04-25

## 2021-10-07 NOTE — TELEPHONE ENCOUNTER
Faxed back with pt name and , last OV. We do not have consents or results. [facility] requested us to fill this out. They will need to contact [facility] for anything else.

## 2021-10-07 NOTE — TELEPHONE ENCOUNTER
Infusion clinic needs corrections on the order that was sent over for covid infusion. Actual order has to have patient name and . Patient allergies, and doctors name. With packet needs progress note, with Dr walker signature and date. She also needs consent and test results.

## 2021-10-11 ENCOUNTER — TELEPHONE (OUTPATIENT)
Dept: FAMILY MEDICINE CLINIC | Facility: CLINIC | Age: 51
End: 2021-10-11

## 2021-10-15 RX ORDER — IPRATROPIUM BROMIDE AND ALBUTEROL SULFATE 2.5; .5 MG/3ML; MG/3ML
SOLUTION RESPIRATORY (INHALATION)
Qty: 180 ML | Refills: 3 | Status: SHIPPED | OUTPATIENT
Start: 2021-10-15 | End: 2022-01-10 | Stop reason: SDUPTHER

## 2021-11-18 ENCOUNTER — OFFICE VISIT (OUTPATIENT)
Dept: FAMILY MEDICINE CLINIC | Facility: CLINIC | Age: 51
End: 2021-11-18

## 2021-11-18 VITALS
WEIGHT: 101.8 LBS | DIASTOLIC BLOOD PRESSURE: 78 MMHG | BODY MASS INDEX: 18.04 KG/M2 | SYSTOLIC BLOOD PRESSURE: 110 MMHG | TEMPERATURE: 97.9 F | OXYGEN SATURATION: 97 % | HEART RATE: 92 BPM | HEIGHT: 63 IN

## 2021-11-18 DIAGNOSIS — Z00.00 MEDICARE ANNUAL WELLNESS VISIT, SUBSEQUENT: Primary | ICD-10-CM

## 2021-11-18 PROCEDURE — G0439 PPPS, SUBSEQ VISIT: HCPCS | Performed by: FAMILY MEDICINE

## 2021-11-18 PROCEDURE — 1170F FXNL STATUS ASSESSED: CPT | Performed by: FAMILY MEDICINE

## 2021-11-18 PROCEDURE — 1159F MED LIST DOCD IN RCRD: CPT | Performed by: FAMILY MEDICINE

## 2021-11-18 PROCEDURE — 1126F AMNT PAIN NOTED NONE PRSNT: CPT | Performed by: FAMILY MEDICINE

## 2021-11-18 NOTE — PATIENT INSTRUCTIONS
Medicare Wellness  Personal Prevention Plan of Service     Date of Office Visit:  2021  Encounter Provider:  Xavier Wick MD  Place of Service:  [facility]  Patient Name: Carla Richard  :  1970    As part of the Medicare Wellness portion of your visit today, we are providing you with this personalized preventive plan of services (PPPS). This plan is based upon recommendations of the [place] Preventive Services Task Force (USPSTF) and the Advisory Committee on Immunization Practices (ACIP).    This lists the preventive care services that should be considered, and provides dates of when you are due. Items listed as completed are up-to-date and do not require any further intervention.    Health Maintenance   Topic Date Due   • COVID-19 Vaccine (2 - Booster for Felicia series) 2021   • INFLUENZA VACCINE  2021   • TDAP/TD VACCINES (2 - Td or Tdap) 2029 (Originally 10/7/2020)   • LIPID PANEL  2022   • LUNG CANCER SCREENING  2022   • MAMMOGRAM  2022   • ANNUAL WELLNESS VISIT  2022   • COLORECTAL CANCER SCREENING  2026   • Pneumococcal Vaccine 0-64 (2 of 2 - PPSV23) 2033   • HEPATITIS C SCREENING  Completed   • PAP SMEAR  Discontinued   • ZOSTER VACCINE  Discontinued       No orders of the defined types were placed in this encounter.      Return in about 1 year (around 2022) for Medicare Wellness.

## 2021-12-06 ENCOUNTER — LAB (OUTPATIENT)
Dept: ONCOLOGY | Facility: HOSPITAL | Age: 51
End: 2021-12-06

## 2021-12-06 ENCOUNTER — OFFICE VISIT (OUTPATIENT)
Dept: ONCOLOGY | Facility: CLINIC | Age: 51
End: 2021-12-06

## 2021-12-06 VITALS
SYSTOLIC BLOOD PRESSURE: 102 MMHG | WEIGHT: 104 LBS | HEART RATE: 82 BPM | DIASTOLIC BLOOD PRESSURE: 65 MMHG | BODY MASS INDEX: 18.42 KG/M2 | OXYGEN SATURATION: 97 % | TEMPERATURE: 97.4 F

## 2021-12-06 DIAGNOSIS — T45.4X5A ADVERSE EFFECT OF IRON, INITIAL ENCOUNTER: ICD-10-CM

## 2021-12-06 DIAGNOSIS — Z85.41 HISTORY OF CERVICAL CANCER: ICD-10-CM

## 2021-12-06 DIAGNOSIS — E53.8 FOLATE DEFICIENCY: ICD-10-CM

## 2021-12-06 DIAGNOSIS — D72.829 LEUKOCYTOSIS, UNSPECIFIED TYPE: ICD-10-CM

## 2021-12-06 DIAGNOSIS — D72.829 LEUKOCYTOSIS, UNSPECIFIED TYPE: Primary | Chronic | ICD-10-CM

## 2021-12-06 DIAGNOSIS — Z85.41 HISTORY OF CERVICAL CANCER: Chronic | ICD-10-CM

## 2021-12-06 DIAGNOSIS — D75.839 THROMBOCYTOSIS: ICD-10-CM

## 2021-12-06 DIAGNOSIS — E53.8 B12 DEFICIENCY: ICD-10-CM

## 2021-12-06 DIAGNOSIS — E61.1 IRON DEFICIENCY: ICD-10-CM

## 2021-12-06 LAB
BASOPHILS # BLD AUTO: 0.1 10*3/MM3 (ref 0–0.2)
BASOPHILS NFR BLD AUTO: 0.7 % (ref 0–1.5)
DEPRECATED RDW RBC AUTO: 47.1 FL (ref 37–54)
EOSINOPHIL # BLD AUTO: 0.28 10*3/MM3 (ref 0–0.4)
EOSINOPHIL NFR BLD AUTO: 2 % (ref 0.3–6.2)
ERYTHROCYTE [DISTWIDTH] IN BLOOD BY AUTOMATED COUNT: 13.1 % (ref 12.3–15.4)
FERRITIN SERPL-MCNC: 69.76 NG/ML (ref 13–150)
FOLATE SERPL-MCNC: 3 NG/ML (ref 4.78–24.2)
HCT VFR BLD AUTO: 41.5 % (ref 34–46.6)
HGB BLD-MCNC: 13.9 G/DL (ref 12–15.9)
IMM GRANULOCYTES # BLD AUTO: 0.09 10*3/MM3 (ref 0–0.05)
IMM GRANULOCYTES NFR BLD AUTO: 0.7 % (ref 0–0.5)
IRON 24H UR-MRATE: 54 MCG/DL (ref 37–145)
IRON SATN MFR SERPL: 14 % (ref 20–50)
LDH SERPL-CCNC: 166 U/L (ref 135–214)
LYMPHOCYTES # BLD AUTO: 3.06 10*3/MM3 (ref 0.7–3.1)
LYMPHOCYTES NFR BLD AUTO: 22.2 % (ref 19.6–45.3)
MCH RBC QN AUTO: 32.7 PG (ref 26.6–33)
MCHC RBC AUTO-ENTMCNC: 33.5 G/DL (ref 31.5–35.7)
MCV RBC AUTO: 97.6 FL (ref 79–97)
MONOCYTES # BLD AUTO: 0.94 10*3/MM3 (ref 0.1–0.9)
MONOCYTES NFR BLD AUTO: 6.8 % (ref 5–12)
NEUTROPHILS NFR BLD AUTO: 67.6 % (ref 42.7–76)
NEUTROPHILS NFR BLD AUTO: 9.34 10*3/MM3 (ref 1.7–7)
NRBC BLD AUTO-RTO: 0 /100 WBC (ref 0–0.2)
PLATELET # BLD AUTO: 425 10*3/MM3 (ref 140–450)
PMV BLD AUTO: 8.9 FL (ref 6–12)
RBC # BLD AUTO: 4.25 10*6/MM3 (ref 3.77–5.28)
TIBC SERPL-MCNC: 384 MCG/DL (ref 298–536)
TRANSFERRIN SERPL-MCNC: 258 MG/DL (ref 200–360)
VIT B12 BLD-MCNC: 725 PG/ML (ref 211–946)
WBC NRBC COR # BLD: 13.81 10*3/MM3 (ref 3.4–10.8)

## 2021-12-06 PROCEDURE — G0463 HOSPITAL OUTPT CLINIC VISIT: HCPCS | Performed by: INTERNAL MEDICINE

## 2021-12-06 PROCEDURE — 99214 OFFICE O/P EST MOD 30 MIN: CPT | Performed by: INTERNAL MEDICINE

## 2021-12-06 PROCEDURE — 1126F AMNT PAIN NOTED NONE PRSNT: CPT | Performed by: INTERNAL MEDICINE

## 2021-12-06 PROCEDURE — 82607 VITAMIN B-12: CPT | Performed by: INTERNAL MEDICINE

## 2021-12-06 PROCEDURE — 82728 ASSAY OF FERRITIN: CPT | Performed by: INTERNAL MEDICINE

## 2021-12-06 PROCEDURE — G9902 PT SCRN TBCO AND ID AS USER: HCPCS | Performed by: INTERNAL MEDICINE

## 2021-12-06 PROCEDURE — 83540 ASSAY OF IRON: CPT | Performed by: INTERNAL MEDICINE

## 2021-12-06 PROCEDURE — 99406 BEHAV CHNG SMOKING 3-10 MIN: CPT | Performed by: INTERNAL MEDICINE

## 2021-12-06 PROCEDURE — 84466 ASSAY OF TRANSFERRIN: CPT | Performed by: INTERNAL MEDICINE

## 2021-12-06 PROCEDURE — 1157F ADVNC CARE PLAN IN RCRD: CPT | Performed by: INTERNAL MEDICINE

## 2021-12-06 PROCEDURE — 85025 COMPLETE CBC W/AUTO DIFF WBC: CPT | Performed by: INTERNAL MEDICINE

## 2021-12-06 PROCEDURE — 82746 ASSAY OF FOLIC ACID SERUM: CPT | Performed by: INTERNAL MEDICINE

## 2021-12-06 PROCEDURE — 83615 LACTATE (LD) (LDH) ENZYME: CPT | Performed by: INTERNAL MEDICINE

## 2021-12-07 ENCOUNTER — TELEPHONE (OUTPATIENT)
Dept: ONCOLOGY | Facility: HOSPITAL | Age: 51
End: 2021-12-07

## 2021-12-07 PROBLEM — E53.8 FOLATE DEFICIENCY: Status: ACTIVE | Noted: 2021-12-07

## 2021-12-07 RX ORDER — FOLIC ACID 1 MG/1
1 TABLET ORAL DAILY
Qty: 90 TABLET | Refills: 1 | Status: SHIPPED | OUTPATIENT
Start: 2021-12-07

## 2021-12-07 NOTE — TELEPHONE ENCOUNTER
Contacted pt regarding lab results and need for iron. PT informed script sent for Folic Acid. PT verbalizes understanding and denies any further questions.

## 2021-12-07 NOTE — TELEPHONE ENCOUNTER
----- Message from Ramirez Burger MD sent at 12/7/2021  6:45 AM CST -----  Please let patient know, her B12 level is adequate at 725.  Recommend continue with monthly B12 injection.  I have ordered intravenous Feraheme to be started next week in view of iron deficiency.  Folate level is low at 3.  Recommend starting folic acid p.o. daily.  I have sent prescription for folic acid to her pharmacy.  Thank you

## 2021-12-08 PROBLEM — T45.4X5A ADVERSE EFFECT OF IRON: Status: ACTIVE | Noted: 2021-12-08

## 2021-12-08 RX ORDER — ACETAMINOPHEN 325 MG/1
650 TABLET ORAL ONCE
Status: CANCELLED | OUTPATIENT
Start: 2021-12-14 | End: 2021-12-14

## 2021-12-08 RX ORDER — CETIRIZINE HYDROCHLORIDE 10 MG/1
10 TABLET ORAL ONCE
Status: CANCELLED | OUTPATIENT
Start: 2021-12-14 | End: 2021-12-14

## 2021-12-08 RX ORDER — DIPHENHYDRAMINE HCL 25 MG
25 CAPSULE ORAL ONCE
Status: CANCELLED | OUTPATIENT
Start: 2021-12-14 | End: 2021-12-14

## 2021-12-08 RX ORDER — FAMOTIDINE 10 MG/ML
20 INJECTION, SOLUTION INTRAVENOUS ONCE
Status: CANCELLED | OUTPATIENT
Start: 2021-12-14 | End: 2021-12-14

## 2021-12-08 RX ORDER — SODIUM CHLORIDE 9 MG/ML
250 INJECTION, SOLUTION INTRAVENOUS ONCE
Status: CANCELLED | OUTPATIENT
Start: 2021-12-14

## 2021-12-08 RX ORDER — DIPHENHYDRAMINE HYDROCHLORIDE 50 MG/ML
50 INJECTION INTRAMUSCULAR; INTRAVENOUS AS NEEDED
Status: CANCELLED | OUTPATIENT
Start: 2021-12-14

## 2021-12-08 RX ORDER — FAMOTIDINE 10 MG/ML
20 INJECTION, SOLUTION INTRAVENOUS AS NEEDED
Status: CANCELLED | OUTPATIENT
Start: 2021-12-14

## 2021-12-16 ENCOUNTER — INFUSION (OUTPATIENT)
Dept: ONCOLOGY | Facility: HOSPITAL | Age: 51
End: 2021-12-16

## 2021-12-16 VITALS
TEMPERATURE: 97.8 F | SYSTOLIC BLOOD PRESSURE: 125 MMHG | DIASTOLIC BLOOD PRESSURE: 75 MMHG | RESPIRATION RATE: 18 BRPM | HEART RATE: 86 BPM

## 2021-12-16 DIAGNOSIS — E61.1 IRON DEFICIENCY: ICD-10-CM

## 2021-12-16 DIAGNOSIS — T45.4X5A ADVERSE EFFECT OF IRON, INITIAL ENCOUNTER: Primary | ICD-10-CM

## 2021-12-16 PROCEDURE — 96374 THER/PROPH/DIAG INJ IV PUSH: CPT | Performed by: INTERNAL MEDICINE

## 2021-12-16 PROCEDURE — 96375 TX/PRO/DX INJ NEW DRUG ADDON: CPT | Performed by: INTERNAL MEDICINE

## 2021-12-16 PROCEDURE — 25010000002 FERUMOXYTOL 510 MG/17ML SOLUTION 510 MG VIAL: Performed by: INTERNAL MEDICINE

## 2021-12-16 RX ORDER — DIPHENHYDRAMINE HCL 25 MG
25 CAPSULE ORAL ONCE
Status: CANCELLED | OUTPATIENT
Start: 2021-12-23 | End: 2021-12-23

## 2021-12-16 RX ORDER — SODIUM CHLORIDE 9 MG/ML
250 INJECTION, SOLUTION INTRAVENOUS ONCE
Status: CANCELLED | OUTPATIENT
Start: 2021-12-23

## 2021-12-16 RX ORDER — SODIUM CHLORIDE 9 MG/ML
250 INJECTION, SOLUTION INTRAVENOUS ONCE
Status: COMPLETED | OUTPATIENT
Start: 2021-12-16 | End: 2021-12-16

## 2021-12-16 RX ORDER — ACETAMINOPHEN 325 MG/1
650 TABLET ORAL ONCE
Status: COMPLETED | OUTPATIENT
Start: 2021-12-16 | End: 2021-12-16

## 2021-12-16 RX ORDER — CETIRIZINE HYDROCHLORIDE 10 MG/1
10 TABLET ORAL ONCE
Status: CANCELLED | OUTPATIENT
Start: 2021-12-23 | End: 2021-12-23

## 2021-12-16 RX ORDER — ACETAMINOPHEN 325 MG/1
650 TABLET ORAL ONCE
Status: CANCELLED | OUTPATIENT
Start: 2021-12-23 | End: 2021-12-23

## 2021-12-16 RX ORDER — FAMOTIDINE 10 MG/ML
20 INJECTION, SOLUTION INTRAVENOUS ONCE
Status: COMPLETED | OUTPATIENT
Start: 2021-12-16 | End: 2021-12-16

## 2021-12-16 RX ORDER — CETIRIZINE HYDROCHLORIDE 10 MG/1
10 TABLET ORAL ONCE
Status: COMPLETED | OUTPATIENT
Start: 2021-12-16 | End: 2021-12-16

## 2021-12-16 RX ORDER — FAMOTIDINE 10 MG/ML
20 INJECTION, SOLUTION INTRAVENOUS AS NEEDED
Status: CANCELLED | OUTPATIENT
Start: 2021-12-23

## 2021-12-16 RX ORDER — DIPHENHYDRAMINE HYDROCHLORIDE 50 MG/ML
50 INJECTION INTRAMUSCULAR; INTRAVENOUS AS NEEDED
Status: CANCELLED | OUTPATIENT
Start: 2021-12-23

## 2021-12-16 RX ORDER — DIPHENHYDRAMINE HCL 25 MG
25 CAPSULE ORAL ONCE
Status: DISCONTINUED | OUTPATIENT
Start: 2021-12-16 | End: 2021-12-16 | Stop reason: HOSPADM

## 2021-12-16 RX ORDER — FAMOTIDINE 10 MG/ML
20 INJECTION, SOLUTION INTRAVENOUS ONCE
Status: CANCELLED | OUTPATIENT
Start: 2021-12-23 | End: 2021-12-23

## 2021-12-16 RX ADMIN — ACETAMINOPHEN 650 MG: 325 TABLET, FILM COATED ORAL at 10:34

## 2021-12-16 RX ADMIN — FERUMOXYTOL 510 MG: 510 INJECTION INTRAVENOUS at 11:07

## 2021-12-16 RX ADMIN — FAMOTIDINE 20 MG: 10 INJECTION INTRAVENOUS at 10:43

## 2021-12-16 RX ADMIN — SODIUM CHLORIDE 250 ML: 9 INJECTION, SOLUTION INTRAVENOUS at 10:42

## 2021-12-16 RX ADMIN — CETIRIZINE HYDROCHLORIDE 10 MG: 10 TABLET, FILM COATED ORAL at 10:34

## 2021-12-23 ENCOUNTER — INFUSION (OUTPATIENT)
Dept: ONCOLOGY | Facility: HOSPITAL | Age: 51
End: 2021-12-23

## 2021-12-23 VITALS
HEART RATE: 93 BPM | RESPIRATION RATE: 18 BRPM | DIASTOLIC BLOOD PRESSURE: 59 MMHG | SYSTOLIC BLOOD PRESSURE: 99 MMHG | TEMPERATURE: 97.2 F

## 2021-12-23 DIAGNOSIS — T45.4X5A ADVERSE EFFECT OF IRON, INITIAL ENCOUNTER: Primary | ICD-10-CM

## 2021-12-23 DIAGNOSIS — E61.1 IRON DEFICIENCY: ICD-10-CM

## 2021-12-23 PROCEDURE — 96374 THER/PROPH/DIAG INJ IV PUSH: CPT | Performed by: INTERNAL MEDICINE

## 2021-12-23 PROCEDURE — 96375 TX/PRO/DX INJ NEW DRUG ADDON: CPT | Performed by: INTERNAL MEDICINE

## 2021-12-23 PROCEDURE — 25010000002 FERUMOXYTOL 510 MG/17ML SOLUTION 510 MG VIAL: Performed by: INTERNAL MEDICINE

## 2021-12-23 RX ORDER — SODIUM CHLORIDE 9 MG/ML
250 INJECTION, SOLUTION INTRAVENOUS ONCE
Status: COMPLETED | OUTPATIENT
Start: 2021-12-23 | End: 2021-12-23

## 2021-12-23 RX ORDER — SODIUM CHLORIDE 9 MG/ML
250 INJECTION, SOLUTION INTRAVENOUS ONCE
Status: CANCELLED | OUTPATIENT
Start: 2021-12-23

## 2021-12-23 RX ORDER — CETIRIZINE HYDROCHLORIDE 10 MG/1
10 TABLET ORAL ONCE
Status: COMPLETED | OUTPATIENT
Start: 2021-12-23 | End: 2021-12-23

## 2021-12-23 RX ORDER — FAMOTIDINE 10 MG/ML
20 INJECTION, SOLUTION INTRAVENOUS ONCE
Status: CANCELLED | OUTPATIENT
Start: 2021-12-23 | End: 2021-12-23

## 2021-12-23 RX ORDER — DIPHENHYDRAMINE HCL 25 MG
25 CAPSULE ORAL ONCE
Status: DISCONTINUED | OUTPATIENT
Start: 2021-12-23 | End: 2021-12-23 | Stop reason: HOSPADM

## 2021-12-23 RX ORDER — DIPHENHYDRAMINE HYDROCHLORIDE 50 MG/ML
50 INJECTION INTRAMUSCULAR; INTRAVENOUS AS NEEDED
Status: CANCELLED | OUTPATIENT
Start: 2021-12-23

## 2021-12-23 RX ORDER — CETIRIZINE HYDROCHLORIDE 10 MG/1
10 TABLET ORAL ONCE
Status: CANCELLED | OUTPATIENT
Start: 2021-12-23 | End: 2021-12-23

## 2021-12-23 RX ORDER — ACETAMINOPHEN 325 MG/1
650 TABLET ORAL ONCE
Status: CANCELLED | OUTPATIENT
Start: 2021-12-23 | End: 2021-12-23

## 2021-12-23 RX ORDER — FAMOTIDINE 10 MG/ML
20 INJECTION, SOLUTION INTRAVENOUS ONCE
Status: COMPLETED | OUTPATIENT
Start: 2021-12-23 | End: 2021-12-23

## 2021-12-23 RX ORDER — FAMOTIDINE 10 MG/ML
20 INJECTION, SOLUTION INTRAVENOUS AS NEEDED
Status: CANCELLED | OUTPATIENT
Start: 2021-12-23

## 2021-12-23 RX ORDER — DIPHENHYDRAMINE HCL 25 MG
25 CAPSULE ORAL ONCE
Status: CANCELLED | OUTPATIENT
Start: 2021-12-23 | End: 2021-12-23

## 2021-12-23 RX ADMIN — FERUMOXYTOL 510 MG: 510 INJECTION INTRAVENOUS at 11:41

## 2021-12-23 RX ADMIN — FAMOTIDINE 20 MG: 10 INJECTION INTRAVENOUS at 11:10

## 2021-12-23 RX ADMIN — SODIUM CHLORIDE 250 ML: 9 INJECTION, SOLUTION INTRAVENOUS at 11:09

## 2021-12-23 RX ADMIN — CETIRIZINE HYDROCHLORIDE 10 MG: 10 TABLET, FILM COATED ORAL at 11:10

## 2021-12-28 ENCOUNTER — TELEPHONE (OUTPATIENT)
Dept: GASTROENTEROLOGY | Facility: CLINIC | Age: 51
End: 2021-12-28

## 2021-12-28 NOTE — TELEPHONE ENCOUNTER
12/28/2021, 1323 - Patient telephoned per this staff member [phone] with notification Dr. Tom Powell M.D. is out of office and will not return until Thursday, January 6, 2022.  Patient stated one year piror she consumed Gluten and was instructed to consume large amounts of fluids to aid in riding body of Gluten.  Patient instructed to seek medical care at Emergency Department should abdominal pain persist or worsen.  Patient verbalized understanding.

## 2021-12-28 NOTE — TELEPHONE ENCOUNTER
----- Message from Carla Richard sent at 12/28/2021 12:10 PM CST -----  Regarding: Celiac attack  I just glutened myself.  How many activated charcoal pills do i take to help get this to pass faster? Its my first time needing it. Im in excruciating pain. I took 3. Do i need more?

## 2022-01-10 ENCOUNTER — TELEPHONE (OUTPATIENT)
Dept: PULMONOLOGY | Facility: CLINIC | Age: 52
End: 2022-01-10

## 2022-01-10 DIAGNOSIS — J44.9 STAGE 2 MODERATE COPD BY GOLD CLASSIFICATION: Primary | ICD-10-CM

## 2022-01-10 RX ORDER — IPRATROPIUM BROMIDE AND ALBUTEROL SULFATE 2.5; .5 MG/3ML; MG/3ML
3 SOLUTION RESPIRATORY (INHALATION)
Qty: 180 ML | Refills: 0 | Status: SHIPPED | OUTPATIENT
Start: 2022-01-10 | End: 2022-08-23 | Stop reason: SDUPTHER

## 2022-01-10 NOTE — TELEPHONE ENCOUNTER
----- Message from Carla Richard sent at 1/10/2022 10:21 AM CST -----  Regarding: Neb machine   I discovered that my insurance will not cover it.  I do need duoneb medicine refill

## 2022-01-11 ENCOUNTER — APPOINTMENT (OUTPATIENT)
Dept: GENERAL RADIOLOGY | Facility: HOSPITAL | Age: 52
End: 2022-01-11

## 2022-01-11 ENCOUNTER — HOSPITAL ENCOUNTER (EMERGENCY)
Facility: HOSPITAL | Age: 52
Discharge: HOME | End: 2022-01-11
Attending: STUDENT IN AN ORGANIZED HEALTH CARE EDUCATION/TRAINING PROGRAM | Admitting: EMERGENCY MEDICINE

## 2022-01-11 VITALS
WEIGHT: 102 LBS | DIASTOLIC BLOOD PRESSURE: 77 MMHG | OXYGEN SATURATION: 97 % | BODY MASS INDEX: 17 KG/M2 | HEART RATE: 92 BPM | TEMPERATURE: 98.1 F | HEIGHT: 65 IN | SYSTOLIC BLOOD PRESSURE: 128 MMHG | RESPIRATION RATE: 20 BRPM

## 2022-01-11 DIAGNOSIS — J06.9 UPPER RESPIRATORY TRACT INFECTION, UNSPECIFIED TYPE: ICD-10-CM

## 2022-01-11 DIAGNOSIS — J44.1 COPD WITH ACUTE EXACERBATION: Primary | ICD-10-CM

## 2022-01-11 LAB
ALBUMIN SERPL-MCNC: 4.5 G/DL (ref 3.5–5.2)
ALBUMIN/GLOB SERPL: 2.3 G/DL
ALP SERPL-CCNC: 80 U/L (ref 39–117)
ALT SERPL W P-5'-P-CCNC: 9 U/L (ref 1–33)
ANION GAP SERPL CALCULATED.3IONS-SCNC: 12 MMOL/L (ref 5–15)
AST SERPL-CCNC: 14 U/L (ref 1–32)
BASOPHILS # BLD AUTO: 0.12 10*3/MM3 (ref 0–0.2)
BASOPHILS NFR BLD AUTO: 0.8 % (ref 0–1.5)
BILIRUB SERPL-MCNC: 0.5 MG/DL (ref 0–1.2)
BUN SERPL-MCNC: 11 MG/DL (ref 6–20)
BUN/CREAT SERPL: 16.7 (ref 7–25)
CALCIUM SPEC-SCNC: 9.3 MG/DL (ref 8.6–10.5)
CHLORIDE SERPL-SCNC: 104 MMOL/L (ref 98–107)
CO2 SERPL-SCNC: 26 MMOL/L (ref 22–29)
CREAT SERPL-MCNC: 0.66 MG/DL (ref 0.57–1)
DEPRECATED RDW RBC AUTO: 47 FL (ref 37–54)
EOSINOPHIL # BLD AUTO: 0.16 10*3/MM3 (ref 0–0.4)
EOSINOPHIL NFR BLD AUTO: 1 % (ref 0.3–6.2)
ERYTHROCYTE [DISTWIDTH] IN BLOOD BY AUTOMATED COUNT: 13.1 % (ref 12.3–15.4)
FLUAV SUBTYP SPEC NAA+PROBE: NOT DETECTED
FLUBV RNA ISLT QL NAA+PROBE: NOT DETECTED
GFR SERPL CREATININE-BSD FRML MDRD: 94 ML/MIN/1.73
GLOBULIN UR ELPH-MCNC: 2 GM/DL
GLUCOSE SERPL-MCNC: 93 MG/DL (ref 65–99)
HCT VFR BLD AUTO: 42.5 % (ref 34–46.6)
HGB BLD-MCNC: 14.2 G/DL (ref 12–15.9)
IMM GRANULOCYTES # BLD AUTO: 0.07 10*3/MM3 (ref 0–0.05)
IMM GRANULOCYTES NFR BLD AUTO: 0.4 % (ref 0–0.5)
LYMPHOCYTES # BLD AUTO: 3.1 10*3/MM3 (ref 0.7–3.1)
LYMPHOCYTES NFR BLD AUTO: 19.8 % (ref 19.6–45.3)
MCH RBC QN AUTO: 32.3 PG (ref 26.6–33)
MCHC RBC AUTO-ENTMCNC: 33.4 G/DL (ref 31.5–35.7)
MCV RBC AUTO: 96.6 FL (ref 79–97)
MONOCYTES # BLD AUTO: 1.29 10*3/MM3 (ref 0.1–0.9)
MONOCYTES NFR BLD AUTO: 8.2 % (ref 5–12)
NEUTROPHILS NFR BLD AUTO: 10.92 10*3/MM3 (ref 1.7–7)
NEUTROPHILS NFR BLD AUTO: 69.8 % (ref 42.7–76)
NRBC BLD AUTO-RTO: 0 /100 WBC (ref 0–0.2)
PLATELET # BLD AUTO: 316 10*3/MM3 (ref 140–450)
PMV BLD AUTO: 9.4 FL (ref 6–12)
POTASSIUM SERPL-SCNC: 4.4 MMOL/L (ref 3.5–5.2)
PROT SERPL-MCNC: 6.5 G/DL (ref 6–8.5)
RBC # BLD AUTO: 4.4 10*6/MM3 (ref 3.77–5.28)
SARS-COV-2 RNA PNL SPEC NAA+PROBE: NOT DETECTED
SODIUM SERPL-SCNC: 142 MMOL/L (ref 136–145)
WBC NRBC COR # BLD: 15.66 10*3/MM3 (ref 3.4–10.8)

## 2022-01-11 PROCEDURE — 71045 X-RAY EXAM CHEST 1 VIEW: CPT

## 2022-01-11 PROCEDURE — 85025 COMPLETE CBC W/AUTO DIFF WBC: CPT | Performed by: PHYSICIAN ASSISTANT

## 2022-01-11 PROCEDURE — 99283 EMERGENCY DEPT VISIT LOW MDM: CPT

## 2022-01-11 PROCEDURE — 36415 COLL VENOUS BLD VENIPUNCTURE: CPT

## 2022-01-11 PROCEDURE — 80053 COMPREHEN METABOLIC PANEL: CPT | Performed by: PHYSICIAN ASSISTANT

## 2022-01-11 PROCEDURE — 87636 SARSCOV2 & INF A&B AMP PRB: CPT | Performed by: STUDENT IN AN ORGANIZED HEALTH CARE EDUCATION/TRAINING PROGRAM

## 2022-01-11 RX ORDER — DOXYCYCLINE 100 MG/1
100 CAPSULE ORAL 2 TIMES DAILY
Qty: 14 CAPSULE | Refills: 0 | Status: SHIPPED | OUTPATIENT
Start: 2022-01-11 | End: 2022-01-18

## 2022-01-11 RX ORDER — BENZONATATE 100 MG/1
100 CAPSULE ORAL 3 TIMES DAILY PRN
Qty: 30 CAPSULE | Refills: 0 | Status: SHIPPED | OUTPATIENT
Start: 2022-01-11 | End: 2022-03-31

## 2022-01-11 RX ORDER — PREDNISONE 20 MG/1
20 TABLET ORAL 2 TIMES DAILY
Qty: 10 TABLET | Refills: 0 | Status: SHIPPED | OUTPATIENT
Start: 2022-01-11 | End: 2022-01-16

## 2022-01-12 NOTE — ED PROVIDER NOTES
"Subjective   Patient presents to emergency department for HA, cough, congestion, sneezing x 1 week.  Cough is not productive.  Hx of COPD and everyday tobacco smoker.  No home oxygen at baseline.  States she was supposed to be seen by her PCP today but was told since she was bringing her mother in to the ED to \"just get seen there instead\".    States symptoms feel like \"just a cold\".        History provided by:  Patient   used: No        Review of Systems   Constitutional: Negative for chills and fever.   HENT: Positive for congestion, ear pain (bilateral) and sneezing. Negative for sore throat and trouble swallowing.    Respiratory: Positive for cough. Negative for shortness of breath.    Cardiovascular: Negative for chest pain.   Gastrointestinal: Negative for abdominal pain, nausea and vomiting.   Genitourinary: Negative for dysuria and flank pain.   Skin: Negative for rash.   Allergic/Immunologic: Negative for immunocompromised state.   Neurological: Negative for syncope and weakness.   Hematological: Does not bruise/bleed easily.   Psychiatric/Behavioral: Negative for confusion.       Past Medical History:   Diagnosis Date   • Asthma    • Cancer (HCC)     cervical   • Colon polyp    • COPD (chronic obstructive pulmonary disease) (HCC)    • Crohn's disease (HCC)    • Diverticulitis of colon    • Elevated cholesterol    • Essential hypertension 4/15/2020   • GERD (gastroesophageal reflux disease)    • History of transfusion    • Irritable bowel syndrome    • Kidney calculus    • Pancreatitis    • Sphincter of Oddi dysfunction        Allergies   Allergen Reactions   • Nsaids Swelling and GI Intolerance   • Azithromycin Swelling   • Ciprofloxacin Hives   • Doxycycline Swelling   • Other Other (See Comments)     nicotine patch   Caused body twitching/seizures   • Levofloxacin Rash   • Phenergan [Promethazine Hcl] GI Intolerance       Past Surgical History:   Procedure Laterality Date   • BACK " SURGERY      C5-6   • COLONOSCOPY     • COLONOSCOPY N/A 10/18/2019    Procedure: COLONOSCOPY;  Surgeon: Tom Davis MD;  Location: [facility] ENDOSCOPY;  Service: Gastroenterology   • COLONOSCOPY N/A 8/27/2020    Procedure: COLONOSCOPY;  Surgeon: Tom Davis MD;  Location: [facility] ENDOSCOPY;  Service: Gastroenterology;  Laterality: N/A;   • COLONOSCOPY N/A 9/29/2021    Procedure: COLONOSCOPY;  Surgeon: Tom Davis MD;  Location: [facility] ENDOSCOPY;  Service: Gastroenterology;  Laterality: N/A;   • ENDOSCOPY N/A 10/18/2019    Procedure: ESOPHAGOGASTRODUODENOSCOPY;  Surgeon: Tom Davis MD;  Location: [facility] ENDOSCOPY;  Service: Gastroenterology   • ENDOSCOPY N/A 7/27/2020    Procedure: ESOPHAGOGASTRODUODENOSCOPY;  Surgeon: Tom Davis MD;  Location: [facility] ENDOSCOPY;  Service: Gastroenterology;  Laterality: N/A;   • ENDOSCOPY N/A 2/10/2021    Procedure: ESOPHAGOGASTRODUODENOSCOPY;  Surgeon: Tom Davis MD;  Location: [facility] ENDOSCOPY;  Service: Gastroenterology;  Laterality: N/A;   • HYSTERECTOMY     • SHOULDER ARTHROSCOPY W/ ROTATOR CUFF REPAIR Right    • TOE SURGERY      left small toe    • TONSILECTOMY, ADENOIDECTOMY, BILATERAL MYRINGOTOMY AND TUBES     • TONSILLECTOMY     • UPPER GASTROINTESTINAL ENDOSCOPY  02/10/2021       Family History   Problem Relation Age of Onset   • Inflammatory bowel disease Mother    • Arthritis Mother    • Diabetes Mother    • Hypertension Mother    • Hyperlipidemia Mother    • Obesity Mother    • Osteoporosis Mother    • Heart disease Father    • Hyperlipidemia Father    • Heart attack Father    • Diabetes Sister    • Liver disease Daughter    • Mental illness Daughter    • Obesity Daughter    • Diabetes Maternal Grandmother    • Cancer Maternal Grandmother         lung   • Cancer Other         lung   • Heart disease Other        Social History     Socioeconomic History   • Marital status:    Tobacco Use   • Smoking status: Current Every Day Smoker      "Packs/day: 1.00     Years: 40.00     Pack years: 40.00     Types: Cigarettes   • Smokeless tobacco: Never Used   Vaping Use   • Vaping Use: Former   • Substances: Nicotine   • Devices: Disposable, Refillable tank   Substance and Sexual Activity   • Alcohol use: No   • Drug use: Yes     Types: Marijuana     Comment: 09/16/2021 - Patient states she utilizes Marijuana nightly to increase appetite and decrease nausea.   • Sexual activity: Defer           Objective      /77 (BP Location: Right arm, Patient Position: Sitting)   Pulse 92   Temp 98.1 °F (36.7 °C) (Oral)   Resp 20   Ht 165.1 cm (65\")   Wt 46.3 kg (102 lb)   LMP  (LMP Unknown)   SpO2 97%   BMI 16.97 kg/m²     Physical Exam  Vitals and nursing note reviewed.   Constitutional:       Appearance: Normal appearance.      Comments: Cachectic     HENT:      Head: Normocephalic and atraumatic.      Right Ear: Tympanic membrane normal.      Left Ear: Tympanic membrane normal.   Eyes:      Conjunctiva/sclera: Conjunctivae normal.   Cardiovascular:      Rate and Rhythm: Normal rate and regular rhythm.      Pulses: Normal pulses.      Heart sounds: Normal heart sounds.   Pulmonary:      Effort: Pulmonary effort is normal. No respiratory distress.      Breath sounds: No wheezing.      Comments: Diffusely dimiinished  Skin:     General: Skin is warm.      Capillary Refill: Capillary refill takes less than 2 seconds.   Neurological:      Mental Status: She is alert. Mental status is at baseline.   Psychiatric:         Mood and Affect: Mood normal.         Behavior: Behavior normal.         Thought Content: Thought content normal.         Procedures           ED Course      Results for orders placed or performed during the hospital encounter of 01/11/22   COVID-19 and FLU A/B PCR - Swab, Nasopharynx    Specimen: Nasopharynx; Swab   Result Value Ref Range    COVID19 Not Detected Not Detected - Ref. Range    Influenza A PCR Not Detected Not Detected    Influenza " B PCR Not Detected Not Detected   Comprehensive Metabolic Panel    Specimen: Blood   Result Value Ref Range    Glucose 93 65 - 99 mg/dL    BUN 11 6 - 20 mg/dL    Creatinine 0.66 0.57 - 1.00 mg/dL    Sodium 142 136 - 145 mmol/L    Potassium 4.4 3.5 - 5.2 mmol/L    Chloride 104 98 - 107 mmol/L    CO2 26.0 22.0 - 29.0 mmol/L    Calcium 9.3 8.6 - 10.5 mg/dL    Total Protein 6.5 6.0 - 8.5 g/dL    Albumin 4.50 3.50 - 5.20 g/dL    ALT (SGPT) 9 1 - 33 U/L    AST (SGOT) 14 1 - 32 U/L    Alkaline Phosphatase 80 39 - 117 U/L    Total Bilirubin 0.5 0.0 - 1.2 mg/dL    eGFR Non African Amer 94 >60 mL/min/1.73    Globulin 2.0 gm/dL    A/G Ratio 2.3 g/dL    BUN/Creatinine Ratio 16.7 7.0 - 25.0    Anion Gap 12.0 5.0 - 15.0 mmol/L   CBC Auto Differential    Specimen: Blood   Result Value Ref Range    WBC 15.66 (H) 3.40 - 10.80 10*3/mm3    RBC 4.40 3.77 - 5.28 10*6/mm3    Hemoglobin 14.2 12.0 - 15.9 g/dL    Hematocrit 42.5 34.0 - 46.6 %    MCV 96.6 79.0 - 97.0 fL    MCH 32.3 26.6 - 33.0 pg    MCHC 33.4 31.5 - 35.7 g/dL    RDW 13.1 12.3 - 15.4 %    RDW-SD 47.0 37.0 - 54.0 fl    MPV 9.4 6.0 - 12.0 fL    Platelets 316 140 - 450 10*3/mm3    Neutrophil % 69.8 42.7 - 76.0 %    Lymphocyte % 19.8 19.6 - 45.3 %    Monocyte % 8.2 5.0 - 12.0 %    Eosinophil % 1.0 0.3 - 6.2 %    Basophil % 0.8 0.0 - 1.5 %    Immature Grans % 0.4 0.0 - 0.5 %    Neutrophils, Absolute 10.92 (H) 1.70 - 7.00 10*3/mm3    Lymphocytes, Absolute 3.10 0.70 - 3.10 10*3/mm3    Monocytes, Absolute 1.29 (H) 0.10 - 0.90 10*3/mm3    Eosinophils, Absolute 0.16 0.00 - 0.40 10*3/mm3    Basophils, Absolute 0.12 0.00 - 0.20 10*3/mm3    Immature Grans, Absolute 0.07 (H) 0.00 - 0.05 10*3/mm3    nRBC 0.0 0.0 - 0.2 /100 WBC     XR Chest 1 View    Result Date: 1/11/2022  Narrative: EXAM: XR CHEST 1 VW CLINICAL INDICATION: Cough COMPARISON: 8/17/2021 FINDINGS: A single view of the chest was obtained. The heart size is normal. The pulmonary vascularity is unremarkable. The lungs are clear.  There is no consolidation, infiltrate, pleural effusion, or pneumothorax.     Impression: No evidence of active pulmonary disease. Electronically signed by:  Kristopher Reed MD  1/11/2022 6:08 PM [facility] Workstation: [id number]                                               [facility]    Final diagnoses:   COPD with acute exacerbation (HCC)   Upper respiratory tract infection, unspecified type       ED Disposition  ED Disposition     ED Disposition Condition Comment    Discharge Stable           Xavier Wick MD  [address]  [facility] [address]  [phone]    Call in 1 day      [facility] EMERGENCY DEPARTMENT  [address]  [facility] [address] [phone]  Go to   if symptoms worsen.         Medication List      New Prescriptions    benzonatate 100 MG capsule  Commonly known as: TESSALON  Take 1 capsule by mouth 3 (Three) Times a Day As Needed for Cough.     doxycycline 100 MG capsule  Commonly known as: MONODOX  Take 1 capsule by mouth 2 (Two) Times a Day for 7 days.        Changed    * predniSONE 10 MG tablet  Commonly known as: DELTASONE  4 tabs po daily x 4 days, then 3 tabs daily x 3 days, then 2 tabs daily x 2 days, then 1 tab po daily x 1 day  What changed: Another medication with the same name was added. Make sure you understand how and when to take each.     * predniSONE 20 MG tablet  Commonly known as: DELTASONE  Take 1 tablet by mouth 2 (Two) Times a Day for 5 days.  What changed: You were already taking a medication with the same name, and this prescription was added. Make sure you understand how and when to take each.         * This list has 2 medication(s) that are the same as other medications prescribed for you. Read the directions carefully, and ask your doctor or other care provider to review them with you.               Where to Get Your Medications      These medications were sent to [facility] - [place], [place] - [address] [facility] -  [phone]  - [phone] FX  [address] [facility] [address]    Phone: [phone]   · benzonatate 100 MG capsule  · doxycycline 100 MG capsule  · predniSONE 20 MG tablet          Xander Krause PA-C  01/11/22 1910

## 2022-01-28 RX ORDER — ONDANSETRON 8 MG/1
TABLET, ORALLY DISINTEGRATING ORAL
Qty: 90 TABLET | Refills: 0 | Status: SHIPPED | OUTPATIENT
Start: 2022-01-28 | End: 2022-03-03

## 2022-03-03 ENCOUNTER — DOCUMENTATION (OUTPATIENT)
Dept: ONCOLOGY | Facility: CLINIC | Age: 52
End: 2022-03-03

## 2022-03-03 RX ORDER — ONDANSETRON 8 MG/1
TABLET, ORALLY DISINTEGRATING ORAL
Qty: 90 TABLET | Refills: 0 | Status: SHIPPED | OUTPATIENT
Start: 2022-03-03 | End: 2022-08-03 | Stop reason: SDUPTHER

## 2022-03-07 ENCOUNTER — LAB (OUTPATIENT)
Dept: ONCOLOGY | Facility: HOSPITAL | Age: 52
End: 2022-03-07

## 2022-03-07 ENCOUNTER — OFFICE VISIT (OUTPATIENT)
Dept: ONCOLOGY | Facility: CLINIC | Age: 52
End: 2022-03-07

## 2022-03-07 VITALS
HEART RATE: 86 BPM | DIASTOLIC BLOOD PRESSURE: 74 MMHG | OXYGEN SATURATION: 97 % | TEMPERATURE: 97.1 F | BODY MASS INDEX: 16.69 KG/M2 | WEIGHT: 100.3 LBS | SYSTOLIC BLOOD PRESSURE: 132 MMHG

## 2022-03-07 DIAGNOSIS — D72.829 LEUKOCYTOSIS, UNSPECIFIED TYPE: Primary | ICD-10-CM

## 2022-03-07 DIAGNOSIS — Z85.41 HISTORY OF CERVICAL CANCER: ICD-10-CM

## 2022-03-07 DIAGNOSIS — E61.1 IRON DEFICIENCY: ICD-10-CM

## 2022-03-07 DIAGNOSIS — E53.8 FOLATE DEFICIENCY: ICD-10-CM

## 2022-03-07 DIAGNOSIS — D72.829 LEUKOCYTOSIS, UNSPECIFIED TYPE: ICD-10-CM

## 2022-03-07 DIAGNOSIS — E53.8 B12 DEFICIENCY: ICD-10-CM

## 2022-03-07 DIAGNOSIS — T45.4X5A ADVERSE EFFECT OF IRON, INITIAL ENCOUNTER: ICD-10-CM

## 2022-03-07 LAB
BASOPHILS # BLD AUTO: 0.11 10*3/MM3 (ref 0–0.2)
BASOPHILS NFR BLD AUTO: 0.8 % (ref 0–1.5)
DEPRECATED RDW RBC AUTO: 49.1 FL (ref 37–54)
EOSINOPHIL # BLD AUTO: 0.25 10*3/MM3 (ref 0–0.4)
EOSINOPHIL NFR BLD AUTO: 1.7 % (ref 0.3–6.2)
ERYTHROCYTE [DISTWIDTH] IN BLOOD BY AUTOMATED COUNT: 13.2 % (ref 12.3–15.4)
FERRITIN SERPL-MCNC: 384.6 NG/ML (ref 13–150)
FOLATE SERPL-MCNC: 3.2 NG/ML (ref 4.78–24.2)
HCT VFR BLD AUTO: 43.3 % (ref 34–46.6)
HGB BLD-MCNC: 14.6 G/DL (ref 12–15.9)
IMM GRANULOCYTES # BLD AUTO: 0.11 10*3/MM3 (ref 0–0.05)
IMM GRANULOCYTES NFR BLD AUTO: 0.8 % (ref 0–0.5)
IRON 24H UR-MRATE: 101 MCG/DL (ref 37–145)
IRON SATN MFR SERPL: 31 % (ref 20–50)
LYMPHOCYTES # BLD AUTO: 3.17 10*3/MM3 (ref 0.7–3.1)
LYMPHOCYTES NFR BLD AUTO: 21.9 % (ref 19.6–45.3)
MCH RBC QN AUTO: 33.7 PG (ref 26.6–33)
MCHC RBC AUTO-ENTMCNC: 33.7 G/DL (ref 31.5–35.7)
MCV RBC AUTO: 100 FL (ref 79–97)
MONOCYTES # BLD AUTO: 1.3 10*3/MM3 (ref 0.1–0.9)
MONOCYTES NFR BLD AUTO: 9 % (ref 5–12)
NEUTROPHILS NFR BLD AUTO: 65.8 % (ref 42.7–76)
NEUTROPHILS NFR BLD AUTO: 9.55 10*3/MM3 (ref 1.7–7)
NRBC BLD AUTO-RTO: 0 /100 WBC (ref 0–0.2)
PLATELET # BLD AUTO: 433 10*3/MM3 (ref 140–450)
PMV BLD AUTO: 9 FL (ref 6–12)
RBC # BLD AUTO: 4.33 10*6/MM3 (ref 3.77–5.28)
TIBC SERPL-MCNC: 326 MCG/DL (ref 298–536)
TRANSFERRIN SERPL-MCNC: 219 MG/DL (ref 200–360)
VIT B12 BLD-MCNC: 712 PG/ML (ref 211–946)
WBC NRBC COR # BLD: 14.49 10*3/MM3 (ref 3.4–10.8)

## 2022-03-07 PROCEDURE — 85025 COMPLETE CBC W/AUTO DIFF WBC: CPT

## 2022-03-07 PROCEDURE — 99214 OFFICE O/P EST MOD 30 MIN: CPT | Performed by: INTERNAL MEDICINE

## 2022-03-07 PROCEDURE — 82607 VITAMIN B-12: CPT

## 2022-03-07 PROCEDURE — 82728 ASSAY OF FERRITIN: CPT

## 2022-03-07 PROCEDURE — 1157F ADVNC CARE PLAN IN RCRD: CPT | Performed by: INTERNAL MEDICINE

## 2022-03-07 PROCEDURE — G0463 HOSPITAL OUTPT CLINIC VISIT: HCPCS | Performed by: INTERNAL MEDICINE

## 2022-03-07 PROCEDURE — 82746 ASSAY OF FOLIC ACID SERUM: CPT

## 2022-03-07 PROCEDURE — 84466 ASSAY OF TRANSFERRIN: CPT

## 2022-03-07 PROCEDURE — 1126F AMNT PAIN NOTED NONE PRSNT: CPT | Performed by: INTERNAL MEDICINE

## 2022-03-07 PROCEDURE — 83540 ASSAY OF IRON: CPT

## 2022-03-07 PROCEDURE — G9902 PT SCRN TBCO AND ID AS USER: HCPCS | Performed by: INTERNAL MEDICINE

## 2022-03-07 NOTE — PROGRESS NOTES
DATE OF VISIT: 3/8/2022      REASON FOR VISIT: Leukocytosis, B12 deficiency, folate deficiency, history of cervical cancer, iron deficiency      HISTORY OF PRESENT ILLNESS:   52-year-old female with medical problem consisting of history of cervical cancer age is 19 s/p conization procedure followed by hysterectomy at age 23, chronic obstructive pulmonary disease, chronic nicotine addiction, celiac disease, pancreatitis, leukocytosis has been following up with [facility] since June 20, 2021.  Due to iron deficiency patient received with intravenous Feraheme in December 2021.  Complains of worsening shortness of breath since Covid in January 2022.  Complains of chronic tingling and numbness affecting hands.  Denies any bleeding.  Denies any new lymph node enlargement.          Past Medical History, Past Surgical History, Social History, Family History have been reviewed and are without significant changes except as mentioned.    Review of Systems   A comprehensive 14 point review of systems was performed and was negative except as mentioned in HPI.    Medications:  The current medication list was reviewed in the EMR    ALLERGIES:    Allergies   Allergen Reactions   • Nsaids Swelling and GI Intolerance   • Azithromycin Swelling   • Ciprofloxacin Hives   • Doxycycline Swelling   • Other Other (See Comments)     nicotine patch   Caused body twitching/seizures   • Levofloxacin Rash   • Phenergan [Promethazine Hcl] GI Intolerance       Objective      Vitals:    03/07/22 1111   BP: 132/74   Pulse: 86   Temp: 97.1 °F (36.2 °C)   TempSrc: Temporal   SpO2: 97%   Weight: 45.5 kg (100 lb 4.8 oz)   PainSc: 0-No pain     Current Status 12/16/2021   ECOG score 0       Physical Exam  Pulmonary:      Breath sounds: Normal breath sounds.   Neurological:      Mental Status: She is alert and oriented to person, place, and time.           RECENT LABS:  Glucose   Date Value Ref Range Status   01/11/2022 93 65 - 99 mg/dL Final      Sodium   Date Value Ref Range Status   01/11/2022 142 136 - 145 mmol/L Final     Potassium   Date Value Ref Range Status   01/11/2022 4.4 3.5 - 5.2 mmol/L Final     CO2   Date Value Ref Range Status   01/11/2022 26.0 22.0 - 29.0 mmol/L Final     Chloride   Date Value Ref Range Status   01/11/2022 104 98 - 107 mmol/L Final     Anion Gap   Date Value Ref Range Status   01/11/2022 12.0 5.0 - 15.0 mmol/L Final     Creatinine   Date Value Ref Range Status   01/11/2022 0.66 0.57 - 1.00 mg/dL Final     BUN   Date Value Ref Range Status   01/11/2022 11 6 - 20 mg/dL Final     BUN/Creatinine Ratio   Date Value Ref Range Status   01/11/2022 16.7 7.0 - 25.0 Final     Calcium   Date Value Ref Range Status   01/11/2022 9.3 8.6 - 10.5 mg/dL Final     eGFR Non  Amer   Date Value Ref Range Status   01/11/2022 94 >60 mL/min/1.73 Final     Alkaline Phosphatase   Date Value Ref Range Status   01/11/2022 80 39 - 117 U/L Final     Total Protein   Date Value Ref Range Status   01/11/2022 6.5 6.0 - 8.5 g/dL Final     ALT (SGPT)   Date Value Ref Range Status   01/11/2022 9 1 - 33 U/L Final     AST (SGOT)   Date Value Ref Range Status   01/11/2022 14 1 - 32 U/L Final     Total Bilirubin   Date Value Ref Range Status   01/11/2022 0.5 0.0 - 1.2 mg/dL Final     Albumin   Date Value Ref Range Status   01/11/2022 4.50 3.50 - 5.20 g/dL Final     Globulin   Date Value Ref Range Status   01/11/2022 2.0 gm/dL Final     Lab Results   Component Value Date    WBC 14.49 (H) 03/07/2022    HGB 14.6 03/07/2022    HCT 43.3 03/07/2022    .0 (H) 03/07/2022     03/07/2022     Lab Results   Component Value Date    NEUTROABS 9.55 (H) 03/07/2022    IRON 101 03/07/2022    IRON 54 12/06/2021    IRON 65 06/03/2021    TIBC 326 03/07/2022    TIBC 384 12/06/2021    TIBC 353 06/03/2021    LABIRON 31 03/07/2022    LABIRON 14 (L) 12/06/2021    LABIRON 18 (L) 06/03/2021    FERRITIN 384.60 (H) 03/07/2022    FERRITIN 69.76 12/06/2021    FERRITIN 87.83  06/03/2021    QUKFGOYB45 712 03/07/2022    BUTVPSJE13 725 12/06/2021    KATSZIPS59 >2,000 (H) 06/01/2021    FOLATE 3.20 (L) 03/07/2022    FOLATE 3.00 (L) 12/06/2021     No results found for: , LABCA2, AFPTM, HCGQUANT, , CHROMGRNA, [id number], CEA, REFLABREPO      PATHOLOGY:  * Cannot find OR log *         RADIOLOGY DATA :  No radiology results for the last 7 days        Assessment/Plan     1.  Leukocytosis:  -Patient has been having ongoing leukocytosis since August 2019  -White blood cell count is 14,000 which is again predominantly increase in neutrophil.  -Work-up with peripheral blood flow cytometry done on June 20, 2021 was negative for any leukemia or lymphoma  -Believe her leukocytosis is related to smoking plus inflammation.  -We will continue with clinical monitoring for now  -We will have patient return to clinic in 3 months with repeat CBC, iron studies, ferritin, B12 and folate to be done on that day    2.  Iron deficiency:  -Due to iron deficiency and inability to tolerate iron by mouth patient received Feraheme in December 2021.  -Anemia work-up done today shows hemoglobin is 14.6.  Iron studies are showing significant improvement.  No need for any Feraheme at present.    3.  B12 deficiency:  -Currently on B12 injections every 28 days.    4.  Folate deficiency  -Patient was started on folic acid p.o. daily.  States she has not been taking folic acid regularly.  Recommend restarting folic acid daily.  -Folate level today is 3.20, consistent with folate deficiency    5.  History of cervical cancer:  -S/p hysterectomy at age 23.  Had a conization procedure done at age 19    6. Carla Richard  reports that she has been smoking cigarettes. She has a 40.00 pack-year smoking history. She has never used smokeless tobacco.. I have educated her on the risk of diseases from using tobacco products such as cancer, COPD, heart disease and cataracts.     I advised her to quit and she is not willing  to quit.    I spent 3.5 minutes counseling the patient.       7. Advance Care Planning   ACP discussion was held with the patient during this visit. Patient has an advance directive in EMR which is still valid.                PHQ-9 Total Score:       Carla Richard reports a pain score of 0.  Given her pain assessment as noted, treatment options were discussed and the following options were decided upon as a follow-up plan to address the patient's pain: continuation of current treatment plan for pain.         Ramirez Burger MD  3/8/2022  07:14 CST        Part of this note may be an electronic transcription/translation of spoken language to printed text using the Dragon Dictation System.          CC:

## 2022-03-08 ENCOUNTER — TELEPHONE (OUTPATIENT)
Dept: ONCOLOGY | Facility: HOSPITAL | Age: 52
End: 2022-03-08

## 2022-03-08 NOTE — TELEPHONE ENCOUNTER
----- Message from Ramirez Burger MD sent at 3/8/2022  7:14 AM CST -----  Regarding: labs  Please let patient know, her folate level is still very low at 3.20.  She needs to start taking folic acid p.o. daily.  Vitamin B12 level is stable at 712.  B12 level last clinic visit was 725.  Recommend continue with monthly B12 injection.  Iron studies are adequate, no need to start any iron replacement intravenously at present.  Thank you

## 2022-03-08 NOTE — TELEPHONE ENCOUNTER
Contacted pt regarding lab work and supplements. PT verbalizes understanding and denies any further questions.

## 2022-03-10 ENCOUNTER — DOCUMENTATION (OUTPATIENT)
Dept: NUTRITION | Facility: HOSPITAL | Age: 52
End: 2022-03-10

## 2022-03-10 NOTE — PROGRESS NOTES
Adult Outpatient Nutrition  Assessment    Patient Name:  Carla Richard  YOB: 1970  MRN: [id number]    Assessment Date:  Entry from 3/7/22    Comments: Follow-up to check nutrition. Leukocytosis/anemia/celiac. Wt 100.3 lb. BMI 16.7.  Pt stated is holding present weight--feels this is her new normal. Urged pt to call on RDN as needed.                      Electronically signed by:  Lia Puga RD  03/10/22 10:52 CST

## 2022-03-11 DIAGNOSIS — R05.9 COUGH: ICD-10-CM

## 2022-03-11 DIAGNOSIS — J44.9 STAGE 2 MODERATE COPD BY GOLD CLASSIFICATION: ICD-10-CM

## 2022-03-11 RX ORDER — ALBUTEROL SULFATE 90 UG/1
AEROSOL, METERED RESPIRATORY (INHALATION)
Qty: 18 G | Refills: 1 | Status: SHIPPED | OUTPATIENT
Start: 2022-03-11 | End: 2023-03-27 | Stop reason: SDUPTHER

## 2022-03-25 ENCOUNTER — OFFICE VISIT (OUTPATIENT)
Dept: GASTROENTEROLOGY | Facility: CLINIC | Age: 52
End: 2022-03-25

## 2022-03-25 ENCOUNTER — TELEPHONE (OUTPATIENT)
Dept: GASTROENTEROLOGY | Facility: CLINIC | Age: 52
End: 2022-03-25

## 2022-03-25 VITALS
SYSTOLIC BLOOD PRESSURE: 115 MMHG | HEART RATE: 109 BPM | DIASTOLIC BLOOD PRESSURE: 77 MMHG | BODY MASS INDEX: 16.56 KG/M2 | WEIGHT: 99.4 LBS | HEIGHT: 65 IN

## 2022-03-25 DIAGNOSIS — R10.13 EPIGASTRIC PAIN: Primary | ICD-10-CM

## 2022-03-25 PROCEDURE — 99214 OFFICE O/P EST MOD 30 MIN: CPT | Performed by: INTERNAL MEDICINE

## 2022-03-25 RX ORDER — LORAZEPAM 1 MG/1
1 TABLET ORAL EVERY 8 HOURS PRN
Qty: 10 TABLET | Refills: 0 | Status: SHIPPED | OUTPATIENT
Start: 2022-03-25 | End: 2022-04-25

## 2022-03-25 NOTE — TELEPHONE ENCOUNTER
----- Message from Carla Richard sent at 3/24/2022  5:23 PM CDT -----  Regarding: Appointment   I was in ER at [facility] today.  I have Enteritis. They said my pancreas is fine but I'm in extreme pain upper left similar to when my pancreas goes crazy. I'm not sure what's causing this pain but I'm at 47 hours and I can't cope much longer. I need to get in asap.

## 2022-03-25 NOTE — TELEPHONE ENCOUNTER
"03/25/2022, 1001 - Patient telephoned per this staff member [phone].  Patient stated she had sought medical care at [facility] Emergency Department Thursday, March 24, 2022 where she received a diagnosis of Enteritis.  Patient states abdominal pain continues.  Patient offered clinical appointment today, Friday, March 25, 2022 at 1:15 P.M. with Dr. Tom Powell M.D.  Patient accepted offer.  Patient made aware clinical appointment is a \"work in\", and she may experience a wait period after arriving.  Patient verbalized understanding.    "

## 2022-03-25 NOTE — PROGRESS NOTES
[facility]      Chief Complaint:   Chief Complaint   Patient presents with   • Abdominal Pain       Subjective     HPI:   Patient with a history of abdominal pain and sphincter of Oddi dysfunction.  Patient's last ERCP was about 10 years ago in [place].  Patient states that the pain has come back very severe in the epigastric region patient was recently in the emergency room in [place] lab work was normal and CT scan was essentially normal but patient is in fairly severe pain.  With patient's history I think it is possible patient will need repeat ERCP.  We will have patient follow-up with Dr. Dorantes on Monday to see if she would like to do this or whether she would like to send the patient back to [place].    Plan; let patient follow-up with Dr. Dorantes on Monday.  Will order Ativan 1 mg twice daily as needed over the weekend.    Past Medical History:   Past Medical History:   Diagnosis Date   • Asthma    • Cancer (HCC)     cervical   • Colon polyp    • COPD (chronic obstructive pulmonary disease) (HCC)    • Crohn's disease (HCC)    • Diverticulitis of colon    • Elevated cholesterol    • Essential hypertension 4/15/2020   • GERD (gastroesophageal reflux disease)    • History of transfusion    • Irritable bowel syndrome    • Kidney calculus    • Pancreatitis    • Sphincter of Oddi dysfunction        Past Surgical History:  Past Surgical History:   Procedure Laterality Date   • BACK SURGERY      C5-6   • COLONOSCOPY     • COLONOSCOPY N/A 10/18/2019    Procedure: COLONOSCOPY;  Surgeon: Tom Davis MD;  Location: [facility] ENDOSCOPY;  Service: Gastroenterology   • COLONOSCOPY N/A 8/27/2020    Procedure: COLONOSCOPY;  Surgeon: Tom Davis MD;  Location: [facility] ENDOSCOPY;  Service: Gastroenterology;  Laterality: N/A;   • COLONOSCOPY N/A 9/29/2021    Procedure: COLONOSCOPY;  Surgeon: Tom Davis MD;  Location: [facility] ENDOSCOPY;  Service: Gastroenterology;  Laterality: N/A;    • ENDOSCOPY N/A 10/18/2019    Procedure: ESOPHAGOGASTRODUODENOSCOPY;  Surgeon: Tom Davis MD;  Location: [facility] ENDOSCOPY;  Service: Gastroenterology   • ENDOSCOPY N/A 7/27/2020    Procedure: ESOPHAGOGASTRODUODENOSCOPY;  Surgeon: Tom Davis MD;  Location: [facility] ENDOSCOPY;  Service: Gastroenterology;  Laterality: N/A;   • ENDOSCOPY N/A 2/10/2021    Procedure: ESOPHAGOGASTRODUODENOSCOPY;  Surgeon: Tom Davis MD;  Location: [facility] ENDOSCOPY;  Service: Gastroenterology;  Laterality: N/A;   • HYSTERECTOMY     • SHOULDER ARTHROSCOPY W/ ROTATOR CUFF REPAIR Right    • TOE SURGERY      left small toe    • TONSILECTOMY, ADENOIDECTOMY, BILATERAL MYRINGOTOMY AND TUBES     • TONSILLECTOMY     • UPPER GASTROINTESTINAL ENDOSCOPY  02/10/2021       Family History:  Family History   Problem Relation Age of Onset   • Inflammatory bowel disease Mother    • Arthritis Mother    • Diabetes Mother    • Hypertension Mother    • Hyperlipidemia Mother    • Obesity Mother    • Osteoporosis Mother    • Heart disease Father    • Hyperlipidemia Father    • Heart attack Father    • Diabetes Sister    • Liver disease Daughter    • Mental illness Daughter    • Obesity Daughter    • Diabetes Maternal Grandmother    • Cancer Maternal Grandmother         lung   • Cancer Other         lung   • Heart disease Other        Social History:   reports that she has been smoking cigarettes. She has a 40.00 pack-year smoking history. She has never used smokeless tobacco. She reports current drug use. Drug: Marijuana. She reports that she does not drink alcohol.    Medications:   Prior to Admission medications    Medication Sig Start Date End Date Taking? Authorizing Provider   albuterol sulfate  (90 Base) MCG/ACT inhaler INHALE TWO PUFFS EVERY 4 HOURS AS NEEDED for WHEEZING 3/11/22   Xavier Wick MD   benzonatate (TESSALON) 100 MG capsule Take 1 capsule by mouth 3 (Three) Times a Day As Needed for Cough. 1/11/22   Jimi  Xander FRANCO PA-C   Black Cohosh 200 MG capsule Take 200 mg by mouth Daily. 8/25/21   Xavier Wick MD   cetirizine (zyrTEC) 10 MG tablet Take 1 tablet by mouth Daily. 9/25/19   Iliana Jacobson MD   Cyanocobalamin (B-12 Compliance Injection) 1000 MCG/ML kit Inject 1 mL as directed Every 28 (Twenty-Eight) Days. 6/29/21   Ramirez Burger MD   cyclobenzaprine (FLEXERIL) 5 MG tablet Take 1 tablet by mouth 3 (Three) Times a Day As Needed (hip pain). Do not drive while taking 6/25/21   Xavier Wick MD   fluticasone (FLONASE) 50 MCG/ACT nasal spray 2 sprays into the nostril(s) as directed by provider Daily. 10/28/20   Iliana Jacobson MD   folic acid (FOLVITE) 1 MG tablet Take 1 tablet by mouth Daily. 12/7/21   Ramirez Burger MD   ipratropium-albuterol (DUO-NEB) 0.5-2.5 mg/3 ml nebulizer Take 3 mL by nebulization 4 (Four) Times a Day. 1/10/22   Talha Payton MD   LORazepam (Ativan) 1 MG tablet Take 1 tablet by mouth Every 8 (Eight) Hours As Needed for Anxiety. 3/25/22   Tom Davis MD   [facility] [place] [facility] (Airborne Elderberry) chewable tablet Chew 1 tablet Daily. 10/7/21   Xavier Wick MD   montelukast (SINGULAIR) 10 MG tablet Take 1 tablet by mouth Every Night. 10/17/19   Xavier Wick MD   omeprazole (priLOSEC) 20 MG capsule Take 1 capsule by mouth Daily. 6/23/20   Xavier Wick MD   ondansetron ODT (ZOFRAN-ODT) 8 MG disintegrating tablet TAKE 1 TABLET BY MOUTH EVERY 8 (EIGHT) HOURS AS NEEDED FOR NAUSEA OR VOMITING. 3/3/22   Xavier Wick MD   PARoxetine (Paxil) 10 MG tablet Take 1 tablet by mouth Every Morning. 8/25/21   Xavier Wick MD   Plecanatide (Trulance) 3 MG tablet Take 1 tablet by mouth Daily. 10/15/21   Tom Davis MD   predniSONE (DELTASONE) 10 MG tablet 4 tabs po daily x 4 days, then 3 tabs daily x 3 days, then 2 tabs daily x 2 days, then 1 tab po daily x 1 day 10/1/21   Niles Roldan APRN   traMADol (Ultram) 50 MG tablet Take 50 mg by mouth Every 6 (Six) Hours As  "Needed.    Provider, MD Byron   umeclidinium-vilanterol (ANORO ELLIPTA) 62.5-25 MCG/INH aerosol powder  inhaler Inhale 1 puff Daily. 10/28/20   Iliana Jacobson MD   vitamin D (ERGOCALCIFEROL) 1.25 MG (83094 UT) capsule capsule Take 1 capsule by mouth 1 (One) Time Per Week. 4/15/21   Xavier Wick MD   zinc gluconate 50 MG tablet Take 1 tablet by mouth Daily. 10/7/21   Xavier Wick MD       Allergies:  Nsaids, Azithromycin, Ciprofloxacin, Doxycycline, Other, Levofloxacin, and Phenergan [promethazine hcl]    ROS:    Review of Systems   Constitutional: Negative for activity change, appetite change, chills, diaphoresis, fatigue, fever and unexpected weight change.   HENT: Negative for sore throat and trouble swallowing.    Respiratory: Negative for shortness of breath.    Gastrointestinal: Negative for abdominal distention, abdominal pain, anal bleeding, blood in stool, constipation, diarrhea, nausea, rectal pain and vomiting.   Endocrine: Negative for polydipsia, polyphagia and polyuria.   Genitourinary: Negative for difficulty urinating.   Musculoskeletal: Negative for arthralgias.   Skin: Negative for pallor.   Allergic/Immunologic: Negative for food allergies.   Neurological: Negative for weakness and light-headedness.   Psychiatric/Behavioral: Negative for behavioral problems.     Objective     Blood pressure 115/77, pulse 109, height 165.1 cm (65\"), weight 45.1 kg (99 lb 6.4 oz), not currently breastfeeding.    Physical Exam  Constitutional:       General: She is not in acute distress.     Appearance: She is well-developed. She is not diaphoretic.   HENT:      Head: Normocephalic and atraumatic.   Cardiovascular:      Rate and Rhythm: Normal rate and regular rhythm.      Heart sounds: Normal heart sounds. No murmur heard.    No friction rub. No gallop.   Pulmonary:      Effort: No respiratory distress.      Breath sounds: Normal breath sounds. No wheezing or rales.   Chest:      Chest wall: No " tenderness.   Abdominal:      General: Bowel sounds are normal. There is no distension.      Palpations: Abdomen is soft. There is no mass.      Tenderness: There is no abdominal tenderness. There is no guarding or rebound.      Hernia: No hernia is present.   Musculoskeletal:         General: Normal range of motion.   Skin:     General: Skin is warm and dry.      Coloration: Skin is not pale.      Findings: No erythema or rash.   Neurological:      Mental Status: She is alert and oriented to person, place, and time.   Psychiatric:         Behavior: Behavior normal.         Thought Content: Thought content normal.         Judgment: Judgment normal.          Assessment/Plan   Diagnoses and all orders for this visit:    1. Epigastric pain (Primary)  -     LORazepam (Ativan) 1 MG tablet; Take 1 tablet by mouth Every 8 (Eight) Hours As Needed for Anxiety.  Dispense: 10 tablet; Refill: 0        * Surgery not found *     Diagnosis Plan   1. Epigastric pain  LORazepam (Ativan) 1 MG tablet       Anticipated Surgical Procedure:  No orders of the defined types were placed in this encounter.      The risks, benefits, and alternatives of this procedure have been discussed with the patient or the responsible party- the patient understands and agrees to proceed.

## 2022-03-28 ENCOUNTER — OFFICE VISIT (OUTPATIENT)
Dept: GASTROENTEROLOGY | Facility: CLINIC | Age: 52
End: 2022-03-28

## 2022-03-28 ENCOUNTER — HOSPITAL ENCOUNTER (EMERGENCY)
Facility: HOSPITAL | Age: 52
Discharge: HOME | End: 2022-03-28
Attending: STUDENT IN AN ORGANIZED HEALTH CARE EDUCATION/TRAINING PROGRAM | Admitting: STUDENT IN AN ORGANIZED HEALTH CARE EDUCATION/TRAINING PROGRAM

## 2022-03-28 ENCOUNTER — APPOINTMENT (OUTPATIENT)
Dept: GENERAL RADIOLOGY | Facility: HOSPITAL | Age: 52
End: 2022-03-28

## 2022-03-28 VITALS
OXYGEN SATURATION: 96 % | SYSTOLIC BLOOD PRESSURE: 105 MMHG | HEART RATE: 90 BPM | RESPIRATION RATE: 18 BRPM | WEIGHT: 96 LBS | TEMPERATURE: 97.8 F | BODY MASS INDEX: 15.99 KG/M2 | DIASTOLIC BLOOD PRESSURE: 58 MMHG | HEIGHT: 65 IN

## 2022-03-28 VITALS
BODY MASS INDEX: 16.06 KG/M2 | HEART RATE: 101 BPM | HEIGHT: 65 IN | WEIGHT: 96.4 LBS | SYSTOLIC BLOOD PRESSURE: 96 MMHG | DIASTOLIC BLOOD PRESSURE: 62 MMHG

## 2022-03-28 DIAGNOSIS — J44.1 COPD WITH ACUTE EXACERBATION: Primary | ICD-10-CM

## 2022-03-28 DIAGNOSIS — R10.13 EPIGASTRIC PAIN: Primary | ICD-10-CM

## 2022-03-28 LAB
ALBUMIN SERPL-MCNC: 4.6 G/DL (ref 3.5–5.2)
ALBUMIN/GLOB SERPL: 1.9 G/DL
ALP SERPL-CCNC: 90 U/L (ref 39–117)
ALT SERPL W P-5'-P-CCNC: 15 U/L (ref 1–33)
ANION GAP SERPL CALCULATED.3IONS-SCNC: 15 MMOL/L (ref 5–15)
AST SERPL-CCNC: 16 U/L (ref 1–32)
BASOPHILS # BLD AUTO: 0.07 10*3/MM3 (ref 0–0.2)
BASOPHILS NFR BLD AUTO: 0.5 % (ref 0–1.5)
BILIRUB SERPL-MCNC: 0.6 MG/DL (ref 0–1.2)
BUN SERPL-MCNC: 8 MG/DL (ref 6–20)
BUN/CREAT SERPL: 13.3 (ref 7–25)
CALCIUM SPEC-SCNC: 8.8 MG/DL (ref 8.6–10.5)
CHLORIDE SERPL-SCNC: 102 MMOL/L (ref 98–107)
CO2 SERPL-SCNC: 23 MMOL/L (ref 22–29)
CREAT SERPL-MCNC: 0.6 MG/DL (ref 0.57–1)
D-DIMER, QUANTITATIVE (MAD,POW, STR): <270 NG/ML (FEU) (ref 0–470)
DEPRECATED RDW RBC AUTO: 44.6 FL (ref 37–54)
EGFRCR SERPLBLD CKD-EPI 2021: 106.8 ML/MIN/1.73
EOSINOPHIL # BLD AUTO: 0.18 10*3/MM3 (ref 0–0.4)
EOSINOPHIL NFR BLD AUTO: 1.3 % (ref 0.3–6.2)
ERYTHROCYTE [DISTWIDTH] IN BLOOD BY AUTOMATED COUNT: 12.5 % (ref 12.3–15.4)
FLUAV RNA RESP QL NAA+PROBE: NOT DETECTED
FLUBV RNA RESP QL NAA+PROBE: NOT DETECTED
GLOBULIN UR ELPH-MCNC: 2.4 GM/DL
GLUCOSE SERPL-MCNC: 87 MG/DL (ref 65–99)
HCT VFR BLD AUTO: 39.9 % (ref 34–46.6)
HGB BLD-MCNC: 13.7 G/DL (ref 12–15.9)
HOLD SPECIMEN: NORMAL
IMM GRANULOCYTES # BLD AUTO: 0.08 10*3/MM3 (ref 0–0.05)
IMM GRANULOCYTES NFR BLD AUTO: 0.6 % (ref 0–0.5)
LYMPHOCYTES # BLD AUTO: 2.37 10*3/MM3 (ref 0.7–3.1)
LYMPHOCYTES NFR BLD AUTO: 17.5 % (ref 19.6–45.3)
MCH RBC QN AUTO: 33.1 PG (ref 26.6–33)
MCHC RBC AUTO-ENTMCNC: 34.3 G/DL (ref 31.5–35.7)
MCV RBC AUTO: 96.4 FL (ref 79–97)
MONOCYTES # BLD AUTO: 1.02 10*3/MM3 (ref 0.1–0.9)
MONOCYTES NFR BLD AUTO: 7.5 % (ref 5–12)
NEUTROPHILS NFR BLD AUTO: 72.6 % (ref 42.7–76)
NEUTROPHILS NFR BLD AUTO: 9.86 10*3/MM3 (ref 1.7–7)
NRBC BLD AUTO-RTO: 0 /100 WBC (ref 0–0.2)
NT-PROBNP SERPL-MCNC: 75.1 PG/ML (ref 0–900)
PLATELET # BLD AUTO: 415 10*3/MM3 (ref 140–450)
PMV BLD AUTO: 9 FL (ref 6–12)
POTASSIUM SERPL-SCNC: 3.5 MMOL/L (ref 3.5–5.2)
PROT SERPL-MCNC: 7 G/DL (ref 6–8.5)
RBC # BLD AUTO: 4.14 10*6/MM3 (ref 3.77–5.28)
SARS-COV-2 RNA RESP QL NAA+PROBE: NOT DETECTED
SODIUM SERPL-SCNC: 140 MMOL/L (ref 136–145)
TROPONIN T SERPL-MCNC: <0.01 NG/ML (ref 0–0.03)
WBC NRBC COR # BLD: 13.58 10*3/MM3 (ref 3.4–10.8)

## 2022-03-28 PROCEDURE — 96374 THER/PROPH/DIAG INJ IV PUSH: CPT

## 2022-03-28 PROCEDURE — 93005 ELECTROCARDIOGRAM TRACING: CPT | Performed by: PHYSICIAN ASSISTANT

## 2022-03-28 PROCEDURE — 84484 ASSAY OF TROPONIN QUANT: CPT | Performed by: PHYSICIAN ASSISTANT

## 2022-03-28 PROCEDURE — 94640 AIRWAY INHALATION TREATMENT: CPT

## 2022-03-28 PROCEDURE — 85379 FIBRIN DEGRADATION QUANT: CPT | Performed by: PHYSICIAN ASSISTANT

## 2022-03-28 PROCEDURE — 99283 EMERGENCY DEPT VISIT LOW MDM: CPT

## 2022-03-28 PROCEDURE — 85025 COMPLETE CBC W/AUTO DIFF WBC: CPT | Performed by: PHYSICIAN ASSISTANT

## 2022-03-28 PROCEDURE — 83880 ASSAY OF NATRIURETIC PEPTIDE: CPT | Performed by: PHYSICIAN ASSISTANT

## 2022-03-28 PROCEDURE — 93010 ELECTROCARDIOGRAM REPORT: CPT | Performed by: INTERNAL MEDICINE

## 2022-03-28 PROCEDURE — 99214 OFFICE O/P EST MOD 30 MIN: CPT | Performed by: INTERNAL MEDICINE

## 2022-03-28 PROCEDURE — 25010000002 METHYLPREDNISOLONE PER 125 MG: Performed by: PHYSICIAN ASSISTANT

## 2022-03-28 PROCEDURE — 80053 COMPREHEN METABOLIC PANEL: CPT | Performed by: PHYSICIAN ASSISTANT

## 2022-03-28 PROCEDURE — 94799 UNLISTED PULMONARY SVC/PX: CPT

## 2022-03-28 PROCEDURE — 87636 SARSCOV2 & INF A&B AMP PRB: CPT | Performed by: PHYSICIAN ASSISTANT

## 2022-03-28 PROCEDURE — 71045 X-RAY EXAM CHEST 1 VIEW: CPT

## 2022-03-28 RX ORDER — DICYCLOMINE HYDROCHLORIDE 10 MG/1
10 CAPSULE ORAL
Qty: 120 CAPSULE | Refills: 4 | Status: SHIPPED | OUTPATIENT
Start: 2022-03-28 | End: 2022-04-27

## 2022-03-28 RX ORDER — IPRATROPIUM BROMIDE AND ALBUTEROL SULFATE 2.5; .5 MG/3ML; MG/3ML
3 SOLUTION RESPIRATORY (INHALATION)
Status: ACTIVE | OUTPATIENT
Start: 2022-03-28 | End: 2022-03-28

## 2022-03-28 RX ORDER — SODIUM CHLORIDE 0.9 % (FLUSH) 0.9 %
10 SYRINGE (ML) INJECTION AS NEEDED
Status: CANCELLED | OUTPATIENT
Start: 2022-03-28

## 2022-03-28 RX ORDER — PREDNISONE 20 MG/1
20 TABLET ORAL 2 TIMES DAILY
Qty: 10 TABLET | Refills: 0 | Status: SHIPPED | OUTPATIENT
Start: 2022-03-28 | End: 2022-03-31 | Stop reason: ALTCHOICE

## 2022-03-28 RX ORDER — METHYLPREDNISOLONE SODIUM SUCCINATE 125 MG/2ML
125 INJECTION, POWDER, LYOPHILIZED, FOR SOLUTION INTRAMUSCULAR; INTRAVENOUS ONCE
Status: COMPLETED | OUTPATIENT
Start: 2022-03-28 | End: 2022-03-28

## 2022-03-28 RX ORDER — CEPHALEXIN 500 MG/1
500 CAPSULE ORAL 2 TIMES DAILY
Qty: 14 CAPSULE | Refills: 0 | Status: SHIPPED | OUTPATIENT
Start: 2022-03-28 | End: 2022-03-31

## 2022-03-28 RX ORDER — SODIUM CHLORIDE 0.9 % (FLUSH) 0.9 %
10 SYRINGE (ML) INJECTION AS NEEDED
Status: DISCONTINUED | OUTPATIENT
Start: 2022-03-28 | End: 2022-03-28 | Stop reason: HOSPADM

## 2022-03-28 RX ADMIN — IPRATROPIUM BROMIDE AND ALBUTEROL SULFATE 3 ML: 2.5; .5 SOLUTION RESPIRATORY (INHALATION) at 13:02

## 2022-03-28 RX ADMIN — IPRATROPIUM BROMIDE AND ALBUTEROL SULFATE 3 ML: 2.5; .5 SOLUTION RESPIRATORY (INHALATION) at 12:57

## 2022-03-28 RX ADMIN — METHYLPREDNISOLONE SODIUM SUCCINATE 125 MG: 125 INJECTION, POWDER, FOR SOLUTION INTRAMUSCULAR; INTRAVENOUS at 12:25

## 2022-03-28 NOTE — PROGRESS NOTES
Chief Complaint   Patient presents with   • possible ERCP     REF dr. Powell        Subjective    Carla Patricia Richard is a 52 y.o. female.    History of Present Illness  Patient presented to the GI clinic for follow-up visit today.  Seen by Dr. Powell 2 weeks ago.  Has intermittent bouts of epigastric pain radiating to the back and left upper quadrant for past several months.  Has nausea and denied vomiting.  Bowel movements are regular.  Weight is stable.  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  Had CBD stent removal subsequently.  Most recent LFTs are normal.  CT abdomen and pelvis was unremarkable at [facility] per patient.  Details are not available at this time.  She denied alcohol usage.  Taking Prilosec daily.  Taking Bentyl as needed.       The following portions of the patient's history were reviewed and updated as appropriate:   Past Medical History:   Diagnosis Date   • Asthma    • Cancer (HCC)     cervical   • Colon polyp    • COPD (chronic obstructive pulmonary disease) (HCC)    • Crohn's disease (HCC)    • Diverticulitis of colon    • Elevated cholesterol    • Essential hypertension 4/15/2020   • GERD (gastroesophageal reflux disease)    • History of transfusion    • Irritable bowel syndrome    • Kidney calculus    • Pancreatitis    • Sphincter of Oddi dysfunction      Past Surgical History:   Procedure Laterality Date   • BACK SURGERY      C5-6   • COLONOSCOPY     • COLONOSCOPY N/A 10/18/2019    Procedure: COLONOSCOPY;  Surgeon: Tom Powell MD;  Location: [facility] ENDOSCOPY;  Service: Gastroenterology   • COLONOSCOPY N/A 8/27/2020    Procedure: COLONOSCOPY;  Surgeon: Tom Powell MD;  Location: [facility] ENDOSCOPY;  Service: Gastroenterology;  Laterality: N/A;   • COLONOSCOPY N/A 9/29/2021    Procedure: COLONOSCOPY;  Surgeon: Tom Powell MD;  Location: [facility] ENDOSCOPY;  Service: Gastroenterology;  Laterality: N/A;   • ENDOSCOPY N/A  10/18/2019    Procedure: ESOPHAGOGASTRODUODENOSCOPY;  Surgeon: Tom Davis MD;  Location: [facility] ENDOSCOPY;  Service: Gastroenterology   • ENDOSCOPY N/A 7/27/2020    Procedure: ESOPHAGOGASTRODUODENOSCOPY;  Surgeon: Tom Davis MD;  Location: [facility] ENDOSCOPY;  Service: Gastroenterology;  Laterality: N/A;   • ENDOSCOPY N/A 2/10/2021    Procedure: ESOPHAGOGASTRODUODENOSCOPY;  Surgeon: Tom Davis MD;  Location: [facility] ENDOSCOPY;  Service: Gastroenterology;  Laterality: N/A;   • HYSTERECTOMY     • SHOULDER ARTHROSCOPY W/ ROTATOR CUFF REPAIR Right    • TOE SURGERY      left small toe    • TONSILECTOMY, ADENOIDECTOMY, BILATERAL MYRINGOTOMY AND TUBES     • TONSILLECTOMY     • UPPER GASTROINTESTINAL ENDOSCOPY  02/10/2021     Family History   Problem Relation Age of Onset   • Inflammatory bowel disease Mother    • Arthritis Mother    • Diabetes Mother    • Hypertension Mother    • Hyperlipidemia Mother    • Obesity Mother    • Osteoporosis Mother    • Heart disease Father    • Hyperlipidemia Father    • Heart attack Father    • Diabetes Sister    • Liver disease Daughter    • Mental illness Daughter    • Obesity Daughter    • Diabetes Maternal Grandmother    • Cancer Maternal Grandmother         lung   • Cancer Other         lung   • Heart disease Other      OB History    No obstetric history on file.       Prior to Admission medications    Medication Sig Start Date End Date Taking? Authorizing Provider   albuterol sulfate  (90 Base) MCG/ACT inhaler INHALE TWO PUFFS EVERY 4 HOURS AS NEEDED for WHEEZING 3/11/22  Yes Xavier Wick MD   benzonatate (TESSALON) 100 MG capsule Take 1 capsule by mouth 3 (Three) Times a Day As Needed for Cough. 1/11/22  Yes Xander Krause PA-C   Black Cohosh 200 MG capsule Take 200 mg by mouth Daily. 8/25/21  Yes Xavier Wick MD   cephalexin (KEFLEX) 500 MG capsule Take 1 capsule by mouth 2 (Two) Times a Day for 7 days. 3/28/22 4/4/22 Yes Xander Krause  YUVAL   cetirizine (zyrTEC) 10 MG tablet Take 1 tablet by mouth Daily. 9/25/19  Yes Iliana Jacobson MD   Cyanocobalamin (B-12 Compliance Injection) 1000 MCG/ML kit Inject 1 mL as directed Every 28 (Twenty-Eight) Days. 6/29/21  Yes Ramirez Burger MD   cyclobenzaprine (FLEXERIL) 5 MG tablet Take 1 tablet by mouth 3 (Three) Times a Day As Needed (hip pain). Do not drive while taking 6/25/21  Yes Xavier Wick MD   fluticasone (FLONASE) 50 MCG/ACT nasal spray 2 sprays into the nostril(s) as directed by provider Daily. 10/28/20  Yes Iliana Jacobson MD   folic acid (FOLVITE) 1 MG tablet Take 1 tablet by mouth Daily. 12/7/21  Yes Ramirez Burger MD   ipratropium-albuterol (DUO-NEB) 0.5-2.5 mg/3 ml nebulizer Take 3 mL by nebulization 4 (Four) Times a Day. 1/10/22  Yes Talha Payton MD   LORazepam (Ativan) 1 MG tablet Take 1 tablet by mouth Every 8 (Eight) Hours As Needed for Anxiety. 3/25/22  Yes Tom Davis MD   [facility] Natural Products (Airborne Elderberry) chewable tablet Chew 1 tablet Daily. 10/7/21  Yes Xavier Wick MD   montelukast (SINGULAIR) 10 MG tablet Take 1 tablet by mouth Every Night. 10/17/19  Yes Xavier Wick MD   omeprazole (priLOSEC) 20 MG capsule Take 1 capsule by mouth Daily. 6/23/20  Yes Xavier Wick MD   ondansetron ODT (ZOFRAN-ODT) 8 MG disintegrating tablet TAKE 1 TABLET BY MOUTH EVERY 8 (EIGHT) HOURS AS NEEDED FOR NAUSEA OR VOMITING. 3/3/22  Yes Xavier Wick MD   PARoxetine (Paxil) 10 MG tablet Take 1 tablet by mouth Every Morning. 8/25/21  Yes Xavier Wick MD   Plecanatide (Trulance) 3 MG tablet Take 1 tablet by mouth Daily. 10/15/21  Yes Tom Davis MD   predniSONE (DELTASONE) 20 MG tablet Take 1 tablet by mouth 2 (Two) Times a Day for 5 days. 3/28/22 4/2/22 Yes Xander Krause PA-C   traMADol (ULTRAM) 50 MG tablet Take 50 mg by mouth Every 6 (Six) Hours As Needed.   Yes Provider, MD Byron   umeclidinium-vilanterol (ANORO ELLIPTA) 62.5-25 MCG/INH  aerosol powder  inhaler Inhale 1 puff Daily. 10/28/20  Yes Iliana Jacobson MD   vitamin D (ERGOCALCIFEROL) 1.25 MG (25663 UT) capsule capsule Take 1 capsule by mouth 1 (One) Time Per Week. 4/15/21  Yes Xavier Wick MD   zinc gluconate 50 MG tablet Take 1 tablet by mouth Daily. 10/7/21  Yes Xavier Wick MD   dicyclomine (Bentyl) 10 MG capsule Take 1 capsule by mouth 4 (Four) Times a Day Before Meals & at Bedtime for 30 days. 3/28/22 4/27/22  Elvin Vo MD   predniSONE (DELTASONE) 10 MG tablet 4 tabs po daily x 4 days, then 3 tabs daily x 3 days, then 2 tabs daily x 2 days, then 1 tab po daily x 1 day 10/1/21 3/28/22  Niles Roldan APRN     Allergies   Allergen Reactions   • Nsaids Swelling and GI Intolerance   • Azithromycin Swelling   • Ciprofloxacin Hives   • Doxycycline Swelling   • Other Other (See Comments)     nicotine patch   Caused body twitching/seizures   • Levofloxacin Rash   • Phenergan [Promethazine Hcl] GI Intolerance     Social History     Socioeconomic History   • Marital status:    Tobacco Use   • Smoking status: Current Every Day Smoker     Packs/day: 1.00     Years: 40.00     Pack years: 40.00     Types: Cigarettes   • Smokeless tobacco: Never Used   Vaping Use   • Vaping Use: Former   • Substances: Nicotine   • Devices: Disposable, Refillable tank   Substance and Sexual Activity   • Alcohol use: No   • Drug use: Yes     Types: Marijuana     Comment: 09/16/2021 - Patient states she utilizes Marijuana nightly to increase appetite and decrease nausea.   • Sexual activity: Defer       Review of Systems  Review of Systems   Constitutional: Negative for chills, fatigue, fever and unexpected weight change.   HENT: Negative for congestion, ear discharge, hearing loss, nosebleeds and sore throat.    Eyes: Negative for pain, discharge and redness.   Respiratory: Negative for cough, chest tightness, shortness of breath and wheezing.    Cardiovascular: Negative for chest pain and  "palpitations.   Gastrointestinal: Positive for abdominal pain and nausea. Negative for abdominal distention, blood in stool, constipation, diarrhea and vomiting.   Endocrine: Negative for cold intolerance, polydipsia, polyphagia and polyuria.   Genitourinary: Negative for dysuria, flank pain, frequency, hematuria and urgency.   Musculoskeletal: Negative for arthralgias, back pain, joint swelling and myalgias.   Skin: Negative for color change, pallor and rash.   Neurological: Negative for tremors, seizures, syncope, weakness and headaches.   Hematological: Negative for adenopathy. Does not bruise/bleed easily.   Psychiatric/Behavioral: Negative for behavioral problems, confusion, dysphoric mood, hallucinations and suicidal ideas. The patient is not nervous/anxious.         BP 96/62 (BP Location: Left arm)   Pulse 101   Ht 165.1 cm (65\")   Wt 43.7 kg (96 lb 6.4 oz)   LMP  (LMP Unknown)   BMI 16.04 kg/m²     Objective    Physical Exam  Constitutional:       Appearance: She is well-developed.   HENT:      Head: Normocephalic and atraumatic.   Eyes:      Conjunctiva/sclera: Conjunctivae normal.      Pupils: Pupils are equal, round, and reactive to light.   Neck:      Thyroid: No thyromegaly.   Cardiovascular:      Rate and Rhythm: Normal rate and regular rhythm.      Heart sounds: Normal heart sounds. No murmur heard.  Pulmonary:      Effort: Pulmonary effort is normal.      Breath sounds: Normal breath sounds. No wheezing.   Abdominal:      General: Bowel sounds are normal. There is no distension.      Palpations: Abdomen is soft. There is no mass.      Tenderness: There is no abdominal tenderness.      Hernia: No hernia is present.   Genitourinary:     Comments: No lesions noted  Musculoskeletal:         General: No tenderness. Normal range of motion.      Cervical back: Normal range of motion and neck supple.   Lymphadenopathy:      Cervical: No cervical adenopathy.   Skin:     General: Skin is warm and dry.     "  Findings: No rash.   Neurological:      Mental Status: She is alert and oriented to person, place, and time.      Cranial Nerves: No cranial nerve deficit.   Psychiatric:         Thought Content: Thought content normal.       Admission on 03/28/2022, Discharged on 03/28/2022   Component Date Value Ref Range Status   • Glucose 03/28/2022 87  65 - 99 mg/dL Final   • BUN 03/28/2022 8  6 - 20 mg/dL Final   • Creatinine 03/28/2022 0.60  0.57 - 1.00 mg/dL Final   • Sodium 03/28/2022 140  136 - 145 mmol/L Final   • Potassium 03/28/2022 3.5  3.5 - 5.2 mmol/L Final   • Chloride 03/28/2022 102  98 - 107 mmol/L Final   • CO2 03/28/2022 23.0  22.0 - 29.0 mmol/L Final   • Calcium 03/28/2022 8.8  8.6 - 10.5 mg/dL Final   • Total Protein 03/28/2022 7.0  6.0 - 8.5 g/dL Final   • Albumin 03/28/2022 4.60  3.50 - 5.20 g/dL Final   • ALT (SGPT) 03/28/2022 15  1 - 33 U/L Final   • AST (SGOT) 03/28/2022 16  1 - 32 U/L Final   • Alkaline Phosphatase 03/28/2022 90  39 - 117 U/L Final   • Total Bilirubin 03/28/2022 0.6  0.0 - 1.2 mg/dL Final   • Globulin 03/28/2022 2.4  gm/dL Final   • A/G Ratio 03/28/2022 1.9  g/dL Final   • BUN/Creatinine Ratio 03/28/2022 13.3  7.0 - 25.0 Final   • Anion Gap 03/28/2022 15.0  5.0 - 15.0 mmol/L Final   • eGFR 03/28/2022 106.8  >60.0 mL/min/1.73 Final    National Kidney Foundation and American Society of Nephrology (ASN) Task Force recommended calculation based on the Chronic Kidney Disease Epidemiology Collaboration (CKD-EPI) equation refit without adjustment for race.   • D-Dimer, Quantitative 03/28/2022 <270  0 - 470 ng/mL (FEU) Final   • Troponin T 03/28/2022 <0.010  0.000 - 0.030 ng/mL Final   • proBNP 03/28/2022 75.1  0.0 - 900.0 pg/mL Final   • COVID19 03/28/2022 Not Detected  Not Detected - Ref. Range Final   • Influenza A PCR 03/28/2022 Not Detected  Not Detected Final   • Influenza B PCR 03/28/2022 Not Detected  Not Detected Final   • WBC 03/28/2022 13.58 (A) 3.40 - 10.80 10*3/mm3 Final   • RBC  03/28/2022 4.14  3.77 - 5.28 10*6/mm3 Final   • Hemoglobin 03/28/2022 13.7  12.0 - 15.9 g/dL Final   • Hematocrit 03/28/2022 39.9  34.0 - 46.6 % Final   • MCV 03/28/2022 96.4  79.0 - 97.0 fL Final   • MCH 03/28/2022 33.1 (A) 26.6 - 33.0 pg Final   • MCHC 03/28/2022 34.3  31.5 - 35.7 g/dL Final   • RDW 03/28/2022 12.5  12.3 - 15.4 % Final   • RDW-SD 03/28/2022 44.6  37.0 - 54.0 fl Final   • MPV 03/28/2022 9.0  6.0 - 12.0 fL Final   • Platelets 03/28/2022 415  140 - 450 10*3/mm3 Final   • Neutrophil % 03/28/2022 72.6  42.7 - 76.0 % Final   • Lymphocyte % 03/28/2022 17.5 (A) 19.6 - 45.3 % Final   • Monocyte % 03/28/2022 7.5  5.0 - 12.0 % Final   • Eosinophil % 03/28/2022 1.3  0.3 - 6.2 % Final   • Basophil % 03/28/2022 0.5  0.0 - 1.5 % Final   • Immature Grans % 03/28/2022 0.6 (A) 0.0 - 0.5 % Final   • Neutrophils, Absolute 03/28/2022 9.86 (A) 1.70 - 7.00 10*3/mm3 Final   • Lymphocytes, Absolute 03/28/2022 2.37  0.70 - 3.10 10*3/mm3 Final   • Monocytes, Absolute 03/28/2022 1.02 (A) 0.10 - 0.90 10*3/mm3 Final   • Eosinophils, Absolute 03/28/2022 0.18  0.00 - 0.40 10*3/mm3 Final   • Basophils, Absolute 03/28/2022 0.07  0.00 - 0.20 10*3/mm3 Final   • Immature Grans, Absolute 03/28/2022 0.08 (A) 0.00 - 0.05 10*3/mm3 Final   • nRBC 03/28/2022 0.0  0.0 - 0.2 /100 WBC Final   • Extra Tube 03/28/2022 Hold for add-ons.   Final    Auto resulted.     Assessment/Plan      1. Epigastric pain    1.  Epigastric pain with nausea, likely due to gastritis.  Could also be due to peptic ulcer disease, chronic pancreatitis, marijuana induced functional abdominal pain and sphincter Oddi dysfunction.  LFTs are normal.  Obtain right upper quadrant sonogram to assess the common bile duct.  Change Bentyl to 4 times daily.  EGD if right upper quadrant sonogram is unremarkable for further evaluation.  Obtain stool study for pancreatic elastase level to rule out chronic pancreatitis.  2.  Low BMI, add nutritional supplements.  3.  Tobacco and  marijuana usage, recommend cessation.    Orders placed during this encounter include:  Orders Placed This Encounter   Procedures   • US Liver     Standing Status:   Future     Standing Expiration Date:   3/28/2023     Order Specific Question:   Reason for Exam:     Answer:   epigastric pain     Order Specific Question:   Release to patient     Answer:   Immediate   • Pancreatic Elastase, Fecal - Stool, Per Rectum     Standing Status:   Future     Standing Expiration Date:   3/28/2023     Order Specific Question:   Release to patient     Answer:   Immediate       * Surgery not found *    Review and/or summary of lab tests, radiology, procedures, medications. Review and summary of old records and obtaining of history. The risks and benefits of my recommendations, as well as other treatment options were discussed with the patient today. Questions were answered.    New Medications Ordered This Visit   Medications   • dicyclomine (Bentyl) 10 MG capsule     Sig: Take 1 capsule by mouth 4 (Four) Times a Day Before Meals & at Bedtime for 30 days.     Dispense:  120 capsule     Refill:  4       Follow-up: Return in about 1 month (around 4/28/2022).               Results for orders placed or performed during the hospital encounter of 03/28/22   [facility]   Result Value Ref Range    Extra Tube Hold for add-ons.    COVID-19 and FLU A/B PCR - Swab, Nasopharynx    Specimen: Nasopharynx; Swab   Result Value Ref Range    COVID19 Not Detected Not Detected - Ref. Range    Influenza A PCR Not Detected Not Detected    Influenza B PCR Not Detected Not Detected   CBC Auto Differential    Specimen: Blood   Result Value Ref Range    WBC 13.58 (H) 3.40 - 10.80 10*3/mm3    RBC 4.14 3.77 - 5.28 10*6/mm3    Hemoglobin 13.7 12.0 - 15.9 g/dL    Hematocrit 39.9 34.0 - 46.6 %    MCV 96.4 79.0 - 97.0 fL    MCH 33.1 (H) 26.6 - 33.0 pg    MCHC 34.3 31.5 - 35.7 g/dL    RDW 12.5 12.3 - 15.4 %    RDW-SD 44.6 37.0 - 54.0 fl    MPV 9.0 6.0 - 12.0 fL     Platelets 415 140 - 450 10*3/mm3    Neutrophil % 72.6 42.7 - 76.0 %    Lymphocyte % 17.5 (L) 19.6 - 45.3 %    Monocyte % 7.5 5.0 - 12.0 %    Eosinophil % 1.3 0.3 - 6.2 %    Basophil % 0.5 0.0 - 1.5 %    Immature Grans % 0.6 (H) 0.0 - 0.5 %    Neutrophils, Absolute 9.86 (H) 1.70 - 7.00 10*3/mm3    Lymphocytes, Absolute 2.37 0.70 - 3.10 10*3/mm3    Monocytes, Absolute 1.02 (H) 0.10 - 0.90 10*3/mm3    Eosinophils, Absolute 0.18 0.00 - 0.40 10*3/mm3    Basophils, Absolute 0.07 0.00 - 0.20 10*3/mm3    Immature Grans, Absolute 0.08 (H) 0.00 - 0.05 10*3/mm3    nRBC 0.0 0.0 - 0.2 /100 WBC   Troponin    Specimen: Blood   Result Value Ref Range    Troponin T <0.010 0.000 - 0.030 ng/mL   D-dimer, Quantitative    Specimen: Blood   Result Value Ref Range    D-Dimer, Quantitative <270 0 - 470 ng/mL (FEU)   BNP    Specimen: Blood   Result Value Ref Range    proBNP 75.1 0.0 - 900.0 pg/mL   Comprehensive Metabolic Panel    Specimen: Blood   Result Value Ref Range    Glucose 87 65 - 99 mg/dL    BUN 8 6 - 20 mg/dL    Creatinine 0.60 0.57 - 1.00 mg/dL    Sodium 140 136 - 145 mmol/L    Potassium 3.5 3.5 - 5.2 mmol/L    Chloride 102 98 - 107 mmol/L    CO2 23.0 22.0 - 29.0 mmol/L    Calcium 8.8 8.6 - 10.5 mg/dL    Total Protein 7.0 6.0 - 8.5 g/dL    Albumin 4.60 3.50 - 5.20 g/dL    ALT (SGPT) 15 1 - 33 U/L    AST (SGOT) 16 1 - 32 U/L    Alkaline Phosphatase 90 39 - 117 U/L    Total Bilirubin 0.6 0.0 - 1.2 mg/dL    Globulin 2.4 gm/dL    A/G Ratio 1.9 g/dL    BUN/Creatinine Ratio 13.3 7.0 - 25.0    Anion Gap 15.0 5.0 - 15.0 mmol/L    eGFR 106.8 >60.0 mL/min/1.73   Results for orders placed or performed in visit on 03/07/22   CBC Auto Differential    Specimen: Blood   Result Value Ref Range    WBC 14.49 (H) 3.40 - 10.80 10*3/mm3    RBC 4.33 3.77 - 5.28 10*6/mm3    Hemoglobin 14.6 12.0 - 15.9 g/dL    Hematocrit 43.3 34.0 - 46.6 %    .0 (H) 79.0 - 97.0 fL    MCH 33.7 (H) 26.6 - 33.0 pg    MCHC 33.7 31.5 - 35.7 g/dL    RDW 13.2 12.3 -  15.4 %    RDW-SD 49.1 37.0 - 54.0 fl    MPV 9.0 6.0 - 12.0 fL    Platelets 433 140 - 450 10*3/mm3    Neutrophil % 65.8 42.7 - 76.0 %    Lymphocyte % 21.9 19.6 - 45.3 %    Monocyte % 9.0 5.0 - 12.0 %    Eosinophil % 1.7 0.3 - 6.2 %    Basophil % 0.8 0.0 - 1.5 %    Immature Grans % 0.8 (H) 0.0 - 0.5 %    Neutrophils, Absolute 9.55 (H) 1.70 - 7.00 10*3/mm3    Lymphocytes, Absolute 3.17 (H) 0.70 - 3.10 10*3/mm3    Monocytes, Absolute 1.30 (H) 0.10 - 0.90 10*3/mm3    Eosinophils, Absolute 0.25 0.00 - 0.40 10*3/mm3    Basophils, Absolute 0.11 0.00 - 0.20 10*3/mm3    Immature Grans, Absolute 0.11 (H) 0.00 - 0.05 10*3/mm3    nRBC 0.0 0.0 - 0.2 /100 WBC   Iron and TIBC    Specimen: Blood   Result Value Ref Range    Iron 101 37 - 145 mcg/dL    Iron Saturation 31 20 - 50 %    Transferrin 219 200 - 360 mg/dL    TIBC 326 298 - 536 mcg/dL   Folate    Specimen: Blood   Result Value Ref Range    Folate 3.20 (L) 4.78 - 24.20 ng/mL   Ferritin    Specimen: Blood   Result Value Ref Range    Ferritin 384.60 (H) 13.00 - 150.00 ng/mL   Vitamin B12    Specimen: Blood   Result Value Ref Range    Vitamin B-12 712 211 - 946 pg/mL   Results for orders placed or performed during the hospital encounter of 01/11/22   COVID-19 and FLU A/B PCR - Swab, Nasopharynx    Specimen: Nasopharynx; Swab   Result Value Ref Range    COVID19 Not Detected Not Detected - Ref. Range    Influenza A PCR Not Detected Not Detected    Influenza B PCR Not Detected Not Detected   CBC Auto Differential    Specimen: Blood   Result Value Ref Range    WBC 15.66 (H) 3.40 - 10.80 10*3/mm3    RBC 4.40 3.77 - 5.28 10*6/mm3    Hemoglobin 14.2 12.0 - 15.9 g/dL    Hematocrit 42.5 34.0 - 46.6 %    MCV 96.6 79.0 - 97.0 fL    MCH 32.3 26.6 - 33.0 pg    MCHC 33.4 31.5 - 35.7 g/dL    RDW 13.1 12.3 - 15.4 %    RDW-SD 47.0 37.0 - 54.0 fl    MPV 9.4 6.0 - 12.0 fL    Platelets 316 140 - 450 10*3/mm3    Neutrophil % 69.8 42.7 - 76.0 %    Lymphocyte % 19.8 19.6 - 45.3 %    Monocyte % 8.2 5.0  - 12.0 %    Eosinophil % 1.0 0.3 - 6.2 %    Basophil % 0.8 0.0 - 1.5 %    Immature Grans % 0.4 0.0 - 0.5 %    Neutrophils, Absolute 10.92 (H) 1.70 - 7.00 10*3/mm3    Lymphocytes, Absolute 3.10 0.70 - 3.10 10*3/mm3    Monocytes, Absolute 1.29 (H) 0.10 - 0.90 10*3/mm3    Eosinophils, Absolute 0.16 0.00 - 0.40 10*3/mm3    Basophils, Absolute 0.12 0.00 - 0.20 10*3/mm3    Immature Grans, Absolute 0.07 (H) 0.00 - 0.05 10*3/mm3    nRBC 0.0 0.0 - 0.2 /100 WBC     *Note: Due to a large number of results and/or encounters for the requested time period, some results have not been displayed. A complete set of results can be found in Results Review.         This document has been electronically signed by Elvin Vo MD on March 28, 2022 15:42 CDT

## 2022-03-31 ENCOUNTER — OFFICE VISIT (OUTPATIENT)
Dept: FAMILY MEDICINE CLINIC | Facility: CLINIC | Age: 52
End: 2022-03-31

## 2022-03-31 VITALS
SYSTOLIC BLOOD PRESSURE: 140 MMHG | HEIGHT: 65 IN | OXYGEN SATURATION: 99 % | DIASTOLIC BLOOD PRESSURE: 80 MMHG | TEMPERATURE: 98 F | BODY MASS INDEX: 15.99 KG/M2 | HEART RATE: 102 BPM | WEIGHT: 96 LBS

## 2022-03-31 DIAGNOSIS — E55.9 VITAMIN D DEFICIENCY: Chronic | ICD-10-CM

## 2022-03-31 DIAGNOSIS — J44.1 COPD WITH EXACERBATION: Primary | Chronic | ICD-10-CM

## 2022-03-31 DIAGNOSIS — J30.89 CHRONIC NONSEASONAL ALLERGIC RHINITIS DUE TO POLLEN: Chronic | ICD-10-CM

## 2022-03-31 PROCEDURE — 99214 OFFICE O/P EST MOD 30 MIN: CPT | Performed by: NURSE PRACTITIONER

## 2022-03-31 RX ORDER — MONTELUKAST SODIUM 10 MG/1
10 TABLET ORAL NIGHTLY
Qty: 30 TABLET | Refills: 2
Start: 2022-03-31 | End: 2022-04-25

## 2022-03-31 RX ORDER — CETIRIZINE HYDROCHLORIDE 10 MG/1
10 TABLET ORAL DAILY
Qty: 30 TABLET | Refills: 2 | Status: SHIPPED | OUTPATIENT
Start: 2022-03-31

## 2022-03-31 RX ORDER — AMOXICILLIN 875 MG/1
875 TABLET, COATED ORAL 2 TIMES DAILY
Qty: 20 TABLET | Refills: 0 | Status: SHIPPED | OUTPATIENT
Start: 2022-03-31 | End: 2022-04-25

## 2022-03-31 RX ORDER — ERGOCALCIFEROL 1.25 MG/1
50000 CAPSULE ORAL WEEKLY
Qty: 5 CAPSULE | Refills: 2 | Status: SHIPPED | OUTPATIENT
Start: 2022-03-31 | End: 2023-02-08 | Stop reason: SDUPTHER

## 2022-03-31 RX ORDER — PREDNISONE 10 MG/1
TABLET ORAL
Qty: 30 TABLET | Refills: 0 | Status: SHIPPED | OUTPATIENT
Start: 2022-03-31 | End: 2022-04-25

## 2022-03-31 NOTE — PROGRESS NOTES
"Chief Complaint  Illness (Cough, colored drainage, temp)    Subjective          Carla Richard presents to [facility] PRIMARY [facility] - [facility]    PCP: Dr. Wick    CC: \"cough\"    Here for ER follow up.  Seen at [facility] on 3---ER notes/testing reviewed.  Dx with COPD exacerbation.  Started on Keflex and Prednisone.  Reports she cannot take either medications--Keflex causes severe GI distress--pain/cramping and diarrhea, was only able to take two doses.  Reports this should have already been on her allergy list, but is not.  Was given Prednisone 20 mg BID x 5 days, but reports she did not start as she can only take the 10 mg pills and taper down or she will be awake for days.  Has had low grade fever, purulent sputum, increased dyspnea.  Using inhalers/nebs.  Continues to smoke, not ready to quit due to stress at this time.  Sees pulmonology.      Also requesting refills of Vit D, Singulair, Zyrtec until PCP back in office.  Last Vit D level was June 2021.        Illness  This is a chronic (chronic COPD with exacerbation ) problem. The current episode started in the past 7 days. The problem occurs daily. The problem has been unchanged. Associated symptoms include abdominal pain (seeing Gastro, was worsened by Keflex, but some better today), anorexia, a change in bowel habit (diarrhea yesterday, none today), chest pain (tightness), congestion (chest), coughing (productive, purulent sputum), fatigue, a fever (low grade), myalgias, a sore throat and swollen glands. Pertinent negatives include no chills, diaphoresis, headaches, joint swelling, nausea, neck pain, numbness, rash, urinary symptoms, vertigo, visual change, vomiting or weakness. The symptoms are aggravated by walking and smoking. Treatments tried: inhalers/nebulizers; keflex and prednisone in ER which she is no longer taking. The treatment provided no relief.       Review of Systems " "  Constitutional: Positive for appetite change, fatigue and fever (low grade). Negative for chills and diaphoresis.   HENT: Positive for congestion (chest), postnasal drip and sore throat. Negative for ear discharge, ear pain, rhinorrhea, sinus pressure, sinus pain, sneezing and trouble swallowing.    Eyes: Negative.    Respiratory: Positive for cough (productive, purulent sputum), chest tightness, shortness of breath and wheezing.    Cardiovascular: Positive for chest pain (tightness). Negative for palpitations and leg swelling.   Gastrointestinal: Positive for abdominal pain (seeing Gastro, was worsened by Keflex, but some better today), anorexia, change in bowel habit (diarrhea yesterday, none today) and diarrhea (yesterday after taking Keflex, better today). Negative for nausea and vomiting.        Seeing gastro for persistent abdominal pain   Genitourinary: Negative.    Musculoskeletal: Positive for myalgias. Negative for joint swelling and neck pain.   Skin: Negative.  Negative for rash.   Neurological: Negative for dizziness, vertigo, weakness, numbness and headaches.        Objective   Vital Signs:   /80 (BP Location: Left arm, Patient Position: Sitting)   Pulse 102   Temp 98 °F (36.7 °C)   Ht 165.1 cm (65\")   Wt 43.5 kg (96 lb)   SpO2 99%   BMI 15.98 kg/m²       Physical Exam  Vitals and nursing note reviewed.   Constitutional:       General: She is not in acute distress.     Appearance: She is ill-appearing.   HENT:      Head: Normocephalic and atraumatic.      Right Ear: Tympanic membrane and ear canal normal.      Left Ear: Tympanic membrane and ear canal normal.      Nose: Nose normal. No congestion.      Mouth/Throat:      Pharynx: Oropharynx is clear. Posterior oropharyngeal erythema ( mild injection with PND) present. No oropharyngeal exudate.   Eyes:      General:         Right eye: No discharge.         Left eye: No discharge.      Conjunctiva/sclera: Conjunctivae normal. "   Cardiovascular:      Rate and Rhythm: Normal rate and regular rhythm.   Pulmonary:      Effort: Tachypnea (at times) present.      Breath sounds: Decreased air movement present. Examination of the right-upper field reveals decreased breath sounds. Examination of the left-upper field reveals decreased breath sounds. Examination of the right-middle field reveals decreased breath sounds. Examination of the left-middle field reveals decreased breath sounds. Examination of the right-lower field reveals decreased breath sounds. Examination of the left-lower field reveals decreased breath sounds. Decreased breath sounds, wheezing (with cough) and rhonchi (a few scattered) present. No rales.   Abdominal:      General: Bowel sounds are normal.      Palpations: Abdomen is soft.      Tenderness: There is abdominal tenderness (mostly LUQ).   Musculoskeletal:      Cervical back: Neck supple. No tenderness.   Lymphadenopathy:      Cervical: No cervical adenopathy.   Skin:     General: Skin is warm and dry.   Neurological:      General: No focal deficit present.      Mental Status: She is alert.   Psychiatric:         Attention and Perception: Attention normal.         Mood and Affect: Mood is anxious.         Behavior: Behavior is cooperative.          Result Review :              Recent Results (from the past 200 hour(s))   Comprehensive Metabolic Panel    Collection Time: 03/28/22 11:21 AM    Specimen: Blood   Result Value Ref Range    Glucose 87 65 - 99 mg/dL    BUN 8 6 - 20 mg/dL    Creatinine 0.60 0.57 - 1.00 mg/dL    Sodium 140 136 - 145 mmol/L    Potassium 3.5 3.5 - 5.2 mmol/L    Chloride 102 98 - 107 mmol/L    CO2 23.0 22.0 - 29.0 mmol/L    Calcium 8.8 8.6 - 10.5 mg/dL    Total Protein 7.0 6.0 - 8.5 g/dL    Albumin 4.60 3.50 - 5.20 g/dL    ALT (SGPT) 15 1 - 33 U/L    AST (SGOT) 16 1 - 32 U/L    Alkaline Phosphatase 90 39 - 117 U/L    Total Bilirubin 0.6 0.0 - 1.2 mg/dL    Globulin 2.4 gm/dL    A/G Ratio 1.9 g/dL     BUN/Creatinine Ratio 13.3 7.0 - 25.0    Anion Gap 15.0 5.0 - 15.0 mmol/L    eGFR 106.8 >60.0 mL/min/1.73   D-dimer, Quantitative    Collection Time: 03/28/22 11:21 AM    Specimen: Blood   Result Value Ref Range    D-Dimer, Quantitative <270 0 - 470 ng/mL (FEU)   Troponin    Collection Time: 03/28/22 11:21 AM    Specimen: Blood   Result Value Ref Range    Troponin T <0.010 0.000 - 0.030 ng/mL   BNP    Collection Time: 03/28/22 11:21 AM    Specimen: Blood   Result Value Ref Range    proBNP 75.1 0.0 - 900.0 pg/mL   COVID-19 and FLU A/B PCR - Swab, Nasopharynx    Collection Time: 03/28/22 11:21 AM    Specimen: Nasopharynx; Swab   Result Value Ref Range    COVID19 Not Detected Not Detected - Ref. Range    Influenza A PCR Not Detected Not Detected    Influenza B PCR Not Detected Not Detected   CBC Auto Differential    Collection Time: 03/28/22 11:21 AM    Specimen: Blood   Result Value Ref Range    WBC 13.58 (H) 3.40 - 10.80 10*3/mm3    RBC 4.14 3.77 - 5.28 10*6/mm3    Hemoglobin 13.7 12.0 - 15.9 g/dL    Hematocrit 39.9 34.0 - 46.6 %    MCV 96.4 79.0 - 97.0 fL    MCH 33.1 (H) 26.6 - 33.0 pg    MCHC 34.3 31.5 - 35.7 g/dL    RDW 12.5 12.3 - 15.4 %    RDW-SD 44.6 37.0 - 54.0 fl    MPV 9.0 6.0 - 12.0 fL    Platelets 415 140 - 450 10*3/mm3    Neutrophil % 72.6 42.7 - 76.0 %    Lymphocyte % 17.5 (L) 19.6 - 45.3 %    Monocyte % 7.5 5.0 - 12.0 %    Eosinophil % 1.3 0.3 - 6.2 %    Basophil % 0.5 0.0 - 1.5 %    Immature Grans % 0.6 (H) 0.0 - 0.5 %    Neutrophils, Absolute 9.86 (H) 1.70 - 7.00 10*3/mm3    Lymphocytes, Absolute 2.37 0.70 - 3.10 10*3/mm3    Monocytes, Absolute 1.02 (H) 0.10 - 0.90 10*3/mm3    Eosinophils, Absolute 0.18 0.00 - 0.40 10*3/mm3    Basophils, Absolute 0.07 0.00 - 0.20 10*3/mm3    Immature Grans, Absolute 0.08 (H) 0.00 - 0.05 10*3/mm3    nRBC 0.0 0.0 - 0.2 /100 WBC   Gold Top - SST    Collection Time: 03/28/22 11:28 AM   Result Value Ref Range    Extra Tube Hold for add-ons.      XR Chest 1 View    Result  Date: 3/28/2022  Stable chest with no acute disease. [id number] Electronically signed by:  Rambo Lainez MD  3/28/2022 11:52 AM CDT Workstation: [id number]       Assessment and Plan    Diagnoses and all orders for this visit:    1. COPD with exacerbation (HCC) (Primary)  -     predniSONE (DELTASONE) 10 MG tablet; 4 tabs po daily x 4 days, 3 tabs po daily x 3 days, 2 tabs po daily x 2 daily , 1 tab po daily x 1 days  Dispense: 30 tablet; Refill: 0  -     amoxicillin (AMOXIL) 875 MG tablet; Take 1 tablet by mouth 2 (Two) Times a Day.  Dispense: 20 tablet; Refill: 0    2. Chronic nonseasonal allergic rhinitis due to pollen  -     montelukast (Singulair) 10 MG tablet; Take 1 tablet by mouth Every Night.  Dispense: 30 tablet; Refill: 2  -     cetirizine (zyrTEC) 10 MG tablet; Take 1 tablet by mouth Daily.  Dispense: 30 tablet; Refill: 2    3. Vitamin D deficiency   -     vitamin D (ERGOCALCIFEROL) 1.25 MG (14871 UT) capsule capsule; Take 1 capsule by mouth 1 (One) Time Per Week.  Dispense: 5 capsule; Refill: 2      List Keflex as allergy.   Rx for Amoxil--has had previously without problems.  Will take with probiotics.    D/C previous Rx for prednisone from ER.  New Rx of Prednisone 10 mg with taper given.   Continue with inhalers/nebulizers   Tylenol as needed  Smoking cessation encouraged, not ready to quit, counseling given    See PCP or RTC if symptoms persist/worsen  See PCP for routine f/u visit and management of chronic medical conditions  Keep scheduled f/u with pulmonology for COPD management      This document has been electronically signed by CROW Padgett on March 31, 2022 11:51 CDT,.    I spent 30 minutes caring for Carla on this date of service. This time includes time spent by me in the following activities:preparing for the visit, reviewing tests, performing a medically appropriate examination and/or evaluation , counseling and educating the patient/family/caregiver, ordering medications, tests, or  procedures and documenting information in the medical record

## 2022-04-02 LAB
QT INTERVAL: 362 MS
QTC INTERVAL: 435 MS

## 2022-04-07 ENCOUNTER — APPOINTMENT (OUTPATIENT)
Dept: ULTRASOUND IMAGING | Facility: HOSPITAL | Age: 52
End: 2022-04-07

## 2022-04-25 ENCOUNTER — OFFICE VISIT (OUTPATIENT)
Dept: PULMONOLOGY | Facility: CLINIC | Age: 52
End: 2022-04-25

## 2022-04-25 VITALS
WEIGHT: 99 LBS | HEIGHT: 65 IN | SYSTOLIC BLOOD PRESSURE: 108 MMHG | OXYGEN SATURATION: 98 % | HEART RATE: 92 BPM | BODY MASS INDEX: 16.5 KG/M2 | DIASTOLIC BLOOD PRESSURE: 68 MMHG

## 2022-04-25 DIAGNOSIS — F17.219 CIGARETTE NICOTINE DEPENDENCE WITH NICOTINE-INDUCED DISORDER: ICD-10-CM

## 2022-04-25 DIAGNOSIS — J44.9 CHRONIC OBSTRUCTIVE PULMONARY DISEASE, UNSPECIFIED COPD TYPE: Primary | ICD-10-CM

## 2022-04-25 PROBLEM — J45.909 ASTHMA: Status: RESOLVED | Noted: 2020-12-01 | Resolved: 2022-04-25

## 2022-04-25 PROBLEM — J44.1 COPD EXACERBATION (HCC): Status: RESOLVED | Noted: 2019-08-29 | Resolved: 2022-04-25

## 2022-04-25 PROBLEM — Z72.0 TOBACCO USER: Status: RESOLVED | Noted: 2019-07-15 | Resolved: 2022-04-25

## 2022-04-25 PROBLEM — Z71.6 TOBACCO ABUSE COUNSELING: Status: RESOLVED | Noted: 2019-07-15 | Resolved: 2022-04-25

## 2022-04-25 PROCEDURE — 99214 OFFICE O/P EST MOD 30 MIN: CPT | Performed by: INTERNAL MEDICINE

## 2022-04-25 RX ORDER — IPRATROPIUM BROMIDE AND ALBUTEROL SULFATE 2.5; .5 MG/3ML; MG/3ML
3 SOLUTION RESPIRATORY (INHALATION) EVERY 4 HOURS PRN
Qty: 360 ML | Refills: 11 | Status: SHIPPED | OUTPATIENT
Start: 2022-04-25

## 2022-04-25 RX ORDER — MONTELUKAST SODIUM 10 MG/1
10 TABLET ORAL NIGHTLY
Qty: 30 TABLET | Refills: 11 | Status: SHIPPED | OUTPATIENT
Start: 2022-04-25

## 2022-04-25 RX ORDER — BUDESONIDE AND FORMOTEROL FUMARATE DIHYDRATE 160; 4.5 UG/1; UG/1
2 AEROSOL RESPIRATORY (INHALATION)
Qty: 1 EACH | Refills: 11 | Status: SHIPPED | OUTPATIENT
Start: 2022-04-25

## 2022-04-25 NOTE — PROGRESS NOTES
Pulmonary Consultation    No ref. provider found,    Thank you for asking me to see Carla Richard for   Chief Complaint   Patient presents with   • COPD     Chief Complaint    .      History of Present Illness  Carla Richard is a 52 y.o. female     Patient has previously been seen for COPD, last appt was Dec 2020. After that she says she went to [place] but can't really tell me what they did there.    Patient currently has a rescue inhaler, and duonebs but denies being on any controller medicines in over a year  She reports being diagnosed with COVID in January 2022. She was not hospitalized and did not require any supplemental O2. She was given prednisone but that was it.    She has previously been on Symbicort and then was changed to Anoro for unclear reasons (maybe insurance?)      Tobacco use history:  Type: cigarettes  Amount: 1/2 ppd  Duration: 40 years  Cessation: has quit during pregnancy   Willing to quit: Yes      Review of Systems: History obtained from chart review and the patient.  Review of Systems  As described in the HPI. Otherwise, remainder of ROS (14 systems) were negative.    Patient Active Problem List   Diagnosis   • Chronic idiopathic constipation   • B12 deficiency   • Vitamin D deficiency    • Stage 2 moderate COPD by GOLD classification ([facility])   • Cervical lymphadenopathy   • Generalized abdominal pain   • Nausea   • Neurological abnormality   • Tremor   • RUQ pain   • Pertussis exposure   • Chronic nonseasonal allergic rhinitis due to pollen   • Episode of recurrent major depressive disorder ([facility])   • Diarrhea   • Chronic diarrhea   • Thrombocytosis   • Nausea and vomiting   • Gluten intolerance   • C. difficile diarrhea   • Shiga toxin-producing Escherichia coli infection   • Generalized weakness   • Head injury   • Concussion with loss of consciousness   • Gastritis without bleeding   • Irritable bowel syndrome   • Tachycardia   • JELENA (generalized anxiety disorder)    • Marijuana use   • Epigastric pain   • History of Clostridioides difficile colitis   • Celiac disease   • Upper respiratory tract infection   • Irritable bowel syndrome with both constipation and diarrhea   • Moderate protein-calorie malnutrition (HCC)   • Folliculitis   • Itchy scalp   • Tinea capitis   • Chronic interstitial cystitis   • Fibromyalgia   • Gastric reflux   • Kidney stone   • Lymphocytic colitis   • Microscopic hematuria   • Obstruction of bile duct   • Throat pain   • Strain of right hip   • Gastroesophageal reflux disease with esophagitis without hemorrhage   • Cigarette nicotine dependence with nicotine-induced disorder   • Underweight   • Iron deficiency   • Leukocytosis   • History of cervical cancer   • Chronic obstructive pulmonary disease (HCC)   • Vasomotor symptoms due to menopause   • Change in bowel habits   • Other constipation   • Close exposure to COVID-19 virus   • Folate deficiency   • Adverse effect of iron         Current Outpatient Medications:   •  albuterol sulfate  (90 Base) MCG/ACT inhaler, INHALE TWO PUFFS EVERY 4 HOURS AS NEEDED for WHEEZING, Disp: 18 g, Rfl: 1  •  cetirizine (zyrTEC) 10 MG tablet, Take 1 tablet by mouth Daily., Disp: 30 tablet, Rfl: 2  •  Cyanocobalamin (B-12 Compliance Injection) 1000 MCG/ML kit, Inject 1 mL as directed Every 28 (Twenty-Eight) Days., Disp: 1 kit, Rfl: 5  •  cyclobenzaprine (FLEXERIL) 5 MG tablet, Take 1 tablet by mouth 3 (Three) Times a Day As Needed (hip pain). Do not drive while taking, Disp: 30 tablet, Rfl: 1  •  dicyclomine (Bentyl) 10 MG capsule, Take 1 capsule by mouth 4 (Four) Times a Day Before Meals & at Bedtime for 30 days., Disp: 120 capsule, Rfl: 4  •  fluticasone (FLONASE) 50 MCG/ACT nasal spray, 2 sprays into the nostril(s) as directed by provider Daily., Disp: 1 bottle, Rfl: 5  •  folic acid (FOLVITE) 1 MG tablet, Take 1 tablet by mouth Daily., Disp: 90 tablet, Rfl: 1  •  ipratropium-albuterol (DUO-NEB) 0.5-2.5  mg/3 ml nebulizer, Take 3 mL by nebulization 4 (Four) Times a Day., Disp: 180 mL, Rfl: 0  •  montelukast (Singulair) 10 MG tablet, Take 1 tablet by mouth Every Night., Disp: 30 tablet, Rfl: 2  •  omeprazole (priLOSEC) 20 MG capsule, Take 1 capsule by mouth Daily., Disp: 30 capsule, Rfl: 3  •  ondansetron ODT (ZOFRAN-ODT) 8 MG disintegrating tablet, TAKE 1 TABLET BY MOUTH EVERY 8 (EIGHT) HOURS AS NEEDED FOR NAUSEA OR VOMITING., Disp: 90 tablet, Rfl: 0  •  Plecanatide (Trulance) 3 MG tablet, Take 1 tablet by mouth Daily., Disp: 90 tablet, Rfl: 5  •  traMADol (ULTRAM) 50 MG tablet, Take 50 mg by mouth Every 6 (Six) Hours As Needed., Disp: , Rfl:   •  umeclidinium-vilanterol (ANORO ELLIPTA) 62.5-25 MCG/INH aerosol powder  inhaler, Inhale 1 puff Daily., Disp: 1 each, Rfl: 5  •  vitamin D (ERGOCALCIFEROL) 1.25 MG (77625 UT) capsule capsule, Take 1 capsule by mouth 1 (One) Time Per Week., Disp: 5 capsule, Rfl: 2  •  amoxicillin (AMOXIL) 875 MG tablet, Take 1 tablet by mouth 2 (Two) Times a Day., Disp: 20 tablet, Rfl: 0  •  Black Cohosh 200 MG capsule, Take 200 mg by mouth Daily., Disp: 30 each, Rfl: 3  •  LORazepam (Ativan) 1 MG tablet, Take 1 tablet by mouth Every 8 (Eight) Hours As Needed for Anxiety., Disp: 10 tablet, Rfl: 0  •  Misc Natural Products (Airborne Elderberry) chewable tablet, Chew 1 tablet Daily., Disp: 30 tablet, Rfl: 3  •  PARoxetine (Paxil) 10 MG tablet, Take 1 tablet by mouth Every Morning., Disp: 30 tablet, Rfl: 3  •  predniSONE (DELTASONE) 10 MG tablet, 4 tabs po daily x 4 days, 3 tabs po daily x 3 days, 2 tabs po daily x 2 daily , 1 tab po daily x 1 days, Disp: 30 tablet, Rfl: 0  •  zinc gluconate 50 MG tablet, Take 1 tablet by mouth Daily., Disp: 30 tablet, Rfl: 3    Allergies   Allergen Reactions   • Nsaids Swelling and GI Intolerance   • Azithromycin Swelling   • Ciprofloxacin Hives   • Doxycycline Swelling   • Keflex [Cephalexin] Diarrhea and Other (See Comments)     Abdominal pain   •  Amoxicillin Other (See Comments)     Vomitting, stomach swells, diarrhea , extreme stomach pain   • Other Other (See Comments)     nicotine patch   Caused body twitching/seizures   • Levofloxacin Rash   • Phenergan [Promethazine Hcl] GI Intolerance       Past Medical History:   Diagnosis Date   • Asthma    • Cancer (HCC)     cervical   • Colon polyp    • COPD (chronic obstructive pulmonary disease) (HCC)    • Crohn's disease (HCC)    • Diverticulitis of colon    • Elevated cholesterol    • Essential hypertension 4/15/2020   • GERD (gastroesophageal reflux disease)    • History of transfusion    • Irritable bowel syndrome    • Kidney calculus    • Pancreatitis    • Sphincter of Oddi dysfunction      Past Surgical History:   Procedure Laterality Date   • BACK SURGERY      C5-6   • COLONOSCOPY     • COLONOSCOPY N/A 10/18/2019    Procedure: COLONOSCOPY;  Surgeon: Tom Davis MD;  Location: [facility] ENDOSCOPY;  Service: Gastroenterology   • COLONOSCOPY N/A 8/27/2020    Procedure: COLONOSCOPY;  Surgeon: Tom Davis MD;  Location: [facility] ENDOSCOPY;  Service: Gastroenterology;  Laterality: N/A;   • COLONOSCOPY N/A 9/29/2021    Procedure: COLONOSCOPY;  Surgeon: Tom Davis MD;  Location: [facility] ENDOSCOPY;  Service: Gastroenterology;  Laterality: N/A;   • ENDOSCOPY N/A 10/18/2019    Procedure: ESOPHAGOGASTRODUODENOSCOPY;  Surgeon: Tom Davis MD;  Location: [facility] ENDOSCOPY;  Service: Gastroenterology   • ENDOSCOPY N/A 7/27/2020    Procedure: ESOPHAGOGASTRODUODENOSCOPY;  Surgeon: Tom Davis MD;  Location: [facility] ENDOSCOPY;  Service: Gastroenterology;  Laterality: N/A;   • ENDOSCOPY N/A 2/10/2021    Procedure: ESOPHAGOGASTRODUODENOSCOPY;  Surgeon: Tom Davis MD;  Location: [facility] ENDOSCOPY;  Service: Gastroenterology;  Laterality: N/A;   • HYSTERECTOMY     • SHOULDER ARTHROSCOPY W/ ROTATOR CUFF REPAIR Right    • TOE SURGERY      left small toe    • TONSILECTOMY, ADENOIDECTOMY, BILATERAL  "MYRINGOTOMY AND TUBES     • TONSILLECTOMY     • UPPER GASTROINTESTINAL ENDOSCOPY  02/10/2021     Social History     Socioeconomic History   • Marital status:    Tobacco Use   • Smoking status: Current Every Day Smoker     Packs/day: 1.00     Years: 40.00     Pack years: 40.00     Types: Cigarettes   • Smokeless tobacco: Never Used   Vaping Use   • Vaping Use: Former   • Substances: Nicotine   • Devices: Disposable, Refillable tank   Substance and Sexual Activity   • Alcohol use: No   • Drug use: Yes     Types: Marijuana     Comment: 09/16/2021 - Patient states she utilizes Marijuana nightly to increase appetite and decrease nausea.   • Sexual activity: Defer     Family History   Problem Relation Age of Onset   • Inflammatory bowel disease Mother    • Arthritis Mother    • Diabetes Mother    • Hypertension Mother    • Hyperlipidemia Mother    • Obesity Mother    • Osteoporosis Mother    • Heart disease Father    • Hyperlipidemia Father    • Heart attack Father    • Diabetes Sister    • Liver disease Daughter    • Mental illness Daughter    • Obesity Daughter    • Diabetes Maternal Grandmother    • Cancer Maternal Grandmother         lung   • Cancer Other         lung   • Heart disease Other           Objective     Height 165.1 cm (65\"), weight 44.9 kg (99 lb), not currently breastfeeding.  Physical Exam  Vitals reviewed.   Constitutional:       Appearance: Normal appearance. She is underweight.   HENT:      Head: Normocephalic and atraumatic.      Nose: Nose normal.      Mouth/Throat:      Mouth: Mucous membranes are moist.      Pharynx: Oropharynx is clear.   Eyes:      Conjunctiva/sclera: Conjunctivae normal.      Pupils: Pupils are equal, round, and reactive to light.   Cardiovascular:      Rate and Rhythm: Normal rate and regular rhythm.      Pulses: Normal pulses.      Heart sounds: Normal heart sounds.   Pulmonary:      Effort: Pulmonary effort is normal.      Breath sounds: Normal breath sounds. "   Abdominal:      General: Abdomen is flat. Bowel sounds are normal.      Palpations: Abdomen is soft.   Musculoskeletal:         General: Normal range of motion.      Cervical back: Normal range of motion.   Skin:     General: Skin is warm and dry.   Neurological:      General: No focal deficit present.      Mental Status: She is alert and oriented to person, place, and time.   Psychiatric:         Mood and Affect: Mood normal.         Behavior: Behavior normal.         PFTs:  (independently reviewed and interpreted by me)  10/28/2020  FVC 2.82L, 79%  FEV1 1.79L, 63%  Ratio 64%  TLC 4.58L, 88%  %  DLCO 56%  Moderate obstructive pattern, normal lung volume with evidence of air trapping, moderate diffusion impairment    Radiology (independently reviewed and interpreted by me):   CT chest 6/2021- emphysema, no concerning nodules or masses, no effusions. Small area of tree in bud in the NILSA       Assessment/Plan     Diagnoses and all orders for this visit:    1. Chronic obstructive pulmonary disease, unspecified COPD type (HCC) (Primary)    2. Cigarette nicotine dependence with nicotine-induced disorder         Discussion/ Recommendations:   Patient with moderate COPD on last PFTs, continues to smoke and recently had COVID infection. She is not on any maintanace meds and that's part of the problem. Will restart Symbicort and see how she does. She understands she needs to work on smoking cessation and she is willing to. Needs updated PFTs. Next CT due in June    -Symbicort 160 2 puffs bid  -Albuteorl hfa prn  -Duonebs prn  -Smoking cesation  -PFTs with follow up  -LDCT in June 2022             No follow-ups on file.      Thank you for allowing me to participate in the care of Carla Richard. Please do not hesitate to contact me with any questions.         This document has been electronically signed by Naina Block DO on April 25, 2022 10:55 CDT

## 2022-05-17 NOTE — PROGRESS NOTES
Subjective:  Carla Richard is a 52 y.o. female who presents for       Patient Active Problem List   Diagnosis   • Chronic idiopathic constipation   • B12 deficiency   • Vitamin D deficiency    • Stage 2 moderate COPD by GOLD classification ([facility])   • Cervical lymphadenopathy   • Generalized abdominal pain   • Nausea   • Neurological abnormality   • Tremor   • RUQ pain   • Pertussis exposure   • Chronic nonseasonal allergic rhinitis due to pollen   • Episode of recurrent major depressive disorder (HCC)   • Diarrhea   • Chronic diarrhea   • Thrombocytosis   • Nausea and vomiting   • Gluten intolerance   • C. difficile diarrhea   • Shiga toxin-producing Escherichia coli infection   • Generalized weakness   • Head injury   • Concussion with loss of consciousness   • Gastritis without bleeding   • Irritable bowel syndrome   • Tachycardia   • JELENA (generalized anxiety disorder)   • Marijuana use   • Epigastric pain   • History of Clostridioides difficile colitis   • Celiac disease   • Upper respiratory tract infection   • Irritable bowel syndrome with both constipation and diarrhea   • Moderate protein-calorie malnutrition (HCC)   • Folliculitis   • Itchy scalp   • Tinea capitis   • Chronic interstitial cystitis   • Fibromyalgia   • Gastric reflux   • Kidney stone   • Lymphocytic colitis   • Microscopic hematuria   • Obstruction of bile duct   • Throat pain   • Strain of right hip   • Gastroesophageal reflux disease with esophagitis without hemorrhage   • Cigarette nicotine dependence with nicotine-induced disorder   • Underweight   • Iron deficiency   • Leukocytosis   • History of cervical cancer   • Chronic obstructive pulmonary disease (HCC)   • Vasomotor symptoms due to menopause   • Change in bowel habits   • Other constipation   • Close exposure to COVID-19 virus   • Folate deficiency   • Adverse effect of iron           Current Outpatient Medications:   •  albuterol sulfate  (90 Base) MCG/ACT  inhaler, INHALE TWO PUFFS EVERY 4 HOURS AS NEEDED for WHEEZING, Disp: 18 g, Rfl: 1  •  budesonide-formoterol (SYMBICORT) 160-4.5 MCG/ACT inhaler, Inhale 2 puffs 2 (Two) Times a Day., Disp: 1 each, Rfl: 11  •  cetirizine (zyrTEC) 10 MG tablet, Take 1 tablet by mouth Daily., Disp: 30 tablet, Rfl: 2  •  Cyanocobalamin (B-12 Compliance Injection) 1000 MCG/ML kit, Inject 1 mL as directed Every 28 (Twenty-Eight) Days., Disp: 1 kit, Rfl: 5  •  cyclobenzaprine (FLEXERIL) 5 MG tablet, Take 1 tablet by mouth 3 (Three) Times a Day As Needed (hip pain). Do not drive while taking, Disp: 30 tablet, Rfl: 1  •  fluticasone (FLONASE) 50 MCG/ACT nasal spray, 2 sprays into the nostril(s) as directed by provider Daily., Disp: 1 bottle, Rfl: 5  •  folic acid (FOLVITE) 1 MG tablet, Take 1 tablet by mouth Daily., Disp: 90 tablet, Rfl: 1  •  ipratropium-albuterol (DUO-NEB) 0.5-2.5 mg/3 ml nebulizer, Take 3 mL by nebulization 4 (Four) Times a Day., Disp: 180 mL, Rfl: 0  •  ipratropium-albuterol (DUO-NEB) 0.5-2.5 mg/3 ml nebulizer, Take 3 mL by nebulization Every 4 (Four) Hours As Needed for Wheezing or Shortness of Air., Disp: 360 mL, Rfl: 11  •  montelukast (SINGULAIR) 10 MG tablet, Take 1 tablet by mouth Every Night., Disp: 30 tablet, Rfl: 11  •  omeprazole (priLOSEC) 20 MG capsule, Take 1 capsule by mouth Daily., Disp: 30 capsule, Rfl: 3  •  ondansetron ODT (ZOFRAN-ODT) 8 MG disintegrating tablet, TAKE 1 TABLET BY MOUTH EVERY 8 (EIGHT) HOURS AS NEEDED FOR NAUSEA OR VOMITING., Disp: 90 tablet, Rfl: 0  •  PARoxetine (Paxil) 10 MG tablet, Take 1 tablet by mouth Every Morning., Disp: 30 tablet, Rfl: 3  •  Plecanatide (Trulance) 3 MG tablet, Take 1 tablet by mouth Daily., Disp: 90 tablet, Rfl: 5  •  traMADol (ULTRAM) 50 MG tablet, Take 50 mg by mouth Every 6 (Six) Hours As Needed., Disp: , Rfl:   •  vitamin D (ERGOCALCIFEROL) 1.25 MG (84559 UT) capsule capsule, Take 1 capsule by mouth 1 (One) Time Per Week., Disp: 5 capsule, Rfl: 2     Pt is  52 yo female with history of moderate COPD, GERD with esophagitis, gastritis, hiatal hernia chronic idiopathic constipation  Vitamin B12 deficiency, Vitamin D deficiency, chronic abdominal pain, tremor,  RUQ pain,  Sp hysterectomy,  History of crohn's  disease, history of pertussis infection  sp right shoulder surgery. X 3, sp rotator cuff repair , history of C diff diarrhea, gluten sensitivity, gastritis, marijuana use, history of cervical cancer.  iron deficiency, history of COVID-19 vaccination         10/7/21 telemedicine visit for recheck no pt's above medical issue. Pt agreed to telemedicine visit today.  Pt saw GI on 9/16/21 and has upcoming endoscopy on 9/29/21.  Pt's family recently tested positive for COVID-19.  She has been having berathing issues.  She has  Mostly cough and congestions. She is trying to get monoclonal antibodies through health department.  She continues to  Smoke She is not on tapering dose of prednisone.  She is using duoneb, Anoro. She denies any fever.      5/24/22 in office visit for recheck. Pt has been to [facility] ER several times on 1/11/22 for COPD exacerbation and given  Prednisone, doxycycline and tessalon perles. She also went again to [facility] ER for COPD exacerabtion on 3/28/22 and given prednisone and keflex.   She has been receiving iron infusion therapy and following up with Hematology regarding her iron deficiency and leukocytosis. Saw GI on 3/28/22 for her epigastric pain. And RUQ US was ordered bentyl was changed to QID and EGD was considered if US was unremarkable. US of liver has yet to be completed. A pancreatic elastase was ordered. She went to walk in clinic on 3/31/22 for COPD exacerbation and given prednisone taperind dose and amoxicillin. She saw Pulmonology on 4/25/22 for her COPD and was restarted on symbicort. Albuterol. She  States she  Has had presyncopal episodes that started about a month ago. She could be laying in bed and feel lightheaded/dizzy.  She also has  had tinnitus and some hearing loss. She continues her medications for her GI issues and for her COPD      Dizziness  This is a new problem. The current episode started more than 1 year ago. The problem occurs constantly. The problem has been unchanged. Associated symptoms include abdominal pain, arthralgias, coughing, fatigue, numbness and weakness. Pertinent negatives include no chest pain, chills, congestion, diaphoresis, fever, headaches, nausea, sore throat or vomiting. Nothing aggravates the symptoms. She has tried nothing for the symptoms. The treatment provided no relief.    Heartburn  She complains of abdominal pain, chest pain and heartburn. She reports no belching, no choking, no coughing, no dysphagia, no early satiety, no globus sensation, no hoarse voice, no nausea, no sore throat, no stridor, no tooth decay or no wheezing. This is a chronic problem. The current episode started more than 1 year ago. The problem occurs constantly. The problem has been waxing and waning. The heartburn is of moderate intensity. The heartburn does not wake her from sleep. The heartburn does not limit her activity. The heartburn doesn't change with position. The symptoms are aggravated by stress and smoking. Pertinent negatives include no fatigue, melena, muscle weakness or weight loss. Risk factors include smoking/tobacco exposure. She has tried a PPI for the symptoms. The treatment provided moderate relief. Past procedures include an EGD. Past procedures do not include an abdominal ultrasound, esophageal manometry, esophageal pH monitoring, H. pylori antibody titer or a UGI.   Nicotine Dependence  Presents for follow-up visit. Symptoms are negative for fatigue and sore throat. Her urge triggers include company of smokers. The symptoms have been stable. She smokes 1 pack of cigarettes per day. Compliance with prior treatments has been poor.   COPD  This is a chronic problem. The current episode started more than 1 year ago.  The problem occurs constantly. The problem has been waxing and waning  Associated symptoms include abdominal pain, arthralgias, fatigue, joint swelling, numbness and weakness. Pertinent negatives include no anorexia, change in bowel habit, chest pain, chills, congestion, coughing, diaphoresis, fever, headaches, myalgias, nausea, neck pain, sore throat, swollen glands, urinary symptoms, vertigo, visual change or vomiting. The symptoms are aggravated by smoking. She has tried (combivent  symbicort anoro, albuterol inhaler ) for the symptoms.     Review of Systems  Review of Systems   Constitutional: Positive for activity change and fatigue. Negative for appetite change, chills, diaphoresis and fever.   HENT: Negative for congestion, postnasal drip, rhinorrhea, sinus pressure, sinus pain, sneezing, sore throat, trouble swallowing and voice change.    Respiratory: Positive for cough and shortness of breath. Negative for choking, chest tightness, wheezing and stridor.    Cardiovascular: Negative for chest pain.   Gastrointestinal: Positive for abdominal pain. Negative for diarrhea, nausea and vomiting.   Musculoskeletal: Positive for arthralgias.   Neurological: Positive for dizziness, tremors, weakness and numbness. Negative for headaches.       Patient Active Problem List   Diagnosis   • Chronic idiopathic constipation   • B12 deficiency   • Vitamin D deficiency    • Stage 2 moderate COPD by GOLD classification ([facility])   • Cervical lymphadenopathy   • Generalized abdominal pain   • Nausea   • Neurological abnormality   • Tremor   • RUQ pain   • Pertussis exposure   • Chronic nonseasonal allergic rhinitis due to pollen   • Episode of recurrent major depressive disorder ([facility])   • Diarrhea   • Chronic diarrhea   • Thrombocytosis   • Nausea and vomiting   • Gluten intolerance   • C. difficile diarrhea   • Shiga toxin-producing Escherichia coli infection   • Generalized weakness   • Head injury   • Concussion with loss of  consciousness   • Gastritis without bleeding   • Irritable bowel syndrome   • Tachycardia   • JELENA (generalized anxiety disorder)   • Marijuana use   • Epigastric pain   • History of Clostridioides difficile colitis   • Celiac disease   • Upper respiratory tract infection   • Irritable bowel syndrome with both constipation and diarrhea   • Moderate protein-calorie malnutrition (HCC)   • Folliculitis   • Itchy scalp   • Tinea capitis   • Chronic interstitial cystitis   • Fibromyalgia   • Gastric reflux   • Kidney stone   • Lymphocytic colitis   • Microscopic hematuria   • Obstruction of bile duct   • Throat pain   • Strain of right hip   • Gastroesophageal reflux disease with esophagitis without hemorrhage   • Cigarette nicotine dependence with nicotine-induced disorder   • Underweight   • Iron deficiency   • Leukocytosis   • History of cervical cancer   • Chronic obstructive pulmonary disease (HCC)   • Vasomotor symptoms due to menopause   • Change in bowel habits   • Other constipation   • Close exposure to COVID-19 virus   • Folate deficiency   • Adverse effect of iron     Past Surgical History:   Procedure Laterality Date   • BACK SURGERY      C5-6   • COLONOSCOPY     • COLONOSCOPY N/A 10/18/2019    Procedure: COLONOSCOPY;  Surgeon: Tom Davis MD;  Location: [facility] ENDOSCOPY;  Service: Gastroenterology   • COLONOSCOPY N/A 8/27/2020    Procedure: COLONOSCOPY;  Surgeon: Tom Davis MD;  Location: [facility] ENDOSCOPY;  Service: Gastroenterology;  Laterality: N/A;   • COLONOSCOPY N/A 9/29/2021    Procedure: COLONOSCOPY;  Surgeon: Tom Davis MD;  Location: [facility] ENDOSCOPY;  Service: Gastroenterology;  Laterality: N/A;   • ENDOSCOPY N/A 10/18/2019    Procedure: ESOPHAGOGASTRODUODENOSCOPY;  Surgeon: Tom Davis MD;  Location: [facility] ENDOSCOPY;  Service: Gastroenterology   • ENDOSCOPY N/A 7/27/2020    Procedure: ESOPHAGOGASTRODUODENOSCOPY;  Surgeon: Tom Davis MD;  Location: [facility] ENDOSCOPY;   Service: Gastroenterology;  Laterality: N/A;   • ENDOSCOPY N/A 2/10/2021    Procedure: ESOPHAGOGASTRODUODENOSCOPY;  Surgeon: Tom Davis MD;  Location: [facility] ENDOSCOPY;  Service: Gastroenterology;  Laterality: N/A;   • HYSTERECTOMY     • SHOULDER ARTHROSCOPY W/ ROTATOR CUFF REPAIR Right    • TOE SURGERY      left small toe    • TONSILECTOMY, ADENOIDECTOMY, BILATERAL MYRINGOTOMY AND TUBES     • TONSILLECTOMY     • UPPER GASTROINTESTINAL ENDOSCOPY  02/10/2021     Social History     Socioeconomic History   • Marital status:    Tobacco Use   • Smoking status: Current Every Day Smoker     Packs/day: 1.00     Years: 40.00     Pack years: 40.00     Types: Cigarettes   • Smokeless tobacco: Never Used   Vaping Use   • Vaping Use: Former   • Substances: Nicotine   • Devices: Disposable, Refillable tank   Substance and Sexual Activity   • Alcohol use: No   • Drug use: Yes     Types: Marijuana     Comment: 09/16/2021 - Patient states she utilizes Marijuana nightly to increase appetite and decrease nausea.   • Sexual activity: Defer     Family History   Problem Relation Age of Onset   • Inflammatory bowel disease Mother    • Arthritis Mother    • Diabetes Mother    • Hypertension Mother    • Hyperlipidemia Mother    • Obesity Mother    • Osteoporosis Mother    • Heart disease Father    • Hyperlipidemia Father    • Heart attack Father    • Diabetes Sister    • Liver disease Daughter    • Mental illness Daughter    • Obesity Daughter    • Diabetes Maternal Grandmother    • Cancer Maternal Grandmother         lung   • Cancer Other         lung   • Heart disease Other      Admission on 03/28/2022, Discharged on 03/28/2022   Component Date Value Ref Range Status   • Glucose 03/28/2022 87  65 - 99 mg/dL Final   • BUN 03/28/2022 8  6 - 20 mg/dL Final   • Creatinine 03/28/2022 0.60  0.57 - 1.00 mg/dL Final   • Sodium 03/28/2022 140  136 - 145 mmol/L Final   • Potassium 03/28/2022 3.5  3.5 - 5.2 mmol/L Final   • Chloride  03/28/2022 102  98 - 107 mmol/L Final   • CO2 03/28/2022 23.0  22.0 - 29.0 mmol/L Final   • Calcium 03/28/2022 8.8  8.6 - 10.5 mg/dL Final   • Total Protein 03/28/2022 7.0  6.0 - 8.5 g/dL Final   • Albumin 03/28/2022 4.60  3.50 - 5.20 g/dL Final   • ALT (SGPT) 03/28/2022 15  1 - 33 U/L Final   • AST (SGOT) 03/28/2022 16  1 - 32 U/L Final   • Alkaline Phosphatase 03/28/2022 90  39 - 117 U/L Final   • Total Bilirubin 03/28/2022 0.6  0.0 - 1.2 mg/dL Final   • Globulin 03/28/2022 2.4  gm/dL Final   • A/G Ratio 03/28/2022 1.9  g/dL Final   • BUN/Creatinine Ratio 03/28/2022 13.3  7.0 - 25.0 Final   • Anion Gap 03/28/2022 15.0  5.0 - 15.0 mmol/L Final   • eGFR 03/28/2022 106.8  >60.0 mL/min/1.73 Final    National Kidney Foundation and American Society of Nephrology (ASN) Task Force recommended calculation based on the Chronic Kidney Disease Epidemiology Collaboration (CKD-EPI) equation refit without adjustment for race.   • D-Dimer, Quantitative 03/28/2022 <270  0 - 470 ng/mL (FEU) Final   • Troponin T 03/28/2022 <0.010  0.000 - 0.030 ng/mL Final   • proBNP 03/28/2022 75.1  0.0 - 900.0 pg/mL Final   • COVID19 03/28/2022 Not Detected  Not Detected - Ref. Range Final   • Influenza A PCR 03/28/2022 Not Detected  Not Detected Final   • Influenza B PCR 03/28/2022 Not Detected  Not Detected Final   • QT Interval 03/28/2022 362  ms Final   • QTC Interval 03/28/2022 435  ms Final   • WBC 03/28/2022 13.58 (A) 3.40 - 10.80 10*3/mm3 Final   • RBC 03/28/2022 4.14  3.77 - 5.28 10*6/mm3 Final   • Hemoglobin 03/28/2022 13.7  12.0 - 15.9 g/dL Final   • Hematocrit 03/28/2022 39.9  34.0 - 46.6 % Final   • MCV 03/28/2022 96.4  79.0 - 97.0 fL Final   • MCH 03/28/2022 33.1 (A) 26.6 - 33.0 pg Final   • MCHC 03/28/2022 34.3  31.5 - 35.7 g/dL Final   • RDW 03/28/2022 12.5  12.3 - 15.4 % Final   • RDW-SD 03/28/2022 44.6  37.0 - 54.0 fl Final   • MPV 03/28/2022 9.0  6.0 - 12.0 fL Final   • Platelets 03/28/2022 415  140 - 450 10*3/mm3 Final   •  Neutrophil % 03/28/2022 72.6  42.7 - 76.0 % Final   • Lymphocyte % 03/28/2022 17.5 (A) 19.6 - 45.3 % Final   • Monocyte % 03/28/2022 7.5  5.0 - 12.0 % Final   • Eosinophil % 03/28/2022 1.3  0.3 - 6.2 % Final   • Basophil % 03/28/2022 0.5  0.0 - 1.5 % Final   • Immature Grans % 03/28/2022 0.6 (A) 0.0 - 0.5 % Final   • Neutrophils, Absolute 03/28/2022 9.86 (A) 1.70 - 7.00 10*3/mm3 Final   • Lymphocytes, Absolute 03/28/2022 2.37  0.70 - 3.10 10*3/mm3 Final   • Monocytes, Absolute 03/28/2022 1.02 (A) 0.10 - 0.90 10*3/mm3 Final   • Eosinophils, Absolute 03/28/2022 0.18  0.00 - 0.40 10*3/mm3 Final   • Basophils, Absolute 03/28/2022 0.07  0.00 - 0.20 10*3/mm3 Final   • Immature Grans, Absolute 03/28/2022 0.08 (A) 0.00 - 0.05 10*3/mm3 Final   • nRBC 03/28/2022 0.0  0.0 - 0.2 /100 WBC Final   • Extra Tube 03/28/2022 Hold for add-ons.   Final    Auto resulted.   Lab on 03/07/2022   Component Date Value Ref Range Status   • Iron 03/07/2022 101  37 - 145 mcg/dL Final   • Iron Saturation 03/07/2022 31  20 - 50 % Final   • Transferrin 03/07/2022 219  200 - 360 mg/dL Final   • TIBC 03/07/2022 326  298 - 536 mcg/dL Final   • Ferritin 03/07/2022 384.60 (A) 13.00 - 150.00 ng/mL Final   • Folate 03/07/2022 3.20 (A) 4.78 - 24.20 ng/mL Final   • Vitamin B-12 03/07/2022 712  211 - 946 pg/mL Final   • WBC 03/07/2022 14.49 (A) 3.40 - 10.80 10*3/mm3 Final   • RBC 03/07/2022 4.33  3.77 - 5.28 10*6/mm3 Final   • Hemoglobin 03/07/2022 14.6  12.0 - 15.9 g/dL Final   • Hematocrit 03/07/2022 43.3  34.0 - 46.6 % Final   • MCV 03/07/2022 100.0 (A) 79.0 - 97.0 fL Final   • MCH 03/07/2022 33.7 (A) 26.6 - 33.0 pg Final   • MCHC 03/07/2022 33.7  31.5 - 35.7 g/dL Final   • RDW 03/07/2022 13.2  12.3 - 15.4 % Final   • RDW-SD 03/07/2022 49.1  37.0 - 54.0 fl Final   • MPV 03/07/2022 9.0  6.0 - 12.0 fL Final   • Platelets 03/07/2022 433  140 - 450 10*3/mm3 Final   • Neutrophil % 03/07/2022 65.8  42.7 - 76.0 % Final   • Lymphocyte % 03/07/2022 21.9  19.6 -  45.3 % Final   • Monocyte % 03/07/2022 9.0  5.0 - 12.0 % Final   • Eosinophil % 03/07/2022 1.7  0.3 - 6.2 % Final   • Basophil % 03/07/2022 0.8  0.0 - 1.5 % Final   • Immature Grans % 03/07/2022 0.8 (A) 0.0 - 0.5 % Final   • Neutrophils, Absolute 03/07/2022 9.55 (A) 1.70 - 7.00 10*3/mm3 Final   • Lymphocytes, Absolute 03/07/2022 3.17 (A) 0.70 - 3.10 10*3/mm3 Final   • Monocytes, Absolute 03/07/2022 1.30 (A) 0.10 - 0.90 10*3/mm3 Final   • Eosinophils, Absolute 03/07/2022 0.25  0.00 - 0.40 10*3/mm3 Final   • Basophils, Absolute 03/07/2022 0.11  0.00 - 0.20 10*3/mm3 Final   • Immature Grans, Absolute 03/07/2022 0.11 (A) 0.00 - 0.05 10*3/mm3 Final   • nRBC 03/07/2022 0.0  0.0 - 0.2 /100 WBC Final   Admission on 01/11/2022, Discharged on 01/11/2022   Component Date Value Ref Range Status   • COVID19 01/11/2022 Not Detected  Not Detected - Ref. Range Final   • Influenza A PCR 01/11/2022 Not Detected  Not Detected Final   • Influenza B PCR 01/11/2022 Not Detected  Not Detected Final   • Glucose 01/11/2022 93  65 - 99 mg/dL Final   • BUN 01/11/2022 11  6 - 20 mg/dL Final   • Creatinine 01/11/2022 0.66  0.57 - 1.00 mg/dL Final   • Sodium 01/11/2022 142  136 - 145 mmol/L Final   • Potassium 01/11/2022 4.4  3.5 - 5.2 mmol/L Final   • Chloride 01/11/2022 104  98 - 107 mmol/L Final   • CO2 01/11/2022 26.0  22.0 - 29.0 mmol/L Final   • Calcium 01/11/2022 9.3  8.6 - 10.5 mg/dL Final   • Total Protein 01/11/2022 6.5  6.0 - 8.5 g/dL Final   • Albumin 01/11/2022 4.50  3.50 - 5.20 g/dL Final   • ALT (SGPT) 01/11/2022 9  1 - 33 U/L Final   • AST (SGOT) 01/11/2022 14  1 - 32 U/L Final   • Alkaline Phosphatase 01/11/2022 80  39 - 117 U/L Final   • Total Bilirubin 01/11/2022 0.5  0.0 - 1.2 mg/dL Final   • eGFR Non African Amer 01/11/2022 94  >60 mL/min/1.73 Final   • Globulin 01/11/2022 2.0  gm/dL Final   • A/G Ratio 01/11/2022 2.3  g/dL Final   • BUN/Creatinine Ratio 01/11/2022 16.7  7.0 - 25.0 Final   • Anion Gap 01/11/2022 12.0  5.0 -  15.0 mmol/L Final   • WBC 01/11/2022 15.66 (A) 3.40 - 10.80 10*3/mm3 Final   • RBC 01/11/2022 4.40  3.77 - 5.28 10*6/mm3 Final   • Hemoglobin 01/11/2022 14.2  12.0 - 15.9 g/dL Final   • Hematocrit 01/11/2022 42.5  34.0 - 46.6 % Final   • MCV 01/11/2022 96.6  79.0 - 97.0 fL Final   • MCH 01/11/2022 32.3  26.6 - 33.0 pg Final   • MCHC 01/11/2022 33.4  31.5 - 35.7 g/dL Final   • RDW 01/11/2022 13.1  12.3 - 15.4 % Final   • RDW-SD 01/11/2022 47.0  37.0 - 54.0 fl Final   • MPV 01/11/2022 9.4  6.0 - 12.0 fL Final   • Platelets 01/11/2022 316  140 - 450 10*3/mm3 Final   • Neutrophil % 01/11/2022 69.8  42.7 - 76.0 % Final   • Lymphocyte % 01/11/2022 19.8  19.6 - 45.3 % Final   • Monocyte % 01/11/2022 8.2  5.0 - 12.0 % Final   • Eosinophil % 01/11/2022 1.0  0.3 - 6.2 % Final   • Basophil % 01/11/2022 0.8  0.0 - 1.5 % Final   • Immature Grans % 01/11/2022 0.4  0.0 - 0.5 % Final   • Neutrophils, Absolute 01/11/2022 10.92 (A) 1.70 - 7.00 10*3/mm3 Final   • Lymphocytes, Absolute 01/11/2022 3.10  0.70 - 3.10 10*3/mm3 Final   • Monocytes, Absolute 01/11/2022 1.29 (A) 0.10 - 0.90 10*3/mm3 Final   • Eosinophils, Absolute 01/11/2022 0.16  0.00 - 0.40 10*3/mm3 Final   • Basophils, Absolute 01/11/2022 0.12  0.00 - 0.20 10*3/mm3 Final   • Immature Grans, Absolute 01/11/2022 0.07 (A) 0.00 - 0.05 10*3/mm3 Final   • nRBC 01/11/2022 0.0  0.0 - 0.2 /100 WBC Final   Lab on 12/06/2021   Component Date Value Ref Range Status   • Iron 12/06/2021 54  37 - 145 mcg/dL Final   • Iron Saturation 12/06/2021 14 (A) 20 - 50 % Final   • Transferrin 12/06/2021 258  200 - 360 mg/dL Final   • TIBC 12/06/2021 384  298 - 536 mcg/dL Final   • Ferritin 12/06/2021 69.76  13.00 - 150.00 ng/mL Final   • Folate 12/06/2021 3.00 (A) 4.78 - 24.20 ng/mL Final   • Vitamin B-12 12/06/2021 725  211 - 946 pg/mL Final   • LDH 12/06/2021 166  135 - 214 U/L Final   • WBC 12/06/2021 13.81 (A) 3.40 - 10.80 10*3/mm3 Final   • RBC 12/06/2021 4.25  3.77 - 5.28 10*6/mm3 Final   •  Hemoglobin 12/06/2021 13.9  12.0 - 15.9 g/dL Final   • Hematocrit 12/06/2021 41.5  34.0 - 46.6 % Final   • MCV 12/06/2021 97.6 (A) 79.0 - 97.0 fL Final   • MCH 12/06/2021 32.7  26.6 - 33.0 pg Final   • MCHC 12/06/2021 33.5  31.5 - 35.7 g/dL Final   • RDW 12/06/2021 13.1  12.3 - 15.4 % Final   • RDW-SD 12/06/2021 47.1  37.0 - 54.0 fl Final   • MPV 12/06/2021 8.9  6.0 - 12.0 fL Final   • Platelets 12/06/2021 425  140 - 450 10*3/mm3 Final   • Neutrophil % 12/06/2021 67.6  42.7 - 76.0 % Final   • Lymphocyte % 12/06/2021 22.2  19.6 - 45.3 % Final   • Monocyte % 12/06/2021 6.8  5.0 - 12.0 % Final   • Eosinophil % 12/06/2021 2.0  0.3 - 6.2 % Final   • Basophil % 12/06/2021 0.7  0.0 - 1.5 % Final   • Immature Grans % 12/06/2021 0.7 (A) 0.0 - 0.5 % Final   • Neutrophils, Absolute 12/06/2021 9.34 (A) 1.70 - 7.00 10*3/mm3 Final   • Lymphocytes, Absolute 12/06/2021 3.06  0.70 - 3.10 10*3/mm3 Final   • Monocytes, Absolute 12/06/2021 0.94 (A) 0.10 - 0.90 10*3/mm3 Final   • Eosinophils, Absolute 12/06/2021 0.28  0.00 - 0.40 10*3/mm3 Final   • Basophils, Absolute 12/06/2021 0.10  0.00 - 0.20 10*3/mm3 Final   • Immature Grans, Absolute 12/06/2021 0.09 (A) 0.00 - 0.05 10*3/mm3 Final   • nRBC 12/06/2021 0.0  0.0 - 0.2 /100 WBC Final      XR Chest 1 View  Narrative: PROCEDURE: Single view AP upright portable chest x-ray at 11:06  AM.    INDICATION: Cough and shortness of breath.    COMPARISON: 1/11/2022 chest exam and earlier chest x-rays.    FINDINGS:    Heart, hilar and mediastinal structures are normal.  Pulmonary vascularity normal.  Lungs clear with no acute infiltrate or focal airspace disease.  No pleural effusion.    Couple small anchors in the right humeral head from prior  surgery.  Status post resection distal end right clavicle unchanged from  prior study.  Impression: Stable chest with no acute disease.    [id number]    Electronically signed by:  Rambo Lainez MD  3/28/2022 11:52  AM CDT Workstation:  [id number]    [email or web address]@  Immunization History   Administered Date(s) Administered   • COVID-19 (CHRYSTAL) 03/10/2021, 03/10/2021   • Flu Vaccine Intradermal Quad 18-64YR 10/07/2010, 09/03/2020   • FluLaval/Fluarix/Fluzone >6 11/30/2015, 09/03/2020, 09/25/2021   • Influenza Quad Vaccine (Inpatient) 09/13/2011, 11/08/2016   • Influenza TIV (IM) 10/07/2010   • Influenza, Unspecified 09/03/2020   • Pneumococcal Polysaccharide (PPSV23) 11/30/2015   • Tdap 10/07/2010   • flucelvax quad pfs =>4 YRS 09/19/2019       The following portions of the patient's history were reviewed and updated as appropriate: allergies, current medications, past family history, past medical history, past social history, past surgical history and problem list.        Physical Exam  /74 (BP Location: Right arm, Patient Position: Sitting, Cuff Size: Adult)   Pulse 92   Temp 99.3 °F (37.4 °C)   Ht 165.1 cm (65\")   Wt 44.1 kg (97 lb 3.2 oz)   LMP  (LMP Unknown)   SpO2 97%   BMI 16.17 kg/m²     Physical Exam  Vitals and nursing note reviewed.   Constitutional:       Appearance: She is well-developed. She is not diaphoretic.   HENT:      Head: Normocephalic and atraumatic.      Right Ear: External ear normal.   Eyes:      Conjunctiva/sclera: Conjunctivae normal.      Pupils: Pupils are equal, round, and reactive to light.   Cardiovascular:      Rate and Rhythm: Normal rate and regular rhythm.      Heart sounds: Normal heart sounds. No murmur heard.  Pulmonary:      Effort: Pulmonary effort is normal. No respiratory distress.      Comments: Decreased breath sounds   Abdominal:      General: Bowel sounds are normal. There is no distension.      Palpations: Abdomen is soft.      Tenderness: There is abdominal tenderness.   Musculoskeletal:         General: Tenderness present. No deformity. Normal range of motion.      Cervical back: Normal range of motion and neck supple.   Skin:     General: Skin is warm.      Coloration: Skin is not pale. "      Findings: No erythema or rash.   Neurological:      Mental Status: She is alert and oriented to person, place, and time.      Cranial Nerves: No cranial nerve deficit.   Psychiatric:         Behavior: Behavior normal.         [unfilled]   Diagnosis Plan   1. Abnormal CT of the chest  CT Chest With Contrast   2. Vitamin D deficiency      3. Stage 2 moderate COPD by GOLD classification (formerly [facility])     4. Chronic idiopathic constipation     5. JELENA (generalized anxiety disorder)     6. Episode of recurrent major depressive disorder, unspecified depression episode severity (formerly [facility])     7. Iron deficiency     8. Chronic obstructive pulmonary disease, unspecified COPD type (formerly [facility])     9. Balance problem  Ambulatory Referral to ENT (Otolaryngology)   10. Tinnitus of both ears  Ambulatory Referral to ENT (Otolaryngology)   11. Hearing loss, unspecified hearing loss type, unspecified laterality  Ambulatory Referral to ENT (Otolaryngology)   12. Dizziness  Ambulatory Referral to ENT (Otolaryngology)   13. Screening mammogram, encounter for  Mammo Screening Bilateral With CAD          -balance issues/ear problem/dizziness/tinnitus - refer to ENT   -vasomotor symptoms due to menopause - cannot start hormonal therapy due to history of tobacco use. Start on paxil 10 mg dialy and black cohosh   -recommend mammogram screening - mammogram screening   -vitamin D deficiency -on vitamin  D once a week  -moderate protein malnutrition/underweight  -  recommend  high calorie diet information/ smoking cesation   -leukocytosis/thrombocytosis - Hematology following   -COPD/COPD exacerbation/chronic  bronchitis/abnormal CT of vhest  - Pulmonology following on albuterol nebs and inhalernow   Albuterol PRN. Continue combivent   C.  Advised importance of smoking cessation.. advised pt to start on singulair 10 mg at bedtime. On symbicort . Will repeat of CT of chest from June 2021.   -tobacco user - counseled to quit smoking >5 minutes. She could  not tolerate chantix  urged the importance of quitting smoking.  Insurance would not cover nicotine patch and gum. Recommend pt call [phone] recommend nicotine replacement therapy   -tremor - stop elavil Neurology folowing   -abdominal pain/gastritis/IBS /GERD/gluten sensitivity  - Gastroenterology following.stable  On PPI prilosec 20 mg q daily on trulance 3 mg PO q daily.   continue with gluten free diet. RUQ US ordered along with labwork. Those have yet to be completed   -advised pt to be safe and call with questions and concerns  -advised pt to go to ER or call 911 if symptoms worrisome or severe  -advised pt to be safe during COVID-19 pandemic  I spent 30  minutes caring for Carla on this date of service. This time includes time spent by me in the following activities: preparing for the visit, reviewing tests, obtaining and/or reviewing a separately obtained history, performing a medically appropriate examination and/or evaluation, counseling and educating the patient/family/caregiver, ordering medications, tests, or procedures, referring and communicating with other health care professionals, documenting information in the medical record, independently interpreting results and communicating that information with the patient/family/caregiver and care coordination.   -recheck in 6 weeks         This document has been electronically signed by Xavier Wick MD on May 25, 2022 14:22 CDT

## 2022-05-25 ENCOUNTER — OFFICE VISIT (OUTPATIENT)
Dept: FAMILY MEDICINE CLINIC | Facility: CLINIC | Age: 52
End: 2022-05-25

## 2022-05-25 VITALS
HEART RATE: 92 BPM | HEIGHT: 65 IN | DIASTOLIC BLOOD PRESSURE: 74 MMHG | OXYGEN SATURATION: 97 % | TEMPERATURE: 99.3 F | WEIGHT: 97.2 LBS | SYSTOLIC BLOOD PRESSURE: 112 MMHG | BODY MASS INDEX: 16.19 KG/M2

## 2022-05-25 DIAGNOSIS — F41.1 GAD (GENERALIZED ANXIETY DISORDER): ICD-10-CM

## 2022-05-25 DIAGNOSIS — H91.90 HEARING LOSS, UNSPECIFIED HEARING LOSS TYPE, UNSPECIFIED LATERALITY: ICD-10-CM

## 2022-05-25 DIAGNOSIS — E55.9 VITAMIN D DEFICIENCY: ICD-10-CM

## 2022-05-25 DIAGNOSIS — E61.1 IRON DEFICIENCY: ICD-10-CM

## 2022-05-25 DIAGNOSIS — J44.9 CHRONIC OBSTRUCTIVE PULMONARY DISEASE, UNSPECIFIED COPD TYPE: ICD-10-CM

## 2022-05-25 DIAGNOSIS — K59.04 CHRONIC IDIOPATHIC CONSTIPATION: ICD-10-CM

## 2022-05-25 DIAGNOSIS — F33.9 EPISODE OF RECURRENT MAJOR DEPRESSIVE DISORDER, UNSPECIFIED DEPRESSION EPISODE SEVERITY: ICD-10-CM

## 2022-05-25 DIAGNOSIS — R42 DIZZINESS: ICD-10-CM

## 2022-05-25 DIAGNOSIS — Z12.31 SCREENING MAMMOGRAM, ENCOUNTER FOR: ICD-10-CM

## 2022-05-25 DIAGNOSIS — R26.89 BALANCE PROBLEM: ICD-10-CM

## 2022-05-25 DIAGNOSIS — H93.13 TINNITUS OF BOTH EARS: ICD-10-CM

## 2022-05-25 DIAGNOSIS — J44.9 STAGE 2 MODERATE COPD BY GOLD CLASSIFICATION: ICD-10-CM

## 2022-05-25 DIAGNOSIS — R93.89 ABNORMAL CT OF THE CHEST: Primary | ICD-10-CM

## 2022-05-25 PROCEDURE — 99214 OFFICE O/P EST MOD 30 MIN: CPT | Performed by: FAMILY MEDICINE

## 2022-06-06 ENCOUNTER — OFFICE VISIT (OUTPATIENT)
Dept: ONCOLOGY | Facility: CLINIC | Age: 52
End: 2022-06-06

## 2022-06-06 ENCOUNTER — LAB (OUTPATIENT)
Dept: ONCOLOGY | Facility: HOSPITAL | Age: 52
End: 2022-06-06

## 2022-06-06 VITALS
WEIGHT: 99 LBS | BODY MASS INDEX: 16.47 KG/M2 | SYSTOLIC BLOOD PRESSURE: 119 MMHG | DIASTOLIC BLOOD PRESSURE: 75 MMHG | TEMPERATURE: 97.9 F | OXYGEN SATURATION: 96 % | HEART RATE: 93 BPM

## 2022-06-06 DIAGNOSIS — D72.829 LEUKOCYTOSIS, UNSPECIFIED TYPE: ICD-10-CM

## 2022-06-06 DIAGNOSIS — E53.8 FOLATE DEFICIENCY: ICD-10-CM

## 2022-06-06 DIAGNOSIS — E53.8 FOLATE DEFICIENCY: Chronic | ICD-10-CM

## 2022-06-06 DIAGNOSIS — E61.1 IRON DEFICIENCY: ICD-10-CM

## 2022-06-06 DIAGNOSIS — T45.4X5A ADVERSE EFFECT OF IRON, INITIAL ENCOUNTER: Chronic | ICD-10-CM

## 2022-06-06 DIAGNOSIS — Z85.41 HISTORY OF CERVICAL CANCER: ICD-10-CM

## 2022-06-06 DIAGNOSIS — E61.1 IRON DEFICIENCY: Chronic | ICD-10-CM

## 2022-06-06 DIAGNOSIS — T45.4X5A ADVERSE EFFECT OF IRON, INITIAL ENCOUNTER: ICD-10-CM

## 2022-06-06 DIAGNOSIS — D72.829 LEUKOCYTOSIS, UNSPECIFIED TYPE: Primary | Chronic | ICD-10-CM

## 2022-06-06 DIAGNOSIS — E53.8 B12 DEFICIENCY: ICD-10-CM

## 2022-06-06 DIAGNOSIS — D75.839 THROMBOCYTOSIS: Chronic | ICD-10-CM

## 2022-06-06 DIAGNOSIS — E53.8 B12 DEFICIENCY: Chronic | ICD-10-CM

## 2022-06-06 DIAGNOSIS — Z85.41 HISTORY OF CERVICAL CANCER: Chronic | ICD-10-CM

## 2022-06-06 LAB
BASOPHILS # BLD AUTO: 0.08 10*3/MM3 (ref 0–0.2)
BASOPHILS NFR BLD AUTO: 0.8 % (ref 0–1.5)
DEPRECATED RDW RBC AUTO: 44.2 FL (ref 37–54)
EOSINOPHIL # BLD AUTO: 0.24 10*3/MM3 (ref 0–0.4)
EOSINOPHIL NFR BLD AUTO: 2.3 % (ref 0.3–6.2)
ERYTHROCYTE [DISTWIDTH] IN BLOOD BY AUTOMATED COUNT: 12.2 % (ref 12.3–15.4)
FERRITIN SERPL-MCNC: 328.6 NG/ML (ref 13–150)
FOLATE SERPL-MCNC: 3.66 NG/ML (ref 4.78–24.2)
HCT VFR BLD AUTO: 43.6 % (ref 34–46.6)
HGB BLD-MCNC: 14.8 G/DL (ref 12–15.9)
IMM GRANULOCYTES # BLD AUTO: 0.06 10*3/MM3 (ref 0–0.05)
IMM GRANULOCYTES NFR BLD AUTO: 0.6 % (ref 0–0.5)
IRON 24H UR-MRATE: 81 MCG/DL (ref 37–145)
IRON SATN MFR SERPL: 23 % (ref 20–50)
LYMPHOCYTES # BLD AUTO: 3.08 10*3/MM3 (ref 0.7–3.1)
LYMPHOCYTES NFR BLD AUTO: 29.8 % (ref 19.6–45.3)
MCH RBC QN AUTO: 33.5 PG (ref 26.6–33)
MCHC RBC AUTO-ENTMCNC: 33.9 G/DL (ref 31.5–35.7)
MCV RBC AUTO: 98.6 FL (ref 79–97)
MONOCYTES # BLD AUTO: 0.97 10*3/MM3 (ref 0.1–0.9)
MONOCYTES NFR BLD AUTO: 9.4 % (ref 5–12)
NEUTROPHILS NFR BLD AUTO: 5.91 10*3/MM3 (ref 1.7–7)
NEUTROPHILS NFR BLD AUTO: 57.1 % (ref 42.7–76)
NRBC BLD AUTO-RTO: 0 /100 WBC (ref 0–0.2)
PLATELET # BLD AUTO: 391 10*3/MM3 (ref 140–450)
PMV BLD AUTO: 9.1 FL (ref 6–12)
RBC # BLD AUTO: 4.42 10*6/MM3 (ref 3.77–5.28)
TIBC SERPL-MCNC: 352 MCG/DL (ref 298–536)
TRANSFERRIN SERPL-MCNC: 236 MG/DL (ref 200–360)
VIT B12 BLD-MCNC: >2000 PG/ML (ref 211–946)
WBC NRBC COR # BLD: 10.34 10*3/MM3 (ref 3.4–10.8)

## 2022-06-06 PROCEDURE — 99214 OFFICE O/P EST MOD 30 MIN: CPT | Performed by: INTERNAL MEDICINE

## 2022-06-06 PROCEDURE — G0463 HOSPITAL OUTPT CLINIC VISIT: HCPCS | Performed by: INTERNAL MEDICINE

## 2022-06-06 PROCEDURE — 83540 ASSAY OF IRON: CPT

## 2022-06-06 PROCEDURE — 82746 ASSAY OF FOLIC ACID SERUM: CPT

## 2022-06-06 PROCEDURE — 1157F ADVNC CARE PLAN IN RCRD: CPT | Performed by: INTERNAL MEDICINE

## 2022-06-06 PROCEDURE — 85025 COMPLETE CBC W/AUTO DIFF WBC: CPT

## 2022-06-06 PROCEDURE — 84466 ASSAY OF TRANSFERRIN: CPT

## 2022-06-06 PROCEDURE — 82607 VITAMIN B-12: CPT

## 2022-06-06 PROCEDURE — 82728 ASSAY OF FERRITIN: CPT

## 2022-06-06 PROCEDURE — 1126F AMNT PAIN NOTED NONE PRSNT: CPT | Performed by: INTERNAL MEDICINE

## 2022-06-06 NOTE — PROGRESS NOTES
DATE OF VISIT: 6/7/2022      REASON FOR VISIT: Leukocytosis, B12 deficiency, folate deficiency, history of cervical cancer,  iron deficiency      HISTORY OF PRESENT ILLNESS:   52-year-old female with medical problem consisting of history of cervical cancer at age 19 s/p convergent procedure followed by hysterectomy at age 23, chronic obstructive pulmonary disease, chronic nicotine addiction, celiac disease, pancreatitis, leukocytosis, iron deficiency, B12 deficiency, folate deficiency has been following up with [facility] since June 20, 2021.  Due to iron deficiency and inability to tolerate and absorb iron by mouth patient received intravenous Feraheme in December 2021.  Complains of shortness of breath.  Complains of chronic tingling and numbness affecting hands.  Complains of fatigue.  States she was having cramps throughout her body last week for which she took an additional dose of B12 on Friday as well as Saturday.  Denies any bleeding.  Denies any new lymph node enlargement.          Past Medical History, Past Surgical History, Social History, Family History have been reviewed and are without significant changes except as mentioned.    Review of Systems   A comprehensive 14 point review of systems was performed and was negative except as mentioned in HPI.    Medications:  The current medication list was reviewed in the EMR    ALLERGIES:    Allergies   Allergen Reactions   • Nsaids Swelling and GI Intolerance   • Azithromycin Swelling   • Ciprofloxacin Hives   • Doxycycline Swelling   • Keflex [Cephalexin] Diarrhea and Other (See Comments)     Abdominal pain   • Amoxicillin Other (See Comments)     Vomitting, stomach swells, diarrhea , extreme stomach pain   • Other Other (See Comments)     nicotine patch   Caused body twitching/seizures   • Levofloxacin Rash   • Phenergan [Promethazine Hcl] GI Intolerance       Objective      Vitals:    06/06/22 1012   BP: 119/75   Pulse: 93   Temp: 97.9 °F (36.6 °C)    TempSrc: Temporal   SpO2: 96%   Weight: 44.9 kg (99 lb)   PainSc: 0-No pain     Current Status 12/16/2021   ECOG score 0       Physical Exam  Pulmonary:      Breath sounds: Normal breath sounds.   Neurological:      Mental Status: She is alert and oriented to person, place, and time.           RECENT LABS:  Glucose   Date Value Ref Range Status   03/28/2022 87 65 - 99 mg/dL Final     Sodium   Date Value Ref Range Status   03/28/2022 140 136 - 145 mmol/L Final     Potassium   Date Value Ref Range Status   03/28/2022 3.5 3.5 - 5.2 mmol/L Final     CO2   Date Value Ref Range Status   03/28/2022 23.0 22.0 - 29.0 mmol/L Final     Chloride   Date Value Ref Range Status   03/28/2022 102 98 - 107 mmol/L Final     Anion Gap   Date Value Ref Range Status   03/28/2022 15.0 5.0 - 15.0 mmol/L Final     Creatinine   Date Value Ref Range Status   03/28/2022 0.60 0.57 - 1.00 mg/dL Final     BUN   Date Value Ref Range Status   03/28/2022 8 6 - 20 mg/dL Final     BUN/Creatinine Ratio   Date Value Ref Range Status   03/28/2022 13.3 7.0 - 25.0 Final     Calcium   Date Value Ref Range Status   03/28/2022 8.8 8.6 - 10.5 mg/dL Final     eGFR Non  Amer   Date Value Ref Range Status   01/11/2022 94 >60 mL/min/1.73 Final     Alkaline Phosphatase   Date Value Ref Range Status   03/28/2022 90 39 - 117 U/L Final     Total Protein   Date Value Ref Range Status   03/28/2022 7.0 6.0 - 8.5 g/dL Final     ALT (SGPT)   Date Value Ref Range Status   03/28/2022 15 1 - 33 U/L Final     AST (SGOT)   Date Value Ref Range Status   03/28/2022 16 1 - 32 U/L Final     Total Bilirubin   Date Value Ref Range Status   03/28/2022 0.6 0.0 - 1.2 mg/dL Final     Albumin   Date Value Ref Range Status   03/28/2022 4.60 3.50 - 5.20 g/dL Final     Globulin   Date Value Ref Range Status   03/28/2022 2.4 gm/dL Final     Lab Results   Component Value Date    WBC 10.34 06/06/2022    HGB 14.8 06/06/2022    HCT 43.6 06/06/2022    MCV 98.6 (H) 06/06/2022      06/06/2022     Lab Results   Component Value Date    NEUTROABS 5.91 06/06/2022    IRON 81 06/06/2022    IRON 101 03/07/2022    IRON 54 12/06/2021    TIBC 352 06/06/2022    TIBC 326 03/07/2022    TIBC 384 12/06/2021    LABIRON 23 06/06/2022    LABIRON 31 03/07/2022    LABIRON 14 (L) 12/06/2021    FERRITIN 328.60 (H) 06/06/2022    FERRITIN 384.60 (H) 03/07/2022    FERRITIN 69.76 12/06/2021    BGJJTTTL36 >2,000 (H) 06/06/2022    VLEWRXGV69 712 03/07/2022    NSRVTEUX74 725 12/06/2021    FOLATE 3.66 (L) 06/06/2022    FOLATE 3.20 (L) 03/07/2022    FOLATE 3.00 (L) 12/06/2021     No results found for: , LABCA2, AFPTM, HCGQUANT, , CHROMGRNA, 1KNSB04SZD, CEA, REFLABREPO      PATHOLOGY:  * Cannot find OR log *         RADIOLOGY DATA :  No radiology results for the last 7 days        Assessment & Plan     1.  Leukocytosis:  - Patient has been having ongoing leukocytosis since August 2019  - White blood cell count today is 10.34  - Work-up with peripheral blood flow cytometry on June 20, 2021 was negative for any leukemia or lymphoma  - Her leukocytosis is related to smoking plus or minus inflammation  - We will continue with clinical monitoring for now  - Patient will return to clinic in 3 months with repeat CBC, iron studies, ferritin, B12 and folate to be done on that day.    2.  Iron deficiency:  - Due to iron deficiency and inability to tolerate iron by mouth, patient received Feraheme in December 2021  - Anemia work-up done today shows hemoglobin is 14.8.  Iron studies are adequate, no need for any intravenous Feraheme at present.    3.  B12 deficiency  - Remains on intramuscular B12 injection every 28 days  -B12 level is greater than 2000.  Would recommend not to take B12 injection more often than every 28 days.    4.  Folate deficiency  - Remains on folic acid p.o. daily.  Patient states she has not been still taking folic acid regularly as she forgets about it.  - Folate level is 3.66.  She was counseled about  importance of taking folic acid regularly.    5.  History of cervical cancer:  S/p hysterectomy at age 23.  Had a conization procedure at age 19    6.  Health maintenance: Patient continues to smoke, has been counseled about smoking cessation    7. Advance Care Planning   ACP discussion was held with the patient during this visit. Patient has an advance directive in EMR which is still valid.                     PHQ-9 Total Score: 0   -Patient is not homicidal or suicidal.  No acute intervention required.    Carla Richard reports a pain score of 0.  Given her pain assessment as noted, treatment options were discussed and the following options were decided upon as a follow-up plan to address the patient's pain: continuation of current treatment plan for pain.         Ramirez Burger MD  6/7/2022  07:02 CDT        Part of this note may be an electronic transcription/translation of spoken language to printed text using the Dragon Dictation System.          CC:

## 2022-06-07 ENCOUNTER — TELEPHONE (OUTPATIENT)
Dept: ONCOLOGY | Facility: HOSPITAL | Age: 52
End: 2022-06-07

## 2022-06-07 NOTE — TELEPHONE ENCOUNTER
----- Message from Ramirez Burger MD sent at 6/7/2022  7:02 AM CDT -----  Regarding: labs  Please let patient know, her folate level is below normal at 3.6 consistent with folate deficiency.  She needs to take folic acid regularly.  Her B12 level is greater than 2000.  Recommend not to take B12 more than once a month.  Iron studies are adequate, no need for any intravenous iron replacement at present.  Thank you

## 2022-06-07 NOTE — TELEPHONE ENCOUNTER
Called and spoke with pt regarding lab results and medication instructions as per Dr. Burger, v/u obtained.

## 2022-06-09 ENCOUNTER — TELEPHONE (OUTPATIENT)
Dept: PULMONOLOGY | Facility: CLINIC | Age: 52
End: 2022-06-09

## 2022-06-09 NOTE — TELEPHONE ENCOUNTER
Overdue result for CT scan has  on 2022.    Patient don't have an upcoming appt.   Do you want to cancel this order?

## 2022-06-17 ENCOUNTER — TRANSCRIBE ORDERS (OUTPATIENT)
Dept: GENERAL RADIOLOGY | Facility: HOSPITAL | Age: 52
End: 2022-06-17

## 2022-06-17 DIAGNOSIS — R93.89 ABNORMAL CT OF THE CHEST: Primary | ICD-10-CM

## 2022-06-21 ENCOUNTER — HOSPITAL ENCOUNTER (OUTPATIENT)
Dept: CT IMAGING | Facility: HOSPITAL | Age: 52
Discharge: HOME | End: 2022-06-21

## 2022-06-21 ENCOUNTER — LAB (OUTPATIENT)
Dept: LAB | Facility: HOSPITAL | Age: 52
End: 2022-06-21

## 2022-06-21 DIAGNOSIS — R93.89 ABNORMAL CT OF THE CHEST: ICD-10-CM

## 2022-06-21 LAB
BUN SERPL-MCNC: 12 MG/DL (ref 6–20)
CREAT SERPL-MCNC: 0.63 MG/DL (ref 0.57–1)
EGFRCR SERPLBLD CKD-EPI 2021: 105.6 ML/MIN/1.73

## 2022-06-21 PROCEDURE — 82565 ASSAY OF CREATININE: CPT

## 2022-06-21 PROCEDURE — 84520 ASSAY OF UREA NITROGEN: CPT

## 2022-06-21 PROCEDURE — 71260 CT THORAX DX C+: CPT

## 2022-06-21 PROCEDURE — 25010000002 IOPAMIDOL 61 % SOLUTION: Performed by: FAMILY MEDICINE

## 2022-06-21 PROCEDURE — 36415 COLL VENOUS BLD VENIPUNCTURE: CPT

## 2022-06-21 RX ADMIN — IOPAMIDOL 90 ML: 612 INJECTION, SOLUTION INTRAVENOUS at 14:38

## 2022-07-12 ENCOUNTER — TELEPHONE (OUTPATIENT)
Dept: FAMILY MEDICINE CLINIC | Facility: CLINIC | Age: 52
End: 2022-07-12

## 2022-07-12 NOTE — TELEPHONE ENCOUNTER
Patient stated she keeps falling. She fell four times yesterday. She said she cannot wait till September to see Dr. Woo. Is there somewhere else she could go to that could get her in quicker?

## 2022-07-13 ENCOUNTER — TELEPHONE (OUTPATIENT)
Dept: FAMILY MEDICINE CLINIC | Facility: CLINIC | Age: 52
End: 2022-07-13

## 2022-07-14 DIAGNOSIS — R26.89 BALANCE PROBLEM: ICD-10-CM

## 2022-07-14 DIAGNOSIS — R29.6 FREQUENT FALLS: Primary | ICD-10-CM

## 2022-07-14 DIAGNOSIS — R25.1 TREMOR: ICD-10-CM

## 2022-08-03 ENCOUNTER — OFFICE VISIT (OUTPATIENT)
Dept: FAMILY MEDICINE CLINIC | Facility: CLINIC | Age: 52
End: 2022-08-03

## 2022-08-03 VITALS
OXYGEN SATURATION: 97 % | HEART RATE: 88 BPM | DIASTOLIC BLOOD PRESSURE: 60 MMHG | WEIGHT: 98 LBS | BODY MASS INDEX: 16.33 KG/M2 | HEIGHT: 65 IN | SYSTOLIC BLOOD PRESSURE: 100 MMHG | TEMPERATURE: 98.7 F

## 2022-08-03 DIAGNOSIS — R11.0 NAUSEA: ICD-10-CM

## 2022-08-03 DIAGNOSIS — R12 HEARTBURN: ICD-10-CM

## 2022-08-03 DIAGNOSIS — J44.9 STAGE 2 MODERATE COPD BY GOLD CLASSIFICATION: Chronic | ICD-10-CM

## 2022-08-03 DIAGNOSIS — U07.1 POSITIVE SELF-ADMINISTERED ANTIGEN TEST FOR COVID-19: Primary | ICD-10-CM

## 2022-08-03 PROCEDURE — 99214 OFFICE O/P EST MOD 30 MIN: CPT | Performed by: NURSE PRACTITIONER

## 2022-08-03 RX ORDER — ONDANSETRON 8 MG/1
8 TABLET, ORALLY DISINTEGRATING ORAL EVERY 8 HOURS PRN
Qty: 30 TABLET | Refills: 0 | Status: SHIPPED | OUTPATIENT
Start: 2022-08-03 | End: 2022-08-23 | Stop reason: SDUPTHER

## 2022-08-03 RX ORDER — COVID-19 ANTIGEN TEST
1 KIT MISCELLANEOUS TAKE AS DIRECTED
Qty: 3 KIT | Refills: 0 | COMMUNITY
Start: 2022-08-03 | End: 2022-08-23

## 2022-08-03 RX ORDER — PREDNISONE 10 MG/1
TABLET ORAL
Qty: 19 TABLET | Refills: 0 | Status: SHIPPED | OUTPATIENT
Start: 2022-08-03 | End: 2022-08-23

## 2022-08-03 RX ORDER — BENZONATATE 100 MG/1
CAPSULE ORAL
Qty: 60 CAPSULE | Refills: 0 | Status: SHIPPED | OUTPATIENT
Start: 2022-08-03 | End: 2022-08-23 | Stop reason: SDUPTHER

## 2022-08-03 RX ORDER — OMEPRAZOLE 20 MG/1
20 CAPSULE, DELAYED RELEASE ORAL DAILY
Qty: 30 CAPSULE | Refills: 0 | Status: SHIPPED | OUTPATIENT
Start: 2022-08-03

## 2022-08-03 NOTE — PROGRESS NOTES
"Chief Complaint  Illness (Ha, st, fatigue, ears. Nausea, sneezing)    Subjective          Carla Richard presents to [facility] - [facility]    PCP: Dr. Wick    CC: \"positive covid test\"    Reports symptoms since 8-1, had 2 negative Covid tests on 8-1 and 8-2, positive test at home today (brought in photo).  No known exposures.  Hx of COPD.     Illness  This is a new problem. Episode onset: 8-1. The problem occurs constantly. The problem has been unchanged. Associated symptoms include anorexia, congestion, coughing, fatigue, headaches, myalgias, nausea and a sore throat. Pertinent negatives include no abdominal pain, arthralgias, change in bowel habit, chest pain, chills, diaphoresis, fever, joint swelling, neck pain, numbness, rash, swollen glands, urinary symptoms, vertigo, visual change, vomiting or weakness. Associated symptoms comments: +sneezing; + heartburn; +earache  . Nothing aggravates the symptoms. Treatments tried: neb treatments. The treatment provided mild relief.       Review of Systems   Constitutional: Positive for appetite change and fatigue. Negative for chills, diaphoresis and fever.   HENT: Positive for congestion, ear pain, postnasal drip, sinus pressure, sneezing and sore throat. Negative for ear discharge and trouble swallowing.    Eyes: Negative.    Respiratory: Positive for cough, chest tightness, shortness of breath and wheezing.    Cardiovascular: Negative.  Negative for chest pain.   Gastrointestinal: Positive for anorexia and nausea. Negative for abdominal pain, change in bowel habit and vomiting.        +heartburn     Genitourinary: Negative.    Musculoskeletal: Positive for myalgias. Negative for arthralgias, joint swelling and neck pain.   Skin: Negative.  Negative for rash.   Neurological: Positive for headaches. Negative for dizziness, vertigo, weakness and numbness.        Objective   Vital Signs: " "  /60 (BP Location: Right arm, Patient Position: Sitting)   Pulse 88   Temp 98.7 °F (37.1 °C)   Ht 165.1 cm (65\")   Wt 44.5 kg (98 lb)   SpO2 97%   BMI 16.31 kg/m²       Physical Exam  Vitals and nursing note reviewed.   Constitutional:       General: She is not in acute distress.     Appearance: She is ill-appearing.   HENT:      Head: Normocephalic and atraumatic.      Right Ear: Tympanic membrane and ear canal normal.      Left Ear: Tympanic membrane and ear canal normal.      Nose: Congestion present.      Comments: Generalized sinus pressure     Mouth/Throat:      Mouth: Mucous membranes are moist.      Pharynx: Posterior oropharyngeal erythema ( mild injection) present. No oropharyngeal exudate.   Eyes:      General:         Right eye: No discharge.         Left eye: No discharge.      Conjunctiva/sclera: Conjunctivae normal.   Cardiovascular:      Rate and Rhythm: Normal rate and regular rhythm.   Pulmonary:      Effort: Pulmonary effort is normal. No respiratory distress.      Breath sounds: No wheezing, rhonchi or rales.      Comments: Decreased, but clear    Abdominal:      General: Bowel sounds are normal.      Palpations: Abdomen is soft.      Tenderness: There is no abdominal tenderness.   Musculoskeletal:      Cervical back: Neck supple. No tenderness.   Lymphadenopathy:      Cervical: No cervical adenopathy.   Skin:     General: Skin is warm and dry.   Neurological:      General: No focal deficit present.      Mental Status: She is alert and oriented to person, place, and time.   Psychiatric:         Mood and Affect: Mood normal.         Thought Content: Thought content normal.          Result Review :     Common labs    Common Labsle 3/28/22 3/28/22 6/6/22 6/21/22 6/21/22    1121 1121  1338 1338   Glucose  87      BUN  8   12   Creatinine  0.60  0.63    Sodium  140      Potassium  3.5      Chloride  102      Calcium  8.8      Albumin  4.60      Total Bilirubin  0.6      Alkaline " Phosphatase  90      AST (SGOT)  16      ALT (SGPT)  15      WBC 13.58 (A)  10.34     Hemoglobin 13.7  14.8     Hematocrit 39.9  43.6     Platelets 415  391     (A) Abnormal value                      Assessment and Plan    Diagnoses and all orders for this visit:    1. Positive self-administered antigen test for COVID-19 (Primary)  -     predniSONE (DELTASONE) 10 MG tablet; 3 tabs po daily x 3 days, then 2 tabs po daily x 3 days, then 1 tab po daily x 4 days  Dispense: 19 tablet; Refill: 0  -     benzonatate (Tessalon Perles) 100 MG capsule; 1-2 caps po TID prn cough  Dispense: 60 capsule; Refill: 0    2. Heartburn  -     omeprazole (priLOSEC) 20 MG capsule; Take 1 capsule by mouth Daily.  Dispense: 30 capsule; Refill: 0    3. Nausea  -     ondansetron ODT (ZOFRAN-ODT) 8 MG disintegrating tablet; Place 1 tablet on the tongue Every 8 (Eight) Hours As Needed for Nausea or Vomiting.  Dispense: 30 tablet; Refill: 0      Meets high risk criteria for antivirals due to COPD, risks/benefits discussed, she declines  Rx for Prednisone, Tessalon perles provided  Continue with nebs PRN  Encouraged immune boosters--Vit, Zinc  Quarantine instructions given  Covid form faxed to [facility]  Additional home tests provided--has 6 family members at home    Refill of Zofran, Omeprazole for nausea, heartburn symptoms    See PCP or RTC if symptoms persist/worsen  See PCP for routine f/u visit and management of chronic medical conditions      This document has been electronically signed by CROW Padgett on August 3, 2022 17:23 CDT,.

## 2022-08-04 ENCOUNTER — TELEPHONE (OUTPATIENT)
Dept: FAMILY MEDICINE CLINIC | Facility: CLINIC | Age: 52
End: 2022-08-04

## 2022-08-04 DIAGNOSIS — U07.1 POSITIVE SELF-ADMINISTERED ANTIGEN TEST FOR COVID-19: Primary | ICD-10-CM

## 2022-08-10 ENCOUNTER — OFFICE VISIT (OUTPATIENT)
Dept: FAMILY MEDICINE CLINIC | Facility: CLINIC | Age: 52
End: 2022-08-10

## 2022-08-10 ENCOUNTER — LAB (OUTPATIENT)
Dept: LAB | Facility: HOSPITAL | Age: 52
End: 2022-08-10

## 2022-08-10 VITALS
WEIGHT: 96.6 LBS | HEIGHT: 65 IN | OXYGEN SATURATION: 97 % | HEART RATE: 95 BPM | DIASTOLIC BLOOD PRESSURE: 60 MMHG | BODY MASS INDEX: 16.1 KG/M2 | SYSTOLIC BLOOD PRESSURE: 100 MMHG | TEMPERATURE: 98.2 F

## 2022-08-10 DIAGNOSIS — R05.9 COUGH: ICD-10-CM

## 2022-08-10 DIAGNOSIS — R06.02 SHORTNESS OF BREATH: Primary | ICD-10-CM

## 2022-08-10 DIAGNOSIS — R79.89 ELEVATED PLATELET COUNT: Primary | ICD-10-CM

## 2022-08-10 DIAGNOSIS — U07.1 POSITIVE SELF-ADMINISTERED ANTIGEN TEST FOR COVID-19: ICD-10-CM

## 2022-08-10 DIAGNOSIS — D72.829 LEUKOCYTOSIS, UNSPECIFIED TYPE: ICD-10-CM

## 2022-08-10 LAB
ALBUMIN SERPL-MCNC: 4.4 G/DL (ref 3.5–5.2)
ALBUMIN/GLOB SERPL: 2.1 G/DL
ALP SERPL-CCNC: 81 U/L (ref 39–117)
ALT SERPL W P-5'-P-CCNC: 20 U/L (ref 1–33)
ANION GAP SERPL CALCULATED.3IONS-SCNC: 9 MMOL/L (ref 5–15)
AST SERPL-CCNC: 20 U/L (ref 1–32)
BILIRUB SERPL-MCNC: 0.3 MG/DL (ref 0–1.2)
BUN SERPL-MCNC: 18 MG/DL (ref 6–20)
BUN/CREAT SERPL: 22.2 (ref 7–25)
CALCIUM SPEC-SCNC: 9.2 MG/DL (ref 8.6–10.5)
CHLORIDE SERPL-SCNC: 105 MMOL/L (ref 98–107)
CO2 SERPL-SCNC: 27 MMOL/L (ref 22–29)
CREAT SERPL-MCNC: 0.81 MG/DL (ref 0.57–1)
D-DIMER, QUANTITATIVE (MAD,POW, STR): 295 NG/ML (FEU) (ref 0–470)
DEPRECATED RDW RBC AUTO: 44.9 FL (ref 37–54)
EGFRCR SERPLBLD CKD-EPI 2021: 86.4 ML/MIN/1.73
ERYTHROCYTE [DISTWIDTH] IN BLOOD BY AUTOMATED COUNT: 12.4 % (ref 12.3–15.4)
EXPIRATION DATE: NORMAL
GLOBULIN UR ELPH-MCNC: 2.1 GM/DL
GLUCOSE SERPL-MCNC: 114 MG/DL (ref 65–99)
HCT VFR BLD AUTO: 43.3 % (ref 34–46.6)
HGB BLD-MCNC: 14.5 G/DL (ref 12–15.9)
INTERNAL CONTROL: NORMAL
Lab: NORMAL
MCH RBC QN AUTO: 32.8 PG (ref 26.6–33)
MCHC RBC AUTO-ENTMCNC: 33.5 G/DL (ref 31.5–35.7)
MCV RBC AUTO: 98 FL (ref 79–97)
PLATELET # BLD AUTO: 495 10*3/MM3 (ref 140–450)
PMV BLD AUTO: 9.4 FL (ref 6–12)
POTASSIUM SERPL-SCNC: 5.3 MMOL/L (ref 3.5–5.2)
PROT SERPL-MCNC: 6.5 G/DL (ref 6–8.5)
RBC # BLD AUTO: 4.42 10*6/MM3 (ref 3.77–5.28)
SARS-COV-2 AG UPPER RESP QL IA.RAPID: NOT DETECTED
SODIUM SERPL-SCNC: 141 MMOL/L (ref 136–145)
WBC NRBC COR # BLD: 14.41 10*3/MM3 (ref 3.4–10.8)

## 2022-08-10 PROCEDURE — 85027 COMPLETE CBC AUTOMATED: CPT | Performed by: NURSE PRACTITIONER

## 2022-08-10 PROCEDURE — 80053 COMPREHEN METABOLIC PANEL: CPT | Performed by: NURSE PRACTITIONER

## 2022-08-10 PROCEDURE — 87426 SARSCOV CORONAVIRUS AG IA: CPT | Performed by: NURSE PRACTITIONER

## 2022-08-10 PROCEDURE — 85379 FIBRIN DEGRADATION QUANT: CPT | Performed by: NURSE PRACTITIONER

## 2022-08-10 PROCEDURE — 99214 OFFICE O/P EST MOD 30 MIN: CPT | Performed by: NURSE PRACTITIONER

## 2022-08-10 NOTE — PROGRESS NOTES
"Chief Complaint  Pain ( Chest and left side  very fatigue   temp staying 100)    Subjective          Carla Richard presents to [facility] - [facility]    PCP: Dr. Wick    CC: \"left side back pain, fatigue, fever\"    Tested + for Covid on 8-3-2022. Initially treated symptomatically, called back requesting Paxlovid and met high risk criteria.  Reports tolerated the first few days without problems, but couldn't finish the last two days due to side effects--caused swelling and throbbing in her \"organs.\"  Reports side effect symptoms have resolved, but still having Covid symptoms of dyspnea, cough, left sided back pain when she breathes, and low grade temp of 100 in the evenings.  Reports she couldn't catch her breath earlier today. Reports a couple of days ago oxygen levels were dropping to 95%, but have returned to her baseline of 96-97% now.  Continues to smoke.       Review of Systems   Constitutional: Positive for fatigue and fever ( in the evenings).   HENT: Negative.    Eyes: Negative.    Respiratory: Positive for cough, shortness of breath and wheezing. Negative for chest tightness.    Cardiovascular: Negative.    Gastrointestinal: Positive for abdominal pain (cramping), constipation and nausea. Negative for diarrhea and vomiting.   Genitourinary: Negative.    Musculoskeletal: Positive for myalgias.   Skin: Negative.    Neurological: Negative for dizziness and headaches.        Objective   Vital Signs:   /60 (BP Location: Left arm, Patient Position: Sitting)   Pulse 95   Temp 98.2 °F (36.8 °C)   Ht 165.1 cm (65\")   Wt 43.8 kg (96 lb 9.6 oz)   SpO2 97%   BMI 16.08 kg/m²       Physical Exam  Vitals and nursing note reviewed.   Constitutional:       General: She is not in acute distress.     Appearance: She is ill-appearing.   HENT:      Head: Normocephalic and atraumatic.   Cardiovascular:      Rate and Rhythm: Normal " rate and regular rhythm.   Pulmonary:      Effort: No respiratory distress.      Breath sounds: No wheezing, rhonchi or rales.      Comments: Diminished, congested cough  Musculoskeletal:      Cervical back: Neck supple.   Skin:     General: Skin is warm and dry.   Neurological:      General: No focal deficit present.      Mental Status: She is alert and oriented to person, place, and time.   Psychiatric:         Mood and Affect: Mood normal.         Thought Content: Thought content normal.          Result Review :                 Assessment and Plan    Diagnoses and all orders for this visit:    1. Shortness of breath (Primary)  -     POCT SARS-CoV-2 Antigen FAHAD  -     CBC No Differential  -     Comprehensive metabolic panel  -     D-dimer, Quantitative  -     XR Chest PA & Lateral    2. Cough  -     POCT SARS-CoV-2 Antigen FAHAD  -     XR Chest PA & Lateral    3. Positive self-administered antigen test for COVID-19      List Paxlovid as allergy.   Work up as above--will notify with results when available  Continue with Albuterol PRN  Smoking cessation strongly encouraged  Continue with immune boosters--Vit C, Zinc    See PCP or RTC if symptoms persist/worsen--ER if significantly worsening dyspnea  See PCP for routine f/u visit and management of chronic medical conditions      This document has been electronically signed by CROW Padgett on August 10, 2022 17:44 CDT,.    I spent 30 minutes caring for Carla on this date of service. This time includes time spent by me in the following activities:preparing for the visit, performing a medically appropriate examination and/or evaluation , counseling and educating the patient/family/caregiver, ordering medications, tests, or procedures and documenting information in the medical record

## 2022-08-15 NOTE — PROGRESS NOTES
Subjective:  Carla Richard is a 52 y.o. female who presents for       Patient Active Problem List   Diagnosis   • Chronic idiopathic constipation   • B12 deficiency   • Vitamin D deficiency    • Stage 2 moderate COPD by GOLD classification ([facility])   • Cervical lymphadenopathy   • Generalized abdominal pain   • Nausea   • Neurological abnormality   • Tremor   • RUQ pain   • Pertussis exposure   • Chronic nonseasonal allergic rhinitis due to pollen   • Episode of recurrent major depressive disorder (HCC)   • Diarrhea   • Chronic diarrhea   • Thrombocytosis   • Nausea and vomiting   • Gluten intolerance   • C. difficile diarrhea   • Shiga toxin-producing Escherichia coli infection   • Generalized weakness   • Head injury   • Concussion with loss of consciousness   • Gastritis without bleeding   • Irritable bowel syndrome   • Tachycardia   • JELENA (generalized anxiety disorder)   • Marijuana use   • Epigastric pain   • History of Clostridioides difficile colitis   • Celiac disease   • Upper respiratory tract infection   • Irritable bowel syndrome with both constipation and diarrhea   • Moderate protein-calorie malnutrition (HCC)   • Folliculitis   • Itchy scalp   • Tinea capitis   • Chronic interstitial cystitis   • Fibromyalgia   • Gastric reflux   • Kidney stone   • Lymphocytic colitis   • Microscopic hematuria   • Obstruction of bile duct   • Throat pain   • Strain of right hip   • Gastroesophageal reflux disease with esophagitis without hemorrhage   • Cigarette nicotine dependence with nicotine-induced disorder   • Underweight   • Iron deficiency   • Leukocytosis   • History of cervical cancer   • Chronic obstructive pulmonary disease (HCC)   • Vasomotor symptoms due to menopause   • Change in bowel habits   • Other constipation   • Close exposure to COVID-19 virus   • Folate deficiency   • Adverse effect of iron   • History of COVID-19           Current Outpatient Medications:   •  albuterol sulfate HFA  108 (90 Base) MCG/ACT inhaler, INHALE TWO PUFFS EVERY 4 HOURS AS NEEDED for WHEEZING, Disp: 18 g, Rfl: 1  •  benzonatate (Tessalon Perles) 100 MG capsule, 1-2 caps po TID prn cough, Disp: 60 capsule, Rfl: 0  •  budesonide-formoterol (SYMBICORT) 160-4.5 MCG/ACT inhaler, Inhale 2 puffs 2 (Two) Times a Day., Disp: 1 each, Rfl: 11  •  cetirizine (zyrTEC) 10 MG tablet, Take 1 tablet by mouth Daily., Disp: 30 tablet, Rfl: 2  •  Cyanocobalamin (B-12 Compliance Injection) 1000 MCG/ML kit, Inject 1 mL as directed Every 28 (Twenty-Eight) Days., Disp: 1 kit, Rfl: 5  •  cyclobenzaprine (FLEXERIL) 5 MG tablet, Take 1 tablet by mouth 3 (Three) Times a Day As Needed (hip pain). Do not drive while taking, Disp: 30 tablet, Rfl: 1  •  fluticasone (FLONASE) 50 MCG/ACT nasal spray, 2 sprays into the nostril(s) as directed by provider Daily., Disp: 1 bottle, Rfl: 5  •  folic acid (FOLVITE) 1 MG tablet, Take 1 tablet by mouth Daily., Disp: 90 tablet, Rfl: 1  •  ipratropium-albuterol (DUO-NEB) 0.5-2.5 mg/3 ml nebulizer, Take 3 mL by nebulization Every 4 (Four) Hours As Needed for Wheezing or Shortness of Air., Disp: 360 mL, Rfl: 11  •  montelukast (SINGULAIR) 10 MG tablet, Take 1 tablet by mouth Every Night., Disp: 30 tablet, Rfl: 11  •  omeprazole (priLOSEC) 20 MG capsule, Take 1 capsule by mouth Daily., Disp: 30 capsule, Rfl: 0  •  ondansetron ODT (ZOFRAN-ODT) 8 MG disintegrating tablet, Place 1 tablet on the tongue Every 8 (Eight) Hours As Needed for Nausea or Vomiting., Disp: 30 tablet, Rfl: 0  •  PARoxetine (Paxil) 10 MG tablet, Take 1 tablet by mouth Every Morning., Disp: 30 tablet, Rfl: 3  •  Plecanatide (Trulance) 3 MG tablet, Take 1 tablet by mouth Daily., Disp: 90 tablet, Rfl: 5  •  traMADol (ULTRAM) 50 MG tablet, Take 50 mg by mouth Every 6 (Six) Hours As Needed., Disp: , Rfl:   •  vitamin D (ERGOCALCIFEROL) 1.25 MG (21679 UT) capsule capsule, Take 1 capsule by mouth 1 (One) Time Per Week., Disp: 5 capsule, Rfl: 2  •   benzonatate (Tessalon Perles) 100 MG capsule, Take 1 capsule by mouth 3 (Three) Times a Day As Needed for Cough., Disp: 90 capsule, Rfl: 3  •  nicotine (Nicotrol) 10 MG inhaler, Inhale 2 puffs As Needed for Smoking Cessation., Disp: 168 each, Rfl: 3    HPI     Pt is 53 yo female with history of moderate COPD, GERD with esophagitis, gastritis, hiatal hernia chronic idiopathic constipation  Vitamin B12 deficiency, Vitamin D deficiency, chronic abdominal pain, tremor,  RUQ pain,  Sp hysterectomy,  History of crohn's  disease, history of pertussis infection  sp right shoulder surgery. X 3, sp rotator cuff repair , history of C diff diarrhea, gluten sensitivity, gastritis, marijuana use, history of cervical cancer.  iron deficiency, history of COVID-19 infection      5/24/22 in office visit for recheck. Pt has been to [facility] ER several times on 1/11/22 for COPD exacerbation and given  Prednisone, doxycycline and tessalon perles. She also went again to [facility] ER for COPD exacerabtion on 3/28/22 and given prednisone and keflex.   She has been receiving iron infusion therapy and following up with Hematology regarding her iron deficiency and leukocytosis. Saw GI on 3/28/22 for her epigastric pain. And RUQ US was ordered bentyl was changed to QID and EGD was considered if US was unremarkable. US of liver has yet to be completed. A pancreatic elastase was ordered. She went to walk in clinic on 3/31/22 for COPD exacerbation and given prednisone taperind dose and amoxicillin. She saw Pulmonology on 4/25/22 for her COPD and was restarted on symbicort. Albuterol. She  States she  Has had presyncopal episodes that started about a month ago. She could be laying in bed and feel lightheaded/dizzy.  She also has had tinnitus and some hearing loss. She continues her medications for her GI issues and for her COPD     8/23/22 in office visit for recheck. Pt was recently positive for COVID and saw walk in clinic on 8/3/22 and 8/10/22 pt took  paxlovid but developed side effects. She was advised to continue albuteorl PRN and take her vitamin. Her labwork on 8/10/22 showed D-dimer normal COVID test was negative. CMP showed potassium at 5.3. GFR at 86.4 normal liver enzymes. CBC showed WBC elevated along with platelet count.  She continues to smoke tobacco about almost a 1/2 ppd. She is coughing. Her recent chest x-ray showed no acute cardiopulmonary process along with biapical scarring. Her CT of chest in June 2022 showed improvement for tree in bud nodulary in right middle lobe that is improved from 6/4/21. This is consistent with a benign process.  Likely this was infectious inflammatory bronchiolitis.  She also has yet to see Neurologist.  She continues to have falling issues and tremors.     Dizziness  This is a new problem. The current episode started more than 1 year ago. The problem occurs constantly. The problem has been unchanged. Associated symptoms include abdominal pain, arthralgias, coughing, fatigue, numbness and weakness. Pertinent negatives include no chest pain, chills, congestion, diaphoresis, fever, headaches, nausea, sore throat or vomiting. Nothing aggravates the symptoms. She has tried nothing for the symptoms. The treatment provided no relief.   Heartburn  She complains of abdominal pain, chest pain and heartburn. She reports no belching, no choking, no coughing, no dysphagia, no early satiety, no globus sensation, no hoarse voice, no nausea, no sore throat, no stridor, no tooth decay or no wheezing. This is a chronic problem. The current episode started more than 1 year ago. The problem occurs constantly. The problem has been waxing and waning. The heartburn is of moderate intensity. The heartburn does not wake her from sleep. The heartburn does not limit her activity. The heartburn doesn't change with position. The symptoms are aggravated by stress and smoking. Pertinent negatives include no fatigue, melena, muscle weakness or  weight loss. Risk factors include smoking/tobacco exposure. She has tried a PPI for the symptoms. The treatment provided moderate relief. Past procedures include an EGD. Past procedures do not include an abdominal ultrasound, esophageal manometry, esophageal pH monitoring, H. pylori antibody titer or a UGI.   Nicotine Dependence  Presents for follow-up visit. Symptoms are negative for fatigue and sore throat. Her urge triggers include company of smokers. The symptoms have been stable. She smokes 1 pack of cigarettes per day. Compliance with prior treatments has been poor.   COPD  This is a chronic problem. The current episode started more than 1 year ago. The problem occurs constantly. The problem has been waxing and waning  Associated symptoms include abdominal pain, arthralgias, fatigue, joint swelling, numbness and weakness. Pertinent negatives include no anorexia, change in bowel habit, chest pain, chills, congestion, coughing, diaphoresis, fever, headaches, myalgias, nausea, neck pain, sore throat, swollen glands, urinary symptoms, vertigo, visual change or vomiting. The symptoms are aggravated by smoking. She has tried (combivent  symbicort anoro, albuterol inhaler ) for the symptoms    Review of Systems  Review of Systems   Constitutional: Positive for activity change and fatigue. Negative for appetite change, chills, diaphoresis and fever.   HENT: Negative for congestion, postnasal drip, rhinorrhea, sinus pressure, sinus pain, sneezing, sore throat, trouble swallowing and voice change.    Respiratory: Positive for cough and shortness of breath. Negative for choking, chest tightness, wheezing and stridor.    Cardiovascular: Negative for chest pain.   Gastrointestinal: Positive for abdominal pain. Negative for diarrhea, nausea and vomiting.   Musculoskeletal: Positive for arthralgias.   Neurological: Positive for dizziness, tremors, weakness and numbness. Negative for headaches.   Psychiatric/Behavioral: The  patient is nervous/anxious.        Patient Active Problem List   Diagnosis   • Chronic idiopathic constipation   • B12 deficiency   • Vitamin D deficiency    • Stage 2 moderate COPD by GOLD classification ([facility])   • Cervical lymphadenopathy   • Generalized abdominal pain   • Nausea   • Neurological abnormality   • Tremor   • RUQ pain   • Pertussis exposure   • Chronic nonseasonal allergic rhinitis due to pollen   • Episode of recurrent major depressive disorder (HCC)   • Diarrhea   • Chronic diarrhea   • Thrombocytosis   • Nausea and vomiting   • Gluten intolerance   • C. difficile diarrhea   • Shiga toxin-producing Escherichia coli infection   • Generalized weakness   • Head injury   • Concussion with loss of consciousness   • Gastritis without bleeding   • Irritable bowel syndrome   • Tachycardia   • JELENA (generalized anxiety disorder)   • Marijuana use   • Epigastric pain   • History of Clostridioides difficile colitis   • Celiac disease   • Upper respiratory tract infection   • Irritable bowel syndrome with both constipation and diarrhea   • Moderate protein-calorie malnutrition (HCC)   • Folliculitis   • Itchy scalp   • Tinea capitis   • Chronic interstitial cystitis   • Fibromyalgia   • Gastric reflux   • Kidney stone   • Lymphocytic colitis   • Microscopic hematuria   • Obstruction of bile duct   • Throat pain   • Strain of right hip   • Gastroesophageal reflux disease with esophagitis without hemorrhage   • Cigarette nicotine dependence with nicotine-induced disorder   • Underweight   • Iron deficiency   • Leukocytosis   • History of cervical cancer   • Chronic obstructive pulmonary disease (HCC)   • Vasomotor symptoms due to menopause   • Change in bowel habits   • Other constipation   • Close exposure to COVID-19 virus   • Folate deficiency   • Adverse effect of iron   • History of COVID-19     Past Surgical History:   Procedure Laterality Date   • BACK SURGERY      C5-6   • COLONOSCOPY     • COLONOSCOPY N/A  10/18/2019    Procedure: COLONOSCOPY;  Surgeon: Tom Davis MD;  Location: [facility] ENDOSCOPY;  Service: Gastroenterology   • COLONOSCOPY N/A 8/27/2020    Procedure: COLONOSCOPY;  Surgeon: Tom Davis MD;  Location: [facility] ENDOSCOPY;  Service: Gastroenterology;  Laterality: N/A;   • COLONOSCOPY N/A 9/29/2021    Procedure: COLONOSCOPY;  Surgeon: Tom Davis MD;  Location: [facility] ENDOSCOPY;  Service: Gastroenterology;  Laterality: N/A;   • ENDOSCOPY N/A 10/18/2019    Procedure: ESOPHAGOGASTRODUODENOSCOPY;  Surgeon: Tom Davis MD;  Location: [facility] ENDOSCOPY;  Service: Gastroenterology   • ENDOSCOPY N/A 7/27/2020    Procedure: ESOPHAGOGASTRODUODENOSCOPY;  Surgeon: Tom Davis MD;  Location: [facility] ENDOSCOPY;  Service: Gastroenterology;  Laterality: N/A;   • ENDOSCOPY N/A 2/10/2021    Procedure: ESOPHAGOGASTRODUODENOSCOPY;  Surgeon: Tom Davis MD;  Location: [facility] ENDOSCOPY;  Service: Gastroenterology;  Laterality: N/A;   • HYSTERECTOMY     • SHOULDER ARTHROSCOPY W/ ROTATOR CUFF REPAIR Right    • TOE SURGERY      left small toe    • TONSILECTOMY, ADENOIDECTOMY, BILATERAL MYRINGOTOMY AND TUBES     • TONSILLECTOMY     • UPPER GASTROINTESTINAL ENDOSCOPY  02/10/2021     Social History     Socioeconomic History   • Marital status:    Tobacco Use   • Smoking status: Current Every Day Smoker     Packs/day: 1.00     Years: 40.00     Pack years: 40.00     Types: Cigarettes   • Smokeless tobacco: Never Used   Vaping Use   • Vaping Use: Former   • Substances: Nicotine   • Devices: Disposable, Refillable tank   Substance and Sexual Activity   • Alcohol use: No   • Drug use: Yes     Types: Marijuana     Comment: 09/16/2021 - Patient states she utilizes Marijuana nightly to increase appetite and decrease nausea.   • Sexual activity: Defer     Family History   Problem Relation Age of Onset   • Inflammatory bowel disease Mother    • Arthritis Mother    • Diabetes Mother    • Hypertension Mother     • Hyperlipidemia Mother    • Obesity Mother    • Osteoporosis Mother    • Heart disease Father    • Hyperlipidemia Father    • Heart attack Father    • Diabetes Sister    • Liver disease Daughter    • Mental illness Daughter    • Obesity Daughter    • Diabetes Maternal Grandmother    • Cancer Maternal Grandmother         lung   • Cancer Other         lung   • Heart disease Other      Office Visit on 08/10/2022   Component Date Value Ref Range Status   • SARS Antigen 08/10/2022 Not Detected  Not Detected, Presumptive Negative Final   • Internal Control 08/10/2022 Passed  Passed Final   • Lot Number 08/10/2022 [id number]   Final   • Expiration Date 08/10/2022 03/11/2023   Final   • WBC 08/10/2022 14.41 (A) 3.40 - 10.80 10*3/mm3 Final   • RBC 08/10/2022 4.42  3.77 - 5.28 10*6/mm3 Final   • Hemoglobin 08/10/2022 14.5  12.0 - 15.9 g/dL Final   • Hematocrit 08/10/2022 43.3  34.0 - 46.6 % Final   • MCV 08/10/2022 98.0 (A) 79.0 - 97.0 fL Final   • MCH 08/10/2022 32.8  26.6 - 33.0 pg Final   • MCHC 08/10/2022 33.5  31.5 - 35.7 g/dL Final   • RDW 08/10/2022 12.4  12.3 - 15.4 % Final   • RDW-SD 08/10/2022 44.9  37.0 - 54.0 fl Final   • MPV 08/10/2022 9.4  6.0 - 12.0 fL Final   • Platelets 08/10/2022 495 (A) 140 - 450 10*3/mm3 Final   • Glucose 08/10/2022 114 (A) 65 - 99 mg/dL Final   • BUN 08/10/2022 18  6 - 20 mg/dL Final   • Creatinine 08/10/2022 0.81  0.57 - 1.00 mg/dL Final   • Sodium 08/10/2022 141  136 - 145 mmol/L Final   • Potassium 08/10/2022 5.3 (A) 3.5 - 5.2 mmol/L Final   • Chloride 08/10/2022 105  98 - 107 mmol/L Final   • CO2 08/10/2022 27.0  22.0 - 29.0 mmol/L Final   • Calcium 08/10/2022 9.2  8.6 - 10.5 mg/dL Final   • Total Protein 08/10/2022 6.5  6.0 - 8.5 g/dL Final   • Albumin 08/10/2022 4.40  3.50 - 5.20 g/dL Final   • ALT (SGPT) 08/10/2022 20  1 - 33 U/L Final   • AST (SGOT) 08/10/2022 20  1 - 32 U/L Final   • Alkaline Phosphatase 08/10/2022 81  39 - 117 U/L Final   • Total Bilirubin 08/10/2022 0.3  0.0  - 1.2 mg/dL Final   • Globulin 08/10/2022 2.1  gm/dL Final   • A/G Ratio 08/10/2022 2.1  g/dL Final   • BUN/Creatinine Ratio 08/10/2022 22.2  7.0 - 25.0 Final   • Anion Gap 08/10/2022 9.0  5.0 - 15.0 mmol/L Final   • eGFR 08/10/2022 86.4  >60.0 mL/min/1.73 Final    National Kidney Foundation and American Society of Nephrology (ASN) Task Force recommended calculation based on the Chronic Kidney Disease Epidemiology Collaboration (CKD-EPI) equation refit without adjustment for race.   • D-Dimer, Quantitative 08/10/2022 295  0 - 470 ng/mL (FEU) Final   Lab on 06/21/2022   Component Date Value Ref Range Status   • Creatinine 06/21/2022 0.63  0.57 - 1.00 mg/dL Final   • eGFR 06/21/2022 105.6  >60.0 mL/min/1.73 Final    National Kidney Foundation and American Society of Nephrology (ASN) Task Force recommended calculation based on the Chronic Kidney Disease Epidemiology Collaboration (CKD-EPI) equation refit without adjustment for race.   • BUN 06/21/2022 12  6 - 20 mg/dL Final   Lab on 06/06/2022   Component Date Value Ref Range Status   • Iron 06/06/2022 81  37 - 145 mcg/dL Final   • Iron Saturation 06/06/2022 23  20 - 50 % Final   • Transferrin 06/06/2022 236  200 - 360 mg/dL Final   • TIBC 06/06/2022 352  298 - 536 mcg/dL Final   • Ferritin 06/06/2022 328.60 (A) 13.00 - 150.00 ng/mL Final   • Folate 06/06/2022 3.66 (A) 4.78 - 24.20 ng/mL Final   • Vitamin B-12 06/06/2022 >2,000 (A) 211 - 946 pg/mL Final   • WBC 06/06/2022 10.34  3.40 - 10.80 10*3/mm3 Final   • RBC 06/06/2022 4.42  3.77 - 5.28 10*6/mm3 Final   • Hemoglobin 06/06/2022 14.8  12.0 - 15.9 g/dL Final   • Hematocrit 06/06/2022 43.6  34.0 - 46.6 % Final   • MCV 06/06/2022 98.6 (A) 79.0 - 97.0 fL Final   • MCH 06/06/2022 33.5 (A) 26.6 - 33.0 pg Final   • MCHC 06/06/2022 33.9  31.5 - 35.7 g/dL Final   • RDW 06/06/2022 12.2 (A) 12.3 - 15.4 % Final   • RDW-SD 06/06/2022 44.2  37.0 - 54.0 fl Final   • MPV 06/06/2022 9.1  6.0 - 12.0 fL Final   • Platelets 06/06/2022  391  140 - 450 10*3/mm3 Final   • Neutrophil % 06/06/2022 57.1  42.7 - 76.0 % Final   • Lymphocyte % 06/06/2022 29.8  19.6 - 45.3 % Final   • Monocyte % 06/06/2022 9.4  5.0 - 12.0 % Final   • Eosinophil % 06/06/2022 2.3  0.3 - 6.2 % Final   • Basophil % 06/06/2022 0.8  0.0 - 1.5 % Final   • Immature Grans % 06/06/2022 0.6 (A) 0.0 - 0.5 % Final   • Neutrophils, Absolute 06/06/2022 5.91  1.70 - 7.00 10*3/mm3 Final   • Lymphocytes, Absolute 06/06/2022 3.08  0.70 - 3.10 10*3/mm3 Final   • Monocytes, Absolute 06/06/2022 0.97 (A) 0.10 - 0.90 10*3/mm3 Final   • Eosinophils, Absolute 06/06/2022 0.24  0.00 - 0.40 10*3/mm3 Final   • Basophils, Absolute 06/06/2022 0.08  0.00 - 0.20 10*3/mm3 Final   • Immature Grans, Absolute 06/06/2022 0.06 (A) 0.00 - 0.05 10*3/mm3 Final   • nRBC 06/06/2022 0.0  0.0 - 0.2 /100 WBC Final   Admission on 03/28/2022, Discharged on 03/28/2022   Component Date Value Ref Range Status   • Glucose 03/28/2022 87  65 - 99 mg/dL Final   • BUN 03/28/2022 8  6 - 20 mg/dL Final   • Creatinine 03/28/2022 0.60  0.57 - 1.00 mg/dL Final   • Sodium 03/28/2022 140  136 - 145 mmol/L Final   • Potassium 03/28/2022 3.5  3.5 - 5.2 mmol/L Final   • Chloride 03/28/2022 102  98 - 107 mmol/L Final   • CO2 03/28/2022 23.0  22.0 - 29.0 mmol/L Final   • Calcium 03/28/2022 8.8  8.6 - 10.5 mg/dL Final   • Total Protein 03/28/2022 7.0  6.0 - 8.5 g/dL Final   • Albumin 03/28/2022 4.60  3.50 - 5.20 g/dL Final   • ALT (SGPT) 03/28/2022 15  1 - 33 U/L Final   • AST (SGOT) 03/28/2022 16  1 - 32 U/L Final   • Alkaline Phosphatase 03/28/2022 90  39 - 117 U/L Final   • Total Bilirubin 03/28/2022 0.6  0.0 - 1.2 mg/dL Final   • Globulin 03/28/2022 2.4  gm/dL Final   • A/G Ratio 03/28/2022 1.9  g/dL Final   • BUN/Creatinine Ratio 03/28/2022 13.3  7.0 - 25.0 Final   • Anion Gap 03/28/2022 15.0  5.0 - 15.0 mmol/L Final   • eGFR 03/28/2022 106.8  >60.0 mL/min/1.73 Final    National Kidney Foundation and American Society of Nephrology (ASN)  Task Force recommended calculation based on the Chronic Kidney Disease Epidemiology Collaboration (CKD-EPI) equation refit without adjustment for race.   • D-Dimer, Quantitative 03/28/2022 <270  0 - 470 ng/mL (FEU) Final   • Troponin T 03/28/2022 <0.010  0.000 - 0.030 ng/mL Final   • proBNP 03/28/2022 75.1  0.0 - 900.0 pg/mL Final   • COVID19 03/28/2022 Not Detected  Not Detected - Ref. Range Final   • Influenza A PCR 03/28/2022 Not Detected  Not Detected Final   • Influenza B PCR 03/28/2022 Not Detected  Not Detected Final   • QT Interval 03/28/2022 362  ms Final   • QTC Interval 03/28/2022 435  ms Final   • WBC 03/28/2022 13.58 (A) 3.40 - 10.80 10*3/mm3 Final   • RBC 03/28/2022 4.14  3.77 - 5.28 10*6/mm3 Final   • Hemoglobin 03/28/2022 13.7  12.0 - 15.9 g/dL Final   • Hematocrit 03/28/2022 39.9  34.0 - 46.6 % Final   • MCV 03/28/2022 96.4  79.0 - 97.0 fL Final   • MCH 03/28/2022 33.1 (A) 26.6 - 33.0 pg Final   • MCHC 03/28/2022 34.3  31.5 - 35.7 g/dL Final   • RDW 03/28/2022 12.5  12.3 - 15.4 % Final   • RDW-SD 03/28/2022 44.6  37.0 - 54.0 fl Final   • MPV 03/28/2022 9.0  6.0 - 12.0 fL Final   • Platelets 03/28/2022 415  140 - 450 10*3/mm3 Final   • Neutrophil % 03/28/2022 72.6  42.7 - 76.0 % Final   • Lymphocyte % 03/28/2022 17.5 (A) 19.6 - 45.3 % Final   • Monocyte % 03/28/2022 7.5  5.0 - 12.0 % Final   • Eosinophil % 03/28/2022 1.3  0.3 - 6.2 % Final   • Basophil % 03/28/2022 0.5  0.0 - 1.5 % Final   • Immature Grans % 03/28/2022 0.6 (A) 0.0 - 0.5 % Final   • Neutrophils, Absolute 03/28/2022 9.86 (A) 1.70 - 7.00 10*3/mm3 Final   • Lymphocytes, Absolute 03/28/2022 2.37  0.70 - 3.10 10*3/mm3 Final   • Monocytes, Absolute 03/28/2022 1.02 (A) 0.10 - 0.90 10*3/mm3 Final   • Eosinophils, Absolute 03/28/2022 0.18  0.00 - 0.40 10*3/mm3 Final   • Basophils, Absolute 03/28/2022 0.07  0.00 - 0.20 10*3/mm3 Final   • Immature Grans, Absolute 03/28/2022 0.08 (A) 0.00 - 0.05 10*3/mm3 Final   • nRBC 03/28/2022 0.0  0.0 - 0.2  /100 WBC Final   • Extra Tube 03/28/2022 Hold for add-ons.   Final    Auto resulted.   Lab on 03/07/2022   Component Date Value Ref Range Status   • Iron 03/07/2022 101  37 - 145 mcg/dL Final   • Iron Saturation 03/07/2022 31  20 - 50 % Final   • Transferrin 03/07/2022 219  200 - 360 mg/dL Final   • TIBC 03/07/2022 326  298 - 536 mcg/dL Final   • Ferritin 03/07/2022 384.60 (A) 13.00 - 150.00 ng/mL Final   • Folate 03/07/2022 3.20 (A) 4.78 - 24.20 ng/mL Final   • Vitamin B-12 03/07/2022 712  211 - 946 pg/mL Final   • WBC 03/07/2022 14.49 (A) 3.40 - 10.80 10*3/mm3 Final   • RBC 03/07/2022 4.33  3.77 - 5.28 10*6/mm3 Final   • Hemoglobin 03/07/2022 14.6  12.0 - 15.9 g/dL Final   • Hematocrit 03/07/2022 43.3  34.0 - 46.6 % Final   • MCV 03/07/2022 100.0 (A) 79.0 - 97.0 fL Final   • MCH 03/07/2022 33.7 (A) 26.6 - 33.0 pg Final   • MCHC 03/07/2022 33.7  31.5 - 35.7 g/dL Final   • RDW 03/07/2022 13.2  12.3 - 15.4 % Final   • RDW-SD 03/07/2022 49.1  37.0 - 54.0 fl Final   • MPV 03/07/2022 9.0  6.0 - 12.0 fL Final   • Platelets 03/07/2022 433  140 - 450 10*3/mm3 Final   • Neutrophil % 03/07/2022 65.8  42.7 - 76.0 % Final   • Lymphocyte % 03/07/2022 21.9  19.6 - 45.3 % Final   • Monocyte % 03/07/2022 9.0  5.0 - 12.0 % Final   • Eosinophil % 03/07/2022 1.7  0.3 - 6.2 % Final   • Basophil % 03/07/2022 0.8  0.0 - 1.5 % Final   • Immature Grans % 03/07/2022 0.8 (A) 0.0 - 0.5 % Final   • Neutrophils, Absolute 03/07/2022 9.55 (A) 1.70 - 7.00 10*3/mm3 Final   • Lymphocytes, Absolute 03/07/2022 3.17 (A) 0.70 - 3.10 10*3/mm3 Final   • Monocytes, Absolute 03/07/2022 1.30 (A) 0.10 - 0.90 10*3/mm3 Final   • Eosinophils, Absolute 03/07/2022 0.25  0.00 - 0.40 10*3/mm3 Final   • Basophils, Absolute 03/07/2022 0.11  0.00 - 0.20 10*3/mm3 Final   • Immature Grans, Absolute 03/07/2022 0.11 (A) 0.00 - 0.05 10*3/mm3 Final   • nRBC 03/07/2022 0.0  0.0 - 0.2 /100 WBC Final      XR Chest PA & Lateral  Narrative: Comparison: 6/21/2022    Indication:  "Shortness of breath    Findings: PA and lateral views of the chest are obtained. The  cardiomediastinal silhouette is normal size and configuration.  The central vasculature is within normal limits. Lung volumes are  within normal limits. Lungs are hyperlucent. There is no  pneumothorax pleural effusion or lobar consolidation. Biapical  scarring. Mild reverse S-shaped curvature of the thoracolumbar  spine.  Impression: Impression: No acute pulmonary process. Biapical scarring.    Electronically signed by:  Que Cowan MD  8/10/2022 2:43 PM CDT  Workstation: [id number]    @[email or web address]@  Immunization History   Administered Date(s) Administered   • COVID-19 (CHRYSTAL) 03/10/2021, 03/10/2021   • Flu Vaccine Intradermal Quad 18-64YR 10/07/2010, 09/03/2020   • FluLaval/Fluzone >6mos 11/30/2015, 09/03/2020, 09/25/2021   • Influenza Quad Vaccine (Inpatient) 09/13/2011, 11/08/2016   • Influenza TIV (IM) 10/07/2010   • Influenza, Unspecified 09/03/2020   • Pneumococcal Polysaccharide (PPSV23) 11/30/2015   • Tdap 10/07/2010   • flucelvax quad pfs =>4 YRS 09/19/2019       The following portions of the patient's history were reviewed and updated as appropriate: allergies, current medications, past family history, past medical history, past social history, past surgical history and problem list.        Physical Exam  /66 (BP Location: Left arm, Patient Position: Sitting, Cuff Size: Adult)   Pulse 97   Temp 97.3 °F (36.3 °C)   Ht 165.1 cm (65\")   Wt 45 kg (99 lb 3.2 oz)   LMP  (LMP Unknown)   SpO2 98%   BMI 16.51 kg/m²     Physical Exam  Vitals and nursing note reviewed.   Constitutional:       Appearance: She is well-developed. She is not diaphoretic.   HENT:      Head: Normocephalic and atraumatic.      Right Ear: External ear normal.   Eyes:      Conjunctiva/sclera: Conjunctivae normal.      Pupils: Pupils are equal, round, and reactive to light.   Cardiovascular:      Rate and Rhythm: Normal rate and regular rhythm.    "   Heart sounds: Normal heart sounds. No murmur heard.  Pulmonary:      Effort: Pulmonary effort is normal. No respiratory distress.      Comments: Decreased breath sounds   Abdominal:      General: Bowel sounds are normal. There is no distension.      Palpations: Abdomen is soft.      Tenderness: There is abdominal tenderness.   Musculoskeletal:         General: No deformity. Normal range of motion.      Cervical back: Normal range of motion and neck supple.   Skin:     General: Skin is warm.      Coloration: Skin is not pale.      Findings: No erythema or rash.   Neurological:      Mental Status: She is alert and oriented to person, place, and time.      Cranial Nerves: No cranial nerve deficit.   Psychiatric:         Behavior: Behavior normal.         [unfilled]   Diagnosis Plan   1. History of COVID-19  Comprehensive metabolic panel   2. Neurological abnormality     3. Stage 2 moderate COPD by GOLD classification (HCC)     4. Vitamin D deficiency      5. B12 deficiency     6. Underweight     7. Leukocytosis, unspecified type     8. Thrombocytosis     9. Positive self-administered antigen test for COVID-19  benzonatate (Tessalon Perles) 100 MG capsule   10. Strain of right hip, initial encounter  cyclobenzaprine (FLEXERIL) 5 MG tablet   11. Nausea  ondansetron ODT (ZOFRAN-ODT) 8 MG disintegrating tablet        -history of COVID - her last COIVD test was negative. Her chest x-ray showed no active disease. Advised pt to quit smoking. Recommend pt to continue probiotics, vitamins. Along with fish oil supplement. Advised pt to call [facility] for an appt    -balance issues/ear problem/dizziness/tinnitus - refer to ENT   -vasomotor symptoms due to menopause - cannot start hormonal therapy due to history of tobacco use.  Was on paxil 10 mg dialy and black cohosh    -vitamin D deficiency -on vitamin  D once a week  -moderate protein malnutrition/underweight  -  recommend  high calorie diet information/ smoking  cesation   -leukocytosis/thrombocytosis - Hematology following   -COPD/COPD exacerbation/chronic  bronchitis/abnormal CT of chest  - Pulmonology following on albuterol nebs and inhalernow   Albuterol PRN. Continue combivent   C.  Advised importance of smoking cessation.. advised pt to start on singulair 10 mg at bedtime. On symbicort and dubo nebs   -tobacco user - counseled to quit smoking >5 minutes. She could not tolerate chantix  urged the importance of quitting smoking.  Insurance would not cover nicotine patch and gum. Recommend pt call 1 [phone] NOW recommend nicotine replacement therapy. Will try nicotine inhaler   -tremor - pt stopped  elavil Neurology folowing   -abdominal pain/gastritis/IBS /GERD/gluten sensitivity  - Gastroenterology following.stable  On PPI prilosec 20 mg q daily on trulance 3 mg PO q daily.   continue with gluten free diet. RUQ US ordered along with labwork. Those have yet to be completed   -advised pt to be safe and call with questions and concerns  -advised pt to go to ER or call 911 if symptoms worrisome or severe  -advised pt to be safe during COVID-19 pandemic  I spent 30  minutes caring for Carla on this date of service. This time includes time spent by me in the following activities: preparing for the visit, reviewing tests, obtaining and/or reviewing a separately obtained history, performing a medically appropriate examination and/or evaluation, counseling and educating the patient/family/caregiver, ordering medications, tests, or procedures, referring and communicating with other health care professionals, documenting information in the medical record, independently interpreting results and communicating that information with the patient/family/caregiver and care coordination.   -recheck in 2 months         This document has been electronically signed by Xavier Wick MD on August 23, 2022 10:29 CDT

## 2022-08-23 ENCOUNTER — LAB (OUTPATIENT)
Dept: LAB | Facility: HOSPITAL | Age: 52
End: 2022-08-23

## 2022-08-23 ENCOUNTER — OFFICE VISIT (OUTPATIENT)
Dept: FAMILY MEDICINE CLINIC | Facility: CLINIC | Age: 52
End: 2022-08-23

## 2022-08-23 VITALS
WEIGHT: 99.2 LBS | SYSTOLIC BLOOD PRESSURE: 108 MMHG | HEART RATE: 97 BPM | TEMPERATURE: 97.3 F | DIASTOLIC BLOOD PRESSURE: 66 MMHG | OXYGEN SATURATION: 98 % | BODY MASS INDEX: 16.53 KG/M2 | HEIGHT: 65 IN

## 2022-08-23 DIAGNOSIS — E55.9 VITAMIN D DEFICIENCY: ICD-10-CM

## 2022-08-23 DIAGNOSIS — Z86.16 HISTORY OF COVID-19: ICD-10-CM

## 2022-08-23 DIAGNOSIS — D75.839 THROMBOCYTOSIS: ICD-10-CM

## 2022-08-23 DIAGNOSIS — J44.9 STAGE 2 MODERATE COPD BY GOLD CLASSIFICATION: ICD-10-CM

## 2022-08-23 DIAGNOSIS — E61.1 IRON DEFICIENCY: ICD-10-CM

## 2022-08-23 DIAGNOSIS — E53.8 B12 DEFICIENCY: Chronic | ICD-10-CM

## 2022-08-23 DIAGNOSIS — T45.4X5A ADVERSE EFFECT OF IRON, INITIAL ENCOUNTER: ICD-10-CM

## 2022-08-23 DIAGNOSIS — D72.829 LEUKOCYTOSIS, UNSPECIFIED TYPE: ICD-10-CM

## 2022-08-23 DIAGNOSIS — R63.6 UNDERWEIGHT: ICD-10-CM

## 2022-08-23 DIAGNOSIS — E53.8 B12 DEFICIENCY: ICD-10-CM

## 2022-08-23 DIAGNOSIS — U07.1 POSITIVE SELF-ADMINISTERED ANTIGEN TEST FOR COVID-19: ICD-10-CM

## 2022-08-23 DIAGNOSIS — R79.89 ELEVATED PLATELET COUNT: ICD-10-CM

## 2022-08-23 DIAGNOSIS — R11.0 NAUSEA: ICD-10-CM

## 2022-08-23 DIAGNOSIS — Z86.16 HISTORY OF COVID-19: Primary | ICD-10-CM

## 2022-08-23 DIAGNOSIS — Z85.41 HISTORY OF CERVICAL CANCER: ICD-10-CM

## 2022-08-23 DIAGNOSIS — S76.011A STRAIN OF RIGHT HIP, INITIAL ENCOUNTER: ICD-10-CM

## 2022-08-23 DIAGNOSIS — E53.8 FOLATE DEFICIENCY: ICD-10-CM

## 2022-08-23 DIAGNOSIS — R29.818 NEUROLOGICAL ABNORMALITY: ICD-10-CM

## 2022-08-23 LAB
ALBUMIN SERPL-MCNC: 4.8 G/DL (ref 3.5–5.2)
ALBUMIN/GLOB SERPL: 2.5 G/DL
ALP SERPL-CCNC: 76 U/L (ref 39–117)
ALT SERPL W P-5'-P-CCNC: 39 U/L (ref 1–33)
ANION GAP SERPL CALCULATED.3IONS-SCNC: 11 MMOL/L (ref 5–15)
AST SERPL-CCNC: 34 U/L (ref 1–32)
BASOPHILS # BLD AUTO: 0.09 10*3/MM3 (ref 0–0.2)
BASOPHILS NFR BLD AUTO: 0.8 % (ref 0–1.5)
BILIRUB SERPL-MCNC: 0.4 MG/DL (ref 0–1.2)
BUN SERPL-MCNC: 11 MG/DL (ref 6–20)
BUN/CREAT SERPL: 18.3 (ref 7–25)
CALCIUM SPEC-SCNC: 9.7 MG/DL (ref 8.6–10.5)
CHLORIDE SERPL-SCNC: 102 MMOL/L (ref 98–107)
CO2 SERPL-SCNC: 27 MMOL/L (ref 22–29)
CREAT SERPL-MCNC: 0.6 MG/DL (ref 0.57–1)
DEPRECATED RDW RBC AUTO: 48 FL (ref 37–54)
EGFRCR SERPLBLD CKD-EPI 2021: 106.8 ML/MIN/1.73
EOSINOPHIL # BLD AUTO: 0.18 10*3/MM3 (ref 0–0.4)
EOSINOPHIL NFR BLD AUTO: 1.6 % (ref 0.3–6.2)
ERYTHROCYTE [DISTWIDTH] IN BLOOD BY AUTOMATED COUNT: 13.2 % (ref 12.3–15.4)
FERRITIN SERPL-MCNC: 430.8 NG/ML (ref 13–150)
FOLATE SERPL-MCNC: 7.56 NG/ML (ref 4.78–24.2)
GLOBULIN UR ELPH-MCNC: 1.9 GM/DL
GLUCOSE SERPL-MCNC: 91 MG/DL (ref 65–99)
HCT VFR BLD AUTO: 42.9 % (ref 34–46.6)
HGB BLD-MCNC: 14.4 G/DL (ref 12–15.9)
IMM GRANULOCYTES # BLD AUTO: 0.08 10*3/MM3 (ref 0–0.05)
IMM GRANULOCYTES NFR BLD AUTO: 0.7 % (ref 0–0.5)
IRON 24H UR-MRATE: 71 MCG/DL (ref 37–145)
IRON SATN MFR SERPL: 19 % (ref 20–50)
LYMPHOCYTES # BLD AUTO: 2.81 10*3/MM3 (ref 0.7–3.1)
LYMPHOCYTES NFR BLD AUTO: 24.7 % (ref 19.6–45.3)
MCH RBC QN AUTO: 33.3 PG (ref 26.6–33)
MCHC RBC AUTO-ENTMCNC: 33.6 G/DL (ref 31.5–35.7)
MCV RBC AUTO: 99.3 FL (ref 79–97)
MONOCYTES # BLD AUTO: 0.76 10*3/MM3 (ref 0.1–0.9)
MONOCYTES NFR BLD AUTO: 6.7 % (ref 5–12)
NEUTROPHILS NFR BLD AUTO: 65.5 % (ref 42.7–76)
NEUTROPHILS NFR BLD AUTO: 7.45 10*3/MM3 (ref 1.7–7)
NRBC BLD AUTO-RTO: 0 /100 WBC (ref 0–0.2)
PLATELET # BLD AUTO: 449 10*3/MM3 (ref 140–450)
PMV BLD AUTO: 9.5 FL (ref 6–12)
POTASSIUM SERPL-SCNC: 4.5 MMOL/L (ref 3.5–5.2)
PROT SERPL-MCNC: 6.7 G/DL (ref 6–8.5)
RBC # BLD AUTO: 4.32 10*6/MM3 (ref 3.77–5.28)
SODIUM SERPL-SCNC: 140 MMOL/L (ref 136–145)
TIBC SERPL-MCNC: 370 MCG/DL (ref 298–536)
TRANSFERRIN SERPL-MCNC: 248 MG/DL (ref 200–360)
VIT B12 BLD-MCNC: 1289 PG/ML (ref 211–946)
WBC NRBC COR # BLD: 11.37 10*3/MM3 (ref 3.4–10.8)

## 2022-08-23 PROCEDURE — 99214 OFFICE O/P EST MOD 30 MIN: CPT | Performed by: FAMILY MEDICINE

## 2022-08-23 PROCEDURE — 83540 ASSAY OF IRON: CPT

## 2022-08-23 PROCEDURE — 82607 VITAMIN B-12: CPT

## 2022-08-23 PROCEDURE — 84466 ASSAY OF TRANSFERRIN: CPT

## 2022-08-23 PROCEDURE — 85025 COMPLETE CBC W/AUTO DIFF WBC: CPT

## 2022-08-23 PROCEDURE — 82746 ASSAY OF FOLIC ACID SERUM: CPT

## 2022-08-23 PROCEDURE — 80053 COMPREHEN METABOLIC PANEL: CPT

## 2022-08-23 PROCEDURE — 82728 ASSAY OF FERRITIN: CPT

## 2022-08-23 RX ORDER — ONDANSETRON 8 MG/1
8 TABLET, ORALLY DISINTEGRATING ORAL EVERY 8 HOURS PRN
Qty: 30 TABLET | Refills: 0 | Status: SHIPPED | OUTPATIENT
Start: 2022-08-23 | End: 2022-11-09 | Stop reason: SDUPTHER

## 2022-08-23 RX ORDER — CYCLOBENZAPRINE HCL 5 MG
5 TABLET ORAL 3 TIMES DAILY PRN
Qty: 30 TABLET | Refills: 1 | Status: SHIPPED | OUTPATIENT
Start: 2022-08-23

## 2022-08-23 RX ORDER — GUAIFENESIN 600 MG/1
1200 TABLET, EXTENDED RELEASE ORAL 2 TIMES DAILY
Qty: 60 TABLET | Refills: 3 | Status: SHIPPED | OUTPATIENT
Start: 2022-08-23 | End: 2022-11-28

## 2022-08-23 RX ORDER — BENZONATATE 100 MG/1
CAPSULE ORAL
Qty: 60 CAPSULE | Refills: 0 | Status: SHIPPED | OUTPATIENT
Start: 2022-08-23 | End: 2022-09-21

## 2022-08-23 RX ORDER — NICOTINE 10 MG
2 CARTRIDGE (EA) INHALATION AS NEEDED
Qty: 168 EACH | Refills: 3 | Status: SHIPPED | OUTPATIENT
Start: 2022-08-23

## 2022-08-23 RX ORDER — BENZONATATE 100 MG/1
100 CAPSULE ORAL 3 TIMES DAILY PRN
Qty: 90 CAPSULE | Refills: 3 | Status: SHIPPED | OUTPATIENT
Start: 2022-08-23 | End: 2022-09-21

## 2022-08-23 NOTE — PATIENT INSTRUCTIONS
Zinc, fish oil omega 3 fatty acids, probiotics     Fish oil with omega 3 fatty acids and DHA EPA     Call Dr. Chen for an appt    Call Dr. Block office for an appt

## 2022-08-24 DIAGNOSIS — R74.8 ELEVATED LIVER ENZYMES: Primary | ICD-10-CM

## 2022-09-06 ENCOUNTER — OFFICE VISIT (OUTPATIENT)
Dept: OTOLARYNGOLOGY | Facility: CLINIC | Age: 52
End: 2022-09-06

## 2022-09-06 ENCOUNTER — CLINICAL SUPPORT (OUTPATIENT)
Dept: AUDIOLOGY | Facility: CLINIC | Age: 52
End: 2022-09-06

## 2022-09-06 VITALS
HEART RATE: 86 BPM | HEIGHT: 65 IN | WEIGHT: 103.2 LBS | DIASTOLIC BLOOD PRESSURE: 69 MMHG | BODY MASS INDEX: 17.19 KG/M2 | SYSTOLIC BLOOD PRESSURE: 103 MMHG

## 2022-09-06 DIAGNOSIS — H90.41 SENSORINEURAL HEARING LOSS (SNHL) OF RIGHT EAR WITH UNRESTRICTED HEARING OF LEFT EAR: ICD-10-CM

## 2022-09-06 DIAGNOSIS — R42 DIZZINESS: Primary | ICD-10-CM

## 2022-09-06 DIAGNOSIS — H93.13 TINNITUS OF BOTH EARS: ICD-10-CM

## 2022-09-06 DIAGNOSIS — R26.89 IMBALANCE: Primary | ICD-10-CM

## 2022-09-06 DIAGNOSIS — H90.5 HEARING LOSS, SENSORINEURAL, HIGH FREQUENCY, RIGHT: ICD-10-CM

## 2022-09-06 PROCEDURE — 99203 OFFICE O/P NEW LOW 30 MIN: CPT | Performed by: OTOLARYNGOLOGY

## 2022-09-06 PROCEDURE — 92567 TYMPANOMETRY: CPT | Performed by: AUDIOLOGIST

## 2022-09-06 PROCEDURE — 92557 COMPREHENSIVE HEARING TEST: CPT | Performed by: AUDIOLOGIST

## 2022-09-06 NOTE — PROGRESS NOTES
Subjective   Carla Richard is a 52 y.o. female.       History of Present Illness     Patient is here today for evaluation of her ears.  Reports she has had bilateral tinnitus for quite some time.  Thinks her hearing is decreased subjectively.  Her primary complaint however is balance.  Says whenever she closes her eyes she will fall.  She can have symptoms of transient dizziness several times a day.  This is not a spinning vertigo.  She has been seen by local neurologist who apparently wants her to be seen at a tertiary center.    The following portions of the patient's history were reviewed and updated as appropriate: allergies, current medications, past family history, past medical history, past social history, past surgical history and problem list.     reports that she has been smoking cigarettes. She has a 40.00 pack-year smoking history. She has never used smokeless tobacco. She reports current drug use. Drug: Marijuana. She reports that she does not drink alcohol.   Patient is a tobacco user and has been counseled for use of tobacco products      Review of Systems        Objective   Physical Exam  Ears: External ears no deformity, canals no discharge, tympanic membranes intact clear and mobile bilaterally.  Nares no discharge or purulence  Oral cavity: Lips and gums without lesions.  Tongue and floor of mouth without lesions.  Parotid and submandibular ducts unobstructed.  No mucosal lesions on the buccal mucosa or vestibule of the mouth.  Pharynx: No erythema exudate or mass  Neck: No lymphadenopathy.  No thyromegaly.  Trachea and larynx midline.  No masses in the parotid or submandibular glands.  Buena-Hallpike maneuvers produce no vertigo no nystagmus    Audiogram is obtained and reviewed and shows hearing within normal limits on the left and a very mild high-frequency sensorineural loss on the right.  Tympanograms are type a bilaterally.  Discrimination scores are 100% bilaterally.          Assessment and Plan   Diagnoses and all orders for this visit:    1. Imbalance (Primary)    2. Tinnitus of both ears    3. Hearing loss, sensorineural, high frequency, right             Plan: Explained to the patient that in all likelihood her symptoms are not otologic in nature but in order to complete work-up before she seeks care at a tertiary center I think it would be reasonable to obtain an ENG.  If this shows normal vestibular function then this is almost certainly a neurologic problem.  Should be seen after the ENG.

## 2022-09-07 NOTE — PROGRESS NOTES
STANDARD AUDIOMETRIC EVALUATION      Name:  Carla Richard  :  1970  Age:  52 y.o.  Date of Evaluation:  2022      HISTORY    Reason for visit:  Carla Richard is seen today for a hearing test at the request of Dr. Chester Woo.  Patient reports she gets dizzy and she falls down, and this is getting worse.  She states she hears ringing in both ears.        EVALUATION    See Audiogram    RESULTS        Otoscopy and Tympanometry 226 Hz :  Right Ear:  Otoscopy:  Testing completed after ears were examined by the ENT physician          Tympanometry:  Middle ear function within normal limits    Left Ear:   Otoscopy:  Testing completed after ears were examined by the ENT physician        Tympanometry:  Middle ear function within normal limits    Test technique:  Standard Audiometry     Pure Tone Audiometry:   Patient responded to pure tones at 10-35 dB for 250-8000 Hz in right ear, and at 10-25 dB for 250-8000 Hz in left ear.       Speech Audiometry:        Right Ear:  Speech Reception Threshold (SRT) was obtained at 20 dBHL                 Speech Discrimination scores were 100% in quiet when words were presented at 60 dBHL       Left Ear:  Speech Reception Threshold (SRT) was obtained at 10 dBHL                 Speech Discrimination scores were 100% in quiet when words were presented at 50 dBHL    Reliability:   good    IMPRESSIONS:  1.  Tympanometry results are consistent with Middle ear function within normal limits in both ears.  2.  Pure tone results are consistent with within normal limits to mild sloping, high frequency sensorineural hearing loss for right ear, and hearing sensitivity essentially within normal limits in left ear.       RECOMMENDATIONS:  Patient is seeing the Ear Nose and Throat physician immediately following this examination.  It was a pleasure seeing Carla Richard in Audiology today.  We would be happy to do further testing or discuss these test as  necessary.          This document has been electronically signed by Elisabet Guerrero MS CCC-A on September 7, 2022 08:54 CDT       Elisabet Guerrero MS CCC-A  Licensed Audiologist

## 2022-09-12 ENCOUNTER — LAB (OUTPATIENT)
Dept: ONCOLOGY | Facility: HOSPITAL | Age: 52
End: 2022-09-12

## 2022-09-12 ENCOUNTER — OFFICE VISIT (OUTPATIENT)
Dept: ONCOLOGY | Facility: CLINIC | Age: 52
End: 2022-09-12

## 2022-09-12 VITALS
WEIGHT: 101 LBS | DIASTOLIC BLOOD PRESSURE: 59 MMHG | HEART RATE: 77 BPM | SYSTOLIC BLOOD PRESSURE: 121 MMHG | TEMPERATURE: 97.5 F | BODY MASS INDEX: 16.81 KG/M2

## 2022-09-12 DIAGNOSIS — D75.839 THROMBOCYTOSIS: Chronic | ICD-10-CM

## 2022-09-12 DIAGNOSIS — E61.1 IRON DEFICIENCY: Primary | Chronic | ICD-10-CM

## 2022-09-12 DIAGNOSIS — E53.8 FOLATE DEFICIENCY: Chronic | ICD-10-CM

## 2022-09-12 DIAGNOSIS — T45.4X5A ADVERSE EFFECT OF IRON, INITIAL ENCOUNTER: Chronic | ICD-10-CM

## 2022-09-12 DIAGNOSIS — Z85.41 HISTORY OF CERVICAL CANCER: Chronic | ICD-10-CM

## 2022-09-12 DIAGNOSIS — E53.8 B12 DEFICIENCY: Chronic | ICD-10-CM

## 2022-09-12 PROCEDURE — 1125F AMNT PAIN NOTED PAIN PRSNT: CPT | Performed by: INTERNAL MEDICINE

## 2022-09-12 PROCEDURE — 99214 OFFICE O/P EST MOD 30 MIN: CPT | Performed by: INTERNAL MEDICINE

## 2022-09-12 PROCEDURE — 1157F ADVNC CARE PLAN IN RCRD: CPT | Performed by: INTERNAL MEDICINE

## 2022-09-12 PROCEDURE — G0463 HOSPITAL OUTPT CLINIC VISIT: HCPCS | Performed by: INTERNAL MEDICINE

## 2022-09-12 NOTE — PROGRESS NOTES
DATE OF VISIT: 9/12/2022      REASON FOR VISIT: Leukocytosis, B12 deficiency, folate deficiency, history of cervical cancer, iron deficiency      HISTORY OF PRESENT ILLNESS:   52-year-old female with medical problem consisting of history of cervical cancer diagnosed at age 19 s/p conization procedure followed by hysterectomy 8-8 25, chronic obstructive pulmonary disease, chronic nicotine addiction, celiac disease, pancreatitis, leukocytosis, iron deficiency, B12 deficiency, folate deficiency is here for follow-up appointment today.  Complains of chronic shortness of breath.  Complains of chronic tingling and numbness affecting bilateral hand.  Complains of headache.  Denies any bleeding.  Denies any new lymph node enlargement.              Past Medical History, Past Surgical History, Social History, Family History have been reviewed and are without significant changes except as mentioned.    Review of Systems   A comprehensive 14 point review of systems was performed and was negative except as mentioned in HPI.    Medications:  The current medication list was reviewed in the EMR    ALLERGIES:    Allergies   Allergen Reactions   • Nsaids Swelling and GI Intolerance   • Paxlovid [Nirmatrelvir-Ritonavir] Swelling   • Azithromycin Swelling   • Ciprofloxacin Hives   • Doxycycline Swelling   • Keflex [Cephalexin] Diarrhea and Other (See Comments)     Abdominal pain   • Amoxicillin Other (See Comments)     Vomitting, stomach swells, diarrhea , extreme stomach pain   • Other Other (See Comments)     nicotine patch   Caused body twitching/seizures   • Levofloxacin Rash   • Phenergan [Promethazine Hcl] GI Intolerance       Objective      Vitals:    09/12/22 1022   BP: 121/59   Pulse: 77   Temp: 97.5 °F (36.4 °C)   TempSrc: Temporal   Weight: 45.8 kg (101 lb)   PainSc:   4   PainLoc: Generalized  Comment: worked at home and sore     Current Status 12/16/2021   ECOG score 0       Physical Exam  Pulmonary:      Breath sounds:  Normal breath sounds.   Neurological:      Mental Status: She is alert and oriented to person, place, and time.           RECENT LABS:  Glucose   Date Value Ref Range Status   08/23/2022 91 65 - 99 mg/dL Final     Sodium   Date Value Ref Range Status   08/23/2022 140 136 - 145 mmol/L Final     Potassium   Date Value Ref Range Status   08/23/2022 4.5 3.5 - 5.2 mmol/L Final     CO2   Date Value Ref Range Status   08/23/2022 27.0 22.0 - 29.0 mmol/L Final     Chloride   Date Value Ref Range Status   08/23/2022 102 98 - 107 mmol/L Final     Anion Gap   Date Value Ref Range Status   08/23/2022 11.0 5.0 - 15.0 mmol/L Final     Creatinine   Date Value Ref Range Status   08/23/2022 0.60 0.57 - 1.00 mg/dL Final     BUN   Date Value Ref Range Status   08/23/2022 11 6 - 20 mg/dL Final     BUN/Creatinine Ratio   Date Value Ref Range Status   08/23/2022 18.3 7.0 - 25.0 Final     Calcium   Date Value Ref Range Status   08/23/2022 9.7 8.6 - 10.5 mg/dL Final     eGFR Non  Amer   Date Value Ref Range Status   01/11/2022 94 >60 mL/min/1.73 Final     Alkaline Phosphatase   Date Value Ref Range Status   08/23/2022 76 39 - 117 U/L Final     Total Protein   Date Value Ref Range Status   08/23/2022 6.7 6.0 - 8.5 g/dL Final     ALT (SGPT)   Date Value Ref Range Status   08/23/2022 39 (H) 1 - 33 U/L Final     AST (SGOT)   Date Value Ref Range Status   08/23/2022 34 (H) 1 - 32 U/L Final     Total Bilirubin   Date Value Ref Range Status   08/23/2022 0.4 0.0 - 1.2 mg/dL Final     Albumin   Date Value Ref Range Status   08/23/2022 4.80 3.50 - 5.20 g/dL Final     Globulin   Date Value Ref Range Status   08/23/2022 1.9 gm/dL Final     Lab Results   Component Value Date    WBC 11.37 (H) 08/23/2022    HGB 14.4 08/23/2022    HCT 42.9 08/23/2022    MCV 99.3 (H) 08/23/2022     08/23/2022     Lab Results   Component Value Date    NEUTROABS 7.45 (H) 08/23/2022    IRON 71 08/23/2022    IRON 81 06/06/2022    IRON 101 03/07/2022    TIBC 370  08/23/2022    TIBC 352 06/06/2022    TIBC 326 03/07/2022    LABIRON 19 (L) 08/23/2022    LABIRON 23 06/06/2022    LABIRON 31 03/07/2022    FERRITIN 430.80 (H) 08/23/2022    FERRITIN 328.60 (H) 06/06/2022    FERRITIN 384.60 (H) 03/07/2022    LCUSPIQB32 1,289 (H) 08/23/2022    ICPDKSHG06 >2,000 (H) 06/06/2022    ZVFIKRHY35 712 03/07/2022    FOLATE 7.56 08/23/2022    FOLATE 3.66 (L) 06/06/2022    FOLATE 3.20 (L) 03/07/2022     No results found for: , LABCA2, AFPTM, HCGQUANT, , CHROMGRNA, [id number], CEA, REFLABREPO      PATHOLOGY:  * Cannot find OR log *         RADIOLOGY DATA :  No radiology results for the last 7 days        Assessment & Plan     1.  Leukocytosis:  - Patient has been having ongoing leukocytosis since August 2019  - Peripheral blood flow cytometry on June 20, 2021 was negative for any leukemia or lymphoma.  - Leukocytosis is related to smoking plus or minus inflammation  - White blood cell count is 11.37 which is predominantly increase in neutrophil.  We will monitor with CBC for now  - We will have patient return to clinic in 3 months with repeat CBC, iron studies, ferritin, B12 and folate to be done on that day.    2.  Iron deficiency:  -Due to inability to tolerate iron by mouth patient has received Feraheme in December 2021  - Recent anemia work-up shows hemoglobin is 14.4 iron saturation is 19% but ferritin is elevated at 430, due to elevated ferritin no need to start any intravenous iron replacement at present.    3.  B12 deficiency:  - Most recent B12 level is 1289.  - Recommend continuing with B12 injection monthly.    4.  Folate deficiency:  - Folate level is 7.2.  Recommend continue with folic acid p.o. daily.    5.  History of cervical cancer:  - S/p hysterectomy at age 23.  Also had a conization procedure at age 19    6.  Health maintenance: Unfortunately patient continues to smoke, has been counseled about smoking cessation    7. Advance Care Planning   ACP discussion was held  with the patient during this visit. Patient has an advance directive in EMR which is still valid.        8.  Prescriptions: Patient has enough prescription for intramuscular B12 supply.             PHQ-9 Total Score: 0   -Patient is not homicidal or suicidal.  No acute intervention required.    Carla Richard reports a pain score of 4.  Given her pain assessment as noted, treatment options were discussed and the following options were decided upon as a follow-up plan to address the patient's pain: continuation of current treatment plan for pain.         Ramirez Burger MD  9/12/2022  10:30 CDT        Part of this note may be an electronic transcription/translation of spoken language to printed text using the Dragon Dictation System.          CC:

## 2022-09-21 ENCOUNTER — OFFICE VISIT (OUTPATIENT)
Dept: FAMILY MEDICINE CLINIC | Facility: CLINIC | Age: 52
End: 2022-09-21

## 2022-09-21 VITALS
DIASTOLIC BLOOD PRESSURE: 60 MMHG | HEIGHT: 65 IN | BODY MASS INDEX: 16.56 KG/M2 | OXYGEN SATURATION: 100 % | HEART RATE: 93 BPM | SYSTOLIC BLOOD PRESSURE: 100 MMHG | TEMPERATURE: 98 F | WEIGHT: 99.4 LBS

## 2022-09-21 DIAGNOSIS — T21.22XA PARTIAL THICKNESS BURN OF ABDOMINAL WALL, INITIAL ENCOUNTER: Primary | ICD-10-CM

## 2022-09-21 PROCEDURE — 99213 OFFICE O/P EST LOW 20 MIN: CPT | Performed by: NURSE PRACTITIONER

## 2022-09-21 RX ADMIN — Medication 1 APPLICATION: at 17:07
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
## 2022-09-28 ENCOUNTER — OFFICE VISIT (OUTPATIENT)
Dept: FAMILY MEDICINE CLINIC | Facility: CLINIC | Age: 52
End: 2022-09-28

## 2022-09-28 VITALS
BODY MASS INDEX: 16.53 KG/M2 | DIASTOLIC BLOOD PRESSURE: 56 MMHG | TEMPERATURE: 97.8 F | OXYGEN SATURATION: 96 % | HEIGHT: 65 IN | WEIGHT: 99.2 LBS | SYSTOLIC BLOOD PRESSURE: 88 MMHG | HEART RATE: 70 BPM

## 2022-09-28 DIAGNOSIS — S83.91XA KNEE SPRAIN, BILATERAL: Primary | ICD-10-CM

## 2022-09-28 DIAGNOSIS — S83.92XA KNEE SPRAIN, BILATERAL: Primary | ICD-10-CM

## 2022-09-28 PROCEDURE — 96372 THER/PROPH/DIAG INJ SC/IM: CPT | Performed by: NURSE PRACTITIONER

## 2022-09-28 PROCEDURE — 99213 OFFICE O/P EST LOW 20 MIN: CPT | Performed by: NURSE PRACTITIONER

## 2022-09-28 RX ORDER — TRIAMCINOLONE ACETONIDE 40 MG/ML
40 INJECTION, SUSPENSION INTRA-ARTICULAR; INTRAMUSCULAR ONCE
Status: COMPLETED | OUTPATIENT
Start: 2022-09-28 | End: 2022-09-28

## 2022-09-28 RX ADMIN — TRIAMCINOLONE ACETONIDE 40 MG: 40 INJECTION, SUSPENSION INTRA-ARTICULAR; INTRAMUSCULAR at 10:25
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
## 2022-10-11 NOTE — PROGRESS NOTES
Subjective:  Carla Richard is a 52 y.o. female who presents for       Patient Active Problem List   Diagnosis   • Chronic idiopathic constipation   • B12 deficiency   • Vitamin D deficiency    • Stage 2 moderate COPD by GOLD classification ([facility])   • Cervical lymphadenopathy   • Generalized abdominal pain   • Nausea   • Neurological abnormality   • Tremor   • RUQ pain   • Pertussis exposure   • Chronic nonseasonal allergic rhinitis due to pollen   • Episode of recurrent major depressive disorder (HCC)   • Diarrhea   • Chronic diarrhea   • Thrombocytosis   • Nausea and vomiting   • Gluten intolerance   • C. difficile diarrhea   • Shiga toxin-producing Escherichia coli infection   • Generalized weakness   • Head injury   • Concussion with loss of consciousness   • Gastritis without bleeding   • Irritable bowel syndrome   • Tachycardia   • JELENA (generalized anxiety disorder)   • Marijuana use   • Epigastric pain   • History of Clostridioides difficile colitis   • Celiac disease   • Upper respiratory tract infection   • Irritable bowel syndrome with both constipation and diarrhea   • Moderate protein-calorie malnutrition (HCC)   • Folliculitis   • Itchy scalp   • Tinea capitis   • Chronic interstitial cystitis   • Fibromyalgia   • Gastric reflux   • Kidney stone   • Lymphocytic colitis   • Microscopic hematuria   • Obstruction of bile duct   • Throat pain   • Strain of right hip   • Gastroesophageal reflux disease with esophagitis without hemorrhage   • Cigarette nicotine dependence with nicotine-induced disorder   • Underweight   • Iron deficiency   • Leukocytosis   • History of cervical cancer   • Chronic obstructive pulmonary disease (HCC)   • Vasomotor symptoms due to menopause   • Change in bowel habits   • Other constipation   • Close exposure to COVID-19 virus   • Folate deficiency   • Adverse effect of iron   • History of COVID-19   • Balance problem   • History of fall           Current Outpatient  Medications:   •  Acetaminophen (TYLENOL 8 HOUR PO), Take 650 mg PE by mouth., Disp: , Rfl:   •  albuterol sulfate  (90 Base) MCG/ACT inhaler, INHALE TWO PUFFS EVERY 4 HOURS AS NEEDED for WHEEZING, Disp: 18 g, Rfl: 1  •  budesonide-formoterol (SYMBICORT) 160-4.5 MCG/ACT inhaler, Inhale 2 puffs 2 (Two) Times a Day., Disp: 1 each, Rfl: 11  •  cetirizine (zyrTEC) 10 MG tablet, Take 1 tablet by mouth Daily., Disp: 30 tablet, Rfl: 2  •  Cyanocobalamin (B-12 Compliance Injection) 1000 MCG/ML kit, Inject 1 mL as directed Every 28 (Twenty-Eight) Days., Disp: 1 kit, Rfl: 5  •  cyclobenzaprine (FLEXERIL) 5 MG tablet, Take 1 tablet by mouth 3 (Three) Times a Day As Needed (hip pain). Do not drive while taking, Disp: 30 tablet, Rfl: 1  •  fluticasone (FLONASE) 50 MCG/ACT nasal spray, 2 sprays into the nostril(s) as directed by provider Daily., Disp: 1 bottle, Rfl: 5  •  folic acid (FOLVITE) 1 MG tablet, Take 1 tablet by mouth Daily., Disp: 90 tablet, Rfl: 1  •  guaiFENesin (Mucinex) 600 MG 12 hr tablet, Take 2 tablets by mouth 2 (Two) Times a Day., Disp: 60 tablet, Rfl: 3  •  ipratropium-albuterol (DUO-NEB) 0.5-2.5 mg/3 ml nebulizer, Take 3 mL by nebulization Every 4 (Four) Hours As Needed for Wheezing or Shortness of Air., Disp: 360 mL, Rfl: 11  •  montelukast (SINGULAIR) 10 MG tablet, Take 1 tablet by mouth Every Night., Disp: 30 tablet, Rfl: 11  •  nicotine (Nicotrol) 10 MG inhaler, Inhale 2 puffs As Needed for Smoking Cessation., Disp: 168 each, Rfl: 3  •  omeprazole (priLOSEC) 20 MG capsule, Take 1 capsule by mouth Daily., Disp: 30 capsule, Rfl: 0  •  ondansetron ODT (ZOFRAN-ODT) 8 MG disintegrating tablet, Place 1 tablet on the tongue Every 8 (Eight) Hours As Needed for Nausea or Vomiting., Disp: 30 tablet, Rfl: 0  •  Plecanatide (Trulance) 3 MG tablet, Take 1 tablet by mouth Daily., Disp: 90 tablet, Rfl: 5  •  traMADol (ULTRAM) 50 MG tablet, Take 50 mg by mouth Every 6 (Six) Hours As Needed., Disp: , Rfl:   •   vitamin D (ERGOCALCIFEROL) 1.25 MG (14356 UT) capsule capsule, Take 1 capsule by mouth 1 (One) Time Per Week., Disp: 5 capsule, Rfl: 2  •  amitriptyline (ELAVIL) 10 MG tablet, Take 1 tablet by mouth Every Night., Disp: 30 tablet, Rfl: 3    HPI        Pt is 51 yo female with history of moderate COPD, GERD with esophagitis, gastritis, hiatal hernia chronic idiopathic constipation  Vitamin B12 deficiency, Vitamin D deficiency, chronic abdominal pain, tremor,  RUQ pain,  Sp hysterectomy,  History of crohn's  disease, history of pertussis infection  sp right shoulder surgery. X 3, sp rotator cuff repair , history of C diff diarrhea, gluten sensitivity, gastritis, marijuana use, history of cervical cancer.  iron deficiency, history of COVID-19 infection      8/23/22 in office visit for recheck. Pt was recently positive for COVID and saw walk in clinic on 8/3/22 and 8/10/22 pt took paxlovid but developed side effects. She was advised to continue albuteorl PRN and take her vitamin. Her labwork on 8/10/22 showed D-dimer normal COVID test was negative. CMP showed potassium at 5.3. GFR at 86.4 normal liver enzymes. CBC showed WBC elevated along with platelet count.  She continues to smoke tobacco about almost a 1/2 ppd. She is coughing. Her recent chest x-ray showed no acute cardiopulmonary process along with biapical scarring. Her CT of chest in June 2022 showed improvement for tree in bud nodulary in right middle lobe that is improved from 6/4/21. This is consistent with a benign process.  Likely this was infectious inflammatory bronchiolitis.  She also has yet to see Neurologist.  She continues to have falling issues and tremors.    10/25/22 in office visit for recheck. Since last visit pt has seen ENT on 9/6/22 for her imbalance and an ENG was ordered.  She was recommended pt see her Neurologist. She saw Hematology on 9/12/22  For leukocytosis and iron deficiency.  Pt saw Walk in clinic on 9/21/22 for burn and 9/28/22 for  knee pain. She was given a steroid injection.  Knee x-rays were not obtained and pt declined . Pt went to  on 10/15/22 for shortness of breath and suspected illness. Her viral panel was negative including Flu and COVID. She was advised to followup with her Pulmonologist. She has a hoarse voice. No cough no fever.she has cut back on tobacco.  Pt continues to have balance issues. She did see Neurologist. Pt was advised to see a higher level of care.  Pt had EMG study that was normal      Dizziness  This is a new problem. The current episode started more than 1 year ago. The problem occurs constantly. The problem has been unchanged. Associated symptoms include abdominal pain, arthralgias, coughing, fatigue, numbness and weakness. Pertinent negatives include no chest pain, chills, congestion, diaphoresis, fever, headaches, nausea, sore throat or vomiting. Nothing aggravates the symptoms. She has tried nothing for the symptoms. The treatment provided no relief.   Heartburn  She complains of abdominal pain, chest pain and heartburn. She reports no belching, no choking, no coughing, no dysphagia, no early satiety, no globus sensation, no hoarse voice, no nausea, no sore throat, no stridor, no tooth decay or no wheezing. This is a chronic problem. The current episode started more than 1 year ago. The problem occurs constantly. The problem has been waxing and waning. The heartburn is of moderate intensity. The heartburn does not wake her from sleep. The heartburn does not limit her activity. The heartburn doesn't change with position. The symptoms are aggravated by stress and smoking. Pertinent negatives include no fatigue, melena, muscle weakness or weight loss. Risk factors include smoking/tobacco exposure. She has tried a PPI for the symptoms. The treatment provided moderate relief. Past procedures include an EGD. Past procedures do not include an abdominal ultrasound, esophageal manometry, esophageal pH monitoring, H.  pylori antibody titer or a UGI.   Nicotine Dependence  Presents for follow-up visit. Symptoms are negative for fatigue and sore throat. Her urge triggers include company of smokers. The symptoms have been stable. She smokes 1 pack of cigarettes per day. Compliance with prior treatments has been poor.   COPD  This is a chronic problem. The current episode started more than 1 year ago. The problem occurs constantly. The problem has been waxing and waning  Associated symptoms include abdominal pain, arthralgias, fatigue, joint swelling, numbness and weakness. Pertinent negatives include no anorexia, change in bowel habit, chest pain, chills, congestion, coughing, diaphoresis, fever, headaches, myalgias, nausea, neck pain, sore throat, swollen glands, urinary symptoms, vertigo, visual change or vomiting. The symptoms are aggravated by smoking. She has tried (combivent  symbicort anoro, albuterol inhaler ) for the symptoms    Review of Systems  Review of Systems   Constitutional: Positive for activity change and fatigue. Negative for appetite change, chills, diaphoresis and fever.   HENT: Negative for congestion, postnasal drip, rhinorrhea, sinus pressure, sinus pain, sneezing, sore throat, trouble swallowing and voice change.    Respiratory: Positive for cough and shortness of breath. Negative for choking, chest tightness, wheezing and stridor.    Cardiovascular: Negative for chest pain.   Gastrointestinal: Positive for abdominal distention, abdominal pain, constipation, diarrhea and vomiting. Negative for nausea.   Musculoskeletal: Positive for arthralgias.   Neurological: Positive for tremors, weakness and numbness. Negative for headaches.   Psychiatric/Behavioral: The patient is nervous/anxious.        Patient Active Problem List   Diagnosis   • Chronic idiopathic constipation   • B12 deficiency   • Vitamin D deficiency    • Stage 2 moderate COPD by GOLD classification (HCC)   • Cervical lymphadenopathy   •  Generalized abdominal pain   • Nausea   • Neurological abnormality   • Tremor   • RUQ pain   • Pertussis exposure   • Chronic nonseasonal allergic rhinitis due to pollen   • Episode of recurrent major depressive disorder (HCC)   • Diarrhea   • Chronic diarrhea   • Thrombocytosis   • Nausea and vomiting   • Gluten intolerance   • C. difficile diarrhea   • Shiga toxin-producing Escherichia coli infection   • Generalized weakness   • Head injury   • Concussion with loss of consciousness   • Gastritis without bleeding   • Irritable bowel syndrome   • Tachycardia   • JELENA (generalized anxiety disorder)   • Marijuana use   • Epigastric pain   • History of Clostridioides difficile colitis   • Celiac disease   • Upper respiratory tract infection   • Irritable bowel syndrome with both constipation and diarrhea   • Moderate protein-calorie malnutrition (HCC)   • Folliculitis   • Itchy scalp   • Tinea capitis   • Chronic interstitial cystitis   • Fibromyalgia   • Gastric reflux   • Kidney stone   • Lymphocytic colitis   • Microscopic hematuria   • Obstruction of bile duct   • Throat pain   • Strain of right hip   • Gastroesophageal reflux disease with esophagitis without hemorrhage   • Cigarette nicotine dependence with nicotine-induced disorder   • Underweight   • Iron deficiency   • Leukocytosis   • History of cervical cancer   • Chronic obstructive pulmonary disease (HCC)   • Vasomotor symptoms due to menopause   • Change in bowel habits   • Other constipation   • Close exposure to COVID-19 virus   • Folate deficiency   • Adverse effect of iron   • History of COVID-19   • Balance problem   • History of fall     Past Surgical History:   Procedure Laterality Date   • BACK SURGERY      C5-6   • COLONOSCOPY     • COLONOSCOPY N/A 10/18/2019    Procedure: COLONOSCOPY;  Surgeon: Tom Davis MD;  Location: [facility] ENDOSCOPY;  Service: Gastroenterology   • COLONOSCOPY N/A 8/27/2020    Procedure: COLONOSCOPY;  Surgeon: Ryan  Tom FRANCO MD;  Location: [facility] ENDOSCOPY;  Service: Gastroenterology;  Laterality: N/A;   • COLONOSCOPY N/A 9/29/2021    Procedure: COLONOSCOPY;  Surgeon: Tom Davis MD;  Location: [facility] ENDOSCOPY;  Service: Gastroenterology;  Laterality: N/A;   • ENDOSCOPY N/A 10/18/2019    Procedure: ESOPHAGOGASTRODUODENOSCOPY;  Surgeon: Tom Davis MD;  Location: [facility] ENDOSCOPY;  Service: Gastroenterology   • ENDOSCOPY N/A 7/27/2020    Procedure: ESOPHAGOGASTRODUODENOSCOPY;  Surgeon: Tom Davis MD;  Location: [facility] ENDOSCOPY;  Service: Gastroenterology;  Laterality: N/A;   • ENDOSCOPY N/A 2/10/2021    Procedure: ESOPHAGOGASTRODUODENOSCOPY;  Surgeon: Tom Davis MD;  Location: [facility] ENDOSCOPY;  Service: Gastroenterology;  Laterality: N/A;   • HYSTERECTOMY     • SHOULDER ARTHROSCOPY W/ ROTATOR CUFF REPAIR Right    • TOE SURGERY      left small toe    • TONSILECTOMY, ADENOIDECTOMY, BILATERAL MYRINGOTOMY AND TUBES     • TONSILLECTOMY     • UPPER GASTROINTESTINAL ENDOSCOPY  02/10/2021     Social History     Socioeconomic History   • Marital status:    Tobacco Use   • Smoking status: Every Day     Packs/day: 1.00     Years: 40.00     Pack years: 40.00     Types: Cigarettes   • Smokeless tobacco: Never   Vaping Use   • Vaping Use: Former   • Substances: Nicotine   • Devices: Disposable, Refillable tank   Substance and Sexual Activity   • Alcohol use: No   • Drug use: Yes     Types: Marijuana     Comment: 10/15/2022 Patient states she utilizes Marijuana nightly to increase appetite and decrease nausea.   • Sexual activity: Defer     Family History   Problem Relation Age of Onset   • Inflammatory bowel disease Mother    • Arthritis Mother    • Diabetes Mother    • Hypertension Mother    • Hyperlipidemia Mother    • Obesity Mother    • Osteoporosis Mother    • Heart disease Father    • Hyperlipidemia Father    • Heart attack Father    • Diabetes Sister    • Liver disease Daughter    • Mental illness  Daughter    • Obesity Daughter    • Diabetes Maternal Grandmother    • Cancer Maternal Grandmother         lung   • Cancer Other         lung   • Heart disease Other      Admission on 10/15/2022, Discharged on 10/15/2022   Component Date Value Ref Range Status   • ADENOVIRUS, PCR 10/15/2022 Not Detected  Not Detected Final   • Coronavirus 229E 10/15/2022 Not Detected  Not Detected Final   • Coronavirus HKU1 10/15/2022 Not Detected  Not Detected Final   • Coronavirus NL63 10/15/2022 Not Detected  Not Detected Final   • Coronavirus OC43 10/15/2022 Not Detected  Not Detected Final   • COVID19 10/15/2022 Not Detected  Not Detected - Ref. Range Final   • Human Metapneumovirus 10/15/2022 Not Detected  Not Detected Final   • Human Rhinovirus/Enterovirus 10/15/2022 Not Detected  Not Detected Final   • Influenza A PCR 10/15/2022 Not Detected  Not Detected Final   • Influenza B PCR 10/15/2022 Not Detected  Not Detected Final   • Parainfluenza Virus 1 10/15/2022 Not Detected  Not Detected Final   • Parainfluenza Virus 2 10/15/2022 Not Detected  Not Detected Final   • Parainfluenza Virus 3 10/15/2022 Not Detected  Not Detected Final   • Parainfluenza Virus 4 10/15/2022 Not Detected  Not Detected Final   • RSV, PCR 10/15/2022 Not Detected  Not Detected Final   • Bordetella pertussis pcr 10/15/2022 Not Detected  Not Detected Final   • Bordetella parapertussis PCR 10/15/2022 Not Detected  Not Detected Final   • Chlamydophila pneumoniae PCR 10/15/2022 Not Detected  Not Detected Final   • Mycoplasma pneumo by PCR 10/15/2022 Not Detected  Not Detected Final   Lab on 08/23/2022   Component Date Value Ref Range Status   • Iron 08/23/2022 71  37 - 145 mcg/dL Final   • Iron Saturation 08/23/2022 19 (L)  20 - 50 % Final   • Transferrin 08/23/2022 248  200 - 360 mg/dL Final   • TIBC 08/23/2022 370  298 - 536 mcg/dL Final   • Ferritin 08/23/2022 430.80 (H)  13.00 - 150.00 ng/mL Final   • Folate 08/23/2022 7.56  4.78 - 24.20 ng/mL Final   •  Vitamin B-12 08/23/2022 1,289 (H)  211 - 946 pg/mL Final   • Glucose 08/23/2022 91  65 - 99 mg/dL Final   • BUN 08/23/2022 11  6 - 20 mg/dL Final   • Creatinine 08/23/2022 0.60  0.57 - 1.00 mg/dL Final   • Sodium 08/23/2022 140  136 - 145 mmol/L Final   • Potassium 08/23/2022 4.5  3.5 - 5.2 mmol/L Final   • Chloride 08/23/2022 102  98 - 107 mmol/L Final   • CO2 08/23/2022 27.0  22.0 - 29.0 mmol/L Final   • Calcium 08/23/2022 9.7  8.6 - 10.5 mg/dL Final   • Total Protein 08/23/2022 6.7  6.0 - 8.5 g/dL Final   • Albumin 08/23/2022 4.80  3.50 - 5.20 g/dL Final   • ALT (SGPT) 08/23/2022 39 (H)  1 - 33 U/L Final   • AST (SGOT) 08/23/2022 34 (H)  1 - 32 U/L Final   • Alkaline Phosphatase 08/23/2022 76  39 - 117 U/L Final   • Total Bilirubin 08/23/2022 0.4  0.0 - 1.2 mg/dL Final   • Globulin 08/23/2022 1.9  gm/dL Final   • A/G Ratio 08/23/2022 2.5  g/dL Final   • BUN/Creatinine Ratio 08/23/2022 18.3  7.0 - 25.0 Final   • Anion Gap 08/23/2022 11.0  5.0 - 15.0 mmol/L Final   • eGFR 08/23/2022 106.8  >60.0 mL/min/1.73 Final    National Kidney Foundation and American Society of Nephrology (ASN) Task Force recommended calculation based on the Chronic Kidney Disease Epidemiology Collaboration (CKD-EPI) equation refit without adjustment for race.   • WBC 08/23/2022 11.37 (H)  3.40 - 10.80 10*3/mm3 Final   • RBC 08/23/2022 4.32  3.77 - 5.28 10*6/mm3 Final   • Hemoglobin 08/23/2022 14.4  12.0 - 15.9 g/dL Final   • Hematocrit 08/23/2022 42.9  34.0 - 46.6 % Final   • MCV 08/23/2022 99.3 (H)  79.0 - 97.0 fL Final   • MCH 08/23/2022 33.3 (H)  26.6 - 33.0 pg Final   • MCHC 08/23/2022 33.6  31.5 - 35.7 g/dL Final   • RDW 08/23/2022 13.2  12.3 - 15.4 % Final   • RDW-SD 08/23/2022 48.0  37.0 - 54.0 fl Final   • MPV 08/23/2022 9.5  6.0 - 12.0 fL Final   • Platelets 08/23/2022 449  140 - 450 10*3/mm3 Final   • Neutrophil % 08/23/2022 65.5  42.7 - 76.0 % Final   • Lymphocyte % 08/23/2022 24.7  19.6 - 45.3 % Final   • Monocyte % 08/23/2022 6.7   5.0 - 12.0 % Final   • Eosinophil % 08/23/2022 1.6  0.3 - 6.2 % Final   • Basophil % 08/23/2022 0.8  0.0 - 1.5 % Final   • Immature Grans % 08/23/2022 0.7 (H)  0.0 - 0.5 % Final   • Neutrophils, Absolute 08/23/2022 7.45 (H)  1.70 - 7.00 10*3/mm3 Final   • Lymphocytes, Absolute 08/23/2022 2.81  0.70 - 3.10 10*3/mm3 Final   • Monocytes, Absolute 08/23/2022 0.76  0.10 - 0.90 10*3/mm3 Final   • Eosinophils, Absolute 08/23/2022 0.18  0.00 - 0.40 10*3/mm3 Final   • Basophils, Absolute 08/23/2022 0.09  0.00 - 0.20 10*3/mm3 Final   • Immature Grans, Absolute 08/23/2022 0.08 (H)  0.00 - 0.05 10*3/mm3 Final   • nRBC 08/23/2022 0.0  0.0 - 0.2 /100 WBC Final   Office Visit on 08/10/2022   Component Date Value Ref Range Status   • SARS Antigen 08/10/2022 Not Detected  Not Detected, Presumptive Negative Final   • Internal Control 08/10/2022 Passed  Passed Final   • Lot Number 08/10/2022 [id number]   Final   • Expiration Date 08/10/2022 03/11/2023   Final   • WBC 08/10/2022 14.41 (H)  3.40 - 10.80 10*3/mm3 Final   • RBC 08/10/2022 4.42  3.77 - 5.28 10*6/mm3 Final   • Hemoglobin 08/10/2022 14.5  12.0 - 15.9 g/dL Final   • Hematocrit 08/10/2022 43.3  34.0 - 46.6 % Final   • MCV 08/10/2022 98.0 (H)  79.0 - 97.0 fL Final   • MCH 08/10/2022 32.8  26.6 - 33.0 pg Final   • MCHC 08/10/2022 33.5  31.5 - 35.7 g/dL Final   • RDW 08/10/2022 12.4  12.3 - 15.4 % Final   • RDW-SD 08/10/2022 44.9  37.0 - 54.0 fl Final   • MPV 08/10/2022 9.4  6.0 - 12.0 fL Final   • Platelets 08/10/2022 495 (H)  140 - 450 10*3/mm3 Final   • Glucose 08/10/2022 114 (H)  65 - 99 mg/dL Final   • BUN 08/10/2022 18  6 - 20 mg/dL Final   • Creatinine 08/10/2022 0.81  0.57 - 1.00 mg/dL Final   • Sodium 08/10/2022 141  136 - 145 mmol/L Final   • Potassium 08/10/2022 5.3 (H)  3.5 - 5.2 mmol/L Final   • Chloride 08/10/2022 105  98 - 107 mmol/L Final   • CO2 08/10/2022 27.0  22.0 - 29.0 mmol/L Final   • Calcium 08/10/2022 9.2  8.6 - 10.5 mg/dL Final   • Total Protein  08/10/2022 6.5  6.0 - 8.5 g/dL Final   • Albumin 08/10/2022 4.40  3.50 - 5.20 g/dL Final   • ALT (SGPT) 08/10/2022 20  1 - 33 U/L Final   • AST (SGOT) 08/10/2022 20  1 - 32 U/L Final   • Alkaline Phosphatase 08/10/2022 81  39 - 117 U/L Final   • Total Bilirubin 08/10/2022 0.3  0.0 - 1.2 mg/dL Final   • Globulin 08/10/2022 2.1  gm/dL Final   • A/G Ratio 08/10/2022 2.1  g/dL Final   • BUN/Creatinine Ratio 08/10/2022 22.2  7.0 - 25.0 Final   • Anion Gap 08/10/2022 9.0  5.0 - 15.0 mmol/L Final   • eGFR 08/10/2022 86.4  >60.0 mL/min/1.73 Final    National Kidney Foundation and American Society of Nephrology (ASN) Task Force recommended calculation based on the Chronic Kidney Disease Epidemiology Collaboration (CKD-EPI) equation refit without adjustment for race.   • D-Dimer, Quantitative 08/10/2022 295  0 - 470 ng/mL (FEU) Final   Lab on 06/21/2022   Component Date Value Ref Range Status   • Creatinine 06/21/2022 0.63  0.57 - 1.00 mg/dL Final   • eGFR 06/21/2022 105.6  >60.0 mL/min/1.73 Final    National Kidney Foundation and American Society of Nephrology (ASN) Task Force recommended calculation based on the Chronic Kidney Disease Epidemiology Collaboration (CKD-EPI) equation refit without adjustment for race.   • BUN 06/21/2022 12  6 - 20 mg/dL Final   Lab on 06/06/2022   Component Date Value Ref Range Status   • Iron 06/06/2022 81  37 - 145 mcg/dL Final   • Iron Saturation 06/06/2022 23  20 - 50 % Final   • Transferrin 06/06/2022 236  200 - 360 mg/dL Final   • TIBC 06/06/2022 352  298 - 536 mcg/dL Final   • Ferritin 06/06/2022 328.60 (H)  13.00 - 150.00 ng/mL Final   • Folate 06/06/2022 3.66 (L)  4.78 - 24.20 ng/mL Final   • Vitamin B-12 06/06/2022 >2,000 (H)  211 - 946 pg/mL Final   • WBC 06/06/2022 10.34  3.40 - 10.80 10*3/mm3 Final   • RBC 06/06/2022 4.42  3.77 - 5.28 10*6/mm3 Final   • Hemoglobin 06/06/2022 14.8  12.0 - 15.9 g/dL Final   • Hematocrit 06/06/2022 43.6  34.0 - 46.6 % Final   • MCV 06/06/2022 98.6 (H)   79.0 - 97.0 fL Final   • MCH 06/06/2022 33.5 (H)  26.6 - 33.0 pg Final   • MCHC 06/06/2022 33.9  31.5 - 35.7 g/dL Final   • RDW 06/06/2022 12.2 (L)  12.3 - 15.4 % Final   • RDW-SD 06/06/2022 44.2  37.0 - 54.0 fl Final   • MPV 06/06/2022 9.1  6.0 - 12.0 fL Final   • Platelets 06/06/2022 391  140 - 450 10*3/mm3 Final   • Neutrophil % 06/06/2022 57.1  42.7 - 76.0 % Final   • Lymphocyte % 06/06/2022 29.8  19.6 - 45.3 % Final   • Monocyte % 06/06/2022 9.4  5.0 - 12.0 % Final   • Eosinophil % 06/06/2022 2.3  0.3 - 6.2 % Final   • Basophil % 06/06/2022 0.8  0.0 - 1.5 % Final   • Immature Grans % 06/06/2022 0.6 (H)  0.0 - 0.5 % Final   • Neutrophils, Absolute 06/06/2022 5.91  1.70 - 7.00 10*3/mm3 Final   • Lymphocytes, Absolute 06/06/2022 3.08  0.70 - 3.10 10*3/mm3 Final   • Monocytes, Absolute 06/06/2022 0.97 (H)  0.10 - 0.90 10*3/mm3 Final   • Eosinophils, Absolute 06/06/2022 0.24  0.00 - 0.40 10*3/mm3 Final   • Basophils, Absolute 06/06/2022 0.08  0.00 - 0.20 10*3/mm3 Final   • Immature Grans, Absolute 06/06/2022 0.06 (H)  0.00 - 0.05 10*3/mm3 Final   • nRBC 06/06/2022 0.0  0.0 - 0.2 /100 WBC Final      XR Chest 2 View  Narrative: TWO VIEW CHEST    HISTORY: Cough. Shortness of breath.    Frontal and lateral films of the chest were obtained.    COMPARISON: August 10, 2022    FINDINGS:    Chronic obstructive pulmonary disease.  No acute infiltrate.  The heart is not enlarged.  The pulmonary vasculature is not increased.  No pleural effusion.  No pneumothorax.  No acute osseous abnormality.  Internal fixation plate and screw device cervical spine.  Prior rotator cuff repair right shoulder.  Impression: CONCLUSION:  Chronic obstructive pulmonary disease.  No acute infiltrate.    [id number]    Electronically signed by:  Theo Jacobson MD  10/15/2022 2:40 PM  CDT Workstation: [id number]    [email or web address]  Immunization History   Administered Date(s) Administered   • COVID-19 (CHRYSTAL) 03/10/2021, 03/10/2021   • Flu Vaccine Intradermal  "Quad 18-64YR 10/07/2010, 09/03/2020   • FluLaval/Fluzone >6mos 11/30/2015, 09/03/2020, 09/25/2021   • Influenza Quad Vaccine (Inpatient) 09/13/2011, 11/08/2016   • Influenza TIV (IM) 10/07/2010   • Influenza, Unspecified 09/03/2020   • Pneumococcal Polysaccharide (PPSV23) 11/30/2015   • Tdap 10/07/2010   • flucelvax quad pfs =>4 YRS 09/19/2019       The following portions of the patient's history were reviewed and updated as appropriate: allergies, current medications, past family history, past medical history, past social history, past surgical history and problem list.        Physical Exam  /68 (BP Location: Left arm, Patient Position: Sitting, Cuff Size: Adult)   Pulse 92   Temp 98 °F (36.7 °C)   Ht 165.1 cm (65\")   Wt 47.7 kg (105 lb 3.2 oz)   LMP  (LMP Unknown)   SpO2 99%   BMI 17.51 kg/m²     Physical Exam  Vitals and nursing note reviewed.   Constitutional:       Appearance: She is well-developed. She is not diaphoretic.   HENT:      Head: Normocephalic and atraumatic.      Right Ear: External ear normal.   Eyes:      Conjunctiva/sclera: Conjunctivae normal.      Pupils: Pupils are equal, round, and reactive to light.   Cardiovascular:      Rate and Rhythm: Normal rate and regular rhythm.      Heart sounds: Normal heart sounds. No murmur heard.  Pulmonary:      Effort: Pulmonary effort is normal. No respiratory distress.      Comments: Decreased breath sounds   Abdominal:      General: Bowel sounds are normal. There is no distension.      Palpations: Abdomen is soft.      Tenderness: There is abdominal tenderness.   Musculoskeletal:         General: Tenderness present. No deformity. Normal range of motion.      Cervical back: Normal range of motion and neck supple.   Skin:     General: Skin is warm.      Coloration: Skin is not pale.      Findings: No erythema or rash.   Neurological:      Mental Status: She is alert and oriented to person, place, and time.      Cranial Nerves: No cranial nerve " deficit.   Psychiatric:         Behavior: Behavior normal.         [unfilled]   Diagnosis Plan   1. Neurological abnormality        2. Iron deficiency        3. Underweight        4. Stage 2 moderate COPD by GOLD classification ([facility])        5. Vitamin D deficiency         6. Chronic idiopathic constipation        7. Osteoporosis screening  DEXA Bone Density Axial      8. Encounter for follow-up examination after completed treatment for conditions other than malignant neoplasm  DEXA Bone Density Axial      9. Balance problem        10. Tremor        11. History of fall           -recommend labwork   -recommend influenza vaccination - given today   -balance issues/ear problem/dizziness/tinnitus/family history of Juan Manuel's ataxia - ENT following. Dizziness less of ENT problem. Advised to followup with Neurologist. Will start back on elavil 10 mg qhs. Advised not to take with tramadol. She will need to  Find Neurologist in [place] and need higher level of care. Last MRI of brain in 2019 normal.  Had normal EMG study.    -vasomotor symptoms due to menopause - cannot start hormonal therapy due to history of tobacco use.  Was on paxil 10 mg dialy and black cohosh    -vitamin D deficiency -on vitamin  D once a week  -moderate protein malnutrition/underweight  -  recommend  high calorie diet information/ smoking cesation BMI at 17.41   -leukocytosis/thrombocytosis - Hematology following   -COPD/COPD exacerbation/chronic  bronchitis/abnormal CT of chest  - Pulmonology following on albuterol nebs and inhaler now   Albuterol PRN. Continue combivent   C.  Advised importance of smoking cessation..  on singulair 10 mg at bedtime. On symbicort and dubo nebs. She will need to call for an appt   -tobacco user - counseled to quit smoking >5 minutes. She could not tolerate chantix  urged the importance of quitting smoking.  Insurance would not cover nicotine patch and gum. Recommend pt call 1 [phone] NOW recommend nicotine  replacement therapy. Will try nicotine inhaler   -tremor - pt stopped  elavil Neurology folowing   -abdominal pain/gastritis/IBS /GERD/gluten sensitivity  - Gastroenterology following.stable  On PPI prilosec 20 mg q daily on trulance 3 mg PO q daily.   continue with gluten free diet. RUQ US ordered along with labwork. Those have yet to be completed   -advised pt to be safe and call with questions and concerns  -advised pt to go to ER or call 911 if symptoms worrisome or severe  -advised pt to be safe during COVID-19 pandemic  I spent 30  minutes caring for Carla on this date of service. This time includes time spent by me in the following activities: preparing for the visit, reviewing tests, obtaining and/or reviewing a separately obtained history, performing a medically appropriate examination and/or evaluation, counseling and educating the patient/family/caregiver, ordering medications, tests, or procedures, referring and communicating with other health care professionals, documenting information in the medical record, independently interpreting results and communicating that information with the patient/family/caregiver and care coordination.         This document has been electronically signed by Xavier Wick MD on October 25, 2022 11:08 CDT

## 2022-10-15 ENCOUNTER — APPOINTMENT (OUTPATIENT)
Dept: GENERAL RADIOLOGY | Facility: HOSPITAL | Age: 52
End: 2022-10-15

## 2022-10-15 ENCOUNTER — HOSPITAL ENCOUNTER (EMERGENCY)
Facility: HOSPITAL | Age: 52
Discharge: HOME | End: 2022-10-15
Attending: EMERGENCY MEDICINE | Admitting: EMERGENCY MEDICINE

## 2022-10-15 VITALS
DIASTOLIC BLOOD PRESSURE: 56 MMHG | RESPIRATION RATE: 20 BRPM | SYSTOLIC BLOOD PRESSURE: 113 MMHG | TEMPERATURE: 98.2 F | OXYGEN SATURATION: 96 % | HEART RATE: 79 BPM | BODY MASS INDEX: 16.33 KG/M2 | HEIGHT: 65 IN | WEIGHT: 98 LBS

## 2022-10-15 DIAGNOSIS — J44.9 CHRONIC OBSTRUCTIVE PULMONARY DISEASE, UNSPECIFIED COPD TYPE: Primary | ICD-10-CM

## 2022-10-15 DIAGNOSIS — R05.9 COUGH, UNSPECIFIED TYPE: ICD-10-CM

## 2022-10-15 PROCEDURE — 0202U NFCT DS 22 TRGT SARS-COV-2: CPT | Performed by: EMERGENCY MEDICINE

## 2022-10-15 PROCEDURE — 94640 AIRWAY INHALATION TREATMENT: CPT

## 2022-10-15 PROCEDURE — 99284 EMERGENCY DEPT VISIT MOD MDM: CPT

## 2022-10-15 PROCEDURE — 71046 X-RAY EXAM CHEST 2 VIEWS: CPT

## 2022-10-15 PROCEDURE — 94799 UNLISTED PULMONARY SVC/PX: CPT

## 2022-10-15 RX ORDER — IPRATROPIUM BROMIDE AND ALBUTEROL SULFATE 2.5; .5 MG/3ML; MG/3ML
3 SOLUTION RESPIRATORY (INHALATION) ONCE
Status: COMPLETED | OUTPATIENT
Start: 2022-10-15 | End: 2022-10-15

## 2022-10-15 RX ORDER — PROMETHAZINE HYDROCHLORIDE 6.25 MG/5ML
12.5 SYRUP ORAL ONCE
Status: DISCONTINUED | OUTPATIENT
Start: 2022-10-15 | End: 2022-10-15

## 2022-10-15 RX ORDER — MIDAZOLAM HYDROCHLORIDE 1 MG/ML
2 INJECTION INTRAMUSCULAR; INTRAVENOUS ONCE
Status: DISCONTINUED | OUTPATIENT
Start: 2022-10-15 | End: 2022-10-15

## 2022-10-15 RX ADMIN — IPRATROPIUM BROMIDE AND ALBUTEROL SULFATE 3 ML: 2.5; .5 SOLUTION RESPIRATORY (INHALATION) at 14:59

## 2022-10-15 NOTE — DISCHARGE INSTRUCTIONS
Please return with new or worsening symptoms.  Continue medications previously prescribed.  Follow-up with pulmonology by calling Monday morning to make an appointment.

## 2022-10-15 NOTE — ED PROVIDER NOTES
Subjective   History of Present Illness  52-year-old female presents the emergency department with complaint of 2 weeks of cough, shortness of breath, and body aches.  She reports she has COPD and asthma.  She uses inhalers and nebulizers at home.  Never oxygen dependent previously.  Reports she has been seen and had 4 negative COVID test.  She reports she had a chest x-ray a week ago that was negative.  She reports she is done a Medrol Dosepak.  She is on Tessalon at home.  She reports she does not feel that she is getting any better.  No fevers.  She continues to smoke daily 1 pack/day cigarettes and also admits to marijuana smoking nightly.    Family history, surgical history, social history, current medications and allergies are reviewed with the patient and triage documentation and vitals are reviewed.    History provided by:  Patient and medical records   used: No        Review of Systems   Constitutional: Negative for fever.   HENT: Negative for congestion and sore throat.    Eyes: Negative for photophobia and visual disturbance.   Respiratory: Positive for cough, shortness of breath and wheezing.    Cardiovascular: Negative for chest pain, palpitations and leg swelling.   Gastrointestinal: Negative for abdominal pain, diarrhea, nausea and vomiting.   Endocrine: Negative for polydipsia, polyphagia and polyuria.   Genitourinary: Negative for dysuria, frequency and urgency.   Musculoskeletal: Positive for myalgias. Negative for arthralgias, back pain, joint swelling and neck pain.   Skin: Negative for rash and wound.   Allergic/Immunologic: Negative.    Neurological: Negative.    Hematological: Negative.    Psychiatric/Behavioral: Negative.        Past Medical History:   Diagnosis Date   • Asthma    • Cancer (HCC)     cervical   • Colon polyp    • COPD (chronic obstructive pulmonary disease) (HCC)    • COVID-19    • Crohn's disease (HCC)    • Diverticulitis of colon    • Elevated cholesterol     • Essential hypertension 04/15/2020   • GERD (gastroesophageal reflux disease)    • History of transfusion    • Irritable bowel syndrome    • Kidney calculus    • Pancreatitis    • Sphincter of Oddi dysfunction        Allergies   Allergen Reactions   • Nsaids Swelling and GI Intolerance   • Paxlovid [Nirmatrelvir-Ritonavir] Swelling   • Azithromycin Swelling   • Ciprofloxacin Hives   • Doxycycline Swelling   • Keflex [Cephalexin] Diarrhea and Other (See Comments)     Abdominal pain   • Amoxicillin Other (See Comments)     Vomitting, stomach swells, diarrhea , extreme stomach pain   • Other Other (See Comments)     nicotine patch   Caused body twitching/seizures   • Levofloxacin Rash   • Phenergan [Promethazine Hcl] GI Intolerance       Past Surgical History:   Procedure Laterality Date   • BACK SURGERY      C5-6   • COLONOSCOPY     • COLONOSCOPY N/A 10/18/2019    Procedure: COLONOSCOPY;  Surgeon: Tom Davis MD;  Location: [facility] ENDOSCOPY;  Service: Gastroenterology   • COLONOSCOPY N/A 8/27/2020    Procedure: COLONOSCOPY;  Surgeon: Tom Davis MD;  Location: [facility] ENDOSCOPY;  Service: Gastroenterology;  Laterality: N/A;   • COLONOSCOPY N/A 9/29/2021    Procedure: COLONOSCOPY;  Surgeon: Tom Davis MD;  Location: [facility] ENDOSCOPY;  Service: Gastroenterology;  Laterality: N/A;   • ENDOSCOPY N/A 10/18/2019    Procedure: ESOPHAGOGASTRODUODENOSCOPY;  Surgeon: Tom Davis MD;  Location: [facility] ENDOSCOPY;  Service: Gastroenterology   • ENDOSCOPY N/A 7/27/2020    Procedure: ESOPHAGOGASTRODUODENOSCOPY;  Surgeon: Tom Davis MD;  Location: [facility] ENDOSCOPY;  Service: Gastroenterology;  Laterality: N/A;   • ENDOSCOPY N/A 2/10/2021    Procedure: ESOPHAGOGASTRODUODENOSCOPY;  Surgeon: Tom Davis MD;  Location: [facility] ENDOSCOPY;  Service: Gastroenterology;  Laterality: N/A;   • HYSTERECTOMY     • SHOULDER ARTHROSCOPY W/ ROTATOR CUFF REPAIR Right    • TOE SURGERY      left small toe    •  TONSILECTOMY, ADENOIDECTOMY, BILATERAL MYRINGOTOMY AND TUBES     • TONSILLECTOMY     • UPPER GASTROINTESTINAL ENDOSCOPY  02/10/2021       Family History   Problem Relation Age of Onset   • Inflammatory bowel disease Mother    • Arthritis Mother    • Diabetes Mother    • Hypertension Mother    • Hyperlipidemia Mother    • Obesity Mother    • Osteoporosis Mother    • Heart disease Father    • Hyperlipidemia Father    • Heart attack Father    • Diabetes Sister    • Liver disease Daughter    • Mental illness Daughter    • Obesity Daughter    • Diabetes Maternal Grandmother    • Cancer Maternal Grandmother         lung   • Cancer Other         lung   • Heart disease Other        Social History     Socioeconomic History   • Marital status:    Tobacco Use   • Smoking status: Every Day     Packs/day: 1.00     Years: 40.00     Pack years: 40.00     Types: Cigarettes   • Smokeless tobacco: Never   Vaping Use   • Vaping Use: Former   • Substances: Nicotine   • Devices: Disposable, Refillable tank   Substance and Sexual Activity   • Alcohol use: No   • Drug use: Yes     Types: Marijuana     Comment: 10/15/2022 Patient states she utilizes Marijuana nightly to increase appetite and decrease nausea.   • Sexual activity: Defer           Objective   Physical Exam  Vitals and nursing note reviewed.   Constitutional:       General: She is not in acute distress.     Appearance: She is well-developed and underweight. She is not ill-appearing, toxic-appearing or diaphoretic.   HENT:      Head: Normocephalic.   Eyes:      Pupils: Pupils are equal, round, and reactive to light.   Cardiovascular:      Rate and Rhythm: Normal rate and regular rhythm.  No extrasystoles are present.     Heart sounds: No murmur heard.  Pulmonary:      Effort: Accessory muscle usage present. No tachypnea or respiratory distress.      Breath sounds: Examination of the right-upper field reveals wheezing. Examination of the left-upper field reveals  wheezing. Examination of the right-lower field reveals decreased breath sounds. Examination of the left-lower field reveals decreased breath sounds. Decreased breath sounds and wheezing present. No rhonchi or rales.   Chest:      Chest wall: No tenderness or crepitus.   Abdominal:      General: Bowel sounds are normal.      Palpations: Abdomen is soft.   Musculoskeletal:      Cervical back: Normal range of motion and neck supple.      Right lower leg: No tenderness. No edema.      Left lower leg: No tenderness. No edema.   Skin:     General: Skin is warm and dry.      Capillary Refill: Capillary refill takes less than 2 seconds.   Neurological:      General: No focal deficit present.      Mental Status: She is alert and oriented to person, place, and time.         Procedures  none         ED Course      Labs Reviewed   RESPIRATORY PANEL PCR W/ COVID-19 (SARS-COV-2) YELITZA/KENN/AMANDA/PAD/COR/MAD/ANITHA IN-HOUSE, NP SWAB IN UTM/VTP, 3-4 HR TAT - Normal    Narrative:     In the setting of a positive respiratory panel with a viral infection PLUS a negative procalcitonin without other underlying concern for bacterial infection, consider observing off antibiotics or discontinuation of antibiotics and continue supportive care. If the respiratory panel is positive for atypical bacterial infection (Bordetella pertussis, Chlamydophila pneumoniae, or Mycoplasma pneumoniae), consider antibiotic de-escalation to target atypical bacterial infection.     XR Chest 2 View    Result Date: 10/15/2022  Narrative: TWO VIEW CHEST HISTORY: Cough. Shortness of breath. Frontal and lateral films of the chest were obtained. COMPARISON: August 10, 2022 FINDINGS:  Chronic obstructive pulmonary disease. No acute infiltrate. The heart is not enlarged. The pulmonary vasculature is not increased. No pleural effusion. No pneumothorax. No acute osseous abnormality. Internal fixation plate and screw device cervical spine. Prior rotator cuff repair right  shoulder.     Impression: CONCLUSION: Chronic obstructive pulmonary disease. No acute infiltrate. [id number] Electronically signed by:  Theo Jacobson MD  10/15/2022 2:40 PM CDT Workstation: [id number]      [facility]  Number of Diagnoses or Management Options     Amount and/or Complexity of Data Reviewed  Tests in the radiology section of CPT®: reviewed    Patient Progress  Patient progress: stable    Chest x-ray without acute pulmonary abnormality.  Oxygen saturation greater than 96% on room air throughout stay in the emergency department.  No tachycardia nor tachypnea.  After coughing she does have some accessory muscle use with taking deep breaths.  No acute respiratory distress.  Viral panel negative including flu and COVID.  Patient has multiple medication allergies and is already prescribed cough medicine that she is able to take.  She has not seen pulmonology in quite some time.  Advised on making appointment as soon as possible for follow-up.    Final diagnoses:   Chronic obstructive pulmonary disease, unspecified COPD type (HCC)   Cough, unspecified type       ED Disposition  ED Disposition     ED Disposition   Discharge    Condition   Stable    Comment   --             Naina Block DO  [address] [place] [address] DR  [facility] [address] [place] [address]  [phone]    Schedule an appointment as soon as possible for a visit            Medication List      Stop    PARoxetine 10 MG tablet  Commonly known as: Paxil     silver sulfadiazine 1 % cream  Commonly known as: Vikas Cruz DO  10/15/22 [id number]

## 2022-10-25 ENCOUNTER — OFFICE VISIT (OUTPATIENT)
Dept: FAMILY MEDICINE CLINIC | Facility: CLINIC | Age: 52
End: 2022-10-25

## 2022-10-25 VITALS
WEIGHT: 105.2 LBS | SYSTOLIC BLOOD PRESSURE: 110 MMHG | BODY MASS INDEX: 17.53 KG/M2 | DIASTOLIC BLOOD PRESSURE: 68 MMHG | TEMPERATURE: 98 F | HEART RATE: 92 BPM | OXYGEN SATURATION: 99 % | HEIGHT: 65 IN

## 2022-10-25 DIAGNOSIS — R29.818 NEUROLOGICAL ABNORMALITY: Primary | ICD-10-CM

## 2022-10-25 DIAGNOSIS — K59.04 CHRONIC IDIOPATHIC CONSTIPATION: ICD-10-CM

## 2022-10-25 DIAGNOSIS — R63.6 UNDERWEIGHT: ICD-10-CM

## 2022-10-25 DIAGNOSIS — Z13.820 OSTEOPOROSIS SCREENING: ICD-10-CM

## 2022-10-25 DIAGNOSIS — R25.1 TREMOR: ICD-10-CM

## 2022-10-25 DIAGNOSIS — J44.9 STAGE 2 MODERATE COPD BY GOLD CLASSIFICATION: ICD-10-CM

## 2022-10-25 DIAGNOSIS — R26.89 BALANCE PROBLEM: ICD-10-CM

## 2022-10-25 DIAGNOSIS — Z09 ENCOUNTER FOR FOLLOW-UP EXAMINATION AFTER COMPLETED TREATMENT FOR CONDITIONS OTHER THAN MALIGNANT NEOPLASM: ICD-10-CM

## 2022-10-25 DIAGNOSIS — E55.9 VITAMIN D DEFICIENCY: ICD-10-CM

## 2022-10-25 DIAGNOSIS — Z91.81 HISTORY OF FALL: ICD-10-CM

## 2022-10-25 DIAGNOSIS — E61.1 IRON DEFICIENCY: Chronic | ICD-10-CM

## 2022-10-25 PROCEDURE — 99214 OFFICE O/P EST MOD 30 MIN: CPT | Performed by: FAMILY MEDICINE

## 2022-10-25 PROCEDURE — G0008 ADMIN INFLUENZA VIRUS VAC: HCPCS | Performed by: FAMILY MEDICINE

## 2022-10-25 PROCEDURE — 90686 IIV4 VACC NO PRSV 0.5 ML IM: CPT | Performed by: FAMILY MEDICINE

## 2022-10-25 RX ORDER — AMITRIPTYLINE HYDROCHLORIDE 10 MG/1
10 TABLET, FILM COATED ORAL NIGHTLY
Qty: 30 TABLET | Refills: 3 | Status: SHIPPED | OUTPATIENT
Start: 2022-10-25 | End: 2023-03-27 | Stop reason: SDUPTHER

## 2022-10-25 NOTE — PROGRESS NOTES
Injection  Injection performed in Right Deltoid by Sina Sousa MA. Patient tolerated the procedure well without complications.  10/25/22   Sina Sousa MA

## 2022-10-25 NOTE — PATIENT INSTRUCTIONS
Call for an appt with Pulmonology\    Look online for any Neurologist in [place] particularly who deals with patient and family members with history of Friedreichs ataxia.  For tremors and gait instability and balance issues     Labs are pending     Will restart on elavil 10 mg at bedtime. Do not take with tramadol

## 2022-11-08 ENCOUNTER — TELEPHONE (OUTPATIENT)
Dept: FAMILY MEDICINE CLINIC | Facility: CLINIC | Age: 52
End: 2022-11-08

## 2022-11-08 DIAGNOSIS — M81.0 OSTEOPOROSIS, UNSPECIFIED OSTEOPOROSIS TYPE, UNSPECIFIED PATHOLOGICAL FRACTURE PRESENCE: Primary | ICD-10-CM

## 2022-11-08 NOTE — TELEPHONE ENCOUNTER
----- Message from Xavier Wick MD sent at 11/4/2022  8:39 AM CDT -----  Regarding: DEXA scan  I had DEXA scan report. I can't seem to find it.  If you can have one refaxed over please    I remember she had osteoporosis and will need to see the bone clinic in [place] for treatment options.  Let me know what pt decides thanks

## 2022-11-09 ENCOUNTER — OFFICE VISIT (OUTPATIENT)
Dept: FAMILY MEDICINE CLINIC | Facility: CLINIC | Age: 52
End: 2022-11-09

## 2022-11-09 VITALS
OXYGEN SATURATION: 97 % | TEMPERATURE: 97.9 F | HEIGHT: 65 IN | SYSTOLIC BLOOD PRESSURE: 100 MMHG | HEART RATE: 80 BPM | DIASTOLIC BLOOD PRESSURE: 80 MMHG | WEIGHT: 102 LBS | BODY MASS INDEX: 17 KG/M2

## 2022-11-09 DIAGNOSIS — R05.1 ACUTE COUGH: ICD-10-CM

## 2022-11-09 DIAGNOSIS — R11.2 NAUSEA AND VOMITING, UNSPECIFIED VOMITING TYPE: ICD-10-CM

## 2022-11-09 DIAGNOSIS — J02.9 SORE THROAT: ICD-10-CM

## 2022-11-09 DIAGNOSIS — Z20.822 CLOSE EXPOSURE TO COVID-19 VIRUS: Primary | ICD-10-CM

## 2022-11-09 LAB
EXPIRATION DATE: NORMAL
EXPIRATION DATE: NORMAL
FLUAV AG UPPER RESP QL IA.RAPID: NOT DETECTED
FLUBV AG UPPER RESP QL IA.RAPID: NOT DETECTED
INTERNAL CONTROL: NORMAL
INTERNAL CONTROL: NORMAL
Lab: NORMAL
Lab: NORMAL
S PYO AG THROAT QL: NEGATIVE
SARS-COV-2 AG UPPER RESP QL IA.RAPID: NOT DETECTED

## 2022-11-09 PROCEDURE — 87880 STREP A ASSAY W/OPTIC: CPT | Performed by: NURSE PRACTITIONER

## 2022-11-09 PROCEDURE — 96372 THER/PROPH/DIAG INJ SC/IM: CPT | Performed by: NURSE PRACTITIONER

## 2022-11-09 PROCEDURE — 99214 OFFICE O/P EST MOD 30 MIN: CPT | Performed by: NURSE PRACTITIONER

## 2022-11-09 PROCEDURE — 87428 SARSCOV & INF VIR A&B AG IA: CPT | Performed by: NURSE PRACTITIONER

## 2022-11-09 RX ORDER — ONDANSETRON 2 MG/ML
4 INJECTION INTRAMUSCULAR; INTRAVENOUS ONCE
Status: COMPLETED | OUTPATIENT
Start: 2022-11-09 | End: 2022-11-09

## 2022-11-09 RX ORDER — BENZONATATE 100 MG/1
CAPSULE ORAL
Qty: 30 CAPSULE | Refills: 1 | Status: SHIPPED | OUTPATIENT
Start: 2022-11-09 | End: 2022-11-28 | Stop reason: SDUPTHER

## 2022-11-09 RX ORDER — COVID-19 ANTIGEN TEST
1 KIT MISCELLANEOUS TAKE AS DIRECTED
Qty: 1 KIT | Refills: 0 | COMMUNITY
Start: 2022-11-09 | End: 2022-12-07

## 2022-11-09 RX ORDER — ONDANSETRON 8 MG/1
8 TABLET, ORALLY DISINTEGRATING ORAL EVERY 8 HOURS PRN
Qty: 30 TABLET | Refills: 0 | Status: SHIPPED | OUTPATIENT
Start: 2022-11-09 | End: 2023-02-13 | Stop reason: SDUPTHER

## 2022-11-09 RX ORDER — DEXAMETHASONE SODIUM PHOSPHATE 4 MG/ML
4 INJECTION, SOLUTION INTRA-ARTICULAR; INTRALESIONAL; INTRAMUSCULAR; INTRAVENOUS; SOFT TISSUE ONCE
Status: COMPLETED | OUTPATIENT
Start: 2022-11-09 | End: 2022-11-09

## 2022-11-09 RX ADMIN — DEXAMETHASONE SODIUM PHOSPHATE 4 MG: 4 INJECTION, SOLUTION INTRA-ARTICULAR; INTRALESIONAL; INTRAMUSCULAR; INTRAVENOUS; SOFT TISSUE at 10:08

## 2022-11-09 RX ADMIN — ONDANSETRON 4 MG: 2 INJECTION INTRAMUSCULAR; INTRAVENOUS at 10:10

## 2022-11-09 NOTE — PROGRESS NOTES
"Chief Complaint  Illness (Body aches, ha, vomiting, cough, st,  X 1 day.)    Subjective          Carla Richard presents to [facility] [place]    History of Present Illness  [facility]    PCP: Dr. Wick    CC: \"body aches, headache, vomiting, cough, sore throat\"    Symptom onset yesterday.  Family members have recently tested + for Covid and her grandkids that she has custody of are being seen today as well and two have tested + for Covid.  No home test done.  Reports she was Covid + in September, could not take Paxlovid. Has COPD, smoker.   Illness  This is a new problem. Episode onset: 11-8-2022. The problem occurs constantly. The problem has been unchanged. Associated symptoms include abdominal pain (general), anorexia, chills, congestion, coughing, fatigue, headaches, myalgias, nausea, a sore throat and vomiting. Pertinent negatives include no arthralgias, change in bowel habit, chest pain, diaphoresis, fever, neck pain, numbness, rash, swollen glands, urinary symptoms, vertigo, visual change or weakness. The symptoms are aggravated by eating and drinking. Treatments tried: tylenol, zofran. The treatment provided no relief (vomiting Zofran ODT).       Review of Systems   Constitutional: Positive for appetite change, chills and fatigue. Negative for diaphoresis and fever.   HENT: Positive for congestion, postnasal drip, sinus pressure and sore throat. Negative for ear discharge, ear pain, sneezing and trouble swallowing.    Eyes: Negative.    Respiratory: Positive for cough, chest tightness and wheezing. Negative for shortness of breath.    Cardiovascular: Negative.  Negative for chest pain.   Gastrointestinal: Positive for abdominal pain (general), anorexia, nausea and vomiting. Negative for change in bowel habit and diarrhea.   Genitourinary: Negative.    Musculoskeletal: Positive for myalgias. Negative for arthralgias and neck pain. " "  Skin: Negative.  Negative for rash.   Neurological: Positive for headaches. Negative for dizziness, vertigo, weakness and numbness.        Objective   Vital Signs:   /80 (BP Location: Right arm, Patient Position: Sitting, Cuff Size: Pediatric)   Pulse 80   Temp 97.9 °F (36.6 °C) (Oral)   Ht 165.1 cm (65\")   Wt 46.3 kg (102 lb)   SpO2 97%   BMI 16.97 kg/m²       Physical Exam  Vitals and nursing note reviewed.   Constitutional:       General: She is not in acute distress.     Appearance: She is ill-appearing.   HENT:      Head: Normocephalic and atraumatic.      Right Ear: Tympanic membrane and ear canal normal.      Left Ear: Tympanic membrane and ear canal normal.      Nose: Congestion ( mild) present.      Mouth/Throat:      Mouth: Mucous membranes are moist.      Pharynx: Posterior oropharyngeal erythema (injected with PND) present. No oropharyngeal exudate.   Eyes:      General:         Right eye: No discharge.         Left eye: No discharge.      Conjunctiva/sclera: Conjunctivae normal.   Cardiovascular:      Rate and Rhythm: Normal rate and regular rhythm.   Pulmonary:      Effort: Pulmonary effort is normal. No respiratory distress.      Breath sounds: Wheezing ( with cough) present. No rhonchi or rales.      Comments: Slightly decreased aeration, congested/wheezy cough  Musculoskeletal:      Cervical back: Neck supple. No tenderness.   Lymphadenopathy:      Cervical: Cervical adenopathy (shotty) present.   Skin:     General: Skin is warm and dry.   Neurological:      General: No focal deficit present.      Mental Status: She is alert and oriented to person, place, and time.   Psychiatric:         Mood and Affect: Mood normal.         Thought Content: Thought content normal.          Result Review :     Common labs    Common Labs 6/21/22 6/21/22 8/10/22 8/10/22 8/23/22 8/23/22    1338 1338 1421 1421 1039 1039   Glucose    114 (A)  91   BUN  12  18  11   Creatinine 0.63   0.81  0.60   Sodium    " 141  140   Potassium    5.3 (A)  4.5   Chloride    105  102   Calcium    9.2  9.7   Albumin    4.40  4.80   Total Bilirubin    0.3  0.4   Alkaline Phosphatase    81  76   AST (SGOT)    20  34 (A)   ALT (SGPT)    20  39 (A)   WBC   14.41 (A)  11.37 (A)    Hemoglobin   14.5  14.4    Hematocrit   43.3  42.9    Platelets   495 (A)  449    (A) Abnormal value            Recent Results (from the past 24 hour(s))   POC Rapid Strep A    Collection Time: 11/09/22  9:03 AM    Specimen: Swab   Result Value Ref Range    Rapid Strep A Screen Negative Negative, VALID, INVALID, Not Performed    Internal Control Passed Passed    Lot Number [id number]     Expiration Date 10/31/23    POCT SARS-CoV-2 Antigen FAHAD + Flu    Collection Time: 11/09/22  9:16 AM    Specimen: Swab   Result Value Ref Range    SARS Antigen Not Detected Not Detected, Presumptive Negative    Influenza A Antigen FAHAD Not Detected Not Detected    Influenza B Antigen FAHAD Not Detected Not Detected    Internal Control Passed Passed    Lot Number [id number]     Expiration Date 03/11/2023                  Assessment and Plan    Diagnoses and all orders for this visit:    1. Close exposure to COVID-19 virus (Primary)  -     POCT SARS-CoV-2 Antigen FAHAD + Flu    2. Sore throat  -     POCT SARS-CoV-2 Antigen FAHAD + Flu  -     POC Rapid Strep A    3. Acute cough  -     POCT SARS-CoV-2 Antigen FAHAD + Flu  -     benzonatate (Tessalon Perles) 100 MG capsule; 1-2 caps po TID prn cough  Dispense: 30 capsule; Refill: 1  -     dexamethasone (DECADRON) injection 4 mg    4. Nausea and vomiting, unspecified vomiting type  -     ondansetron (ZOFRAN) injection 4 mg  -     ondansetron ODT (ZOFRAN-ODT) 8 MG disintegrating tablet; Place 1 tablet on the tongue Every 8 (Eight) Hours As Needed for Nausea or Vomiting.  Dispense: 30 tablet; Refill: 0      Recommended retesting for Covid at home--Sample test kit provided    Zofran 4 mg IM x 1 in office for n/v.  Refill of Zofran ODT provided  Clear  liquids advancing to bland, BRAT diet as n/v improves    Continue with inhalers as prescribed.   Decadron 4 mg IM x 1 in office for respiratory symptoms  Rx for Tessalon perles  Encouraged immune boosters--Vit C and Zinc    See PCP or RTC if symptoms persist/worsen  See PCP for routine f/u visit and management of chronic medical conditions      This document has been electronically signed by CROW Padgett on November 9, 2022 10:23 CST,.    I spent 30 minutes caring for Carla on this date of service. This time includes time spent by me in the following activities:preparing for the visit, reviewing tests, performing a medically appropriate examination and/or evaluation , counseling and educating the patient/family/caregiver, ordering medications, tests, or procedures and documenting information in the medical record

## 2022-11-17 ENCOUNTER — OFFICE VISIT (OUTPATIENT)
Dept: GASTROENTEROLOGY | Facility: CLINIC | Age: 52
End: 2022-11-17

## 2022-11-17 VITALS
HEART RATE: 82 BPM | BODY MASS INDEX: 17.36 KG/M2 | HEIGHT: 65 IN | WEIGHT: 104.2 LBS | SYSTOLIC BLOOD PRESSURE: 135 MMHG | DIASTOLIC BLOOD PRESSURE: 78 MMHG

## 2022-11-17 DIAGNOSIS — K62.5 HEMORRHAGE OF ANUS AND RECTUM: ICD-10-CM

## 2022-11-17 DIAGNOSIS — R10.13 EPIGASTRIC PAIN: Primary | ICD-10-CM

## 2022-11-17 DIAGNOSIS — R11.0 NAUSEA: ICD-10-CM

## 2022-11-17 DIAGNOSIS — K59.09 OTHER CONSTIPATION: ICD-10-CM

## 2022-11-17 PROCEDURE — 99214 OFFICE O/P EST MOD 30 MIN: CPT | Performed by: INTERNAL MEDICINE

## 2022-11-17 RX ORDER — DEXTROSE AND SODIUM CHLORIDE 5; .45 G/100ML; G/100ML
30 INJECTION, SOLUTION INTRAVENOUS CONTINUOUS PRN
Status: CANCELLED | OUTPATIENT
Start: 2022-11-22

## 2022-11-17 RX ORDER — SODIUM, POTASSIUM,MAG SULFATES 17.5-3.13G
SOLUTION, RECONSTITUTED, ORAL ORAL
Qty: 354 ML | Refills: 0 | Status: SHIPPED | OUTPATIENT
Start: 2022-11-17 | End: 2022-11-28

## 2022-11-22 ENCOUNTER — ANESTHESIA (OUTPATIENT)
Dept: GASTROENTEROLOGY | Facility: HOSPITAL | Age: 52
End: 2022-11-22

## 2022-11-22 ENCOUNTER — HOSPITAL ENCOUNTER (OUTPATIENT)
Facility: HOSPITAL | Age: 52
Setting detail: HOSPITAL OUTPATIENT SURGERY
Discharge: HOME | End: 2022-11-22
Attending: INTERNAL MEDICINE | Admitting: INTERNAL MEDICINE

## 2022-11-22 ENCOUNTER — ANESTHESIA EVENT (OUTPATIENT)
Dept: GASTROENTEROLOGY | Facility: HOSPITAL | Age: 52
End: 2022-11-22

## 2022-11-22 VITALS
HEIGHT: 65 IN | TEMPERATURE: 97.3 F | OXYGEN SATURATION: 96 % | DIASTOLIC BLOOD PRESSURE: 66 MMHG | SYSTOLIC BLOOD PRESSURE: 115 MMHG | RESPIRATION RATE: 20 BRPM | WEIGHT: 99.4 LBS | HEART RATE: 95 BPM | BODY MASS INDEX: 16.56 KG/M2

## 2022-11-22 DIAGNOSIS — R11.0 NAUSEA: ICD-10-CM

## 2022-11-22 DIAGNOSIS — R10.13 EPIGASTRIC PAIN: ICD-10-CM

## 2022-11-22 DIAGNOSIS — K59.09 OTHER CONSTIPATION: ICD-10-CM

## 2022-11-22 DIAGNOSIS — K62.5 HEMORRHAGE OF ANUS AND RECTUM: ICD-10-CM

## 2022-11-22 PROCEDURE — 45380 COLONOSCOPY AND BIOPSY: CPT | Performed by: INTERNAL MEDICINE

## 2022-11-22 PROCEDURE — 88305 TISSUE EXAM BY PATHOLOGIST: CPT

## 2022-11-22 PROCEDURE — 25010000002 PROPOFOL 10 MG/ML EMULSION: Performed by: NURSE ANESTHETIST, CERTIFIED REGISTERED

## 2022-11-22 PROCEDURE — 25010000002 ONDANSETRON PER 1 MG: Performed by: NURSE ANESTHETIST, CERTIFIED REGISTERED

## 2022-11-22 PROCEDURE — 43239 EGD BIOPSY SINGLE/MULTIPLE: CPT | Performed by: INTERNAL MEDICINE

## 2022-11-22 RX ORDER — DEXTROSE AND SODIUM CHLORIDE 5; .45 G/100ML; G/100ML
INJECTION, SOLUTION INTRAVENOUS CONTINUOUS PRN
Status: DISCONTINUED | OUTPATIENT
Start: 2022-11-22 | End: 2022-11-22 | Stop reason: SURG

## 2022-11-22 RX ORDER — ONDANSETRON 2 MG/ML
4 INJECTION INTRAMUSCULAR; INTRAVENOUS ONCE AS NEEDED
Status: DISCONTINUED | OUTPATIENT
Start: 2022-11-22 | End: 2022-11-22 | Stop reason: HOSPADM

## 2022-11-22 RX ORDER — ONDANSETRON 2 MG/ML
INJECTION INTRAMUSCULAR; INTRAVENOUS AS NEEDED
Status: DISCONTINUED | OUTPATIENT
Start: 2022-11-22 | End: 2022-11-22 | Stop reason: SURG

## 2022-11-22 RX ORDER — LIDOCAINE HYDROCHLORIDE 20 MG/ML
INJECTION, SOLUTION INTRAVENOUS AS NEEDED
Status: DISCONTINUED | OUTPATIENT
Start: 2022-11-22 | End: 2022-11-22 | Stop reason: SURG

## 2022-11-22 RX ORDER — PROPOFOL 10 MG/ML
VIAL (ML) INTRAVENOUS AS NEEDED
Status: DISCONTINUED | OUTPATIENT
Start: 2022-11-22 | End: 2022-11-22 | Stop reason: SURG

## 2022-11-22 RX ORDER — DEXTROSE AND SODIUM CHLORIDE 5; .45 G/100ML; G/100ML
30 INJECTION, SOLUTION INTRAVENOUS CONTINUOUS PRN
Status: DISCONTINUED | OUTPATIENT
Start: 2022-11-22 | End: 2022-11-22 | Stop reason: HOSPADM

## 2022-11-22 RX ADMIN — PROPOFOL 20 MG: 10 INJECTION, EMULSION INTRAVENOUS at 14:53

## 2022-11-22 RX ADMIN — DEXTROSE AND SODIUM CHLORIDE 30 ML/HR: 5; 450 INJECTION, SOLUTION INTRAVENOUS at 13:45

## 2022-11-22 RX ADMIN — PROPOFOL 20 MG: 10 INJECTION, EMULSION INTRAVENOUS at 14:38

## 2022-11-22 RX ADMIN — LIDOCAINE HYDROCHLORIDE 45 MG: 20 INJECTION, SOLUTION INTRAVENOUS at 14:32

## 2022-11-22 RX ADMIN — ONDANSETRON 4 MG: 2 INJECTION INTRAMUSCULAR; INTRAVENOUS at 15:02

## 2022-11-22 RX ADMIN — PROPOFOL 30 MG: 10 INJECTION, EMULSION INTRAVENOUS at 14:47

## 2022-11-22 RX ADMIN — DEXTROSE AND SODIUM CHLORIDE: 5; 450 INJECTION, SOLUTION INTRAVENOUS at 14:26

## 2022-11-22 RX ADMIN — PROPOFOL 30 MG: 10 INJECTION, EMULSION INTRAVENOUS at 14:36

## 2022-11-22 RX ADMIN — PROPOFOL 20 MG: 10 INJECTION, EMULSION INTRAVENOUS at 14:50

## 2022-11-22 RX ADMIN — PROPOFOL 20 MG: 10 INJECTION, EMULSION INTRAVENOUS at 14:43

## 2022-11-22 RX ADMIN — PROPOFOL 30 MG: 10 INJECTION, EMULSION INTRAVENOUS at 14:34

## 2022-11-22 RX ADMIN — PROPOFOL 20 MG: 10 INJECTION, EMULSION INTRAVENOUS at 14:40

## 2022-11-22 RX ADMIN — PROPOFOL 100 MG: 10 INJECTION, EMULSION INTRAVENOUS at 14:32

## 2022-11-22 NOTE — PROGRESS NOTES
Chief Complaint   Patient presents with   • Follow-up     [facility] follow up colitis       Subjective    Carla Richard is a 52 y.o. female.    History of Present Illness  Patient presented to GI clinic complaining of abdominal pain and constipation.  Has generalized abdominal pain with constipation for past week.  Also has nausea and rectal bleeding.  Denied vomiting, melena or weight loss.  Denied NSAID usage.  Seen at [facility] emergency room and had rectosigmoid colon abnormality upon CT abdomen pelvis.       The following portions of the patient's history were reviewed and updated as appropriate:   Past Medical History:   Diagnosis Date   • Asthma    • Cancer (HCC)     cervical   • Colon polyp    • COPD (chronic obstructive pulmonary disease) (HCC)    • COVID-19    • Crohn's disease (HCC)    • Diverticulitis of colon    • Elevated cholesterol    • GERD (gastroesophageal reflux disease)    • History of transfusion    • Irritable bowel syndrome    • Kidney calculus    • Pancreatitis    • Sphincter of Oddi dysfunction      Past Surgical History:   Procedure Laterality Date   • CERVICAL SPINE SURGERY      C5-C6   • COLONOSCOPY     • COLONOSCOPY N/A 10/18/2019    Procedure: COLONOSCOPY;  Surgeon: Tom Davis MD;  Location: [facility] ENDOSCOPY;  Service: Gastroenterology   • COLONOSCOPY N/A 08/27/2020    Procedure: COLONOSCOPY;  Surgeon: Tom Davis MD;  Location: [facility] ENDOSCOPY;  Service: Gastroenterology;  Laterality: N/A;   • COLONOSCOPY N/A 09/29/2021    Procedure: COLONOSCOPY;  Surgeon: Tom Davis MD;  Location: [facility] ENDOSCOPY;  Service: Gastroenterology;  Laterality: N/A;   • ENDOSCOPY N/A 10/18/2019    Procedure: ESOPHAGOGASTRODUODENOSCOPY;  Surgeon: Tom Davis MD;  Location: [facility] ENDOSCOPY;  Service: Gastroenterology   • ENDOSCOPY N/A 07/27/2020    Procedure: ESOPHAGOGASTRODUODENOSCOPY;  Surgeon: Tom Davis MD;  Location: [facility] ENDOSCOPY;  Service:  Gastroenterology;  Laterality: N/A;   • ENDOSCOPY N/A 02/10/2021    Procedure: ESOPHAGOGASTRODUODENOSCOPY;  Surgeon: Tom Davis MD;  Location: [facility] ENDOSCOPY;  Service: Gastroenterology;  Laterality: N/A;   • HYSTERECTOMY     • SHOULDER ARTHROSCOPY W/ ROTATOR CUFF REPAIR Right    • TOE SURGERY      left small toe    • TONSILECTOMY, ADENOIDECTOMY, BILATERAL MYRINGOTOMY AND TUBES     • TONSILLECTOMY     • UPPER GASTROINTESTINAL ENDOSCOPY  02/10/2021     Family History   Problem Relation Age of Onset   • Inflammatory bowel disease Mother    • Arthritis Mother    • Diabetes Mother    • Hypertension Mother    • Hyperlipidemia Mother    • Obesity Mother    • Osteoporosis Mother    • Heart disease Father    • Hyperlipidemia Father    • Heart attack Father    • Diabetes Sister    • Liver disease Daughter    • Mental illness Daughter    • Obesity Daughter    • Diabetes Maternal Grandmother    • Cancer Maternal Grandmother         lung   • Cancer Other         lung   • Heart disease Other      OB History    No obstetric history on file.       Prior to Admission medications    Medication Sig Start Date End Date Taking? Authorizing Provider   Acetaminophen (TYLENOL 8 HOUR PO) Take 650 mg PE by mouth.   Yes Provider, MD Byron   albuterol sulfate  (90 Base) MCG/ACT inhaler INHALE TWO PUFFS EVERY 4 HOURS AS NEEDED for WHEEZING 3/11/22  Yes Xavier Wick MD   amitriptyline (ELAVIL) 10 MG tablet Take 1 tablet by mouth Every Night. 10/25/22  Yes Xavier Wick MD   benzonatate (Tessalon Perles) 100 MG capsule 1-2 caps po TID prn cough 11/9/22  Yes Niles Roldan APRN   budesonide-formoterol (SYMBICORT) 160-4.5 MCG/ACT inhaler Inhale 2 puffs 2 (Two) Times a Day. 4/25/22  Yes Naina Block DO   cetirizine (zyrTEC) 10 MG tablet Take 1 tablet by mouth Daily. 3/31/22  Yes Niles Roldan APRN   COVID-19 At Home Antigen Test (QuickVue At-Home Covid-19 Test) kit 1 kit by In Vitro route Take As Directed. 11/9/22   Yes Niles Roldan APRN   Cyanocobalamin (B-12 Compliance Injection) 1000 MCG/ML kit Inject 1 mL as directed Every 28 (Twenty-Eight) Days. 6/29/21  Yes Raimrez Burger MD   cyclobenzaprine (FLEXERIL) 5 MG tablet Take 1 tablet by mouth 3 (Three) Times a Day As Needed (hip pain). Do not drive while taking 8/23/22  Yes Xavier Wick MD   fluticasone (FLONASE) 50 MCG/ACT nasal spray 2 sprays into the nostril(s) as directed by provider Daily. 10/28/20  Yes Iliana Jacobson MD   folic acid (FOLVITE) 1 MG tablet Take 1 tablet by mouth Daily. 12/7/21  Yes Ramirez Burger MD   guaiFENesin (Mucinex) 600 MG 12 hr tablet Take 2 tablets by mouth 2 (Two) Times a Day. 8/23/22  Yes Xavier Wick MD   ipratropium-albuterol (DUO-NEB) 0.5-2.5 mg/3 ml nebulizer Take 3 mL by nebulization Every 4 (Four) Hours As Needed for Wheezing or Shortness of Air. 4/25/22  Yes Naina Block,    montelukast (SINGULAIR) 10 MG tablet Take 1 tablet by mouth Every Night. 4/25/22  Yes Naina Block,    nicotine (Nicotrol) 10 MG inhaler Inhale 2 puffs As Needed for Smoking Cessation. 8/23/22  Yes Xavier Wick MD   omeprazole (priLOSEC) 20 MG capsule Take 1 capsule by mouth Daily. 8/3/22  Yes Niles Roldan APRN   ondansetron ODT (ZOFRAN-ODT) 8 MG disintegrating tablet Place 1 tablet on the tongue Every 8 (Eight) Hours As Needed for Nausea or Vomiting. 11/9/22  Yes Niles Roldan APRN   Plecanatide (Trulance) 3 MG tablet Take 1 tablet by mouth Daily. 10/15/21  Yes Tom Davis MD   traMADol (ULTRAM) 50 MG tablet Take 50 mg by mouth Every 6 (Six) Hours As Needed.   Yes Provider, MD Byron   vitamin D (ERGOCALCIFEROL) 1.25 MG (97344 UT) capsule capsule Take 1 capsule by mouth 1 (One) Time Per Week. 3/31/22  Yes Niles Roldan APRN   sodium-potassium-magnesium sulfates (Suprep Bowel Prep Kit) 17.5-3.13-1.6 GM/177ML solution oral solution Please use the instructions given in office 11/17/22   Elvin Vo MD     Allergies  "  Allergen Reactions   • Nicotine Seizure     \"nicotine patch\"   • Nsaids Swelling and GI Intolerance   • Other Other (See Comments)     All oral antibiotics cause abdominal swelling.   • Paxlovid [Nirmatrelvir-Ritonavir] Swelling   • Azithromycin Swelling   • Ciprofloxacin Hives   • Doxycycline Swelling   • Keflex [Cephalexin] Diarrhea and Other (See Comments)     Abdominal pain   • Amoxicillin Other (See Comments)     Vomitting, stomach swells, diarrhea , extreme stomach pain   • Levofloxacin Rash   • Phenergan [Promethazine Hcl] GI Intolerance     Social History     Socioeconomic History   • Marital status:    Tobacco Use   • Smoking status: Every Day     Packs/day: 1.00     Years: 42.00     Pack years: 42.00     Types: Cigarettes   • Smokeless tobacco: Never   Vaping Use   • Vaping Use: Former   • Substances: Nicotine   • Devices: Disposable, Refillable tank   Substance and Sexual Activity   • Alcohol use: No   • Drug use: Yes     Types: Marijuana     Comment: 10/15/2022 Patient states she utilizes Marijuana nightly to increase appetite and decrease nausea.   • Sexual activity: Defer       Review of Systems  Review of Systems   Constitutional: Negative for chills, fatigue, fever and unexpected weight change.   HENT: Negative for congestion, ear discharge, hearing loss, nosebleeds and sore throat.    Eyes: Negative for pain, discharge and redness.   Respiratory: Negative for cough, chest tightness, shortness of breath and wheezing.    Cardiovascular: Negative for chest pain and palpitations.   Gastrointestinal: Positive for abdominal pain, anal bleeding, blood in stool, constipation and nausea. Negative for abdominal distention, diarrhea and vomiting.   Endocrine: Negative for cold intolerance, polydipsia, polyphagia and polyuria.   Genitourinary: Negative for dysuria, flank pain, frequency, hematuria and urgency.   Musculoskeletal: Negative for arthralgias, back pain, joint swelling and myalgias.   Skin: " "Negative for color change, pallor and rash.   Neurological: Negative for tremors, seizures, syncope, weakness and headaches.   Hematological: Negative for adenopathy. Does not bruise/bleed easily.   Psychiatric/Behavioral: Negative for behavioral problems, confusion, dysphoric mood, hallucinations and suicidal ideas. The patient is not nervous/anxious.         /78 (BP Location: Right arm)   Pulse 82   Ht 165.1 cm (65\")   Wt 47.3 kg (104 lb 3.2 oz)   LMP  (LMP Unknown)   BMI 17.34 kg/m²     Objective    Physical Exam  Constitutional:       Appearance: She is well-developed.   HENT:      Head: Normocephalic and atraumatic.   Eyes:      Conjunctiva/sclera: Conjunctivae normal.      Pupils: Pupils are equal, round, and reactive to light.   Neck:      Thyroid: No thyromegaly.   Cardiovascular:      Rate and Rhythm: Normal rate and regular rhythm.      Heart sounds: Normal heart sounds. No murmur heard.  Pulmonary:      Effort: Pulmonary effort is normal.      Breath sounds: Normal breath sounds. No wheezing.   Abdominal:      General: Bowel sounds are normal. There is no distension.      Palpations: Abdomen is soft. There is no mass.      Tenderness: There is no abdominal tenderness.      Hernia: No hernia is present.   Genitourinary:     Comments: No lesions noted  Musculoskeletal:         General: No tenderness. Normal range of motion.      Cervical back: Normal range of motion and neck supple.   Lymphadenopathy:      Cervical: No cervical adenopathy.   Skin:     General: Skin is warm and dry.      Findings: No rash.   Neurological:      Mental Status: She is alert and oriented to person, place, and time.      Cranial Nerves: No cranial nerve deficit.   Psychiatric:         Thought Content: Thought content normal.       Office Visit on 11/09/2022   Component Date Value Ref Range Status   • SARS Antigen 11/09/2022 Not Detected  Not Detected, Presumptive Negative Final   • Influenza A Antigen FAHAD 11/09/2022 " Not Detected  Not Detected Final   • Influenza B Antigen FAHAD 11/09/2022 Not Detected  Not Detected Final   • Internal Control 11/09/2022 Passed  Passed Final   • Lot Number 11/09/2022 [id number]   Final   • Expiration Date 11/09/2022 03/11/2023   Final   • Rapid Strep A Screen 11/09/2022 Negative  Negative, VALID, INVALID, Not Performed Final   • Internal Control 11/09/2022 Passed  Passed Final   • Lot Number 11/09/2022 [id number]   Final   • Expiration Date 11/09/2022 10/31/23   Final     Assessment & Plan      1. Epigastric pain    2. Nausea    3. Other constipation    4. Hemorrhage of anus and rectum    1.  Abdominal pain with nausea, likely due to gastritis.  Could also be due to peptic ulcer disease, pancreaticobiliary pathology and marijuana induced functional abdominal pain.  Add Prilosec 40 mg p.o. daily.  Proceed with EGD for further evaluation.  2.  Abdominal pain with constipation, rectal bleeding and abnormal: Noted upon CT rule out IBD and colorectal neoplasia.  Add MiraLAX 17 g p.o. daily.  Proceed with colonoscopy for further evaluation.  3.  Low BMI, add nutritional supplements.  4.  Tobacco and marijuana usage, recommend cessation.       Orders placed during this encounter include:  Orders Placed This Encounter   Procedures   • Obtain Informed Consent     Standing Status:   Future     Order Specific Question:   Informed Consent Given For     Answer:   egd and colonoscopy       ESOPHAGOGASTRODUODENOSCOPY (N/A), COLONOSCOPY (N/A)    Review and/or summary of lab tests, radiology, procedures, medications. Review and summary of old records and obtaining of history. The risks and benefits of my recommendations, as well as other treatment options were discussed with the patient today. Questions were answered.    New Medications Ordered This Visit   Medications   • sodium-potassium-magnesium sulfates (Suprep Bowel Prep Kit) 17.5-3.13-1.6 GM/177ML solution oral solution     Sig: Please use the instructions  given in office     Dispense:  354 mL     Refill:  0       Follow-up: Return in about 1 month (around 12/17/2022).               Results for orders placed or performed in visit on 11/09/22   POCT SARS-CoV-2 Antigen FAHAD + Flu    Specimen: Swab   Result Value Ref Range    SARS Antigen Not Detected Not Detected, Presumptive Negative    Influenza A Antigen FAHAD Not Detected Not Detected    Influenza B Antigen FAHAD Not Detected Not Detected    Internal Control Passed Passed    Lot Number [id number]     Expiration Date 03/11/2023    POC Rapid Strep A    Specimen: Swab   Result Value Ref Range    Rapid Strep A Screen Negative Negative, VALID, INVALID, Not Performed    Internal Control Passed Passed    Lot Number [id number]     Expiration Date 10/31/23    Results for orders placed or performed during the hospital encounter of 10/15/22   Respiratory Panel PCR w/COVID-19(SARS-CoV-2) YELITZA/KENN/AMANDA/PAD/COR/MAD/ANITHA In-House, NP Swab in UTM/VTM, 3-4 HR TAT - Swab, Nasopharynx    Specimen: Nasopharynx; Swab   Result Value Ref Range    ADENOVIRUS, PCR Not Detected Not Detected    Coronavirus 229E Not Detected Not Detected    Coronavirus HKU1 Not Detected Not Detected    Coronavirus NL63 Not Detected Not Detected    Coronavirus OC43 Not Detected Not Detected    COVID19 Not Detected Not Detected - Ref. Range    Human Metapneumovirus Not Detected Not Detected    Human Rhinovirus/Enterovirus Not Detected Not Detected    Influenza A PCR Not Detected Not Detected    Influenza B PCR Not Detected Not Detected    Parainfluenza Virus 1 Not Detected Not Detected    Parainfluenza Virus 2 Not Detected Not Detected    Parainfluenza Virus 3 Not Detected Not Detected    Parainfluenza Virus 4 Not Detected Not Detected    RSV, PCR Not Detected Not Detected    Bordetella pertussis pcr Not Detected Not Detected    Bordetella parapertussis PCR Not Detected Not Detected    Chlamydophila pneumoniae PCR Not Detected Not Detected    Mycoplasma pneumo by PCR Not  Detected Not Detected   Results for orders placed or performed in visit on 08/23/22   CBC Auto Differential    Specimen: Blood   Result Value Ref Range    WBC 11.37 (H) 3.40 - 10.80 10*3/mm3    RBC 4.32 3.77 - 5.28 10*6/mm3    Hemoglobin 14.4 12.0 - 15.9 g/dL    Hematocrit 42.9 34.0 - 46.6 %    MCV 99.3 (H) 79.0 - 97.0 fL    MCH 33.3 (H) 26.6 - 33.0 pg    MCHC 33.6 31.5 - 35.7 g/dL    RDW 13.2 12.3 - 15.4 %    RDW-SD 48.0 37.0 - 54.0 fl    MPV 9.5 6.0 - 12.0 fL    Platelets 449 140 - 450 10*3/mm3    Neutrophil % 65.5 42.7 - 76.0 %    Lymphocyte % 24.7 19.6 - 45.3 %    Monocyte % 6.7 5.0 - 12.0 %    Eosinophil % 1.6 0.3 - 6.2 %    Basophil % 0.8 0.0 - 1.5 %    Immature Grans % 0.7 (H) 0.0 - 0.5 %    Neutrophils, Absolute 7.45 (H) 1.70 - 7.00 10*3/mm3    Lymphocytes, Absolute 2.81 0.70 - 3.10 10*3/mm3    Monocytes, Absolute 0.76 0.10 - 0.90 10*3/mm3    Eosinophils, Absolute 0.18 0.00 - 0.40 10*3/mm3    Basophils, Absolute 0.09 0.00 - 0.20 10*3/mm3    Immature Grans, Absolute 0.08 (H) 0.00 - 0.05 10*3/mm3    nRBC 0.0 0.0 - 0.2 /100 WBC   Iron and TIBC    Specimen: Blood   Result Value Ref Range    Iron 71 37 - 145 mcg/dL    Iron Saturation 19 (L) 20 - 50 %    Transferrin 248 200 - 360 mg/dL    TIBC 370 298 - 536 mcg/dL   Folate    Specimen: Blood   Result Value Ref Range    Folate 7.56 4.78 - 24.20 ng/mL   Ferritin    Specimen: Blood   Result Value Ref Range    Ferritin 430.80 (H) 13.00 - 150.00 ng/mL   Vitamin B12    Specimen: Blood   Result Value Ref Range    Vitamin B-12 1,289 (H) 211 - 946 pg/mL   Comprehensive metabolic panel    Specimen: Blood   Result Value Ref Range    Glucose 91 65 - 99 mg/dL    BUN 11 6 - 20 mg/dL    Creatinine 0.60 0.57 - 1.00 mg/dL    Sodium 140 136 - 145 mmol/L    Potassium 4.5 3.5 - 5.2 mmol/L    Chloride 102 98 - 107 mmol/L    CO2 27.0 22.0 - 29.0 mmol/L    Calcium 9.7 8.6 - 10.5 mg/dL    Total Protein 6.7 6.0 - 8.5 g/dL    Albumin 4.80 3.50 - 5.20 g/dL    ALT (SGPT) 39 (H) 1 - 33 U/L     AST (SGOT) 34 (H) 1 - 32 U/L    Alkaline Phosphatase 76 39 - 117 U/L    Total Bilirubin 0.4 0.0 - 1.2 mg/dL    Globulin 1.9 gm/dL    A/G Ratio 2.5 g/dL    BUN/Creatinine Ratio 18.3 7.0 - 25.0    Anion Gap 11.0 5.0 - 15.0 mmol/L    eGFR 106.8 >60.0 mL/min/1.73   Results for orders placed or performed in visit on 08/10/22   POCT SARS-CoV-2 Antigen FAHAD    Specimen: Swab   Result Value Ref Range    SARS Antigen Not Detected Not Detected, Presumptive Negative    Internal Control Passed Passed    Lot Number [id number]     Expiration Date 03/11/2023    D-dimer, Quantitative    Specimen: Blood   Result Value Ref Range    D-Dimer, Quantitative 295 0 - 470 ng/mL (FEU)   CBC No Differential    Specimen: Blood   Result Value Ref Range    WBC 14.41 (H) 3.40 - 10.80 10*3/mm3    RBC 4.42 3.77 - 5.28 10*6/mm3    Hemoglobin 14.5 12.0 - 15.9 g/dL    Hematocrit 43.3 34.0 - 46.6 %    MCV 98.0 (H) 79.0 - 97.0 fL    MCH 32.8 26.6 - 33.0 pg    MCHC 33.5 31.5 - 35.7 g/dL    RDW 12.4 12.3 - 15.4 %    RDW-SD 44.9 37.0 - 54.0 fl    MPV 9.4 6.0 - 12.0 fL    Platelets 495 (H) 140 - 450 10*3/mm3   Comprehensive metabolic panel    Specimen: Blood   Result Value Ref Range    Glucose 114 (H) 65 - 99 mg/dL    BUN 18 6 - 20 mg/dL    Creatinine 0.81 0.57 - 1.00 mg/dL    Sodium 141 136 - 145 mmol/L    Potassium 5.3 (H) 3.5 - 5.2 mmol/L    Chloride 105 98 - 107 mmol/L    CO2 27.0 22.0 - 29.0 mmol/L    Calcium 9.2 8.6 - 10.5 mg/dL    Total Protein 6.5 6.0 - 8.5 g/dL    Albumin 4.40 3.50 - 5.20 g/dL    ALT (SGPT) 20 1 - 33 U/L    AST (SGOT) 20 1 - 32 U/L    Alkaline Phosphatase 81 39 - 117 U/L    Total Bilirubin 0.3 0.0 - 1.2 mg/dL    Globulin 2.1 gm/dL    A/G Ratio 2.1 g/dL    BUN/Creatinine Ratio 22.2 7.0 - 25.0    Anion Gap 9.0 5.0 - 15.0 mmol/L    eGFR 86.4 >60.0 mL/min/1.73     *Note: Due to a large number of results and/or encounters for the requested time period, some results have not been displayed. A complete set of results can be found in Results  Review.         This document has been electronically signed by Elvin Vo MD on November 22, 2022 07:20 CST

## 2022-11-22 NOTE — H&P (VIEW-ONLY)
Chief Complaint   Patient presents with   • Follow-up     [facility] follow up colitis       Subjective    Carla Richard is a 52 y.o. female.    History of Present Illness  Patient presented to GI clinic complaining of abdominal pain and constipation.  Has generalized abdominal pain with constipation for past week.  Also has nausea and rectal bleeding.  Denied vomiting, melena or weight loss.  Denied NSAID usage.  Seen at [facility] emergency room and had rectosigmoid colon abnormality upon CT abdomen pelvis.       The following portions of the patient's history were reviewed and updated as appropriate:   Past Medical History:   Diagnosis Date   • Asthma    • Cancer (HCC)     cervical   • Colon polyp    • COPD (chronic obstructive pulmonary disease) (HCC)    • COVID-19    • Crohn's disease (HCC)    • Diverticulitis of colon    • Elevated cholesterol    • GERD (gastroesophageal reflux disease)    • History of transfusion    • Irritable bowel syndrome    • Kidney calculus    • Pancreatitis    • Sphincter of Oddi dysfunction      Past Surgical History:   Procedure Laterality Date   • CERVICAL SPINE SURGERY      C5-C6   • COLONOSCOPY     • COLONOSCOPY N/A 10/18/2019    Procedure: COLONOSCOPY;  Surgeon: Tom Davis MD;  Location: [facility] ENDOSCOPY;  Service: Gastroenterology   • COLONOSCOPY N/A 08/27/2020    Procedure: COLONOSCOPY;  Surgeon: Tom Davis MD;  Location: [facility] ENDOSCOPY;  Service: Gastroenterology;  Laterality: N/A;   • COLONOSCOPY N/A 09/29/2021    Procedure: COLONOSCOPY;  Surgeon: Tom Davis MD;  Location: [facility] ENDOSCOPY;  Service: Gastroenterology;  Laterality: N/A;   • ENDOSCOPY N/A 10/18/2019    Procedure: ESOPHAGOGASTRODUODENOSCOPY;  Surgeon: Tom Davis MD;  Location: [facility] ENDOSCOPY;  Service: Gastroenterology   • ENDOSCOPY N/A 07/27/2020    Procedure: ESOPHAGOGASTRODUODENOSCOPY;  Surgeon: Tom Davis MD;  Location: [facility] ENDOSCOPY;  Service:  Gastroenterology;  Laterality: N/A;   • ENDOSCOPY N/A 02/10/2021    Procedure: ESOPHAGOGASTRODUODENOSCOPY;  Surgeon: Tom Davis MD;  Location: [facility] ENDOSCOPY;  Service: Gastroenterology;  Laterality: N/A;   • HYSTERECTOMY     • SHOULDER ARTHROSCOPY W/ ROTATOR CUFF REPAIR Right    • TOE SURGERY      left small toe    • TONSILECTOMY, ADENOIDECTOMY, BILATERAL MYRINGOTOMY AND TUBES     • TONSILLECTOMY     • UPPER GASTROINTESTINAL ENDOSCOPY  02/10/2021     Family History   Problem Relation Age of Onset   • Inflammatory bowel disease Mother    • Arthritis Mother    • Diabetes Mother    • Hypertension Mother    • Hyperlipidemia Mother    • Obesity Mother    • Osteoporosis Mother    • Heart disease Father    • Hyperlipidemia Father    • Heart attack Father    • Diabetes Sister    • Liver disease Daughter    • Mental illness Daughter    • Obesity Daughter    • Diabetes Maternal Grandmother    • Cancer Maternal Grandmother         lung   • Cancer Other         lung   • Heart disease Other      OB History    No obstetric history on file.       Prior to Admission medications    Medication Sig Start Date End Date Taking? Authorizing Provider   Acetaminophen (TYLENOL 8 HOUR PO) Take 650 mg PE by mouth.   Yes Provider, MD Byron   albuterol sulfate  (90 Base) MCG/ACT inhaler INHALE TWO PUFFS EVERY 4 HOURS AS NEEDED for WHEEZING 3/11/22  Yes Xavier Wick MD   amitriptyline (ELAVIL) 10 MG tablet Take 1 tablet by mouth Every Night. 10/25/22  Yes Xavier Wick MD   benzonatate (Tessalon Perles) 100 MG capsule 1-2 caps po TID prn cough 11/9/22  Yes Niles Roldan APRN   budesonide-formoterol (SYMBICORT) 160-4.5 MCG/ACT inhaler Inhale 2 puffs 2 (Two) Times a Day. 4/25/22  Yes Naina Block DO   cetirizine (zyrTEC) 10 MG tablet Take 1 tablet by mouth Daily. 3/31/22  Yes Niles Roldan APRN   COVID-19 At Home Antigen Test (QuickVue At-Home Covid-19 Test) kit 1 kit by In Vitro route Take As Directed. 11/9/22   Yes Niles Roldan APRN   Cyanocobalamin (B-12 Compliance Injection) 1000 MCG/ML kit Inject 1 mL as directed Every 28 (Twenty-Eight) Days. 6/29/21  Yes Ramirez Burger MD   cyclobenzaprine (FLEXERIL) 5 MG tablet Take 1 tablet by mouth 3 (Three) Times a Day As Needed (hip pain). Do not drive while taking 8/23/22  Yes Xavier Wick MD   fluticasone (FLONASE) 50 MCG/ACT nasal spray 2 sprays into the nostril(s) as directed by provider Daily. 10/28/20  Yes Iliana Jacobson MD   folic acid (FOLVITE) 1 MG tablet Take 1 tablet by mouth Daily. 12/7/21  Yes Ramirez Burger MD   guaiFENesin (Mucinex) 600 MG 12 hr tablet Take 2 tablets by mouth 2 (Two) Times a Day. 8/23/22  Yes Xavier Wick MD   ipratropium-albuterol (DUO-NEB) 0.5-2.5 mg/3 ml nebulizer Take 3 mL by nebulization Every 4 (Four) Hours As Needed for Wheezing or Shortness of Air. 4/25/22  Yes Naina Block,    montelukast (SINGULAIR) 10 MG tablet Take 1 tablet by mouth Every Night. 4/25/22  Yes Naina Block,    nicotine (Nicotrol) 10 MG inhaler Inhale 2 puffs As Needed for Smoking Cessation. 8/23/22  Yes Xavier Wick MD   omeprazole (priLOSEC) 20 MG capsule Take 1 capsule by mouth Daily. 8/3/22  Yes Niles Roldan APRN   ondansetron ODT (ZOFRAN-ODT) 8 MG disintegrating tablet Place 1 tablet on the tongue Every 8 (Eight) Hours As Needed for Nausea or Vomiting. 11/9/22  Yes Niles Roldan APRN   Plecanatide (Trulance) 3 MG tablet Take 1 tablet by mouth Daily. 10/15/21  Yes Tom Davis MD   traMADol (ULTRAM) 50 MG tablet Take 50 mg by mouth Every 6 (Six) Hours As Needed.   Yes Provider, MD Byron   vitamin D (ERGOCALCIFEROL) 1.25 MG (83338 UT) capsule capsule Take 1 capsule by mouth 1 (One) Time Per Week. 3/31/22  Yes Niles Roldan APRN   sodium-potassium-magnesium sulfates (Suprep Bowel Prep Kit) 17.5-3.13-1.6 GM/177ML solution oral solution Please use the instructions given in office 11/17/22   Elvin Vo MD     Allergies  "  Allergen Reactions   • Nicotine Seizure     \"nicotine patch\"   • Nsaids Swelling and GI Intolerance   • Other Other (See Comments)     All oral antibiotics cause abdominal swelling.   • Paxlovid [Nirmatrelvir-Ritonavir] Swelling   • Azithromycin Swelling   • Ciprofloxacin Hives   • Doxycycline Swelling   • Keflex [Cephalexin] Diarrhea and Other (See Comments)     Abdominal pain   • Amoxicillin Other (See Comments)     Vomitting, stomach swells, diarrhea , extreme stomach pain   • Levofloxacin Rash   • Phenergan [Promethazine Hcl] GI Intolerance     Social History     Socioeconomic History   • Marital status:    Tobacco Use   • Smoking status: Every Day     Packs/day: 1.00     Years: 42.00     Pack years: 42.00     Types: Cigarettes   • Smokeless tobacco: Never   Vaping Use   • Vaping Use: Former   • Substances: Nicotine   • Devices: Disposable, Refillable tank   Substance and Sexual Activity   • Alcohol use: No   • Drug use: Yes     Types: Marijuana     Comment: 10/15/2022 Patient states she utilizes Marijuana nightly to increase appetite and decrease nausea.   • Sexual activity: Defer       Review of Systems  Review of Systems   Constitutional: Negative for chills, fatigue, fever and unexpected weight change.   HENT: Negative for congestion, ear discharge, hearing loss, nosebleeds and sore throat.    Eyes: Negative for pain, discharge and redness.   Respiratory: Negative for cough, chest tightness, shortness of breath and wheezing.    Cardiovascular: Negative for chest pain and palpitations.   Gastrointestinal: Positive for abdominal pain, anal bleeding, blood in stool, constipation and nausea. Negative for abdominal distention, diarrhea and vomiting.   Endocrine: Negative for cold intolerance, polydipsia, polyphagia and polyuria.   Genitourinary: Negative for dysuria, flank pain, frequency, hematuria and urgency.   Musculoskeletal: Negative for arthralgias, back pain, joint swelling and myalgias.   Skin: " "Negative for color change, pallor and rash.   Neurological: Negative for tremors, seizures, syncope, weakness and headaches.   Hematological: Negative for adenopathy. Does not bruise/bleed easily.   Psychiatric/Behavioral: Negative for behavioral problems, confusion, dysphoric mood, hallucinations and suicidal ideas. The patient is not nervous/anxious.         /78 (BP Location: Right arm)   Pulse 82   Ht 165.1 cm (65\")   Wt 47.3 kg (104 lb 3.2 oz)   LMP  (LMP Unknown)   BMI 17.34 kg/m²     Objective    Physical Exam  Constitutional:       Appearance: She is well-developed.   HENT:      Head: Normocephalic and atraumatic.   Eyes:      Conjunctiva/sclera: Conjunctivae normal.      Pupils: Pupils are equal, round, and reactive to light.   Neck:      Thyroid: No thyromegaly.   Cardiovascular:      Rate and Rhythm: Normal rate and regular rhythm.      Heart sounds: Normal heart sounds. No murmur heard.  Pulmonary:      Effort: Pulmonary effort is normal.      Breath sounds: Normal breath sounds. No wheezing.   Abdominal:      General: Bowel sounds are normal. There is no distension.      Palpations: Abdomen is soft. There is no mass.      Tenderness: There is no abdominal tenderness.      Hernia: No hernia is present.   Genitourinary:     Comments: No lesions noted  Musculoskeletal:         General: No tenderness. Normal range of motion.      Cervical back: Normal range of motion and neck supple.   Lymphadenopathy:      Cervical: No cervical adenopathy.   Skin:     General: Skin is warm and dry.      Findings: No rash.   Neurological:      Mental Status: She is alert and oriented to person, place, and time.      Cranial Nerves: No cranial nerve deficit.   Psychiatric:         Thought Content: Thought content normal.       Office Visit on 11/09/2022   Component Date Value Ref Range Status   • SARS Antigen 11/09/2022 Not Detected  Not Detected, Presumptive Negative Final   • Influenza A Antigen FAHAD 11/09/2022 " Not Detected  Not Detected Final   • Influenza B Antigen FAHAD 11/09/2022 Not Detected  Not Detected Final   • Internal Control 11/09/2022 Passed  Passed Final   • Lot Number 11/09/2022 [id number]   Final   • Expiration Date 11/09/2022 03/11/2023   Final   • Rapid Strep A Screen 11/09/2022 Negative  Negative, VALID, INVALID, Not Performed Final   • Internal Control 11/09/2022 Passed  Passed Final   • Lot Number 11/09/2022 [id number]   Final   • Expiration Date 11/09/2022 10/31/23   Final     Assessment & Plan      1. Epigastric pain    2. Nausea    3. Other constipation    4. Hemorrhage of anus and rectum    1.  Abdominal pain with nausea, likely due to gastritis.  Could also be due to peptic ulcer disease, pancreaticobiliary pathology and marijuana induced functional abdominal pain.  Add Prilosec 40 mg p.o. daily.  Proceed with EGD for further evaluation.  2.  Abdominal pain with constipation, rectal bleeding and abnormal: Noted upon CT rule out IBD and colorectal neoplasia.  Add MiraLAX 17 g p.o. daily.  Proceed with colonoscopy for further evaluation.  3.  Low BMI, add nutritional supplements.  4.  Tobacco and marijuana usage, recommend cessation.       Orders placed during this encounter include:  Orders Placed This Encounter   Procedures   • Obtain Informed Consent     Standing Status:   Future     Order Specific Question:   Informed Consent Given For     Answer:   egd and colonoscopy       ESOPHAGOGASTRODUODENOSCOPY (N/A), COLONOSCOPY (N/A)    Review and/or summary of lab tests, radiology, procedures, medications. Review and summary of old records and obtaining of history. The risks and benefits of my recommendations, as well as other treatment options were discussed with the patient today. Questions were answered.    New Medications Ordered This Visit   Medications   • sodium-potassium-magnesium sulfates (Suprep Bowel Prep Kit) 17.5-3.13-1.6 GM/177ML solution oral solution     Sig: Please use the instructions  given in office     Dispense:  354 mL     Refill:  0       Follow-up: Return in about 1 month (around 12/17/2022).               Results for orders placed or performed in visit on 11/09/22   POCT SARS-CoV-2 Antigen FAHAD + Flu    Specimen: Swab   Result Value Ref Range    SARS Antigen Not Detected Not Detected, Presumptive Negative    Influenza A Antigen FAHAD Not Detected Not Detected    Influenza B Antigen FAHAD Not Detected Not Detected    Internal Control Passed Passed    Lot Number [id number]     Expiration Date 03/11/2023    POC Rapid Strep A    Specimen: Swab   Result Value Ref Range    Rapid Strep A Screen Negative Negative, VALID, INVALID, Not Performed    Internal Control Passed Passed    Lot Number [id number]     Expiration Date 10/31/23    Results for orders placed or performed during the hospital encounter of 10/15/22   Respiratory Panel PCR w/COVID-19(SARS-CoV-2) YELITZA/KENN/AMANDA/PAD/COR/MAD/ANITHA In-House, NP Swab in UTM/VTM, 3-4 HR TAT - Swab, Nasopharynx    Specimen: Nasopharynx; Swab   Result Value Ref Range    ADENOVIRUS, PCR Not Detected Not Detected    Coronavirus 229E Not Detected Not Detected    Coronavirus HKU1 Not Detected Not Detected    Coronavirus NL63 Not Detected Not Detected    Coronavirus OC43 Not Detected Not Detected    COVID19 Not Detected Not Detected - Ref. Range    Human Metapneumovirus Not Detected Not Detected    Human Rhinovirus/Enterovirus Not Detected Not Detected    Influenza A PCR Not Detected Not Detected    Influenza B PCR Not Detected Not Detected    Parainfluenza Virus 1 Not Detected Not Detected    Parainfluenza Virus 2 Not Detected Not Detected    Parainfluenza Virus 3 Not Detected Not Detected    Parainfluenza Virus 4 Not Detected Not Detected    RSV, PCR Not Detected Not Detected    Bordetella pertussis pcr Not Detected Not Detected    Bordetella parapertussis PCR Not Detected Not Detected    Chlamydophila pneumoniae PCR Not Detected Not Detected    Mycoplasma pneumo by PCR Not  Detected Not Detected   Results for orders placed or performed in visit on 08/23/22   CBC Auto Differential    Specimen: Blood   Result Value Ref Range    WBC 11.37 (H) 3.40 - 10.80 10*3/mm3    RBC 4.32 3.77 - 5.28 10*6/mm3    Hemoglobin 14.4 12.0 - 15.9 g/dL    Hematocrit 42.9 34.0 - 46.6 %    MCV 99.3 (H) 79.0 - 97.0 fL    MCH 33.3 (H) 26.6 - 33.0 pg    MCHC 33.6 31.5 - 35.7 g/dL    RDW 13.2 12.3 - 15.4 %    RDW-SD 48.0 37.0 - 54.0 fl    MPV 9.5 6.0 - 12.0 fL    Platelets 449 140 - 450 10*3/mm3    Neutrophil % 65.5 42.7 - 76.0 %    Lymphocyte % 24.7 19.6 - 45.3 %    Monocyte % 6.7 5.0 - 12.0 %    Eosinophil % 1.6 0.3 - 6.2 %    Basophil % 0.8 0.0 - 1.5 %    Immature Grans % 0.7 (H) 0.0 - 0.5 %    Neutrophils, Absolute 7.45 (H) 1.70 - 7.00 10*3/mm3    Lymphocytes, Absolute 2.81 0.70 - 3.10 10*3/mm3    Monocytes, Absolute 0.76 0.10 - 0.90 10*3/mm3    Eosinophils, Absolute 0.18 0.00 - 0.40 10*3/mm3    Basophils, Absolute 0.09 0.00 - 0.20 10*3/mm3    Immature Grans, Absolute 0.08 (H) 0.00 - 0.05 10*3/mm3    nRBC 0.0 0.0 - 0.2 /100 WBC   Iron and TIBC    Specimen: Blood   Result Value Ref Range    Iron 71 37 - 145 mcg/dL    Iron Saturation 19 (L) 20 - 50 %    Transferrin 248 200 - 360 mg/dL    TIBC 370 298 - 536 mcg/dL   Folate    Specimen: Blood   Result Value Ref Range    Folate 7.56 4.78 - 24.20 ng/mL   Ferritin    Specimen: Blood   Result Value Ref Range    Ferritin 430.80 (H) 13.00 - 150.00 ng/mL   Vitamin B12    Specimen: Blood   Result Value Ref Range    Vitamin B-12 1,289 (H) 211 - 946 pg/mL   Comprehensive metabolic panel    Specimen: Blood   Result Value Ref Range    Glucose 91 65 - 99 mg/dL    BUN 11 6 - 20 mg/dL    Creatinine 0.60 0.57 - 1.00 mg/dL    Sodium 140 136 - 145 mmol/L    Potassium 4.5 3.5 - 5.2 mmol/L    Chloride 102 98 - 107 mmol/L    CO2 27.0 22.0 - 29.0 mmol/L    Calcium 9.7 8.6 - 10.5 mg/dL    Total Protein 6.7 6.0 - 8.5 g/dL    Albumin 4.80 3.50 - 5.20 g/dL    ALT (SGPT) 39 (H) 1 - 33 U/L     AST (SGOT) 34 (H) 1 - 32 U/L    Alkaline Phosphatase 76 39 - 117 U/L    Total Bilirubin 0.4 0.0 - 1.2 mg/dL    Globulin 1.9 gm/dL    A/G Ratio 2.5 g/dL    BUN/Creatinine Ratio 18.3 7.0 - 25.0    Anion Gap 11.0 5.0 - 15.0 mmol/L    eGFR 106.8 >60.0 mL/min/1.73   Results for orders placed or performed in visit on 08/10/22   POCT SARS-CoV-2 Antigen FAHAD    Specimen: Swab   Result Value Ref Range    SARS Antigen Not Detected Not Detected, Presumptive Negative    Internal Control Passed Passed    Lot Number [id number]     Expiration Date 03/11/2023    D-dimer, Quantitative    Specimen: Blood   Result Value Ref Range    D-Dimer, Quantitative 295 0 - 470 ng/mL (FEU)   CBC No Differential    Specimen: Blood   Result Value Ref Range    WBC 14.41 (H) 3.40 - 10.80 10*3/mm3    RBC 4.42 3.77 - 5.28 10*6/mm3    Hemoglobin 14.5 12.0 - 15.9 g/dL    Hematocrit 43.3 34.0 - 46.6 %    MCV 98.0 (H) 79.0 - 97.0 fL    MCH 32.8 26.6 - 33.0 pg    MCHC 33.5 31.5 - 35.7 g/dL    RDW 12.4 12.3 - 15.4 %    RDW-SD 44.9 37.0 - 54.0 fl    MPV 9.4 6.0 - 12.0 fL    Platelets 495 (H) 140 - 450 10*3/mm3   Comprehensive metabolic panel    Specimen: Blood   Result Value Ref Range    Glucose 114 (H) 65 - 99 mg/dL    BUN 18 6 - 20 mg/dL    Creatinine 0.81 0.57 - 1.00 mg/dL    Sodium 141 136 - 145 mmol/L    Potassium 5.3 (H) 3.5 - 5.2 mmol/L    Chloride 105 98 - 107 mmol/L    CO2 27.0 22.0 - 29.0 mmol/L    Calcium 9.2 8.6 - 10.5 mg/dL    Total Protein 6.5 6.0 - 8.5 g/dL    Albumin 4.40 3.50 - 5.20 g/dL    ALT (SGPT) 20 1 - 33 U/L    AST (SGOT) 20 1 - 32 U/L    Alkaline Phosphatase 81 39 - 117 U/L    Total Bilirubin 0.3 0.0 - 1.2 mg/dL    Globulin 2.1 gm/dL    A/G Ratio 2.1 g/dL    BUN/Creatinine Ratio 22.2 7.0 - 25.0    Anion Gap 9.0 5.0 - 15.0 mmol/L    eGFR 86.4 >60.0 mL/min/1.73     *Note: Due to a large number of results and/or encounters for the requested time period, some results have not been displayed. A complete set of results can be found in Results  Review.         This document has been electronically signed by Elvin Vo MD on November 22, 2022 07:20 CST

## 2022-11-22 NOTE — ANESTHESIA POSTPROCEDURE EVALUATION
Patient: Carla Richard    Procedure Summary     Date: 11/22/22 Room / Location: [facility] ENDOSCOPY 3 / [facility] ENDOSCOPY    Anesthesia Start: 1429 Anesthesia Stop: 1457    Procedures:       ESOPHAGOGASTRODUODENOSCOPY      COLONOSCOPY Diagnosis:       Epigastric pain      Nausea      Other constipation      Hemorrhage of anus and rectum      (Epigastric pain [R10.13])      (Nausea [R11.0])      (Other constipation [K59.09])      (Hemorrhage of anus and rectum [K62.5])    Surgeons: Elvin Vo MD Provider: Rosalinda Latham CRNA    Anesthesia Type: general ASA Status: 4          Anesthesia Type: general    Vitals  No vitals data found for the desired time range.          Post Anesthesia Care and Evaluation    Patient location during evaluation: bedside  Patient participation: complete - patient cannot participate  Level of consciousness: sleepy but conscious  Pain score: 0  Pain management: adequate    Airway patency: patent  Anesthetic complications: No anesthetic complications  PONV Status: none  Cardiovascular status: acceptable  Respiratory status: acceptable and spontaneous ventilation  Hydration status: acceptable    Comments: Sat 99% RA, RR 20, HR 93, /62, wet cough, suctioned, similar to baseline lung function.  Pt encouraged to cough and deep breath

## 2022-11-22 NOTE — ANESTHESIA PREPROCEDURE EVALUATION
Anesthesia Evaluation     Patient summary reviewed and Nursing notes reviewed   NPO Solid Status: > 8 hours  NPO Liquid Status: > 8 hours           Airway   Mallampati: II  TM distance: >3 FB  Neck ROM: full  No difficulty expected  Dental    (+) edentulous    Pulmonary    (+) a smoker Current Smoked day of surgery, COPD moderate, asthma,recent URI resolved, decreased breath sounds,   Cardiovascular - normal exam    (+) hypertension well controlled, hyperlipidemia,       Neuro/Psych  (+) tremors, psychiatric history Anxiety and Depression,    GI/Hepatic/Renal/Endo    (+)  GERD,  renal disease CRI,     Musculoskeletal     (+) neck pain,   Abdominal  - normal exam   Substance History   (+) drug use      Comment: Smokes marijuana daily   OB/GYN negative ob/gyn ROS         Other      history of cancer    ROS/Med Hx Other: Malnourished, barrel chested                    Anesthesia Plan    ASA 4     general   total IV anesthesia  intravenous induction     Anesthetic plan, risks, benefits, and alternatives have been provided, discussed and informed consent has been obtained with: patient.    Plan discussed with CRNA.

## 2022-11-25 LAB — REF LAB TEST METHOD: NORMAL

## 2022-11-28 ENCOUNTER — OFFICE VISIT (OUTPATIENT)
Dept: FAMILY MEDICINE CLINIC | Facility: CLINIC | Age: 52
End: 2022-11-28

## 2022-11-28 VITALS
WEIGHT: 100 LBS | TEMPERATURE: 97.4 F | OXYGEN SATURATION: 96 % | BODY MASS INDEX: 16.66 KG/M2 | HEIGHT: 65 IN | HEART RATE: 86 BPM | SYSTOLIC BLOOD PRESSURE: 90 MMHG | DIASTOLIC BLOOD PRESSURE: 58 MMHG

## 2022-11-28 DIAGNOSIS — J06.9 UPPER RESPIRATORY TRACT INFECTION, UNSPECIFIED TYPE: ICD-10-CM

## 2022-11-28 DIAGNOSIS — J40 BRONCHITIS: Primary | ICD-10-CM

## 2022-11-28 PROCEDURE — 99213 OFFICE O/P EST LOW 20 MIN: CPT | Performed by: NURSE PRACTITIONER

## 2022-11-28 RX ORDER — BENZONATATE 100 MG/1
CAPSULE ORAL
Qty: 30 CAPSULE | Refills: 1 | Status: SHIPPED | OUTPATIENT
Start: 2022-11-28 | End: 2023-02-13

## 2022-11-28 RX ORDER — PREDNISONE 10 MG/1
TABLET ORAL
Qty: 19 TABLET | Refills: 0 | Status: SHIPPED | OUTPATIENT
Start: 2022-11-28 | End: 2022-12-07

## 2022-11-28 NOTE — PROGRESS NOTES
"Chief Complaint  Illness (St, miranda earache, cough X 1 day)    Subjective          Carla Richard presents to [facility] [place]    History of Present Illness  [facility]    PCP: Dr. Wick    CC: \"sore throat, earache, cough\"    Symptoms beginning in the last 24 hours.  No fever, body aches.  Declines POCT at this time.  Hx of COPD, smoker, using nebulizer at home since yesterday.        Illness  This is a new problem. The current episode started yesterday. The problem occurs constantly. The problem has been unchanged. Associated symptoms include congestion, coughing, fatigue and a sore throat (scratchy). Pertinent negatives include no abdominal pain, anorexia, arthralgias, change in bowel habit, chest pain, chills, diaphoresis, fever, headaches, joint swelling, myalgias, nausea, neck pain, numbness, rash, swollen glands, urinary symptoms, vertigo, visual change, vomiting or weakness. Nothing aggravates the symptoms. Treatments tried: nebulizer. The treatment provided mild relief.       Review of Systems   Constitutional: Positive for fatigue. Negative for appetite change, chills, diaphoresis and fever.   HENT: Positive for congestion, ear pain, postnasal drip, rhinorrhea, sinus pressure, sneezing and sore throat (scratchy). Negative for ear discharge (bilateral), sinus pain and trouble swallowing.    Eyes: Negative.    Respiratory: Positive for cough, chest tightness, shortness of breath and wheezing.    Cardiovascular: Negative.  Negative for chest pain.   Gastrointestinal: Negative.  Negative for abdominal pain, anorexia, change in bowel habit, nausea and vomiting.   Genitourinary: Negative.    Musculoskeletal: Negative.  Negative for arthralgias, joint swelling, myalgias and neck pain.   Skin: Negative.  Negative for rash.   Neurological: Negative for dizziness, vertigo, weakness, numbness and headaches.        Objective   Vital Signs:   BP " "90/58 (BP Location: Left arm, Patient Position: Sitting, Cuff Size: Adult)   Pulse 86   Temp 97.4 °F (36.3 °C) (Oral)   Ht 165.1 cm (65\")   Wt 45.4 kg (100 lb)   SpO2 96%   BMI 16.64 kg/m²       Physical Exam  Vitals and nursing note reviewed.   Constitutional:       General: She is not in acute distress.     Appearance: She is ill-appearing ( mildly).   HENT:      Head: Normocephalic and atraumatic.      Right Ear: Tympanic membrane and ear canal normal.      Left Ear: Tympanic membrane and ear canal normal.      Nose: Congestion present.      Comments: Generalized TTP over the sinuses       Mouth/Throat:      Mouth: Mucous membranes are moist.      Pharynx: No oropharyngeal exudate or posterior oropharyngeal erythema.      Comments: +PND    Eyes:      General:         Right eye: No discharge.         Left eye: No discharge.      Conjunctiva/sclera: Conjunctivae normal.   Cardiovascular:      Rate and Rhythm: Normal rate and regular rhythm.   Pulmonary:      Effort: Pulmonary effort is normal. No respiratory distress.      Breath sounds: Wheezing present. No rhonchi or rales.      Comments: Decreased aeration with tight, congested, wheezy cough noted  Musculoskeletal:      Cervical back: Neck supple. No tenderness.   Lymphadenopathy:      Cervical: No cervical adenopathy.   Skin:     General: Skin is warm and dry.   Neurological:      General: No focal deficit present.      Mental Status: She is alert and oriented to person, place, and time.   Psychiatric:         Mood and Affect: Mood normal.         Thought Content: Thought content normal.          Result Review :     Common labs    Common Labs 6/21/22 6/21/22 8/10/22 8/10/22 8/23/22 8/23/22    1338 1338 1421 1421 1039 1039   Glucose    114 (A)  91   BUN  12  18  11   Creatinine 0.63   0.81  0.60   Sodium    141  140   Potassium    5.3 (A)  4.5   Chloride    105  102   Calcium    9.2  9.7   Albumin    4.40  4.80   Total Bilirubin    0.3  0.4   Alkaline " Phosphatase    81  76   AST (SGOT)    20  34 (A)   ALT (SGPT)    20  39 (A)   WBC   14.41 (A)  11.37 (A)    Hemoglobin   14.5  14.4    Hematocrit   43.3  42.9    Platelets   495 (A)  449    (A) Abnormal value                      Assessment and Plan    Diagnoses and all orders for this visit:    1. Bronchitis (Primary)  -     benzonatate (Tessalon Perles) 100 MG capsule; 1-2 caps po TID prn cough  Dispense: 30 capsule; Refill: 1  -     predniSONE (DELTASONE) 10 MG tablet; 3 tabs po daily x 3 days, then 2 tabs daily x 3 days, then 1 tab daily x 4 days  Dispense: 19 tablet; Refill: 0    2. Upper respiratory tract infection, unspecified type      Declines POCT at this time--has home Covid test kit if not improving    Push fluids  Rest  Tylenol as needed  Rx for Prednisone, Tessalon perles provided  Continue with inhalers/nebulizer treatments  Smoking cessation encouraged    See PCP or RTC if symptoms persist/worsen  See PCP for routine f/u visit and management of chronic medical conditions      This document has been electronically signed by CROW Padgett on November 28, 2022 11:49 CST,.

## 2022-12-01 ENCOUNTER — OFFICE VISIT (OUTPATIENT)
Dept: GASTROENTEROLOGY | Facility: CLINIC | Age: 52
End: 2022-12-01

## 2022-12-01 VITALS
HEIGHT: 65 IN | HEART RATE: 86 BPM | WEIGHT: 101.4 LBS | BODY MASS INDEX: 16.89 KG/M2 | SYSTOLIC BLOOD PRESSURE: 112 MMHG | DIASTOLIC BLOOD PRESSURE: 72 MMHG

## 2022-12-01 DIAGNOSIS — R10.13 EPIGASTRIC PAIN: Primary | ICD-10-CM

## 2022-12-01 PROCEDURE — 99214 OFFICE O/P EST MOD 30 MIN: CPT | Performed by: INTERNAL MEDICINE

## 2022-12-01 RX ORDER — SUCRALFATE 1 G/1
1 TABLET ORAL 4 TIMES DAILY
Qty: 120 TABLET | Refills: 6 | Status: SHIPPED | OUTPATIENT
Start: 2022-12-01 | End: 2022-12-31

## 2022-12-02 NOTE — PROGRESS NOTES
Chief Complaint   Patient presents with   • Follow-up       Subjective    Carla Richard is a 52 y.o. female.    History of Present Illness  Patient presented to GI clinic for follow-up visit today.  Has intermittent symptoms with nausea and vomiting.  Also has constipation.  Has intermittent generalized abdominal pain which improves with defecation.  Lost 4 pounds weight since last visit.  Denied diarrhea or rectal bleeding.  EGD was consistent with hiatal hernia, esophagitis and gastritis.  Path was consistent with reactive gastropathy and Alan's esophagus.  Colonoscopy was consistent with hemorrhoids.  Iron saturations are 19%.  Most recent hemoglobin was 14.4.  Patient continues to smoke pot.       The following portions of the patient's history were reviewed and updated as appropriate:   Past Medical History:   Diagnosis Date   • Asthma    • Cancer (HCC)     cervical   • Colon polyp    • COPD (chronic obstructive pulmonary disease) (HCC)    • COVID-19    • Crohn's disease (HCC)    • Diverticulitis of colon    • Elevated cholesterol    • GERD (gastroesophageal reflux disease)    • History of transfusion    • Irritable bowel syndrome    • Kidney calculus    • Pancreatitis    • Sphincter of Oddi dysfunction      Past Surgical History:   Procedure Laterality Date   • CERVICAL SPINE SURGERY      C5-C6   • COLONOSCOPY     • COLONOSCOPY N/A 10/18/2019    Procedure: COLONOSCOPY;  Surgeon: Tom Davis MD;  Location: [facility] ENDOSCOPY;  Service: Gastroenterology   • COLONOSCOPY N/A 08/27/2020    Procedure: COLONOSCOPY;  Surgeon: Tom Davis MD;  Location: [facility] ENDOSCOPY;  Service: Gastroenterology;  Laterality: N/A;   • COLONOSCOPY N/A 09/29/2021    Procedure: COLONOSCOPY;  Surgeon: Tom Davis MD;  Location: [facility] ENDOSCOPY;  Service: Gastroenterology;  Laterality: N/A;   • ENDOSCOPY N/A 10/18/2019    Procedure: ESOPHAGOGASTRODUODENOSCOPY;  Surgeon: Tom Davis MD;  Location: [facility]  ENDOSCOPY;  Service: Gastroenterology   • ENDOSCOPY N/A 07/27/2020    Procedure: ESOPHAGOGASTRODUODENOSCOPY;  Surgeon: Tom Davis MD;  Location: [facility] ENDOSCOPY;  Service: Gastroenterology;  Laterality: N/A;   • ENDOSCOPY N/A 02/10/2021    Procedure: ESOPHAGOGASTRODUODENOSCOPY;  Surgeon: Tom Davis MD;  Location: [facility] ENDOSCOPY;  Service: Gastroenterology;  Laterality: N/A;   • HYSTERECTOMY     • SHOULDER ARTHROSCOPY W/ ROTATOR CUFF REPAIR Right    • TOE SURGERY      left small toe    • TONSILECTOMY, ADENOIDECTOMY, BILATERAL MYRINGOTOMY AND TUBES     • TONSILLECTOMY     • UPPER GASTROINTESTINAL ENDOSCOPY  02/10/2021     Family History   Problem Relation Age of Onset   • Inflammatory bowel disease Mother    • Arthritis Mother    • Diabetes Mother    • Hypertension Mother    • Hyperlipidemia Mother    • Obesity Mother    • Osteoporosis Mother    • Heart disease Father    • Hyperlipidemia Father    • Heart attack Father    • Diabetes Sister    • Liver disease Daughter    • Mental illness Daughter    • Obesity Daughter    • Diabetes Maternal Grandmother    • Cancer Maternal Grandmother         lung   • Cancer Other         lung   • Heart disease Other      OB History    No obstetric history on file.       Prior to Admission medications    Medication Sig Start Date End Date Taking? Authorizing Provider   Acetaminophen (TYLENOL 8 HOUR PO) Take 650 mg PE by mouth.   Yes Provider, MD Byron   albuterol sulfate  (90 Base) MCG/ACT inhaler INHALE TWO PUFFS EVERY 4 HOURS AS NEEDED for WHEEZING 3/11/22  Yes Xavier Wick MD   amitriptyline (ELAVIL) 10 MG tablet Take 1 tablet by mouth Every Night. 10/25/22  Yes Xavier Wick MD   benzonatate (Tessalon Perles) 100 MG capsule 1-2 caps po TID prn cough 11/28/22  Yes Niles Roldan APRN   budesonide-formoterol (SYMBICORT) 160-4.5 MCG/ACT inhaler Inhale 2 puffs 2 (Two) Times a Day. 4/25/22  Yes Naina Block DO   cetirizine (zyrTEC) 10 MG tablet  Take 1 tablet by mouth Daily. 3/31/22  Yes Niles Roldan APRN   COVID-19 At Home Antigen Test (QuickVue At-Home Covid-19 Test) kit 1 kit by In Vitro route Take As Directed. 11/9/22  Yes Niles Roldan APRN   Cyanocobalamin (B-12 Compliance Injection) 1000 MCG/ML kit Inject 1 mL as directed Every 28 (Twenty-Eight) Days. 6/29/21  Yes Ramirez Burger MD   cyclobenzaprine (FLEXERIL) 5 MG tablet Take 1 tablet by mouth 3 (Three) Times a Day As Needed (hip pain). Do not drive while taking 8/23/22  Yes Xavier Wick MD   fluticasone (FLONASE) 50 MCG/ACT nasal spray 2 sprays into the nostril(s) as directed by provider Daily. 10/28/20  Yes Iliana Jacobson MD   folic acid (FOLVITE) 1 MG tablet Take 1 tablet by mouth Daily. 12/7/21  Yes Ramirez Burger MD   ipratropium-albuterol (DUO-NEB) 0.5-2.5 mg/3 ml nebulizer Take 3 mL by nebulization Every 4 (Four) Hours As Needed for Wheezing or Shortness of Air. 4/25/22  Yes Naina Block DO   montelukast (SINGULAIR) 10 MG tablet Take 1 tablet by mouth Every Night. 4/25/22  Yes Naina Block DO   nicotine (Nicotrol) 10 MG inhaler Inhale 2 puffs As Needed for Smoking Cessation. 8/23/22  Yes Xavier Wick MD   omeprazole (priLOSEC) 20 MG capsule Take 1 capsule by mouth Daily. 8/3/22  Yes Niles Roldan APRN   ondansetron ODT (ZOFRAN-ODT) 8 MG disintegrating tablet Place 1 tablet on the tongue Every 8 (Eight) Hours As Needed for Nausea or Vomiting. 11/9/22  Yes Niles Roldan APRN   Plecanatide (Trulance) 3 MG tablet Take 1 tablet by mouth Daily. 10/15/21  Yes Tom Davis MD   predniSONE (DELTASONE) 10 MG tablet 3 tabs po daily x 3 days, then 2 tabs daily x 3 days, then 1 tab daily x 4 days 11/28/22  Yes Niles Roldan APRN   traMADol (ULTRAM) 50 MG tablet Take 50 mg by mouth Every 6 (Six) Hours As Needed.   Yes Provider, MD Byron   vitamin D (ERGOCALCIFEROL) 1.25 MG (27754 UT) capsule capsule Take 1 capsule by mouth 1 (One) Time Per Week. 3/31/22  Yes Niles Roldan  "V, APRN   sucralfate (Carafate) 1 g tablet Take 1 tablet by mouth 4 (Four) Times a Day for 30 days. 12/1/22 12/31/22  Elvin Vo MD     Allergies   Allergen Reactions   • Nicotine Seizure     \"nicotine patch\"   • Nsaids Swelling and GI Intolerance   • Other Other (See Comments)     All oral antibiotics cause abdominal swelling.   • Paxlovid [Nirmatrelvir-Ritonavir] Swelling   • Azithromycin Swelling   • Ciprofloxacin Hives   • Doxycycline Swelling   • Keflex [Cephalexin] Diarrhea and Other (See Comments)     Abdominal pain   • Amoxicillin Other (See Comments)     Vomitting, stomach swells, diarrhea , extreme stomach pain   • Levofloxacin Rash   • Phenergan [Promethazine Hcl] GI Intolerance     Social History     Socioeconomic History   • Marital status:    Tobacco Use   • Smoking status: Every Day     Packs/day: 1.00     Years: 42.00     Pack years: 42.00     Types: Cigarettes   • Smokeless tobacco: Never   Vaping Use   • Vaping Use: Former   • Substances: Nicotine   • Devices: Disposable, Refillable tank   Substance and Sexual Activity   • Alcohol use: No   • Drug use: Yes     Types: Marijuana     Comment: 10/15/2022 Patient states she utilizes Marijuana nightly to increase appetite and decrease nausea.   • Sexual activity: Defer       Review of Systems  Review of Systems   Constitutional: Positive for unexpected weight change. Negative for chills, fatigue and fever.   HENT: Negative for congestion, ear discharge, hearing loss, nosebleeds and sore throat.    Eyes: Negative for pain, discharge and redness.   Respiratory: Negative for cough, chest tightness, shortness of breath and wheezing.    Cardiovascular: Negative for chest pain and palpitations.   Gastrointestinal: Positive for abdominal pain, constipation, nausea and vomiting. Negative for abdominal distention, blood in stool and diarrhea.   Endocrine: Negative for cold intolerance, polydipsia, polyphagia and polyuria.   Genitourinary: Negative " "for dysuria, flank pain, frequency, hematuria and urgency.   Musculoskeletal: Negative for arthralgias, back pain, joint swelling and myalgias.   Skin: Negative for color change, pallor and rash.   Neurological: Negative for tremors, seizures, syncope, weakness and headaches.   Hematological: Negative for adenopathy. Does not bruise/bleed easily.   Psychiatric/Behavioral: Negative for behavioral problems, confusion, dysphoric mood, hallucinations and suicidal ideas. The patient is not nervous/anxious.         /72 (BP Location: Left arm)   Pulse 86   Ht 165.1 cm (65\")   Wt 46 kg (101 lb 6.4 oz)   LMP  (LMP Unknown)   BMI 16.87 kg/m²     Objective    Physical Exam  Constitutional:       Appearance: She is well-developed.   HENT:      Head: Normocephalic and atraumatic.   Eyes:      Conjunctiva/sclera: Conjunctivae normal.      Pupils: Pupils are equal, round, and reactive to light.   Neck:      Thyroid: No thyromegaly.   Cardiovascular:      Rate and Rhythm: Normal rate and regular rhythm.      Heart sounds: Normal heart sounds. No murmur heard.  Pulmonary:      Effort: Pulmonary effort is normal.      Breath sounds: Normal breath sounds. No wheezing.   Abdominal:      General: Bowel sounds are normal. There is no distension.      Palpations: Abdomen is soft. There is no mass.      Tenderness: There is no abdominal tenderness.      Hernia: No hernia is present.   Genitourinary:     Comments: No lesions noted  Musculoskeletal:         General: No tenderness. Normal range of motion.      Cervical back: Normal range of motion and neck supple.   Lymphadenopathy:      Cervical: No cervical adenopathy.   Skin:     General: Skin is warm and dry.      Findings: No rash.   Neurological:      Mental Status: She is alert and oriented to person, place, and time.      Cranial Nerves: No cranial nerve deficit.   Psychiatric:         Thought Content: Thought content normal.       Admission on 11/22/2022, Discharged on " 2022   Component Date Value Ref Range Status   • Reference Lab Report 2022    Final                    Value:[facility]  [address] [place] [address]  Phone: [phone] or [phone]  Fax: [phone]  Rambo Tillman M.D., Medical Director    PATIENT NAME                           LABORATORY NO.  1800  ARA ZACARIAS.              [id number]  [id number]                         AGE              SEX  SSN           CLIENT REF #  [facility]           52      1970  F    [id number]   [id number]    [place]                       REQUESTING M.D.     ATTENDING M.D.     COPY TO[address]                 MARIANA WILDER[address]  DATE COLLECTED      DATE RECEIVED      DATE REPORTED  2022    DIAGNOSIS:  A.   DUODENUM, BIOPSY:  Small bowel mucosa with no diagnostic abnormality; negative for  malabsorption pattern and microorganisms.  B.   GASTRIC ANTRUM, BIOPSY:  Reactive gastropathy.  Negative for intestinal                           metaplasia or dysplasia.  No H. pylori-like organisms identified on H&E.  C.   GE JUNCTION:  Squamocolumnar junction epithelium/mucosa with intestinal metaplasia,  without dysplasia.  If the specimen was obtained from tubular esophagus,  the findings are consistent with specialized Alan's esophagus.  D.   TERMINAL ILEUM BIOPSY:  Small bowel mucosa with no diagnostic abnormality; negative for  malabsorption pattern and microorganisms.  E.   COLON, BIOPSY, MUCOSA:  Colonic mucosa with no diagnostic abnormality; negative for  microscopic colitis pattern.    WJB    CLINICAL HISTORY:  Epigastric pain, nausea, other constipation, hemorrhage of anus and rectum    SPECIMENS RECEIVED:  A.  DUODENUM, BIOPSY  B.  GASTRIC ANTRUM, BIOPSY  C.  GE JUNCTION  D.  TERMINAL ILEUM BIOPSY  E.  COLON, BIOPSY,  "MUCOSA    MICROSCOPIC DESCRIPTION:  Tissue blocks are prepared and slides are examined microscopically on all  specimens. See diagnosis for details.    Professional interpretation rendered by                           Chester Fam D.O. at [facility], [address] [place] [address].    GROSS DESCRIPTION:  A.  Specimen is received in 1 formalin filled container labeled \"duodenum  biopsy\" and consists of 2 portions of tan soft tissue measuring 0.5 x 0.5 x  0.2 cm in aggregate.  Submitted entirely in 1 cassette.  MTH  B.  Specimen is received in 1 formalin filled container labeled \"gastric, antrum  biopsy\" and consists of a single portion of tan soft tissue measuring 0.6 x  0.3 x 0.2 cm.  Submitted entirely in 1 cassette.  C.  Specimen is received in 1 formalin filled container labeled \"GE junction  biopsy\" and consists of 3 portions of gray soft tissue measuring 0.8 x 0.5 x  0.1 cm in aggregate.  Submitted entirely in 1 cassette.  D.  Specimen is received in 1 formalin filled container labeled \"TI biopsy\" and  consists of a single portion of tan soft tissue measuring 0.6 x 0.3 x 0.2 cm.  Submitted entirely in 1 cassette.  E.  Specimen is received in 1 formalin filled container                           labeled \"colonic  mucosa biopsy\" and consists of 2 portions of tan soft tissue measuring 0.4  x 0.4 x 0.2 cm in aggregate.  Submitted entirely in 1 cassette.    REVIEWED, DIAGNOSED AND ELECTRONICALLY  SIGNED BY:    Chester Fam D.O.  CPT CODES:  88305x5       Assessment & Plan      1. Epigastric pain    1.  Abdominal pain with nausea, vomiting and weight loss likely due to marijuana induced functional abdominal pain.  Need to rule out gastroparesis.  Continue Prilosec.  Add Carafate 1 g p.o. 4 times daily.  Recommend strict marijuana abstinence.  Obtain gastric emptying scan.  2.  Abdominal pain with constipation, likely due to IBS.  Continue Trulance.  Add MiraLAX 17 g p.o. twice daily.  Add " high-fiber diet.  3.  Low BMI with recent weight loss, continue p.o. nutritional supplements.  4.  Alan's esophagus, continue PPI.  Repeat EGD in 1 year for surveillance.  5.  Rectal bleeding, resolved.  Likely hemorrhoidal.  Tobacco and marijuana usage, recommend cessation.      Orders placed during this encounter include:  Orders Placed This Encounter   Procedures   • NM Gastric Emptying     Standing Status:   Future     Standing Expiration Date:   2023   • Pancreatic Elastase, Fecal - Stool, Per Rectum     Standing Status:   Future     Standing Expiration Date:   2023     Order Specific Question:   Release to patient     Answer:   Routine Release       * Surgery not found *    Review and/or summary of lab tests, radiology, procedures, medications. Review and summary of old records and obtaining of history. The risks and benefits of my recommendations, as well as other treatment options were discussed with the patient today. Questions were answered.    New Medications Ordered This Visit   Medications   • sucralfate (Carafate) 1 g tablet     Sig: Take 1 tablet by mouth 4 (Four) Times a Day for 30 days.     Dispense:  120 tablet     Refill:  6       Follow-up: Return in about 1 month (around 2023).               Results for orders placed or performed during the hospital encounter of 22   TISSUE EXAM, [facility] (ANITHA,COR,MAD)    Specimen: A: Small Intestine, Duodenum; Tissue    B: Gastric, Antrum; Tissue    C: GE Junction; Tissue    D: Small Intestine, Ileum; Tissue    E: Large Intestine; Tissue   Result Value Ref Range    Reference Lab Report       [facility]  [address] [place] [address]  Phone: [phone] or [phone]  Fax: [phone]  Rambo Tillman M.D., Medical Director    PATIENT NAME                           LABORATORY NO.  1800  ARA ZACARIAS.              [id number]  [id number]                         AGE              SEX   "[facility]           CLIENT REF #  [facility]           52      1970  F    [id number]   [id number]    [place]                       REQUESTING M.D.     ATTENDING MLOUIS.     COPY TO.  [address] [place] [address]                 [place] [address]             RENEA  DATE COLLECTED      DATE RECEIVED      DATE REPORTED  11/22/2022 11/23/2022 11/25/2022    DIAGNOSIS:  A.   DUODENUM, BIOPSY:  Small bowel mucosa with no diagnostic abnormality; negative for  malabsorption pattern and microorganisms.  B.   GASTRIC ANTRUM, BIOPSY:  Reactive gastropathy.  Negative for intestinal  metaplasia or dysplasia.  No H. pylori-like organisms identified on H&E.  C.   GE JUNCTION:  Squamocolumnar junction epithelium/mucosa with intestinal metaplasia,  without dysplasia.  If the specimen was obtained from tubular esophagus,  the findings are consistent with specialized Alan's esophagus.  D.   TERMINAL ILEUM BIOPSY:  Small bowel mucosa with no diagnostic abnormality; negative for  malabsorption pattern and microorganisms.  E.   COLON, BIOPSY, MUCOSA:  Colonic mucosa with no diagnostic abnormality; negative for  microscopic colitis pattern.    WJB    CLINICAL HISTORY:  Epigastric pain, nausea, other constipation, hemorrhage of anus and rectum    SPECIMENS RECEIVED:  A.  DUODENUM, BIOPSY  B.  GASTRIC ANTRUM, BIOPSY  C.  GE JUNCTION  D.  TERMINAL ILEUM BIOPSY  E.  COLON, BIOPSY, MUCOSA    MICROSCOPIC DESCRIPTION:  Tissue blocks are prepared and slides are examined microscopically on all  specimens. See diagnosis for details.    Professional interpretation rendered by  Chester Fam D.O. at [facility],  [place], [address] [place] [address].    GROSS DESCRIPTION:  A.  Specimen is received in 1 formalin filled container labeled \"duodenum  biopsy\" and consists of 2 portions of tan soft tissue measuring 0.5 x 0.5 x  0.2 cm in aggregate.  Submitted entirely in 1 " "cassette.  MTH  B.  Specimen is received in 1 formalin filled container labeled \"gastric, antrum  biopsy\" and consists of a single portion of tan soft tissue measuring 0.6 x  0.3 x 0.2 cm.  Submitted entirely in 1 cassette.  C.  Specimen is received in 1 formalin filled container labeled \"GE junction  biopsy\" and consists of 3 portions of gray soft tissue measuring 0.8 x 0.5 x  0.1 cm in aggregate.  Submitted entirely in 1 cassette.  D.  Specimen is received in 1 formalin filled container labeled \"TI biopsy\" and  consists of a single portion of tan soft tissue measuring 0.6 x 0.3 x 0.2 cm.  Submitted entirely in 1 cassette.  E.  Specimen is received in 1 formalin filled container  labeled \"colonic  mucosa biopsy\" and consists of 2 portions of tan soft tissue measuring 0.4  x 0.4 x 0.2 cm in aggregate.  Submitted entirely in 1 cassette.    REVIEWED, DIAGNOSED AND ELECTRONICALLY  SIGNED BY:    Chester Fam D.O.  CPT CODES:  88305x5     Results for orders placed or performed in visit on 11/09/22   POCT SARS-CoV-2 Antigen FAHAD + Flu    Specimen: Swab   Result Value Ref Range    SARS Antigen Not Detected Not Detected, Presumptive Negative    Influenza A Antigen FAHAD Not Detected Not Detected    Influenza B Antigen FAHAD Not Detected Not Detected    Internal Control Passed Passed    Lot Number [id number]     Expiration Date 03/11/2023    POC Rapid Strep A    Specimen: Swab   Result Value Ref Range    Rapid Strep A Screen Negative Negative, VALID, INVALID, Not Performed    Internal Control Passed Passed    Lot Number [id number]     Expiration Date 10/31/23    Results for orders placed or performed during the hospital encounter of 10/15/22   Respiratory Panel PCR w/COVID-19(SARS-CoV-2) YELITZA/KENN/AMANDA/PAD/COR/MAD/ANITHA In-House, NP Swab in UTM/VTM, 3-4 HR TAT - Swab, Nasopharynx    Specimen: Nasopharynx; Swab   Result Value Ref Range    ADENOVIRUS, PCR Not Detected Not Detected    Coronavirus 229E Not Detected Not Detected    " Coronavirus HKU1 Not Detected Not Detected    Coronavirus NL63 Not Detected Not Detected    Coronavirus OC43 Not Detected Not Detected    COVID19 Not Detected Not Detected - Ref. Range    Human Metapneumovirus Not Detected Not Detected    Human Rhinovirus/Enterovirus Not Detected Not Detected    Influenza A PCR Not Detected Not Detected    Influenza B PCR Not Detected Not Detected    Parainfluenza Virus 1 Not Detected Not Detected    Parainfluenza Virus 2 Not Detected Not Detected    Parainfluenza Virus 3 Not Detected Not Detected    Parainfluenza Virus 4 Not Detected Not Detected    RSV, PCR Not Detected Not Detected    Bordetella pertussis pcr Not Detected Not Detected    Bordetella parapertussis PCR Not Detected Not Detected    Chlamydophila pneumoniae PCR Not Detected Not Detected    Mycoplasma pneumo by PCR Not Detected Not Detected   Results for orders placed or performed in visit on 08/23/22   CBC Auto Differential    Specimen: Blood   Result Value Ref Range    WBC 11.37 (H) 3.40 - 10.80 10*3/mm3    RBC 4.32 3.77 - 5.28 10*6/mm3    Hemoglobin 14.4 12.0 - 15.9 g/dL    Hematocrit 42.9 34.0 - 46.6 %    MCV 99.3 (H) 79.0 - 97.0 fL    MCH 33.3 (H) 26.6 - 33.0 pg    MCHC 33.6 31.5 - 35.7 g/dL    RDW 13.2 12.3 - 15.4 %    RDW-SD 48.0 37.0 - 54.0 fl    MPV 9.5 6.0 - 12.0 fL    Platelets 449 140 - 450 10*3/mm3    Neutrophil % 65.5 42.7 - 76.0 %    Lymphocyte % 24.7 19.6 - 45.3 %    Monocyte % 6.7 5.0 - 12.0 %    Eosinophil % 1.6 0.3 - 6.2 %    Basophil % 0.8 0.0 - 1.5 %    Immature Grans % 0.7 (H) 0.0 - 0.5 %    Neutrophils, Absolute 7.45 (H) 1.70 - 7.00 10*3/mm3    Lymphocytes, Absolute 2.81 0.70 - 3.10 10*3/mm3    Monocytes, Absolute 0.76 0.10 - 0.90 10*3/mm3    Eosinophils, Absolute 0.18 0.00 - 0.40 10*3/mm3    Basophils, Absolute 0.09 0.00 - 0.20 10*3/mm3    Immature Grans, Absolute 0.08 (H) 0.00 - 0.05 10*3/mm3    nRBC 0.0 0.0 - 0.2 /100 WBC   Iron and TIBC    Specimen: Blood   Result Value Ref Range    Iron 71  37 - 145 mcg/dL    Iron Saturation 19 (L) 20 - 50 %    Transferrin 248 200 - 360 mg/dL    TIBC 370 298 - 536 mcg/dL   Folate    Specimen: Blood   Result Value Ref Range    Folate 7.56 4.78 - 24.20 ng/mL   Ferritin    Specimen: Blood   Result Value Ref Range    Ferritin 430.80 (H) 13.00 - 150.00 ng/mL   Vitamin B12    Specimen: Blood   Result Value Ref Range    Vitamin B-12 1,289 (H) 211 - 946 pg/mL   Comprehensive metabolic panel    Specimen: Blood   Result Value Ref Range    Glucose 91 65 - 99 mg/dL    BUN 11 6 - 20 mg/dL    Creatinine 0.60 0.57 - 1.00 mg/dL    Sodium 140 136 - 145 mmol/L    Potassium 4.5 3.5 - 5.2 mmol/L    Chloride 102 98 - 107 mmol/L    CO2 27.0 22.0 - 29.0 mmol/L    Calcium 9.7 8.6 - 10.5 mg/dL    Total Protein 6.7 6.0 - 8.5 g/dL    Albumin 4.80 3.50 - 5.20 g/dL    ALT (SGPT) 39 (H) 1 - 33 U/L    AST (SGOT) 34 (H) 1 - 32 U/L    Alkaline Phosphatase 76 39 - 117 U/L    Total Bilirubin 0.4 0.0 - 1.2 mg/dL    Globulin 1.9 gm/dL    A/G Ratio 2.5 g/dL    BUN/Creatinine Ratio 18.3 7.0 - 25.0    Anion Gap 11.0 5.0 - 15.0 mmol/L    eGFR 106.8 >60.0 mL/min/1.73   Results for orders placed or performed in visit on 08/10/22   POCT SARS-CoV-2 Antigen FAHAD    Specimen: Swab   Result Value Ref Range    SARS Antigen Not Detected Not Detected, Presumptive Negative    Internal Control Passed Passed    Lot Number [id number]     Expiration Date 03/11/2023    D-dimer, Quantitative    Specimen: Blood   Result Value Ref Range    D-Dimer, Quantitative 295 0 - 470 ng/mL (FEU)   CBC No Differential    Specimen: Blood   Result Value Ref Range    WBC 14.41 (H) 3.40 - 10.80 10*3/mm3    RBC 4.42 3.77 - 5.28 10*6/mm3    Hemoglobin 14.5 12.0 - 15.9 g/dL    Hematocrit 43.3 34.0 - 46.6 %    MCV 98.0 (H) 79.0 - 97.0 fL    MCH 32.8 26.6 - 33.0 pg    MCHC 33.5 31.5 - 35.7 g/dL    RDW 12.4 12.3 - 15.4 %    RDW-SD 44.9 37.0 - 54.0 fl    MPV 9.4 6.0 - 12.0 fL    Platelets 495 (H) 140 - 450 10*3/mm3   Comprehensive metabolic panel     Specimen: Blood   Result Value Ref Range    Glucose 114 (H) 65 - 99 mg/dL    BUN 18 6 - 20 mg/dL    Creatinine 0.81 0.57 - 1.00 mg/dL    Sodium 141 136 - 145 mmol/L    Potassium 5.3 (H) 3.5 - 5.2 mmol/L    Chloride 105 98 - 107 mmol/L    CO2 27.0 22.0 - 29.0 mmol/L    Calcium 9.2 8.6 - 10.5 mg/dL    Total Protein 6.5 6.0 - 8.5 g/dL    Albumin 4.40 3.50 - 5.20 g/dL    ALT (SGPT) 20 1 - 33 U/L    AST (SGOT) 20 1 - 32 U/L    Alkaline Phosphatase 81 39 - 117 U/L    Total Bilirubin 0.3 0.0 - 1.2 mg/dL    Globulin 2.1 gm/dL    A/G Ratio 2.1 g/dL    BUN/Creatinine Ratio 22.2 7.0 - 25.0    Anion Gap 9.0 5.0 - 15.0 mmol/L    eGFR 86.4 >60.0 mL/min/1.73     *Note: Due to a large number of results and/or encounters for the requested time period, some results have not been displayed. A complete set of results can be found in Results Review.         This document has been electronically signed by Elvin Vo MD on December 2, 2022 10:48 CST

## 2022-12-07 ENCOUNTER — OFFICE VISIT (OUTPATIENT)
Dept: FAMILY MEDICINE CLINIC | Facility: CLINIC | Age: 52
End: 2022-12-07

## 2022-12-07 VITALS
WEIGHT: 100.8 LBS | DIASTOLIC BLOOD PRESSURE: 64 MMHG | HEIGHT: 65 IN | HEART RATE: 90 BPM | SYSTOLIC BLOOD PRESSURE: 100 MMHG | BODY MASS INDEX: 16.79 KG/M2 | TEMPERATURE: 98.2 F | OXYGEN SATURATION: 95 %

## 2022-12-07 DIAGNOSIS — R63.6 UNDERWEIGHT: ICD-10-CM

## 2022-12-07 DIAGNOSIS — R25.1 TREMOR: Primary | ICD-10-CM

## 2022-12-07 DIAGNOSIS — M81.0 OSTEOPOROSIS, UNSPECIFIED OSTEOPOROSIS TYPE, UNSPECIFIED PATHOLOGICAL FRACTURE PRESENCE: ICD-10-CM

## 2022-12-07 DIAGNOSIS — K22.719 BARRETT'S ESOPHAGUS WITH DYSPLASIA: ICD-10-CM

## 2022-12-07 DIAGNOSIS — R79.89 HIGH SERUM VITAMIN B12: ICD-10-CM

## 2022-12-07 DIAGNOSIS — K29.70 GASTRITIS WITHOUT BLEEDING, UNSPECIFIED CHRONICITY, UNSPECIFIED GASTRITIS TYPE: ICD-10-CM

## 2022-12-07 DIAGNOSIS — K44.9 HIATAL HERNIA: ICD-10-CM

## 2022-12-07 DIAGNOSIS — J44.9 CHRONIC OBSTRUCTIVE PULMONARY DISEASE, UNSPECIFIED COPD TYPE: ICD-10-CM

## 2022-12-07 DIAGNOSIS — E55.9 VITAMIN D DEFICIENCY: ICD-10-CM

## 2022-12-07 DIAGNOSIS — H83.2X9 BALANCE PROBLEM DUE TO LABYRINTHINE DYSFUNCTION, UNSPECIFIED LATERALITY: ICD-10-CM

## 2022-12-07 DIAGNOSIS — K21.00 GASTROESOPHAGEAL REFLUX DISEASE WITH ESOPHAGITIS WITHOUT HEMORRHAGE: ICD-10-CM

## 2022-12-07 DIAGNOSIS — R10.84 GENERALIZED ABDOMINAL PAIN: ICD-10-CM

## 2022-12-07 DIAGNOSIS — R29.6 FREQUENT FALLS: ICD-10-CM

## 2022-12-07 PROCEDURE — 99214 OFFICE O/P EST MOD 30 MIN: CPT | Performed by: FAMILY MEDICINE

## 2022-12-07 NOTE — PATIENT INSTRUCTIONS
Will refer to Neurology in [place]    Get labwork fasting    Recheck in 2 months    Call Pulmonology for an appt.

## 2022-12-12 ENCOUNTER — TELEPHONE (OUTPATIENT)
Dept: ONCOLOGY | Facility: CLINIC | Age: 52
End: 2022-12-12

## 2022-12-12 NOTE — TELEPHONE ENCOUNTER
CALLED PT [facility] SHE IS GOING TO HAVE THEM IN [place] IF NOT SHE WILL HAVE THEM THE DAY SHE SEES DR PULIDO

## 2022-12-20 ENCOUNTER — APPOINTMENT (OUTPATIENT)
Dept: NUCLEAR MEDICINE | Facility: HOSPITAL | Age: 52
End: 2022-12-20
Payer: MEDICARE

## 2022-12-22 ENCOUNTER — OFFICE VISIT (OUTPATIENT)
Dept: FAMILY MEDICINE CLINIC | Facility: CLINIC | Age: 52
End: 2022-12-22

## 2022-12-22 VITALS
SYSTOLIC BLOOD PRESSURE: 108 MMHG | BODY MASS INDEX: 16.6 KG/M2 | OXYGEN SATURATION: 98 % | TEMPERATURE: 98.4 F | HEART RATE: 78 BPM | DIASTOLIC BLOOD PRESSURE: 40 MMHG | WEIGHT: 99.6 LBS | HEIGHT: 65 IN

## 2022-12-22 DIAGNOSIS — Z00.00 MEDICARE ANNUAL WELLNESS VISIT, SUBSEQUENT: Primary | ICD-10-CM

## 2022-12-22 PROCEDURE — 1170F FXNL STATUS ASSESSED: CPT | Performed by: FAMILY MEDICINE

## 2022-12-22 PROCEDURE — 1159F MED LIST DOCD IN RCRD: CPT | Performed by: FAMILY MEDICINE

## 2022-12-22 PROCEDURE — G0439 PPPS, SUBSEQ VISIT: HCPCS | Performed by: FAMILY MEDICINE

## 2022-12-22 NOTE — PROGRESS NOTES
The ABCs of the Annual Wellness Visit  Subsequent Medicare Wellness Visit    Subjective    Carla Richard is a 52 y.o. female who presents for a Subsequent Medicare Wellness Visit.    The following portions of the patient's history were reviewed and   updated as appropriate: allergies, current medications, past family history, past medical history, past social history, past surgical history and problem list.    Compared to one year ago, the patient feels her physical   health is worse.    Compared to one year ago, the patient feels her mental   health is worse.    Recent Hospitalizations:  She was not admitted to the hospital during the last year.       Current Medical Providers:  Patient Care Team:  Xavier Wick MD as PCP - General (Family Medicine)  Divine Cardona APRN (Gastroenterology)  Talha Payton MD as Consulting Physician (Pulmonary Disease)  Ramirez Burger MD as Consulting Physician (Hematology and Oncology)  Adelita Pennington APRN as Nurse Practitioner (Nurse Practitioner)    Outpatient Medications Prior to Visit   Medication Sig Dispense Refill   • Acetaminophen (TYLENOL 8 HOUR PO) Take 650 mg PE by mouth.     • albuterol sulfate  (90 Base) MCG/ACT inhaler INHALE TWO PUFFS EVERY 4 HOURS AS NEEDED for WHEEZING 18 g 1   • amitriptyline (ELAVIL) 10 MG tablet Take 1 tablet by mouth Every Night. 30 tablet 3   • benzonatate (Tessalon Perles) 100 MG capsule 1-2 caps po TID prn cough 30 capsule 1   • budesonide-formoterol (SYMBICORT) 160-4.5 MCG/ACT inhaler Inhale 2 puffs 2 (Two) Times a Day. 1 each 11   • cetirizine (zyrTEC) 10 MG tablet Take 1 tablet by mouth Daily. 30 tablet 2   • Cyanocobalamin (B-12 Compliance Injection) 1000 MCG/ML kit Inject 1 mL as directed Every 28 (Twenty-Eight) Days. 1 kit 5   • cyclobenzaprine (FLEXERIL) 5 MG tablet Take 1 tablet by mouth 3 (Three) Times a Day As Needed (hip pain). Do not drive while taking 30 tablet 1   • fluticasone (FLONASE) 50  MCG/ACT nasal spray 2 sprays into the nostril(s) as directed by provider Daily. 1 bottle 5   • folic acid (FOLVITE) 1 MG tablet Take 1 tablet by mouth Daily. 90 tablet 1   • ipratropium-albuterol (DUO-NEB) 0.5-2.5 mg/3 ml nebulizer Take 3 mL by nebulization Every 4 (Four) Hours As Needed for Wheezing or Shortness of Air. 360 mL 11   • montelukast (SINGULAIR) 10 MG tablet Take 1 tablet by mouth Every Night. 30 tablet 11   • nicotine (Nicotrol) 10 MG inhaler Inhale 2 puffs As Needed for Smoking Cessation. 168 each 3   • omeprazole (priLOSEC) 20 MG capsule Take 1 capsule by mouth Daily. 30 capsule 0   • ondansetron ODT (ZOFRAN-ODT) 8 MG disintegrating tablet Place 1 tablet on the tongue Every 8 (Eight) Hours As Needed for Nausea or Vomiting. 30 tablet 0   • Plecanatide (Trulance) 3 MG tablet Take 1 tablet by mouth Daily. 90 tablet 5   • sucralfate (Carafate) 1 g tablet Take 1 tablet by mouth 4 (Four) Times a Day for 30 days. 120 tablet 6   • traMADol (ULTRAM) 50 MG tablet Take 50 mg by mouth Every 6 (Six) Hours As Needed.     • vitamin D (ERGOCALCIFEROL) 1.25 MG (49380 UT) capsule capsule Take 1 capsule by mouth 1 (One) Time Per Week. 5 capsule 2     No facility-administered medications prior to visit.       Opioid medication/s are on active medication list.  and I have evaluated her active treatment plan and pain score trends (see table).  Vitals:    12/22/22 1313   PainSc:   7   PainLoc: Hip     I have reviewed the chart for potential of high risk medication and harmful drug interactions in the elderly.            Aspirin is not on active medication list.  Aspirin use is not indicated based on review of current medical condition/s. Risk of harm outweighs potential benefits.  .    Patient Active Problem List   Diagnosis   • Chronic idiopathic constipation   • B12 deficiency   • Vitamin D deficiency    • Stage 2 moderate COPD by GOLD classification (HCC)   • Cervical lymphadenopathy   • Generalized abdominal pain   •  Nausea   • Neurological abnormality   • Tremor   • RUQ pain   • Pertussis exposure   • Chronic nonseasonal allergic rhinitis due to pollen   • Episode of recurrent major depressive disorder (HCC)   • Diarrhea   • Chronic diarrhea   • Thrombocytosis   • Nausea and vomiting   • Gluten intolerance   • C. difficile diarrhea   • Shiga toxin-producing Escherichia coli infection   • Generalized weakness   • Head injury   • Concussion with loss of consciousness   • Gastritis without bleeding   • Irritable bowel syndrome   • Tachycardia   • JELENA (generalized anxiety disorder)   • Marijuana use   • Epigastric pain   • History of Clostridioides difficile colitis   • Celiac disease   • Upper respiratory tract infection   • Irritable bowel syndrome with both constipation and diarrhea   • Moderate protein-calorie malnutrition (HCC)   • Folliculitis   • Itchy scalp   • Tinea capitis   • Chronic interstitial cystitis   • Fibromyalgia   • Gastric reflux   • Kidney stone   • Lymphocytic colitis   • Microscopic hematuria   • Obstruction of bile duct   • Throat pain   • Strain of right hip   • Gastroesophageal reflux disease with esophagitis without hemorrhage   • Cigarette nicotine dependence with nicotine-induced disorder   • Underweight   • Iron deficiency   • Leukocytosis   • History of cervical cancer   • Chronic obstructive pulmonary disease (HCC)   • Vasomotor symptoms due to menopause   • Change in bowel habits   • Other constipation   • Close exposure to COVID-19 virus   • Folate deficiency   • Adverse effect of iron   • History of COVID-19   • Balance problem   • History of fall   • Hemorrhage of anus and rectum   • Hiatal hernia   • High serum vitamin B12   • Alan's esophagus with dysplasia   • Osteoporosis     Advance Care Planning  Advance Directive is on file.  ACP discussion was held with the patient during this visit. Patient has an advance directive in EMR which is still valid.      Objective    Vitals:    12/22/22  "1313   BP: 108/40   BP Location: Right arm   Patient Position: Sitting   Cuff Size: Adult   Pulse: 78   Temp: 98.4 °F (36.9 °C)   SpO2: 98%   Weight: 45.2 kg (99 lb 9.6 oz)   Height: 165.1 cm (65\")   PainSc:   7   PainLoc: Hip     Estimated body mass index is 16.57 kg/m² as calculated from the following:    Height as of this encounter: 165.1 cm (65\").    Weight as of this encounter: 45.2 kg (99 lb 9.6 oz).    BMI is below normal parameters (malnutrition). Recommendations: none (medical contraindication)      Does the patient have evidence of cognitive impairment? No          HEALTH RISK ASSESSMENT    Smoking Status:  Social History     Tobacco Use   Smoking Status Every Day   • Packs/day: 1.00   • Years: 42.00   • Pack years: 42.00   • Types: Cigarettes   Smokeless Tobacco Never     Alcohol Consumption:  Social History     Substance and Sexual Activity   Alcohol Use No     Fall Risk Screen:    ARMANDOADI Fall Risk Assessment has not been completed.    Depression Screening:  PHQ-2/PHQ-9 Depression Screening 9/12/2022   Retired PHQ-9 Total Score -   Retired Total Score -   Little Interest or Pleasure in Doing Things 0-->not at all   Feeling Down, Depressed or Hopeless 0-->not at all   PHQ-9: Brief Depression Severity Measure Score 0       Health Habits and Functional and Cognitive Screening:  Functional & Cognitive Status 11/18/2021   Do you have difficulty preparing food and eating? No   Do you have difficulty bathing yourself, getting dressed or grooming yourself? No   Do you have difficulty using the toilet? No   Do you have difficulty moving around from place to place? No   Do you have trouble with steps or getting out of a bed or a chair? No   Current Diet Other        Current Diet Comment gluten free   Dental Exam Up to date   Eye Exam Up to date   Exercise (times per week) 0 times per week   Current Exercises Include No Regular Exercise   Current Exercise Activities Include -   Do you need help using the phone?  No "   Are you deaf or do you have serious difficulty hearing?  No   Do you need help with transportation? No   Do you need help shopping? No   Do you need help preparing meals?  No   Do you need help with housework?  No   Do you need help with laundry? No   Do you need help taking your medications? No   Do you need help managing money? No   Do you ever drive or ride in a car without wearing a seat belt? No   Have you felt unusual stress, anger or loneliness in the last month? Yes   Who do you live with? Other   If you need help, do you have trouble finding someone available to you? No   Have you been bothered in the last four weeks by sexual problems? No   Do you have difficulty concentrating, remembering or making decisions? No       Age-appropriate Screening Schedule:  Refer to the list below for future screening recommendations based on patient's age, sex and/or medical conditions. Orders for these recommended tests are listed in the plan section. The patient has been provided with a written plan.    Health Maintenance   Topic Date Due   • LIPID PANEL  06/01/2022   • TDAP/TD VACCINES (2 - Td or Tdap) 08/12/2029 (Originally 10/7/2020)   • MAMMOGRAM  06/27/2024   • DXA SCAN  11/08/2024   • INFLUENZA VACCINE  Completed   • PAP SMEAR  Discontinued   • ZOSTER VACCINE  Discontinued                CMS Preventative Services Quick Reference  Risk Factors Identified During Encounter  Alcohol Misuse: Patient encouraged to stop all alcohol use  and Referral to behavioral health specialist  Chronic Pain: Natural history and expected course discussed. Questions answered.  Depression/Dysphoria: Current medication is effective, no change recommended  Drug Use/Abuse Identified or Suspected:   Fall Risk-High or Moderate: Discussed Fall Prevention in the home  Glaucoma or Family History of Glaucoma:    Hearing Problem:   Inadequate Social Support, Isolation, Loneliness, Lack of Transportation, Financial Difficulties, or Caregiver  "Stress:   Inactivity/Sedentary: Patient was advised to exercise at least 150 minutes a week per CDC recommendations.  Polypharmacy: Medication List reviewed  High Risk Sexual Behavior Identified:  Tobacco Use/Dependance Risk (use dotphrase .tobaccocessation for documentation)  Urinary Incontinence: Kegel exercises discussed  Dental Screening Recommended  Vision Screening Recommended  The above risks/problems have been discussed with the patient.  Pertinent information has been shared with the patient in the After Visit Summary.  An After Visit Summary and PPPS were made available to the patient.    Follow Up:   Next Medicare Wellness visit to be scheduled in 1 year.       Additional E&M Note during same encounter follows:  Patient has multiple medical problems which are significant and separately identifiable that require additional work above and beyond the Medicare Wellness Visit.      Chief Complaint  Annual Exam (Subsequent Medicare Wellness)    Subjective        HPI  Carla Richard is also being seen today for Medicare Wellness Exam         Objective   Vital Signs:  /40 (BP Location: Right arm, Patient Position: Sitting, Cuff Size: Adult)   Pulse 78   Temp 98.4 °F (36.9 °C)   Ht 165.1 cm (65\")   Wt 45.2 kg (99 lb 9.6 oz)   SpO2 98%   BMI 16.57 kg/m²     Physical Exam                    Assessment and Plan   Diagnoses and all orders for this visit:    1. Medicare annual wellness visit, subsequent (Primary)    -Action plan discussed please see scanned documents  -repeat in 1 year        This document has been electronically signed by Xavier Wick MD on December 22, 2022 13:27 CST             Follow Up   Return in about 1 year (around 12/22/2023) for Medicare Wellness.  Patient was given instructions and counseling regarding her condition or for health maintenance advice. Please see specific information pulled into the AVS if appropriate.         "

## 2022-12-22 NOTE — PATIENT INSTRUCTIONS
Medicare Wellness  Personal Prevention Plan of Service     Date of Office Visit:    Encounter Provider:  Xavier Wick MD  Place of Service:  [facility]  Patient Name: Carla Richard  :  1970    As part of the Medicare Wellness portion of your visit today, we are providing you with this personalized preventive plan of services (PPPS). This plan is based upon recommendations of the [place] Preventive Services Task Force (USPSTF) and the Advisory Committee on Immunization Practices (ACIP).    This lists the preventive care services that should be considered, and provides dates of when you are due. Items listed as completed are up-to-date and do not require any further intervention.    Health Maintenance   Topic Date Due    LIPID PANEL  2022    Pneumococcal Vaccine 0-64 (2 - PCV) 2023 (Originally 2016)    COVID-19 Vaccine (2 - Booster for Felicia series) 2027 (Originally 2021)    TDAP/TD VACCINES (2 - Td or Tdap) 2029 (Originally 10/7/2020)    LUNG CANCER SCREENING  2023    GASTROSCOPY (EGD)  2023    ANNUAL WELLNESS VISIT  2023    MAMMOGRAM  2024    DXA SCAN  2024    COLORECTAL CANCER SCREENING  2027    HEPATITIS C SCREENING  Completed    INFLUENZA VACCINE  Completed    PAP SMEAR  Discontinued    ZOSTER VACCINE  Discontinued       No orders of the defined types were placed in this encounter.      Return in about 1 year (around 2023) for Medicare Wellness.

## 2023-01-09 ENCOUNTER — OFFICE VISIT (OUTPATIENT)
Dept: ORTHOPEDIC SURGERY | Facility: CLINIC | Age: 53
End: 2023-01-09
Payer: MEDICARE

## 2023-01-09 VITALS — BODY MASS INDEX: 16.31 KG/M2 | HEIGHT: 65 IN | WEIGHT: 97.9 LBS

## 2023-01-09 DIAGNOSIS — J44.9 CHRONIC OBSTRUCTIVE PULMONARY DISEASE, UNSPECIFIED COPD TYPE: ICD-10-CM

## 2023-01-09 DIAGNOSIS — E28.319 EARLY MENOPAUSE OCCURRING IN PATIENT AGE YOUNGER THAN 45 YEARS: ICD-10-CM

## 2023-01-09 DIAGNOSIS — M81.0 POSTMENOPAUSAL OSTEOPOROSIS: Primary | ICD-10-CM

## 2023-01-09 DIAGNOSIS — R63.6 UNDERWEIGHT: ICD-10-CM

## 2023-01-09 DIAGNOSIS — K58.9 IRRITABLE BOWEL SYNDROME, UNSPECIFIED TYPE: ICD-10-CM

## 2023-01-09 DIAGNOSIS — K21.00 GASTROESOPHAGEAL REFLUX DISEASE WITH ESOPHAGITIS WITHOUT HEMORRHAGE: ICD-10-CM

## 2023-01-09 DIAGNOSIS — Z79.899 LONG-TERM CURRENT USE OF PROTON PUMP INHIBITOR THERAPY: ICD-10-CM

## 2023-01-09 DIAGNOSIS — K90.0 CELIAC DISEASE: ICD-10-CM

## 2023-01-09 DIAGNOSIS — Z72.0 TOBACCO ABUSE: ICD-10-CM

## 2023-01-09 DIAGNOSIS — K22.719 BARRETT'S ESOPHAGUS WITH DYSPLASIA: ICD-10-CM

## 2023-01-09 DIAGNOSIS — E55.9 VITAMIN D DEFICIENCY: ICD-10-CM

## 2023-01-09 DIAGNOSIS — Z79.51 LONG TERM (CURRENT) USE OF INHALED STEROIDS: ICD-10-CM

## 2023-01-09 DIAGNOSIS — K44.9 HIATAL HERNIA: ICD-10-CM

## 2023-01-09 PROCEDURE — 99214 OFFICE O/P EST MOD 30 MIN: CPT | Performed by: NURSE PRACTITIONER

## 2023-01-09 NOTE — PROGRESS NOTES
Carla Richard is a 52 y.o. female returns for     Chief Complaint   Patient presents with   • Osteoporosis     [facility]     HISTORY OF PRESENT ILLNESS: 52-year-old  female patient [facility] for bone health evaluation and treatment of osteoporosis.    Last Bone Density Study: 11/1/2022    Referred by: Dr. Xavier Wick (primary care)    History of Osteoporosis or Osteopenia: Unknown.  She has a current diagnosis of osteoporosis based on her recent DEXA scan.  It is unclear if this is her initial study or if this is a new diagnosis.  Prior Treatments for Osteoporosis: Fosamax    Decrease in Height: Yes    Fracture History: History of left hip fracture in 2010 due to a fall per the patient's report, which she states did not require surgery.  The history of this is unclear and there are no records for review.  Patient also reports a history of left-sided rib fractures due to a fall.    High Risk Medications:   Current: Prilosec, Symbicort   Previous: Prilosec, prednisone, Symbicort; Long term use of inhaled steroids and patient reports frequent use of oral steroids for various conditions as well    Comorbid Medical Conditions that Increase Risk for Osteoporosis: COPD, celiac disease, malabsorption, irritable bowel syndrome, Crohn's disease, long-term use of inhaled steroids Rensi Symbicort), long-term use of proton pump inhibitors, long-term use of tobacco, early menopause due to hysterectomy at the age of 23, vitamin D deficiency    Postmenopausal status: Postmenopausal due to hysterectomy  Age of Menopause/Hysterectomy: Age 23  Hormone Replacement Therapy: Yes.  It is unknown how long she took hormone replacement therapy.    Family History of Osteoporosis: Yes history of osteoporosis in her mother and grandmother.    History of Smoking: Yes, current smoker of 1 pack/day for the past 43 years.  Patient states she started smoking at the age of 9.    Taking Calcium  supplements: No  Taking Vitamin D supplements: Yes, taking a high-dose vitamin D supplement of 50,000 units weekly.  Patient has known vitamin D deficiency.  Her last vitamin D level was 28.7 as of 6/1/2021.  She has an updated lab order for a recheck of her vitamin D level pending and she has not yet had this performed.       CONCURRENT MEDICAL HISTORY:    The following portions of the patient's history were reviewed and updated as appropriate: allergies, current medications, past family history, past medical history, past social history, past surgical history and problem list.     ROS  No fevers or chills.  No chest pain or shortness of air.  No GI or  disturbances.    PHYSICAL EXAMINATION:       Ht 165.1 cm (65\")   Wt 44.4 kg (97 lb 14.4 oz)   LMP  (LMP Unknown)   BMI 16.29 kg/m²     Physical Exam  Vitals reviewed.   Constitutional:       General: She is not in acute distress.     Appearance: She is well-developed and underweight. She is not ill-appearing.   HENT:      Head: Normocephalic.   Pulmonary:      Effort: Pulmonary effort is normal. No respiratory distress.   Abdominal:      General: There is no distension.      Palpations: Abdomen is soft.   Musculoskeletal:         General: No swelling, tenderness, deformity or signs of injury.   Skin:     General: Skin is warm and dry.      Capillary Refill: Capillary refill takes less than 2 seconds.      Findings: No erythema.   Neurological:      Mental Status: She is alert and oriented to person, place, and time.      GCS: GCS eye subscore is 4. GCS verbal subscore is 5. GCS motor subscore is 6.   Psychiatric:         Speech: Speech normal.         Behavior: Behavior normal.         Thought Content: Thought content normal.         Judgment: Judgment normal.         GAIT:     [x]  Normal  []  Antalgic    Assistive device: [x]  None  []  Walker     []  Crutches  []  Cane     []  Wheelchair  []  Stretcher    Back Exam     Other   Gait: normal     Comments:  No  kyphosis or deformity noted.  No focal neurological deficits noted.                  Vitamin D 25 hydroxy     Specimen Information: Blood    1 Result Note       Component  Ref Range & Units 1 yr ago 3 yr ago   25 Hydroxy, Vitamin D  30.0 - 100.0 ng/ml 28.7 Low   26.1 Low     [facility]ing Agency  [facility]           Narrative  Performed by:  [facility]  Reference Range for Total Vitamin D 25(OH)     Deficiency <20.0 ng/mL   Insufficiency 21-29 ng/mL   Sufficiency  ng/mL   Toxicity >100 ng/ml     Results may be falsely increased if patient taking Biotin.       Specimen Collected: 06/01/21 08:24 CDT Last Resulted: 06/01/21 20:42 CDT               ASSESSMENT:    Diagnoses and all orders for this visit:    Postmenopausal osteoporosis    Early menopause occurring in patient age younger than 45 years    Vitamin D deficiency    Long term (current) use of inhaled steroids    Hiatal hernia    Alan's esophagus with dysplasia    Gastroesophageal reflux disease with esophagitis without hemorrhage    Chronic obstructive pulmonary disease, unspecified COPD type (HCC)    Celiac disease    Irritable bowel syndrome, unspecified type    Tobacco abuse    Long-term current use of proton pump inhibitor therapy    Underweight    PLAN    Bone density study results from 11/1/2022 I reviewed and discussed with patient today.  We discussed that she has a current T score of -2.9 in the lumbar spine, indicative of elidia osteoporosis.  We discussed that she has a T score of -3.2 in the left hip, also indicative of elidia osteoporosis.  It is unclear if osteoporosis is a new diagnosis for this patient.  There are no prior bone density studies on file for review/comparison.  She has lived in this area since 2010 and prior to that she states that she did have prior bone density studies performed and was also prescribed Fosamax at some point.  We discussed her current diagnosis of osteoporosis and that she is at a high risk for  fracture.  We discussed that osteoporosis is a chronic, progressive and silent disease it typically requires long-term management.    We discussed her individualized risk factors for osteoporosis including her race, gender, postmenopausal status, early menopause due to hysterectomy at the age of 23, positive family history in both her mother and grandmother, low body weight (BMI equals 16.29), long-term use of inhaled steroids (Symbicort), long-term tobacco abuse and multiple comorbid medical conditions including vitamin D deficiency, COPD, celiac disease, irritable bowel syndrome, absorption problems and long-term use of proton pump inhibitors for control of reflux.    I have highly recommended that she start osteoporosis treatment in an effort to prevent worsening osteoporosis, improve her bone mineral density and reduce her risk for fracture.  We discussed various pharmaceutical treatment options today including oral bisphosphonates, Evenity and Prolia.  She has already taken Fosamax in the past and I would advise against any further oral bisphosphonates as she does have a long history of epigastric pain, chronic reflux, Alan's esophagus with dysplasia and hiatal hernia.  We discussed in more detail both Evenity and Prolia.  I have recommended that she start with Evenity elidia osteoporosis at a young age.  We discussed Evenity and that it has unique dual properties of both anabolic and antiresorption functions.  We discussed risk versus benefits of taking Evenity.  We discussed potential adverse side effects of Evenity.  Written literature regarding Evenity is provided to the patient today.  The patient is in agreement with starting Evenity and wants to proceed.  She understands she will receive a telephone call to schedule her initial injection, pending insurance approval.  We also discussed transitioning to Prolia injections once the course of Evenity has been completed.    We discussed the importance of  proper nutrition and adequate intake of calcium and vitamin D for optimal bone health and prevention of worsening osteoporosis.  The patient has a long history of malabsorption issues and GI issues, including celiac disease and malabsorption.  She is not currently taking calcium supplementation.  We discussed starting a calcium supplement twice daily, such as Caltrate, which can be purchased over-the-counter.  The Caltrate is a coated and smaller tablet and she would likely tolerate this better.  We also discussed other palatable forms of calcium supplementation such as chewables or Gummies.  It is unlikely that the patient get sufficient calcium from dietary sources due to her malabsorption.  She does state that she drinks a nutritional supplement each morning which likely has calcium in it.  Recommend to continue with her high-dose vitamin D supplement of 50,000 units weekly for treatment of her vitamin D deficiency.  She has an updated vitamin D level to be performed in the near future and that lab is currently pending per her PCP.  We also discussed the importance of regular exercise, especially weightbearing exercise such as walking, for optimal bone health and prevention of worsening osteoporosis. An educational packet regarding osteoporosis is provided to the patient today.    Patient is a long-term smoker states she has been smoking since the age of 9?  She states that she smokes 1 pack/day.  We discussed that smoking can worsen osteoporosis and I encouraged smoking cessation.  At this time, the patient is not interested in smoking cessation.    Recommend a repeat bone density study in one year once the full course of Evenity has been completed and follow-[facility] with [facility].  Anticipate transitioning to Prolia injections at that time.    Time spent of a minimum of 30 minutes including the face to face evaluation, reviewing of medical history and prior medial records, reviewing of diagnostic studies,  prescription drug management, documentation, patient education and coordination of care.     EMR Dragon/Transciption Disclaimer: Some of this note may be an electronic transcription/translation of spoken language to printed text using the Dragon Dictation System.     Return in about 1 year (around 1/9/2024) for Recheck.        This document has been electronically signed by CROW Zavala on January 9, 2023 20:47 CST      CROW Zavala

## 2023-01-19 ENCOUNTER — TELEPHONE (OUTPATIENT)
Dept: FAMILY MEDICINE CLINIC | Facility: CLINIC | Age: 53
End: 2023-01-19
Payer: MEDICARE

## 2023-01-19 ENCOUNTER — HOSPITAL ENCOUNTER (EMERGENCY)
Facility: HOSPITAL | Age: 53
Discharge: HOME | End: 2023-01-19
Attending: FAMILY MEDICINE | Admitting: FAMILY MEDICINE
Payer: MEDICARE

## 2023-01-19 VITALS
SYSTOLIC BLOOD PRESSURE: 134 MMHG | RESPIRATION RATE: 18 BRPM | BODY MASS INDEX: 15.83 KG/M2 | HEART RATE: 84 BPM | HEIGHT: 65 IN | DIASTOLIC BLOOD PRESSURE: 78 MMHG | OXYGEN SATURATION: 97 % | TEMPERATURE: 97.9 F | WEIGHT: 95 LBS

## 2023-01-19 DIAGNOSIS — S80.12XA CONTUSION OF MULTIPLE SITES OF LEFT LOWER EXTREMITY, INITIAL ENCOUNTER: Primary | ICD-10-CM

## 2023-01-19 PROCEDURE — 99283 EMERGENCY DEPT VISIT LOW MDM: CPT

## 2023-01-19 RX ORDER — HYDROCODONE BITARTRATE AND ACETAMINOPHEN 5; 325 MG/1; MG/1
1 TABLET ORAL EVERY 6 HOURS PRN
Qty: 12 TABLET | Refills: 0 | Status: SHIPPED | OUTPATIENT
Start: 2023-01-19 | End: 2023-03-01 | Stop reason: SDUPTHER

## 2023-01-19 NOTE — TELEPHONE ENCOUNTER
Patient called stating she had a bad leg injury that was causing excruciating pain. She said she went to [facility], but they were trying to give her medication she was allergic to. Let patient know we recommend going to nearest ER, but if she decides to go to [facility] in [place], her records will already be in our system for PCP to review. If she visits another facility we will request records. Patient verbalized understanding.

## 2023-01-23 NOTE — ED PROVIDER NOTES
Subjective   History of Present Illness  Patient complains of left lower leg pain that began 2 days ago after her Rottweiler jumped on her leg.  She states that the dog was scared and was running to her safety.  She was seen at [facility] and had some x-rays performed that were negative and was discharged home.  Patient is concerned that there may still be an injury although the x-rays were negative.  She is ambulatory, with no signs of trauma.    Leg Injury  Location:  Left leg  Quality:  Aching  Severity:  Moderate  Onset quality:  Sudden  Duration:  2 days  Timing:  Intermittent  Progression:  Waxing and waning  Chronicity:  New  Worsened by:  Movement  Associated symptoms: myalgias    Associated symptoms: no abdominal pain, no chest pain, no congestion, no cough, no diarrhea, no ear pain, no fatigue, no fever, no headaches, no nausea, no rash, no rhinorrhea, no shortness of breath, no sore throat, no vomiting and no wheezing        Review of Systems   Constitutional: Negative for appetite change, chills, diaphoresis, fatigue and fever.   HENT: Negative for congestion, ear discharge, ear pain, nosebleeds, rhinorrhea, sinus pressure, sore throat and trouble swallowing.    Eyes: Negative for discharge and redness.   Respiratory: Negative for apnea, cough, chest tightness, shortness of breath and wheezing.    Cardiovascular: Negative for chest pain.   Gastrointestinal: Negative for abdominal pain, diarrhea, nausea and vomiting.   Endocrine: Negative for polyuria.   Genitourinary: Negative for dysuria, frequency and urgency.   Musculoskeletal: Positive for myalgias. Negative for neck pain.   Skin: Negative for color change and rash.   Allergic/Immunologic: Negative for immunocompromised state.   Neurological: Negative for dizziness, seizures, syncope, weakness, light-headedness and headaches.   Hematological: Negative for adenopathy. Does not bruise/bleed easily.   Psychiatric/Behavioral: Negative for behavioral  "problems and confusion.   All other systems reviewed and are negative.      Past Medical History:   Diagnosis Date   • Asthma    • Cancer (HCC)     cervical   • Celiac disease    • Colon polyp    • COPD (chronic obstructive pulmonary disease) (HCC)    • COVID-19    • Crohn's disease (HCC)    • Diverticulitis of colon    • Elevated cholesterol    • GERD (gastroesophageal reflux disease)    • History of transfusion    • Irritable bowel syndrome    • Kidney calculus    • Pancreatitis    • Sphincter of Oddi dysfunction        Allergies   Allergen Reactions   • Nicotine Seizure     \"nicotine patch\"   • Nsaids Swelling and GI Intolerance   • Other Other (See Comments)     All oral antibiotics cause abdominal swelling.   • Paxlovid [Nirmatrelvir-Ritonavir] Swelling   • Azithromycin Swelling   • Ciprofloxacin Hives   • Doxycycline Swelling   • Keflex [Cephalexin] Diarrhea and Other (See Comments)     Abdominal pain   • Amoxicillin Other (See Comments)     Vomitting, stomach swells, diarrhea , extreme stomach pain   • Levofloxacin Rash   • Phenergan [Promethazine Hcl] GI Intolerance       Past Surgical History:   Procedure Laterality Date   • CERVICAL SPINE SURGERY      C5-C6   • COLONOSCOPY     • COLONOSCOPY N/A 10/18/2019    Procedure: COLONOSCOPY;  Surgeon: Tom Davis MD;  Location: [facility] ENDOSCOPY;  Service: Gastroenterology   • COLONOSCOPY N/A 08/27/2020    Procedure: COLONOSCOPY;  Surgeon: Tom Davis MD;  Location: [facility] ENDOSCOPY;  Service: Gastroenterology;  Laterality: N/A;   • COLONOSCOPY N/A 09/29/2021    Procedure: COLONOSCOPY;  Surgeon: Tom Davis MD;  Location: [facility] ENDOSCOPY;  Service: Gastroenterology;  Laterality: N/A;   • COLONOSCOPY N/A 11/22/2022    Procedure: COLONOSCOPY;  Surgeon: Elvin Vo MD;  Location: [facility] ENDOSCOPY;  Service: Gastroenterology;  Laterality: N/A;   • ENDOSCOPY N/A 10/18/2019    Procedure: ESOPHAGOGASTRODUODENOSCOPY;  Surgeon: Tom Davis MD;  " Location: [facility] ENDOSCOPY;  Service: Gastroenterology   • ENDOSCOPY N/A 07/27/2020    Procedure: ESOPHAGOGASTRODUODENOSCOPY;  Surgeon: Tom Davis MD;  Location: [facility] ENDOSCOPY;  Service: Gastroenterology;  Laterality: N/A;   • ENDOSCOPY N/A 02/10/2021    Procedure: ESOPHAGOGASTRODUODENOSCOPY;  Surgeon: Tom Davis MD;  Location: [facility] ENDOSCOPY;  Service: Gastroenterology;  Laterality: N/A;   • ENDOSCOPY N/A 11/22/2022    Procedure: ESOPHAGOGASTRODUODENOSCOPY;  Surgeon: Elvin Vo MD;  Location: [facility] ENDOSCOPY;  Service: Gastroenterology;  Laterality: N/A;   • HYSTERECTOMY     • SHOULDER ARTHROSCOPY W/ ROTATOR CUFF REPAIR Right    • TOE SURGERY      left small toe    • TONSILECTOMY, ADENOIDECTOMY, BILATERAL MYRINGOTOMY AND TUBES     • TONSILLECTOMY     • UPPER GASTROINTESTINAL ENDOSCOPY  02/10/2021       Family History   Problem Relation Age of Onset   • Inflammatory bowel disease Mother    • Arthritis Mother    • Diabetes Mother    • Hypertension Mother    • Hyperlipidemia Mother    • Obesity Mother    • Osteoporosis Mother    • Heart disease Father    • Hyperlipidemia Father    • Heart attack Father    • Diabetes Sister    • Osteoporosis Maternal Grandmother    • Diabetes Maternal Grandmother    • Cancer Maternal Grandmother         lung   • Liver disease Daughter    • Mental illness Daughter    • Obesity Daughter    • Cancer Other         lung   • Heart disease Other    • Hip fracture Other        Social History     Socioeconomic History   • Marital status:    Tobacco Use   • Smoking status: Every Day     Packs/day: 1.00     Years: 42.00     Pack years: 42.00     Types: Cigarettes   • Smokeless tobacco: Never   Vaping Use   • Vaping Use: Former   • Substances: Nicotine   • Devices: Disposable, Refillable tank   Substance and Sexual Activity   • Alcohol use: No   • Drug use: Yes     Types: Marijuana     Comment: 10/15/2022 Patient states she utilizes Marijuana nightly to increase  appetite and decrease nausea.   • Sexual activity: Defer           Objective   Physical Exam  Vitals and nursing note reviewed.   Constitutional:       Appearance: She is well-developed.   HENT:      Head: Normocephalic and atraumatic.      Nose: Nose normal.   Eyes:      General: No scleral icterus.        Right eye: No discharge.         Left eye: No discharge.      Conjunctiva/sclera: Conjunctivae normal.      Pupils: Pupils are equal, round, and reactive to light.   Neck:      Trachea: No tracheal deviation.   Cardiovascular:      Rate and Rhythm: Normal rate and regular rhythm.      Heart sounds: Normal heart sounds. No murmur heard.  Pulmonary:      Effort: Pulmonary effort is normal. No respiratory distress.      Breath sounds: Normal breath sounds. No stridor. No wheezing or rales.   Abdominal:      General: Bowel sounds are normal. There is no distension.      Palpations: Abdomen is soft. There is no mass.      Tenderness: There is no abdominal tenderness. There is no guarding or rebound.   Musculoskeletal:      Cervical back: Normal range of motion and neck supple.      Comments: The lower extremities, particular the left lower extremity, does not demonstrate any erythema, edema, warmth, or signs of injury or infection.  There is tenderness to palpation.  Distal pulses are equal.   Skin:     General: Skin is warm and dry.      Findings: No erythema or rash.   Neurological:      Mental Status: She is alert and oriented to person, place, and time.      Coordination: Coordination normal.   Psychiatric:         Behavior: Behavior normal.         Thought Content: Thought content normal.         Procedures           ED Course              Labs Reviewed - No data to display    No orders to display                                         MDM    Final diagnoses:   Contusion of multiple sites of left lower extremity, initial encounter       ED Disposition  ED Disposition     ED Disposition   Discharge    Condition    Stable    Comment   --             Jossue Mota MD PhD  [address] [facility] [id number] [phone]    In 1 week      Xavier Wick MD  [address]  [place] [id number] [phone]      As needed         Medication List      New Prescriptions    HYDROcodone-acetaminophen 5-325 MG per tablet  Commonly known as: NORCO  Take 1 tablet by mouth Every 6 (Six) Hours As Needed for Moderate Pain.           Where to Get Your Medications      These medications were sent to [facility] - [place], [place] - [address] - [phone]  - [phone] FX  [address], [facility] [address]    Phone: [phone]   · HYDROcodone-acetaminophen 5-325 MG per tablet          Nic Fuchs MD  01/22/23 3188

## 2023-02-01 ENCOUNTER — TELEPHONE (OUTPATIENT)
Dept: ONCOLOGY | Facility: HOSPITAL | Age: 53
End: 2023-02-01
Payer: MEDICARE

## 2023-02-01 NOTE — TELEPHONE ENCOUNTER
Spoke w/ pt about needing CMP prior to start of [facility]. She stated that she would go on Friday. Orders are in.

## 2023-02-03 ENCOUNTER — LAB (OUTPATIENT)
Dept: LAB | Facility: HOSPITAL | Age: 53
End: 2023-02-03
Payer: MEDICARE

## 2023-02-03 DIAGNOSIS — R79.89 HIGH SERUM VITAMIN B12: ICD-10-CM

## 2023-02-03 DIAGNOSIS — E61.1 IRON DEFICIENCY: ICD-10-CM

## 2023-02-03 DIAGNOSIS — E55.9 VITAMIN D DEFICIENCY: ICD-10-CM

## 2023-02-03 DIAGNOSIS — D72.829 LEUKOCYTOSIS, UNSPECIFIED TYPE: ICD-10-CM

## 2023-02-03 DIAGNOSIS — K21.00 GASTROESOPHAGEAL REFLUX DISEASE WITH ESOPHAGITIS WITHOUT HEMORRHAGE: ICD-10-CM

## 2023-02-03 DIAGNOSIS — K29.70 GASTRITIS WITHOUT BLEEDING, UNSPECIFIED CHRONICITY, UNSPECIFIED GASTRITIS TYPE: ICD-10-CM

## 2023-02-03 DIAGNOSIS — R25.1 TREMOR: ICD-10-CM

## 2023-02-03 DIAGNOSIS — R63.6 UNDERWEIGHT: ICD-10-CM

## 2023-02-03 DIAGNOSIS — R74.8 ELEVATED LIVER ENZYMES: ICD-10-CM

## 2023-02-03 DIAGNOSIS — J44.9 CHRONIC OBSTRUCTIVE PULMONARY DISEASE, UNSPECIFIED COPD TYPE: ICD-10-CM

## 2023-02-03 DIAGNOSIS — Z85.41 HISTORY OF CERVICAL CANCER: ICD-10-CM

## 2023-02-03 DIAGNOSIS — T45.4X5A ADVERSE EFFECT OF IRON, INITIAL ENCOUNTER: ICD-10-CM

## 2023-02-03 DIAGNOSIS — D75.839 THROMBOCYTOSIS: ICD-10-CM

## 2023-02-03 DIAGNOSIS — M81.0 OSTEOPOROSIS, UNSPECIFIED OSTEOPOROSIS TYPE, UNSPECIFIED PATHOLOGICAL FRACTURE PRESENCE: ICD-10-CM

## 2023-02-03 DIAGNOSIS — K22.719 BARRETT'S ESOPHAGUS WITH DYSPLASIA: ICD-10-CM

## 2023-02-03 DIAGNOSIS — E53.8 FOLATE DEFICIENCY: ICD-10-CM

## 2023-02-03 DIAGNOSIS — K44.9 HIATAL HERNIA: ICD-10-CM

## 2023-02-03 DIAGNOSIS — E53.8 B12 DEFICIENCY: ICD-10-CM

## 2023-02-03 DIAGNOSIS — R10.84 GENERALIZED ABDOMINAL PAIN: ICD-10-CM

## 2023-02-03 LAB
ALBUMIN SERPL-MCNC: 4.2 G/DL (ref 3.5–5.2)
ALBUMIN SERPL-MCNC: 4.4 G/DL (ref 3.5–5.2)
ALBUMIN/GLOB SERPL: 1.9 G/DL
ALBUMIN/GLOB SERPL: 2.6 G/DL
ALP SERPL-CCNC: 85 U/L (ref 39–117)
ALP SERPL-CCNC: 86 U/L (ref 39–117)
ALT SERPL W P-5'-P-CCNC: 15 U/L (ref 1–33)
ALT SERPL W P-5'-P-CCNC: 17 U/L (ref 1–33)
ANION GAP SERPL CALCULATED.3IONS-SCNC: 10 MMOL/L (ref 5–15)
ANION GAP SERPL CALCULATED.3IONS-SCNC: 10 MMOL/L (ref 5–15)
AST SERPL-CCNC: 21 U/L (ref 1–32)
AST SERPL-CCNC: 29 U/L (ref 1–32)
BASOPHILS # BLD AUTO: 0.06 10*3/MM3 (ref 0–0.2)
BASOPHILS NFR BLD AUTO: 0.6 % (ref 0–1.5)
BILIRUB CONJ SERPL-MCNC: <0.2 MG/DL (ref 0–0.3)
BILIRUB SERPL-MCNC: 0.4 MG/DL (ref 0–1.2)
BILIRUB SERPL-MCNC: 0.4 MG/DL (ref 0–1.2)
BUN SERPL-MCNC: 7 MG/DL (ref 6–20)
BUN SERPL-MCNC: 8 MG/DL (ref 6–20)
BUN/CREAT SERPL: 11.7 (ref 7–25)
BUN/CREAT SERPL: 12.5 (ref 7–25)
CALCIUM SPEC-SCNC: 8.6 MG/DL (ref 8.6–10.5)
CALCIUM SPEC-SCNC: 9 MG/DL (ref 8.6–10.5)
CHLORIDE SERPL-SCNC: 104 MMOL/L (ref 98–107)
CHLORIDE SERPL-SCNC: 105 MMOL/L (ref 98–107)
CHOLEST SERPL-MCNC: 143 MG/DL (ref 0–200)
CO2 SERPL-SCNC: 27 MMOL/L (ref 22–29)
CO2 SERPL-SCNC: 28 MMOL/L (ref 22–29)
CREAT SERPL-MCNC: 0.6 MG/DL (ref 0.57–1)
CREAT SERPL-MCNC: 0.64 MG/DL (ref 0.57–1)
DEPRECATED RDW RBC AUTO: 47.2 FL (ref 37–54)
EGFRCR SERPLBLD CKD-EPI 2021: 105.2 ML/MIN/1.73
EGFRCR SERPLBLD CKD-EPI 2021: 106.8 ML/MIN/1.73
EOSINOPHIL # BLD AUTO: 0.31 10*3/MM3 (ref 0–0.4)
EOSINOPHIL NFR BLD AUTO: 3.3 % (ref 0.3–6.2)
ERYTHROCYTE [DISTWIDTH] IN BLOOD BY AUTOMATED COUNT: 12.8 % (ref 12.3–15.4)
FERRITIN SERPL-MCNC: 323.5 NG/ML (ref 13–150)
GLOBULIN UR ELPH-MCNC: 1.7 GM/DL
GLOBULIN UR ELPH-MCNC: 2.2 GM/DL
GLUCOSE SERPL-MCNC: 77 MG/DL (ref 65–99)
GLUCOSE SERPL-MCNC: 79 MG/DL (ref 65–99)
HCT VFR BLD AUTO: 44.7 % (ref 34–46.6)
HDLC SERPL-MCNC: 50 MG/DL (ref 40–60)
HGB BLD-MCNC: 14.3 G/DL (ref 12–15.9)
IMM GRANULOCYTES # BLD AUTO: 0.04 10*3/MM3 (ref 0–0.05)
IMM GRANULOCYTES NFR BLD AUTO: 0.4 % (ref 0–0.5)
IRON 24H UR-MRATE: 88 MCG/DL (ref 37–145)
IRON SATN MFR SERPL: 29 % (ref 20–50)
LDLC SERPL CALC-MCNC: 77 MG/DL (ref 0–100)
LDLC/HDLC SERPL: 1.54 {RATIO}
LYMPHOCYTES # BLD AUTO: 3.38 10*3/MM3 (ref 0.7–3.1)
LYMPHOCYTES NFR BLD AUTO: 36 % (ref 19.6–45.3)
MCH RBC QN AUTO: 32.2 PG (ref 26.6–33)
MCHC RBC AUTO-ENTMCNC: 32 G/DL (ref 31.5–35.7)
MCV RBC AUTO: 100.7 FL (ref 79–97)
MONOCYTES # BLD AUTO: 0.7 10*3/MM3 (ref 0.1–0.9)
MONOCYTES NFR BLD AUTO: 7.4 % (ref 5–12)
NEUTROPHILS NFR BLD AUTO: 4.91 10*3/MM3 (ref 1.7–7)
NEUTROPHILS NFR BLD AUTO: 52.3 % (ref 42.7–76)
NRBC BLD AUTO-RTO: 0 /100 WBC (ref 0–0.2)
PLATELET # BLD AUTO: 438 10*3/MM3 (ref 140–450)
PMV BLD AUTO: 9.4 FL (ref 6–12)
POTASSIUM SERPL-SCNC: 3.8 MMOL/L (ref 3.5–5.2)
POTASSIUM SERPL-SCNC: 3.9 MMOL/L (ref 3.5–5.2)
PROT SERPL-MCNC: 6.1 G/DL (ref 6–8.5)
PROT SERPL-MCNC: 6.4 G/DL (ref 6–8.5)
RBC # BLD AUTO: 4.44 10*6/MM3 (ref 3.77–5.28)
SODIUM SERPL-SCNC: 142 MMOL/L (ref 136–145)
SODIUM SERPL-SCNC: 142 MMOL/L (ref 136–145)
TIBC SERPL-MCNC: 308 MCG/DL (ref 298–536)
TRANSFERRIN SERPL-MCNC: 207 MG/DL (ref 200–360)
TRIGL SERPL-MCNC: 81 MG/DL (ref 0–150)
VLDLC SERPL-MCNC: 16 MG/DL (ref 5–40)
WBC NRBC COR # BLD: 9.4 10*3/MM3 (ref 3.4–10.8)

## 2023-02-03 PROCEDURE — 82746 ASSAY OF FOLIC ACID SERUM: CPT

## 2023-02-03 PROCEDURE — 80053 COMPREHEN METABOLIC PANEL: CPT

## 2023-02-03 PROCEDURE — 83540 ASSAY OF IRON: CPT

## 2023-02-03 PROCEDURE — 84466 ASSAY OF TRANSFERRIN: CPT

## 2023-02-03 PROCEDURE — 82607 VITAMIN B-12: CPT

## 2023-02-03 PROCEDURE — 85025 COMPLETE CBC W/AUTO DIFF WBC: CPT

## 2023-02-03 PROCEDURE — 82306 VITAMIN D 25 HYDROXY: CPT

## 2023-02-03 PROCEDURE — 82248 BILIRUBIN DIRECT: CPT

## 2023-02-03 PROCEDURE — 82728 ASSAY OF FERRITIN: CPT

## 2023-02-03 PROCEDURE — 80061 LIPID PANEL: CPT

## 2023-02-04 LAB
25(OH)D3 SERPL-MCNC: 24 NG/ML (ref 30–100)
FOLATE SERPL-MCNC: 6.64 NG/ML (ref 4.78–24.2)
VIT B12 BLD-MCNC: 1324 PG/ML (ref 211–946)

## 2023-02-05 PROBLEM — M81.0 OSTEOPOROSIS WITHOUT CURRENT PATHOLOGICAL FRACTURE: Status: ACTIVE | Noted: 2023-02-05

## 2023-02-06 ENCOUNTER — INFUSION (OUTPATIENT)
Dept: ONCOLOGY | Facility: HOSPITAL | Age: 53
End: 2023-02-06
Payer: MEDICARE

## 2023-02-06 VITALS
SYSTOLIC BLOOD PRESSURE: 135 MMHG | RESPIRATION RATE: 18 BRPM | TEMPERATURE: 97.6 F | DIASTOLIC BLOOD PRESSURE: 60 MMHG | HEART RATE: 87 BPM

## 2023-02-06 DIAGNOSIS — M81.0 OSTEOPOROSIS WITHOUT CURRENT PATHOLOGICAL FRACTURE, UNSPECIFIED OSTEOPOROSIS TYPE: ICD-10-CM

## 2023-02-06 DIAGNOSIS — R59.0 CERVICAL LYMPHADENOPATHY: Primary | ICD-10-CM

## 2023-02-06 DIAGNOSIS — E61.1 IRON DEFICIENCY: ICD-10-CM

## 2023-02-06 DIAGNOSIS — T45.4X5A ADVERSE EFFECT OF IRON, INITIAL ENCOUNTER: ICD-10-CM

## 2023-02-06 DIAGNOSIS — D72.829 LEUKOCYTOSIS, UNSPECIFIED TYPE: ICD-10-CM

## 2023-02-06 PROCEDURE — 25010000002 ROMOSOZUMAB-AQQG 105 MG/1.17ML SOLUTION PREFILLED SYRINGE: Performed by: NURSE PRACTITIONER

## 2023-02-06 PROCEDURE — 96372 THER/PROPH/DIAG INJ SC/IM: CPT | Performed by: NURSE PRACTITIONER

## 2023-02-06 RX ADMIN — ROMOSOZUMAB-AQQG 210 MG: 105 INJECTION, SOLUTION SUBCUTANEOUS at 13:00

## 2023-02-08 ENCOUNTER — OFFICE VISIT (OUTPATIENT)
Dept: FAMILY MEDICINE CLINIC | Facility: CLINIC | Age: 53
End: 2023-02-08
Payer: MEDICARE

## 2023-02-08 VITALS
WEIGHT: 95.8 LBS | SYSTOLIC BLOOD PRESSURE: 108 MMHG | HEART RATE: 83 BPM | OXYGEN SATURATION: 97 % | TEMPERATURE: 98.7 F | HEIGHT: 65 IN | DIASTOLIC BLOOD PRESSURE: 68 MMHG | BODY MASS INDEX: 15.96 KG/M2

## 2023-02-08 DIAGNOSIS — R63.6 UNDERWEIGHT: ICD-10-CM

## 2023-02-08 DIAGNOSIS — E55.9 VITAMIN D DEFICIENCY: ICD-10-CM

## 2023-02-08 DIAGNOSIS — K29.70 GASTRITIS WITHOUT BLEEDING, UNSPECIFIED CHRONICITY, UNSPECIFIED GASTRITIS TYPE: ICD-10-CM

## 2023-02-08 DIAGNOSIS — K21.00 GASTROESOPHAGEAL REFLUX DISEASE WITH ESOPHAGITIS WITHOUT HEMORRHAGE: ICD-10-CM

## 2023-02-08 DIAGNOSIS — K59.04 CHRONIC IDIOPATHIC CONSTIPATION: ICD-10-CM

## 2023-02-08 DIAGNOSIS — K44.9 HIATAL HERNIA: ICD-10-CM

## 2023-02-08 DIAGNOSIS — Z12.2 SCREENING FOR LUNG CANCER: Primary | ICD-10-CM

## 2023-02-08 DIAGNOSIS — K22.719 BARRETT'S ESOPHAGUS WITH DYSPLASIA: ICD-10-CM

## 2023-02-08 DIAGNOSIS — E53.8 VITAMIN B12 DEFICIENCY: ICD-10-CM

## 2023-02-08 DIAGNOSIS — M81.0 OSTEOPOROSIS WITHOUT CURRENT PATHOLOGICAL FRACTURE, UNSPECIFIED OSTEOPOROSIS TYPE: ICD-10-CM

## 2023-02-08 DIAGNOSIS — S76.011A STRAIN OF RIGHT HIP, INITIAL ENCOUNTER: ICD-10-CM

## 2023-02-08 PROCEDURE — 99214 OFFICE O/P EST MOD 30 MIN: CPT | Performed by: FAMILY MEDICINE

## 2023-02-08 RX ORDER — ERGOCALCIFEROL 1.25 MG/1
50000 CAPSULE ORAL WEEKLY
Qty: 5 CAPSULE | Refills: 2 | Status: SHIPPED | OUTPATIENT
Start: 2023-02-08

## 2023-02-13 ENCOUNTER — OFFICE VISIT (OUTPATIENT)
Dept: FAMILY MEDICINE CLINIC | Facility: CLINIC | Age: 53
End: 2023-02-13
Payer: MEDICARE

## 2023-02-13 VITALS
OXYGEN SATURATION: 98 % | DIASTOLIC BLOOD PRESSURE: 60 MMHG | WEIGHT: 93.8 LBS | SYSTOLIC BLOOD PRESSURE: 100 MMHG | TEMPERATURE: 98.1 F | BODY MASS INDEX: 15.63 KG/M2 | HEIGHT: 65 IN | HEART RATE: 96 BPM

## 2023-02-13 DIAGNOSIS — J06.9 URI WITH COUGH AND CONGESTION: Primary | ICD-10-CM

## 2023-02-13 DIAGNOSIS — R11.2 NAUSEA AND VOMITING, UNSPECIFIED VOMITING TYPE: ICD-10-CM

## 2023-02-13 DIAGNOSIS — R50.9 FEVER, UNSPECIFIED FEVER CAUSE: ICD-10-CM

## 2023-02-13 LAB
EXPIRATION DATE: NORMAL
FLUAV AG UPPER RESP QL IA.RAPID: NOT DETECTED
FLUBV AG UPPER RESP QL IA.RAPID: NOT DETECTED
INTERNAL CONTROL: NORMAL
Lab: NORMAL
SARS-COV-2 AG UPPER RESP QL IA.RAPID: NOT DETECTED

## 2023-02-13 PROCEDURE — 96372 THER/PROPH/DIAG INJ SC/IM: CPT | Performed by: NURSE PRACTITIONER

## 2023-02-13 PROCEDURE — 99213 OFFICE O/P EST LOW 20 MIN: CPT | Performed by: NURSE PRACTITIONER

## 2023-02-13 PROCEDURE — 87428 SARSCOV & INF VIR A&B AG IA: CPT | Performed by: NURSE PRACTITIONER

## 2023-02-13 RX ORDER — ONDANSETRON 2 MG/ML
4 INJECTION INTRAMUSCULAR; INTRAVENOUS ONCE
Status: COMPLETED | OUTPATIENT
Start: 2023-02-13 | End: 2023-02-13

## 2023-02-13 RX ORDER — ONDANSETRON 8 MG/1
8 TABLET, ORALLY DISINTEGRATING ORAL EVERY 8 HOURS PRN
Qty: 30 TABLET | Refills: 0 | Status: SHIPPED | OUTPATIENT
Start: 2023-02-13

## 2023-02-13 RX ADMIN — ONDANSETRON 4 MG: 2 INJECTION INTRAMUSCULAR; INTRAVENOUS at 09:16

## 2023-02-13 NOTE — PATIENT INSTRUCTIONS
Symptom Relief for Viral Illnesses       1. DIAGNOSIS  2. GENERAL INSTRUCTIONS   Cold or cough  Middle ear fluid (Wally Media with Effusion, OME)  Flu  Viral sore throat  Bronchitis    You have been diagnosed with an illness caused by a virus.  Antibiotics do not work on viruses.  When antibiotics aren't needed, they won't help you, and the side effects could still hurt you.  The treatments prescribed below will help you feel better while your body fights off the virus.   Drink extra water and fluids  Use a cool mist vaporizer or saline nasal spray to relieve congestion  For sore throats in older children and adults, use ice chips, sore throat spray, or lozenges  Use honey to relieve cough.  Do not give honey to an infant younger than 1 year.      3. SPECIFIC MEDICINES  4. FOLLOW UP   Use over-the-counter medications specific to your symptoms. Use medicines according to the package instructions or as directed by your healthcare professional.  Stop the medication when the symptoms get better.   If not improved in 3-5 days, if new symptoms occur, or if you have other concerns, please call or return to the office for a recheck.         To learn more about antibiotic prescribing and use, visit www.cdc.gov/antibiotic-use

## 2023-02-13 NOTE — PROGRESS NOTES
"Chief Complaint  Illness (Fever (100.3), N/V, body aches, ha, sob, cough X 2 days.)    Subjective          Carla Richard presents to [facility] [place]    History of Present Illness  [facility]    PCP: Dr. Wick    CC: \"fever, nausea/vomiting, body aches, headache, dyspnea, cough\"    Symptoms x 48 hours.  Has had n/v, no diarrhea.  Generalized weakness with URI symptoms.  Granddaughter now has similar symptoms.  Concerned about the flu.  No known exposures to flu or covid.  Taking Zofran, tylenol and dayquil with some relief.  Needing refill of Zofran.  Temp daily, max 100.3. Keeping down solid foods as of yesterday, but still having some vomiting with some liquids.  Smoker, hx of COPD.       Illness  This is a new problem. Episode onset: 2-. The problem occurs daily. The problem has been unchanged. Associated symptoms include abdominal pain (generalized), anorexia, chills, congestion, coughing, fatigue, a fever (max 100.3), headaches, myalgias, nausea and weakness (generalized). Pertinent negatives include no change in bowel habit, chest pain, diaphoresis, joint swelling, neck pain, numbness, rash, sore throat, swollen glands, urinary symptoms, vertigo, visual change or vomiting. The symptoms are aggravated by eating, drinking and coughing. She has tried acetaminophen (zofran, dayquil) for the symptoms. The treatment provided mild relief.       Review of Systems   Constitutional: Positive for appetite change, chills, fatigue and fever (max 100.3). Negative for diaphoresis.   HENT: Positive for congestion, postnasal drip, rhinorrhea, sinus pressure and sneezing. Negative for ear discharge, ear pain, sinus pain, sore throat and trouble swallowing.    Eyes: Negative.    Respiratory: Positive for cough and shortness of breath. Negative for chest tightness and wheezing.    Cardiovascular: Negative.  Negative for chest pain. " "  Gastrointestinal: Positive for abdominal pain (generalized), anorexia and nausea. Negative for change in bowel habit, diarrhea and vomiting.   Genitourinary: Negative.    Musculoskeletal: Positive for myalgias. Negative for joint swelling and neck pain.   Skin: Negative.  Negative for rash.   Neurological: Positive for weakness (generalized) and headaches. Negative for dizziness, vertigo and numbness.        Objective   Vital Signs:   /60 (BP Location: Right arm, Patient Position: Sitting, Cuff Size: Pediatric)   Pulse 96   Temp 98.1 °F (36.7 °C) (Oral)   Ht 165.1 cm (65\")   Wt 42.5 kg (93 lb 12.8 oz)   SpO2 98%   BMI 15.61 kg/m²       Physical Exam  Vitals and nursing note reviewed.   Constitutional:       General: She is not in acute distress.     Appearance: She is ill-appearing.   HENT:      Head: Normocephalic and atraumatic.      Right Ear: Ear canal normal.      Left Ear: Ear canal normal.      Ears:      Comments: TM's dull bilaterally, non erythematous       Nose: Congestion present.      Comments: Generalized sinus pressure, no localized pain       Mouth/Throat:      Mouth: Mucous membranes are moist.      Pharynx: Posterior oropharyngeal erythema (mild injection with PND) present. No oropharyngeal exudate.   Eyes:      General:         Right eye: No discharge.         Left eye: No discharge.      Conjunctiva/sclera: Conjunctivae normal.   Cardiovascular:      Rate and Rhythm: Normal rate and regular rhythm.   Pulmonary:      Effort: No respiratory distress.      Breath sounds: No wheezing, rhonchi or rales.      Comments: Slightly decreased aeration, but clear.  Loose cough.   Abdominal:      General: Bowel sounds are increased.      Tenderness: There is generalized abdominal tenderness. There is no right CVA tenderness, left CVA tenderness, guarding or rebound.   Musculoskeletal:      Cervical back: Neck supple. No tenderness.   Lymphadenopathy:      Cervical: No cervical adenopathy. "   Skin:     General: Skin is warm and dry.   Neurological:      General: No focal deficit present.      Mental Status: She is alert and oriented to person, place, and time.   Psychiatric:         Mood and Affect: Mood normal.         Thought Content: Thought content normal.          Result Review :     Common labs    Common Labs 8/10/22 8/10/22 8/23/22 8/23/22 2/3/23 2/3/23 2/3/23 2/3/23    1421 1421 1039 1039 0834 0837 0837 0837   Glucose  114 (A)  91 79  77    BUN  18  11 7  8    Creatinine  0.81  0.60 0.60  0.64    Sodium  141  140 142  142    Potassium  5.3 (A)  4.5 3.9  3.8    Chloride  105  102 105  104    Calcium  9.2  9.7 8.6  9.0    Albumin  4.40  4.80 4.2  4.4    Total Bilirubin  0.3  0.4 0.4  0.4    Alkaline Phosphatase  81  76 85  86    AST (SGOT)  20  34 (A) 29  21    ALT (SGPT)  20  39 (A) 15  17    WBC 14.41 (A)  11.37 (A)   9.40     Hemoglobin 14.5  14.4   14.3     Hematocrit 43.3  42.9   44.7     Platelets 495 (A)  449   438     Total Cholesterol        143   Triglycerides        81   HDL Cholesterol        50   LDL Cholesterol         77   (A) Abnormal value            Recent Results (from the past 24 hour(s))   POCT SARS-CoV-2 Antigen FAHAD + Flu    Collection Time: 02/13/23  8:51 AM    Specimen: Swab   Result Value Ref Range    SARS Antigen Not Detected Not Detected, Presumptive Negative    Influenza A Antigen FAHAD Not Detected Not Detected    Influenza B Antigen FAHAD Not Detected Not Detected    Internal Control Passed Passed    Lot Number [id number]     Expiration Date 03/11/2023                  Assessment and Plan    Diagnoses and all orders for this visit:    1. URI with cough and congestion (Primary)    2. Fever, unspecified fever cause  -     POCT SARS-CoV-2 Antigen FAHAD + Flu    3. Nausea and vomiting, unspecified vomiting type  -     ondansetron ODT (ZOFRAN-ODT) 8 MG disintegrating tablet; Place 1 tablet on the tongue Every 8 (Eight) Hours As Needed for Nausea or Vomiting.  Dispense: 30  tablet; Refill: 0  -     ondansetron (ZOFRAN) injection 4 mg      Push fluids. LORENA Sommers diet until normal BM.   May continue with Dayquil PRN  Zofran 4 mg IM x 1 in office  Rx for Zofran 8 mg ODT provided PRN  Continue with all other meds, including inhalers as prescribed  Smoking cessation encouraged--she has cut down some    See PCP or RTC if symptoms persist/worsen  See PCP for routine f/u visit and management of chronic medical conditions      This document has been electronically signed by CROW Padgett on February 13, 2023 10:43 CST,.

## 2023-02-20 ENCOUNTER — OFFICE VISIT (OUTPATIENT)
Dept: FAMILY MEDICINE CLINIC | Facility: CLINIC | Age: 53
End: 2023-02-20
Payer: MEDICARE

## 2023-02-20 VITALS
HEART RATE: 82 BPM | TEMPERATURE: 97.2 F | HEIGHT: 65 IN | OXYGEN SATURATION: 96 % | BODY MASS INDEX: 15.93 KG/M2 | DIASTOLIC BLOOD PRESSURE: 60 MMHG | SYSTOLIC BLOOD PRESSURE: 98 MMHG | WEIGHT: 95.6 LBS

## 2023-02-20 DIAGNOSIS — R39.9 UTI SYMPTOMS: Primary | ICD-10-CM

## 2023-02-20 LAB
BILIRUB BLD-MCNC: NEGATIVE MG/DL
CLARITY, POC: CLEAR
COLOR UR: YELLOW
EXPIRATION DATE: NORMAL
GLUCOSE UR STRIP-MCNC: NEGATIVE MG/DL
KETONES UR QL: NEGATIVE
LEUKOCYTE EST, POC: NEGATIVE
Lab: NORMAL
NITRITE UR-MCNC: NEGATIVE MG/ML
PH UR: 6 [PH] (ref 5–8)
PROT UR STRIP-MCNC: NEGATIVE MG/DL
RBC # UR STRIP: NEGATIVE /UL
SP GR UR: 1.02 (ref 1–1.03)
UROBILINOGEN UR QL: NORMAL

## 2023-02-20 PROCEDURE — 99213 OFFICE O/P EST LOW 20 MIN: CPT | Performed by: NURSE PRACTITIONER

## 2023-02-20 PROCEDURE — 81003 URINALYSIS AUTO W/O SCOPE: CPT | Performed by: NURSE PRACTITIONER

## 2023-02-20 PROCEDURE — 87086 URINE CULTURE/COLONY COUNT: CPT | Performed by: NURSE PRACTITIONER

## 2023-02-20 PROCEDURE — 81015 MICROSCOPIC EXAM OF URINE: CPT | Performed by: NURSE PRACTITIONER

## 2023-02-20 NOTE — PROGRESS NOTES
"Chief Complaint  Urinary Tract Infection (Frequent urination, burning X 2 day)    Subjective          Carla Richard presents to [facility] [place]    History of Present Illness  [facility]    PCP: Dr. Wick    CC: \"UTI\"    Reports UTI symptoms x 2 days--urgency, dysuria, pressure, frequency.  Reports she has had some mild constipation as well.  Having intermittent flank pain, hx of renal stones in the past, but this feels different.  Denies any vaginal symptoms. No STI concern.  Had viral infection last week with vomiting, unable to eat/drink much for 4 days.  Appetite has improved, trying to increase water intake.  Still having some nausea, taking Zofran which does help.      Urinary Tract Infection   This is a new problem. The current episode started yesterday. The problem occurs every urination. The problem has been unchanged. The quality of the pain is described as aching and burning (pressure). The pain is at a severity of 5/10. There has been no fever. She is not sexually active. Associated symptoms include flank pain ( bilateral, intermittent), frequency, nausea and urgency. Pertinent negatives include no chills, discharge, hematuria, hesitancy, possible pregnancy, sweats or vomiting. She has tried nothing for the symptoms. The treatment provided no relief. Her past medical history is significant for kidney stones.       Review of Systems   Constitutional: Negative.  Negative for chills.   Respiratory: Negative.    Cardiovascular: Negative.    Gastrointestinal: Positive for abdominal pain (lower/suprapubic), constipation ( mild) and nausea. Negative for vomiting.   Genitourinary: Positive for dysuria, flank pain ( bilateral, intermittent), frequency and urgency. Negative for hematuria, hesitancy and vaginal discharge.   Skin: Negative.    Neurological: Negative for headaches.        Objective   Vital Signs:   BP 98/60 (BP Location: " "Right arm, Patient Position: Sitting, Cuff Size: Pediatric)   Pulse 82   Temp 97.2 °F (36.2 °C) (Oral)   Ht 165.1 cm (65\")   Wt 43.4 kg (95 lb 9.6 oz)   SpO2 96%   BMI 15.91 kg/m²       Physical Exam  Vitals and nursing note reviewed.   Constitutional:       General: She is not in acute distress.     Appearance: She is not ill-appearing.   HENT:      Head: Normocephalic and atraumatic.   Cardiovascular:      Rate and Rhythm: Normal rate and regular rhythm.   Pulmonary:      Effort: Pulmonary effort is normal. No respiratory distress.      Breath sounds: No wheezing, rhonchi or rales.      Comments: Diminished, but clear    Abdominal:      General: Bowel sounds are normal.      Palpations: Abdomen is soft.      Tenderness: There is abdominal tenderness (suprapubic). There is no right CVA tenderness, left CVA tenderness, guarding or rebound.   Musculoskeletal:      Cervical back: Neck supple.   Skin:     General: Skin is warm and dry.   Neurological:      General: No focal deficit present.      Mental Status: She is alert and oriented to person, place, and time.   Psychiatric:         Mood and Affect: Mood normal.         Thought Content: Thought content normal.          Result Review :     Renal Profile    Renal Profile 8/10/22 8/23/22 2/3/23 2/3/23      0834 0837   BUN 18 11 7 8   Creatinine 0.81 0.60 0.60 0.64           Recent Results (from the past 24 hour(s))   POCT urinalysis dipstick, automated    Collection Time: 02/20/23  5:26 PM    Specimen: Urine   Result Value Ref Range    Color Yellow Yellow, Straw, Dark Yellow, Ana M    Clarity, UA Clear Clear    Specific Gravity  1.020 1.005 - 1.030    pH, Urine 6.0 5.0 - 8.0    Leukocytes Negative Negative    Nitrite, UA Negative Negative    Protein, POC Negative Negative mg/dL    Glucose, UA Negative Negative mg/dL    Ketones, UA Negative Negative    Urobilinogen, UA Normal Normal, 0.2 E.U./dL    Bilirubin Negative Negative    Blood, UA Negative Negative    Lot " Number [id number]     Expiration Date 02-                   Assessment and Plan    Diagnoses and all orders for this visit:    1. UTI symptoms (Primary)  -     POCT urinalysis dipstick, automated  -     Urine Culture - Urine, Urine, Clean Catch  -     Urinalysis, Microscopic Only - Urine, Clean Catch      Will send urine for micro and culture--will notify with results when available  Increase water intake  May take Azo OTC for symptoms for the next few days while waiting on results    See PCP or RTC if symptoms persist/worsen  See PCP for routine f/u visit and management of chronic medical conditions      This document has been electronically signed by CROW Padgett on February 20, 2023 17:34 CST,.      .

## 2023-02-21 LAB
BACTERIA UR QL AUTO: NORMAL /HPF
HYALINE CASTS UR QL AUTO: NORMAL /LPF
RBC # UR STRIP: NORMAL /HPF
REF LAB TEST METHOD: NORMAL
SQUAMOUS #/AREA URNS HPF: NORMAL /HPF
WBC # UR STRIP: NORMAL /HPF

## 2023-02-22 LAB — BACTERIA SPEC AEROBE CULT: NO GROWTH

## 2023-03-01 ENCOUNTER — OFFICE VISIT (OUTPATIENT)
Dept: FAMILY MEDICINE CLINIC | Facility: CLINIC | Age: 53
End: 2023-03-01
Payer: MEDICARE

## 2023-03-01 VITALS
TEMPERATURE: 97.6 F | DIASTOLIC BLOOD PRESSURE: 70 MMHG | OXYGEN SATURATION: 96 % | WEIGHT: 94 LBS | HEART RATE: 87 BPM | SYSTOLIC BLOOD PRESSURE: 100 MMHG | HEIGHT: 64 IN | BODY MASS INDEX: 16.05 KG/M2

## 2023-03-01 DIAGNOSIS — S29.9XXA RIB INJURY: Primary | ICD-10-CM

## 2023-03-01 PROCEDURE — 99213 OFFICE O/P EST LOW 20 MIN: CPT | Performed by: NURSE PRACTITIONER

## 2023-03-01 RX ORDER — HYDROCODONE BITARTRATE AND ACETAMINOPHEN 5; 325 MG/1; MG/1
1 TABLET ORAL EVERY 8 HOURS PRN
Qty: 6 TABLET | Refills: 0 | Status: SHIPPED | OUTPATIENT
Start: 2023-03-01 | End: 2023-03-06

## 2023-03-01 NOTE — PROGRESS NOTES
"Chief Complaint  Pain (Rib pain injury yesterday)    Subjective          Carla Richard presents to [facility] PRIMARY [facility] - [place]    History of Present Illness  [facility] Same Day/Walk in Clinic    PCP: Dr. Wick    CC: \"rib pain\"    Reports she was having to push on a car door that was stuck yesterday, left rib cage pushed in purse against vehicle, heard a pop and had terrible pain.  C/O some dyspnea with this as well. Reports dislocated rib in same area in the last 6 months.  Hx of osteoporosis.  Has taken tramadol and tylenol with no relief in pain.        Rib Injury  This is a new problem. The current episode started yesterday. The problem occurs constantly. The problem has been unchanged. Associated symptoms include coughing (chronic ). Pertinent negatives include no fever. Associated symptoms comments: +dyspnea  . The symptoms are aggravated by coughing (deep breathing; movement). She has tried acetaminophen (tramadol) for the symptoms. The treatment provided no relief.       Review of Systems   Constitutional: Negative for fever.   Respiratory: Positive for cough (chronic ) and shortness of breath. Negative for chest tightness and wheezing.    Cardiovascular: Negative.    Genitourinary: Negative.    Musculoskeletal:        +osteoporosis  +left sided rib pain     Skin: Negative.    Neurological: Positive for dizziness (due to pain) and light-headedness (due to pain).        Objective   Vital Signs:   /70 (BP Location: Right arm, Patient Position: Sitting, Cuff Size: Pediatric)   Pulse 87   Temp 97.6 °F (36.4 °C) (Oral)   Ht 162.6 cm (64\")   Wt 42.6 kg (94 lb)   SpO2 96%   BMI 16.14 kg/m²       Physical Exam  Vitals and nursing note reviewed.   Constitutional:       General: She is in acute distress (in notable pain/discomfort).      Appearance: She is not ill-appearing.   HENT:      Head: Normocephalic and atraumatic.   Cardiovascular:      Rate and Rhythm: " Normal rate and regular rhythm.   Pulmonary:      Effort: No respiratory distress.      Breath sounds: No wheezing, rhonchi or rales.      Comments: Diminished, decreased effort due to pain  Chest:      Chest wall: Deformity, swelling and tenderness present.       Musculoskeletal:      Cervical back: Neck supple.   Skin:     General: Skin is warm and dry.      Findings: No bruising, erythema or rash.   Neurological:      General: No focal deficit present.      Mental Status: She is alert and oriented to person, place, and time.   Psychiatric:         Thought Content: Thought content normal.          Result Review :     Common labs    Common Labs 8/10/22 8/10/22 8/23/22 8/23/22 2/3/23 2/3/23 2/3/23 2/3/23    1421 1421 1039 1039 0834 0837 0837 0837   Glucose  114 (A)  91 79  77    BUN  18  11 7  8    Creatinine  0.81  0.60 0.60  0.64    Sodium  141  140 142  142    Potassium  5.3 (A)  4.5 3.9  3.8    Chloride  105  102 105  104    Calcium  9.2  9.7 8.6  9.0    Albumin  4.40  4.80 4.2  4.4    Total Bilirubin  0.3  0.4 0.4  0.4    Alkaline Phosphatase  81  76 85  86    AST (SGOT)  20  34 (A) 29  21    ALT (SGPT)  20  39 (A) 15  17    WBC 14.41 (A)  11.37 (A)   9.40     Hemoglobin 14.5  14.4   14.3     Hematocrit 43.3  42.9   44.7     Platelets 495 (A)  449   438     Total Cholesterol        143   Triglycerides        81   HDL Cholesterol        50   LDL Cholesterol         77   (A) Abnormal value                      Assessment and Plan    Diagnoses and all orders for this visit:    1. Rib injury (Primary)  -     XR Ribs Left With PA Chest  -     HYDROcodone-acetaminophen (NORCO) 5-325 MG per tablet; Take 1 tablet by mouth Every 8 (Eight) Hours As Needed for Moderate Pain (rib pain).  Dispense: 6 tablet; Refill: 0      Xrays today--will notify with results when available  Encouraged to splint with pillow with movement, coughing  Rx for Norco 5, #6 only, then may resume tramadol PRN--Radny reviewed  Ice over area    See  PCP or RTC if symptoms persist/worsen  See PCP for routine f/u visit and management of chronic medical conditions      This document has been electronically signed by CROW Padgett on March 1, 2023 10:38 CST,.

## 2023-03-06 ENCOUNTER — OFFICE VISIT (OUTPATIENT)
Dept: FAMILY MEDICINE CLINIC | Facility: CLINIC | Age: 53
End: 2023-03-06
Payer: MEDICARE

## 2023-03-06 VITALS
SYSTOLIC BLOOD PRESSURE: 120 MMHG | HEIGHT: 64 IN | BODY MASS INDEX: 16.05 KG/M2 | DIASTOLIC BLOOD PRESSURE: 82 MMHG | OXYGEN SATURATION: 99 % | TEMPERATURE: 97.8 F | HEART RATE: 79 BPM | WEIGHT: 94 LBS

## 2023-03-06 DIAGNOSIS — S99.921A INJURY OF RIGHT FOOT, INITIAL ENCOUNTER: ICD-10-CM

## 2023-03-06 DIAGNOSIS — S99.922A INJURY OF LEFT FOOT, INITIAL ENCOUNTER: ICD-10-CM

## 2023-03-06 DIAGNOSIS — S90.129A CONTUSION OF TOE WITHOUT DAMAGE TO NAIL, UNSPECIFIED LATERALITY, UNSPECIFIED TOE, INITIAL ENCOUNTER: Primary | ICD-10-CM

## 2023-03-06 PROCEDURE — 99213 OFFICE O/P EST LOW 20 MIN: CPT | Performed by: NURSE PRACTITIONER

## 2023-03-06 NOTE — PROGRESS NOTES
"Chief Complaint  Pain (Pain miranda great toes )    Subjective          Carla Richard presents to [facility]IMARY [facility] - [place]    History of Present Illness  FP Same Day/Walk in Clinic    PCP: Dr. Wick    CC: \"pain in toes\"    Reports injury x 2 to feet in the last few days, thinks toes may be broken.  Hx of osteoporosis.  Reports on 3-3-2023, can fell out of grocery bag and landed on right foot--broke skin slightly and had some mild bruising/swelling, but this has improved, still painful.  Reports on 3-4-2023, reports large mirror in her home was against a wall and fell over and landed on bilateral feet, but seemed to get the left great toe the most and has had pain since that time, especially with any movement.  Taking Tylenol only for pain with little relief.  Has Rx for Tramadol, waiting on pharmacy to deliver the refill she requested.          Review of Systems   Constitutional: Negative for fever.   Musculoskeletal: Positive for arthralgias (bilateral feet, specifically bilateral great toes), gait problem (pain with walking--in wheelchair today) and joint swelling.   Skin: Positive for color change (she reported mild bruising, but this has mostly improved).        Objective   Vital Signs:   /82 (BP Location: Right arm, Patient Position: Sitting, Cuff Size: Pediatric)   Pulse 79   Temp 97.8 °F (36.6 °C) (Oral)   Ht 162.6 cm (64\")   Wt 42.6 kg (94 lb)   SpO2 99%   BMI 16.14 kg/m²       Physical Exam  Vitals and nursing note reviewed.   Constitutional:       General: She is not in acute distress.     Appearance: She is not ill-appearing.   HENT:      Head: Normocephalic and atraumatic.   Cardiovascular:      Pulses:           Dorsalis pedis pulses are 2+ on the right side and 2+ on the left side.        Posterior tibial pulses are 2+ on the right side and 2+ on the left side.   Musculoskeletal:      Cervical back: Neck supple.        Feet:    Feet:      " Right foot:      Skin integrity: Skin integrity normal.      Toenail Condition: Right toenails are normal.      Left foot:      Skin integrity: Skin integrity normal.      Toenail Condition: Left toenails are normal.   Skin:     Findings: Bruising (minimal right great toe) present. No erythema.   Neurological:      General: No focal deficit present.      Mental Status: She is alert and oriented to person, place, and time.   Psychiatric:         Mood and Affect: Mood normal.         Thought Content: Thought content normal.          Result Review :     Common labs    Common Labs 8/10/22 8/10/22 8/23/22 8/23/22 2/3/23 2/3/23 2/3/23 2/3/23    1421 1421 1039 1039 0834 0837 0837 0837   Glucose  114 (A)  91 79  77    BUN  18  11 7  8    Creatinine  0.81  0.60 0.60  0.64    Sodium  141  140 142  142    Potassium  5.3 (A)  4.5 3.9  3.8    Chloride  105  102 105  104    Calcium  9.2  9.7 8.6  9.0    Albumin  4.40  4.80 4.2  4.4    Total Bilirubin  0.3  0.4 0.4  0.4    Alkaline Phosphatase  81  76 85  86    AST (SGOT)  20  34 (A) 29  21    ALT (SGPT)  20  39 (A) 15  17    WBC 14.41 (A)  11.37 (A)   9.40     Hemoglobin 14.5  14.4   14.3     Hematocrit 43.3  42.9   44.7     Platelets 495 (A)  449   438     Total Cholesterol        143   Triglycerides        81   HDL Cholesterol        50   LDL Cholesterol         77   (A) Abnormal value                   XR foot 3+ vw bilateral    Result Date: 3/6/2023  No definite acute abnormality. Deformity base of the proximal phalanx of the fifth toe on the left suspicious for fracture age-indeterminate. Clinical correlation recommended. Other findings as above. See body of report for full details. Electronically signed by:  Agustin Fulton MD  3/6/2023 10:57 AM [facility] Workstation: [id number]        Assessment and Plan    Diagnoses and all orders for this visit:    1. Injury of left foot, initial encounter (Primary)  -     XR foot 3+ vw bilateral    2. Injury of right foot, initial encounter  -      XR foot 3+ vw bilateral      No definite acute fractures noted.  Old fracture left 5th toe that patient was already previously aware.  Arthritic changes.   Placed in bilateral post op shoes for comfort  Tylenol PRN OTC.  Will be getting refill of Tramadol as well that has been prescribed by orthopedics.   Ice PRN  If symptoms persist, can refer to Podiatry    See PCP or RTC if symptoms persist/worsen  See PCP for routine f/u visit and management of chronic medical conditions      This document has been electronically signed by CROW Padgett on March 6, 2023 11:30 CST,.

## 2023-03-16 ENCOUNTER — OFFICE VISIT (OUTPATIENT)
Dept: FAMILY MEDICINE CLINIC | Facility: CLINIC | Age: 53
End: 2023-03-16
Payer: MEDICARE

## 2023-03-16 VITALS
TEMPERATURE: 98.3 F | WEIGHT: 94.6 LBS | SYSTOLIC BLOOD PRESSURE: 110 MMHG | OXYGEN SATURATION: 99 % | BODY MASS INDEX: 16.15 KG/M2 | HEIGHT: 64 IN | HEART RATE: 74 BPM | DIASTOLIC BLOOD PRESSURE: 62 MMHG

## 2023-03-16 DIAGNOSIS — R52 BODY ACHES: ICD-10-CM

## 2023-03-16 DIAGNOSIS — J44.0 COPD WITH LOWER RESPIRATORY INFECTION: Primary | ICD-10-CM

## 2023-03-16 PROCEDURE — 1159F MED LIST DOCD IN RCRD: CPT | Performed by: NURSE PRACTITIONER

## 2023-03-16 PROCEDURE — 87428 SARSCOV & INF VIR A&B AG IA: CPT | Performed by: NURSE PRACTITIONER

## 2023-03-16 PROCEDURE — 1160F RVW MEDS BY RX/DR IN RCRD: CPT | Performed by: NURSE PRACTITIONER

## 2023-03-16 PROCEDURE — 96372 THER/PROPH/DIAG INJ SC/IM: CPT | Performed by: NURSE PRACTITIONER

## 2023-03-16 PROCEDURE — 99214 OFFICE O/P EST MOD 30 MIN: CPT | Performed by: NURSE PRACTITIONER

## 2023-03-16 RX ORDER — CEFTRIAXONE 1 G/1
1 INJECTION, POWDER, FOR SOLUTION INTRAMUSCULAR; INTRAVENOUS ONCE
Qty: 1 G | Refills: 0 | Status: SHIPPED | OUTPATIENT
Start: 2023-03-16 | End: 2023-03-16

## 2023-03-16 RX ORDER — CEFTRIAXONE 1 G/1
1 INJECTION, POWDER, FOR SOLUTION INTRAMUSCULAR; INTRAVENOUS ONCE
Status: COMPLETED | OUTPATIENT
Start: 2023-03-16 | End: 2023-03-16

## 2023-03-16 RX ORDER — BENZONATATE 100 MG/1
CAPSULE ORAL
Qty: 30 CAPSULE | Refills: 1 | Status: SHIPPED | OUTPATIENT
Start: 2023-03-16

## 2023-03-16 RX ORDER — CEFTRIAXONE 1 G/1
1 INJECTION, POWDER, FOR SOLUTION INTRAMUSCULAR; INTRAVENOUS ONCE
Status: COMPLETED | OUTPATIENT
Start: 2023-03-17 | End: 2023-03-17

## 2023-03-16 RX ADMIN — CEFTRIAXONE 1 G: 1 INJECTION, POWDER, FOR SOLUTION INTRAMUSCULAR; INTRAVENOUS at 10:00

## 2023-03-16 NOTE — PROGRESS NOTES
"Chief Complaint  Illness (Ha, st, low grade temp (100.1) body aches, diarrhea, n/v, and ears X 1 day)    Subjective          Carla Richard presents to [facility] - [place]    History of Present Illness  [facility] Same Day/Walk in Clinic    PCP: Dr. Wick    CC: \"headache, sore throat, low grade fever, body aches, n/v/d, ears\"    Reports family members have been ill with respiratory symptoms recently.  She had worsening symptoms beginning yesterday.  Temp max 100.1 with productive cough of purulent mucus.  Taking Mucinex DM, Tylenol and Zofran.  No known exposures to Covid/Flu, but reports family members never got checked. COPD--sees pulmonology at [facility].  Continues to smoke.  Using Duo Neb treatments at home, has increased to every 4 hours since worsening symptoms.  Using symbicort daily. Had recent rib injury, no fracture, but cough is worsening pain. Unable to tolerate oral antibiotics, has done okay with Rocephin IM in the past despite other allergies.            Illness  This is a new problem. The current episode started yesterday. The problem occurs constantly. The problem has been unchanged. Associated symptoms include abdominal pain (generalized), a change in bowel habit (loose stools), chills, congestion, coughing, a fever (100.2), headaches, myalgias, nausea, a sore throat, swollen glands and vomiting. Pertinent negatives include no anorexia, arthralgias, chest pain, diaphoresis, fatigue, joint swelling, neck pain, numbness, rash, urinary symptoms, vertigo, visual change or weakness. The symptoms are aggravated by coughing. Treatments tried: mucinex, neb treatments, tylenol, zofran. The treatment provided no relief.       Review of Systems   Constitutional: Positive for appetite change, chills and fever (100.2). Negative for diaphoresis and fatigue.   HENT: Positive for congestion, ear pain (reports right jaw popped, had some ear pain), postnasal drip, " "sinus pressure and sore throat. Negative for ear discharge, sneezing and trouble swallowing.    Eyes: Negative.    Respiratory: Positive for cough, chest tightness, shortness of breath and wheezing (with cough).    Cardiovascular: Negative for chest pain, palpitations and leg swelling.   Gastrointestinal: Positive for abdominal pain (generalized), change in bowel habit (loose stools), diarrhea, nausea and vomiting. Negative for anorexia and blood in stool.   Genitourinary: Negative.    Musculoskeletal: Positive for myalgias. Negative for arthralgias, joint swelling and neck pain.   Skin: Negative.  Negative for rash.   Neurological: Positive for headaches. Negative for dizziness, vertigo, weakness and numbness.        Objective   Vital Signs:   /62 (BP Location: Right arm, Patient Position: Sitting, Cuff Size: Pediatric)   Pulse 74   Temp 98.3 °F (36.8 °C) (Oral)   Ht 162.6 cm (64\")   Wt 42.9 kg (94 lb 9.6 oz)   SpO2 99%   BMI 16.24 kg/m²       Physical Exam  Vitals and nursing note reviewed.   Constitutional:       General: She is not in acute distress.     Appearance: She is ill-appearing.   HENT:      Head: Normocephalic and atraumatic.      Right Ear: Tympanic membrane and ear canal normal.      Left Ear: Tympanic membrane and ear canal normal.      Nose: Congestion (mild) present.      Comments: Generalized sinus pressure, no localized pain     Mouth/Throat:      Mouth: Mucous membranes are moist.      Pharynx: Posterior oropharyngeal erythema (mild injection with PND) present. No oropharyngeal exudate.   Eyes:      General:         Right eye: No discharge.         Left eye: No discharge.      Conjunctiva/sclera: Conjunctivae normal.   Cardiovascular:      Rate and Rhythm: Normal rate and regular rhythm.   Pulmonary:      Effort: Respiratory distress (when goes into coughing fit, cannot catch breath) present.      Breath sounds: Wheezing present. No rhonchi or rales.      Comments: Decreased " aeration, but clear.  Tight, congested, wheezy cough noted  Chest:      Chest wall: Tenderness (left rib cage, lower, anterior with cough) present.   Abdominal:      General: Bowel sounds are normal.      Palpations: Abdomen is soft.      Tenderness: There is abdominal tenderness ( generalized). There is no right CVA tenderness, left CVA tenderness, guarding or rebound.   Musculoskeletal:      Cervical back: Neck supple. No tenderness.   Lymphadenopathy:      Cervical: No cervical adenopathy.   Skin:     General: Skin is warm and dry.   Neurological:      General: No focal deficit present.      Mental Status: She is alert and oriented to person, place, and time.   Psychiatric:         Mood and Affect: Mood normal.         Thought Content: Thought content normal.          Result Review :     Common labs    Common Labs 8/10/22 8/10/22 8/23/22 8/23/22 2/3/23 2/3/23 2/3/23 2/3/23    1421 1421 1039 1039 0834 0837 0837 0837   Glucose  114 (A)  91 79  77    BUN  18  11 7  8    Creatinine  0.81  0.60 0.60  0.64    Sodium  141  140 142  142    Potassium  5.3 (A)  4.5 3.9  3.8    Chloride  105  102 105  104    Calcium  9.2  9.7 8.6  9.0    Albumin  4.40  4.80 4.2  4.4    Total Bilirubin  0.3  0.4 0.4  0.4    Alkaline Phosphatase  81  76 85  86    AST (SGOT)  20  34 (A) 29  21    ALT (SGPT)  20  39 (A) 15  17    WBC 14.41 (A)  11.37 (A)   9.40     Hemoglobin 14.5  14.4   14.3     Hematocrit 43.3  42.9   44.7     Platelets 495 (A)  449   438     Total Cholesterol        143   Triglycerides        81   HDL Cholesterol        50   LDL Cholesterol         77   (A) Abnormal value            Recent Results (from the past 24 hour(s))   POCT SARS-CoV-2 Antigen FAHAD + Flu    Collection Time: 03/16/23  9:14 AM    Specimen: Swab   Result Value Ref Range    SARS Antigen Not Detected Not Detected, Presumptive Negative    Influenza A Antigen FAHAD Not Detected Not Detected    Influenza B Antigen FAHAD Not Detected Not Detected    Internal  Control Passed Passed    Lot Number [id number]     Expiration Date 03-                   Assessment and Plan    Diagnoses and all orders for this visit:    1. COPD with lower respiratory infection (HCC) (Primary)  -     cefTRIAXone (ROCEPHIN) 1 g injection; Inject 1 g into the appropriate muscle as directed by prescriber 1 (One) Time for 1 dose.  Dispense: 1 g; Refill: 0  -     benzonatate (Tessalon Perles) 100 MG capsule; 1-2 caps po TID prn cough  Dispense: 30 capsule; Refill: 1  -     cefTRIAXone (ROCEPHIN) injection 1 g    2. Body aches  -     POCT SARS-CoV-2 Antigen FAHAD + Flu      Due to inability to tolerate oral antibiotics--Rocephin 1 gm IM x 1 in office today.  Will RTC tomorrow for 2nd injection and on 3- for 3rd injection if needed.   Continue with Mucinex.  Rx for Tessalon perles provided  Continue with Duoneb treatments every 4 hours PRN  Smoking cessation encouraged  Oxygen sat: 99%.  Wishes to defer CXR for now.   ER if worsening fever, dyspnea due to limited toleration of antibiotics    See PCP or RTC if symptoms persist/worsen  See PCP for routine f/u visit and management of chronic medical conditions      This document has been electronically signed by CROW Padgett on March 16, 2023 09:15 CDT,.    I spent 30 minutes caring for Carla on this date of service. This time includes time spent by me in the following activities:preparing for the visit, reviewing tests, performing a medically appropriate examination and/or evaluation , counseling and educating the patient/family/caregiver, ordering medications, tests, or procedures and documenting information in the medical record

## 2023-03-17 ENCOUNTER — CLINICAL SUPPORT (OUTPATIENT)
Dept: FAMILY MEDICINE CLINIC | Facility: CLINIC | Age: 53
End: 2023-03-17
Payer: MEDICARE

## 2023-03-17 PROCEDURE — 96372 THER/PROPH/DIAG INJ SC/IM: CPT | Performed by: NURSE PRACTITIONER

## 2023-03-17 RX ADMIN — CEFTRIAXONE 1 G: 1 INJECTION, POWDER, FOR SOLUTION INTRAMUSCULAR; INTRAVENOUS at 10:19

## 2023-03-17 NOTE — PROGRESS NOTES
Patient came in today for 2nd Rocephine 1 gram left dorsal gluteal.  Patient tolerated well with no complaints or concerns.  Patient instructed to come back in on Monday for her 3rd Rocephine inj.  ndc [id number]  Lot#[id number]  Exp 8/28/2024.

## 2023-03-27 DIAGNOSIS — R05.9 COUGH: ICD-10-CM

## 2023-03-27 DIAGNOSIS — J44.9 STAGE 2 MODERATE COPD BY GOLD CLASSIFICATION: ICD-10-CM

## 2023-03-27 DIAGNOSIS — J44.9 CHRONIC OBSTRUCTIVE PULMONARY DISEASE, UNSPECIFIED COPD TYPE: Primary | ICD-10-CM

## 2023-03-27 RX ORDER — AMITRIPTYLINE HYDROCHLORIDE 10 MG/1
10 TABLET, FILM COATED ORAL NIGHTLY
Qty: 30 TABLET | Refills: 3 | Status: SHIPPED | OUTPATIENT
Start: 2023-03-27

## 2023-03-27 RX ORDER — ALBUTEROL SULFATE 90 UG/1
2 AEROSOL, METERED RESPIRATORY (INHALATION) EVERY 4 HOURS PRN
Qty: 18 G | Refills: 1 | Status: SHIPPED | OUTPATIENT
Start: 2023-03-27

## 2023-04-11 ENCOUNTER — LAB (OUTPATIENT)
Dept: LAB | Facility: HOSPITAL | Age: 53
End: 2023-04-11
Payer: MEDICARE

## 2023-04-11 DIAGNOSIS — K44.9 HIATAL HERNIA: ICD-10-CM

## 2023-04-11 DIAGNOSIS — R11.0 NAUSEA: ICD-10-CM

## 2023-04-11 DIAGNOSIS — E55.9 VITAMIN D DEFICIENCY: ICD-10-CM

## 2023-04-11 DIAGNOSIS — Z12.2 SCREENING FOR LUNG CANCER: ICD-10-CM

## 2023-04-11 DIAGNOSIS — M81.0 OSTEOPOROSIS WITHOUT CURRENT PATHOLOGICAL FRACTURE, UNSPECIFIED OSTEOPOROSIS TYPE: ICD-10-CM

## 2023-04-11 DIAGNOSIS — K29.70 GASTRITIS WITHOUT BLEEDING, UNSPECIFIED CHRONICITY, UNSPECIFIED GASTRITIS TYPE: ICD-10-CM

## 2023-04-11 DIAGNOSIS — K22.719 BARRETT'S ESOPHAGUS WITH DYSPLASIA: ICD-10-CM

## 2023-04-11 DIAGNOSIS — R63.6 UNDERWEIGHT: ICD-10-CM

## 2023-04-11 DIAGNOSIS — S76.011A STRAIN OF RIGHT HIP, INITIAL ENCOUNTER: ICD-10-CM

## 2023-04-11 DIAGNOSIS — K59.04 CHRONIC IDIOPATHIC CONSTIPATION: ICD-10-CM

## 2023-04-11 DIAGNOSIS — K21.00 GASTROESOPHAGEAL REFLUX DISEASE WITH ESOPHAGITIS WITHOUT HEMORRHAGE: ICD-10-CM

## 2023-04-11 DIAGNOSIS — R10.13 EPIGASTRIC PAIN: ICD-10-CM

## 2023-04-11 DIAGNOSIS — E53.8 VITAMIN B12 DEFICIENCY: ICD-10-CM

## 2023-04-11 PROCEDURE — 85025 COMPLETE CBC W/AUTO DIFF WBC: CPT

## 2023-04-11 PROCEDURE — 80053 COMPREHEN METABOLIC PANEL: CPT

## 2023-04-12 LAB
ALBUMIN SERPL-MCNC: 4.9 G/DL (ref 3.5–5.2)
ALBUMIN/GLOB SERPL: 2.7 G/DL
ALP SERPL-CCNC: 76 U/L (ref 39–117)
ALT SERPL W P-5'-P-CCNC: 21 U/L (ref 1–33)
ANION GAP SERPL CALCULATED.3IONS-SCNC: 14.2 MMOL/L (ref 5–15)
AST SERPL-CCNC: 22 U/L (ref 1–32)
BASOPHILS # BLD AUTO: 0.1 10*3/MM3 (ref 0–0.2)
BASOPHILS NFR BLD AUTO: 1 % (ref 0–1.5)
BILIRUB SERPL-MCNC: 0.6 MG/DL (ref 0–1.2)
BUN SERPL-MCNC: 9 MG/DL (ref 6–20)
BUN/CREAT SERPL: 12.2 (ref 7–25)
CALCIUM SPEC-SCNC: 9.8 MG/DL (ref 8.6–10.5)
CHLORIDE SERPL-SCNC: 104 MMOL/L (ref 98–107)
CO2 SERPL-SCNC: 22.8 MMOL/L (ref 22–29)
CREAT SERPL-MCNC: 0.74 MG/DL (ref 0.57–1)
DEPRECATED RDW RBC AUTO: 41.6 FL (ref 37–54)
EGFRCR SERPLBLD CKD-EPI 2021: 95.7 ML/MIN/1.73
EOSINOPHIL # BLD AUTO: 0.2 10*3/MM3 (ref 0–0.4)
EOSINOPHIL NFR BLD AUTO: 2 % (ref 0.3–6.2)
ERYTHROCYTE [DISTWIDTH] IN BLOOD BY AUTOMATED COUNT: 12.1 % (ref 12.3–15.4)
GLOBULIN UR ELPH-MCNC: 1.8 GM/DL
GLUCOSE SERPL-MCNC: 129 MG/DL (ref 65–99)
HCT VFR BLD AUTO: 45.5 % (ref 34–46.6)
HGB BLD-MCNC: 15.8 G/DL (ref 12–15.9)
IMM GRANULOCYTES # BLD AUTO: 0.04 10*3/MM3 (ref 0–0.05)
IMM GRANULOCYTES NFR BLD AUTO: 0.4 % (ref 0–0.5)
LYMPHOCYTES # BLD AUTO: 3.2 10*3/MM3 (ref 0.7–3.1)
LYMPHOCYTES NFR BLD AUTO: 32.6 % (ref 19.6–45.3)
MCH RBC QN AUTO: 33.3 PG (ref 26.6–33)
MCHC RBC AUTO-ENTMCNC: 34.7 G/DL (ref 31.5–35.7)
MCV RBC AUTO: 96 FL (ref 79–97)
MONOCYTES # BLD AUTO: 0.99 10*3/MM3 (ref 0.1–0.9)
MONOCYTES NFR BLD AUTO: 10.1 % (ref 5–12)
NEUTROPHILS NFR BLD AUTO: 5.29 10*3/MM3 (ref 1.7–7)
NEUTROPHILS NFR BLD AUTO: 53.9 % (ref 42.7–76)
NRBC BLD AUTO-RTO: 0 /100 WBC (ref 0–0.2)
PLATELET # BLD AUTO: 484 10*3/MM3 (ref 140–450)
PMV BLD AUTO: 10 FL (ref 6–12)
POTASSIUM SERPL-SCNC: 4.1 MMOL/L (ref 3.5–5.2)
PROT SERPL-MCNC: 6.7 G/DL (ref 6–8.5)
RBC # BLD AUTO: 4.74 10*6/MM3 (ref 3.77–5.28)
SODIUM SERPL-SCNC: 141 MMOL/L (ref 136–145)
WBC NRBC COR # BLD: 9.82 10*3/MM3 (ref 3.4–10.8)

## 2023-04-21 ENCOUNTER — TELEPHONE (OUTPATIENT)
Dept: FAMILY MEDICINE CLINIC | Facility: CLINIC | Age: 53
End: 2023-04-21
Payer: MEDICARE

## 2023-04-21 NOTE — TELEPHONE ENCOUNTER
Patient called and states was seen at [facility] ED and states needs to follow up with primary provider; records have been requested for review

## (undated) DEVICE — SINGLE-USE BIOPSY FORCEPS: Brand: RADIAL JAW 4

## (undated) DEVICE — BITEBLOCK ENDO W/STRAP 60F A/ LF DISP

## (undated) DEVICE — CANN SMPL SOFTECH BIFLO ETCO2 A/M 7FT